# Patient Record
Sex: FEMALE | Race: WHITE | NOT HISPANIC OR LATINO | Employment: FULL TIME | URBAN - METROPOLITAN AREA
[De-identification: names, ages, dates, MRNs, and addresses within clinical notes are randomized per-mention and may not be internally consistent; named-entity substitution may affect disease eponyms.]

---

## 2017-07-20 DIAGNOSIS — Z12.31 OTHER SCREENING MAMMOGRAM: Primary | ICD-10-CM

## 2017-07-31 ENCOUNTER — HOSPITAL ENCOUNTER (OUTPATIENT)
Dept: RADIOLOGY | Facility: HOSPITAL | Age: 57
Discharge: HOME | End: 2017-07-31
Attending: NURSE PRACTITIONER
Payer: COMMERCIAL

## 2017-07-31 DIAGNOSIS — Z12.31 OTHER SCREENING MAMMOGRAM: ICD-10-CM

## 2017-07-31 PROCEDURE — 77067 SCR MAMMO BI INCL CAD: CPT | Mod: 26,,, | Performed by: RADIOLOGY

## 2017-07-31 PROCEDURE — 77067 SCR MAMMO BI INCL CAD: CPT | Mod: TC

## 2017-08-22 DIAGNOSIS — Z12.11 COLON CANCER SCREENING: ICD-10-CM

## 2018-03-02 ENCOUNTER — HOSPITAL ENCOUNTER (EMERGENCY)
Facility: HOSPITAL | Age: 58
Discharge: HOME | End: 2018-03-02
Attending: EMERGENCY MEDICINE
Payer: COMMERCIAL

## 2018-03-02 VITALS
HEIGHT: 59 IN | OXYGEN SATURATION: 98 % | RESPIRATION RATE: 16 BRPM | HEART RATE: 95 BPM | BODY MASS INDEX: 36.29 KG/M2 | TEMPERATURE: 98 F | DIASTOLIC BLOOD PRESSURE: 73 MMHG | WEIGHT: 180 LBS | SYSTOLIC BLOOD PRESSURE: 114 MMHG

## 2018-03-02 DIAGNOSIS — R52 PAIN: ICD-10-CM

## 2018-03-02 DIAGNOSIS — M25.569 KNEE PAIN, UNSPECIFIED CHRONICITY, UNSPECIFIED LATERALITY: Primary | ICD-10-CM

## 2018-03-02 LAB
BASOPHILS # BLD AUTO: 0.03 K/UL
BASOPHILS NFR BLD: 0.2 %
DIFFERENTIAL METHOD: ABNORMAL
EOSINOPHIL # BLD AUTO: 0.5 K/UL
EOSINOPHIL NFR BLD: 3.1 %
ERYTHROCYTE [DISTWIDTH] IN BLOOD BY AUTOMATED COUNT: 14 %
HCT VFR BLD AUTO: 42.3 %
HGB BLD-MCNC: 13.8 G/DL
LYMPHOCYTES # BLD AUTO: 3.1 K/UL
LYMPHOCYTES NFR BLD: 18.4 %
MCH RBC QN AUTO: 30.8 PG
MCHC RBC AUTO-ENTMCNC: 32.6 G/DL
MCV RBC AUTO: 94 FL
MONOCYTES # BLD AUTO: 1.3 K/UL
MONOCYTES NFR BLD: 7.6 %
NEUTROPHILS # BLD AUTO: 11.7 K/UL
NEUTROPHILS NFR BLD: 70.5 %
PLATELET # BLD AUTO: 256 K/UL
PMV BLD AUTO: 9.8 FL
RBC # BLD AUTO: 4.48 M/UL
WBC # BLD AUTO: 16.55 K/UL

## 2018-03-02 PROCEDURE — 63600175 PHARM REV CODE 636 W HCPCS: Performed by: NURSE PRACTITIONER

## 2018-03-02 PROCEDURE — 85025 COMPLETE CBC W/AUTO DIFF WBC: CPT

## 2018-03-02 PROCEDURE — 99284 EMERGENCY DEPT VISIT MOD MDM: CPT | Mod: 25

## 2018-03-02 PROCEDURE — 96372 THER/PROPH/DIAG INJ SC/IM: CPT

## 2018-03-02 RX ORDER — HYDROCODONE BITARTRATE AND ACETAMINOPHEN 5; 325 MG/1; MG/1
1 TABLET ORAL EVERY 6 HOURS PRN
Qty: 7 TABLET | Refills: 0 | Status: SHIPPED | OUTPATIENT
Start: 2018-03-02 | End: 2018-05-14

## 2018-03-02 RX ORDER — KETOROLAC TROMETHAMINE 30 MG/ML
30 INJECTION, SOLUTION INTRAMUSCULAR; INTRAVENOUS
Status: COMPLETED | OUTPATIENT
Start: 2018-03-02 | End: 2018-03-02

## 2018-03-02 RX ADMIN — KETOROLAC TROMETHAMINE 30 MG: 30 INJECTION, SOLUTION INTRAMUSCULAR at 07:03

## 2018-03-03 NOTE — DISCHARGE INSTRUCTIONS
Follow-up with PCP within 2-3 days. Return to ED if symptoms worsen or change, such as fever, chills, increased swelling or redness to area.

## 2018-03-03 NOTE — ED PROVIDER NOTES
"Encounter Date: 3/2/2018       History     Chief Complaint   Patient presents with    Joint Swelling     right knee swelling and tenderness radiating to calf area for 3 days.  Denies shortness of breath     Pt is a 57-year-old female with pmhx of total knee surgery on R knee and hidradenitis suppurativa who presents today with R knee pain and swelling x 2 days. Pt denies any trauma or injury to R knee. Reports she has been told that she has arthritis, but is currently not taking any medication for it. States that she had a total knee replacement on her R knee 2 years ago. Denies any recent surgeries or hospitalizations. Pt reports that she takes weekly Humira injections for her skin condition.  Pt describes the pain as a throbbing pain. Pt has tried ice and aleve at home with no relief. Reports that she can walk but it hurts and she has noticed that she is starting to "limp."  She also reports that she thinks that she had some chills last night with nausea. Denies chills and nausea at this time. Denies fever and vomiting. Pt also denies dizziness, headache, SOB, chest pain, and abdominal pain. Pt denies any other complaints at this time.       The history is provided by the patient.   Leg Pain    The incident occurred at home. There was no injury mechanism. The incident occurred two days ago. Pain location: right knee. The quality of the pain is described as throbbing. The pain is at a severity of 9/10. The pain has been constant since onset. Pertinent negatives include no numbness, no inability to bear weight, no loss of motion, no muscle weakness, no loss of sensation and no tingling. She reports no foreign bodies present. The symptoms are aggravated by palpation, bearing weight and activity. She has tried ice and NSAIDs for the symptoms. The treatment provided no relief.     Review of patient's allergies indicates:   Allergen Reactions    Morphine Other (See Comments)     headaches    Pcn [penicillins] Nausea " And Vomiting    Latex, natural rubber Rash     Past Medical History:   Diagnosis Date    HS (hereditary spherocytosis)      Past Surgical History:   Procedure Laterality Date    achilles tendon repair      CHOLECYSTECTOMY      TOTAL KNEE ARTHROPLASTY       Family History   Problem Relation Age of Onset    Cancer Mother     Cancer Father      lung CA    Cancer Son      wade    Breast cancer Sister     Liver cancer Maternal Uncle      Social History   Substance Use Topics    Smoking status: Current Some Day Smoker     Types: Vaping with nicotine    Smokeless tobacco: Not on file    Alcohol use Yes      Comment: occ     Review of Systems   Constitutional: Negative for chills and fever.   Respiratory: Negative for chest tightness, shortness of breath and wheezing.    Cardiovascular: Negative for chest pain, palpitations and leg swelling.   Gastrointestinal: Negative for abdominal pain, nausea and vomiting.   Genitourinary: Negative for dysuria.   Musculoskeletal: Positive for joint swelling. Negative for back pain, gait problem, neck pain and neck stiffness.   Skin: Negative for rash.   Allergic/Immunologic: Positive for immunocompromised state.   Neurological: Negative for dizziness, tingling, weakness, light-headedness, numbness and headaches.   Hematological: Does not bruise/bleed easily.       Physical Exam     Initial Vitals [03/02/18 1807]   BP Pulse Resp Temp SpO2   138/85 94 16 97.7 °F (36.5 °C) 99 %      MAP       102.67         Physical Exam    Nursing note and vitals reviewed.  Constitutional: Vital signs are normal. She appears well-developed and well-nourished. She is Obese . She is cooperative.  Non-toxic appearance. She does not have a sickly appearance.   Obese   HENT:   Head: Normocephalic and atraumatic.   Right Ear: Hearing normal.   Left Ear: Hearing normal.   Nose: Nose normal.   Mouth/Throat: Oropharynx is clear and moist.   Eyes: Conjunctivae, EOM and lids are normal. Pupils are  equal, round, and reactive to light.   Neck: Normal range of motion and phonation normal. Neck supple. No tracheal deviation present.   Cardiovascular: Normal rate, regular rhythm and normal heart sounds.   Pulses:       Radial pulses are 2+ on the right side, and 2+ on the left side.        Dorsalis pedis pulses are 2+ on the right side, and 2+ on the left side.   Pulmonary/Chest: Effort normal and breath sounds normal. No respiratory distress. She has no decreased breath sounds.   Abdominal: Soft. Bowel sounds are normal.   Musculoskeletal:        Right knee: She exhibits swelling. She exhibits normal range of motion, no effusion, no ecchymosis, no deformity, no laceration, no erythema and no bony tenderness. Tenderness found.        Legs:  Neurological: She is alert and oriented to person, place, and time. She has normal strength. No sensory deficit. Gait normal.   Skin: Skin is warm, dry and intact. Capillary refill takes less than 2 seconds.   Psychiatric: Her speech is normal.         ED Course   Procedures  Labs Reviewed   CBC W/ AUTO DIFFERENTIAL - Abnormal; Notable for the following:        Result Value    WBC 16.55 (*)     Gran # (ANC) 11.7 (*)     Mono # 1.3 (*)     All other components within normal limits         Imaging Results          X-Ray Knee 3 View Right (Final result)  Result time 03/02/18 19:16:43    Final result by Kaleb Rodriguez MD (03/02/18 19:16:43)                 Impression:      No acute fracture.    Status post prior right total knee arthroplasty.  Orthopedic appliances appear intact and in good position and alignment.    Soft tissue swelling.        Electronically signed by: KALEB RODRIGUEZ MD  Date:     03/02/18  Time:    19:16              Narrative:    History: .    Right knee 3 views:    Status post prior right total knee arthroplasty.  Orthopedic appliances appear intact and in good position and alignment.    No acute fractures or dislocations.  Mild soft tissue swelling about  the knee.                                 Medical Decision Making:   Initial Assessment:   Pt is a 57-year-old with pmhx of R total knee replacement and Hs who presents today with R knee pain and swelling x 2 days. Pt has tenderness, swelling, and decreased ROM to R knee due to pain. No redness noted. No deformity or bony tenderness noted. DP 2+ bilaterally by palpation. Pt ambulatory.   Differential Diagnosis:   Arthritis, bursitis, septic arthritis, osteomyelitis   Independently Interpreted Test(s):   I have ordered and independently interpreted X-rays - see prior notes.  Clinical Tests:   Lab Tests: Reviewed       <> Summary of Lab: Benign  ED Management:  Xray, CBC, toradol  Xray normal. WBC elevated but no left shift. Pt's pain and swelling to R knee most likely due to arthritis. Pt instructed to f/u with PCP within 2-3 days. Pt instructed to return to emergency department if symptoms worsen or change, such as increased pain, swelling, warmth or redness to area. Pt verbalized d/c instructions and is in compliance and agreement with tx plan.     Rx: Norco                   Attending Attestation:     Physician Attestation Statement for NP/PA:   I have conducted a face to face encounter with this patient in addition to the NP/PA, due to NP/PA Request    Other NP/PA Attestation Additions:    History of Present Illness: Agree; 49-year-old female present see emergency department complaining of right knee pain and swelling.  Onset about 2 days ago.  She reports a constant swelling and pain that is worse with angulation without alleviating factors.  Described as aching and throbbing.  History of total knee replacement in that leg.  Minimal relief with ice and over-the-counter Aleve at home.  No other symptoms reported.  Denies any fever.   Physical Exam: agree   Medical Decision Making: Agree; x-ray and labs benign; patient instructed on symptomatically management, follow-up with orthopedist in her primary care  physician, reasons to return to the emergency room                    Clinical Impression:   The primary encounter diagnosis was Knee pain, unspecified chronicity, unspecified laterality. A diagnosis of Pain was also pertinent to this visit.                           Campos Nova NP  03/02/18 2013       Cj Apodaca MD  03/05/18 6843

## 2018-03-03 NOTE — ED NOTES
Report received, pt care assumed. Pt sitting up on stretcher, ELLIE, VSS. Dr. Apodaca at bedside evaluating pt. Pt updated on plan of care. Will continue to monitor.

## 2018-03-05 ENCOUNTER — OFFICE VISIT (OUTPATIENT)
Dept: INTERNAL MEDICINE | Facility: CLINIC | Age: 58
End: 2018-03-05
Payer: COMMERCIAL

## 2018-03-05 VITALS
BODY MASS INDEX: 39.85 KG/M2 | DIASTOLIC BLOOD PRESSURE: 64 MMHG | HEART RATE: 106 BPM | WEIGHT: 197.31 LBS | SYSTOLIC BLOOD PRESSURE: 100 MMHG | OXYGEN SATURATION: 95 %

## 2018-03-05 DIAGNOSIS — L30.3 PUSTULAR ECZEMA: ICD-10-CM

## 2018-03-05 DIAGNOSIS — M25.561 ACUTE PAIN OF RIGHT KNEE: Primary | ICD-10-CM

## 2018-03-05 PROCEDURE — 99999 PR PBB SHADOW E&M-EST. PATIENT-LVL IV: CPT | Mod: PBBFAC,,, | Performed by: INTERNAL MEDICINE

## 2018-03-05 PROCEDURE — 99213 OFFICE O/P EST LOW 20 MIN: CPT | Mod: S$GLB,,, | Performed by: INTERNAL MEDICINE

## 2018-03-05 RX ORDER — NAPROXEN 500 MG/1
500 TABLET ORAL 2 TIMES DAILY PRN
Qty: 10 TABLET | Refills: 0 | Status: SHIPPED | OUTPATIENT
Start: 2018-03-05 | End: 2018-05-14

## 2018-03-05 NOTE — PROGRESS NOTES
Subjective:       Patient ID: Radha Ervin is a 57 y.o. female.    Chief Complaint: Knee Pain (right)    HPI Mrs. Ervin is a 57-year-old female who presents with a chief complaint of right knee pain.  She is a patient of Dr. Valdivia and this is her first visit with me.Patient states that she had right knee replacement 2 years ago.  She was doing well until Friday when she began to experience pain in the right knee.  She describes this pain as a soreness.  It is 7 out of 10 in severity.  She went to the emergency department on Friday evening for this pain.  X-rays were obtained which were normal.  She has associated swelling but no redness or warmth of the knee.  She was given hydrocodone/acetaminophen which she has been taking; she is also been taking ibuprofen; however she has got no relief from her pain.  Of note she was following with Dr. Velasquez who performed her knee replacement surgery.  However due to a change in her insurance she can no longer see him and she needs to establish with a different orthopedist here at [facility].    She complains of bumps on her hands which have been present for some time. She follows with dermatology. They told her it was some form of eczema. She was prescribed a z-pack. The lesions got better but are now back. They are itchy.    Review of Systems   Musculoskeletal: Positive for joint swelling (see hpi).   Skin: Positive for rash.       Objective:      Physical Exam   Constitutional: She is oriented to person, place, and time. She appears well-developed and well-nourished. No distress.   Musculoskeletal: She exhibits edema. She exhibits no deformity.   Right knee: +edema, vertical incisional scar-well healed. No erythema, warmth. +crepitus felt on ROM activities but able to perform active and passive full ROM   Neurological: She is alert and oriented to person, place, and time.   Skin: Skin is warm and dry. She is not diaphoretic.   Numerous white pustules on an erythematous  base on bilateral hands   Psychiatric: She has a normal mood and affect. Her behavior is normal. Judgment and thought content normal.   Vitals reviewed.      Assessment:       1. Acute pain of right knee    2. Pustular eczema        Plan:     1. Acute pain of right knee  -I have reviewed both the patient's x-rays and the x-ray report, which did not reveal any acute changes  -Prescribing naproxen 500 mg PO BID PRN pain X 5 days, lidocaine 1 application BID PRN pain  -Apply ice or heat to the site  -Referral placed to orthopedics  -Discussed with patient that if any signs of infection such as fever, warmth or redness of knee, she should go to emergency department for evaluation. She understood    2. Pustular eczema  -Patient has appt with dermatology at 4:40 today    RTC for worsening symptoms  RTC PRN

## 2018-03-07 ENCOUNTER — TELEPHONE (OUTPATIENT)
Dept: INTERNAL MEDICINE | Facility: CLINIC | Age: 58
End: 2018-03-07

## 2018-03-07 NOTE — TELEPHONE ENCOUNTER
----- Message from Diana Cardenas sent at 3/7/2018 11:50 AM CST -----  Contact: [phone]/self  Patient called in returning your call. Please advise.

## 2018-03-07 NOTE — TELEPHONE ENCOUNTER
Called pt , no answer, left voicemail adv that no one from Dr. Pedersen office called pt to my knowing and that if pt has any other questions call office back at [phone]!

## 2018-03-22 ENCOUNTER — OFFICE VISIT (OUTPATIENT)
Dept: ORTHOPEDICS | Facility: CLINIC | Age: 58
End: 2018-03-22
Payer: COMMERCIAL

## 2018-03-22 DIAGNOSIS — M65.9 SYNOVITIS OF RIGHT KNEE: Primary | ICD-10-CM

## 2018-03-22 PROCEDURE — 99999 PR PBB SHADOW E&M-EST. PATIENT-LVL I: CPT | Mod: PBBFAC,,, | Performed by: ORTHOPAEDIC SURGERY

## 2018-03-22 PROCEDURE — 99214 OFFICE O/P EST MOD 30 MIN: CPT | Mod: S$GLB,,, | Performed by: ORTHOPAEDIC SURGERY

## 2018-03-22 NOTE — PROGRESS NOTES
Subjective:      Patient ID: Radha Ervin is a 57 y.o. female.    Chief Complaint: No chief complaint on file.  Right knee pain  HPI     Tthe patient reports pain and swelling in her right total knee which was  Replaced about 2 years ago..  It started acutely while taking Humira.  She has a fairly generalized rash and some swelling in all her extremities.  Denies fever and chills.  Stopped Humira 2 weeks ago and feels much better already.    Review of Systems   Constitution: Negative for fever and weight loss.   HENT: Negative for congestion.    Eyes: Negative for visual disturbance.   Cardiovascular: Negative for chest pain.   Respiratory: Negative for shortness of breath.    Hematologic/Lymphatic: Negative for bleeding problem. Does not bruise/bleed easily.   Skin: Positive for rash. Negative for poor wound healing.   Musculoskeletal: Positive for joint pain and joint swelling.   Gastrointestinal: Negative for abdominal pain.   Genitourinary: Negative for dysuria.   Neurological: Negative for seizures.   Psychiatric/Behavioral: Negative for altered mental status.   Allergic/Immunologic: Negative for persistent infections.         Objective:            Ortho/SPM Exam    Right Knee    There were no vitals filed for this visit.    The patient is not in acute distress.   Body habitus is obese.   The patient walks without a limp.  Resisted SLR negative.   The skin over the knee is has a healed scar.  Knee effusion 1+  Tendernes is located absent  Range of motion- Flexion 115 deg, Extension 0 deg,   Ligament exam:   MCL intact     Pulses DP present, PT present.  Motor normal 5/5 strength in all tested muscle groups.   Sensory normal.    There were no vitals filed for this visit.    I reviewed the relevant radiographic images for the patient's condition:a well-positioned right total knee is present without visible complicating  process            Assessment:       Encounter Diagnosis   Name Primary?    Synovitis of  right knee Yes            Based on spontaneous improvement, this appears to be a drug reaction. I'm still concerned about the possibility of latent infection  Plan:       Diagnoses and all orders for this visit:    Synovitis of right knee      I explained my diagnostic impression and the reasoning behind it in detail, using layman's terms.      I discussed aspiration with the patient and she hopes to avoid this.    Considering the improvement, infection is less likely    I recommend avoidance of vigorous activity.  Patient advised to call my office or go to the emergency room  for acute fever or worsening

## 2018-03-22 NOTE — LETTER
03/22/2018    To whom it may concern:    Radha Ervin is under my medical care. She was seen today and may return to work/school on Tomorrow with the following restrictions: sit down work only for 10 days.    Yours truly,        Flex De La Cruz MD

## 2018-03-22 NOTE — LETTER
March 22, 2018      Melba Pedersen MD  [address] [facility] [address]           [facility]  [address] [facility] [address]  Phone: [phone]          Patient: Radha Ervin   MR Number: [id number]   YOB: 1960   Date of Visit: 3/22/2018       Dear Dr. Melba Pedersen:    Thank you for referring Radha Ervin to me for evaluation. Attached you will find relevant portions of my assessment and plan of care.    If you have questions, please do not hesitate to call me. I look forward to following Radha Ervin along with you.    Sincerely,    Flex De La Cruz MD    Enclosure  CC:  No Recipients    If you would like to receive this communication electronically, please contact [email or web address] or [phone] to request more information on [facility] access.    For providers and/or their staff who would like to refer a patient to [facility], please contact us through our one-stop-shop provider referral line, [facility] , at [phone].    If you feel you have received this communication in error or would no longer like to receive these types of communications, please e-mail [email or web address]

## 2018-04-03 ENCOUNTER — OFFICE VISIT (OUTPATIENT)
Dept: ORTHOPEDICS | Facility: CLINIC | Age: 58
End: 2018-04-03
Payer: COMMERCIAL

## 2018-04-03 VITALS
WEIGHT: 197 LBS | WEIGHT: 197 LBS | HEIGHT: 59 IN | BODY MASS INDEX: 39.72 KG/M2 | HEIGHT: 59 IN | BODY MASS INDEX: 39.72 KG/M2

## 2018-04-03 DIAGNOSIS — M65.9 SYNOVITIS OF RIGHT KNEE: Primary | ICD-10-CM

## 2018-04-03 PROCEDURE — 99999 PR PBB SHADOW E&M-EST. PATIENT-LVL III: CPT | Mod: PBBFAC,,, | Performed by: ORTHOPAEDIC SURGERY

## 2018-04-03 PROCEDURE — 99499 UNLISTED E&M SERVICE: CPT | Mod: S$GLB,,, | Performed by: ORTHOPAEDIC SURGERY

## 2018-04-03 PROCEDURE — 99213 OFFICE O/P EST LOW 20 MIN: CPT | Mod: S$GLB,,, | Performed by: ORTHOPAEDIC SURGERY

## 2018-04-03 NOTE — LETTER
04/03/2018    To whom it may concern:    Radha Ervin is under my medical care. She was seen today and may return to work/school on tomorrow with the following restrictions: No standing for over one hour.    Yours truly,        Flex De La Cruz MD

## 2018-04-03 NOTE — PROGRESS NOTES
"Subjective:      Patient ID: Radha Ervin is a 57 y.o. female.    Chief Complaint: Pain of the Right Knee      HPI:  The patient is seen for postop follow-up of right  TKA.  Pain control has been satisfactory  They feel that they are ambulating easily  Preoperative complaints include: Compared to the previous visit the patient reports that her discomfort and swelling are much improved      Current Outpatient Prescriptions:     ADALIMUMAB (HUMIRA SUBQ), Inject into the skin., Disp: , Rfl:     hydrocodone-acetaminophen 5-325mg (NORCO) 5-325 mg per tablet, Take 1 tablet by mouth every 6 (six) hours as needed for Pain., Disp: 7 tablet, Rfl: 0    lidocaine 5 % Gel, Apply 1 application topically 2 (two) times daily as needed (pain)., Disp: 30 g, Rfl: 0    naproxen (NAPROSYN) 500 MG tablet, Take 1 tablet (500 mg total) by mouth 2 (two) times daily as needed (pain)., Disp: 10 tablet, Rfl: 0  Review of patient's allergies indicates:   Allergen Reactions    Morphine Other (See Comments)     headaches    Pcn [penicillins] Nausea And Vomiting    Latex, natural rubber Rash       Ht 4' 11" (1.499 m)   Wt 89.4 kg (197 lb)   LMP  (LMP Unknown)   BMI 39.79 kg/m²     ROS        Objective:    Ortho Exam          Alert, oriented, no acute distress  non-ataxic  wound margins intact and healing well.  No signs of infection.  Range of motion: extension- 0 degrees; flexion- 135 degrees  Valgus/varus stability- stable  mild  Effusion is trace at most  Assessment:     Imaging: NA        1. Synovitis of right knee        The synovitis and swelling was apparently a drug reaction which is resolving        Plan:          No Follow-up on file.    The patient and I discussed the situation.  Considering the very unusual presentation, I would like to follow-up until he returns to baseline.  I warned the patient to go to the ER call for any acute worsening        "

## 2018-05-14 ENCOUNTER — OFFICE VISIT (OUTPATIENT)
Dept: ORTHOPEDICS | Facility: CLINIC | Age: 58
End: 2018-05-14
Payer: COMMERCIAL

## 2018-05-14 DIAGNOSIS — M65.9 SYNOVITIS OF RIGHT KNEE: Primary | ICD-10-CM

## 2018-05-14 PROCEDURE — 99999 PR PBB SHADOW E&M-EST. PATIENT-LVL II: CPT | Mod: PBBFAC,,, | Performed by: ORTHOPAEDIC SURGERY

## 2018-05-14 PROCEDURE — 99212 OFFICE O/P EST SF 10 MIN: CPT | Mod: S$GLB,,, | Performed by: ORTHOPAEDIC SURGERY

## 2018-05-14 NOTE — PROGRESS NOTES
Subjective:      Patient ID: Radha Ervin is a 57 y.o. female.    Chief Complaint: Follow-up of the Right Knee and Follow-up (right knee total replacement )      HPI:  Follow-up for swelling of right total knee.  This was apparently a drug reaction.  Patient reports that the knee has returned to baseline.    No current outpatient prescriptions on file.  Review of patient's allergies indicates:   Allergen Reactions    Morphine Other (See Comments)     headaches    Pcn [penicillins] Nausea And Vomiting    Latex, natural rubber Rash       LMP  (LMP Unknown)     ROS        Objective:    Ortho Exam          Alert, oriented, no acute distress  non-ataxic  wound margins intact and healing well.  No signs of infection.   No effusion  Range of motion: extension- 0 degrees; flexion- 120 degrees  Valgus/varus stability- stable  none    Assessment:     Imaging: na        1. Synovitis of right knee        Unusual drug reaction-apparently resolving        Plan:          No Follow-up on file. no further intervention for now.  Patient advised to return for any acute recurrence  Otherwise follow-up in one year with x-ray

## 2018-06-08 DIAGNOSIS — Z11.59 NEED FOR HEPATITIS C SCREENING TEST: ICD-10-CM

## 2018-06-30 ENCOUNTER — OFFICE VISIT (OUTPATIENT)
Dept: URGENT CARE | Facility: CLINIC | Age: 58
End: 2018-06-30
Payer: COMMERCIAL

## 2018-06-30 VITALS
WEIGHT: 170 LBS | DIASTOLIC BLOOD PRESSURE: 82 MMHG | SYSTOLIC BLOOD PRESSURE: 122 MMHG | RESPIRATION RATE: 18 BRPM | HEIGHT: 59 IN | OXYGEN SATURATION: 95 % | TEMPERATURE: 98 F | HEART RATE: 101 BPM | BODY MASS INDEX: 34.27 KG/M2

## 2018-06-30 DIAGNOSIS — R30.0 DYSURIA: Primary | ICD-10-CM

## 2018-06-30 DIAGNOSIS — N30.00 ACUTE CYSTITIS WITHOUT HEMATURIA: ICD-10-CM

## 2018-06-30 DIAGNOSIS — L20.89 OTHER ATOPIC DERMATITIS: ICD-10-CM

## 2018-06-30 LAB
BILIRUB UR QL STRIP: POSITIVE
GLUCOSE UR QL STRIP: NEGATIVE
KETONES UR QL STRIP: POSITIVE
LEUKOCYTE ESTERASE UR QL STRIP: POSITIVE
PH, POC UA: 5.5 (ref 5–8)
POC BLOOD, URINE: NEGATIVE
POC NITRATES, URINE: NEGATIVE
PROT UR QL STRIP: POSITIVE
SP GR UR STRIP: 1.03 (ref 1–1.03)
UROBILINOGEN UR STRIP-ACNC: 1 (ref 0.1–1.1)

## 2018-06-30 PROCEDURE — 3008F BODY MASS INDEX DOCD: CPT | Mod: CPTII,S$GLB,, | Performed by: EMERGENCY MEDICINE

## 2018-06-30 PROCEDURE — 99214 OFFICE O/P EST MOD 30 MIN: CPT | Mod: 25,S$GLB,, | Performed by: EMERGENCY MEDICINE

## 2018-06-30 PROCEDURE — 81003 URINALYSIS AUTO W/O SCOPE: CPT | Mod: QW,S$GLB,, | Performed by: EMERGENCY MEDICINE

## 2018-06-30 RX ORDER — SULFAMETHOXAZOLE AND TRIMETHOPRIM 800; 160 MG/1; MG/1
1 TABLET ORAL 2 TIMES DAILY
Qty: 6 TABLET | Refills: 0 | Status: SHIPPED | OUTPATIENT
Start: 2018-06-30 | End: 2018-07-02

## 2018-06-30 NOTE — PROGRESS NOTES
"Subjective:       Patient ID: Radha Ervin is a 57 y.o. female.    Vitals:  height is 4' 11" (1.499 m) and weight is 77.1 kg (170 lb). Her oral temperature is 98.1 °F (36.7 °C). Her blood pressure is 122/82 and her pulse is 101. Her respiration is 18 and oxygen saturation is 95%.     Chief Complaint: Dysuria    Dysuria, nausea, and frequency that started today    She also has a rash to her hands and feet since February, she's seen dermatology, they've done a bx and diagnosed her with eczema.      Dysuria    This is a new problem. The current episode started today. The problem has been unchanged. The quality of the pain is described as aching. The pain is at a severity of 3/10. There has been no fever. Associated symptoms include frequency, nausea and urgency. Pertinent negatives include no chills, hematuria or vomiting. She has tried nothing for the symptoms.     Review of Systems   Constitution: Negative for chills and fever.   Skin: Negative for itching.   Musculoskeletal: Negative for back pain.   Gastrointestinal: Positive for nausea. Negative for abdominal pain and vomiting.   Genitourinary: Positive for dysuria, frequency and urgency. Negative for genital sores, hematuria, missed menses and non-menstrual bleeding.       Objective:      Physical Exam   Constitutional: She is oriented to person, place, and time. She appears well-developed and well-nourished.   HENT:   Head: Normocephalic and atraumatic.   Cardiovascular: Normal rate, regular rhythm and normal heart sounds.    No murmur heard.  Pulmonary/Chest: Effort normal and breath sounds normal. No respiratory distress. She has no wheezes.   Abdominal: There is no CVA tenderness.   Musculoskeletal: Normal range of motion.   Neurological: She is alert and oriented to person, place, and time.   Skin: Skin is warm and dry.   Dried patches, mild erythema noted to B palms, also +dermataphyte to thumb nails.   Psychiatric: She has a normal mood and affect. Her " behavior is normal.   Nursing note and vitals reviewed.      Assessment:       1. Dysuria    2. Acute cystitis without hematuria        Plan:         Dysuria  -     POCT Urinalysis, Dipstick, Automated, W/O Scope    Acute cystitis without hematuria  -     sulfamethoxazole-trimethoprim 800-160mg (BACTRIM DS) 800-160 mg Tab; Take 1 tablet by mouth 2 (two) times daily. for 3 days  Dispense: 6 tablet; Refill: 0  udip +Leuk, will treat with Bactrim, f/u if not improved    Other atopic dermatitis  Follow up with dermatology as directed.

## 2018-06-30 NOTE — PATIENT INSTRUCTIONS
Follow up with primary care provider if not improved  Go to ER for new, worse or concerning symptoms    Anatomy of the Female Urinary Tract  Your urinary tract helps get rid of urine (your bodys liquid waste). The kidneys collect chemicals and water your body doesnt need. This is turned into urine. Urine travels out of the kidneys through the ureters to the bladder. The bladder holds urine until youre ready to release it. The urethra carries urine from the bladder out of the body. The main sphincter muscle circles the mid-urethra.      Front view of female urinary tract.     Date Last Reviewed: 1/1/2017  © 5570-8591 [employer]. [address] [place] [address], [place] [address]. All rights reserved. This information is not intended as a substitute for professional medical care. Always follow your healthcare professional's instructions.

## 2018-07-02 ENCOUNTER — OFFICE VISIT (OUTPATIENT)
Dept: URGENT CARE | Facility: CLINIC | Age: 58
End: 2018-07-02
Payer: COMMERCIAL

## 2018-07-02 VITALS
RESPIRATION RATE: 18 BRPM | OXYGEN SATURATION: 97 % | HEIGHT: 59 IN | DIASTOLIC BLOOD PRESSURE: 71 MMHG | WEIGHT: 170 LBS | TEMPERATURE: 98 F | SYSTOLIC BLOOD PRESSURE: 106 MMHG | BODY MASS INDEX: 34.27 KG/M2 | HEART RATE: 105 BPM

## 2018-07-02 DIAGNOSIS — R39.15 URINARY URGENCY: ICD-10-CM

## 2018-07-02 DIAGNOSIS — N30.00 ACUTE CYSTITIS WITHOUT HEMATURIA: Primary | ICD-10-CM

## 2018-07-02 LAB
BILIRUB UR QL STRIP: POSITIVE
GLUCOSE UR QL STRIP: NEGATIVE
KETONES UR QL STRIP: NEGATIVE
LEUKOCYTE ESTERASE UR QL STRIP: POSITIVE
PH, POC UA: 5.5 (ref 5–8)
POC BLOOD, URINE: NEGATIVE
POC NITRATES, URINE: NEGATIVE
PROT UR QL STRIP: POSITIVE
SP GR UR STRIP: 1.03 (ref 1–1.03)
UROBILINOGEN UR STRIP-ACNC: NORMAL (ref 0.1–1.1)

## 2018-07-02 PROCEDURE — 99000 SPECIMEN HANDLING OFFICE-LAB: CPT | Mod: S$GLB,,, | Performed by: PHYSICIAN ASSISTANT

## 2018-07-02 PROCEDURE — 99214 OFFICE O/P EST MOD 30 MIN: CPT | Mod: 25,S$GLB,, | Performed by: PHYSICIAN ASSISTANT

## 2018-07-02 PROCEDURE — 81003 URINALYSIS AUTO W/O SCOPE: CPT | Mod: QW,S$GLB,, | Performed by: PHYSICIAN ASSISTANT

## 2018-07-02 RX ORDER — NITROFURANTOIN 25; 75 MG/1; MG/1
100 CAPSULE ORAL 2 TIMES DAILY
Qty: 14 CAPSULE | Refills: 0 | Status: SHIPPED | OUTPATIENT
Start: 2018-07-02 | End: 2018-07-09

## 2018-07-02 NOTE — PROGRESS NOTES
"Subjective:       Patient ID: Radha Ervin is a 57 y.o. female.    Vitals:  height is 4' 11" (1.499 m) and weight is 77.1 kg (170 lb). Her temperature is 98.3 °F (36.8 °C). Her blood pressure is 106/71 and her pulse is 105. Her respiration is 18 and oxygen saturation is 97%.     Chief Complaint: Urinary Tract Infection    Urinary Tract Infection    This is a new problem. The current episode started in the past 7 days (2 DAYS AGO). The problem has been gradually worsening. The quality of the pain is described as burning. The pain is at a severity of 2/10. The pain is mild. She is not sexually active. History of pyelonephritis: HX OF KIDNEY STONES. Associated symptoms include frequency, sweats and urgency. Pertinent negatives include no chills, hematuria, nausea or vomiting. She has tried antibiotics (BACTRIM - 2 DAYS AGO) for the symptoms. The treatment provided no relief. Her past medical history is significant for kidney stones.     Review of Systems   Constitution: Negative for chills and fever.   Skin: Negative for itching.   Musculoskeletal: Negative for back pain.   Gastrointestinal: Negative for abdominal pain, nausea and vomiting.   Genitourinary: Positive for dysuria, frequency, incomplete emptying and urgency. Negative for genital sores, hematuria, missed menses and non-menstrual bleeding.       Objective:      Physical Exam   Constitutional: She is oriented to person, place, and time. She appears well-developed and well-nourished.   HENT:   Head: Normocephalic and atraumatic.   Right Ear: External ear normal.   Left Ear: External ear normal.   Nose: Nose normal.   Mouth/Throat: Mucous membranes are normal.   Eyes: Conjunctivae and lids are normal.   Neck: Trachea normal and full passive range of motion without pain. Neck supple.   Cardiovascular: Normal rate, regular rhythm and normal heart sounds.    Pulmonary/Chest: Effort normal and breath sounds normal. No respiratory distress.   Abdominal: Soft. " "Normal appearance and bowel sounds are normal. She exhibits no distension, no abdominal bruit, no pulsatile midline mass and no mass. There is no hepatosplenomegaly. There is no tenderness. There is no rigidity, no rebound, no guarding, no CVA tenderness, no tenderness at McBurney's point and negative Brown's sign. No hernia.   Musculoskeletal: Normal range of motion. She exhibits no edema.   Neurological: She is alert and oriented to person, place, and time. She has normal strength.   Skin: Skin is warm, dry and intact. She is not diaphoretic. No pallor.   Psychiatric: She has a normal mood and affect. Her speech is normal and behavior is normal. Judgment and thought content normal. Cognition and memory are normal.   Nursing note and vitals reviewed.      Assessment:       1. Acute cystitis without hematuria    2. Urinary urgency        Plan:         Acute cystitis without hematuria  -     Urine culture  -     nitrofurantoin, macrocrystal-monohydrate, (MACROBID) 100 MG capsule; Take 1 capsule (100 mg total) by mouth 2 (two) times daily. for 7 days  Dispense: 14 capsule; Refill: 0    Urinary urgency  -     POCT Urinalysis, Dipstick, Automated, W/O Scope        Bladder Infection, Female (Adult)    Urine is normally doesn't have any bacteria in it. But bacteria can get into the urinary tract from the skin around the rectum. Or they can travel in the blood from elsewhere in the body. Once they are in your urinary tract, they can cause infection in the urethra (urethritis), the bladder (cystitis), or the kidneys (pyelonephritis).  The most common place for an infection is in the bladder. This is called a bladder infection. This is one of the most common infections in women. Most bladder infections are easily treated. They are not serious unless the infection spreads to the kidney.  The phrases "bladder infection," "UTI," and "cystitis" are often used to describe the same thing. But they are not always the same. " Cystitis is an inflammation of the bladder. The most common cause of cystitis is an infection.  Symptoms  The infection causes inflammation in the urethra and bladder. This causes many of the symptoms. The most common symptoms of a bladder infection are:  · Pain or burning when urinating  · Having to urinate more often than usual  · Urgent need to urinate  · Only a small amount of urine comes out  · Blood in urine  · Abdominal discomfort. This is usually in the lower abdomen above the pubic bone.  · Cloudy urine  · Strong- or bad-smelling urine  · Unable to urinate (urinary retention)  · Unable to hold urine in (urinary incontinence)  · Fever  · Loss of appetite  · Confusion (in older adults)  Causes  Bladder infections are not contagious. You can't get one from someone else, from a toilet seat, or from sharing a bath.  The most common cause of bladder infections is bacteria from the bowels. The bacteria get onto the skin around the opening of the urethra. From there, they can get into the urine and travel up to the bladder, causing inflammation and infection. This usually happens because of:  · Wiping improperly after urinating. Always wipe from front to back.  · Bowel incontinence  · Pregnancy  · Procedures such as having a catheter inserted  · Older age  · Not emptying your bladder. This can allow bacteria a chance to grow in your urine.  · Dehydration  · Constipation  · Sex  · Use of a diaphragm for birth control   Treatment  Bladder infections are diagnosed by a urine test. They are treated with antibiotics and usually clear up quickly without complications. Treatment helps prevent a more serious kidney infection.  Medicines  Medicines can help in the treatment of a bladder infection:  · Take antibiotics until they are used up, even if you feel better. It is important to finish them to make sure the infection has cleared.  · You can use acetaminophen or ibuprofen for pain, fever, or discomfort, unless another  medicine was prescribed. If you have chronic liver or kidney disease, talk with your healthcare provider before using these medicines. Also talk with your provider if you've ever had a stomach ulcer or gastrointestinal bleeding, or are taking blood-thinner medicines.  · If you are given phenazopydridine to reduce burning with urination, it will cause your urine to become a bright orange color. This can stain clothing.  Care and prevention  These self-care steps can help prevent future infections:  · Drink plenty of fluids to prevent dehydration and flush out your bladder. Do this unless you must restrict fluids for other health reasons, or your doctor told you not to.  · Proper cleaning after going to the bathroom is important. Wipe from front to back after using the toilet to prevent the spread of bacteria.  · Urinate more often. Don't try to hold urine in for a long time.  · Wear loose-fitting clothes and cotton underwear. Avoid tight-fitting pants.  · Improve your diet and prevent constipation. Eat more fresh fruit and vegetables, and fiber, and less junk and fatty foods.  · Avoid sex until your symptoms are gone.  · Avoid caffeine, alcohol, and spicy foods. These can irritate your bladder.  · Urinate right after intercourse to flush out your bladder.  · If you use birth control pills and have frequent bladder infections, discuss it with your doctor.  Follow-up care  Call your healthcare provider if all symptoms are not gone after 3 days of treatment. This is especially important if you have repeat infections.  If a culture was done, you will be told if your treatment needs to be changed. If directed, you can call to find out the results.  If X-rays were done, you will be told if the results will affect your treatment.  Call 911  Call 911 if any of the following occur:  · Trouble breathing  · Hard to wake up or confusion  · Fainting or loss of consciousness  · Rapid heart rate  When to seek medical advice  Call  your healthcare provider right away if any of these occur:  · Fever of 100.4ºF (38.0ºC) or higher, or as directed by your healthcare provider  · Symptoms are not better by the third day of treatment  · Back or belly (abdominal) pain that gets worse  · Repeated vomiting, or unable to keep medicine down  · Weakness or dizziness  · Vaginal discharge  · Pain, redness, or swelling in the outer vaginal area (labia)  Date Last Reviewed: 10/1/2016  © 5616-2976 [employer] [address] [place] [id number]. All rights reserved. This information is not intended as a substitute for professional medical care. Always follow your healthcare professional's instructions.      Please follow up with your Primary care provider within 2-5 days if your signs and symptoms have not resolved or worsen.     If your condition worsens or fails to improve we recommend that you receive another evaluation at the emergency room immediately or contact your primary medical clinic to discuss your concerns.   You must understand that you have received an Urgent Care treatment only and that you may be released before all of your medical problems are known or treated. You, the patient, will arrange for follow up care as instructed.

## 2018-07-02 NOTE — PATIENT INSTRUCTIONS

## 2018-07-06 ENCOUNTER — TELEPHONE (OUTPATIENT)
Dept: URGENT CARE | Facility: CLINIC | Age: 58
End: 2018-07-06

## 2018-07-06 LAB
BACTERIA UR CULT: NORMAL
BACTERIA UR CULT: NORMAL

## 2018-08-06 ENCOUNTER — TELEPHONE (OUTPATIENT)
Dept: ORTHOPEDICS | Facility: CLINIC | Age: 58
End: 2018-08-06

## 2018-08-06 NOTE — TELEPHONE ENCOUNTER
Spoke with patient and informed her           At last visit pt symptoms were resolved.   If she needs a note for work she should have a follow up with Dr. De La Cruz for re-evaluation.      ----- Message from Tana Roldan sent at 8/6/2018 10:04 AM CDT -----  No. [phone]    Patient needs a note for work stating that she has to continue sitting down at work for at least 6 months.   Patient would like to pick the note up today.    Please call.

## 2018-08-13 ENCOUNTER — TELEPHONE (OUTPATIENT)
Dept: DERMATOLOGY | Facility: CLINIC | Age: 58
End: 2018-08-13

## 2018-08-13 NOTE — TELEPHONE ENCOUNTER
Spoke with pt for sooner appt. Pt is scheduled for September 10th at 3:30pm. ----- Message from Harry Chua sent at 8/13/2018 12:59 PM CDT -----  Contact: Patient   Patient Requesting Sooner Appointment.     Reason for sooner appt.: Blisters on hands, feet   When is the first available appointment? 10/19  Communication Preference: [phone]  Additional Information: pt prefers Mondays

## 2018-08-20 ENCOUNTER — OFFICE VISIT (OUTPATIENT)
Dept: INTERNAL MEDICINE | Facility: CLINIC | Age: 58
End: 2018-08-20
Payer: COMMERCIAL

## 2018-08-20 ENCOUNTER — PATIENT MESSAGE (OUTPATIENT)
Dept: INTERNAL MEDICINE | Facility: CLINIC | Age: 58
End: 2018-08-20

## 2018-08-20 VITALS
OXYGEN SATURATION: 96 % | WEIGHT: 195.13 LBS | HEIGHT: 59 IN | HEART RATE: 90 BPM | DIASTOLIC BLOOD PRESSURE: 60 MMHG | SYSTOLIC BLOOD PRESSURE: 100 MMHG | BODY MASS INDEX: 39.34 KG/M2

## 2018-08-20 DIAGNOSIS — L40.9 PSORIASIS: Primary | ICD-10-CM

## 2018-08-20 PROCEDURE — 99213 OFFICE O/P EST LOW 20 MIN: CPT | Mod: S$GLB,,, | Performed by: INTERNAL MEDICINE

## 2018-08-20 PROCEDURE — 3008F BODY MASS INDEX DOCD: CPT | Mod: CPTII,S$GLB,, | Performed by: INTERNAL MEDICINE

## 2018-08-20 PROCEDURE — 99999 PR PBB SHADOW E&M-EST. PATIENT-LVL III: CPT | Mod: PBBFAC,,, | Performed by: INTERNAL MEDICINE

## 2018-08-20 RX ORDER — BETAMETHASONE VALERATE 1.2 MG/G
CREAM TOPICAL 2 TIMES DAILY
Qty: 1 TUBE | Refills: 1 | Status: SHIPPED | OUTPATIENT
Start: 2018-08-20 | End: 2018-09-10

## 2018-08-20 RX ORDER — CALCIPOTRIENE 50 UG/G
CREAM TOPICAL 2 TIMES DAILY
Qty: 1 G | Refills: 1 | Status: SHIPPED | OUTPATIENT
Start: 2018-08-20 | End: 2018-09-10

## 2018-08-20 NOTE — PROGRESS NOTES
Subjective:       Patient ID: Radha Ervin is a 58 y.o. female.    Chief Complaint: Blister (on her hand started in february)    HPI Mrs. Ervin is a 58-year-old female who presents with a chief complaint of blisters on her hands and feet.  Patient reports being placed on Humira for hidradenitis.  Then she began getting blisters in February.  She went to a dermatologist about this but her blisters did not go away.  She stopped the Humira.  However her blisters have returned.  She has blisters on her hands and feet.  However she reports that she has itching everywhere.  She has been using an over-the-counter cream for eczema. Upon my inspection this appears to be a barrier cream only.  She has an appointment with a new dermatologist-Dr. Spencer-in September.    Review of Systems   Musculoskeletal: Positive for arthralgias (chronic right knee pain).   Skin: Positive for rash.       Objective:      Physical Exam   Constitutional: She is oriented to person, place, and time. She appears well-developed and well-nourished. No distress.   HENT:   Head: Normocephalic and atraumatic.   Eyes: Conjunctivae and EOM are normal.   Neurological: She is alert and oriented to person, place, and time.   Skin: Skin is warm and dry. She is not diaphoretic.   Erythematous patches of skin on the hands and feet, some with overlying light scale.  Patient has marked discoloration of nails on hands and feet; some nail loss on fingernails; nail pitting also present   Psychiatric: She has a normal mood and affect. Her behavior is normal. Judgment and thought content normal.   Vitals reviewed.      Assessment:       1. Psoriasis        Plan:     1. Psoriasis  Calcipotriene and betamethasone creams prescribed  Pt will let me know if still symptomatic with creams. And then will prescribe oral steroids if needed  Has appt scheduled with Mary in Sept    RTC PRN

## 2018-08-27 ENCOUNTER — OFFICE VISIT (OUTPATIENT)
Dept: ALLERGY | Facility: CLINIC | Age: 58
End: 2018-08-27
Payer: COMMERCIAL

## 2018-08-27 VITALS
BODY MASS INDEX: 39.72 KG/M2 | OXYGEN SATURATION: 94 % | HEART RATE: 89 BPM | WEIGHT: 197.06 LBS | SYSTOLIC BLOOD PRESSURE: 110 MMHG | HEIGHT: 59 IN | DIASTOLIC BLOOD PRESSURE: 80 MMHG

## 2018-08-27 DIAGNOSIS — L60.9 NAIL ABNORMALITIES: ICD-10-CM

## 2018-08-27 DIAGNOSIS — L73.2 HYDRADENITIS: ICD-10-CM

## 2018-08-27 DIAGNOSIS — R21 RASH: ICD-10-CM

## 2018-08-27 DIAGNOSIS — L29.9 ITCHING: Primary | ICD-10-CM

## 2018-08-27 PROCEDURE — 99999 PR PBB SHADOW E&M-EST. PATIENT-LVL IV: CPT | Mod: PBBFAC,,, | Performed by: STUDENT IN AN ORGANIZED HEALTH CARE EDUCATION/TRAINING PROGRAM

## 2018-08-27 PROCEDURE — 99203 OFFICE O/P NEW LOW 30 MIN: CPT | Mod: S$GLB,,, | Performed by: STUDENT IN AN ORGANIZED HEALTH CARE EDUCATION/TRAINING PROGRAM

## 2018-08-27 PROCEDURE — 3008F BODY MASS INDEX DOCD: CPT | Mod: CPTII,S$GLB,, | Performed by: STUDENT IN AN ORGANIZED HEALTH CARE EDUCATION/TRAINING PROGRAM

## 2018-08-27 RX ORDER — HYDROXYZINE HYDROCHLORIDE 25 MG/1
TABLET, FILM COATED ORAL
Refills: 0 | COMMUNITY
Start: 2018-07-16 | End: 2018-10-07

## 2018-08-27 RX ORDER — CETIRIZINE HYDROCHLORIDE 10 MG/1
TABLET ORAL
Qty: 60 TABLET | Refills: 3 | Status: SHIPPED | OUTPATIENT
Start: 2018-08-27 | End: 2023-07-28

## 2018-08-27 NOTE — PROGRESS NOTES
Allergy Clinic Note  [facility]    Subjective:      Patient ID: Radha Ervin is a 58 y.o. female.    Chief Complaint: Rash and Itching      Referring Provider: Self, Aaareferral    History of Present Illness:  58-year-old female presents complaining of itching off and on since February 2018.    Patient has had diffuse itching.  She has had a localized rash on her hands and feet and severe fingernail and toenail disease.  This is superimposed on a long history of hidradenitis for which she was treated with Humira, but stopped secondary to itching.  The itching persists off Humira.    She reports that workup by her 1st dermatologist included a count of 794.  She does not know what type of count this was, and no records are available.  She has an appointment with a 2nd dermatologist September 10th.    She denies any history of rhinitis, eczema, asthma, or hives.  She admits sinusitis episodes less than once a year but denies any other infections besides her axillary and groin hidradenitis.  Her itching is uncontrolled on topical betamethasone.  She is intolerant of oral hydroxyzine (excess sedation).    She says the rash on her palm started with purulent blisters.  In the last couple of months many of her fingernails and toenails have become rough and some have fallen off.    Additional History:   Past medical history is significant for  hidradenitis.  No Hx of ENT surgery. Family history is negative for atopy also 1 sister does have sinus infections. Client   reports that she has been smoking vaping with nicotine.  she has never used smokeless tobacco.  Exposures are notable for active smoking.      Patient Active Problem List   Diagnosis    Primary localized osteoarthrosis of right lower leg    BMI 40.0-44.9, adult    Other atopic dermatitis     Current Outpatient Medications on File Prior to Visit   Medication Sig Dispense Refill    betamethasone valerate 0.1% (VALISONE) 0.1 % Crea Apply  "topically 2 (two) times daily. for 10 days 1 Tube 1    calcipotriene (DOVONOX) 0.005 % cream Apply topically 2 (two) times daily. 1 g 1    hydrOXYzine HCl (ATARAX) 25 MG tablet TK 1 T PO QHS PRF ITCHING  0     No current facility-administered medications on file prior to visit.          Review of Systems   Constitutional: Negative for chills and fever.   HENT: Negative for ear discharge and nosebleeds.    Eyes: Negative for discharge and redness.   Respiratory: Negative for hemoptysis, sputum production, shortness of breath and stridor.    Cardiovascular: Negative for chest pain and palpitations.   Gastrointestinal: Negative for blood in stool, melena and vomiting.   Genitourinary: Negative for dysuria and hematuria.   Skin: Positive for itching and rash.        Fingernail and toenail disruption   Neurological: Negative for seizures and loss of consciousness.       Objective:   /80 (BP Location: Left arm, Patient Position: Sitting)   Pulse 89   Ht 4' 11" (1.499 m)   Wt 89.4 kg (197 lb 1.5 oz)   LMP  (LMP Unknown)   SpO2 (!) 94%   BMI 39.81 kg/m²       Physical Exam   Constitutional: She is oriented to person, place, and time and well-developed, well-nourished, and in no distress.   HENT:   Head: Normocephalic and atraumatic.   Nose: Nose normal.   Eyes: Conjunctivae are normal. No scleral icterus.   Neck: Neck supple.   Cardiovascular: Normal rate, regular rhythm and intact distal pulses.   Pulmonary/Chest: Effort normal. No stridor. No respiratory distress.   Abdominal: She exhibits no distension.   Musculoskeletal: She exhibits no edema or deformity.   Neurological: She is alert and oriented to person, place, and time.   Skin: No rash noted. No erythema.   Fingernails:  Left 5th fingernail absent.  Multiple fingernails and toenails heaped up and extremely rough, some with brownish discoloration.    Left palm:  Thenar eminence-- patch of rough red dry fissured and cracked skin with some flaking.  No " "ade scales.   Psychiatric: Affect and judgment normal.       Data:       Assessment:     1. Itching    2. Rash    3. Nail abnormalities    4. Hydradenitis        Plan:     Medical decision making:  Client is presenting with what appears to be a dermatologic problem involving skin and nails.  The recurrent skin abscesses diagnosis hidradenitis could possibly represent a rare immune deficiency such as hyper IgE syndrome or LAD deficiency, but I think it is much too soon to be considering such rarities.      I think I can best serve this patient by controlling her itching.  Since she is intolerant of Atarax, I will start with Zyrtec 10 mg BID with low threshold to increase to 20 mg BID. Defer topical Rx to deramtology.      I do not recommend allergy testing at this time, as client has no allergic syndromes.  I am curious, however, if the "count" represents total IgE.  This would explain the suggestion she see an alleist but would not be anywhere near high enough for hyper IgE syndrome.      Radha was seen today for rash and itching.    Diagnoses and all orders for this visit:    Itching  -     cetirizine (ZYRTEC) 10 MG tablet; Take 1 tablet up to twice a day.  Can be used regularly or just when needed.    Rash   Nail abnormalities  Hydradenitis       -    defer to Dermatology        Patient Instructions   Testing    None now.        Treatment    Zyrtec = cetirizine (10mg)  1 pill up to twice a day         OK to skip if not itchy.      Follow-up if symptoms worsen or fail to improve.    Becky Fernandez MD      "

## 2018-08-27 NOTE — PATIENT INSTRUCTIONS
Testing    None now.        Treatment    Zyrtec = cetirizine (10mg)  1 pill up to twice a day         OK to skip if not itchy.

## 2018-08-31 DIAGNOSIS — Z12.11 COLON CANCER SCREENING: ICD-10-CM

## 2018-09-10 ENCOUNTER — OFFICE VISIT (OUTPATIENT)
Dept: DERMATOLOGY | Facility: CLINIC | Age: 58
End: 2018-09-10
Payer: COMMERCIAL

## 2018-09-10 DIAGNOSIS — L40.9 PSORIASIS: Primary | ICD-10-CM

## 2018-09-10 PROCEDURE — 99999 PR PBB SHADOW E&M-EST. PATIENT-LVL II: CPT | Mod: PBBFAC,,, | Performed by: DERMATOLOGY

## 2018-09-10 PROCEDURE — 99202 OFFICE O/P NEW SF 15 MIN: CPT | Mod: S$GLB,,, | Performed by: DERMATOLOGY

## 2018-09-10 RX ORDER — BETAMETHASONE DIPROPIONATE 0.5 MG/G
OINTMENT TOPICAL 2 TIMES DAILY
Qty: 45 G | Refills: 2 | Status: SHIPPED | OUTPATIENT
Start: 2018-09-10 | End: 2019-01-14 | Stop reason: SDUPTHER

## 2018-09-10 NOTE — PROGRESS NOTES
Subjective:       Patient ID:  Radha Ervin is a 58 y.o. female who presents for   Chief Complaint   Patient presents with    Rash     hands and feet, x months, blistering, itching, burning, slight painful, tx betamethasone cream and hydroxyzine     Nail Problem     fingers and R big toe, x several months, chipping off, no tx     Rash  - Initial  Affected locations: left foot, right foot, left hand and right hand  Signs / symptoms: itching, burning and pain  Relieving factors/Treatments tried: Rx topical steroids and antihistamines    Nail Problem  - Initial  Affected locations: left fingers, right fingers and right toes  Duration: several months.  Signs and Symptoms: chipping off.  Relieving factors/Treatments tried: nothing      59 yo female with history of hidradenitis, of axilla and groin, for which outside dermatologist started Humira injections weekly in 12/2017, and she stopped end of February when developed this rash.  Was initially on scalp, palms, and soles and consistent of painful itchy red plaques with pustules.  Was treated with one tar light treatment at outside derm ( no Excimer or consistent phototherapy).  Was recently given betamethasone cream that is helping a little bit.  Also given dovonex that was not covered.  Scalp is not clear, but palms and soles still active. Is also concerned by her nails that are brittle and falling off.  Also endorses right knee pain and occasional finger pain as well and swelling of distal nails.    Review of Systems   Gastrointestinal: Negative for diarrhea.   Skin: Positive for itching, rash and dry skin.   Psychiatric/Behavioral: Negative for depressed mood.   Hematologic/Lymphatic: Does not bruise/bleed easily.        Objective:    Physical Exam   Constitutional: She appears well-developed and well-nourished. No distress.   Neurological: She is alert and oriented to person, place, and time. She is not disoriented.   Psychiatric: She has a normal mood and  affect.   Skin:   Areas Examined (abnormalities noted in diagram):   Scalp / Hair Palpated and Inspected  Head / Face Inspection Performed  Neck Inspection Performed  Chest / Axilla Inspection Performed  Abdomen Inspection Performed  Genitals / Buttocks / Groin Inspection Performed  Back Inspection Performed  RUE Inspected  LUE Inspection Performed  RLE Inspected  LLE Inspection Performed  Nails and Digits Inspection Performed              Diagram Legend     Erythematous scaling macule/papule c/w actinic keratosis       Vascular papule c/w angioma      Pigmented verrucoid papule/plaque c/w seborrheic keratosis      Yellow umbilicated papule c/w sebaceous hyperplasia      Irregularly shaped tan macule c/w lentigo     1-2 mm smooth white papules consistent with Milia      Movable subcutaneous cyst with punctum c/w epidermal inclusion cyst      Subcutaneous movable cyst c/w pilar cyst      Firm pink to brown papule c/w dermatofibroma      Pedunculated fleshy papule(s) c/w skin tag(s)      Evenly pigmented macule c/w junctional nevus     Mildly variegated pigmented, slightly irregular-bordered macule c/w mildly atypical nevus      Flesh colored to evenly pigmented papule c/w intradermal nevus       Pink pearly papule/plaque c/w basal cell carcinoma      Erythematous hyperkeratotic cursted plaque c/w SCC      Surgical scar with no sign of skin cancer recurrence      Open and closed comedones      Inflammatory papules and pustules      Verrucoid papule consistent consistent with wart     Erythematous eczematous patches and plaques     Dystrophic onycholytic nail with subungual debris c/w onychomycosis     Umbilicated papule    Erythematous-base heme-crusted tan verrucoid plaque consistent with inflamed seborrheic keratosis     Erythematous Silvery Scaling Plaque c/w Psoriasis     See annotation    Lab Results   Component Value Date    WBC 16.55 (H) 03/02/2018    HGB 13.8 03/02/2018    HCT 42.3 03/02/2018    MCV 94  03/02/2018     03/02/2018     CMP  Sodium   Date Value Ref Range Status   01/13/2016 142 136 - 145 mmol/L Final     Potassium   Date Value Ref Range Status   01/13/2016 4.0 3.5 - 5.1 mmol/L Final     Chloride   Date Value Ref Range Status   01/13/2016 109 95 - 110 mmol/L Final     CO2   Date Value Ref Range Status   01/13/2016 26 23 - 29 mmol/L Final     Glucose   Date Value Ref Range Status   01/13/2016 112 (H) 70 - 110 mg/dL Final     BUN, Bld   Date Value Ref Range Status   01/13/2016 12 6 - 20 mg/dL Final     Creatinine   Date Value Ref Range Status   01/13/2016 0.8 0.5 - 1.4 mg/dL Final     Calcium   Date Value Ref Range Status   01/13/2016 9.4 8.7 - 10.5 mg/dL Final     Total Protein   Date Value Ref Range Status   01/13/2016 6.8 6.0 - 8.4 g/dL Final     Albumin   Date Value Ref Range Status   01/13/2016 3.7 3.5 - 5.2 g/dL Final     Total Bilirubin   Date Value Ref Range Status   01/13/2016 0.4 0.1 - 1.0 mg/dL Final     Comment:     For infants and newborns, interpretation of results should be based  on gestational age, weight and in agreement with clinical  observations.  Premature Infant recommended reference ranges:  Up to 24 hours.............<8.0 mg/dL  Up to 48 hours............<12.0 mg/dL  3-5 days..................<15.0 mg/dL  6-29 days.................<15.0 mg/dL       Alkaline Phosphatase   Date Value Ref Range Status   01/13/2016 83 55 - 135 U/L Final     AST   Date Value Ref Range Status   01/13/2016 13 10 - 40 U/L Final     ALT   Date Value Ref Range Status   01/13/2016 18 10 - 44 U/L Final     Anion Gap   Date Value Ref Range Status   01/13/2016 7 (L) 8 - 16 mmol/L Final     eGFR if    Date Value Ref Range Status   01/13/2016 >60 >60 mL/min/1.73 m^2 Final     eGFR if non    Date Value Ref Range Status   01/13/2016 >60 >60 mL/min/1.73 m^2 Final     Comment:     Calculation used to obtain the estimated glomerular filtration  rate (eGFR) is the CKD-EPI  equation. Since race is unknown   in our information system, the eGFR values for   -American and Non--American patients are given   for each creatinine result.         Assessment / Plan:        Psoriasis - palmo plantar pustulosis subtype -  was in scalp, now clear, palms/soles remain- thought to be humira induced from treatment for HS  -     Ambulatory Referral to Rheumatology to rule out inflammatory arthritis which could affect systemic management  Discussed otelza vs methotrexate   Phototherapy not an option due to cost (copay) and work schedule  Would not treat with another anti TNF  Could consider a nonTNF biologic as well  Smoking cessation encouraged pt to consider    -     betamethasone dipropionate (DIPROLENE) 0.05 % ointment; Apply topically 2 (two) times daily. To rash under gloves x 14 days, then weekends only for 14 days  Dispense: 45 g; Refill: 2  neutrogena norwegian hand cream under occlusion    HS - one small inflammatory cyst right axilla otherwise clear           Follow-up in about 4 weeks (around 10/8/2018).

## 2018-10-07 ENCOUNTER — OFFICE VISIT (OUTPATIENT)
Dept: URGENT CARE | Facility: CLINIC | Age: 58
End: 2018-10-07
Payer: COMMERCIAL

## 2018-10-07 VITALS
WEIGHT: 180 LBS | DIASTOLIC BLOOD PRESSURE: 88 MMHG | BODY MASS INDEX: 36.29 KG/M2 | SYSTOLIC BLOOD PRESSURE: 127 MMHG | HEIGHT: 59 IN | OXYGEN SATURATION: 96 % | RESPIRATION RATE: 16 BRPM | HEART RATE: 84 BPM | TEMPERATURE: 98 F

## 2018-10-07 DIAGNOSIS — H60.93 OTITIS EXTERNA OF BOTH EARS, UNSPECIFIED CHRONICITY, UNSPECIFIED TYPE: Primary | ICD-10-CM

## 2018-10-07 PROCEDURE — 99214 OFFICE O/P EST MOD 30 MIN: CPT | Mod: S$GLB,,, | Performed by: NURSE PRACTITIONER

## 2018-10-07 RX ORDER — NEOMYCIN SULFATE, POLYMYXIN B SULFATE, HYDROCORTISONE 3.5; 10000; 1 MG/ML; [USP'U]/ML; MG/ML
4 SOLUTION/ DROPS AURICULAR (OTIC) 3 TIMES DAILY
Qty: 10 ML | Refills: 0 | Status: SHIPPED | OUTPATIENT
Start: 2018-10-07 | End: 2018-10-14

## 2018-10-07 NOTE — PATIENT INSTRUCTIONS

## 2018-10-07 NOTE — PROGRESS NOTES
"Subjective:       Patient ID: Radha Ervin is a 58 y.o. female.    Vitals:  height is 4' 11" (1.499 m) and weight is 81.6 kg (180 lb). Her oral temperature is 98 °F (36.7 °C). Her blood pressure is 127/88 and her pulse is 84. Her respiration is 16 and oxygen saturation is 96%.     Chief Complaint: Otalgia (Rt ear)    Otalgia    There is pain in the right ear. This is a new problem. The current episode started yesterday. The problem occurs constantly. The problem has been unchanged. There has been no fever. The pain is at a severity of 8/10. The pain is severe. Associated symptoms include coughing. Pertinent negatives include no abdominal pain, diarrhea, headaches, rash, sore throat or vomiting. She has tried nothing for the symptoms.     Review of Systems   Constitution: Negative for chills and fever.   HENT: Positive for ear pain. Negative for sore throat.    Eyes: Negative for blurred vision.   Cardiovascular: Negative for chest pain.   Respiratory: Positive for cough. Negative for shortness of breath.    Skin: Negative for rash.   Musculoskeletal: Negative for back pain and joint pain.   Gastrointestinal: Negative for abdominal pain, diarrhea, nausea and vomiting.   Neurological: Negative for headaches.   Psychiatric/Behavioral: The patient is not nervous/anxious.        Objective:      Physical Exam   Constitutional: She is oriented to person, place, and time. She appears well-developed and well-nourished. She is cooperative.  Non-toxic appearance. She does not appear ill. No distress.   HENT:   Head: Normocephalic and atraumatic.   Right Ear: Hearing, tympanic membrane, external ear and ear canal normal. There is tenderness.   Left Ear: Hearing, tympanic membrane, external ear and ear canal normal. No tenderness.   Nose: Nose normal. No mucosal edema, rhinorrhea or nasal deformity. No epistaxis. Right sinus exhibits no maxillary sinus tenderness and no frontal sinus tenderness. Left sinus exhibits no " maxillary sinus tenderness and no frontal sinus tenderness.   Mouth/Throat: Uvula is midline, oropharynx is clear and moist and mucous membranes are normal. No trismus in the jaw. Normal dentition. No uvula swelling. No posterior oropharyngeal erythema.   Bilateral canal redness   Narrowing to right side    Eyes: Conjunctivae and lids are normal. Right eye exhibits no discharge. Left eye exhibits no discharge. No scleral icterus.   Sclera clear bilat   Neck: Trachea normal, normal range of motion, full passive range of motion without pain and phonation normal. Neck supple.   Cardiovascular: Normal rate, regular rhythm, normal heart sounds, intact distal pulses and normal pulses.   Pulmonary/Chest: Effort normal and breath sounds normal. No respiratory distress.   Abdominal: Soft. Normal appearance and bowel sounds are normal. She exhibits no distension, no pulsatile midline mass and no mass. There is no tenderness.   Musculoskeletal: Normal range of motion. She exhibits no edema or deformity.   Neurological: She is alert and oriented to person, place, and time. She exhibits normal muscle tone. Coordination normal.   Skin: Skin is warm, dry and intact. She is not diaphoretic. No pallor.   Psychiatric: She has a normal mood and affect. Her speech is normal and behavior is normal. Judgment and thought content normal. Cognition and memory are normal.   Nursing note and vitals reviewed.      Assessment:       1. Otitis externa of both ears, unspecified chronicity, unspecified type        Plan:         Otitis externa of both ears, unspecified chronicity, unspecified type    Other orders  -     neomycin-polymyxin-hydrocortisone (CORTISPORIN) otic solution; Place 4 drops into both ears 3 (three) times daily. for 7 days  Dispense: 10 mL; Refill: 0      Patient Instructions     External Ear Infection (Adult)    External otitis (also called swimmers ear) is an infection in the ear canal. It is often caused by bacteria or  fungus. It can occur a few days after water gets trapped in the ear canal (from swimming or bathing). It can also occur after cleaning too deeply in the ear canal with a cotton swab or other object. Sometimes, hair care products get into the ear canal and cause this problem.  Symptoms can include pain, fever, itching, redness, drainage, or swelling of the ear canal. Temporary hearing loss may also occur.  Home care  · Do not try to clean the ear canal. This can push pus and bacteria deeper into the canal.  · Use prescribed ear drops as directed. These help reduce swelling and fight the infection. If an ear wick was placed in the ear canal, apply drops right onto the end of the wick. The wick will draw the medication into the ear canal even if it is swollen closed.  · A cotton ball may be loosely placed in the outer ear to absorb any drainage.  · You may use acetaminophen or ibuprofen to control pain, unless another medication was prescribed. Note: If you have chronic liver or kidney disease or ever had a stomach ulcer or GI bleeding, talk to your health care provider before taking any of these medications.  · Do not allow water to get into your ear when bathing. Also, avoid swimming until the infection has cleared.  Prevention  · Keep your ears dry. This helps lower the risk of infection. Dry your ears with a towel or hair dryer after getting wet. Also, use ear plugs when swimming.  · Do not stick any objects in the ear to remove wax.  · If you feel water trapped in your ear, use ear drops right away. You can get these drops over the counter at most drugstores. They work by removing water from the ear canal.  Follow-up care  Follow up with your health care provider in one week, or as advised.  When to seek medical advice  Call your health care provider right away if any of these occur:  · Ear pain becomes worse or doesnt improve after 3 days of treatment  · Redness or swelling of the outer ear occurs or gets  worse  · Headache  · Painful or stiff neck  · Drowsiness or confusion  · Fever of 100.4ºF (38ºC) or higher, or as directed by your health care provider  · Seizure  Date Last Reviewed: 3/22/2015  © 1875-9839 [employer]. [address] [place] [address]. All rights reserved. This information is not intended as a substitute for professional medical care. Always follow your healthcare professional's instructions.

## 2018-10-07 NOTE — LETTER
October 7, 2018      [facility]  [place] [address]  Phone: [phone]  Fax: [phone]       Patient: Radha Ervin   YOB: 1960  Date of Visit: 10/07/2018    To Whom It May Concern:    Eri Ervin  was at [facility] on 10/07/2018. She may return to work/school on 10/8/18 with no restrictions. If you have any questions or concerns, or if I can be of further assistance, please do not hesitate to contact me.    Sincerely,    Veronica Noriega NP

## 2018-10-15 ENCOUNTER — OFFICE VISIT (OUTPATIENT)
Dept: DERMATOLOGY | Facility: CLINIC | Age: 58
End: 2018-10-15
Payer: COMMERCIAL

## 2018-10-15 ENCOUNTER — TELEPHONE (OUTPATIENT)
Dept: PHARMACY | Facility: CLINIC | Age: 58
End: 2018-10-15

## 2018-10-15 DIAGNOSIS — L73.2 HIDRADENITIS SUPPURATIVA: Primary | ICD-10-CM

## 2018-10-15 DIAGNOSIS — L40.3 PALMOPLANTAR PUSTULAR PSORIASIS: ICD-10-CM

## 2018-10-15 PROCEDURE — 99213 OFFICE O/P EST LOW 20 MIN: CPT | Mod: 25,S$GLB,, | Performed by: DERMATOLOGY

## 2018-10-15 PROCEDURE — 11900 INJECT SKIN LESIONS </W 7: CPT | Mod: S$GLB,,, | Performed by: DERMATOLOGY

## 2018-10-15 PROCEDURE — 99999 PR PBB SHADOW E&M-EST. PATIENT-LVL II: CPT | Mod: PBBFAC,,, | Performed by: DERMATOLOGY

## 2018-10-15 RX ORDER — CLINDAMYCIN PHOSPHATE 10 MG/G
GEL TOPICAL 2 TIMES DAILY
Qty: 60 G | Refills: 2 | Status: SHIPPED | OUTPATIENT
Start: 2018-10-15 | End: 2018-10-17 | Stop reason: SDUPTHER

## 2018-10-15 RX ORDER — APREMILAST 30 MG/1
TABLET, FILM COATED ORAL
Qty: 60 TABLET | Refills: 2 | Status: SHIPPED | OUTPATIENT
Start: 2018-10-15 | End: 2019-01-24

## 2018-10-15 NOTE — PROGRESS NOTES
Subjective:       Patient ID:  Radha Ervin is a 58 y.o. female who presents for   Chief Complaint   Patient presents with    Rash     f/u     59 yo female with history of hidradenitis, of axilla and groin, presents for follow-up of pustular psoriasis that is likely secondary to Humira, which she was started on by  an outside dermatologist 12/2017. She stopped Humira end of February when developed this rash.  At her last visit she was given betamethasone to use for 14 days BID which she did, with instructions to decrease to two days weekly. This has improved her palms slightly but not her feet or nails. She reports her scalp has improved and she has not had to use her T-gel shampoo as often. Was initially on scalp, palms, and soles and consistent of painful itchy red plaques with pustules.  Endorses right knee pain (s/p knee replacement) after working and standing all day. She has an appt with Rheum tomorrow. She is interested in starting otezla but not MTX at this time.       Rash  - Follow-up  Symptom course: unchanged  Affected locations: right hand, left hand, left foot and right foot  Signs / symptoms: itching, burning and pain        Review of Systems   Skin: Positive for rash.   Hematologic/Lymphatic: Bruises/bleeds easily.        Bruises        Objective:    Physical Exam   Constitutional: She appears well-developed and well-nourished. No distress.   Neurological: She is alert and oriented to person, place, and time. She is not disoriented.   Psychiatric: She has a normal mood and affect.   Skin:   Areas Examined (abnormalities noted in diagram):   Scalp / Hair Palpated and Inspected  Head / Face Inspection Performed  Neck Inspection Performed  Chest / Axilla Inspection Performed  RUE Inspected  LUE Inspection Performed  RLE Inspected  LLE Inspection Performed  Nails and Digits Inspection Performed                      Diagram Legend     Erythematous scaling macule/papule c/w actinic keratosis        Vascular papule c/w angioma      Pigmented verrucoid papule/plaque c/w seborrheic keratosis      Yellow umbilicated papule c/w sebaceous hyperplasia      Irregularly shaped tan macule c/w lentigo     1-2 mm smooth white papules consistent with Milia      Movable subcutaneous cyst with punctum c/w epidermal inclusion cyst      Subcutaneous movable cyst c/w pilar cyst      Firm pink to brown papule c/w dermatofibroma      Pedunculated fleshy papule(s) c/w skin tag(s)      Evenly pigmented macule c/w junctional nevus     Mildly variegated pigmented, slightly irregular-bordered macule c/w mildly atypical nevus      Flesh colored to evenly pigmented papule c/w intradermal nevus       Pink pearly papule/plaque c/w basal cell carcinoma      Erythematous hyperkeratotic cursted plaque c/w SCC      Surgical scar with no sign of skin cancer recurrence      Open and closed comedones      Inflammatory papules and pustules      Verrucoid papule consistent consistent with wart     Erythematous eczematous patches and plaques     Dystrophic onycholytic nail with subungual debris c/w onychomycosis     Umbilicated papule    Erythematous-base heme-crusted tan verrucoid plaque consistent with inflamed seborrheic keratosis     Erythematous Silvery Scaling Plaque c/w Psoriasis     See annotation      Assessment / Plan:        Hidradenitis suppurativa  -     clindamycin phosphate 1% (CLINDAGEL) 1 % gel; Apply topically 2 (two) times daily. To axilla PRN flares  Dispense: 60 g; Refill: 2  - DIAL soap to AA  Intralesional Kenalog 5mg/cc (0.2 cc total) injected into 1 lesions on the right axilla today after obtaining verbal consent including risk of surrounding hypopigmentation. Patient tolerated procedure well.    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 PsA - to see rheum tmw  After reviewing medications pt hesitant to start MTX  although discussed this is likely more effective over time  Trial Otelza   Given starter packs today x 2   Restart betamethasone topical burst x 2 wks then weekends only  -     OTEZLA 30 mg Tab; Take 1 tablet by mouth twice daily  Dispense: 60 tablet; Refill: 2  Discussed the benefits and risks of Otezla including but not limited to nausea, vomiting, diarrhea, weight loss, and depression.    Start Otezla at 30mg bid after standard tapering up to this dose.    Otezla is a pregnancy category C and dose needs to be reduced in patients with severe renal impairment (Cr cl < 30ml/min)    Otezla is not recommended with strong ECR126 enzyme inducers ie rifamin and anti-epileptics)             Follow-up in about 3 months (around 1/15/2019).

## 2018-10-16 ENCOUNTER — PATIENT MESSAGE (OUTPATIENT)
Dept: DERMATOLOGY | Facility: CLINIC | Age: 58
End: 2018-10-16

## 2018-10-16 ENCOUNTER — INITIAL CONSULT (OUTPATIENT)
Dept: RHEUMATOLOGY | Facility: CLINIC | Age: 58
End: 2018-10-16
Payer: COMMERCIAL

## 2018-10-16 ENCOUNTER — PATIENT MESSAGE (OUTPATIENT)
Dept: RHEUMATOLOGY | Facility: CLINIC | Age: 58
End: 2018-10-16

## 2018-10-16 ENCOUNTER — LAB VISIT (OUTPATIENT)
Dept: LAB | Facility: HOSPITAL | Age: 58
End: 2018-10-16
Payer: COMMERCIAL

## 2018-10-16 VITALS
DIASTOLIC BLOOD PRESSURE: 88 MMHG | HEIGHT: 59 IN | WEIGHT: 199.5 LBS | HEART RATE: 77 BPM | BODY MASS INDEX: 40.22 KG/M2 | SYSTOLIC BLOOD PRESSURE: 125 MMHG

## 2018-10-16 DIAGNOSIS — M25.561 CHRONIC PAIN OF RIGHT KNEE: ICD-10-CM

## 2018-10-16 DIAGNOSIS — L40.9 PSORIASIS: Primary | ICD-10-CM

## 2018-10-16 DIAGNOSIS — L73.2 HIDRADENITIS SUPPURATIVA: ICD-10-CM

## 2018-10-16 DIAGNOSIS — L40.9 PSORIASIS: ICD-10-CM

## 2018-10-16 DIAGNOSIS — G89.29 CHRONIC PAIN OF RIGHT KNEE: ICD-10-CM

## 2018-10-16 LAB
ALBUMIN SERPL BCP-MCNC: 3.9 G/DL
ALP SERPL-CCNC: 116 U/L
ALT SERPL W/O P-5'-P-CCNC: 20 U/L
ANION GAP SERPL CALC-SCNC: 7 MMOL/L
AST SERPL-CCNC: 17 U/L
BASOPHILS # BLD AUTO: 0.08 K/UL
BASOPHILS NFR BLD: 0.7 %
BILIRUB SERPL-MCNC: 0.6 MG/DL
BUN SERPL-MCNC: 12 MG/DL
CALCIUM SERPL-MCNC: 9.6 MG/DL
CCP AB SER IA-ACNC: 10.5 U/ML
CHLORIDE SERPL-SCNC: 108 MMOL/L
CO2 SERPL-SCNC: 26 MMOL/L
CREAT SERPL-MCNC: 0.8 MG/DL
CRP SERPL-MCNC: 11.9 MG/L
DIFFERENTIAL METHOD: ABNORMAL
EOSINOPHIL # BLD AUTO: 0.5 K/UL
EOSINOPHIL NFR BLD: 4.9 %
ERYTHROCYTE [DISTWIDTH] IN BLOOD BY AUTOMATED COUNT: 13.9 %
ERYTHROCYTE [SEDIMENTATION RATE] IN BLOOD BY WESTERGREN METHOD: 36 MM/HR
EST. GFR  (AFRICAN AMERICAN): >60 ML/MIN/1.73 M^2
EST. GFR  (NON AFRICAN AMERICAN): >60 ML/MIN/1.73 M^2
GLUCOSE SERPL-MCNC: 86 MG/DL
HCT VFR BLD AUTO: 45.5 %
HGB BLD-MCNC: 14.4 G/DL
IMM GRANULOCYTES # BLD AUTO: 0.04 K/UL
IMM GRANULOCYTES NFR BLD AUTO: 0.4 %
LYMPHOCYTES # BLD AUTO: 3 K/UL
LYMPHOCYTES NFR BLD: 27.1 %
MCH RBC QN AUTO: 29.9 PG
MCHC RBC AUTO-ENTMCNC: 31.6 G/DL
MCV RBC AUTO: 95 FL
MONOCYTES # BLD AUTO: 0.7 K/UL
MONOCYTES NFR BLD: 6.6 %
NEUTROPHILS # BLD AUTO: 6.6 K/UL
NEUTROPHILS NFR BLD: 60.3 %
NRBC BLD-RTO: 0 /100 WBC
PLATELET # BLD AUTO: 293 K/UL
PMV BLD AUTO: 10.2 FL
POTASSIUM SERPL-SCNC: 4 MMOL/L
PROT SERPL-MCNC: 7.9 G/DL
RBC # BLD AUTO: 4.81 M/UL
RHEUMATOID FACT SERPL-ACNC: 267 IU/ML
SODIUM SERPL-SCNC: 141 MMOL/L
URATE SERPL-MCNC: 7.2 MG/DL
WBC # BLD AUTO: 10.99 K/UL

## 2018-10-16 PROCEDURE — 86431 RHEUMATOID FACTOR QUANT: CPT

## 2018-10-16 PROCEDURE — 99999 PR PBB SHADOW E&M-EST. PATIENT-LVL III: CPT | Mod: PBBFAC,,, | Performed by: STUDENT IN AN ORGANIZED HEALTH CARE EDUCATION/TRAINING PROGRAM

## 2018-10-16 PROCEDURE — 86200 CCP ANTIBODY: CPT

## 2018-10-16 PROCEDURE — 85652 RBC SED RATE AUTOMATED: CPT

## 2018-10-16 PROCEDURE — 81374 HLA I TYPING 1 ANTIGEN LR: CPT | Mod: PO

## 2018-10-16 PROCEDURE — 84550 ASSAY OF BLOOD/URIC ACID: CPT

## 2018-10-16 PROCEDURE — 80053 COMPREHEN METABOLIC PANEL: CPT

## 2018-10-16 PROCEDURE — 3008F BODY MASS INDEX DOCD: CPT | Mod: CPTII,S$GLB,, | Performed by: STUDENT IN AN ORGANIZED HEALTH CARE EDUCATION/TRAINING PROGRAM

## 2018-10-16 PROCEDURE — 86235 NUCLEAR ANTIGEN ANTIBODY: CPT

## 2018-10-16 PROCEDURE — 85025 COMPLETE CBC W/AUTO DIFF WBC: CPT

## 2018-10-16 PROCEDURE — 86038 ANTINUCLEAR ANTIBODIES: CPT

## 2018-10-16 PROCEDURE — 86140 C-REACTIVE PROTEIN: CPT

## 2018-10-16 PROCEDURE — 36415 COLL VENOUS BLD VENIPUNCTURE: CPT

## 2018-10-16 PROCEDURE — 99204 OFFICE O/P NEW MOD 45 MIN: CPT | Mod: S$GLB,,, | Performed by: STUDENT IN AN ORGANIZED HEALTH CARE EDUCATION/TRAINING PROGRAM

## 2018-10-16 RX ORDER — SULFASALAZINE 500 MG/1
500 TABLET ORAL 2 TIMES DAILY
Qty: 180 TABLET | Refills: 0 | Status: SHIPPED | OUTPATIENT
Start: 2018-10-16 | End: 2018-12-17 | Stop reason: SDUPTHER

## 2018-10-16 ASSESSMENT — ROUTINE ASSESSMENT OF PATIENT INDEX DATA (RAPID3)
PAIN SCORE: 1.5
FATIGUE SCORE: 0
PSYCHOLOGICAL DISTRESS SCORE: 0
MDHAQ FUNCTION SCORE: .2
PATIENT GLOBAL ASSESSMENT SCORE: 0
TOTAL RAPID3 SCORE: .72
WHEN YOU AWAKENED IN THE MORNING OVER THE LAST WEEK, PLEASE INDICATE THE AMOUNT OF TIME IT TAKES UNTIL YOU ARE AS LIMBER AS YOU WILL BE FOR THE DAY: 5 MIN
AM STIFFNESS SCORE: 1, YES

## 2018-10-16 NOTE — PROGRESS NOTES
Subjective:       Patient ID: Radha Ervin is a 58 y.o. female.    Chief Complaint: Disease Management    HPI   Ms. Ervin is a 58 year old female with past medical history of hydradenitis suppurativa, pustular psoriasis who presents for evaluation of psoriatic arthritis. Patient has had hydradenitis for about 15 years. She was placed on Humira for this in December 2017. In Feb 2018 she started developing erythematous blisters, which were painful, pruritic and scaly on her palms and soles. She states that she has had a similar rash about 10 years ago. It was also pustular on palms and soles. She went to her PCP that did not know what it was but ended up giving her a 2 week course of prednisone. She had not had any issues since. She started developing nail changes in July 2018.   She has right knee pain. She states that OA which required a total knee replacement in 2016. She has had one episode where her knee was swollen a that time she was evaluated by orthopedics and it was thought to be related to humira. She still has knee pain but it is usually at the end of the day and worse with movement. She has morning stiffness lasting 2-3 min.   Denies alopecia,  eye pain/redness, dry eyes, dry mouth, oral ulcers, rash, photosensitivity, raynauds, distal hand ulcers, genital ulcers, chest pain, shortness of breath, abd pain, constipation, diarrhea, bloody diarrhea, joint  swelling, tenderness, muscle weakness,      Review of Systems   Constitutional: Negative for activity change, appetite change and fever.   HENT: Negative for mouth sores.         Negative hair loss    Eyes: Negative for pain and redness.   Respiratory: Negative for chest tightness and shortness of breath.    Cardiovascular: Negative for chest pain and leg swelling.   Gastrointestinal: Negative for abdominal pain, blood in stool, constipation and diarrhea.   Endocrine: Negative for cold intolerance.   Genitourinary: Negative for dysuria.  "  Musculoskeletal: Positive for arthralgias. Negative for back pain and neck stiffness.   Skin: Positive for rash.        +nail changes  +pustular lesions with    Neurological: Negative for numbness and headaches.         Objective:   /88   Pulse 77   Ht 4' 11" (1.499 m)   Wt 90.5 kg (199 lb 8.3 oz)   LMP  (LMP Unknown)   BMI 40.30 kg/m²      Physical Exam   Constitutional: She is oriented to person, place, and time and well-developed, well-nourished, and in no distress. No distress.   HENT:   Head: Normocephalic.   Mouth/Throat: No oropharyngeal exudate.   Eyes: Conjunctivae and EOM are normal. Pupils are equal, round, and reactive to light. No scleral icterus.   Neck: Neck supple.   Cardiovascular: Normal rate and normal heart sounds.    No murmur heard.  Pulmonary/Chest: Effort normal and breath sounds normal. No respiratory distress.   Abdominal: Soft. Bowel sounds are normal. She exhibits no distension.       Right Side Rheumatological Exam     Examination finds the shoulder, elbow, wrist, knee, 1st PIP, 1st MCP, 2nd PIP, 2nd MCP, 3rd PIP, 3rd MCP, 4th PIP, 4th MCP, 5th PIP and 5th MCP normal.    Left Side Rheumatological Exam     Examination finds the shoulder, elbow, wrist, knee, 1st PIP, 1st MCP, 2nd PIP, 2nd MCP, 3rd PIP, 3rd MCP, 4th PIP, 4th MCP, 5th PIP and 5th MCP normal.      Neurological: She is alert and oriented to person, place, and time.   Skin: Skin is warm and dry. Rash noted. She is not diaphoretic.     +pustular erythematous plaques on palms and soles   +nail changes   Musculoskeletal: She exhibits no edema.            Assessment:       1. Psoriasis    2. Chronic pain of right knee            Plan:       Ms. Ervin is a 58 year old female with past medical history of hydradenitis suppurativa, pustular psoriasis who presents for evaluation of psoriatic arthritis.  Patient with pustular psoriasis thought to be 2/2 Humira. At this time we do not see any signs/symptoms that indicated " psoriatic arthritis based on hsitory, exam and reviewing patients old XRAYs. We agree with Umm. We would also start sulfasalazine 500mg bid with plans to increase to 1g bid in 1 mos as this will help her psoriasis and hydradenitis. We will also complete workup with HLA b27, RF, CCP, ESR, CRP, [place], SSA, and uric acid.   We will see in 3 mos to further evaluate.

## 2018-10-16 NOTE — PROGRESS NOTES
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

## 2018-10-17 LAB
ANA SER QL IF: NORMAL
ANTI-SSA ANTIBODY: 1.4 EU
ANTI-SSA INTERPRETATION: NEGATIVE

## 2018-10-17 RX ORDER — CLINDAMYCIN PHOSPHATE 10 MG/G
GEL TOPICAL 2 TIMES DAILY
Qty: 60 G | Refills: 2 | Status: SHIPPED | OUTPATIENT
Start: 2018-10-17 | End: 2019-05-13 | Stop reason: SDUPTHER

## 2018-10-17 NOTE — PROGRESS NOTES
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  Unclear yet,but would wait and see how she does on the otezla  If no response,then would do advanced imaging  rtc in 3 months

## 2018-10-18 ENCOUNTER — IMMUNIZATION (OUTPATIENT)
Dept: PHARMACY | Facility: CLINIC | Age: 58
End: 2018-10-18
Payer: COMMERCIAL

## 2018-10-18 NOTE — TELEPHONE ENCOUNTER
DOCUMENTATION ONLY:  Prior authorization for Otezla approved from 10/17/18 to 10/17/20    Case Id: [id number]    Co-pay: $100.00, e-voucher brings down to $0    Patient Assistance is not required

## 2018-10-19 ENCOUNTER — DOCUMENTATION ONLY (OUTPATIENT)
Dept: DERMATOLOGY | Facility: CLINIC | Age: 58
End: 2018-10-19

## 2018-10-19 ENCOUNTER — TELEPHONE (OUTPATIENT)
Dept: DERMATOLOGY | Facility: CLINIC | Age: 58
End: 2018-10-19

## 2018-10-19 NOTE — TELEPHONE ENCOUNTER
Spoke with pt, informed her that she can start the Otezla now since she has been approved for 2 yrs. Pt states she has a $0 copayment also. Provider informed.    ----- Message from Andria Spencer MD sent at 10/19/2018  2:42 PM CDT -----  Regarding: RE: Otezla approved  Yay!  PCOOL - can you please notify the patient she was approved and to go ahead and start the Otelza starter packs?  Thanks :)    ----- Message -----  From: Kathe Burnett MA  Sent: 10/19/2018   2:40 PM  To: Andria Spencer MD  Subject: Otezla approved                                  Cool pt - the Otzela 30mg was approved by [facility] from 10-17-18 to 10-.

## 2018-10-19 NOTE — PROGRESS NOTES
The PA for Otezla 30mg tablet was approved by [facility] from 10-17-18 to 10-.  Auth #  [id number]

## 2018-10-22 NOTE — TELEPHONE ENCOUNTER
Otezla initial. $0 (004). Address confirmed. Work address: [address] [place] [id number]. Shipping out 10/23/18.     Patient was given Otezla counseling as follows:  - Take Otezla Starter pack as directed on package label, then take Otezla 30mg (maintenance dose) twice daily with or without food.   - Patient was counseled on the following possible side effects, which include, but are not limited to: headache, diarrhea, upset stomach, weight loss. Contact provider if allergic reaction, major weight loss, or depression should occur.  - DDI: none    Reviewed difference in MOA of past tx (Humira) with Otelza. She was nervous starting a new tx after past experienced complications on TNF.     Compliance stressed. Patient has no further questions. Patient plans to start Otezla on 10/23/18 (starter kit given in office). I will f/u with patient in 1 week and [facility] will f/u in 3 weeks for further refills.

## 2018-10-24 LAB
HLA-B27 RELATED AG QL: NEGATIVE
HLA-B27 RELATED AG QL: NORMAL

## 2018-10-30 NOTE — TELEPHONE ENCOUNTER
Patient reached for initial F/U. Did not want to complete full questionnaire. She did start treatment and is now on the 30 mg BID. No stomach upset, nausea, vomiting, or diarrhea. Has been tolerating the medication very well. Has noticed minor improvement in skin since starting in various locations.   No new questions or concerns at this time. Advised we will be reaching out again in the near future for her refill, she acknowledged.

## 2018-11-15 ENCOUNTER — TELEPHONE (OUTPATIENT)
Dept: PHARMACY | Facility: CLINIC | Age: 58
End: 2018-11-15

## 2018-12-13 ENCOUNTER — TELEPHONE (OUTPATIENT)
Dept: INTERNAL MEDICINE | Facility: CLINIC | Age: 58
End: 2018-12-13

## 2018-12-13 ENCOUNTER — HOSPITAL ENCOUNTER (OUTPATIENT)
Dept: RADIOLOGY | Facility: HOSPITAL | Age: 58
Discharge: HOME | End: 2018-12-13
Attending: FAMILY MEDICINE
Payer: COMMERCIAL

## 2018-12-13 ENCOUNTER — OFFICE VISIT (OUTPATIENT)
Dept: FAMILY MEDICINE | Facility: CLINIC | Age: 58
End: 2018-12-13
Payer: COMMERCIAL

## 2018-12-13 VITALS
TEMPERATURE: 99 F | HEART RATE: 102 BPM | SYSTOLIC BLOOD PRESSURE: 100 MMHG | WEIGHT: 217.13 LBS | HEIGHT: 59 IN | DIASTOLIC BLOOD PRESSURE: 80 MMHG | OXYGEN SATURATION: 95 % | BODY MASS INDEX: 43.77 KG/M2

## 2018-12-13 DIAGNOSIS — J01.80 ACUTE NON-RECURRENT SINUSITIS OF OTHER SINUS: Primary | ICD-10-CM

## 2018-12-13 DIAGNOSIS — R05.9 COUGH: ICD-10-CM

## 2018-12-13 DIAGNOSIS — J02.9 SORE THROAT: ICD-10-CM

## 2018-12-13 DIAGNOSIS — Z79.899 LONG-TERM CURRENT USE OF INTRAVENOUS IMMUNOGLOBULIN (IVIG): ICD-10-CM

## 2018-12-13 DIAGNOSIS — F17.200 TOBACCO DEPENDENCE: ICD-10-CM

## 2018-12-13 PROBLEM — L73.2 HIDRADENITIS: Status: ACTIVE | Noted: 2018-12-13

## 2018-12-13 PROCEDURE — 99999 PR PBB SHADOW E&M-EST. PATIENT-LVL IV: CPT | Mod: PBBFAC,,, | Performed by: FAMILY MEDICINE

## 2018-12-13 PROCEDURE — 99214 OFFICE O/P EST MOD 30 MIN: CPT | Mod: S$GLB,,, | Performed by: FAMILY MEDICINE

## 2018-12-13 PROCEDURE — 3008F BODY MASS INDEX DOCD: CPT | Mod: CPTII,S$GLB,, | Performed by: FAMILY MEDICINE

## 2018-12-13 PROCEDURE — 71046 X-RAY EXAM CHEST 2 VIEWS: CPT | Mod: TC,FY,PO

## 2018-12-13 PROCEDURE — 71046 X-RAY EXAM CHEST 2 VIEWS: CPT | Mod: 26,,, | Performed by: RADIOLOGY

## 2018-12-13 RX ORDER — FLUTICASONE PROPIONATE 50 MCG
1 SPRAY, SUSPENSION (ML) NASAL 2 TIMES DAILY
Qty: 16 G | Refills: 0 | Status: SHIPPED | OUTPATIENT
Start: 2018-12-13 | End: 2019-01-12

## 2018-12-13 RX ORDER — AZITHROMYCIN 250 MG/1
TABLET, FILM COATED ORAL
Qty: 6 TABLET | Refills: 0 | Status: SHIPPED | OUTPATIENT
Start: 2018-12-13 | End: 2018-12-18

## 2018-12-13 RX ORDER — BENZONATATE 100 MG/1
100 CAPSULE ORAL 3 TIMES DAILY PRN
Qty: 30 CAPSULE | Refills: 0 | Status: SHIPPED | OUTPATIENT
Start: 2018-12-13 | End: 2018-12-23

## 2018-12-13 RX ORDER — PROMETHAZINE HYDROCHLORIDE AND DEXTROMETHORPHAN HYDROBROMIDE 6.25; 15 MG/5ML; MG/5ML
5 SYRUP ORAL EVERY 6 HOURS PRN
Qty: 180 ML | Refills: 0 | Status: SHIPPED | OUTPATIENT
Start: 2018-12-13 | End: 2018-12-23

## 2018-12-13 RX ORDER — OXYMETAZOLINE HCL 0.05 %
1 SPRAY, NON-AEROSOL (ML) NASAL 2 TIMES DAILY
Start: 2018-12-13 | End: 2018-12-16

## 2018-12-13 NOTE — PROGRESS NOTES
Subjective:       Patient ID: Radha Ervin is a 58 y.o. female.    Chief Complaint: Cough (x 3 days) and Sore Throat (x 3 days)    Marley is a 58 y.o. female who presents today with cough and sore throat since Saturday. This is day 6 today. She started with a sore throat. Halls helped initially but then she continue to have symptoms including facial pain and a cough. She tried mucinex and that helped a little. The cough started on Monday and is minimally productive. It is worse when she lies down. She is a smoker.      Sinusitis   This is a new problem. The current episode started in the past 7 days. The problem has been gradually worsening since onset. There has been no fever. Associated symptoms include congestion, coughing, ear pain, headaches, a hoarse voice, sinus pressure and a sore throat. Pertinent negatives include no chills, diaphoresis, neck pain, shortness of breath, sneezing or swollen glands. Treatments tried: OTC. The treatment provided mild relief.   Cough   This is a new problem. The current episode started in the past 7 days. The problem has been gradually worsening. The problem occurs every few minutes. The cough is productive of sputum. Associated symptoms include ear pain, headaches, nasal congestion, postnasal drip, rhinorrhea and a sore throat. Pertinent negatives include no chest pain, chills, ear congestion, fever, hemoptysis, shortness of breath or wheezing. The symptoms are aggravated by lying down. Risk factors for lung disease include smoking/tobacco exposure. Treatments tried: mucinex. The treatment provided mild relief. Her past medical history is significant for bronchitis. There is no history of COPD, emphysema or pneumonia.     Review of Systems   Constitutional: Negative for chills, diaphoresis and fever.   HENT: Positive for congestion, ear pain, hoarse voice, postnasal drip, rhinorrhea, sinus pressure and sore throat. Negative for sneezing.    Respiratory: Positive for cough.  Negative for hemoptysis, shortness of breath and wheezing.    Cardiovascular: Negative for chest pain.   Musculoskeletal: Negative for neck pain.   Neurological: Positive for headaches.         Objective:     Vitals:    12/13/18 1358   BP: 100/80   Pulse: 102   Temp: 98.5 °F (36.9 °C)        Physical Exam   Constitutional: She is oriented to person, place, and time. She appears well-developed and well-nourished. No distress.   HENT:   Head: Normocephalic and atraumatic.   TM: appear normal BL  Nasal congestion noted   pharyngeal erythema without exudate noted  No adenopathy  Full ROM of neck     Eyes: Conjunctivae and EOM are normal. Pupils are equal, round, and reactive to light.   Neck: Normal range of motion. Neck supple.   Cardiovascular: Normal rate and regular rhythm.   Pulmonary/Chest: Effort normal. No respiratory distress. She has no wheezes. She has no rales.   Diminished expiratory phase in the left lung. No wheezing. No rales. No respiratory distress or nasal flaring or grunting.    Musculoskeletal: She exhibits no edema.   Neurological: She is alert and oriented to person, place, and time.   Skin: Skin is warm. No rash noted. She is not diaphoretic.   Psychiatric: She has a normal mood and affect. Her behavior is normal. Judgment and thought content normal.   Nursing note and vitals reviewed.      Assessment:       1. Acute non-recurrent sinusitis of other sinus    2. Cough    3. Long-term current use of intravenous immunoglobulin (IVIG)    4. Sore throat    5. Tobacco dependence        Plan:         No wheezing noted, but left lung has a more diminished expiratory phase compared to the right lung.   CXR given history of smoking and lung exam    Given length of symptoms and immunnosupression, will empirically treat for sinusitis. Consider bronchitis in the differential.   Zpak. PCN allergy noted.   Suggested brief course of Afrin for 3 days total (OTC)  Flonase BID  Nasal saline spray for congestion at  night  Tylenol 500 mg TID for pain as needed  Aggressive fluids  Buckwheat honey for possible cough  Tessalon perles for the cough  Follow up if symptoms aren't resolving.  Follow up with PCP as needed      Acute non-recurrent sinusitis of other sinus  -     fluticasone (FLONASE) 50 mcg/actuation nasal spray; 1 spray (50 mcg total) by Each Nare route 2 (two) times daily.  Dispense: 16 g; Refill: 0  -     oxymetazoline (AFRIN, OXYMETAZOLINE,) 0.05 % nasal spray; 1 spray by Nasal route 2 (two) times daily. for 3 days  -     benzonatate (TESSALON) 100 MG capsule; Take 1 capsule (100 mg total) by mouth 3 (three) times daily as needed for Cough.  Dispense: 30 capsule; Refill: 0  -     azithromycin (Z-DERECK) 250 MG tablet; Take 2 tablets by mouth on day 1; Take 1 tablet by mouth on days 2-5  Dispense: 6 tablet; Refill: 0  -     promethazine-dextromethorphan (PROMETHAZINE-DM) 6.25-15 mg/5 mL Syrp; Take 5 mLs by mouth every 6 (six) hours as needed.  Dispense: 180 mL; Refill: 0    Cough  -     X-Ray Chest PA And Lateral; Future; Expected date: 12/13/2018    Long-term current use of intravenous immunoglobulin (IVIG)    Sore throat  -     POCT Rapid Strep A    Tobacco dependence  -     X-Ray Chest PA And Lateral; Future; Expected date: 12/13/2018      Warning signs discussed, patient to call with any further issues or worsening of symptoms.

## 2018-12-13 NOTE — PATIENT INSTRUCTIONS
Discussed diagnosis and treatment of sinusitis  zpak  Suggested brief course of Afrin for 3 days total (OTC)  Flonase BID  Nasal saline spray for congestion at night  Tylenol 500 mg TID for pain as needed  Aggressive fluids  Buckwheat honey for possible cough  Tessalon perles for the cough  Follow up if symptoms aren't resolving.  Follow up with PCP as needed      Acute Bacterial Rhinosinusitis (ABRS)    Acute bacterial rhinosinusitis (ABRS) is an infection of your nasal cavity and sinuses. Its caused by bacteria. Acute means that youve had symptoms for less than 12 weeks.  Understanding your sinuses  The nasal cavity is the large air-filled space behind your nose. The sinuses are a group of spaces formed by the bones of your face. They connect with your nasal cavity. ABRS causes the tissue lining these spaces to become inflamed. Mucus may not drain normally. This leads to facial pain and other symptoms.  What causes ABRS?  ABRS most often follows an upper respiratory infection caused by a virus. Bacteria then infect the lining of your nasal cavity and sinuses. But you can also get ABRS if you have:  · Nasal allergies  · Long-term nasal swelling and congestion not caused by allergies  · Blockage in the nose  Symptoms of ABRS  The symptoms of ABRS may be different for each person, and can include:  · Nasal congestion  · Runny nose  · Fluid draining from the nose down the throat (postnasal drip)  · Headache  · Cough  · Pain in the sinuses  · Thick, colored fluid from the nose (mucus)  · Fever  Diagnosing ABRS  ABRS may be diagnosed if youve had an upper respiratory infection like a cold and cough for longer than 10 to 14 days. Your health care provider will ask about your symptoms and your medical history. The provider will check your vital signs, including your temperature. Youll have a physical exam. The health care provider will check your ears, nose, and throat. You likely wont need any tests. If ABRS comes  back, you may have a culture or other tests.  Treatment for ABRS  Treatment may include:  · Antibiotic medicine. This is for symptoms that last for at least 10 to 14 days.  · Nasal corticosteroid medicine. Drops or spray used in the nose can lessen swelling and congestion.  · Over-the-counter pain medicine. This is to lessen sinus pain and pressure.  · Nasal decongestant medicine. Spray or drops may help to lessen congestion. Do not use them for more than a few days.  · Salt wash (saline irrigation). This can help to loosen mucus.  Possible complications of ABRS  ABRS may come back or become long-term (chronic).  In rare cases, ABRS may cause complications such as:   · Inflamed tissue around the brain and spinal cord (meningitis)  · Inflamed tissue around the eyes (orbital cellulitis)  · Inflamed bones around the sinuses (osteitis)  These problems may need to be treated in a hospital with intravenous (IV) antibiotic medicine or surgery.  When to call the health care provider  Call your health care provider if you have any of the following:  · Symptoms that dont get better, or get worse  · Symptoms that dont get better after 3 to 5 days on antibiotics  · Trouble seeing  · Swelling around your eyes  · Confusion or trouble staying awake   Date Last Reviewed: 3/3/2015  © 1295-3102 [employer]. [address] [place] [address], [place] [address]. All rights reserved. This information is not intended as a substitute for professional medical care. Always follow your healthcare professional's instructions.

## 2018-12-14 ENCOUNTER — PATIENT MESSAGE (OUTPATIENT)
Dept: RHEUMATOLOGY | Facility: CLINIC | Age: 58
End: 2018-12-14

## 2018-12-17 RX ORDER — SULFASALAZINE 500 MG/1
500 TABLET ORAL 2 TIMES DAILY
Qty: 180 TABLET | Refills: 0 | Status: SHIPPED | OUTPATIENT
Start: 2018-12-17 | End: 2019-03-17

## 2018-12-24 ENCOUNTER — TELEPHONE (OUTPATIENT)
Dept: PHARMACY | Facility: CLINIC | Age: 58
End: 2018-12-24

## 2019-01-14 ENCOUNTER — OFFICE VISIT (OUTPATIENT)
Dept: DERMATOLOGY | Facility: CLINIC | Age: 59
End: 2019-01-14
Payer: COMMERCIAL

## 2019-01-14 DIAGNOSIS — L40.3 PALMOPLANTAR PUSTULOSIS: Primary | ICD-10-CM

## 2019-01-14 DIAGNOSIS — L73.2 HIDRADENITIS SUPPURATIVA: ICD-10-CM

## 2019-01-14 PROCEDURE — 99999 PR PBB SHADOW E&M-EST. PATIENT-LVL II: CPT | Mod: PBBFAC,,, | Performed by: DERMATOLOGY

## 2019-01-14 PROCEDURE — 99213 PR OFFICE/OUTPT VISIT, EST, LEVL III, 20-29 MIN: ICD-10-PCS | Mod: S$GLB,,, | Performed by: DERMATOLOGY

## 2019-01-14 PROCEDURE — 99213 OFFICE O/P EST LOW 20 MIN: CPT | Mod: S$GLB,,, | Performed by: DERMATOLOGY

## 2019-01-14 PROCEDURE — 99999 PR PBB SHADOW E&M-EST. PATIENT-LVL II: ICD-10-PCS | Mod: PBBFAC,,, | Performed by: DERMATOLOGY

## 2019-01-14 RX ORDER — BETAMETHASONE DIPROPIONATE 0.5 MG/G
OINTMENT TOPICAL 2 TIMES DAILY
Qty: 45 G | Refills: 2 | Status: SHIPPED | OUTPATIENT
Start: 2019-01-14 | End: 2019-05-13 | Stop reason: SDUPTHER

## 2019-01-14 NOTE — PROGRESS NOTES
Subjective:       Patient ID:  Radha Ervin is a 58 y.o. female who presents for   Chief Complaint   Patient presents with    Psoriasis     f/u     Psoriasis  - Follow-up  Symptom course: stable  Currently using: Otezla.  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 She stopped Humira end of February 2018 when developed this rash.  Was initially on scalp, palms, and soles and consistent of painful itchy red plaques with pustules.  Started Otelza at her last visit 3 mo ago; now taking 30 mg po bid and it is helping.  Initially had diarrhea, since resolved. No depression.  Endorsed right knee pain (s/p knee replacement) after working and standing all day.  Was seen by rheumatology who started her on sulfasalazine, which has helped.    Using topical betamethasone to hands and feet presently for persistent areas.  Still has nail dystrophy.    Review of Systems   Skin: Positive for rash.   Hematologic/Lymphatic: Bruises/bleeds easily.        Objective:    Physical Exam   Constitutional: She appears well-developed and well-nourished. No distress.   Neurological: She is alert and oriented to person, place, and time. She is not disoriented.   Psychiatric: She has a normal mood and affect.   Skin:   Areas Examined (abnormalities noted in diagram):   Head / Face Inspection Performed  Neck Inspection Performed  Chest / Axilla Inspection Performed  Back Inspection Performed  RUE Inspected  LUE Inspection Performed  RLE Inspected  LLE Inspection Performed  Nails and Digits Inspection Performed                      Diagram Legend     Erythematous scaling macule/papule c/w actinic keratosis       Vascular papule c/w angioma       Pigmented verrucoid papule/plaque c/w seborrheic keratosis      Yellow umbilicated papule c/w sebaceous hyperplasia      Irregularly shaped tan macule c/w lentigo     1-2 mm smooth white papules consistent with Milia      Movable subcutaneous cyst with punctum c/w epidermal inclusion cyst      Subcutaneous movable cyst c/w pilar cyst      Firm pink to brown papule c/w dermatofibroma      Pedunculated fleshy papule(s) c/w skin tag(s)      Evenly pigmented macule c/w junctional nevus     Mildly variegated pigmented, slightly irregular-bordered macule c/w mildly atypical nevus      Flesh colored to evenly pigmented papule c/w intradermal nevus       Pink pearly papule/plaque c/w basal cell carcinoma      Erythematous hyperkeratotic cursted plaque c/w SCC      Surgical scar with no sign of skin cancer recurrence      Open and closed comedones      Inflammatory papules and pustules      Verrucoid papule consistent consistent with wart     Erythematous eczematous patches and plaques     Dystrophic onycholytic nail with subungual debris c/w onychomycosis     Umbilicated papule    Erythematous-base heme-crusted tan verrucoid plaque consistent with inflamed seborrheic keratosis     Erythematous Silvery Scaling Plaque c/w Psoriasis     See annotation      Lab Results   Component Value Date    WBC 10.99 10/16/2018    HGB 14.4 10/16/2018    HCT 45.5 10/16/2018    MCV 95 10/16/2018     10/16/2018     CMP  Sodium   Date Value Ref Range Status   10/16/2018 141 136 - 145 mmol/L Final     Potassium   Date Value Ref Range Status   10/16/2018 4.0 3.5 - 5.1 mmol/L Final     Chloride   Date Value Ref Range Status   10/16/2018 108 95 - 110 mmol/L Final     CO2   Date Value Ref Range Status   10/16/2018 26 23 - 29 mmol/L Final     Glucose   Date Value Ref Range Status   10/16/2018 86 70 - 110 mg/dL Final     BUN, Bld   Date Value Ref Range Status   10/16/2018 12 6 - 20 mg/dL Final     Creatinine   Date Value Ref Range Status    10/16/2018 0.8 0.5 - 1.4 mg/dL Final     Calcium   Date Value Ref Range Status   10/16/2018 9.6 8.7 - 10.5 mg/dL Final     Total Protein   Date Value Ref Range Status   10/16/2018 7.9 6.0 - 8.4 g/dL Final     Albumin   Date Value Ref Range Status   10/16/2018 3.9 3.5 - 5.2 g/dL Final     Total Bilirubin   Date Value Ref Range Status   10/16/2018 0.6 0.1 - 1.0 mg/dL Final     Comment:     For infants and newborns, interpretation of results should be based  on gestational age, weight and in agreement with clinical  observations.  Premature Infant recommended reference ranges:  Up to 24 hours.............<8.0 mg/dL  Up to 48 hours............<12.0 mg/dL  3-5 days..................<15.0 mg/dL  6-29 days.................<15.0 mg/dL       Alkaline Phosphatase   Date Value Ref Range Status   10/16/2018 116 55 - 135 U/L Final     AST   Date Value Ref Range Status   10/16/2018 17 10 - 40 U/L Final     ALT   Date Value Ref Range Status   10/16/2018 20 10 - 44 U/L Final     Anion Gap   Date Value Ref Range Status   10/16/2018 7 (L) 8 - 16 mmol/L Final     eGFR if    Date Value Ref Range Status   10/16/2018 >60.0 >60 mL/min/1.73 m^2 Final     eGFR if non    Date Value Ref Range Status   10/16/2018 >60.0 >60 mL/min/1.73 m^2 Final     Comment:     Calculation used to obtain the estimated glomerular filtration  rate (eGFR) is the CKD-EPI equation.          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 To rash under gloves x 14 days, then weekends only for 14 days  Dispense: 45 g; Refill: 2  Appreciate rheum input re: sulfasalazine    Hidradenitis suppurativa  Continue topical clindamycin prn           Follow-up in about 6 months (around 7/14/2019).

## 2019-01-24 DIAGNOSIS — L40.3 PALMOPLANTAR PUSTULAR PSORIASIS: ICD-10-CM

## 2019-01-25 RX ORDER — APREMILAST 30 MG/1
TABLET, FILM COATED ORAL
Qty: 60 TABLET | Refills: 2 | Status: SHIPPED | OUTPATIENT
Start: 2019-01-25 | End: 2019-05-15

## 2019-01-29 ENCOUNTER — TELEPHONE (OUTPATIENT)
Dept: PHARMACY | Facility: CLINIC | Age: 59
End: 2019-01-29

## 2019-02-08 ENCOUNTER — PATIENT MESSAGE (OUTPATIENT)
Dept: ADMINISTRATIVE | Facility: OTHER | Age: 59
End: 2019-02-08

## 2019-02-11 ENCOUNTER — LAB VISIT (OUTPATIENT)
Dept: LAB | Facility: HOSPITAL | Age: 59
End: 2019-02-11
Attending: STUDENT IN AN ORGANIZED HEALTH CARE EDUCATION/TRAINING PROGRAM
Payer: COMMERCIAL

## 2019-02-11 ENCOUNTER — OFFICE VISIT (OUTPATIENT)
Dept: RHEUMATOLOGY | Facility: CLINIC | Age: 59
End: 2019-02-11
Payer: COMMERCIAL

## 2019-02-11 VITALS
SYSTOLIC BLOOD PRESSURE: 120 MMHG | DIASTOLIC BLOOD PRESSURE: 83 MMHG | BODY MASS INDEX: 39.42 KG/M2 | HEART RATE: 101 BPM | WEIGHT: 195.56 LBS | HEIGHT: 59 IN

## 2019-02-11 DIAGNOSIS — L40.1 PUSTULAR PSORIASIS: ICD-10-CM

## 2019-02-11 DIAGNOSIS — L60.3 NAIL DYSTROPHY: ICD-10-CM

## 2019-02-11 DIAGNOSIS — L40.1 PUSTULAR PSORIASIS: Primary | ICD-10-CM

## 2019-02-11 DIAGNOSIS — M25.561 CHRONIC PAIN OF RIGHT KNEE: ICD-10-CM

## 2019-02-11 DIAGNOSIS — L73.2 HYDRADENITIS: ICD-10-CM

## 2019-02-11 DIAGNOSIS — Z79.899 HIGH RISK MEDICATION USE: ICD-10-CM

## 2019-02-11 DIAGNOSIS — G89.29 CHRONIC PAIN OF RIGHT KNEE: ICD-10-CM

## 2019-02-11 LAB
ALBUMIN SERPL BCP-MCNC: 3.7 G/DL
ALP SERPL-CCNC: 95 U/L
ALT SERPL W/O P-5'-P-CCNC: 16 U/L
ANION GAP SERPL CALC-SCNC: 10 MMOL/L
AST SERPL-CCNC: 15 U/L
BASOPHILS # BLD AUTO: 0.06 K/UL
BASOPHILS NFR BLD: 0.4 %
BILIRUB SERPL-MCNC: 0.4 MG/DL
BUN SERPL-MCNC: 13 MG/DL
CALCIUM SERPL-MCNC: 9.8 MG/DL
CHLORIDE SERPL-SCNC: 108 MMOL/L
CO2 SERPL-SCNC: 24 MMOL/L
CREAT SERPL-MCNC: 0.9 MG/DL
DIFFERENTIAL METHOD: ABNORMAL
EOSINOPHIL # BLD AUTO: 0.7 K/UL
EOSINOPHIL NFR BLD: 4.9 %
ERYTHROCYTE [DISTWIDTH] IN BLOOD BY AUTOMATED COUNT: 14.5 %
EST. GFR  (AFRICAN AMERICAN): >60 ML/MIN/1.73 M^2
EST. GFR  (NON AFRICAN AMERICAN): >60 ML/MIN/1.73 M^2
GLUCOSE SERPL-MCNC: 108 MG/DL
HCT VFR BLD AUTO: 38.1 %
HGB BLD-MCNC: 12 G/DL
LYMPHOCYTES # BLD AUTO: 2.6 K/UL
LYMPHOCYTES NFR BLD: 19.1 %
MCH RBC QN AUTO: 29.9 PG
MCHC RBC AUTO-ENTMCNC: 31.5 G/DL
MCV RBC AUTO: 95 FL
MONOCYTES # BLD AUTO: 1 K/UL
MONOCYTES NFR BLD: 7.3 %
NEUTROPHILS # BLD AUTO: 9.3 K/UL
NEUTROPHILS NFR BLD: 68 %
PLATELET # BLD AUTO: 350 K/UL
PMV BLD AUTO: 9.9 FL
POTASSIUM SERPL-SCNC: 3.6 MMOL/L
PROT SERPL-MCNC: 7.1 G/DL
RBC # BLD AUTO: 4.02 M/UL
SODIUM SERPL-SCNC: 142 MMOL/L
WBC # BLD AUTO: 13.63 K/UL

## 2019-02-11 PROCEDURE — 99214 OFFICE O/P EST MOD 30 MIN: CPT | Mod: S$GLB,,, | Performed by: STUDENT IN AN ORGANIZED HEALTH CARE EDUCATION/TRAINING PROGRAM

## 2019-02-11 PROCEDURE — 80053 COMPREHEN METABOLIC PANEL: CPT

## 2019-02-11 PROCEDURE — 85025 COMPLETE CBC W/AUTO DIFF WBC: CPT

## 2019-02-11 PROCEDURE — 99999 PR PBB SHADOW E&M-EST. PATIENT-LVL III: CPT | Mod: PBBFAC,,, | Performed by: STUDENT IN AN ORGANIZED HEALTH CARE EDUCATION/TRAINING PROGRAM

## 2019-02-11 PROCEDURE — 3008F PR BODY MASS INDEX (BMI) DOCUMENTED: ICD-10-PCS | Mod: CPTII,S$GLB,, | Performed by: STUDENT IN AN ORGANIZED HEALTH CARE EDUCATION/TRAINING PROGRAM

## 2019-02-11 PROCEDURE — 99214 PR OFFICE/OUTPT VISIT, EST, LEVL IV, 30-39 MIN: ICD-10-PCS | Mod: S$GLB,,, | Performed by: STUDENT IN AN ORGANIZED HEALTH CARE EDUCATION/TRAINING PROGRAM

## 2019-02-11 PROCEDURE — 99999 PR PBB SHADOW E&M-EST. PATIENT-LVL III: ICD-10-PCS | Mod: PBBFAC,,, | Performed by: STUDENT IN AN ORGANIZED HEALTH CARE EDUCATION/TRAINING PROGRAM

## 2019-02-11 PROCEDURE — 36415 COLL VENOUS BLD VENIPUNCTURE: CPT

## 2019-02-11 PROCEDURE — 3008F BODY MASS INDEX DOCD: CPT | Mod: CPTII,S$GLB,, | Performed by: STUDENT IN AN ORGANIZED HEALTH CARE EDUCATION/TRAINING PROGRAM

## 2019-02-11 RX ORDER — BETAMETHASONE DIPROPIONATE 0.5 MG/G
OINTMENT, AUGMENTED TOPICAL
Refills: 2 | COMMUNITY
Start: 2019-01-22 | End: 2023-07-28

## 2019-02-11 ASSESSMENT — ROUTINE ASSESSMENT OF PATIENT INDEX DATA (RAPID3)
MDHAQ FUNCTION SCORE: 0
AM STIFFNESS SCORE: 0, NO
PAIN SCORE: 1
FATIGUE SCORE: 0
TOTAL RAPID3 SCORE: .5
PSYCHOLOGICAL DISTRESS SCORE: 0
PATIENT GLOBAL ASSESSMENT SCORE: .5

## 2019-02-11 NOTE — PROGRESS NOTES
Subjective:       Patient ID: Radha Ervin is a 58 y.o. female.    Chief Complaint: No chief complaint on file.  follow up for pustular psoriasis     HPI   Ms. Ervin is a 58 year old female with past medical history of hydradenitis suppurativa, pustular psoriasis who presents for follow up.     Initial history:  Patient has had hydradenitis for about 15 years. She was placed on Humira for this in December 2017. In Feb 2018 she started developing erythematous blisters, which were painful, pruritic and scaly on her palms and soles. She states that she has had a similar rash about 10 years ago. It was also pustular on palms and soles. She went to her PCP that did not know what it was but ended up giving her a 2 week course of prednisone. She had not had any issues since. She started developing nail changes in July 2018.   She has right knee pain. She states that OA which required a total knee replacement in 2016. She has had one episode where her knee was swollen a that time she was evaluated by orthopedics and it was thought to be related to humira. She still has knee pain but it is usually at the end of the day and worse with movement. She has morning stiffness lasting 2-3 min.   Denies alopecia,  eye pain/redness, dry eyes, dry mouth, oral ulcers, rash, photosensitivity, raynauds, distal hand ulcers, genital ulcers, chest pain, shortness of breath, abd pain, constipation, diarrhea, bloody diarrhea, joint  swelling, tenderness, muscle weakness.     Interval history:  She states that her psoriasis is better about 70%. Nails still are dystophic, She has almost lost her right big toe nail.   Currently Sulfasalzine 500mg bid (never increased to 1g BID) and Otezla.   Hydradenititis is under control. Last flare few weeks ago, gets one every 2-3 mos.   No hand pain or swelling. Some right knee pain     She has had unintentional weight lost (doesn't know how much), slight decrease in appetite, no night sweats, no fever  or chills.  Last mamogram few years ago, last papsmear years ago, needs to give stool sample.   Labs: WASHINGTON negative, SSA negative,    and CCP 10.5, uric acid 7.2,       Review of Systems   Constitutional: Negative for activity change, appetite change and fever.   HENT: Negative for mouth sores.         Negative hair loss    Eyes: Negative for pain and redness.   Respiratory: Negative for chest tightness and shortness of breath.    Cardiovascular: Negative for chest pain and leg swelling.   Gastrointestinal: Negative for abdominal pain, blood in stool, constipation and diarrhea.   Endocrine: Negative for cold intolerance.   Genitourinary: Negative for dysuria.   Musculoskeletal: Positive for arthralgias. Negative for back pain and neck stiffness.   Skin: Positive for rash.        +nail changes  +pustular lesions with    Neurological: Negative for numbness and headaches.       Past Medical History:   Diagnosis Date    Allergy     Eczema     HS (hereditary spherocytosis)     Hx of total knee replacement 01/19/2016    right knee    Joint pain        Past Surgical History:   Procedure Laterality Date    achilles tendon repair      CHOLECYSTECTOMY      TOTAL KNEE ARTHROPLASTY         Family History   Problem Relation Age of Onset    Cancer Mother     Cancer Father         lung CA    Cancer Son         hogdkanaya    Breast cancer Sister     Liver cancer Maternal Uncle        Social History     Socioeconomic History    Marital status: Single     Spouse name: Not on file    Number of children: Not on file    Years of education: Not on file    Highest education level: Not on file   Social Needs    Financial resource strain: Not on file    Food insecurity - worry: Not on file    Food insecurity - inability: Not on file    Transportation needs - medical: Not on file    Transportation needs - non-medical: Not on file   Occupational History    Not on file   Tobacco Use    Smoking status: Current Some  "Day Smoker     Types: Vaping with nicotine    Smokeless tobacco: Never Used   Substance and Sexual Activity    Alcohol use: Yes     Comment: occassionally    Drug use: No    Sexual activity: Not on file   Other Topics Concern    Are you pregnant or think you may be? Not Asked    Breast-feeding Not Asked   Social History Narrative    Not on file       Current Outpatient Medications   Medication Sig Dispense Refill    augmented betamethasone dipropionate (DIPROLENE-AF) 0.05 % ointment MARIA ALEJANDRA EXT AA BID FOR 14 DAYS  2    cetirizine (ZYRTEC) 10 MG tablet Take 1 tablet up to twice a day.  Can be used regularly or just when needed. 60 tablet 3    clindamycin phosphate 1% (CLINDAGEL) 1 % gel Apply topically 2 (two) times daily. To axilla PRN flares 60 g 2    OTEZLA 30 mg Tab Take 1 tablet by mouth twice daily 60 tablet 2    sulfaSALAzine (AZULFIDINE) 500 mg Tab Take 1 tablet (500 mg total) by mouth 2 (two) times daily. 180 tablet 0    betamethasone dipropionate (DIPROLENE) 0.05 % ointment Apply topically 2 (two) times daily. To rash under gloves x 14 days, then weekends only for 14 days 45 g 2     No current facility-administered medications for this visit.        Review of patient's allergies indicates:   Allergen Reactions    Morphine Other (See Comments)     headaches    Latex, natural rubber Rash and Other (See Comments)     Redness and peeling only with bandaids    Penicillins Nausea And Vomiting and Rash       Objective:   /83   Pulse 101   Ht 4' 11" (1.499 m)   Wt 88.7 kg (195 lb 8.8 oz)   LMP  (LMP Unknown)   BMI 39.50 kg/m²      Physical Exam   Constitutional: She is oriented to person, place, and time and well-developed, well-nourished, and in no distress. No distress.   HENT:   Head: Normocephalic.   Mouth/Throat: No oropharyngeal exudate.   Eyes: Conjunctivae and EOM are normal. Pupils are equal, round, and reactive to light. No scleral icterus.   Neck: Neck supple.   Cardiovascular: " Normal rate and normal heart sounds.    No murmur heard.  Pulmonary/Chest: Effort normal and breath sounds normal. No respiratory distress.   Abdominal: Soft. Bowel sounds are normal. She exhibits no distension.       Right Side Rheumatological Exam     Examination finds the shoulder, elbow, wrist, knee, 1st PIP, 1st MCP, 2nd PIP, 2nd MCP, 3rd PIP, 3rd MCP, 4th PIP, 4th MCP, 5th PIP and 5th MCP normal.    Left Side Rheumatological Exam     Examination finds the shoulder, elbow, wrist, knee, 1st PIP, 1st MCP, 2nd PIP, 2nd MCP, 3rd PIP, 3rd MCP, 4th PIP, 4th MCP, 5th PIP and 5th MCP normal.      Neurological: She is alert and oriented to person, place, and time.   Skin: Skin is warm and dry. No rash noted. She is not diaphoretic.       +nail changes   Musculoskeletal: She exhibits no edema.         CMP  Sodium   Date Value Ref Range Status   10/16/2018 141 136 - 145 mmol/L Final     Potassium   Date Value Ref Range Status   10/16/2018 4.0 3.5 - 5.1 mmol/L Final     Chloride   Date Value Ref Range Status   10/16/2018 108 95 - 110 mmol/L Final     CO2   Date Value Ref Range Status   10/16/2018 26 23 - 29 mmol/L Final     Glucose   Date Value Ref Range Status   10/16/2018 86 70 - 110 mg/dL Final     BUN, Bld   Date Value Ref Range Status   10/16/2018 12 6 - 20 mg/dL Final     Creatinine   Date Value Ref Range Status   10/16/2018 0.8 0.5 - 1.4 mg/dL Final     Calcium   Date Value Ref Range Status   10/16/2018 9.6 8.7 - 10.5 mg/dL Final     Total Protein   Date Value Ref Range Status   10/16/2018 7.9 6.0 - 8.4 g/dL Final     Albumin   Date Value Ref Range Status   10/16/2018 3.9 3.5 - 5.2 g/dL Final     Total Bilirubin   Date Value Ref Range Status   10/16/2018 0.6 0.1 - 1.0 mg/dL Final     Comment:     For infants and newborns, interpretation of results should be based  on gestational age, weight and in agreement with clinical  observations.  Premature Infant recommended reference ranges:  Up to 24 hours.............<8.0  mg/dL  Up to 48 hours............<12.0 mg/dL  3-5 days..................<15.0 mg/dL  6-29 days.................<15.0 mg/dL       Alkaline Phosphatase   Date Value Ref Range Status   10/16/2018 116 55 - 135 U/L Final     AST   Date Value Ref Range Status   10/16/2018 17 10 - 40 U/L Final     ALT   Date Value Ref Range Status   10/16/2018 20 10 - 44 U/L Final     Anion Gap   Date Value Ref Range Status   10/16/2018 7 (L) 8 - 16 mmol/L Final     eGFR if    Date Value Ref Range Status   10/16/2018 >60.0 >60 mL/min/1.73 m^2 Final     eGFR if non    Date Value Ref Range Status   10/16/2018 >60.0 >60 mL/min/1.73 m^2 Final     Comment:     Calculation used to obtain the estimated glomerular filtration  rate (eGFR) is the CKD-EPI equation.        Lab Results   Component Value Date    WBC 10.99 10/16/2018    HGB 14.4 10/16/2018    HCT 45.5 10/16/2018    MCV 95 10/16/2018     10/16/2018     Lab Results   Component Value Date    .0 (H) 10/16/2018       Assessment:       1. Pustular psoriasis    2. Chronic pain of right knee    3. Hydradenitis            Plan:       Ms. Ervin is a 58 year old female with past medical history of hydradenitis suppurativa, pustular psoriasis who presents for follow up.     #Pustilar psoriasis  -Patient with pustular psoriasis thought to be 2/2 Humira  -At this time we do not see any signs/symptoms that indicated psoriatic arthritis based on hsitory, exam and reviewing patients old XRAYs  -continue Otezla   -increase sulfasalazine to 1g BID and in one mos increase to 1.5mg BID   -will get labs today   -following with derm     #Nail dystrophy   -if not improved with Otezla and sulfasalazine will have to switch to another agent   -referral to podiatry   -following with derm     #Right knee pain  -s/p knee replacement   -she is going to see orthopedics soon     #hydradenitis  -controlled for now   -following with derm     #fifi loss   -patient counseled  on health maintenance  and screening.     Patient seen and discussed with Dr. Childs    RTC in 3 mos with rizwan Siddiqui MD  Rheumatology PGY 4

## 2019-02-11 NOTE — PROGRESS NOTES
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

## 2019-02-13 ENCOUNTER — TELEPHONE (OUTPATIENT)
Dept: PHARMACY | Facility: CLINIC | Age: 59
End: 2019-02-13

## 2019-03-10 ENCOUNTER — OFFICE VISIT (OUTPATIENT)
Dept: URGENT CARE | Facility: CLINIC | Age: 59
End: 2019-03-10
Payer: COMMERCIAL

## 2019-03-10 VITALS
WEIGHT: 195 LBS | HEIGHT: 59 IN | DIASTOLIC BLOOD PRESSURE: 84 MMHG | HEART RATE: 114 BPM | TEMPERATURE: 100 F | RESPIRATION RATE: 16 BRPM | BODY MASS INDEX: 39.31 KG/M2 | OXYGEN SATURATION: 97 % | SYSTOLIC BLOOD PRESSURE: 117 MMHG

## 2019-03-10 DIAGNOSIS — R50.9 FEVER, UNSPECIFIED FEVER CAUSE: Primary | ICD-10-CM

## 2019-03-10 DIAGNOSIS — R68.83 CHILLS: ICD-10-CM

## 2019-03-10 DIAGNOSIS — J06.9 UPPER RESPIRATORY TRACT INFECTION, UNSPECIFIED TYPE: ICD-10-CM

## 2019-03-10 LAB
CTP QC/QA: YES
FLUAV AG NPH QL: NEGATIVE
FLUBV AG NPH QL: NEGATIVE

## 2019-03-10 PROCEDURE — 87804 INFLUENZA ASSAY W/OPTIC: CPT | Mod: QW,S$GLB,, | Performed by: PHYSICIAN ASSISTANT

## 2019-03-10 PROCEDURE — 99213 OFFICE O/P EST LOW 20 MIN: CPT | Mod: S$GLB,,, | Performed by: PHYSICIAN ASSISTANT

## 2019-03-10 PROCEDURE — 3008F PR BODY MASS INDEX (BMI) DOCUMENTED: ICD-10-PCS | Mod: CPTII,S$GLB,, | Performed by: PHYSICIAN ASSISTANT

## 2019-03-10 PROCEDURE — 3008F BODY MASS INDEX DOCD: CPT | Mod: CPTII,S$GLB,, | Performed by: PHYSICIAN ASSISTANT

## 2019-03-10 PROCEDURE — 99213 PR OFFICE/OUTPT VISIT, EST, LEVL III, 20-29 MIN: ICD-10-PCS | Mod: S$GLB,,, | Performed by: PHYSICIAN ASSISTANT

## 2019-03-10 PROCEDURE — 87804 POCT INFLUENZA A/B: ICD-10-PCS | Mod: 59,QW,S$GLB, | Performed by: PHYSICIAN ASSISTANT

## 2019-03-10 NOTE — PROGRESS NOTES
"Subjective:       Patient ID: Radha Ervin is a 58 y.o. female.    Vitals:  height is 4' 11" (1.499 m) and weight is 88.5 kg (195 lb). Her oral temperature is 100.1 °F (37.8 °C). Her blood pressure is 117/84 and her pulse is 114 (abnormal). Her respiration is 16 and oxygen saturation is 97%.     Chief Complaint: Chills    Patient states her symptoms started on 3/8/19. Patient is complaining of chills and a little cough.      Fever    This is a new problem. The current episode started in the past 7 days. The problem occurs constantly. The problem has been unchanged. Her temperature was unmeasured prior to arrival. Associated symptoms include coughing and muscle aches. Pertinent negatives include no congestion, ear pain, nausea, rash, sore throat, vomiting or wheezing. Associated symptoms comments: Chills  . She has tried NSAIDs for the symptoms. The treatment provided mild relief.       Constitution: Positive for chills and fever. Negative for sweating and fatigue.   HENT: Negative for ear pain, congestion, sinus pain, sinus pressure, sore throat and voice change.    Neck: Negative for painful lymph nodes.   Eyes: Negative for eye redness.   Respiratory: Positive for cough. Negative for chest tightness, sputum production, bloody sputum, COPD, shortness of breath, stridor, wheezing and asthma.    Gastrointestinal: Negative for nausea and vomiting.   Musculoskeletal: Positive for muscle ache.   Skin: Negative for rash.   Allergic/Immunologic: Negative for seasonal allergies and asthma.   Hematologic/Lymphatic: Negative for swollen lymph nodes.       Patient presented with a couple days of flu-like sx. Patient has had fevers, body aches and chills but not N/V/D. Patient has been taking all of the usual and appropriate supportive medications for her sx.   Objective:       Results for orders placed or performed in visit on 03/10/19   POCT Influenza A/B   Result Value Ref Range    Rapid Influenza A Ag Negative Negative "    Rapid Influenza B Ag Negative Negative     Acceptable Yes        Physical Exam   Constitutional: She is oriented to person, place, and time. She appears well-developed and well-nourished. She is cooperative.  Non-toxic appearance. She does not appear ill. No distress.   HENT:   Head: Normocephalic and atraumatic.   Right Ear: Hearing, tympanic membrane, external ear and ear canal normal.   Left Ear: Hearing, tympanic membrane, external ear and ear canal normal.   Nose: Mucosal edema and rhinorrhea present. No nasal deformity. No epistaxis. Right sinus exhibits no maxillary sinus tenderness and no frontal sinus tenderness. Left sinus exhibits no maxillary sinus tenderness and no frontal sinus tenderness.   Mouth/Throat: Uvula is midline, oropharynx is clear and moist and mucous membranes are normal. No trismus in the jaw. Normal dentition. No uvula swelling. No posterior oropharyngeal erythema.   Eyes: Conjunctivae and lids are normal. No scleral icterus.   Sclera clear bilat   Neck: Trachea normal, full passive range of motion without pain and phonation normal. Neck supple.   Cardiovascular: Normal rate, regular rhythm, normal heart sounds, intact distal pulses and normal pulses.   Pulmonary/Chest: Effort normal and breath sounds normal. No stridor. No respiratory distress. She has no decreased breath sounds. She has no wheezes. She has no rhonchi. She has no rales.   Abdominal: Soft. Normal appearance and bowel sounds are normal. She exhibits no distension. There is no tenderness.   Musculoskeletal: Normal range of motion. She exhibits no edema or deformity.   Neurological: She is alert and oriented to person, place, and time. She exhibits normal muscle tone. Coordination normal.   Skin: Skin is warm, dry and intact. She is not diaphoretic. No pallor.   Psychiatric: She has a normal mood and affect. Her speech is normal and behavior is normal. Judgment and thought content normal. Cognition and  "memory are normal.   Nursing note and vitals reviewed.      Assessment:       1. Fever, unspecified fever cause    2. Chills    3. Upper respiratory tract infection, unspecified type        Plan:       Supportive meds: Tylenol for fevers and body aches, cough syrup, and a decongestant for the sinus pressure from congestion (Zyrtec-D)    Fever, unspecified fever cause    Chills  -     POCT Influenza A/B    Upper respiratory tract infection, unspecified type    There are no Patient Instructions on file for this visit.  FLU / COLD HOME CARES:  Discussed with patient need for blowing the nose to expel mucus outwards. Also advised sleeping propped up in bed to around 30 degrees to not allow mucus to pool in sinuses or throat. Patient was advised to do salt water gargles upon awakening and at bedtime, with a few in between during the day if wished. Patient was given "Daytime vs. Nighttime" cough suppressive medications.     COUGH / SORE THROAT HOME CARES:  Patient was recommended to continue with supportive cares such as lozenges, throat sprays, warm salt water gargles, OTC NSAIDs such as Ibuprofen or acetaminphen for discomfort of fevers above 101.0 degrees. Patient may benefit from an OTC antihistamine such as Zyrtec or Claritin for post nasal drip that may cause a sore throat.  The goal is to dry the sinuses and disrupt the over production of mucus that is filling the sinus cavities and secondarily causing a sore throat.    Consistency with treatment is key and patient was informed that these medications may be needed for the next few weeks.        "

## 2019-03-13 NOTE — PATIENT INSTRUCTIONS
Viral Upper Respiratory Illness (Adult)  You have a viral upper respiratory illness (URI), which is another term for the common cold. This illness is contagious during the first few days. It is spread through the air by coughing and sneezing. It may also be spread by direct contact (touching the sick person and then touching your own eyes, nose, or mouth). Frequent handwashing will decrease risk of spread. Most viral illnesses go away within 7 to 10 days with rest and simple home remedies. Sometimes the illness may last for several weeks. Antibiotics will not kill a virus, and they are generally not prescribed for this condition.    Home care  · If symptoms are severe, rest at home for the first 2 to 3 days. When you resume activity, don't let yourself get too tired.  · Avoid being exposed to cigarette smoke (yours or others).  · You may use acetaminophen or ibuprofen to control pain and fever, unless another medicine was prescribed. (Note: If you have chronic liver or kidney disease, have ever had a stomach ulcer or gastrointestinal bleeding, or are taking blood-thinning medicines, talk with your healthcare provider before using these medicines.) Aspirin should never be given to anyone under 18 years of age who is ill with a viral infection or fever. It may cause severe liver or brain damage.  · Your appetite may be poor, so a light diet is fine. Avoid dehydration by drinking 6 to 8 glasses of fluids per day (water, soft drinks, juices, tea, or soup). Extra fluids will help loosen secretions in the nose and lungs.  · Over-the-counter cold medicines will not shorten the length of time youre sick, but they may be helpful for the following symptoms: cough, sore throat, and nasal and sinus congestion. (Note: Do not use decongestants if you have high blood pressure.)  Follow-up care  Follow up with your healthcare provider, or as advised.  When to seek medical advice  Call your healthcare provider right away if any  of these occur:  · Cough with lots of colored sputum (mucus)  · Severe headache; face, neck, or ear pain  · Difficulty swallowing due to throat pain  · Fever of 100.4°F (38°C)  Call 911, or get immediate medical care  Call emergency services right away if any of these occur:  · Chest pain, shortness of breath, wheezing, or difficulty breathing  · Coughing up blood  · Inability to swallow due to throat pain  Date Last Reviewed: 9/13/2015  © 8882-8003 [employer]. [address] [place] [id number]. All rights reserved. This information is not intended as a substitute for professional medical care. Always follow your healthcare professional's instructions.

## 2019-03-15 ENCOUNTER — TELEPHONE (OUTPATIENT)
Dept: PHARMACY | Facility: CLINIC | Age: 59
End: 2019-03-15

## 2019-04-15 ENCOUNTER — TELEPHONE (OUTPATIENT)
Dept: PHARMACY | Facility: CLINIC | Age: 59
End: 2019-04-15

## 2019-04-18 ENCOUNTER — OFFICE VISIT (OUTPATIENT)
Dept: PODIATRY | Facility: CLINIC | Age: 59
End: 2019-04-18
Payer: COMMERCIAL

## 2019-04-18 VITALS — HEIGHT: 59 IN | WEIGHT: 195 LBS | BODY MASS INDEX: 39.31 KG/M2

## 2019-04-18 DIAGNOSIS — B35.1 ONYCHOMYCOSIS: Primary | ICD-10-CM

## 2019-04-18 DIAGNOSIS — L40.9 PSORIASIS: ICD-10-CM

## 2019-04-18 DIAGNOSIS — M72.2 PLANTAR FASCIITIS, RIGHT: ICD-10-CM

## 2019-04-18 PROCEDURE — 99999 PR PBB SHADOW E&M-EST. PATIENT-LVL III: CPT | Mod: PBBFAC,,, | Performed by: PODIATRIST

## 2019-04-18 PROCEDURE — 99203 OFFICE O/P NEW LOW 30 MIN: CPT | Mod: S$GLB,,, | Performed by: PODIATRIST

## 2019-04-18 PROCEDURE — 3008F PR BODY MASS INDEX (BMI) DOCUMENTED: ICD-10-PCS | Mod: CPTII,S$GLB,, | Performed by: PODIATRIST

## 2019-04-18 PROCEDURE — 99203 PR OFFICE/OUTPT VISIT, NEW, LEVL III, 30-44 MIN: ICD-10-PCS | Mod: S$GLB,,, | Performed by: PODIATRIST

## 2019-04-18 PROCEDURE — 99999 PR PBB SHADOW E&M-EST. PATIENT-LVL III: ICD-10-PCS | Mod: PBBFAC,,, | Performed by: PODIATRIST

## 2019-04-18 PROCEDURE — 3008F BODY MASS INDEX DOCD: CPT | Mod: CPTII,S$GLB,, | Performed by: PODIATRIST

## 2019-04-18 NOTE — PROGRESS NOTES
"Subjective:      Patient ID: Radha Ervin is a 58 y.o. female.    Chief Complaint: Heel Pain (heel or arch of right foot )    Radha is a 58 y.o. female  has a past medical history of Allergy, Eczema, HS (hereditary spherocytosis), total knee replacement, and Joint pain. who presents to the podiatry clinic  with complaint of  right foot pain. Onset of the symptoms was several weeks ago. Precipitating event: Painful R toe nail which caused her to transition from hokas in to an old pair of sketchers. Now she has arch pain as well.. Current symptoms include: ability to bear weight, but with some pain. Aggravating factors: any weight bearing. Symptoms have stabilized. Patient has had prior foot problems. Evaluation to date: none. Treatment to date: avoidance of offending activity, ice and rest. Patients rates pain 7/10 on pain scale.    Vitals:    04/18/19 1333   Weight: 88.5 kg (195 lb)   Height: 4' 11" (1.499 m)   PainSc:   5      Past Medical History:   Diagnosis Date    Allergy     Eczema     HS (hereditary spherocytosis)     Hx of total knee replacement 01/19/2016    right knee    Joint pain        Past Surgical History:   Procedure Laterality Date    achilles tendon repair      CHOLECYSTECTOMY      TOTAL KNEE ARTHROPLASTY         Family History   Problem Relation Age of Onset    Cancer Mother     Cancer Father         lung CA    Cancer Son         wade    Breast cancer Sister     Liver cancer Maternal Uncle        Social History     Socioeconomic History    Marital status: Single     Spouse name: Not on file    Number of children: Not on file    Years of education: Not on file    Highest education level: Not on file   Occupational History    Not on file   Social Needs    Financial resource strain: Not on file    Food insecurity:     Worry: Not on file     Inability: Not on file    Transportation needs:     Medical: Not on file     Non-medical: Not on file   Tobacco Use    Smoking " status: Current Some Day Smoker     Types: Vaping with nicotine    Smokeless tobacco: Never Used   Substance and Sexual Activity    Alcohol use: Yes     Comment: occassionally    Drug use: No    Sexual activity: Not Currently     Partners: Male   Lifestyle    Physical activity:     Days per week: Not on file     Minutes per session: Not on file    Stress: Not on file   Relationships    Social connections:     Talks on phone: Not on file     Gets together: Not on file     Attends [employer] service: Not on file     Active member of club or organization: Not on file     Attends meetings of clubs or organizations: Not on file     Relationship status: Not on file   Other Topics Concern    Are you pregnant or think you may be? Not Asked    Breast-feeding Not Asked   Social History Narrative    Not on file       Current Outpatient Medications   Medication Sig Dispense Refill    augmented betamethasone dipropionate (DIPROLENE-AF) 0.05 % ointment MARIA ALEJANDRA EXT AA BID FOR 14 DAYS  2    cetirizine (ZYRTEC) 10 MG tablet Take 1 tablet up to twice a day.  Can be used regularly or just when needed. 60 tablet 3    clindamycin phosphate 1% (CLINDAGEL) 1 % gel Apply topically 2 (two) times daily. To axilla PRN flares 60 g 2    OTEZLA 30 mg Tab Take 1 tablet by mouth twice daily 60 tablet 2    betamethasone dipropionate (DIPROLENE) 0.05 % ointment Apply topically 2 (two) times daily. To rash under gloves x 14 days, then weekends only for 14 days 45 g 2     No current facility-administered medications for this visit.        Review of patient's allergies indicates:   Allergen Reactions    Morphine Other (See Comments)     headaches    Latex, natural rubber Rash and Other (See Comments)     Redness and peeling only with bandaids    Penicillins Nausea And Vomiting and Rash         Review of Systems   Constitution: Negative for chills, decreased appetite, diaphoresis, fever, night sweats and weight loss.   HENT: Negative for  congestion.    Cardiovascular: Negative for chest pain and claudication.   Respiratory: Negative for cough and shortness of breath.    Skin: Positive for dry skin, nail changes and unusual hair distribution.   Gastrointestinal: Negative for nausea and vomiting.   Psychiatric/Behavioral: Negative for altered mental status.           Objective:      Physical Exam   Constitutional: She is oriented to person, place, and time. She appears well-developed and well-nourished. No distress.   Cardiovascular: Intact distal pulses.   Musculoskeletal: She exhibits tenderness.   Tenderness to right hallux nail and to mid arch of right foot.    Pes cavus noted champ with equinus deformity, reduced with knee flexion, champ.    Neurological: She is alert and oriented to person, place, and time. No sensory deficit.   Skin: Skin is warm, dry and intact. Capillary refill takes less than 2 seconds. Rash noted. She is not diaphoretic. There is erythema. No cyanosis. No pallor. Nails show no clubbing.   R hallux nail mycotic with mild erythematous papular rash of proximal nail fold and champ 5th toes mycotic with thickened/dystrophic and discolored appearance. Remaining toe nails are discolored, thickened and normotrophic.     Skin is warm/dry/and supple, champ. No open lesions or SOI noted, champ, stable.     Lateral onychocryptosis of right hallux nail, no open wounds or SOI noted,s table.                  Assessment:       Encounter Diagnoses   Name Primary?    Onychomycosis Yes    Psoriasis     Plantar fasciitis, right          Plan:       Radha was seen today for heel pain.    Diagnoses and all orders for this visit:    Onychomycosis    Psoriasis    Plantar fasciitis, right      I counseled the patient on her conditions, their implications and medical management.      - With patient's permission and as a courtesy, right great toe nail trimmed and lateral slant back procedure performed. Patient relates relief following the procedure. She  will continue to monitor the areas daily, inspect her feet, wear protective shoe gear when ambulatory, moisturizer to maintain skin integrity and follow in this office p.r.n.    -Recommend stretching posterior compartment for equinus as demonstrated in clinic. Recommend RICE therapy and transitioning to rigid soled shoes with arch support and wide toe box with small heel to accommodate foot type.     -Discussed topical vs oral tx for onychomycosis. Pt elects to try topical tx for now. [facility].     -RTC as specified    Radha Enciso DPM, PGY3    I have personally taken the history and examined this patient and agree with the resident's note as stated as above.   Elmer MARKSM, FACFAS

## 2019-04-18 NOTE — LETTER
April 18, 2019      Jennyfer Siddiqui MD  [address] [facility] [address]           [place] - Podiatry  [address]  [facility] [address]  Phone: [phone]  Fax: [phone]          Patient: Radha Ervin   MR Number: [id number]   YOB: 1960   Date of Visit: 4/18/2019       Dear Dr. Jennyfer Siddiqui:    Thank you for referring Radha Ervin to me for evaluation. Attached you will find relevant portions of my assessment and plan of care.    If you have questions, please do not hesitate to call me. I look forward to following Radha Ervin along with you.    Sincerely,    Elmer Dotson, DPGONZALES    Enclosure  CC:  No Recipients    If you would like to receive this communication electronically, please contact [email or web address] or [phone] to request more information on travayl Link access.    For providers and/or their staff who would like to refer a patient to [facility], please contact us through our one-stop-shop provider referral line, [facility], at [phone].    If you feel you have received this communication in error or would no longer like to receive these types of communications, please e-mail [email or web address]

## 2019-04-23 ENCOUNTER — TELEPHONE (OUTPATIENT)
Dept: PHARMACY | Facility: CLINIC | Age: 59
End: 2019-04-23

## 2019-05-06 ENCOUNTER — LAB VISIT (OUTPATIENT)
Dept: LAB | Facility: HOSPITAL | Age: 59
End: 2019-05-06
Attending: STUDENT IN AN ORGANIZED HEALTH CARE EDUCATION/TRAINING PROGRAM
Payer: COMMERCIAL

## 2019-05-06 DIAGNOSIS — L40.1 PUSTULAR PSORIASIS: ICD-10-CM

## 2019-05-06 LAB
ALBUMIN SERPL BCP-MCNC: 3.8 G/DL (ref 3.5–5.2)
ALP SERPL-CCNC: 103 U/L (ref 55–135)
ALT SERPL W/O P-5'-P-CCNC: 18 U/L (ref 10–44)
ANION GAP SERPL CALC-SCNC: 10 MMOL/L (ref 8–16)
AST SERPL-CCNC: 16 U/L (ref 10–40)
BASOPHILS # BLD AUTO: 0.05 K/UL (ref 0–0.2)
BASOPHILS NFR BLD: 0.5 % (ref 0–1.9)
BILIRUB SERPL-MCNC: 0.4 MG/DL (ref 0.1–1)
BUN SERPL-MCNC: 15 MG/DL (ref 6–20)
CALCIUM SERPL-MCNC: 10.6 MG/DL (ref 8.7–10.5)
CHLORIDE SERPL-SCNC: 106 MMOL/L (ref 95–110)
CO2 SERPL-SCNC: 24 MMOL/L (ref 23–29)
CREAT SERPL-MCNC: 0.9 MG/DL (ref 0.5–1.4)
DIFFERENTIAL METHOD: ABNORMAL
EOSINOPHIL # BLD AUTO: 0.6 K/UL (ref 0–0.5)
EOSINOPHIL NFR BLD: 5.7 % (ref 0–8)
ERYTHROCYTE [DISTWIDTH] IN BLOOD BY AUTOMATED COUNT: 13.9 % (ref 11.5–14.5)
EST. GFR  (AFRICAN AMERICAN): >60 ML/MIN/1.73 M^2
EST. GFR  (NON AFRICAN AMERICAN): >60 ML/MIN/1.73 M^2
GLUCOSE SERPL-MCNC: 107 MG/DL (ref 70–110)
HCT VFR BLD AUTO: 43.9 % (ref 37–48.5)
HGB BLD-MCNC: 14.1 G/DL (ref 12–16)
LYMPHOCYTES # BLD AUTO: 2.4 K/UL (ref 1–4.8)
LYMPHOCYTES NFR BLD: 24.1 % (ref 18–48)
MCH RBC QN AUTO: 29.7 PG (ref 27–31)
MCHC RBC AUTO-ENTMCNC: 32.1 G/DL (ref 32–36)
MCV RBC AUTO: 92 FL (ref 82–98)
MONOCYTES # BLD AUTO: 0.9 K/UL (ref 0.3–1)
MONOCYTES NFR BLD: 8.7 % (ref 4–15)
NEUTROPHILS # BLD AUTO: 6.1 K/UL (ref 1.8–7.7)
NEUTROPHILS NFR BLD: 60.7 % (ref 38–73)
PLATELET # BLD AUTO: 333 K/UL (ref 150–350)
PMV BLD AUTO: 9.9 FL (ref 9.2–12.9)
POTASSIUM SERPL-SCNC: 4.3 MMOL/L (ref 3.5–5.1)
PROT SERPL-MCNC: 7.4 G/DL (ref 6–8.4)
RBC # BLD AUTO: 4.75 M/UL (ref 4–5.4)
SODIUM SERPL-SCNC: 140 MMOL/L (ref 136–145)
WBC # BLD AUTO: 10.11 K/UL (ref 3.9–12.7)

## 2019-05-06 PROCEDURE — 36415 COLL VENOUS BLD VENIPUNCTURE: CPT

## 2019-05-06 PROCEDURE — 80053 COMPREHEN METABOLIC PANEL: CPT

## 2019-05-06 PROCEDURE — 85025 COMPLETE CBC W/AUTO DIFF WBC: CPT

## 2019-05-09 DIAGNOSIS — Z12.31 ENCOUNTER FOR SCREENING MAMMOGRAM FOR MALIGNANT NEOPLASM OF BREAST: Primary | ICD-10-CM

## 2019-05-10 ENCOUNTER — TELEPHONE (OUTPATIENT)
Dept: ORTHOPEDICS | Facility: CLINIC | Age: 59
End: 2019-05-10

## 2019-05-10 DIAGNOSIS — G89.29 CHRONIC PAIN OF RIGHT KNEE: Primary | ICD-10-CM

## 2019-05-10 DIAGNOSIS — M25.561 CHRONIC PAIN OF RIGHT KNEE: Primary | ICD-10-CM

## 2019-05-10 NOTE — PROGRESS NOTES
Subjective:       Patient ID: Radha Ervin is a 58 y.o. female.    Chief Complaint: Disease Management  follow up for pustular psoriasis     HPI   Ms. Ervin is a 58 year old female with past medical history of hydradenitis suppurativa, pustular psoriasis who presents for follow up for her pustular psoriasis     Initial history:  Patient has had hydradenitis for about 15 years. She was placed on Humira for this in December 2017. In Feb 2018 she started developing erythematous blisters, which were painful, pruritic and scaly on her palms and soles. She states that she has had a similar rash about 10 years ago. It was also pustular on palms and soles. She went to her PCP that did not know what it was but ended up giving her a 2 week course of prednisone. She had not had any issues since. She started developing nail changes in July 2018.   She has right knee pain. She states that OA which required a total knee replacement in 2016. She has had one episode where her knee was swollen a that time she was evaluated by orthopedics and it was thought to be related to humira. She still has knee pain but it is usually at the end of the day and worse with movement. She has morning stiffness lasting 2-3 min.   Denies alopecia,  eye pain/redness, dry eyes, dry mouth, oral ulcers, rash, photosensitivity, raynauds, distal hand ulcers, genital ulcers, chest pain, shortness of breath, abd pain, constipation, diarrhea, bloody diarrhea, joint  swelling, tenderness, muscle weakness.     Last seen in clinic 2/11/19  She states that her psoriasis is better about 70%. Nails still are dystophic, She has almost lost her right big toe nail.   Currently Sulfasalzine 500mg bid (never increased to 1g BID) and Otezla.   Hydradenititis is under control. Last flare few weeks ago, gets one every 2-3 mos.   No hand pain or swelling. Some right knee pain   She has had unintentional weight lost (doesn't know how much), slight decrease in appetite,  no night sweats, no fever or chills.  Last mamogram few years ago, last papsmear years ago, needs to give stool sample.   Labs: WASHINGTON negative, SSA negative,    and CCP 10.5, uric acid 7.2,   We increased sulfasalazine to 1g BID and in one mos increase to 1.5mg BID     Interval history:  Is due to see Derm in July. Is currently having a hidradenitis flare in right groin. Having flares every 3-4 weeks, in just this spot. She states that her steroid and antibiotic cream cost her $40 and $70 respectively. Her pustular psoriasis has improved a lot. She gets flares of her psoriasis every 2-3 months which resolve. Her nails are still dystrophic. Saw podiatry. Her nail was shaved down, which is less painful now. Also with plantar fascitis on right foot.   Tolerating Otezla well. Last visit she increased to 1500mg BID SSZ but had diarrhea so decreased to 1000mg BID   No hand, wrists, ankle or feet swelling or pain.   Labs 5/2019: showing normal liver and kidney function, normal blood counts     Review of Systems   Constitutional: Negative for activity change, appetite change and fever.   HENT: Negative for mouth sores.         Negative hair loss    Eyes: Negative for pain and redness.   Respiratory: Positive for cough and shortness of breath. Negative for chest tightness.    Cardiovascular: Negative for chest pain and leg swelling.   Gastrointestinal: Negative for abdominal pain, blood in stool, constipation and diarrhea.   Endocrine: Negative for cold intolerance.   Genitourinary: Negative for dysuria.   Musculoskeletal: Negative for back pain and neck stiffness.   Skin: Positive for rash.        +nail changes     Neurological: Negative for numbness and headaches.       Past Medical History:   Diagnosis Date    Allergy     Eczema     HS (hereditary spherocytosis)     Hx of total knee replacement 01/19/2016    right knee    Joint pain        Past Surgical History:   Procedure Laterality Date    achilles tendon  repair      CHOLECYSTECTOMY      TOTAL KNEE ARTHROPLASTY         Family History   Problem Relation Age of Onset    Cancer Mother     Cancer Father         lung CA    Cancer Son         wade    Breast cancer Sister     Liver cancer Maternal Uncle        Social History     Socioeconomic History    Marital status: Single     Spouse name: Not on file    Number of children: Not on file    Years of education: Not on file    Highest education level: Not on file   Occupational History    Not on file   Social Needs    Financial resource strain: Not on file    Food insecurity:     Worry: Not on file     Inability: Not on file    Transportation needs:     Medical: Not on file     Non-medical: Not on file   Tobacco Use    Smoking status: Current Some Day Smoker     Types: Vaping with nicotine    Smokeless tobacco: Never Used   Substance and Sexual Activity    Alcohol use: Yes     Comment: occassionally    Drug use: No    Sexual activity: Not Currently     Partners: Male   Lifestyle    Physical activity:     Days per week: Not on file     Minutes per session: Not on file    Stress: Not on file   Relationships    Social connections:     Talks on phone: Not on file     Gets together: Not on file     Attends [employer] service: Not on file     Active member of club or organization: Not on file     Attends meetings of clubs or organizations: Not on file     Relationship status: Not on file   Other Topics Concern    Are you pregnant or think you may be? Not Asked    Breast-feeding Not Asked   Social History Narrative    Not on file       Current Outpatient Medications   Medication Sig Dispense Refill    augmented betamethasone dipropionate (DIPROLENE-AF) 0.05 % ointment MARIA ALEJANDRA EXT AA BID FOR 14 DAYS  2    cetirizine (ZYRTEC) 10 MG tablet Take 1 tablet up to twice a day.  Can be used regularly or just when needed. 60 tablet 3    clindamycin phosphate 1% (CLINDAGEL) 1 % gel Apply topically 2 (two) times  "daily. To axilla as needed for flares 60 g 2    OTEZLA 30 mg Tab Take 1 tablet by mouth twice daily 60 tablet 2    betamethasone dipropionate (DIPROLENE) 0.05 % ointment Apply topically 2 (two) times daily. To rash under gloves x 14 days, then weekends only for 14 days 45 g 2    sulfaSALAzine (AZULFIDINE) 500 mg Tab TK 1 T PO BID  0     No current facility-administered medications for this visit.        Review of patient's allergies indicates:   Allergen Reactions    Morphine Other (See Comments)     headaches    Latex, natural rubber Rash and Other (See Comments)     Redness and peeling only with bandaids    Penicillins Nausea And Vomiting and Rash       Objective:   /85   Pulse 89   Ht 4' 11" (1.499 m)   Wt 90.1 kg (198 lb 10.2 oz)   LMP  (LMP Unknown)   BMI 40.12 kg/m²      Physical Exam   Constitutional: She is oriented to person, place, and time and well-developed, well-nourished, and in no distress. No distress.   HENT:   Head: Normocephalic.   Mouth/Throat: No oropharyngeal exudate.   Eyes: Conjunctivae and EOM are normal. Pupils are equal, round, and reactive to light. No scleral icterus.   Neck: Neck supple.   Cardiovascular: Normal rate and normal heart sounds.    No murmur heard.  Pulmonary/Chest: Effort normal and breath sounds normal. No respiratory distress.   Abdominal: Soft. Bowel sounds are normal. She exhibits no distension.       Right Side Rheumatological Exam     Examination finds the shoulder, elbow, wrist, knee, 1st PIP, 1st MCP, 2nd PIP, 2nd MCP, 3rd PIP, 3rd MCP, 4th PIP, 4th MCP, 5th PIP and 5th MCP normal.    Left Side Rheumatological Exam     Examination finds the shoulder, elbow, wrist, knee, 1st PIP, 1st MCP, 2nd PIP, 2nd MCP, 3rd PIP, 3rd MCP, 4th PIP, 4th MCP, 5th PIP and 5th MCP normal.      Genitourinary:   Genitourinary Comments: Right groin with one small lesion, not draining    Neurological: She is alert and oriented to person, place, and time.   Skin: Skin is " warm and dry. No rash noted. She is not diaphoretic.     Resolving lesions on left palm and b/l soles of feet   +nail changes   Musculoskeletal: She exhibits no edema.         CMP  Sodium   Date Value Ref Range Status   05/06/2019 140 136 - 145 mmol/L Final     Potassium   Date Value Ref Range Status   05/06/2019 4.3 3.5 - 5.1 mmol/L Final     Chloride   Date Value Ref Range Status   05/06/2019 106 95 - 110 mmol/L Final     CO2   Date Value Ref Range Status   05/06/2019 24 23 - 29 mmol/L Final     Glucose   Date Value Ref Range Status   05/06/2019 107 70 - 110 mg/dL Final     BUN, Bld   Date Value Ref Range Status   05/06/2019 15 6 - 20 mg/dL Final     Creatinine   Date Value Ref Range Status   05/06/2019 0.9 0.5 - 1.4 mg/dL Final     Calcium   Date Value Ref Range Status   05/06/2019 10.6 (H) 8.7 - 10.5 mg/dL Final     Total Protein   Date Value Ref Range Status   05/06/2019 7.4 6.0 - 8.4 g/dL Final     Albumin   Date Value Ref Range Status   05/06/2019 3.8 3.5 - 5.2 g/dL Final     Total Bilirubin   Date Value Ref Range Status   05/06/2019 0.4 0.1 - 1.0 mg/dL Final     Comment:     For infants and newborns, interpretation of results should be based  on gestational age, weight and in agreement with clinical  observations.  Premature Infant recommended reference ranges:  Up to 24 hours.............<8.0 mg/dL  Up to 48 hours............<12.0 mg/dL  3-5 days..................<15.0 mg/dL  6-29 days.................<15.0 mg/dL       Alkaline Phosphatase   Date Value Ref Range Status   05/06/2019 103 55 - 135 U/L Final     AST   Date Value Ref Range Status   05/06/2019 16 10 - 40 U/L Final     ALT   Date Value Ref Range Status   05/06/2019 18 10 - 44 U/L Final     Anion Gap   Date Value Ref Range Status   05/06/2019 10 8 - 16 mmol/L Final     eGFR if    Date Value Ref Range Status   05/06/2019 >60 >60 mL/min/1.73 m^2 Final     eGFR if non    Date Value Ref Range Status   05/06/2019 >60 >60  mL/min/1.73 m^2 Final     Comment:     Calculation used to obtain the estimated glomerular filtration  rate (eGFR) is the CKD-EPI equation.        Lab Results   Component Value Date    WBC 10.11 05/06/2019    HGB 14.1 05/06/2019    HCT 43.9 05/06/2019    MCV 92 05/06/2019     05/06/2019     Lab Results   Component Value Date    .0 (H) 10/16/2018       Assessment:       1. Palmoplantar pustulosis    2. Hidradenitis suppurativa            Plan:       Ms. Ervin is a 58 year old female with past medical history of hydradenitis suppurativa, pustular psoriasis who presents for follow up.     #Pustilar psoriasis  -Patient with pustular psoriasis thought to be 2/2 Humira  -At this time we do not see any signs/symptoms that indicated psoriatic arthritis based on hsitory, exam and reviewing patients old XRAYs  -she has no active lesions and is doing well   -continue Otezla   -patient was not able to tolerate sulfasalazine 1500mg BID due to diarrhea, she is to continue 1000mg BID   -following with derm     #Nail dystrophy   -following with derm     #Right knee pain  -s/p knee replacement   -she is going to see orthopedics soon     #hydradenitis  -has recurrent flares 3-4 times per week   -on examination, seems to be resolving.   -following with derm   -prescriptions for her steroid and clindamycin cream sent to our pharmacy to see if they can help with cost.   -will discuss with Dr. Spencer if patients therapy needs to be changed vs just monitoring. I have messaged her through epic     #health maintenance   -patient counseled on health maintenance  and screening.     Patient seen and discussed with Dr. Childs    RTC in 3 mos with rizwan Siddiqui MD  Rheumatology PGY 4

## 2019-05-13 ENCOUNTER — OFFICE VISIT (OUTPATIENT)
Dept: RHEUMATOLOGY | Facility: CLINIC | Age: 59
End: 2019-05-13
Payer: COMMERCIAL

## 2019-05-13 VITALS
BODY MASS INDEX: 40.04 KG/M2 | HEART RATE: 89 BPM | HEIGHT: 59 IN | WEIGHT: 198.63 LBS | DIASTOLIC BLOOD PRESSURE: 85 MMHG | SYSTOLIC BLOOD PRESSURE: 122 MMHG

## 2019-05-13 DIAGNOSIS — L73.2 HIDRADENITIS SUPPURATIVA: ICD-10-CM

## 2019-05-13 DIAGNOSIS — L40.3 PALMOPLANTAR PUSTULOSIS: ICD-10-CM

## 2019-05-13 PROCEDURE — 99999 PR PBB SHADOW E&M-EST. PATIENT-LVL III: ICD-10-PCS | Mod: PBBFAC,,, | Performed by: STUDENT IN AN ORGANIZED HEALTH CARE EDUCATION/TRAINING PROGRAM

## 2019-05-13 PROCEDURE — 99999 PR PBB SHADOW E&M-EST. PATIENT-LVL III: CPT | Mod: PBBFAC,,, | Performed by: STUDENT IN AN ORGANIZED HEALTH CARE EDUCATION/TRAINING PROGRAM

## 2019-05-13 PROCEDURE — 99213 PR OFFICE/OUTPT VISIT, EST, LEVL III, 20-29 MIN: ICD-10-PCS | Mod: S$GLB,,, | Performed by: STUDENT IN AN ORGANIZED HEALTH CARE EDUCATION/TRAINING PROGRAM

## 2019-05-13 PROCEDURE — 3008F PR BODY MASS INDEX (BMI) DOCUMENTED: ICD-10-PCS | Mod: CPTII,S$GLB,, | Performed by: STUDENT IN AN ORGANIZED HEALTH CARE EDUCATION/TRAINING PROGRAM

## 2019-05-13 PROCEDURE — 99213 OFFICE O/P EST LOW 20 MIN: CPT | Mod: S$GLB,,, | Performed by: STUDENT IN AN ORGANIZED HEALTH CARE EDUCATION/TRAINING PROGRAM

## 2019-05-13 PROCEDURE — 3008F BODY MASS INDEX DOCD: CPT | Mod: CPTII,S$GLB,, | Performed by: STUDENT IN AN ORGANIZED HEALTH CARE EDUCATION/TRAINING PROGRAM

## 2019-05-13 RX ORDER — CLINDAMYCIN PHOSPHATE 10 MG/G
GEL TOPICAL 2 TIMES DAILY
Qty: 60 G | Refills: 2 | Status: SHIPPED | OUTPATIENT
Start: 2019-05-13 | End: 2021-01-20

## 2019-05-13 RX ORDER — SULFASALAZINE 500 MG/1
TABLET ORAL
Refills: 0 | COMMUNITY
Start: 2019-04-24 | End: 2019-09-09

## 2019-05-13 RX ORDER — BETAMETHASONE DIPROPIONATE 0.5 MG/G
OINTMENT TOPICAL 2 TIMES DAILY
Qty: 45 G | Refills: 2 | Status: SHIPPED | OUTPATIENT
Start: 2019-05-13 | End: 2019-07-15 | Stop reason: SDUPTHER

## 2019-05-13 ASSESSMENT — ROUTINE ASSESSMENT OF PATIENT INDEX DATA (RAPID3)
PAIN SCORE: .5
MDHAQ FUNCTION SCORE: 0
PATIENT GLOBAL ASSESSMENT SCORE: 0
FATIGUE SCORE: 0
AM STIFFNESS SCORE: 0, NO
TOTAL RAPID3 SCORE: .17
PSYCHOLOGICAL DISTRESS SCORE: 0

## 2019-05-13 NOTE — PROGRESS NOTES
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  Plantar fasciitis currently doing PT      WASHINGTON and SSA neg  Uric acid 7.2    CCP 10.5     Psoriasis better  Joints ok  Hidradenitis is active despite steroids and clindamycin,but it comes as flares only     Today noted a small lesion inner left thigh      PLAN      Ask derm how they want to manage the hidradenitis  She doesn't seem to be compliant to the topical steroids and clindamycin since they are very expensive,we will contact our pharmacy and ask for cheaper alternatives  If no response,  Consider other immunosuppressants : different TNF vs tacro vs cyclosporine    For now no change in management unless derm has any suggestions

## 2019-05-13 NOTE — PROGRESS NOTES
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

## 2019-05-14 ENCOUNTER — OFFICE VISIT (OUTPATIENT)
Dept: ORTHOPEDICS | Facility: CLINIC | Age: 59
End: 2019-05-14
Payer: COMMERCIAL

## 2019-05-14 VITALS — BODY MASS INDEX: 39.92 KG/M2 | HEIGHT: 59 IN | WEIGHT: 198 LBS

## 2019-05-14 DIAGNOSIS — M65.9 SYNOVITIS OF RIGHT KNEE: Primary | ICD-10-CM

## 2019-05-14 PROCEDURE — 99999 PR PBB SHADOW E&M-EST. PATIENT-LVL III: CPT | Mod: PBBFAC,,, | Performed by: ORTHOPAEDIC SURGERY

## 2019-05-14 PROCEDURE — 3008F BODY MASS INDEX DOCD: CPT | Mod: CPTII,S$GLB,, | Performed by: ORTHOPAEDIC SURGERY

## 2019-05-14 PROCEDURE — 99212 PR OFFICE/OUTPT VISIT, EST, LEVL II, 10-19 MIN: ICD-10-PCS | Mod: S$GLB,,, | Performed by: ORTHOPAEDIC SURGERY

## 2019-05-14 PROCEDURE — 99212 OFFICE O/P EST SF 10 MIN: CPT | Mod: S$GLB,,, | Performed by: ORTHOPAEDIC SURGERY

## 2019-05-14 PROCEDURE — 3008F PR BODY MASS INDEX (BMI) DOCUMENTED: ICD-10-PCS | Mod: CPTII,S$GLB,, | Performed by: ORTHOPAEDIC SURGERY

## 2019-05-14 PROCEDURE — 99999 PR PBB SHADOW E&M-EST. PATIENT-LVL III: ICD-10-PCS | Mod: PBBFAC,,, | Performed by: ORTHOPAEDIC SURGERY

## 2019-05-14 NOTE — PROGRESS NOTES
"Subjective:      Patient ID: Radha Ervin is a 58 y.o. female.    Chief Complaint: Pain of the Right Knee      HPI:  Three years postop  The patient is seen for postop follow-up of right  TKA.  Pain control has been satisfactory  They feel that they are ambulating easily  Preoperative complaints include:  None at this time. The procedure was done at another facility.  The patient initially presented with synovitis that appears to have been related to her systemic psoriasis.  This is now being treated by Rheumatology and is very well controlled.      Current Outpatient Medications:     augmented betamethasone dipropionate (DIPROLENE-AF) 0.05 % ointment, MARIA ALEJANDRA EXT AA BID FOR 14 DAYS, Disp: , Rfl: 2    betamethasone dipropionate (DIPROLENE) 0.05 % ointment, Apply topically 2 (two) times daily. To rash under gloves x 14 days, then weekends only for 14 days, Disp: 45 g, Rfl: 2    cetirizine (ZYRTEC) 10 MG tablet, Take 1 tablet up to twice a day.  Can be used regularly or just when needed., Disp: 60 tablet, Rfl: 3    clindamycin phosphate 1% (CLINDAGEL) 1 % gel, Apply topically 2 (two) times daily. To axilla as needed for flares, Disp: 60 g, Rfl: 2    OTEZLA 30 mg Tab, Take 1 tablet by mouth twice daily, Disp: 60 tablet, Rfl: 2    sulfaSALAzine (AZULFIDINE) 500 mg Tab, TK 1 T PO BID, Disp: , Rfl: 0  Review of patient's allergies indicates:   Allergen Reactions    Morphine Other (See Comments)     headaches    Latex, natural rubber Rash and Other (See Comments)     Redness and peeling only with bandaids    Penicillins Nausea And Vomiting and Rash       Ht 4' 11" (1.499 m)   Wt 89.8 kg (198 lb)   LMP  (LMP Unknown)   BMI 39.99 kg/m²     ROS        Objective:    Ortho Exam          Alert, oriented, no acute distress  Sclera-Normal  Respiratory distress-none  Gait no limp  Incision:  Normally  No effusion  Range of motion: extension- 0 degrees; flexion- upon 35 degrees  Valgus/varus stability- " stable  Swelling-none  Neurologic exam intact  Pulses intact  Assessment:     Imaging:  Radiographs show well-positioned right total knee without complicating process        1. Synovitis of right knee        The implant is functioning normally at this time. There is no evidence of complicating process; the presenting condition is resolved at this point.        Plan:          No follow-ups on file.    I explained my diagnostic impression and the reasoning behind it in detail, using layman's terms.  Models and/or pictures were used to help in the explanation.    Appropriate levels of activity were discussed as well as modifications with regard to squatting

## 2019-05-15 ENCOUNTER — TELEPHONE (OUTPATIENT)
Dept: PHARMACY | Facility: CLINIC | Age: 59
End: 2019-05-15

## 2019-05-15 DIAGNOSIS — L40.3 PALMOPLANTAR PUSTULAR PSORIASIS: ICD-10-CM

## 2019-05-15 RX ORDER — APREMILAST 30 MG/1
TABLET, FILM COATED ORAL
Qty: 60 TABLET | Refills: 2 | Status: SHIPPED | OUTPATIENT
Start: 2019-05-15 | End: 2019-07-18

## 2019-05-27 ENCOUNTER — OFFICE VISIT (OUTPATIENT)
Dept: URGENT CARE | Facility: CLINIC | Age: 59
End: 2019-05-27
Payer: COMMERCIAL

## 2019-05-27 VITALS
DIASTOLIC BLOOD PRESSURE: 78 MMHG | OXYGEN SATURATION: 96 % | WEIGHT: 180 LBS | HEIGHT: 59 IN | SYSTOLIC BLOOD PRESSURE: 112 MMHG | BODY MASS INDEX: 36.29 KG/M2 | RESPIRATION RATE: 18 BRPM | TEMPERATURE: 101 F | HEART RATE: 115 BPM

## 2019-05-27 DIAGNOSIS — R50.9 FEVER, UNSPECIFIED FEVER CAUSE: ICD-10-CM

## 2019-05-27 DIAGNOSIS — J06.9 UPPER RESPIRATORY TRACT INFECTION, UNSPECIFIED TYPE: Primary | ICD-10-CM

## 2019-05-27 LAB
CTP QC/QA: YES
FLUAV AG NPH QL: NEGATIVE
FLUBV AG NPH QL: NEGATIVE

## 2019-05-27 PROCEDURE — 87804 POCT INFLUENZA A/B: ICD-10-PCS | Mod: QW,S$GLB,, | Performed by: PHYSICIAN ASSISTANT

## 2019-05-27 PROCEDURE — 71046 XR CHEST PA AND LATERAL: ICD-10-PCS | Mod: FY,S$GLB,, | Performed by: RADIOLOGY

## 2019-05-27 PROCEDURE — 87804 INFLUENZA ASSAY W/OPTIC: CPT | Mod: QW,S$GLB,, | Performed by: PHYSICIAN ASSISTANT

## 2019-05-27 PROCEDURE — 99214 PR OFFICE/OUTPT VISIT, EST, LEVL IV, 30-39 MIN: ICD-10-PCS | Mod: S$GLB,,, | Performed by: PHYSICIAN ASSISTANT

## 2019-05-27 PROCEDURE — 3008F BODY MASS INDEX DOCD: CPT | Mod: CPTII,S$GLB,, | Performed by: PHYSICIAN ASSISTANT

## 2019-05-27 PROCEDURE — 3008F PR BODY MASS INDEX (BMI) DOCUMENTED: ICD-10-PCS | Mod: CPTII,S$GLB,, | Performed by: PHYSICIAN ASSISTANT

## 2019-05-27 PROCEDURE — 71046 X-RAY EXAM CHEST 2 VIEWS: CPT | Mod: FY,S$GLB,, | Performed by: RADIOLOGY

## 2019-05-27 PROCEDURE — 99214 OFFICE O/P EST MOD 30 MIN: CPT | Mod: S$GLB,,, | Performed by: PHYSICIAN ASSISTANT

## 2019-05-27 RX ORDER — CODEINE PHOSPHATE AND GUAIFENESIN 10; 100 MG/5ML; MG/5ML
5 SOLUTION ORAL 3 TIMES DAILY PRN
Qty: 120 ML | Refills: 0 | Status: SHIPPED | OUTPATIENT
Start: 2019-05-27 | End: 2019-06-06

## 2019-05-27 RX ORDER — ACETAMINOPHEN 500 MG
1000 TABLET ORAL
Status: COMPLETED | OUTPATIENT
Start: 2019-05-27 | End: 2019-05-27

## 2019-05-27 RX ORDER — BENZONATATE 100 MG/1
200 CAPSULE ORAL 3 TIMES DAILY PRN
Qty: 40 CAPSULE | Refills: 0 | Status: SHIPPED | OUTPATIENT
Start: 2019-05-27 | End: 2022-04-06

## 2019-05-27 RX ORDER — PREDNISONE 20 MG/1
20 TABLET ORAL DAILY
Qty: 3 TABLET | Refills: 0 | Status: SHIPPED | OUTPATIENT
Start: 2019-05-27 | End: 2019-05-30

## 2019-05-27 RX ADMIN — Medication 1000 MG: at 11:05

## 2019-05-27 NOTE — PATIENT INSTRUCTIONS
Viral Upper Respiratory Illness (Adult)  You have a viral upper respiratory illness (URI), which is another term for the common cold. This illness is contagious during the first few days. It is spread through the air by coughing and sneezing. It may also be spread by direct contact (touching the sick person and then touching your own eyes, nose, or mouth). Frequent handwashing will decrease risk of spread. Most viral illnesses go away within 7 to 10 days with rest and simple home remedies. Sometimes the illness may last for several weeks. Antibiotics will not kill a virus, and they are generally not prescribed for this condition.    Home care  · If symptoms are severe, rest at home for the first 2 to 3 days. When you resume activity, don't let yourself get too tired.  · Avoid being exposed to cigarette smoke (yours or others).  · You may use acetaminophen or ibuprofen to control pain and fever, unless another medicine was prescribed. (Note: If you have chronic liver or kidney disease, have ever had a stomach ulcer or gastrointestinal bleeding, or are taking blood-thinning medicines, talk with your healthcare provider before using these medicines.) Aspirin should never be given to anyone under 18 years of age who is ill with a viral infection or fever. It may cause severe liver or brain damage.  · Your appetite may be poor, so a light diet is fine. Avoid dehydration by drinking 6 to 8 glasses of fluids per day (water, soft drinks, juices, tea, or soup). Extra fluids will help loosen secretions in the nose and lungs.  · Over-the-counter cold medicines will not shorten the length of time youre sick, but they may be helpful for the following symptoms: cough, sore throat, and nasal and sinus congestion. (Note: Do not use decongestants if you have high blood pressure.)  Follow-up care  Follow up with your healthcare provider, or as advised.  When to seek medical advice  Call your healthcare provider right away if any  of these occur:  · Cough with lots of colored sputum (mucus)  · Severe headache; face, neck, or ear pain  · Difficulty swallowing due to throat pain  · Fever of 100.4°F (38°C)  Call 911, or get immediate medical care  Call emergency services right away if any of these occur:  · Chest pain, shortness of breath, wheezing, or difficulty breathing  · Coughing up blood  · Inability to swallow due to throat pain  Date Last Reviewed: 9/13/2015  © 9516-1558 [employer]. [address] [place] [address]. All rights reserved. This information is not intended as a substitute for professional medical care. Always follow your healthcare professional's instructions.      Patient Instructions   -Below are suggestions for symptomatic relief:              -Tylenol every 4 hours OR ibuprofen every 6 hours as needed for pain/fever.              -Salt water gargles to soothe throat pain.              -Chloroseptic spray also helps to numb throat pain.              -Nasal saline spray reduces inflammation and dryness.              -Warm face compresses to help with facial sinus pain/pressure.              -Vicks vapor rub at night.              -Flonase OTC or Nasacort OTC for nasal congestion.              -Simple foods like chicken noodle soup.              -Delsym helps with coughing at night              -Zyrtec/Claritin during the day & Benadryl at night may help with allergies.                If you DO NOT have Hypertension or any history of palpitations, it is ok to take over the counter Sudafed or Mucinex D or Allegra-D or Claritin-D or Zyrtec-D.  If you do take one of the above, it is ok to combine that with plain over the counter Mucinex or Allegra or Claritin or Zyrtec. If, for example, you are taking Zyrtec -D, you can combine that with Mucinex, but not Mucinex-D.  If you are taking Mucinex-D, you can combine that with plain Allegra or Claritin or Zyrtec.   If you DO have Hypertension or palpitations,  it is safe to take Coricidin HBP for relief of sinus symptoms.    Please follow up with your Primary care provider within 2-5 days if your signs and symptoms have not resolved or worsen.     If your condition worsens or fails to improve we recommend that you receive another evaluation at the emergency room immediately or contact your primary medical clinic to discuss your concerns.   You must understand that you have received an Urgent Care treatment only and that you may be released before all of your medical problems are known or treated. You, the patient, will arrange for follow up care as instructed.     RED FLAGS/WARNING SYMPTOMS DISCUSSED WITH PATIENT THAT WOULD WARRANT EMERGENT MEDICAL ATTENTION. PATIENT VERBALIZED UNDERSTANDING.

## 2019-05-27 NOTE — PROGRESS NOTES
"Subjective:       Patient ID: Radha Ervin is a 58 y.o. female.    Vitals:  height is 4' 11" (1.499 m) and weight is 81.6 kg (180 lb). Her temperature is 101.1 °F (38.4 °C) (abnormal). Her blood pressure is 112/78 and her pulse is 115 (abnormal). Her respiration is 18 and oxygen saturation is 96%.     Chief Complaint: URI    URI    This is a new problem. The current episode started in the past 7 days (2 Days ago ). The problem has been gradually worsening. The maximum temperature recorded prior to her arrival was 102 - 102.9 F. The fever has been present for 1 to 2 days. Associated symptoms include coughing, headaches and vomiting. Pertinent negatives include no congestion, ear pain, nausea, rash, sinus pain, sore throat or wheezing. Treatments tried: Aleve  The treatment provided moderate relief.       Constitution: Positive for chills, sweating, fatigue and fever.   HENT: Negative for ear pain, congestion, sinus pain, sinus pressure, sore throat and voice change.    Neck: Negative for painful lymph nodes.   Eyes: Negative for eye redness.   Respiratory: Positive for cough and shortness of breath. Negative for chest tightness, sputum production, bloody sputum, COPD, stridor, wheezing and asthma.    Gastrointestinal: Positive for vomiting. Negative for nausea.   Musculoskeletal: Positive for muscle ache.   Skin: Negative for rash.   Allergic/Immunologic: Negative for seasonal allergies and asthma.   Neurological: Positive for headaches.   Hematologic/Lymphatic: Negative for swollen lymph nodes.       Objective:      Physical Exam   Constitutional: She is oriented to person, place, and time. She appears well-developed and well-nourished. She is cooperative.  Non-toxic appearance. She does not appear ill. No distress.   HENT:   Head: Normocephalic and atraumatic.   Right Ear: Hearing, tympanic membrane, external ear and ear canal normal.   Left Ear: Hearing, tympanic membrane, external ear and ear canal normal. "   Nose: Nose normal. No mucosal edema, rhinorrhea or nasal deformity. No epistaxis. Right sinus exhibits no maxillary sinus tenderness and no frontal sinus tenderness. Left sinus exhibits no maxillary sinus tenderness and no frontal sinus tenderness.   Mouth/Throat: Uvula is midline, oropharynx is clear and moist and mucous membranes are normal. No trismus in the jaw. Normal dentition. No uvula swelling. No posterior oropharyngeal erythema.   Eyes: Conjunctivae and lids are normal. Right eye exhibits no discharge. Left eye exhibits no discharge. No scleral icterus.   Sclera clear bilat   Neck: Trachea normal, normal range of motion, full passive range of motion without pain and phonation normal. Neck supple.   Cardiovascular: Normal rate, regular rhythm, normal heart sounds, intact distal pulses and normal pulses.   Pulmonary/Chest: Effort normal. No accessory muscle usage or stridor. No respiratory distress. She has no decreased breath sounds. She has no wheezes. She has no rhonchi. She has no rales.   Moderate upper airway congestion with intermittent deep nonproductive cough noted   Abdominal: Soft. Normal appearance and bowel sounds are normal. She exhibits no distension, no pulsatile midline mass and no mass. There is no tenderness.   Musculoskeletal: Normal range of motion. She exhibits no edema or deformity.   Neurological: She is alert and oriented to person, place, and time. She exhibits normal muscle tone. Coordination normal.   Skin: Skin is warm, dry and intact. She is not diaphoretic. No pallor.   Psychiatric: She has a normal mood and affect. Her speech is normal and behavior is normal. Judgment and thought content normal. Cognition and memory are normal.   Nursing note and vitals reviewed.        XRAY: no acute cardiothoracic processes    Assessment:       1. Upper respiratory tract infection, unspecified type    2. Fever, unspecified fever cause        Plan:         Upper respiratory tract infection,  unspecified type  -     POCT Influenza A/B  -     X-Ray Chest PA And Lateral; Future; Expected date: 05/27/2019    Fever, unspecified fever cause  -     acetaminophen tablet 1,000 mg          Viral Upper Respiratory Illness (Adult)  You have a viral upper respiratory illness (URI), which is another term for the common cold. This illness is contagious during the first few days. It is spread through the air by coughing and sneezing. It may also be spread by direct contact (touching the sick person and then touching your own eyes, nose, or mouth). Frequent handwashing will decrease risk of spread. Most viral illnesses go away within 7 to 10 days with rest and simple home remedies. Sometimes the illness may last for several weeks. Antibiotics will not kill a virus, and they are generally not prescribed for this condition.    Home care  · If symptoms are severe, rest at home for the first 2 to 3 days. When you resume activity, don't let yourself get too tired.  · Avoid being exposed to cigarette smoke (yours or others).  · You may use acetaminophen or ibuprofen to control pain and fever, unless another medicine was prescribed. (Note: If you have chronic liver or kidney disease, have ever had a stomach ulcer or gastrointestinal bleeding, or are taking blood-thinning medicines, talk with your healthcare provider before using these medicines.) Aspirin should never be given to anyone under 18 years of age who is ill with a viral infection or fever. It may cause severe liver or brain damage.  · Your appetite may be poor, so a light diet is fine. Avoid dehydration by drinking 6 to 8 glasses of fluids per day (water, soft drinks, juices, tea, or soup). Extra fluids will help loosen secretions in the nose and lungs.  · Over-the-counter cold medicines will not shorten the length of time youre sick, but they may be helpful for the following symptoms: cough, sore throat, and nasal and sinus congestion. (Note: Do not use  decongestants if you have high blood pressure.)  Follow-up care  Follow up with your healthcare provider, or as advised.  When to seek medical advice  Call your healthcare provider right away if any of these occur:  · Cough with lots of colored sputum (mucus)  · Severe headache; face, neck, or ear pain  · Difficulty swallowing due to throat pain  · Fever of 100.4°F (38°C)  Call 911, or get immediate medical care  Call emergency services right away if any of these occur:  · Chest pain, shortness of breath, wheezing, or difficulty breathing  · Coughing up blood  · Inability to swallow due to throat pain  Date Last Reviewed: 9/13/2015  © 0224-9967 [employer]. [address] [place] [address], [place] [address]. All rights reserved. This information is not intended as a substitute for professional medical care. Always follow your healthcare professional's instructions.      Patient Instructions   -Below are suggestions for symptomatic relief:              -Tylenol every 4 hours OR ibuprofen every 6 hours as needed for pain/fever.              -Salt water gargles to soothe throat pain.              -Chloroseptic spray also helps to numb throat pain.              -Nasal saline spray reduces inflammation and dryness.              -Warm face compresses to help with facial sinus pain/pressure.              -Vicks vapor rub at night.              -Flonase OTC or Nasacort OTC for nasal congestion.              -Simple foods like chicken noodle soup.              -Delsym helps with coughing at night              -Zyrtec/Claritin during the day & Benadryl at night may help with allergies.                If you DO NOT have Hypertension or any history of palpitations, it is ok to take over the counter Sudafed or Mucinex D or Allegra-D or Claritin-D or Zyrtec-D.  If you do take one of the above, it is ok to combine that with plain over the counter Mucinex or Allegra or Claritin or Zyrtec. If, for example, you are taking Zyrtec  -D, you can combine that with Mucinex, but not Mucinex-D.  If you are taking Mucinex-D, you can combine that with plain Allegra or Claritin or Zyrtec.   If you DO have Hypertension or palpitations, it is safe to take Coricidin HBP for relief of sinus symptoms.    Please follow up with your Primary care provider within 2-5 days if your signs and symptoms have not resolved or worsen.     If your condition worsens or fails to improve we recommend that you receive another evaluation at the emergency room immediately or contact your primary medical clinic to discuss your concerns.   You must understand that you have received an Urgent Care treatment only and that you may be released before all of your medical problems are known or treated. You, the patient, will arrange for follow up care as instructed.     RED FLAGS/WARNING SYMPTOMS DISCUSSED WITH PATIENT THAT WOULD WARRANT EMERGENT MEDICAL ATTENTION. PATIENT VERBALIZED UNDERSTANDING.

## 2019-06-12 ENCOUNTER — TELEPHONE (OUTPATIENT)
Dept: PHARMACY | Facility: CLINIC | Age: 59
End: 2019-06-12

## 2019-06-13 ENCOUNTER — PATIENT MESSAGE (OUTPATIENT)
Dept: DERMATOLOGY | Facility: CLINIC | Age: 59
End: 2019-06-13

## 2019-06-13 ENCOUNTER — PATIENT MESSAGE (OUTPATIENT)
Dept: ADMINISTRATIVE | Facility: OTHER | Age: 59
End: 2019-06-13

## 2019-06-17 ENCOUNTER — TELEPHONE (OUTPATIENT)
Dept: PHARMACY | Facility: CLINIC | Age: 59
End: 2019-06-17

## 2019-07-03 ENCOUNTER — TELEPHONE (OUTPATIENT)
Dept: DERMATOLOGY | Facility: CLINIC | Age: 59
End: 2019-07-03

## 2019-07-03 NOTE — TELEPHONE ENCOUNTER
Called pt in regards to her appt scheduled with Dr. Spencer July 15 for 3:45pm. Will need to reschedule to earlier time on that day due to Dr. Spencer seeing hospital patients for this time. No answer. Left voice message.

## 2019-07-05 ENCOUNTER — TELEPHONE (OUTPATIENT)
Dept: DERMATOLOGY | Facility: CLINIC | Age: 59
End: 2019-07-05

## 2019-07-15 ENCOUNTER — OFFICE VISIT (OUTPATIENT)
Dept: DERMATOLOGY | Facility: CLINIC | Age: 59
End: 2019-07-15
Payer: COMMERCIAL

## 2019-07-15 DIAGNOSIS — L73.2 HIDRADENITIS SUPPURATIVA: Primary | ICD-10-CM

## 2019-07-15 DIAGNOSIS — L40.3 PALMOPLANTAR PUSTULOSIS: ICD-10-CM

## 2019-07-15 PROCEDURE — 99213 OFFICE O/P EST LOW 20 MIN: CPT | Mod: S$GLB,,, | Performed by: DERMATOLOGY

## 2019-07-15 PROCEDURE — 99213 PR OFFICE/OUTPT VISIT, EST, LEVL III, 20-29 MIN: ICD-10-PCS | Mod: S$GLB,,, | Performed by: DERMATOLOGY

## 2019-07-15 PROCEDURE — 99999 PR PBB SHADOW E&M-EST. PATIENT-LVL II: CPT | Mod: PBBFAC,,, | Performed by: DERMATOLOGY

## 2019-07-15 PROCEDURE — 99999 PR PBB SHADOW E&M-EST. PATIENT-LVL II: ICD-10-PCS | Mod: PBBFAC,,, | Performed by: DERMATOLOGY

## 2019-07-15 RX ORDER — DOXYCYCLINE 100 MG/1
100 CAPSULE ORAL EVERY 12 HOURS
Qty: 28 CAPSULE | Refills: 3 | Status: SHIPPED | OUTPATIENT
Start: 2019-07-15 | End: 2019-07-29

## 2019-07-15 RX ORDER — BETAMETHASONE DIPROPIONATE 0.5 MG/G
OINTMENT TOPICAL 2 TIMES DAILY
Qty: 45 G | Refills: 2 | Status: SHIPPED | OUTPATIENT
Start: 2019-07-15 | End: 2019-07-29

## 2019-07-15 NOTE — PROGRESS NOTES
Subjective:       Patient ID:  Radha Ervin is a 58 y.o. 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 She stopped Humira end of February 2018 when developed this rash.  Was initially on scalp, palms, and soles and consistent of painful itchy red plaques with pustules.  Started Otelza about 9 months; now taking 30 mg po bid and it is helping. Initially had diarrhea, since resolved. No depression.y.  Was seen by rheumatology who started her on sulfasalazine, which has helped.     Today, she notes that her skin is much better on Otezla. Denies diarrhea and depression. Her nail dystrophy and oil spots on her nails are most bothersome to her. Although, the nail changes in her finger nails are quite mild. She also notes a cyst in her right groin that tends to drain. Never been injected before. In general her HS is better controlled now than previously when she started the Humira.     Using topical betamethasone to hands and feet presently for persistent areas.      Nail Problem  - Initial  Affected locations: right toes, left toes, left fingers and right fingers  Signs and Symptoms: nailbed discolored.  Treatments tried: prescribed solution for the toes.  Improvement on treatment: no relief        Review of Systems   Constitutional: Positive for fever and chills.   Skin: Negative for rash.   Hematologic/Lymphatic: Bruises/bleeds easily.        Objective:    Physical Exam   Constitutional: She appears well-developed and well-nourished. No distress.   Neurological: She is alert and oriented to person, place, and time. She is not disoriented.   Psychiatric: She has a normal mood and affect.   Skin:   Areas Examined (abnormalities noted in diagram):   Head  / Face Inspection Performed  Neck Inspection Performed  Chest / Axilla Inspection Performed  RUE Inspected  LUE Inspection Performed  RLE Inspected  LLE Inspection Performed  Nails and Digits Inspection Performed                      Diagram Legend     Erythematous scaling macule/papule c/w actinic keratosis       Vascular papule c/w angioma      Pigmented verrucoid papule/plaque c/w seborrheic keratosis      Yellow umbilicated papule c/w sebaceous hyperplasia      Irregularly shaped tan macule c/w lentigo     1-2 mm smooth white papules consistent with Milia      Movable subcutaneous cyst with punctum c/w epidermal inclusion cyst      Subcutaneous movable cyst c/w pilar cyst      Firm pink to brown papule c/w dermatofibroma      Pedunculated fleshy papule(s) c/w skin tag(s)      Evenly pigmented macule c/w junctional nevus     Mildly variegated pigmented, slightly irregular-bordered macule c/w mildly atypical nevus      Flesh colored to evenly pigmented papule c/w intradermal nevus       Pink pearly papule/plaque c/w basal cell carcinoma      Erythematous hyperkeratotic cursted plaque c/w SCC      Surgical scar with no sign of skin cancer recurrence      Open and closed comedones      Inflammatory papules and pustules      Verrucoid papule consistent consistent with wart     Erythematous eczematous patches and plaques     Dystrophic onycholytic nail with subungual debris c/w onychomycosis     Umbilicated papule    Erythematous-base heme-crusted tan verrucoid plaque consistent with inflamed seborrheic keratosis     Erythematous Silvery Scaling Plaque c/w Psoriasis     See annotation      Assessment / Plan:        Hidradenitis suppurativa - right groin cyst -   -     doxycycline (VIBRAMYCIN) 100 MG Cap; Take 1 capsule (100 mg total) by mouth every 12 (twelve) hours. PRN hidradenitis flares for 14 days  Dispense: 28 capsule; Refill: 3    Turmeric black pepper  mg po daily    Palmoplantar pustulosis    Doing  well with Otelza - continue at 30 mg po bid  Continue topicals to medial soles  -     betamethasone dipropionate (DIPROLENE) 0.05 % ointment; Apply topically 2 (two) times daily. To rash under gloves x 14 days, then weekends only for 14 days  Dispense: 45 g; Refill: 2    Nail dystrophy -   Feet - suspicious for onychomycosis - continue Penlac and c/s nail avulsion right great toenail    Hands -   Advise biotin 5000 mcg daily  otc nail strengthener daily           Follow up in about 6 months (around 1/15/2020).

## 2019-07-17 ENCOUNTER — TELEPHONE (OUTPATIENT)
Dept: PHARMACY | Facility: CLINIC | Age: 59
End: 2019-07-17

## 2019-07-17 NOTE — TELEPHONE ENCOUNTER
FOR DOCUMENTATION ONLY:  Financial Assistance for Otezla approved  Source: Copay Card  BIN: [id number]  PCN: [employer]  ID: [id number]  Group: [id number]    Must use [facility]  Ph: [phone]  Fx: [phone]

## 2019-07-17 NOTE — TELEPHONE ENCOUNTER
Patient notified of Otezla approval on new plan and need to now fill with [facility]. She gives permission for copay card to be obtained on her behalf. Will send MyChart with processing info and pharmacy info and patient advised to call to schedule delivery. She verbalized understanding.

## 2019-07-17 NOTE — TELEPHONE ENCOUNTER
DOCUMENTATION ONLY:  Otezla prior authorization approved x 1 year on new [employer] plan.   Dates: 7/17/19 through 7/17/20  Case ID: [id number]   Co pay: $35.00   Forwarded to [facility] Financial assistance team for co pay card. CEB

## 2019-07-29 ENCOUNTER — TELEPHONE (OUTPATIENT)
Dept: DERMATOLOGY | Facility: CLINIC | Age: 59
End: 2019-07-29

## 2019-07-30 ENCOUNTER — TELEPHONE (OUTPATIENT)
Dept: DERMATOLOGY | Facility: CLINIC | Age: 59
End: 2019-07-30

## 2019-07-30 NOTE — TELEPHONE ENCOUNTER
Spoke with Shannan at [facility], states pt is going to receive the medication on Friday and it will be for the 30 mg bid.    ----- Message from Kory Gresham LPN sent at 7/24/2019  2:45 PM CDT -----      ----- Message -----  From: Gris Samuel  Sent: 7/24/2019   1:22 PM  To: Tj Ayers Staff    Type:  RX Refill Request    Who Called: Cherri   Refill apremilast (OTEZLA) 30 mg Tab    How is the patient currently taking it? 2 twice a day  Is this a 90 day RX:  Preferred Pharmacy with phone number:[facility] phone [phone]  Local   Ordering Provider:Tj  Would the pharmacy rather a call back  Best Call Back Number:[phone]  Additional Information: Thank you!

## 2019-08-12 ENCOUNTER — LAB VISIT (OUTPATIENT)
Dept: LAB | Facility: HOSPITAL | Age: 59
End: 2019-08-12
Attending: STUDENT IN AN ORGANIZED HEALTH CARE EDUCATION/TRAINING PROGRAM
Payer: COMMERCIAL

## 2019-08-12 DIAGNOSIS — L40.1 PUSTULAR PSORIASIS: ICD-10-CM

## 2019-08-12 LAB
ALBUMIN SERPL BCP-MCNC: 4 G/DL (ref 3.5–5.2)
ALP SERPL-CCNC: 98 U/L (ref 55–135)
ALT SERPL W/O P-5'-P-CCNC: 15 U/L (ref 10–44)
ANION GAP SERPL CALC-SCNC: 9 MMOL/L (ref 8–16)
AST SERPL-CCNC: 15 U/L (ref 10–40)
BASOPHILS # BLD AUTO: 0.05 K/UL (ref 0–0.2)
BASOPHILS NFR BLD: 0.5 % (ref 0–1.9)
BILIRUB SERPL-MCNC: 0.4 MG/DL (ref 0.1–1)
BUN SERPL-MCNC: 13 MG/DL (ref 6–20)
CALCIUM SERPL-MCNC: 9.8 MG/DL (ref 8.7–10.5)
CHLORIDE SERPL-SCNC: 108 MMOL/L (ref 95–110)
CO2 SERPL-SCNC: 24 MMOL/L (ref 23–29)
CREAT SERPL-MCNC: 0.9 MG/DL (ref 0.5–1.4)
DIFFERENTIAL METHOD: ABNORMAL
EOSINOPHIL # BLD AUTO: 0.5 K/UL (ref 0–0.5)
EOSINOPHIL NFR BLD: 4.9 % (ref 0–8)
ERYTHROCYTE [DISTWIDTH] IN BLOOD BY AUTOMATED COUNT: 14.6 % (ref 11.5–14.5)
EST. GFR  (AFRICAN AMERICAN): >60 ML/MIN/1.73 M^2
EST. GFR  (NON AFRICAN AMERICAN): >60 ML/MIN/1.73 M^2
GLUCOSE SERPL-MCNC: 103 MG/DL (ref 70–110)
HCT VFR BLD AUTO: 42.7 % (ref 37–48.5)
HGB BLD-MCNC: 13.4 G/DL (ref 12–16)
LYMPHOCYTES # BLD AUTO: 2.9 K/UL (ref 1–4.8)
LYMPHOCYTES NFR BLD: 28.4 % (ref 18–48)
MCH RBC QN AUTO: 29.4 PG (ref 27–31)
MCHC RBC AUTO-ENTMCNC: 31.4 G/DL (ref 32–36)
MCV RBC AUTO: 94 FL (ref 82–98)
MONOCYTES # BLD AUTO: 0.7 K/UL (ref 0.3–1)
MONOCYTES NFR BLD: 6.7 % (ref 4–15)
NEUTROPHILS # BLD AUTO: 6.1 K/UL (ref 1.8–7.7)
NEUTROPHILS NFR BLD: 59.5 % (ref 38–73)
PLATELET # BLD AUTO: 325 K/UL (ref 150–350)
PMV BLD AUTO: 10.3 FL (ref 9.2–12.9)
POTASSIUM SERPL-SCNC: 4.1 MMOL/L (ref 3.5–5.1)
PROT SERPL-MCNC: 7.4 G/DL (ref 6–8.4)
RBC # BLD AUTO: 4.56 M/UL (ref 4–5.4)
SODIUM SERPL-SCNC: 141 MMOL/L (ref 136–145)
WBC # BLD AUTO: 10.26 K/UL (ref 3.9–12.7)

## 2019-08-12 PROCEDURE — 36415 COLL VENOUS BLD VENIPUNCTURE: CPT

## 2019-08-12 PROCEDURE — 85025 COMPLETE CBC W/AUTO DIFF WBC: CPT

## 2019-08-12 PROCEDURE — 80053 COMPREHEN METABOLIC PANEL: CPT

## 2019-09-09 ENCOUNTER — OFFICE VISIT (OUTPATIENT)
Dept: RHEUMATOLOGY | Facility: CLINIC | Age: 59
End: 2019-09-09
Payer: COMMERCIAL

## 2019-09-09 VITALS
WEIGHT: 196.63 LBS | HEIGHT: 59 IN | DIASTOLIC BLOOD PRESSURE: 99 MMHG | HEART RATE: 77 BPM | SYSTOLIC BLOOD PRESSURE: 148 MMHG | BODY MASS INDEX: 39.64 KG/M2

## 2019-09-09 DIAGNOSIS — Z79.899 HIGH RISK MEDICATION USE: ICD-10-CM

## 2019-09-09 DIAGNOSIS — L60.3 NAIL DYSTROPHY: ICD-10-CM

## 2019-09-09 DIAGNOSIS — L40.3 PALMOPLANTAR PUSTULOSIS: Primary | ICD-10-CM

## 2019-09-09 DIAGNOSIS — L73.2 HIDRADENITIS SUPPURATIVA: ICD-10-CM

## 2019-09-09 PROCEDURE — 99999 PR PBB SHADOW E&M-EST. PATIENT-LVL III: ICD-10-PCS | Mod: PBBFAC,,, | Performed by: STUDENT IN AN ORGANIZED HEALTH CARE EDUCATION/TRAINING PROGRAM

## 2019-09-09 PROCEDURE — 99999 PR PBB SHADOW E&M-EST. PATIENT-LVL III: CPT | Mod: PBBFAC,,, | Performed by: STUDENT IN AN ORGANIZED HEALTH CARE EDUCATION/TRAINING PROGRAM

## 2019-09-09 PROCEDURE — 3008F BODY MASS INDEX DOCD: CPT | Mod: CPTII,S$GLB,, | Performed by: STUDENT IN AN ORGANIZED HEALTH CARE EDUCATION/TRAINING PROGRAM

## 2019-09-09 PROCEDURE — 99213 PR OFFICE/OUTPT VISIT, EST, LEVL III, 20-29 MIN: ICD-10-PCS | Mod: S$GLB,,, | Performed by: STUDENT IN AN ORGANIZED HEALTH CARE EDUCATION/TRAINING PROGRAM

## 2019-09-09 PROCEDURE — 3008F PR BODY MASS INDEX (BMI) DOCUMENTED: ICD-10-PCS | Mod: CPTII,S$GLB,, | Performed by: STUDENT IN AN ORGANIZED HEALTH CARE EDUCATION/TRAINING PROGRAM

## 2019-09-09 PROCEDURE — 99213 OFFICE O/P EST LOW 20 MIN: CPT | Mod: S$GLB,,, | Performed by: STUDENT IN AN ORGANIZED HEALTH CARE EDUCATION/TRAINING PROGRAM

## 2019-09-09 RX ORDER — SULFASALAZINE 500 MG/1
1000 TABLET ORAL 2 TIMES DAILY
Qty: 360 TABLET | Refills: 1 | Status: SHIPPED | OUTPATIENT
Start: 2019-09-09 | End: 2019-12-08

## 2019-09-09 ASSESSMENT — ROUTINE ASSESSMENT OF PATIENT INDEX DATA (RAPID3)
TOTAL RAPID3 SCORE: 0
FATIGUE SCORE: 0
PATIENT GLOBAL ASSESSMENT SCORE: 0
PSYCHOLOGICAL DISTRESS SCORE: 0
MDHAQ FUNCTION SCORE: 0
PAIN SCORE: 0
AM STIFFNESS SCORE: 0, NO

## 2019-09-09 NOTE — PROGRESS NOTES
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  Plantar fasciitis currently doing PT      [place] and SSA neg  Uric acid 7.2    CCP 10.5     Psoriasis has resolved with otezla     Joints ok    Hidradenitis seems to be an active issue    Derm is seeing her closely    Uses doxycycline prn and turmeric prn for hidradenitis flares    For now no change in management unless derm has any suggestions    rtc in 6 months   Answers for HPI/ROS submitted by the patient on 9/7/2019   fever: No  eye redness: No  headaches: No  shortness of breath: No  chest pain: No  trouble swallowing: No  diarrhea: No  constipation: No  unexpected weight change: No  genital sore: No  dysuria: No  During the last 3 days, have you had a skin rash?: No  Bruises or bleeds easily: No  cough: No

## 2019-09-09 NOTE — PROGRESS NOTES
Rapid3 Question Responses and Scores 9/7/2019   MDHAQ Score 0   Psychologic Score 0   Pain Score 0   When you awakened in the morning OVER THE LAST WEEK, did you feel stiff? No   Fatigue Score 0   Global Health Score 0   RAPID3 Score 0

## 2019-09-09 NOTE — PROGRESS NOTES
Subjective:       Patient ID: Radha Ervin is a 59 y.o. female.    Chief Complaint: Follow-up  follow up for pustular psoriasis        Ms. Ervin is a 58 year old female with past medical history of hydradenitis suppurativa, pustular psoriasis who presents for follow up for her pustular psoriasis     Initial history:  Patient has had hydradenitis for about 15 years. She was placed on Humira for this in December 2017. In Feb 2018 she started developing erythematous blisters, which were painful, pruritic and scaly on her palms and soles. She states that she has had a similar rash about 10 years ago. It was also pustular on palms and soles. She went to her PCP that did not know what it was but ended up giving her a 2 week course of prednisone. She had not had any issues since. She started developing nail changes in July 2018.   She has right knee pain. She states that OA which required a total knee replacement in 2016. She has had one episode where her knee was swollen a that time she was evaluated by orthopedics and it was thought to be related to humira. She still has knee pain but it is usually at the end of the day and worse with movement. She has morning stiffness lasting 2-3 min.   Denies alopecia,  eye pain/redness, dry eyes, dry mouth, oral ulcers, rash, photosensitivity, raynauds, distal hand ulcers, genital ulcers, chest pain, shortness of breath, abd pain, constipation, diarrhea, bloody diarrhea, joint  swelling, tenderness, muscle weakness.     Last seen in clinic 2/11/19  She states that her psoriasis is better about 70%. Nails still are dystophic, She has almost lost her right big toe nail.   Currently Sulfasalzine 500mg bid (never increased to 1g BID) and Otezla.   Hydradenititis is under control. Last flare few weeks ago, gets one every 2-3 mos.   No hand pain or swelling. Some right knee pain   She has had unintentional weight lost (doesn't know how much), slight decrease in appetite, no night  sweats, no fever or chills.  Last mamogram few years ago, last papsmear years ago, needs to give stool sample.   Labs: [facility] negative, SSA negative,    and CCP 10.5, uric acid 7.2,   We increased sulfasalazine to 1g BID and in one mos increase to 1.5mg BID     5/2019:  Is due to see Derm in July. Is currently having a hidradenitis flare in right groin. Having flares every 3-4 weeks, in just this spot. She states that her steroid and antibiotic cream cost her $40 and $70 respectively. Her pustular psoriasis has improved a lot. She gets flares of her psoriasis every 2-3 months which resolve. Her nails are still dystrophic. Saw podiatry. Her nail was shaved down, which is less painful now. Also with plantar fascitis on right foot.   Tolerating Otezla well. Last visit she increased to 1500mg BID SSZ but had diarrhea so decreased to 1000mg BID   No hand, wrists, ankle or feet swelling or pain.   Labs 5/2019: showing normal liver and kidney function, normal blood counts     Interval history:  She has one spot in right groin area. She always has this, does not go away. She has taken one course of Doxycycline with initial improvement but then it did not last. She gets nausea with Doxycycline and doesn't like to take. It did shrink with medicine but then came back. She is still using topical creams, clindamycin which helps with it.   Dr. Spencer had also told her to start turmuric with black pepper which she hasnt   No joint pain or swelling.   Currently on Otezla, SSZ 1g bid.       Review of Systems   Constitutional: Negative for activity change, appetite change, fever and unexpected weight change.   HENT: Negative for mouth sores and trouble swallowing.         Negative hair loss    Eyes: Negative for pain and redness.   Respiratory: Negative for cough and chest tightness.    Cardiovascular: Negative for chest pain and leg swelling.   Gastrointestinal: Negative for abdominal pain, blood in stool, constipation and  diarrhea.   Endocrine: Negative for cold intolerance.   Genitourinary: Negative for dysuria and genital sores.   Musculoskeletal: Negative for back pain and neck stiffness.   Skin: Positive for rash.        +nail changes     Neurological: Negative for numbness and headaches.   Hematological: Does not bruise/bleed easily.       Past Medical History:   Diagnosis Date    Allergy     Eczema     HS (hereditary spherocytosis)     Hx of total knee replacement 01/19/2016    right knee    Joint pain        Past Surgical History:   Procedure Laterality Date    achilles tendon repair      CHOLECYSTECTOMY      TOTAL KNEE ARTHROPLASTY         Family History   Problem Relation Age of Onset    Cancer Mother     Cancer Father         lung CA    Cancer Son         wade    Breast cancer Sister     Liver cancer Maternal Uncle        Social History     Socioeconomic History    Marital status: Single     Spouse name: Not on file    Number of children: Not on file    Years of education: Not on file    Highest education level: Not on file   Occupational History    Not on file   Social Needs    Financial resource strain: Not on file    Food insecurity:     Worry: Not on file     Inability: Not on file    Transportation needs:     Medical: Not on file     Non-medical: Not on file   Tobacco Use    Smoking status: Current Some Day Smoker     Types: Vaping with nicotine    Smokeless tobacco: Never Used   Substance and Sexual Activity    Alcohol use: Yes     Comment: occassionally    Drug use: No    Sexual activity: Not Currently     Partners: Male   Lifestyle    Physical activity:     Days per week: Not on file     Minutes per session: Not on file    Stress: Not on file   Relationships    Social connections:     Talks on phone: Not on file     Gets together: Not on file     Attends Congregation service: Not on file     Active member of club or organization: Not on file     Attends meetings of clubs or  "organizations: Not on file     Relationship status: Not on file   Other Topics Concern    Are you pregnant or think you may be? Not Asked    Breast-feeding Not Asked   Social History Narrative    Not on file       Current Outpatient Medications   Medication Sig Dispense Refill    apremilast (OTEZLA) 30 mg Tab Take 1 tablet (30 mg total) by mouth 2 (two) times daily. 60 tablet 5    augmented betamethasone dipropionate (DIPROLENE-AF) 0.05 % ointment MARIA ALEJANDRA EXT AA BID FOR 14 DAYS  2    benzonatate (TESSALON PERLES) 100 MG capsule Take 2 capsules (200 mg total) by mouth 3 (three) times daily as needed for Cough. 40 capsule 0    cetirizine (ZYRTEC) 10 MG tablet Take 1 tablet up to twice a day.  Can be used regularly or just when needed. 60 tablet 3    clindamycin phosphate 1% (CLINDAGEL) 1 % gel Apply topically 2 (two) times daily. To axilla as needed for flares 60 g 2    betamethasone dipropionate (DIPROLENE) 0.05 % ointment Apply topically 2 (two) times daily. To rash under gloves x 14 days, then weekends only for 14 days 45 g 2    sulfaSALAzine (AZULFIDINE) 500 mg Tab Take 2 tablets (1,000 mg total) by mouth 2 (two) times daily. 360 tablet 1     No current facility-administered medications for this visit.        Review of patient's allergies indicates:   Allergen Reactions    Morphine Other (See Comments)     headaches    Latex, natural rubber Rash and Other (See Comments)     Redness and peeling only with bandaids    Penicillins Nausea And Vomiting and Rash       Objective:   BP (!) 148/99   Pulse 77   Ht 4' 11" (1.499 m)   Wt 89.2 kg (196 lb 10.4 oz)   LMP  (LMP Unknown)   BMI 39.72 kg/m²      Physical Exam   Constitutional: She is oriented to person, place, and time and well-developed, well-nourished, and in no distress. No distress.   HENT:   Head: Normocephalic.   Mouth/Throat: No oropharyngeal exudate.   Eyes: Conjunctivae and EOM are normal. Pupils are equal, round, and reactive to light. No " scleral icterus.   Neck: Neck supple.   Cardiovascular: Normal rate and normal heart sounds.    No murmur heard.  Pulmonary/Chest: Effort normal and breath sounds normal. No respiratory distress.   Abdominal: Soft. Bowel sounds are normal. She exhibits no distension.       Right Side Rheumatological Exam     Examination finds the shoulder, elbow, wrist, knee, 1st PIP, 1st MCP, 2nd PIP, 2nd MCP, 3rd PIP, 3rd MCP, 4th PIP, 4th MCP, 5th PIP and 5th MCP normal.    Left Side Rheumatological Exam     Examination finds the shoulder, elbow, wrist, knee, 1st PIP, 1st MCP, 2nd PIP, 2nd MCP, 3rd PIP, 3rd MCP, 4th PIP, 4th MCP, 5th PIP and 5th MCP normal.      Genitourinary:   Genitourinary Comments: Right groin with one small lesion, not draining    Neurological: She is alert and oriented to person, place, and time.   Skin: Skin is warm and dry. No rash noted. She is not diaphoretic.     No lesions on palm or soles   +nail changes  Dry skin on soles, and right elbow    Musculoskeletal: She exhibits no edema.         CMP  Sodium   Date Value Ref Range Status   08/12/2019 141 136 - 145 mmol/L Final     Potassium   Date Value Ref Range Status   08/12/2019 4.1 3.5 - 5.1 mmol/L Final     Chloride   Date Value Ref Range Status   08/12/2019 108 95 - 110 mmol/L Final     CO2   Date Value Ref Range Status   08/12/2019 24 23 - 29 mmol/L Final     Glucose   Date Value Ref Range Status   08/12/2019 103 70 - 110 mg/dL Final     BUN, Bld   Date Value Ref Range Status   08/12/2019 13 6 - 20 mg/dL Final     Creatinine   Date Value Ref Range Status   08/12/2019 0.9 0.5 - 1.4 mg/dL Final     Calcium   Date Value Ref Range Status   08/12/2019 9.8 8.7 - 10.5 mg/dL Final     Total Protein   Date Value Ref Range Status   08/12/2019 7.4 6.0 - 8.4 g/dL Final     Albumin   Date Value Ref Range Status   08/12/2019 4.0 3.5 - 5.2 g/dL Final     Total Bilirubin   Date Value Ref Range Status   08/12/2019 0.4 0.1 - 1.0 mg/dL Final     Comment:     For  infants and newborns, interpretation of results should be based  on gestational age, weight and in agreement with clinical  observations.  Premature Infant recommended reference ranges:  Up to 24 hours.............<8.0 mg/dL  Up to 48 hours............<12.0 mg/dL  3-5 days..................<15.0 mg/dL  6-29 days.................<15.0 mg/dL       Alkaline Phosphatase   Date Value Ref Range Status   08/12/2019 98 55 - 135 U/L Final     AST   Date Value Ref Range Status   08/12/2019 15 10 - 40 U/L Final     ALT   Date Value Ref Range Status   08/12/2019 15 10 - 44 U/L Final     Anion Gap   Date Value Ref Range Status   08/12/2019 9 8 - 16 mmol/L Final     eGFR if    Date Value Ref Range Status   08/12/2019 >60 >60 mL/min/1.73 m^2 Final     eGFR if non    Date Value Ref Range Status   08/12/2019 >60 >60 mL/min/1.73 m^2 Final     Comment:     Calculation used to obtain the estimated glomerular filtration  rate (eGFR) is the CKD-EPI equation.        Lab Results   Component Value Date    WBC 10.26 08/12/2019    HGB 13.4 08/12/2019    HCT 42.7 08/12/2019    MCV 94 08/12/2019     08/12/2019     Lab Results   Component Value Date    .0 (H) 10/16/2018       Assessment:       1. Palmoplantar pustulosis    2. Hidradenitis suppurativa    3. Nail dystrophy    4. High risk medication use            Plan:       Ms. Ervin is a 58 year old female with past medical history of hydradenitis suppurativa, pustular psoriasis who presents for follow up.     #Pustilar psoriasis  -Patient with pustular psoriasis thought to be 2/2 Humira  -At this time we do not see any signs/symptoms that indicated psoriatic arthritis based on hsitory, exam and reviewing patients old XRAYs  -she has no active lesions and is doing well   -continue Otezla   -patient was not able to tolerate sulfasalazine 1500mg BID due to diarrhea, she is to continue 1000mg BID   -following with derm   -labs q3 mos for monitoring      #Nail dystrophy   -following with derm, improving     #hydradenitis  -has one recurring lesion    -following with derm     #health maintenance   -patient counseled on health maintenance  and screening.     Patient seen and discussed with Dr. Childs    RTC in 6 mos with rizwan Siddiqui MD  Rheumatology PGY 5

## 2019-09-11 ENCOUNTER — TELEPHONE (OUTPATIENT)
Dept: DERMATOLOGY | Facility: CLINIC | Age: 59
End: 2019-09-11

## 2019-09-11 NOTE — TELEPHONE ENCOUNTER
Received notification from [facility] that pt was unreachable and wanted to know if therapy has changed on pt. Spoke with pt and stated that medication was being delivered today.

## 2019-11-11 ENCOUNTER — LAB VISIT (OUTPATIENT)
Dept: LAB | Facility: HOSPITAL | Age: 59
End: 2019-11-11
Attending: STUDENT IN AN ORGANIZED HEALTH CARE EDUCATION/TRAINING PROGRAM
Payer: COMMERCIAL

## 2019-11-11 DIAGNOSIS — L40.1 PUSTULAR PSORIASIS: ICD-10-CM

## 2019-11-11 LAB
ALBUMIN SERPL BCP-MCNC: 3.7 G/DL (ref 3.5–5.2)
ALP SERPL-CCNC: 106 U/L (ref 55–135)
ALT SERPL W/O P-5'-P-CCNC: 16 U/L (ref 10–44)
ANION GAP SERPL CALC-SCNC: 11 MMOL/L (ref 8–16)
AST SERPL-CCNC: 16 U/L (ref 10–40)
BASOPHILS # BLD AUTO: 0.07 K/UL (ref 0–0.2)
BASOPHILS NFR BLD: 0.7 % (ref 0–1.9)
BILIRUB SERPL-MCNC: 0.5 MG/DL (ref 0.1–1)
BUN SERPL-MCNC: 13 MG/DL (ref 6–20)
CALCIUM SERPL-MCNC: 9.5 MG/DL (ref 8.7–10.5)
CHLORIDE SERPL-SCNC: 108 MMOL/L (ref 95–110)
CO2 SERPL-SCNC: 23 MMOL/L (ref 23–29)
CREAT SERPL-MCNC: 0.9 MG/DL (ref 0.5–1.4)
DIFFERENTIAL METHOD: ABNORMAL
EOSINOPHIL # BLD AUTO: 0.5 K/UL (ref 0–0.5)
EOSINOPHIL NFR BLD: 5.1 % (ref 0–8)
ERYTHROCYTE [DISTWIDTH] IN BLOOD BY AUTOMATED COUNT: 14.1 % (ref 11.5–14.5)
EST. GFR  (AFRICAN AMERICAN): >60 ML/MIN/1.73 M^2
EST. GFR  (NON AFRICAN AMERICAN): >60 ML/MIN/1.73 M^2
GLUCOSE SERPL-MCNC: 105 MG/DL (ref 70–110)
HCT VFR BLD AUTO: 43.3 % (ref 37–48.5)
HGB BLD-MCNC: 13.6 G/DL (ref 12–16)
LYMPHOCYTES # BLD AUTO: 2.3 K/UL (ref 1–4.8)
LYMPHOCYTES NFR BLD: 22.4 % (ref 18–48)
MCH RBC QN AUTO: 29.8 PG (ref 27–31)
MCHC RBC AUTO-ENTMCNC: 31.4 G/DL (ref 32–36)
MCV RBC AUTO: 95 FL (ref 82–98)
MONOCYTES # BLD AUTO: 0.8 K/UL (ref 0.3–1)
MONOCYTES NFR BLD: 7.9 % (ref 4–15)
NEUTROPHILS # BLD AUTO: 6.5 K/UL (ref 1.8–7.7)
NEUTROPHILS NFR BLD: 63.9 % (ref 38–73)
PLATELET # BLD AUTO: 325 K/UL (ref 150–350)
PMV BLD AUTO: 10.4 FL (ref 9.2–12.9)
POTASSIUM SERPL-SCNC: 4.2 MMOL/L (ref 3.5–5.1)
PROT SERPL-MCNC: 7 G/DL (ref 6–8.4)
RBC # BLD AUTO: 4.56 M/UL (ref 4–5.4)
SODIUM SERPL-SCNC: 142 MMOL/L (ref 136–145)
WBC # BLD AUTO: 10.18 K/UL (ref 3.9–12.7)

## 2019-11-11 PROCEDURE — 80053 COMPREHEN METABOLIC PANEL: CPT

## 2019-11-11 PROCEDURE — 36415 COLL VENOUS BLD VENIPUNCTURE: CPT

## 2019-11-11 PROCEDURE — 85025 COMPLETE CBC W/AUTO DIFF WBC: CPT

## 2020-01-06 ENCOUNTER — TELEPHONE (OUTPATIENT)
Dept: OPHTHALMOLOGY | Facility: CLINIC | Age: 60
End: 2020-01-06

## 2020-01-13 ENCOUNTER — OFFICE VISIT (OUTPATIENT)
Dept: DERMATOLOGY | Facility: CLINIC | Age: 60
End: 2020-01-13
Payer: COMMERCIAL

## 2020-01-13 DIAGNOSIS — L60.1 ONYCHOLYSIS: ICD-10-CM

## 2020-01-13 DIAGNOSIS — L73.2 HIDRADENITIS SUPPURATIVA: ICD-10-CM

## 2020-01-13 DIAGNOSIS — L40.3 PALMOPLANTAR PUSTULOSIS: ICD-10-CM

## 2020-01-13 DIAGNOSIS — L40.9 PSORIASIS: Primary | ICD-10-CM

## 2020-01-13 PROCEDURE — 99999 PR PBB SHADOW E&M-EST. PATIENT-LVL II: CPT | Mod: PBBFAC,,, | Performed by: DERMATOLOGY

## 2020-01-13 PROCEDURE — 99213 PR OFFICE/OUTPT VISIT, EST, LEVL III, 20-29 MIN: ICD-10-PCS | Mod: S$GLB,,, | Performed by: DERMATOLOGY

## 2020-01-13 PROCEDURE — 99213 OFFICE O/P EST LOW 20 MIN: CPT | Mod: S$GLB,,, | Performed by: DERMATOLOGY

## 2020-01-13 PROCEDURE — 99999 PR PBB SHADOW E&M-EST. PATIENT-LVL II: ICD-10-PCS | Mod: PBBFAC,,, | Performed by: DERMATOLOGY

## 2020-01-13 NOTE — PROGRESS NOTES
Subjective:       Patient ID:  Radha Ervin is a 59 y.o. female who presents for   Chief Complaint   Patient presents with    Hidradenitis Suppurativa     f/u     Hidradenitis Suppurativa  - Follow-up  Symptom course: stable  Currently using: betamethasone dip oint.  Affected locations: diffuse  Signs / symptoms: asymptomatic    58 yo female here for followup. Has a history of hand/foot pustular psoriasis, thought to be induced by Humira use for her hidradenitis.  She is not on Humira any longer and is now doing well on Otelza. Her skin is currently clear. No GI side effects nor depression. Not using any topicals.    HS is stable. Uses Dial gold soap and clindagel as needed.  At last visit needed oral abx that caused nausea but she did complete the course.    Also follows with rheum q6 mo for which she is now on sulfasalazine for arthritis.    Review of Systems   Constitutional: Negative for fever, chills, weight loss, fatigue and night sweats.   HENT: Negative for headaches.    Eyes: Negative for itching and eye watering.   Respiratory: Negative for cough and shortness of breath.    Gastrointestinal: Positive for diarrhea. Negative for nausea and vomiting.   Skin: Negative for itching and rash.   Neurological: Negative for headaches.   Psychiatric/Behavioral: Negative for depressed mood.   Hematologic/Lymphatic: Bruises/bleeds easily.          59 year old female established patient with palmoplantar pustular psoriasis and hidradenitis. Was seen in July 2019 and at that time was improved on otezla bid. She was continued on this and started on doxycycline for cyst to groin. She reports cyst is still there, but not draining. Denies any new lesions since last visit.   Reports hands and feet are largely clear. Denies need for topical betamethasone. Still has some at home in case of flares.     Last mamogram 3 years ago and was normal.  Last pap smear years ago.  No colonoscopy.       Objective:    Physical Exam    Constitutional: She appears well-developed and well-nourished. No distress.   Neurological: She is alert and oriented to person, place, and time. She is not disoriented.   Psychiatric: She has a normal mood and affect.   Skin:   Areas Examined (abnormalities noted in diagram):   Head / Face Inspection Performed  Neck Inspection Performed  Chest / Axilla Inspection Performed  Back Inspection Performed  RUE Inspected  LUE Inspection Performed  Nails and Digits Inspection Performed              Diagram Legend     Erythematous scaling macule/papule c/w actinic keratosis       Vascular papule c/w angioma      Pigmented verrucoid papule/plaque c/w seborrheic keratosis      Yellow umbilicated papule c/w sebaceous hyperplasia      Irregularly shaped tan macule c/w lentigo     1-2 mm smooth white papules consistent with Milia      Movable subcutaneous cyst with punctum c/w epidermal inclusion cyst      Subcutaneous movable cyst c/w pilar cyst      Firm pink to brown papule c/w dermatofibroma      Pedunculated fleshy papule(s) c/w skin tag(s)      Evenly pigmented macule c/w junctional nevus     Mildly variegated pigmented, slightly irregular-bordered macule c/w mildly atypical nevus      Flesh colored to evenly pigmented papule c/w intradermal nevus       Pink pearly papule/plaque c/w basal cell carcinoma      Erythematous hyperkeratotic cursted plaque c/w SCC      Surgical scar with no sign of skin cancer recurrence      Open and closed comedones      Inflammatory papules and pustules      Verrucoid papule consistent consistent with wart     Erythematous eczematous patches and plaques     Dystrophic onycholytic nail with subungual debris c/w onychomycosis     Umbilicated papule    Erythematous-base heme-crusted tan verrucoid plaque consistent with inflamed seborrheic keratosis     Erythematous Silvery Scaling Plaque c/w Psoriasis     See annotation    Lab Results   Component Value Date    WBC 10.18 11/11/2019    HGB 13.6  11/11/2019    HCT 43.3 11/11/2019    MCV 95 11/11/2019     11/11/2019       CMP  Sodium   Date Value Ref Range Status   11/11/2019 142 136 - 145 mmol/L Final     Potassium   Date Value Ref Range Status   11/11/2019 4.2 3.5 - 5.1 mmol/L Final     Chloride   Date Value Ref Range Status   11/11/2019 108 95 - 110 mmol/L Final     CO2   Date Value Ref Range Status   11/11/2019 23 23 - 29 mmol/L Final     Glucose   Date Value Ref Range Status   11/11/2019 105 70 - 110 mg/dL Final     BUN, Bld   Date Value Ref Range Status   11/11/2019 13 6 - 20 mg/dL Final     Creatinine   Date Value Ref Range Status   11/11/2019 0.9 0.5 - 1.4 mg/dL Final     Calcium   Date Value Ref Range Status   11/11/2019 9.5 8.7 - 10.5 mg/dL Final     Total Protein   Date Value Ref Range Status   11/11/2019 7.0 6.0 - 8.4 g/dL Final     Albumin   Date Value Ref Range Status   11/11/2019 3.7 3.5 - 5.2 g/dL Final     Total Bilirubin   Date Value Ref Range Status   11/11/2019 0.5 0.1 - 1.0 mg/dL Final     Comment:     For infants and newborns, interpretation of results should be based  on gestational age, weight and in agreement with clinical  observations.  Premature Infant recommended reference ranges:  Up to 24 hours.............<8.0 mg/dL  Up to 48 hours............<12.0 mg/dL  3-5 days..................<15.0 mg/dL  6-29 days.................<15.0 mg/dL       Alkaline Phosphatase   Date Value Ref Range Status   11/11/2019 106 55 - 135 U/L Final     AST   Date Value Ref Range Status   11/11/2019 16 10 - 40 U/L Final     ALT   Date Value Ref Range Status   11/11/2019 16 10 - 44 U/L Final     Anion Gap   Date Value Ref Range Status   11/11/2019 11 8 - 16 mmol/L Final     eGFR if    Date Value Ref Range Status   11/11/2019 >60 >60 mL/min/1.73 m^2 Final     eGFR if non    Date Value Ref Range Status   11/11/2019 >60 >60 mL/min/1.73 m^2 Final     Comment:     Calculation used to obtain the estimated glomerular  filtration  rate (eGFR) is the CKD-EPI equation.          Assessment / Plan:        Psoriasis with Palmoplantar pustulosis  Continue Otelza 30 mg po bid  Topicals prn    Hidradenitis suppurativa  Stable  panoxyl wash 10% daily to AA  clindagel prn    Onycholysis             Follow up in about 1 year (around 1/13/2021).

## 2020-02-10 ENCOUNTER — OFFICE VISIT (OUTPATIENT)
Dept: OPHTHALMOLOGY | Facility: CLINIC | Age: 60
End: 2020-02-10
Payer: COMMERCIAL

## 2020-02-10 DIAGNOSIS — H53.40 VISUAL FIELD DEFECT: ICD-10-CM

## 2020-02-10 DIAGNOSIS — H25.13 NUCLEAR SCLEROSIS OF BOTH EYES: Primary | ICD-10-CM

## 2020-02-10 PROCEDURE — 92004 COMPRE OPH EXAM NEW PT 1/>: CPT | Mod: S$GLB,,, | Performed by: OPHTHALMOLOGY

## 2020-02-10 PROCEDURE — 92004 PR EYE EXAM, NEW PATIENT,COMPREHESV: ICD-10-PCS | Mod: S$GLB,,, | Performed by: OPHTHALMOLOGY

## 2020-02-10 PROCEDURE — 99999 PR PBB SHADOW E&M-EST. PATIENT-LVL III: CPT | Mod: PBBFAC,,, | Performed by: OPHTHALMOLOGY

## 2020-02-10 PROCEDURE — 99999 PR PBB SHADOW E&M-EST. PATIENT-LVL III: ICD-10-PCS | Mod: PBBFAC,,, | Performed by: OPHTHALMOLOGY

## 2020-02-10 NOTE — PROGRESS NOTES
"Subjective:       Patient ID: Radha Ervin is a 59 y.o. female.    Chief Complaint: Concerns About Ocular Health    HPI     DLS: 11/2020 or 12/2020 [facility]    59 y.o. Female is here for Visual field defect after taking [facility] Visual   Field Test. Denies eye pain and flashes. Occasional floaters right eye. No   tearing, burning or tearing. No noticeable VA with correction. Slight   trouble with glare.     Eye meds: No gtts     Last edited by JUAN MIGUEL Johnson on 2/10/2020  8:52 AM. (History)             Assessment:       1. Nuclear sclerosis of both eyes    2. Visual field defect        Plan:       Cataracts- Not visually significant.  H/o HVF defect-Pt had an "abnormal"  HVF at an outside office & was referred here for F/U. ON's & retinae appear WNL's OU.      RTC 4 wks for HVF's.           "

## 2020-03-19 ENCOUNTER — TELEPHONE (OUTPATIENT)
Dept: OPHTHALMOLOGY | Facility: CLINIC | Age: 60
End: 2020-03-19

## 2020-03-19 NOTE — TELEPHONE ENCOUNTER
----- Message from Chela Martinez sent at 3/19/2020  8:28 AM CDT -----  Contact: PT   PT called to reschedule her appointments on 3/23.     Callback: [phone]

## 2020-03-20 ENCOUNTER — PATIENT MESSAGE (OUTPATIENT)
Dept: RHEUMATOLOGY | Facility: CLINIC | Age: 60
End: 2020-03-20

## 2020-03-20 NOTE — PROGRESS NOTES
Subjective:       Patient ID: Radha Ervin is a 59 y.o. female.    Chief Complaint: No chief complaint on file.  follow up for pustular psoriasis        Ms. Ervin is a 58 year old female with past medical history of hydradenitis suppurativa, pustular psoriasis who presents for follow up for her pustular psoriasis     Initial history:  Patient has had hydradenitis for about 15 years. She was placed on Humira for this in December 2017. In Feb 2018 she started developing erythematous blisters, which were painful, pruritic and scaly on her palms and soles. She states that she has had a similar rash about 10 years ago. It was also pustular on palms and soles. She went to her PCP that did not know what it was but ended up giving her a 2 week course of prednisone. She had not had any issues since. She started developing nail changes in July 2018.   She has right knee pain. She states that OA which required a total knee replacement in 2016. She has had one episode where her knee was swollen a that time she was evaluated by orthopedics and it was thought to be related to humira. She still has knee pain but it is usually at the end of the day and worse with movement. She has morning stiffness lasting 2-3 min.   Denies alopecia,  eye pain/redness, dry eyes, dry mouth, oral ulcers, rash, photosensitivity, raynauds, distal hand ulcers, genital ulcers, chest pain, shortness of breath, abd pain, constipation, diarrhea, bloody diarrhea, joint  swelling, tenderness, muscle weakness.     Last seen in clinic 2/11/19  She states that her psoriasis is better about 70%. Nails still are dystophic, She has almost lost her right big toe nail.   Currently Sulfasalzine 500mg bid (never increased to 1g BID) and Otezla.   Hydradenititis is under control. Last flare few weeks ago, gets one every 2-3 mos.   No hand pain or swelling. Some right knee pain   She has had unintentional weight lost (doesn't know how much), slight decrease in  appetite, no night sweats, no fever or chills.  Last mamogram few years ago, last papsmear years ago, needs to give stool sample.   Labs: WASHINGTON negative, SSA negative,    and CCP 10.5, uric acid 7.2,   We increased sulfasalazine to 1g BID and in one mos increase to 1.5mg BID     5/2019:  Is due to see Derm in July. Is currently having a hidradenitis flare in right groin. Having flares every 3-4 weeks, in just this spot. She states that her steroid and antibiotic cream cost her $40 and $70 respectively. Her pustular psoriasis has improved a lot. She gets flares of her psoriasis every 2-3 months which resolve. Her nails are still dystrophic. Saw podiatry. Her nail was shaved down, which is less painful now. Also with plantar fascitis on right foot.   Tolerating Otezla well. Last visit she increased to 1500mg BID SSZ but had diarrhea so decreased to 1000mg BID   No hand, wrists, ankle or feet swelling or pain.   Labs 5/2019: showing normal liver and kidney function, normal blood counts     9/2019:  She has one spot in right groin area. She always has this, does not go away. She has taken one course of Doxycycline with initial improvement but then it did not last. She gets nausea with Doxycycline and doesn't like to take. It did shrink with medicine but then came back. She is still using topical creams, clindamycin which helps with it.   Dr. Spnecer had also told her to start turmuric with black pepper which she hasnt   No joint pain or swelling.   Currently on Otezla, SSZ 1g bid.     Interval history:  Saw ophthalmology 2/20: has nuclear sclerosis of b/l eyes  Saw Dr. Spencer in dermatology 1/20:no change in management. Continue otezla and topical prn. Her hidradenitis is stable.   She has no active pustular psoriasis. She has one area of hidradenitis that sometimes flares. Currently under control. Her joint are doing well. No pain or swelling. Her nails are growing back normal        Review of Systems   Constitutional:  Negative for activity change, appetite change, fever and unexpected weight change.   HENT: Negative for mouth sores and trouble swallowing.         Negative hair loss    Eyes: Negative for pain and redness.   Respiratory: Negative for cough, chest tightness and shortness of breath.    Cardiovascular: Negative for chest pain and leg swelling.   Gastrointestinal: Negative for abdominal pain, blood in stool, constipation and diarrhea.   Endocrine: Negative for cold intolerance.   Genitourinary: Negative for dysuria and genital sores.   Musculoskeletal: Negative for back pain and neck stiffness.   Skin: Negative for rash.        +nail changes improving     Neurological: Negative for numbness and headaches.   Hematological: Does not bruise/bleed easily.       Past Medical History:   Diagnosis Date    Allergy     Amblyopia     Cataract     Eczema     HS (hereditary spherocytosis)     Hx of total knee replacement 01/19/2016    right knee    Joint pain        Past Surgical History:   Procedure Laterality Date    achilles tendon repair      CHOLECYSTECTOMY      TOTAL KNEE ARTHROPLASTY         Family History   Problem Relation Age of Onset    Cancer Mother     Cancer Father         lung CA    Cancer Son         hogdkins    Breast cancer Sister     Liver cancer Maternal Uncle     Blindness Cousin     Diabetes Cousin     Amblyopia Neg Hx     Cataracts Neg Hx     Glaucoma Neg Hx     Macular degeneration Neg Hx     Retinal detachment Neg Hx     Strabismus Neg Hx        Social History     Socioeconomic History    Marital status: Single     Spouse name: Not on file    Number of children: Not on file    Years of education: Not on file    Highest education level: Not on file   Occupational History    Not on file   Social Needs    Financial resource strain: Not on file    Food insecurity:     Worry: Not on file     Inability: Not on file    Transportation needs:     Medical: Not on file     Non-medical:  Not on file   Tobacco Use    Smoking status: Current Some Day Smoker     Types: Vaping with nicotine    Smokeless tobacco: Never Used   Substance and Sexual Activity    Alcohol use: Yes     Comment: occassionally    Drug use: No    Sexual activity: Not Currently     Partners: Male   Lifestyle    Physical activity:     Days per week: Not on file     Minutes per session: Not on file    Stress: Not on file   Relationships    Social connections:     Talks on phone: Not on file     Gets together: Not on file     Attends Gnosticism service: Not on file     Active member of club or organization: Not on file     Attends meetings of clubs or organizations: Not on file     Relationship status: Not on file   Other Topics Concern    Are you pregnant or think you may be? Not Asked    Breast-feeding Not Asked   Social History Narrative    Not on file       Current Outpatient Medications   Medication Sig Dispense Refill    apremilast (OTEZLA) 30 mg Tab Take 1 tablet (30 mg total) by mouth 2 (two) times daily. 60 tablet 5    augmented betamethasone dipropionate (DIPROLENE-AF) 0.05 % ointment MARIA ALEJANDRA EXT AA BID FOR 14 DAYS  2    benzonatate (TESSALON PERLES) 100 MG capsule Take 2 capsules (200 mg total) by mouth 3 (three) times daily as needed for Cough. 40 capsule 0    betamethasone dipropionate (DIPROLENE) 0.05 % ointment Apply topically 2 (two) times daily. To rash under gloves x 14 days, then weekends only for 14 days 45 g 2    cetirizine (ZYRTEC) 10 MG tablet Take 1 tablet up to twice a day.  Can be used regularly or just when needed. 60 tablet 3    clindamycin phosphate 1% (CLINDAGEL) 1 % gel Apply topically 2 (two) times daily. To axilla as needed for flares 60 g 2     No current facility-administered medications for this visit.        Review of patient's allergies indicates:   Allergen Reactions    Morphine Other (See Comments)     headaches    Latex, natural rubber Rash and Other (See Comments)     Redness and  peeling only with bandaids    Penicillins Nausea And Vomiting and Rash       Objective:   LMP  (LMP Unknown)      Physical Exam      Telephone visit: unable to exam       CMP  Sodium   Date Value Ref Range Status   11/11/2019 142 136 - 145 mmol/L Final     Potassium   Date Value Ref Range Status   11/11/2019 4.2 3.5 - 5.1 mmol/L Final     Chloride   Date Value Ref Range Status   11/11/2019 108 95 - 110 mmol/L Final     CO2   Date Value Ref Range Status   11/11/2019 23 23 - 29 mmol/L Final     Glucose   Date Value Ref Range Status   11/11/2019 105 70 - 110 mg/dL Final     BUN, Bld   Date Value Ref Range Status   11/11/2019 13 6 - 20 mg/dL Final     Creatinine   Date Value Ref Range Status   11/11/2019 0.9 0.5 - 1.4 mg/dL Final     Calcium   Date Value Ref Range Status   11/11/2019 9.5 8.7 - 10.5 mg/dL Final     Total Protein   Date Value Ref Range Status   11/11/2019 7.0 6.0 - 8.4 g/dL Final     Albumin   Date Value Ref Range Status   11/11/2019 3.7 3.5 - 5.2 g/dL Final     Total Bilirubin   Date Value Ref Range Status   11/11/2019 0.5 0.1 - 1.0 mg/dL Final     Comment:     For infants and newborns, interpretation of results should be based  on gestational age, weight and in agreement with clinical  observations.  Premature Infant recommended reference ranges:  Up to 24 hours.............<8.0 mg/dL  Up to 48 hours............<12.0 mg/dL  3-5 days..................<15.0 mg/dL  6-29 days.................<15.0 mg/dL       Alkaline Phosphatase   Date Value Ref Range Status   11/11/2019 106 55 - 135 U/L Final     AST   Date Value Ref Range Status   11/11/2019 16 10 - 40 U/L Final     ALT   Date Value Ref Range Status   11/11/2019 16 10 - 44 U/L Final     Anion Gap   Date Value Ref Range Status   11/11/2019 11 8 - 16 mmol/L Final     eGFR if    Date Value Ref Range Status   11/11/2019 >60 >60 mL/min/1.73 m^2 Final     eGFR if non    Date Value Ref Range Status   11/11/2019 >60 >60  mL/min/1.73 m^2 Final     Comment:     Calculation used to obtain the estimated glomerular filtration  rate (eGFR) is the CKD-EPI equation.        Lab Results   Component Value Date    WBC 10.18 11/11/2019    HGB 13.6 11/11/2019    HCT 43.3 11/11/2019    MCV 95 11/11/2019     11/11/2019     Lab Results   Component Value Date    .0 (H) 10/16/2018       Assessment:       No diagnosis found.        Plan:       Ms. Ervin is a 58 year old female with past medical history of hydradenitis suppurativa, pustular psoriasis who presents for follow up.     #Pustilar psoriasis  -Patient with pustular psoriasis thought to be 2/2 Humira  -At this time we do not see any signs/symptoms that indicated psoriatic arthritis based on hsitory, exam and reviewing patients old XRAYs  -she has no active lesions and is doing well   -continue Otezla   -patient was not able to tolerate sulfasalazine 1500mg BID due to diarrhea, she is to continue 1000mg BID   -following with derm   -labs ASAP for monitoring, last in 11/2019 (patient states she will get in 2 weeks)  -labs q3 mos for monitoring     #Nail dystrophy   -following with derm, improving     #hydradenitis  -has one recurring lesion , currently not active   -following with derm     #health maintenance   -patient counseled on health maintenance  and screening.   -COVID concerns addressed     Patient seen and discussed with Dr. Childs    RTC in 6 mos with labs to see Dr. Marybeth Siddiqui MD  Rheumatology PGY 5     The patient location is: home    The chief complaint leading to consultation is: pustular psoriasis  Visit type: Virtual visit with synchronous audio    Total time spent with patient: 20 min   Each patient to whom he or she provides medical services by telemedicine is:  (1) informed of the relationship between the physician and patient and the respective role of any other health care provider with respect to management of the patient; and (2)  notified that he or she may decline to receive medical services by telemedicine and may withdraw from such care at any time.    Notes: see above

## 2020-03-23 ENCOUNTER — OFFICE VISIT (OUTPATIENT)
Dept: RHEUMATOLOGY | Facility: CLINIC | Age: 60
End: 2020-03-23
Payer: COMMERCIAL

## 2020-03-23 DIAGNOSIS — L40.9 PSORIASIS: ICD-10-CM

## 2020-03-23 DIAGNOSIS — L73.2 HIDRADENITIS SUPPURATIVA: Primary | ICD-10-CM

## 2020-03-23 PROCEDURE — 99212 PR OFFICE/OUTPT VISIT, EST, LEVL II, 10-19 MIN: ICD-10-PCS | Mod: S$GLB,,, | Performed by: STUDENT IN AN ORGANIZED HEALTH CARE EDUCATION/TRAINING PROGRAM

## 2020-03-23 PROCEDURE — 99999 PR PBB SHADOW E&M-EST. PATIENT-LVL III: ICD-10-PCS | Mod: PBBFAC,,, | Performed by: STUDENT IN AN ORGANIZED HEALTH CARE EDUCATION/TRAINING PROGRAM

## 2020-03-23 PROCEDURE — 99212 OFFICE O/P EST SF 10 MIN: CPT | Mod: S$GLB,,, | Performed by: STUDENT IN AN ORGANIZED HEALTH CARE EDUCATION/TRAINING PROGRAM

## 2020-03-23 PROCEDURE — 99999 PR PBB SHADOW E&M-EST. PATIENT-LVL III: CPT | Mod: PBBFAC,,, | Performed by: STUDENT IN AN ORGANIZED HEALTH CARE EDUCATION/TRAINING PROGRAM

## 2020-03-23 RX ORDER — SULFASALAZINE 500 MG/1
TABLET ORAL
COMMUNITY
Start: 2020-03-19 | End: 2021-01-20

## 2020-03-23 NOTE — PATIENT INSTRUCTIONS
Rapid3 Question Responses and Scores 3/20/2020   MDHAQ Score 0   Psychologic Score 0   Pain Score 1   When you awakened in the morning OVER THE LAST WEEK, did you feel stiff? No   Fatigue Score 0   Global Health Score 0   RAPID3 Score 0.33

## 2020-03-23 NOTE — PROGRESS NOTES
I have reviewed the notes,documentation by  and I agree with her recommendations and I have made an addendum to her note

## 2020-03-23 NOTE — PROGRESS NOTES
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  Plantar fasciitis currently doing PT      WASHINGTON and SSA neg  Uric acid 7.2    CCP 10.5     Psoriasis has resolved with otezla     Joints ok    Hidradenitis has resolved mostly  Derm is seeing her closely    Uses doxycycline prn and turmeric prn for hidradenitis flares    For now no change in management unless derm has any suggestions    rtc in 6 months     asap labs for SSZ monitoring    Answers for HPI/ROS submitted by the patient on 3/20/2020   fever: No  eye redness: No  headaches: No  shortness of breath: No  chest pain: No  trouble swallowing: No  diarrhea: Yes  constipation: No  unexpected weight change: No  genital sore: No  dysuria: No  During the last 3 days, have you had a skin rash?: No  Bruises or bleeds easily: No  cough: No

## 2020-03-27 ENCOUNTER — OFFICE VISIT (OUTPATIENT)
Dept: INTERNAL MEDICINE | Facility: CLINIC | Age: 60
End: 2020-03-27
Payer: COMMERCIAL

## 2020-03-27 ENCOUNTER — TELEPHONE (OUTPATIENT)
Dept: INTERNAL MEDICINE | Facility: CLINIC | Age: 60
End: 2020-03-27

## 2020-03-27 VITALS — TEMPERATURE: 99 F

## 2020-03-27 DIAGNOSIS — J06.9 VIRAL URI WITH COUGH: Primary | ICD-10-CM

## 2020-03-27 PROCEDURE — 99213 PR OFFICE/OUTPT VISIT, EST, LEVL III, 20-29 MIN: ICD-10-PCS | Mod: 95,,, | Performed by: INTERNAL MEDICINE

## 2020-03-27 PROCEDURE — 99213 OFFICE O/P EST LOW 20 MIN: CPT | Mod: 95,,, | Performed by: INTERNAL MEDICINE

## 2020-03-27 NOTE — PATIENT INSTRUCTIONS
We were happy to see you today    For your Testing  Please have your labs and imaging test done at your earliest convience      For your Medication     Nasal Allergy Medicines  The table below lists the most common over-the-counter (OTC) medicines for nasal allergies. Some are pills. Some are liquids. And, some are nasal sprays. It's important to check with your healthcare provider or pharmacist before taking these medicines, even though they are available without a prescription. And, be sure to follow the instructions on the package labels.  Type of medicine Description of medicine   Antihistamines  Take ONE tab once a day  Claritin  Allegra  Xyzal  Zyrtec     · Stops the release of histamine, a substance in the body that causes many allergy symptoms.  · Helps prevent sneezing, runny nose, and itchy and watery eyes.   Corticosteroids  Use ONE spray in EACH nostril ONCE a day. For persistent symptoms increase to TWICE a day for 3-4 weeks  Flonase  Nasocort    · Reduces inflammation and swelling.  · Relieves itching and sneezing.   Decongestants  Afrin Nasal Spray - only use this for 1-2 days in a row       · Reduce swelling of nasal passages and relieve sinus pressure  · Overuse can worsen symptoms.    ·     ·    Saline sprays, rinses, and gels · Provide lubrication or moisture to nasal passages. These can be used as often as needed.  · Help soothe irritated nasal passages. Loosens thick mucus.   NOTE: Talk to your healthcare provider or pharmacist about the possible side effects and drug or food interactions of any medicine you take.   How to use nasal spray  Nasal sprays must be used the right way to be effective. Be sure to do the following:  · Blow your nose to clear your nostrils.  · Gently shake the bottle. Then remove the cap.  · With your right hand, carefully insert the tip of the bottle into your left nostril. Make sure to point the tip toward your ear and not the center of the nose.  · While gently  breathing in through your nose, press down once on the pump to release the spray.  · Breathe out through your mouth.  · With your left hand, repeat the steps for your right nostril.  Date Last Reviewed: 9/1/2016 © 2000-2017 "[employer]. [address] [place] [id number]. All rights reserved. This information is not intended as a substitute for professional medical care. Always follow your healthcare professional's instructions.    For more information about side effects please visit medlineplus.gov          Please return to clinic in        As needed

## 2020-03-27 NOTE — LETTER
[address] [facility] ? [place] [address] ? Phone [phone] ? Fax [phone] ? [email or web address]          Return to Work/School    Patient: Radha Ervin  YOB: 1960   Date: 03/27/2020      To Whom It May Concern:     Radha Ervin was in contact with/seen in my office on 03/27/2020. COVID-19 is present in our communities across the [facility]. There is limited testing for COVID at this time, so not all patients can be tested. In this situation, your employee meets the following criteria:     Radha Ervin does have some symptoms (upper respiratory, fever, etc) but does not meet all the criteria for testing as defined by the [facility].  The employee should quarantine for a minimum of 7 days from the start of symptoms can return to work once they are asymptomatic for 72 hours without the use of fever reducing medications (Tylenol, Motrin, etc) -ie no sooner than 4/3/2020. Infection control policies of the employer should be followed, as well as good hand hygiene.     If you have any questions or concerns, or if I can be of further assistance, please do not hesitate to contact me.     Sincerely,    Julito Herrera III, MD

## 2020-03-27 NOTE — PROGRESS NOTES
Subjective:       Patient ID: Radha Ervin is a 59 y.o. female.    Chief Complaint: URI  this patient is new to me    The patient location is: [place]  The chief complaint leading to consultation is: cold  Visit type: Virtual visit with synchronous audio and video  Total time spent with patient: 10 mins  Each patient to whom he or she provides medical services by telemedicine is:  (1) informed of the relationship between the physician and patient and the respective role of any other health care provider with respect to management of the patient; and (2) notified that he or she may decline to receive medical services by telemedicine and may withdraw from such care at any time.    Notes:       URI     Onset; started this morning    Associated;  headaches, sore throat  Denies  - Mylagias, arthralgia, retroorbital headache, faituge,   - cough, runny nose, sore throat, congestion  - SOB, RILEY, chest pain, hemoptysis  Sick contact; none   Recent travel; none   Alleviated/meds; none   Worsening; none   Previous episodes; iburpofen            HPI  Review of Systems    Objective:       Temp 99 °F (37.2 °C)   LMP  (LMP Unknown)     Physical Exam   Constitutional: She appears well-developed and well-nourished. No distress.   HEENT  Normocephalic atraumatic  Oropharynx clear and moist  No pharyngeal exudates  No cervical adenopathy palpated on patient self exam  No tenderness to palpation of the frontal , maxillary sinuses on patient self exam  Resp  Normal Respiratory effort  No retraction  No increase work of breathing, able to complete full sentences  No audible wheezes  CV  No perioral cyanosis  Skin  No observed rashes/ bruises  MSK  Moves all visualized extremites with normal range of motion    Skin: She is not diaphoretic.       Assessment:       1. Viral URI with cough        Plan:       Radha was seen today for uri.    Diagnoses and all orders for this visit:    Viral URI with cough  Signs, symptoms consistent  with viral  illness  history or pyhsical exam does not suggest bacterial infection such as PNA or severe viral infections such as COVID19 or influenza  I provided instruction on supportive care measures   reviewed signs and symptoms that should prompt return to provider or evaluation in the ED

## 2020-03-27 NOTE — TELEPHONE ENCOUNTER
----- Message from Nicholas Gamble sent at 3/27/2020 11:55 AM CDT -----  Contact: Pt   Pt would like to be called back regarding needing to speak with nurse about self quarantine for 14 days.  Pt's work place would like a statement from [employer]'s office for proof.    Pt can be reached at  [phone].    Thank You

## 2020-04-02 ENCOUNTER — PATIENT MESSAGE (OUTPATIENT)
Dept: RHEUMATOLOGY | Facility: CLINIC | Age: 60
End: 2020-04-02

## 2020-04-02 ENCOUNTER — PATIENT MESSAGE (OUTPATIENT)
Dept: DERMATOLOGY | Facility: CLINIC | Age: 60
End: 2020-04-02

## 2020-04-06 ENCOUNTER — LAB VISIT (OUTPATIENT)
Dept: LAB | Facility: HOSPITAL | Age: 60
End: 2020-04-06
Attending: STUDENT IN AN ORGANIZED HEALTH CARE EDUCATION/TRAINING PROGRAM
Payer: COMMERCIAL

## 2020-04-06 DIAGNOSIS — L40.9 PSORIASIS: ICD-10-CM

## 2020-04-06 DIAGNOSIS — L73.2 HIDRADENITIS SUPPURATIVA: ICD-10-CM

## 2020-04-06 LAB
ALBUMIN SERPL BCP-MCNC: 4 G/DL (ref 3.5–5.2)
ALP SERPL-CCNC: 112 U/L (ref 55–135)
ALT SERPL W/O P-5'-P-CCNC: 21 U/L (ref 10–44)
ANION GAP SERPL CALC-SCNC: 11 MMOL/L (ref 8–16)
AST SERPL-CCNC: 17 U/L (ref 10–40)
BASOPHILS # BLD AUTO: 0.11 K/UL (ref 0–0.2)
BASOPHILS NFR BLD: 0.9 % (ref 0–1.9)
BILIRUB SERPL-MCNC: 0.4 MG/DL (ref 0.1–1)
BUN SERPL-MCNC: 14 MG/DL (ref 6–20)
CALCIUM SERPL-MCNC: 10.2 MG/DL (ref 8.7–10.5)
CHLORIDE SERPL-SCNC: 107 MMOL/L (ref 95–110)
CO2 SERPL-SCNC: 21 MMOL/L (ref 23–29)
CREAT SERPL-MCNC: 0.9 MG/DL (ref 0.5–1.4)
CRP SERPL-MCNC: 4.2 MG/L (ref 0–8.2)
DIFFERENTIAL METHOD: ABNORMAL
EOSINOPHIL # BLD AUTO: 0.7 K/UL (ref 0–0.5)
EOSINOPHIL NFR BLD: 5.7 % (ref 0–8)
ERYTHROCYTE [DISTWIDTH] IN BLOOD BY AUTOMATED COUNT: 14.1 % (ref 11.5–14.5)
ERYTHROCYTE [SEDIMENTATION RATE] IN BLOOD BY WESTERGREN METHOD: 55 MM/HR (ref 0–36)
EST. GFR  (AFRICAN AMERICAN): >60 ML/MIN/1.73 M^2
EST. GFR  (NON AFRICAN AMERICAN): >60 ML/MIN/1.73 M^2
GLUCOSE SERPL-MCNC: 116 MG/DL (ref 70–110)
HCT VFR BLD AUTO: 47 % (ref 37–48.5)
HGB BLD-MCNC: 14.5 G/DL (ref 12–16)
IMM GRANULOCYTES # BLD AUTO: 0.02 K/UL (ref 0–0.04)
IMM GRANULOCYTES NFR BLD AUTO: 0.2 % (ref 0–0.5)
LYMPHOCYTES # BLD AUTO: 3 K/UL (ref 1–4.8)
LYMPHOCYTES NFR BLD: 25.5 % (ref 18–48)
MCH RBC QN AUTO: 30.5 PG (ref 27–31)
MCHC RBC AUTO-ENTMCNC: 30.9 G/DL (ref 32–36)
MCV RBC AUTO: 99 FL (ref 82–98)
MONOCYTES # BLD AUTO: 0.8 K/UL (ref 0.3–1)
MONOCYTES NFR BLD: 7 % (ref 4–15)
NEUTROPHILS # BLD AUTO: 7.1 K/UL (ref 1.8–7.7)
NEUTROPHILS NFR BLD: 60.7 % (ref 38–73)
NRBC BLD-RTO: 0 /100 WBC
PLATELET # BLD AUTO: 320 K/UL (ref 150–350)
PMV BLD AUTO: 10.9 FL (ref 9.2–12.9)
POTASSIUM SERPL-SCNC: 4.5 MMOL/L (ref 3.5–5.1)
PROT SERPL-MCNC: 7.8 G/DL (ref 6–8.4)
RBC # BLD AUTO: 4.75 M/UL (ref 4–5.4)
SODIUM SERPL-SCNC: 139 MMOL/L (ref 136–145)
WBC # BLD AUTO: 11.67 K/UL (ref 3.9–12.7)

## 2020-04-06 PROCEDURE — 85652 RBC SED RATE AUTOMATED: CPT

## 2020-04-06 PROCEDURE — 86140 C-REACTIVE PROTEIN: CPT

## 2020-04-06 PROCEDURE — 80053 COMPREHEN METABOLIC PANEL: CPT

## 2020-04-06 PROCEDURE — 85025 COMPLETE CBC W/AUTO DIFF WBC: CPT

## 2020-04-06 PROCEDURE — 36415 COLL VENOUS BLD VENIPUNCTURE: CPT | Mod: PO

## 2020-05-06 ENCOUNTER — TELEPHONE (OUTPATIENT)
Dept: OPHTHALMOLOGY | Facility: CLINIC | Age: 60
End: 2020-05-06

## 2020-05-11 ENCOUNTER — OFFICE VISIT (OUTPATIENT)
Dept: OPHTHALMOLOGY | Facility: CLINIC | Age: 60
End: 2020-05-11
Payer: COMMERCIAL

## 2020-05-11 ENCOUNTER — CLINICAL SUPPORT (OUTPATIENT)
Dept: OPHTHALMOLOGY | Facility: CLINIC | Age: 60
End: 2020-05-11
Payer: COMMERCIAL

## 2020-05-11 DIAGNOSIS — H25.13 NUCLEAR SCLEROSIS OF BOTH EYES: ICD-10-CM

## 2020-05-11 DIAGNOSIS — H53.40 VISUAL FIELD DEFECT: Primary | ICD-10-CM

## 2020-05-11 DIAGNOSIS — H53.40 VISUAL FIELD DEFECT: ICD-10-CM

## 2020-05-11 PROCEDURE — 92083 EXTENDED VISUAL FIELD XM: CPT | Mod: S$GLB,,, | Performed by: OPHTHALMOLOGY

## 2020-05-11 PROCEDURE — 99999 PR PBB SHADOW E&M-EST. PATIENT-LVL III: ICD-10-PCS | Mod: PBBFAC,,, | Performed by: OPHTHALMOLOGY

## 2020-05-11 PROCEDURE — 92083 HUMPHREY VISUAL FIELD - OU - BOTH EYES: ICD-10-PCS | Mod: S$GLB,,, | Performed by: OPHTHALMOLOGY

## 2020-05-11 PROCEDURE — 92012 PR EYE EXAM, EST PATIENT,INTERMED: ICD-10-PCS | Mod: S$GLB,,, | Performed by: OPHTHALMOLOGY

## 2020-05-11 PROCEDURE — 99999 PR PBB SHADOW E&M-EST. PATIENT-LVL I: ICD-10-PCS | Mod: PBBFAC,,,

## 2020-05-11 PROCEDURE — 99999 PR PBB SHADOW E&M-EST. PATIENT-LVL I: CPT | Mod: PBBFAC,,,

## 2020-05-11 PROCEDURE — 92012 INTRM OPH EXAM EST PATIENT: CPT | Mod: S$GLB,,, | Performed by: OPHTHALMOLOGY

## 2020-05-11 PROCEDURE — 99999 PR PBB SHADOW E&M-EST. PATIENT-LVL III: CPT | Mod: PBBFAC,,, | Performed by: OPHTHALMOLOGY

## 2020-05-11 NOTE — PROGRESS NOTES
Subjective:       Patient ID: Radha Ervin is a 59 y.o. female.    Chief Complaint: Follow-up    HPI     4 week F/U for HVF's and Review. Denies eye pain. Experience f/f, right   eye. No noticeable VA changes since last visit. Eyes itch and burn. Have   trouble reading with glasses on. Takes a while for eyes to adjust after   reading for a while and putting the glasses back on.    Eye Med's: No gtt     Last edited by JUAN MIGUEL Johnson on 5/11/2020  1:52 PM. (History)             Assessment:       1. Visual field defect    2. Nuclear sclerosis of both eyes        Plan:       H/o HVF defect-Pt with nonspecific VF defects OU. ON's & retinae were WNL's on 2/10/2020 exam.  Cataracts- Not visually significant.      AT's prn.  RTC me prn.

## 2020-05-22 ENCOUNTER — PATIENT MESSAGE (OUTPATIENT)
Dept: RHEUMATOLOGY | Facility: CLINIC | Age: 60
End: 2020-05-22

## 2020-07-17 RX ORDER — APREMILAST 30 MG/1
30 TABLET, FILM COATED ORAL 2 TIMES DAILY
Qty: 60 TABLET | Refills: 5 | Status: SHIPPED | OUTPATIENT
Start: 2020-07-17 | End: 2020-08-13

## 2020-08-13 RX ORDER — APREMILAST 30 MG/1
30 TABLET, FILM COATED ORAL 2 TIMES DAILY
Qty: 60 TABLET | Refills: 11 | Status: SHIPPED | OUTPATIENT
Start: 2020-08-13 | End: 2020-08-18

## 2020-08-14 ENCOUNTER — TELEPHONE (OUTPATIENT)
Dept: PHARMACY | Facility: CLINIC | Age: 60
End: 2020-08-14

## 2020-08-17 NOTE — TELEPHONE ENCOUNTER
DOCUMENTATION ONLY:  Prior authorization for Otezla approved from 08/17/20 to 08/10/22    Case Id: [id number]    Co-pay: $35    Assistance is needed.    Patient must fill with [facility].

## 2020-08-18 RX ORDER — APREMILAST 30 MG/1
30 TABLET, FILM COATED ORAL 2 TIMES DAILY
Qty: 60 TABLET | Refills: 11 | Status: SHIPPED | OUTPATIENT
Start: 2020-08-18 | End: 2020-09-17

## 2021-01-20 ENCOUNTER — OFFICE VISIT (OUTPATIENT)
Dept: DERMATOLOGY | Facility: CLINIC | Age: 61
End: 2021-01-20
Payer: COMMERCIAL

## 2021-01-20 DIAGNOSIS — L73.2 HIDRADENITIS SUPPURATIVA: Primary | ICD-10-CM

## 2021-01-20 DIAGNOSIS — L40.9 PSORIASIS: ICD-10-CM

## 2021-01-20 DIAGNOSIS — L02.224 BOIL OF GROIN: ICD-10-CM

## 2021-01-20 PROCEDURE — 1126F AMNT PAIN NOTED NONE PRSNT: CPT | Mod: S$GLB,,, | Performed by: DERMATOLOGY

## 2021-01-20 PROCEDURE — 99999 PR PBB SHADOW E&M-EST. PATIENT-LVL III: CPT | Mod: PBBFAC,,, | Performed by: DERMATOLOGY

## 2021-01-20 PROCEDURE — 1126F PR PAIN SEVERITY QUANTIFIED, NO PAIN PRESENT: ICD-10-PCS | Mod: S$GLB,,, | Performed by: DERMATOLOGY

## 2021-01-20 PROCEDURE — 99214 PR OFFICE/OUTPT VISIT, EST, LEVL IV, 30-39 MIN: ICD-10-PCS | Mod: S$GLB,,, | Performed by: DERMATOLOGY

## 2021-01-20 PROCEDURE — 99999 PR PBB SHADOW E&M-EST. PATIENT-LVL III: ICD-10-PCS | Mod: PBBFAC,,, | Performed by: DERMATOLOGY

## 2021-01-20 PROCEDURE — 99214 OFFICE O/P EST MOD 30 MIN: CPT | Mod: S$GLB,,, | Performed by: DERMATOLOGY

## 2021-01-20 RX ORDER — CLINDAMYCIN PHOSPHATE 11.9 MG/ML
SOLUTION TOPICAL 2 TIMES DAILY
Qty: 60 ML | Refills: 5 | Status: SHIPPED | OUTPATIENT
Start: 2021-01-20 | End: 2023-07-28

## 2021-01-20 RX ORDER — APREMILAST 30 MG/1
TABLET, FILM COATED ORAL
Qty: 60 TABLET | Refills: 3 | Status: CANCELLED | OUTPATIENT
Start: 2021-01-20

## 2021-01-20 RX ORDER — APREMILAST 30 MG/1
TABLET, FILM COATED ORAL
Qty: 60 TABLET | Refills: 3 | Status: SHIPPED | OUTPATIENT
Start: 2021-01-20 | End: 2021-03-15 | Stop reason: SDUPTHER

## 2021-02-02 ENCOUNTER — SPECIALTY PHARMACY (OUTPATIENT)
Dept: PHARMACY | Facility: CLINIC | Age: 61
End: 2021-02-02

## 2021-02-09 ENCOUNTER — TELEPHONE (OUTPATIENT)
Dept: DERMATOLOGY | Facility: CLINIC | Age: 61
End: 2021-02-09

## 2021-02-11 ENCOUNTER — TELEPHONE (OUTPATIENT)
Dept: DERMATOLOGY | Facility: CLINIC | Age: 61
End: 2021-02-11

## 2021-02-12 ENCOUNTER — TELEPHONE (OUTPATIENT)
Dept: DERMATOLOGY | Facility: CLINIC | Age: 61
End: 2021-02-12

## 2021-02-12 ENCOUNTER — SPECIALTY PHARMACY (OUTPATIENT)
Dept: PHARMACY | Facility: CLINIC | Age: 61
End: 2021-02-12

## 2021-02-23 ENCOUNTER — PATIENT MESSAGE (OUTPATIENT)
Dept: DERMATOLOGY | Facility: CLINIC | Age: 61
End: 2021-02-23

## 2021-02-23 ENCOUNTER — PATIENT MESSAGE (OUTPATIENT)
Dept: RHEUMATOLOGY | Facility: CLINIC | Age: 61
End: 2021-02-23

## 2021-03-06 ENCOUNTER — SPECIALTY PHARMACY (OUTPATIENT)
Dept: PHARMACY | Facility: CLINIC | Age: 61
End: 2021-03-06

## 2021-03-15 DIAGNOSIS — L40.9 PSORIASIS: ICD-10-CM

## 2021-03-15 RX ORDER — APREMILAST 30 MG/1
TABLET, FILM COATED ORAL
Qty: 60 TABLET | Refills: 3 | OUTPATIENT
Start: 2021-03-15 | End: 2021-03-29 | Stop reason: SDUPTHER

## 2021-03-20 ENCOUNTER — IMMUNIZATION (OUTPATIENT)
Dept: PRIMARY CARE CLINIC | Facility: CLINIC | Age: 61
End: 2021-03-20
Payer: COMMERCIAL

## 2021-03-20 DIAGNOSIS — Z23 NEED FOR VACCINATION: Primary | ICD-10-CM

## 2021-03-20 PROCEDURE — 91300 PR SARS-COV- 2 COVID-19 VACCINE, NO PRSV, 30MCG/0.3ML, IM: CPT | Mod: S$GLB,,, | Performed by: INTERNAL MEDICINE

## 2021-03-20 PROCEDURE — 0001A PR IMMUNIZ ADMIN, SARS-COV-2 COVID-19 VACC, 30MCG/0.3ML, 1ST DOSE: ICD-10-PCS | Mod: CV19,S$GLB,, | Performed by: INTERNAL MEDICINE

## 2021-03-20 PROCEDURE — 0001A PR IMMUNIZ ADMIN, SARS-COV-2 COVID-19 VACC, 30MCG/0.3ML, 1ST DOSE: CPT | Mod: CV19,S$GLB,, | Performed by: INTERNAL MEDICINE

## 2021-03-20 PROCEDURE — 91300 PR SARS-COV- 2 COVID-19 VACCINE, NO PRSV, 30MCG/0.3ML, IM: ICD-10-PCS | Mod: S$GLB,,, | Performed by: INTERNAL MEDICINE

## 2021-03-20 RX ADMIN — Medication 0.3 ML: at 12:03

## 2021-03-29 DIAGNOSIS — L40.9 PSORIASIS: ICD-10-CM

## 2021-03-29 RX ORDER — APREMILAST 30 MG/1
TABLET, FILM COATED ORAL
Qty: 60 TABLET | Refills: 3 | OUTPATIENT
Start: 2021-03-29 | End: 2022-09-13 | Stop reason: SDUPTHER

## 2021-04-07 ENCOUNTER — PATIENT MESSAGE (OUTPATIENT)
Dept: DERMATOLOGY | Facility: CLINIC | Age: 61
End: 2021-04-07

## 2021-04-11 ENCOUNTER — IMMUNIZATION (OUTPATIENT)
Dept: PRIMARY CARE CLINIC | Facility: CLINIC | Age: 61
End: 2021-04-11
Payer: COMMERCIAL

## 2021-04-11 DIAGNOSIS — Z23 NEED FOR VACCINATION: Primary | ICD-10-CM

## 2021-04-11 PROCEDURE — 91300 PR SARS-COV- 2 COVID-19 VACCINE, NO PRSV, 30MCG/0.3ML, IM: ICD-10-PCS | Mod: S$GLB,,, | Performed by: INTERNAL MEDICINE

## 2021-04-11 PROCEDURE — 0002A PR IMMUNIZ ADMIN, SARS-COV-2 COVID-19 VACC, 30MCG/0.3ML, 2ND DOSE: CPT | Mod: CV19,S$GLB,, | Performed by: INTERNAL MEDICINE

## 2021-04-11 PROCEDURE — 91300 PR SARS-COV- 2 COVID-19 VACCINE, NO PRSV, 30MCG/0.3ML, IM: CPT | Mod: S$GLB,,, | Performed by: INTERNAL MEDICINE

## 2021-04-11 PROCEDURE — 0002A PR IMMUNIZ ADMIN, SARS-COV-2 COVID-19 VACC, 30MCG/0.3ML, 2ND DOSE: ICD-10-PCS | Mod: CV19,S$GLB,, | Performed by: INTERNAL MEDICINE

## 2021-04-11 RX ADMIN — Medication 0.3 ML: at 12:04

## 2021-04-14 ENCOUNTER — PATIENT MESSAGE (OUTPATIENT)
Dept: DERMATOLOGY | Facility: CLINIC | Age: 61
End: 2021-04-14

## 2021-05-18 ENCOUNTER — PATIENT MESSAGE (OUTPATIENT)
Dept: DERMATOLOGY | Facility: CLINIC | Age: 61
End: 2021-05-18

## 2021-05-19 ENCOUNTER — TELEPHONE (OUTPATIENT)
Dept: DERMATOLOGY | Facility: CLINIC | Age: 61
End: 2021-05-19

## 2021-09-22 ENCOUNTER — OFFICE VISIT (OUTPATIENT)
Dept: INTERNAL MEDICINE | Facility: CLINIC | Age: 61
End: 2021-09-22
Payer: COMMERCIAL

## 2021-09-22 VITALS
DIASTOLIC BLOOD PRESSURE: 88 MMHG | WEIGHT: 203.25 LBS | BODY MASS INDEX: 40.97 KG/M2 | OXYGEN SATURATION: 97 % | SYSTOLIC BLOOD PRESSURE: 128 MMHG | HEART RATE: 98 BPM | HEIGHT: 59 IN

## 2021-09-22 DIAGNOSIS — Z01.810 PREOPERATIVE CARDIOVASCULAR EXAMINATION: ICD-10-CM

## 2021-09-22 DIAGNOSIS — Z23 NEED FOR VACCINATION: ICD-10-CM

## 2021-09-22 DIAGNOSIS — F17.200 TOBACCO DEPENDENCE: ICD-10-CM

## 2021-09-22 DIAGNOSIS — Z00.00 PREVENTATIVE HEALTH CARE: Primary | ICD-10-CM

## 2021-09-22 PROCEDURE — 90686 FLU VACCINE (QUAD) GREATER THAN OR EQUAL TO 3YO PRESERVATIVE FREE IM: ICD-10-PCS | Mod: S$GLB,,, | Performed by: INTERNAL MEDICINE

## 2021-09-22 PROCEDURE — 90472 IMMUNIZATION ADMIN EACH ADD: CPT | Mod: S$GLB,,, | Performed by: INTERNAL MEDICINE

## 2021-09-22 PROCEDURE — 1159F MED LIST DOCD IN RCRD: CPT | Mod: CPTII,S$GLB,, | Performed by: INTERNAL MEDICINE

## 2021-09-22 PROCEDURE — 99999 PR PBB SHADOW E&M-EST. PATIENT-LVL IV: CPT | Mod: PBBFAC,,, | Performed by: INTERNAL MEDICINE

## 2021-09-22 PROCEDURE — 1160F PR REVIEW ALL MEDS BY PRESCRIBER/CLIN PHARMACIST DOCUMENTED: ICD-10-PCS | Mod: CPTII,S$GLB,, | Performed by: INTERNAL MEDICINE

## 2021-09-22 PROCEDURE — 3079F DIAST BP 80-89 MM HG: CPT | Mod: CPTII,S$GLB,, | Performed by: INTERNAL MEDICINE

## 2021-09-22 PROCEDURE — 90472 TDAP VACCINE GREATER THAN OR EQUAL TO 7YO IM: ICD-10-PCS | Mod: S$GLB,,, | Performed by: INTERNAL MEDICINE

## 2021-09-22 PROCEDURE — 99406 PR TOBACCO USE CESSATION INTERMEDIATE 3-10 MINUTES: ICD-10-PCS | Mod: 25,S$GLB,, | Performed by: INTERNAL MEDICINE

## 2021-09-22 PROCEDURE — 3079F PR MOST RECENT DIASTOLIC BLOOD PRESSURE 80-89 MM HG: ICD-10-PCS | Mod: CPTII,S$GLB,, | Performed by: INTERNAL MEDICINE

## 2021-09-22 PROCEDURE — 99999 PR PBB SHADOW E&M-EST. PATIENT-LVL IV: ICD-10-PCS | Mod: PBBFAC,,, | Performed by: INTERNAL MEDICINE

## 2021-09-22 PROCEDURE — 1159F PR MEDICATION LIST DOCUMENTED IN MEDICAL RECORD: ICD-10-PCS | Mod: CPTII,S$GLB,, | Performed by: INTERNAL MEDICINE

## 2021-09-22 PROCEDURE — 99396 PR PREVENTIVE VISIT,EST,40-64: ICD-10-PCS | Mod: 25,S$GLB,, | Performed by: INTERNAL MEDICINE

## 2021-09-22 PROCEDURE — 3008F PR BODY MASS INDEX (BMI) DOCUMENTED: ICD-10-PCS | Mod: CPTII,S$GLB,, | Performed by: INTERNAL MEDICINE

## 2021-09-22 PROCEDURE — 90471 FLU VACCINE (QUAD) GREATER THAN OR EQUAL TO 3YO PRESERVATIVE FREE IM: ICD-10-PCS | Mod: S$GLB,,, | Performed by: INTERNAL MEDICINE

## 2021-09-22 PROCEDURE — 90715 TDAP VACCINE GREATER THAN OR EQUAL TO 7YO IM: ICD-10-PCS | Mod: S$GLB,,, | Performed by: INTERNAL MEDICINE

## 2021-09-22 PROCEDURE — 90715 TDAP VACCINE 7 YRS/> IM: CPT | Mod: S$GLB,,, | Performed by: INTERNAL MEDICINE

## 2021-09-22 PROCEDURE — 3074F PR MOST RECENT SYSTOLIC BLOOD PRESSURE < 130 MM HG: ICD-10-PCS | Mod: CPTII,S$GLB,, | Performed by: INTERNAL MEDICINE

## 2021-09-22 PROCEDURE — 3074F SYST BP LT 130 MM HG: CPT | Mod: CPTII,S$GLB,, | Performed by: INTERNAL MEDICINE

## 2021-09-22 PROCEDURE — 99406 BEHAV CHNG SMOKING 3-10 MIN: CPT | Mod: 25,S$GLB,, | Performed by: INTERNAL MEDICINE

## 2021-09-22 PROCEDURE — 90686 IIV4 VACC NO PRSV 0.5 ML IM: CPT | Mod: S$GLB,,, | Performed by: INTERNAL MEDICINE

## 2021-09-22 PROCEDURE — 1160F RVW MEDS BY RX/DR IN RCRD: CPT | Mod: CPTII,S$GLB,, | Performed by: INTERNAL MEDICINE

## 2021-09-22 PROCEDURE — 3008F BODY MASS INDEX DOCD: CPT | Mod: CPTII,S$GLB,, | Performed by: INTERNAL MEDICINE

## 2021-09-22 PROCEDURE — 90471 IMMUNIZATION ADMIN: CPT | Mod: S$GLB,,, | Performed by: INTERNAL MEDICINE

## 2021-09-22 PROCEDURE — 99396 PREV VISIT EST AGE 40-64: CPT | Mod: 25,S$GLB,, | Performed by: INTERNAL MEDICINE

## 2021-09-29 ENCOUNTER — HOSPITAL ENCOUNTER (OUTPATIENT)
Dept: RADIOLOGY | Facility: HOSPITAL | Age: 61
Discharge: HOME | End: 2021-09-29
Attending: INTERNAL MEDICINE
Payer: COMMERCIAL

## 2021-09-29 ENCOUNTER — TELEPHONE (OUTPATIENT)
Dept: INTERNAL MEDICINE | Facility: CLINIC | Age: 61
End: 2021-09-29

## 2021-09-29 DIAGNOSIS — Z00.00 PREVENTATIVE HEALTH CARE: ICD-10-CM

## 2021-09-29 PROCEDURE — 77067 SCR MAMMO BI INCL CAD: CPT | Mod: TC

## 2021-09-29 PROCEDURE — 77063 BREAST TOMOSYNTHESIS BI: CPT | Mod: 26,,, | Performed by: RADIOLOGY

## 2021-09-29 PROCEDURE — 77063 MAMMO DIGITAL SCREENING BILAT WITH TOMO: ICD-10-PCS | Mod: 26,,, | Performed by: RADIOLOGY

## 2021-09-29 PROCEDURE — 77067 MAMMO DIGITAL SCREENING BILAT WITH TOMO: ICD-10-PCS | Mod: 26,,, | Performed by: RADIOLOGY

## 2021-09-29 PROCEDURE — 77067 SCR MAMMO BI INCL CAD: CPT | Mod: 26,,, | Performed by: RADIOLOGY

## 2021-10-04 ENCOUNTER — PATIENT MESSAGE (OUTPATIENT)
Dept: ADMINISTRATIVE | Facility: HOSPITAL | Age: 61
End: 2021-10-04

## 2021-10-06 ENCOUNTER — TELEPHONE (OUTPATIENT)
Dept: INTERNAL MEDICINE | Facility: CLINIC | Age: 61
End: 2021-10-06

## 2021-12-12 ENCOUNTER — NURSE TRIAGE (OUTPATIENT)
Dept: ADMINISTRATIVE | Facility: CLINIC | Age: 61
End: 2021-12-12
Payer: COMMERCIAL

## 2021-12-21 ENCOUNTER — PATIENT MESSAGE (OUTPATIENT)
Dept: INTERNAL MEDICINE | Facility: CLINIC | Age: 61
End: 2021-12-21
Payer: COMMERCIAL

## 2022-03-23 ENCOUNTER — PATIENT OUTREACH (OUTPATIENT)
Dept: ADMINISTRATIVE | Facility: HOSPITAL | Age: 62
End: 2022-03-23
Payer: COMMERCIAL

## 2022-03-23 ENCOUNTER — PATIENT MESSAGE (OUTPATIENT)
Dept: ADMINISTRATIVE | Facility: HOSPITAL | Age: 62
End: 2022-03-23
Payer: COMMERCIAL

## 2022-03-23 NOTE — PROGRESS NOTES
Chart audit performed LINKS triggered and updated Care everywhere triggered & updated Patient outreach regarding Health Maintenance- Appt sched for PAP Smear, requesting FITKIT.

## 2022-03-30 ENCOUNTER — DOCUMENTATION ONLY (OUTPATIENT)
Dept: DERMATOLOGY | Facility: CLINIC | Age: 62
End: 2022-03-30

## 2022-03-30 ENCOUNTER — OFFICE VISIT (OUTPATIENT)
Dept: DERMATOLOGY | Facility: CLINIC | Age: 62
End: 2022-03-30
Payer: COMMERCIAL

## 2022-03-30 DIAGNOSIS — L73.2 HIDRADENITIS SUPPURATIVA: Primary | ICD-10-CM

## 2022-03-30 DIAGNOSIS — L02.224 BOIL OF GROIN: ICD-10-CM

## 2022-03-30 DIAGNOSIS — L40.9 PSORIASIS: ICD-10-CM

## 2022-03-30 PROCEDURE — 99214 PR OFFICE/OUTPT VISIT, EST, LEVL IV, 30-39 MIN: ICD-10-PCS | Mod: S$GLB,,, | Performed by: DERMATOLOGY

## 2022-03-30 PROCEDURE — 99999 PR PBB SHADOW E&M-EST. PATIENT-LVL II: CPT | Mod: PBBFAC,,, | Performed by: DERMATOLOGY

## 2022-03-30 PROCEDURE — 99214 OFFICE O/P EST MOD 30 MIN: CPT | Mod: S$GLB,,, | Performed by: DERMATOLOGY

## 2022-03-30 PROCEDURE — 99999 PR PBB SHADOW E&M-EST. PATIENT-LVL II: ICD-10-PCS | Mod: PBBFAC,,, | Performed by: DERMATOLOGY

## 2022-03-30 RX ORDER — BETAMETHASONE DIPROPIONATE 0.5 MG/G
OINTMENT TOPICAL 2 TIMES DAILY
Qty: 45 G | Refills: 3 | Status: SHIPPED | OUTPATIENT
Start: 2022-03-30

## 2022-03-30 RX ORDER — APREMILAST 30 MG/1
TABLET, FILM COATED ORAL
Qty: 60 TABLET | Refills: 2 | Status: SHIPPED | OUTPATIENT
Start: 2022-03-30 | End: 2022-07-08

## 2022-03-30 RX ORDER — APREMILAST 30 MG/1
TABLET, FILM COATED ORAL
Qty: 60 TABLET | Refills: 2 | OUTPATIENT
Start: 2022-03-30 | End: 2022-03-30 | Stop reason: SDUPTHER

## 2022-03-30 RX ORDER — CLINDAMYCIN PHOSPHATE 11.9 MG/ML
SOLUTION TOPICAL 2 TIMES DAILY
Qty: 60 ML | Refills: 5 | Status: SHIPPED | OUTPATIENT
Start: 2022-03-30 | End: 2023-07-28

## 2022-03-30 NOTE — PROGRESS NOTES
Subjective:       Patient ID:  Radha Ervin is a 61 y.o. female who presents for   Chief Complaint   Patient presents with    Hidradenitis Suppurativa     Patient is a 62 yo female present for HS. Current flare on groin, lesion comes and goes. Interested in kenalog injection     Hidradenitis Suppurativa    hx of HS was previously tx with Humira, however developed hand/foot pustular psoriasis and now on Otelza for this (which helps).  Previously was tx with sulfasalazine for the HS, no longer taking.  Using clindamycin soln to groin area PRN.  Has a new boil right upper thigh would like injected.    Review of Systems   Skin: Positive for abscesses.        Objective:    Physical Exam   Constitutional: She appears well-developed and well-nourished.   Neurological: She is alert and oriented to person, place, and time.   Psychiatric: She has a normal mood and affect.   Skin:   Areas Examined (abnormalities noted in diagram):   Scalp / Hair Palpated and Inspected  Head / Face Inspection Performed  Neck Inspection Performed  Chest / Axilla Inspection Performed  Abdomen Inspection Performed  Genitals / Buttocks / Groin Inspection Performed  Back Inspection Performed  RUE Inspected  LUE Inspection Performed  RLE Inspected  LLE Inspection Performed  Nails and Digits Inspection Performed              Diagram Legend     Erythematous scaling macule/papule c/w actinic keratosis       Vascular papule c/w angioma      Pigmented verrucoid papule/plaque c/w seborrheic keratosis      Yellow umbilicated papule c/w sebaceous hyperplasia      Irregularly shaped tan macule c/w lentigo     1-2 mm smooth white papules consistent with Milia      Movable subcutaneous cyst with punctum c/w epidermal inclusion cyst      Subcutaneous movable cyst c/w pilar cyst      Firm pink to brown papule c/w dermatofibroma      Pedunculated fleshy papule(s) c/w skin tag(s)      Evenly pigmented macule c/w junctional nevus     Mildly variegated  pigmented, slightly irregular-bordered macule c/w mildly atypical nevus      Flesh colored to evenly pigmented papule c/w intradermal nevus       Pink pearly papule/plaque c/w basal cell carcinoma      Erythematous hyperkeratotic cursted plaque c/w SCC      Surgical scar with no sign of skin cancer recurrence      Open and closed comedones      Inflammatory papules and pustules      Verrucoid papule consistent consistent with wart     Erythematous eczematous patches and plaques     Dystrophic onycholytic nail with subungual debris c/w onychomycosis     Umbilicated papule    Erythematous-base heme-crusted tan verrucoid plaque consistent with inflamed seborrheic keratosis     Erythematous Silvery Scaling Plaque c/w Psoriasis     See annotation      Assessment / Plan:        Hidradenitis suppurativa  -     clindamycin (CLEOCIN T) 1 % external solution; Apply topically 2 (two) times daily. To groin area for pimples  Dispense: 60 mL; Refill: 5    Psoriasis - much improved  -     OTEZLA 30 mg Tab; Take 1 po bid  Dispense: 60 tablet; Refill: 2    Refills sent and pt requested samples today given lapse in RX coverage -   3 boxes lot #[id number] exp 5/3/23 given       betamethasone dipropionate (DIPROLENE) 0.05 % ointment; Apply topically 2 (two) times daily. Prn psoriasis flares  Dispense: 45 g; Refill: 3    Boil of groin                 Follow up in about 6 months (around 9/30/2022).

## 2022-04-05 NOTE — PROGRESS NOTES
Health Maintenance Due   Topic Date Due    Hepatitis C Screening  Never done    Cervical Cancer Screening  Never done    Lipid Panel  Never done    Pneumococcal Vaccines (Age 0-64) (1 of 2 - PPSV23) Never done    HIV Screening  Never done    Colorectal Cancer Screening  Never done    Shingles Vaccine (1 of 2) Never done     I spent at least 30 mins with preparing to see the patient, obtaining and/or revieweing separately obtained history, preforming a examination and evaluation, counseling, communicating with other health care professional, documenting clinical information in the electronic health record,  and/or coordinating care.                 Subjective:       Patient ID: Radha Ervin is a 61 y.o. female.    Chief Complaint: No chief complaint on file.      Preoperative clearance    Surgical info  Surgeon: Dr Parker  Date: 4/12/22  Surgery: cataracts  Fax: [phone]        Perninet PMx  Patient Active Problem List   Diagnosis    Primary localized osteoarthrosis of right lower leg    BMI 40.0-44.9, adult    Other atopic dermatitis    Long-term current use of intravenous immunoglobulin (IVIG)    Hidradenitis    Tobacco dependence    Nuclear sclerosis of both eyes    Visual field defect         Mets:  > 4        Prior Surgery      Past Surgical History:   Procedure Laterality Date    achilles tendon repair      CHOLECYSTECTOMY      TOTAL KNEE ARTHROPLASTY           Patient denies complication or reactions to anasthesia      Smoking/Etoh    Tobacco Use: High Risk    Smoking Tobacco Use: Current Some Day Smoker    Smokeless Tobacco Use: Never Used         Pertinet meds    Current Outpatient Medications   Medication Sig Dispense Refill    augmented betamethasone dipropionate (DIPROLENE-AF) 0.05 % ointment MARIA ALEJANDRA EXT AA BID FOR 14 DAYS  2    benzonatate (TESSALON PERLES) 100 MG capsule Take 2 capsules (200 mg total) by mouth 3 (three) times daily as needed for Cough. 40 capsule 0     "betamethasone dipropionate (DIPROLENE) 0.05 % ointment Apply topically 2 (two) times daily. Prn psoriasis flares 45 g 3    cetirizine (ZYRTEC) 10 MG tablet Take 1 tablet up to twice a day.  Can be used regularly or just when needed. 60 tablet 3    clindamycin (CLEOCIN T) 1 % external solution Apply topically 2 (two) times daily. To groin and axilla prn breakouts 60 mL 5    clindamycin (CLEOCIN T) 1 % external solution Apply topically 2 (two) times daily. To groin area for pimples 60 mL 5    OTEZLA 30 mg Tab Take 1 tablet by mouth twice a day. 60 tablet 3    OTEZLA 30 mg Tab Take 1 po bid 60 tablet 2     No current facility-administered medications for this visit.         OTher        Sleep Apnea screening  STOP-BANG  Snore: Yes  Tired/fatigue:No  Observed apnea; No  Pressure - HTN; No  BMI >35: Yes  Age >50; Yes  Neck circumference: No  Gender male; No  Score2         HPI  Review of Systems   All other systems reviewed and are negative.        Objective:     /88 (BP Location: Right arm, Patient Position: Sitting, BP Method: Large (Manual))   Pulse 101   Ht 4' 11" (1.499 m)   Wt 94.8 kg (208 lb 15.9 oz)   LMP  (LMP Unknown)   SpO2 97%   BMI 42.21 kg/m²       Wt Readings from Last 1 Encounters:   09/22/21 1321 92.2 kg (203 lb 4.2 oz)       BMI Readings from Last 1 Encounters:   09/22/21 41.05 kg/m²            Physical Exam  Vitals and nursing note reviewed.   Constitutional:       General: She is not in acute distress.     Appearance: She is well-developed. She is not diaphoretic.   HENT:      Head: Normocephalic.      Nose: Nose normal.   Eyes:      General:         Right eye: No discharge.         Left eye: No discharge.      Conjunctiva/sclera: Conjunctivae normal.      Pupils: Pupils are equal, round, and reactive to light.   Cardiovascular:      Rate and Rhythm: Normal rate and regular rhythm.      Heart sounds: Normal heart sounds. No murmur heard.    No friction rub. No gallop.   Pulmonary:     "  Effort: Pulmonary effort is normal. No respiratory distress.   Abdominal:      General: Bowel sounds are normal. There is no distension.      Palpations: Abdomen is soft.   Musculoskeletal:         General: No deformity. Normal range of motion.      Cervical back: Normal range of motion.   Skin:     General: Skin is warm.   Neurological:      Mental Status: She is alert and oriented to person, place, and time.      Cranial Nerves: No cranial nerve deficit.             Assessment:       1. Tobacco dependence        Plan:         Radha was seen today for pre-op exam.    Diagnoses and all orders for this visit:    Tobacco dependence                  Pre-op examination   Patient does not present with active unstable cardiac conditions such as ACS, unstable arrhythmia or ADHF  No personal  history of CAD/MI, CVA/TIA, DM requiring insulin , or Cr>2    Patient is able to walk at least one flight of stairs  without symptoms of chest pain   RCRI score is 0 class I    Patient is low risk patient going for low risk surgery and would not  recommend further evaluation prior to surgery            Health Maintenance Due   Topic Date Due    Hepatitis C Screening  Never done    Cervical Cancer Screening  Never done    Lipid Panel  Never done    Pneumococcal Vaccines (Age 0-64) (1 of 2 - PPSV23) Never done    HIV Screening  Never done    Colorectal Cancer Screening  Never done    Shingles Vaccine (1 of 2) Never done           Future Appointments   Date Time Provider Department Center   7/14/2022  1:45 PM Nguyen Moreno MD [facility] [place]             Disclaimer:  This note has been generated using voice-recognition software. There may be grammatical or spelling errors that have been missed during proof-reading

## 2022-04-05 NOTE — PATIENT INSTRUCTIONS
We were happy to see you today    For your Testing  Please have your labs and/or imaging test done at your earliest convience      For your Medication   For more information about side effects please visit medlineplus.gov      For your Referrals      For your Vaccinations    We gave your the following vaccines  Please obtain the following vaccines at the pharmacy          Please return to clinic in        Extra resources

## 2022-04-06 ENCOUNTER — OFFICE VISIT (OUTPATIENT)
Dept: INTERNAL MEDICINE | Facility: CLINIC | Age: 62
End: 2022-04-06
Payer: COMMERCIAL

## 2022-04-06 ENCOUNTER — PATIENT MESSAGE (OUTPATIENT)
Dept: ADMINISTRATIVE | Facility: HOSPITAL | Age: 62
End: 2022-04-06
Payer: COMMERCIAL

## 2022-04-06 VITALS
HEART RATE: 101 BPM | DIASTOLIC BLOOD PRESSURE: 88 MMHG | BODY MASS INDEX: 42.13 KG/M2 | WEIGHT: 209 LBS | OXYGEN SATURATION: 97 % | HEIGHT: 59 IN | SYSTOLIC BLOOD PRESSURE: 122 MMHG

## 2022-04-06 DIAGNOSIS — Z12.11 SCREENING FOR COLON CANCER: ICD-10-CM

## 2022-04-06 DIAGNOSIS — F17.200 TOBACCO DEPENDENCE: Primary | ICD-10-CM

## 2022-04-06 PROCEDURE — 3008F PR BODY MASS INDEX (BMI) DOCUMENTED: ICD-10-PCS | Mod: CPTII,S$GLB,, | Performed by: INTERNAL MEDICINE

## 2022-04-06 PROCEDURE — 1159F PR MEDICATION LIST DOCUMENTED IN MEDICAL RECORD: ICD-10-PCS | Mod: CPTII,S$GLB,, | Performed by: INTERNAL MEDICINE

## 2022-04-06 PROCEDURE — 3074F PR MOST RECENT SYSTOLIC BLOOD PRESSURE < 130 MM HG: ICD-10-PCS | Mod: CPTII,S$GLB,, | Performed by: INTERNAL MEDICINE

## 2022-04-06 PROCEDURE — 3074F SYST BP LT 130 MM HG: CPT | Mod: CPTII,S$GLB,, | Performed by: INTERNAL MEDICINE

## 2022-04-06 PROCEDURE — 99999 PR PBB SHADOW E&M-EST. PATIENT-LVL V: CPT | Mod: PBBFAC,,, | Performed by: INTERNAL MEDICINE

## 2022-04-06 PROCEDURE — 99999 PR PBB SHADOW E&M-EST. PATIENT-LVL V: ICD-10-PCS | Mod: PBBFAC,,, | Performed by: INTERNAL MEDICINE

## 2022-04-06 PROCEDURE — 3008F BODY MASS INDEX DOCD: CPT | Mod: CPTII,S$GLB,, | Performed by: INTERNAL MEDICINE

## 2022-04-06 PROCEDURE — 3079F DIAST BP 80-89 MM HG: CPT | Mod: CPTII,S$GLB,, | Performed by: INTERNAL MEDICINE

## 2022-04-06 PROCEDURE — 1159F MED LIST DOCD IN RCRD: CPT | Mod: CPTII,S$GLB,, | Performed by: INTERNAL MEDICINE

## 2022-04-06 PROCEDURE — 99214 OFFICE O/P EST MOD 30 MIN: CPT | Mod: S$GLB,,, | Performed by: INTERNAL MEDICINE

## 2022-04-06 PROCEDURE — 99214 PR OFFICE/OUTPT VISIT, EST, LEVL IV, 30-39 MIN: ICD-10-PCS | Mod: S$GLB,,, | Performed by: INTERNAL MEDICINE

## 2022-04-06 PROCEDURE — 3079F PR MOST RECENT DIASTOLIC BLOOD PRESSURE 80-89 MM HG: ICD-10-PCS | Mod: CPTII,S$GLB,, | Performed by: INTERNAL MEDICINE

## 2022-04-06 RX ORDER — PREDNISOLONE ACETATE 10 MG/ML
SUSPENSION/ DROPS OPHTHALMIC
COMMUNITY
Start: 2022-02-24 | End: 2022-09-14

## 2022-04-06 RX ORDER — IBUPROFEN 800 MG/1
800 TABLET ORAL 2 TIMES DAILY PRN
COMMUNITY
Start: 2021-12-08 | End: 2023-07-28

## 2022-04-06 RX ORDER — KETOROLAC TROMETHAMINE 5 MG/ML
SOLUTION OPHTHALMIC
COMMUNITY
Start: 2022-02-24 | End: 2022-09-14

## 2022-04-06 NOTE — PROGRESS NOTES
Patient received sample medication per verbal order by . Medication otezla .  Lot [id number], expiration 5/3/23.

## 2022-04-18 ENCOUNTER — SPECIALTY PHARMACY (OUTPATIENT)
Dept: PHARMACY | Facility: CLINIC | Age: 62
End: 2022-04-18
Payer: COMMERCIAL

## 2022-04-18 NOTE — TELEPHONE ENCOUNTER
Drug/Indication: Otezla for HS   Dose: 30 mg BID continuation dose.     -Rx received. Medication appropriate. Tried and failed Humira, however developed hand/foot pustular psoriasis and now on Otelza for this (which helps).  Previously was tx with sulfasalazine for the HS, no longer taking.  Using clindamycin soln to groin area PRN.  Has a new boil right upper thigh would like injected.  -PA is required. [phone]    -PA submitted CMM key: [id number]

## 2022-04-19 ENCOUNTER — PATIENT MESSAGE (OUTPATIENT)
Dept: DERMATOLOGY | Facility: CLINIC | Age: 62
End: 2022-04-19
Payer: COMMERCIAL

## 2022-04-20 ENCOUNTER — LAB VISIT (OUTPATIENT)
Dept: LAB | Facility: HOSPITAL | Age: 62
End: 2022-04-20
Attending: INTERNAL MEDICINE
Payer: COMMERCIAL

## 2022-04-20 DIAGNOSIS — Z12.11 SCREENING FOR COLON CANCER: ICD-10-CM

## 2022-04-20 PROCEDURE — 82274 ASSAY TEST FOR BLOOD FECAL: CPT | Performed by: INTERNAL MEDICINE

## 2022-04-25 ENCOUNTER — PATIENT MESSAGE (OUTPATIENT)
Dept: PHARMACY | Facility: CLINIC | Age: 62
End: 2022-04-25
Payer: COMMERCIAL

## 2022-04-25 NOTE — TELEPHONE ENCOUNTER
PA Appeal faxed 4/22.   Test claim unsuccessful just for pharmacy not in network.   Patient locked into [facility].    [phone]     Transferring out and will inform patient via patient messages.

## 2022-04-26 LAB — HEMOCCULT STL QL IA: NEGATIVE

## 2022-05-20 ENCOUNTER — TELEPHONE (OUTPATIENT)
Dept: INTERNAL MEDICINE | Facility: CLINIC | Age: 62
End: 2022-05-20
Payer: COMMERCIAL

## 2022-05-20 NOTE — TELEPHONE ENCOUNTER
----- Message from Cristo Walters sent at 5/20/2022  8:54 AM CDT -----  Contact: pt  Type: Requesting to speak with nurse        Who Called: PT  Regarding: would like advice on what to do with having tested positive for covid-19  ld the patient rather a call back or a response via [facility]? Call back  Best Call Back Number: [phone]   Additional Information: at home test

## 2022-05-20 NOTE — TELEPHONE ENCOUNTER
Patient states that she has an sore throat and took an at home test that came back positive. Informed patient that she can use OTC medication to help treat any symptoms she may be having., rest and plenty of fluids. Patient verbalized understanding.

## 2022-05-24 ENCOUNTER — OFFICE VISIT (OUTPATIENT)
Dept: INTERNAL MEDICINE | Facility: CLINIC | Age: 62
End: 2022-05-24
Payer: COMMERCIAL

## 2022-05-24 DIAGNOSIS — U07.1 COVID-19: Primary | ICD-10-CM

## 2022-05-24 PROCEDURE — 1159F PR MEDICATION LIST DOCUMENTED IN MEDICAL RECORD: ICD-10-PCS | Mod: CPTII,95,, | Performed by: INTERNAL MEDICINE

## 2022-05-24 PROCEDURE — 99212 PR OFFICE/OUTPT VISIT, EST, LEVL II, 10-19 MIN: ICD-10-PCS | Mod: 95,,, | Performed by: INTERNAL MEDICINE

## 2022-05-24 PROCEDURE — 1160F RVW MEDS BY RX/DR IN RCRD: CPT | Mod: CPTII,95,, | Performed by: INTERNAL MEDICINE

## 2022-05-24 PROCEDURE — 1160F PR REVIEW ALL MEDS BY PRESCRIBER/CLIN PHARMACIST DOCUMENTED: ICD-10-PCS | Mod: CPTII,95,, | Performed by: INTERNAL MEDICINE

## 2022-05-24 PROCEDURE — 99212 OFFICE O/P EST SF 10 MIN: CPT | Mod: 95,,, | Performed by: INTERNAL MEDICINE

## 2022-05-24 PROCEDURE — 1159F MED LIST DOCD IN RCRD: CPT | Mod: CPTII,95,, | Performed by: INTERNAL MEDICINE

## 2022-05-24 RX ORDER — BENZONATATE 200 MG/1
200 CAPSULE ORAL 3 TIMES DAILY PRN
Qty: 21 CAPSULE | Refills: 0 | Status: SHIPPED | OUTPATIENT
Start: 2022-05-24 | End: 2022-06-03

## 2022-05-24 NOTE — PROGRESS NOTES
The patient location is: [place], [place]  The chief complaint leading to consultation is: covid     Visit type: audiovisual    Face to Face time with patient: 10   minutes of total time spent on the encounter, which includes face to face time and non-face to face time preparing to see the patient (eg, review of tests), Obtaining and/or reviewing separately obtained history, Documenting clinical information in the electronic or other health record, Independently interpreting results (not separately reported) and communicating results to the patient/family/caregiver, or Care coordination (not separately reported).         Each patient to whom he or she provides medical services by telemedicine is:  (1) informed of the relationship between the physician and patient and the respective role of any other health care provider with respect to management of the patient; and (2) notified that he or she may decline to receive medical services by telemedicine and may withdraw from such care at any time.    Notes:     Assessment:       1. COVID-19        Plan:         Diagnoses and all orders for this visit:    COVID-19  -     benzonatate (TESSALON) 200 MG capsule; Take 1 capsule (200 mg total) by mouth 3 (three) times daily as needed.  -     COVID-19 Home Symptom Monitoring  - Duration (days): 14          Subjective:       Patient ID: Radha Ervin is a 61 y.o. female.    Chief Complaint: No chief complaint on file.  covid 19   Patient reports that she tested positive on COVID last Friday.  Symptoms started on Thursday evening to Friday morning with sore throat since then development of headache.  Coughing started a few days ago.  Symptoms seem to be improving.  Needs work note for in returning to work on Thursday.  She reports sick contact at work with COVID.  No recent travel.  She has not been able to take gain anything for cough she does not have anything at home.  She did not smoke since her diagnosis.    HPI  Review of  Systems   Constitutional: Negative for activity change and unexpected weight change.   HENT: Negative for hearing loss, rhinorrhea and trouble swallowing.    Eyes: Negative for discharge and visual disturbance.   Respiratory: Negative for chest tightness and wheezing.    Cardiovascular: Negative for chest pain and palpitations.   Gastrointestinal: Negative for blood in stool, constipation, diarrhea and vomiting.   Endocrine: Negative for polydipsia and polyuria.   Genitourinary: Negative for difficulty urinating, dysuria, hematuria and menstrual problem.   Musculoskeletal: Negative for arthralgias, joint swelling and neck pain.   Neurological: Positive for headaches. Negative for weakness.   Psychiatric/Behavioral: Negative for confusion and dysphoric mood.         Objective:     LMP  (LMP Unknown)       Wt Readings from Last 1 Encounters:   04/06/22 0928 94.8 kg (208 lb 15.9 oz)       BMI Readings from Last 1 Encounters:   04/06/22 42.21 kg/m²            Physical Exam  Nursing note reviewed.   Constitutional:       General: She is not in acute distress.     Appearance: Normal appearance. She is not ill-appearing, toxic-appearing or diaphoretic.   HENT:      Head: Normocephalic.   Eyes:      Conjunctiva/sclera: Conjunctivae normal.   Pulmonary:      Effort: Pulmonary effort is normal. No respiratory distress.   Neurological:      General: No focal deficit present.      Mental Status: She is alert and oriented to person, place, and time.   Psychiatric:         Mood and Affect: Mood normal.         Behavior: Behavior normal.         Thought Content: Thought content normal.         Judgment: Judgment normal.             Future Appointments   Date Time Provider Department Center   7/14/2022  1:45 PM Nguyen Moreno MD [facility] [place]         Medication List with Changes/Refills   New Medications    BENZONATATE (TESSALON) 200 MG CAPSULE    Take 1 capsule (200 mg total) by mouth 3 (three) times daily as needed.    Current Medications    AUGMENTED BETAMETHASONE DIPROPIONATE (DIPROLENE-AF) 0.05 % OINTMENT    MARIA ALEJANDRA EXT AA BID FOR 14 DAYS    BETAMETHASONE DIPROPIONATE (DIPROLENE) 0.05 % OINTMENT    Apply topically 2 (two) times daily. Prn psoriasis flares    CETIRIZINE (ZYRTEC) 10 MG TABLET    Take 1 tablet up to twice a day.  Can be used regularly or just when needed.    CLINDAMYCIN (CLEOCIN T) 1 % EXTERNAL SOLUTION    Apply topically 2 (two) times daily. To groin and axilla prn breakouts    CLINDAMYCIN (CLEOCIN T) 1 % EXTERNAL SOLUTION    Apply topically 2 (two) times daily. To groin area for pimples    IBUPROFEN (ADVIL,MOTRIN) 800 MG TABLET    Take 800 mg by mouth 2 (two) times daily as needed.    KETOROLAC 0.5% (ACULAR) 0.5 % DROP    INSTILL 1 DROP IN SURGERY EYE TWICE DAILY. STARTING 3 DAYS BEFORE SURGERY AND THE MORNING OF SURGERY    OTEZLA 30 MG TAB    Take 1 tablet by mouth twice a day.    OTEZLA 30 MG TAB    Take 1 po bid    PREDNISOLONE ACETATE (PRED FORTE) 1 % DRPS    SHAKE LIQUID AND INSTILL 1 DROP TO SURGERY EYE FOUR TIMES DAILY STARTING AFTER SURGERY         Disclaimer:  This note has been generated using voice-recognition software. There may be grammatical or spelling errors that have been missed during proof-reading

## 2022-05-24 NOTE — LETTER
[address] [facility] ? [place] [address] ? Phone [phone] ? Fax [phone]           Return to Work/School Status    Patient: Radha Ervin  YOB: 1960   Date: 05/24/2022      [facility] has adopted CDCs time-based return to work/school strategy for persons with confirmed or suspected COVID19. [facility] does not recommend using a test-based strategy for returning to work/school after COVID-19 infection. CDC has reported prolonged positive test results without evidence of infectiousness. At this time, positive specimens capable of producing disease have not been isolated more than 9 days after onset of illness.   Symptomatic persons with confirmed COVID-19 or suspected COVID-19 can return to work/school after:   If you were not hospitalized and are not moderately to severely immunocompromised:   More than 5 days since symptoms first appeared AND   More than 24 hours fever free without medications AND   Symptoms are improving   Continue to wear a mask around others for 5 additional days.   Continue to wear a mask around others for 5 additional days.    She may return to work on 5/26/2022     More information about the science behind the symptom-based return to work/school can be found at: https://www.cdc.gov/coronavirus/2019-ncov/community/astgujcp-kchbfxmhatu-lyjaqqzwc.html    Sincerely,          Julito Herrera III, MD

## 2022-05-31 ENCOUNTER — PATIENT MESSAGE (OUTPATIENT)
Dept: ADMINISTRATIVE | Facility: HOSPITAL | Age: 62
End: 2022-05-31
Payer: COMMERCIAL

## 2022-05-31 ENCOUNTER — TELEPHONE (OUTPATIENT)
Dept: FAMILY MEDICINE | Facility: CLINIC | Age: 62
End: 2022-05-31
Payer: COMMERCIAL

## 2022-06-04 ENCOUNTER — OFFICE VISIT (OUTPATIENT)
Dept: INTERNAL MEDICINE | Facility: CLINIC | Age: 62
End: 2022-06-04
Payer: COMMERCIAL

## 2022-06-04 VITALS
HEART RATE: 100 BPM | HEIGHT: 59 IN | SYSTOLIC BLOOD PRESSURE: 126 MMHG | BODY MASS INDEX: 42.21 KG/M2 | DIASTOLIC BLOOD PRESSURE: 88 MMHG | OXYGEN SATURATION: 95 %

## 2022-06-04 DIAGNOSIS — M25.561 ACUTE PAIN OF RIGHT KNEE: ICD-10-CM
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
## 2022-06-04 PROCEDURE — 3079F PR MOST RECENT DIASTOLIC BLOOD PRESSURE 80-89 MM HG: ICD-10-PCS | Mod: CPTII,S$GLB,, | Performed by: INTERNAL MEDICINE

## 2022-06-04 PROCEDURE — 1160F RVW MEDS BY RX/DR IN RCRD: CPT | Mod: CPTII,S$GLB,, | Performed by: INTERNAL MEDICINE

## 2022-06-04 PROCEDURE — 99999 PR PBB SHADOW E&M-EST. PATIENT-LVL III: ICD-10-PCS | Mod: PBBFAC,,, | Performed by: INTERNAL MEDICINE

## 2022-06-04 PROCEDURE — 1160F PR REVIEW ALL MEDS BY PRESCRIBER/CLIN PHARMACIST DOCUMENTED: ICD-10-PCS | Mod: CPTII,S$GLB,, | Performed by: INTERNAL MEDICINE

## 2022-06-04 PROCEDURE — 3008F PR BODY MASS INDEX (BMI) DOCUMENTED: ICD-10-PCS | Mod: CPTII,S$GLB,, | Performed by: INTERNAL MEDICINE

## 2022-06-04 PROCEDURE — 99999 PR PBB SHADOW E&M-EST. PATIENT-LVL III: CPT | Mod: PBBFAC,,, | Performed by: INTERNAL MEDICINE

## 2022-06-04 PROCEDURE — 1159F PR MEDICATION LIST DOCUMENTED IN MEDICAL RECORD: ICD-10-PCS | Mod: CPTII,S$GLB,, | Performed by: INTERNAL MEDICINE

## 2022-06-04 PROCEDURE — 99214 OFFICE O/P EST MOD 30 MIN: CPT | Mod: S$GLB,,, | Performed by: INTERNAL MEDICINE

## 2022-06-04 PROCEDURE — 3074F PR MOST RECENT SYSTOLIC BLOOD PRESSURE < 130 MM HG: ICD-10-PCS | Mod: CPTII,S$GLB,, | Performed by: INTERNAL MEDICINE

## 2022-06-04 PROCEDURE — 1159F MED LIST DOCD IN RCRD: CPT | Mod: CPTII,S$GLB,, | Performed by: INTERNAL MEDICINE

## 2022-06-04 PROCEDURE — 3079F DIAST BP 80-89 MM HG: CPT | Mod: CPTII,S$GLB,, | Performed by: INTERNAL MEDICINE

## 2022-06-04 PROCEDURE — 3074F SYST BP LT 130 MM HG: CPT | Mod: CPTII,S$GLB,, | Performed by: INTERNAL MEDICINE

## 2022-06-04 PROCEDURE — 99214 PR OFFICE/OUTPT VISIT, EST, LEVL IV, 30-39 MIN: ICD-10-PCS | Mod: S$GLB,,, | Performed by: INTERNAL MEDICINE

## 2022-06-04 PROCEDURE — 3008F BODY MASS INDEX DOCD: CPT | Mod: CPTII,S$GLB,, | Performed by: INTERNAL MEDICINE

## 2022-06-04 NOTE — PROGRESS NOTES
"Assessment:       1. Fall, initial encounter    2. Acute pain of right knee        Plan:         Radha was seen today for follow-up and knee pain.    Diagnoses and all orders for this visit:    Fall, initial encounter  -     X-Ray Knee 3 View Right; Future    Acute pain of right knee  -     X-Ray Knee 3 View Right; Future      New   Uncontrolled  Signs, symptoms consistent with hematoma  history or pyhsical exam do not suggest septic joint  DDx includes but not limited to bursitis, tenditis fx  I provided instruction on supportive care measures   Prior tesing reviewed and new testing was was given  no change   reviewed signs and symptoms that should prompt return to provider or evaluation in the ED    Subjective:       Patient ID: Radha Ervin is a 61 y.o. female.    Chief Complaint: Follow-up and Knee Pain        Patient reports that symptoms  started   One weeks   prior to presentation .     Symptoms are are improving    Patient reports pain is right .     Pain is worse with flexion       They have tried nothing  for the pain .      Scale is 5/10    They describe the pain as dull      Associated symptoms include had swelling and bruising initial but improved     Pain does not radiate .       Past Surgical History:   Procedure Laterality Date    achilles tendon repair      CHOLECYSTECTOMY      TOTAL KNEE ARTHROPLASTY         Denies     Previous episodes include right knee replacement in 2016 with Dr owusu       HPI  Review of Systems   All other systems reviewed and are negative.        Objective:     /88 (BP Location: Right arm, Patient Position: Sitting, BP Method: Large (Manual))   Pulse 100   Ht 4' 11" (1.499 m)   LMP  (LMP Unknown)   SpO2 95%   BMI 42.21 kg/m²       Wt Readings from Last 1 Encounters:   04/06/22 0928 94.8 kg (208 lb 15.9 oz)       BMI Readings from Last 1 Encounters:   06/04/22 42.21 kg/m²            Physical Exam  Vitals and nursing note reviewed.   Constitutional:       " General: She is not in acute distress.     Appearance: Normal appearance. She is not ill-appearing, toxic-appearing or diaphoretic.   HENT:      Head: Normocephalic.   Eyes:      Conjunctiva/sclera: Conjunctivae normal.   Cardiovascular:      Rate and Rhythm: Normal rate and regular rhythm.   Pulmonary:      Effort: Pulmonary effort is normal. No respiratory distress.      Breath sounds: Normal breath sounds. No stridor.   Musculoskeletal:      Cervical back: Normal range of motion and neck supple.      Comments: Inspection; no obvious deformity, swelling, erythema  Palpation; no crepitus, warmth  . ttp to the medial joint line   ROM:   - Passive: full  - Active: full  Maneuvers; No laxity to Anterior drawer, Lachman,Posterior drawer, Valgus, varus  Negative Thessaly - Medial-lateral grind, Griffith     Neurological:      General: No focal deficit present.      Mental Status: She is alert and oriented to person, place, and time.   Psychiatric:         Mood and Affect: Mood normal.         Behavior: Behavior normal.         Thought Content: Thought content normal.         Judgment: Judgment normal.             Future Appointments   Date Time Provider Department Center   7/14/2022  1:45 PM Nguyen Moreno MD [facility] [place]         Medication List with Changes/Refills   Current Medications    AUGMENTED BETAMETHASONE DIPROPIONATE (DIPROLENE-AF) 0.05 % OINTMENT    MARIA ALEJANDRA EXT AA BID FOR 14 DAYS    BETAMETHASONE DIPROPIONATE (DIPROLENE) 0.05 % OINTMENT    Apply topically 2 (two) times daily. Prn psoriasis flares    CETIRIZINE (ZYRTEC) 10 MG TABLET    Take 1 tablet up to twice a day.  Can be used regularly or just when needed.    CLINDAMYCIN (CLEOCIN T) 1 % EXTERNAL SOLUTION    Apply topically 2 (two) times daily. To groin and axilla prn breakouts    CLINDAMYCIN (CLEOCIN T) 1 % EXTERNAL SOLUTION    Apply topically 2 (two) times daily. To groin area for pimples    IBUPROFEN (ADVIL,MOTRIN) 800 MG TABLET    Take 800 mg  by mouth 2 (two) times daily as needed.    KETOROLAC 0.5% (ACULAR) 0.5 % DROP    INSTILL 1 DROP IN SURGERY EYE TWICE DAILY. STARTING 3 DAYS BEFORE SURGERY AND THE MORNING OF SURGERY    OTEZLA 30 MG TAB    Take 1 tablet by mouth twice a day.    OTEZLA 30 MG TAB    Take 1 po bid    PREDNISOLONE ACETATE (PRED FORTE) 1 % DRPS    SHAKE LIQUID AND INSTILL 1 DROP TO SURGERY EYE FOUR TIMES DAILY STARTING AFTER SURGERY         Disclaimer:  This note has been generated using voice-recognition software. There may be grammatical or spelling errors that have been missed during proof-reading

## 2022-06-06 ENCOUNTER — HOSPITAL ENCOUNTER (OUTPATIENT)
Dept: RADIOLOGY | Facility: HOSPITAL | Age: 62
Discharge: HOME | End: 2022-06-06
Attending: INTERNAL MEDICINE
Payer: COMMERCIAL

## 2022-06-06 DIAGNOSIS — M25.561 ACUTE PAIN OF RIGHT KNEE: ICD-10-CM
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
## 2022-06-06 PROCEDURE — 73562 XR KNEE 3 VIEW RIGHT: ICD-10-PCS | Mod: 26,RT,, | Performed by: INTERNAL MEDICINE

## 2022-06-06 PROCEDURE — 73562 X-RAY EXAM OF KNEE 3: CPT | Mod: TC,FY,RT

## 2022-06-06 PROCEDURE — 73562 X-RAY EXAM OF KNEE 3: CPT | Mod: 26,RT,, | Performed by: INTERNAL MEDICINE

## 2022-07-26 ENCOUNTER — PATIENT OUTREACH (OUTPATIENT)
Dept: ADMINISTRATIVE | Facility: HOSPITAL | Age: 62
End: 2022-07-26
Payer: COMMERCIAL

## 2022-07-26 ENCOUNTER — PATIENT MESSAGE (OUTPATIENT)
Dept: ADMINISTRATIVE | Facility: HOSPITAL | Age: 62
End: 2022-07-26
Payer: COMMERCIAL

## 2022-08-24 ENCOUNTER — PATIENT MESSAGE (OUTPATIENT)
Dept: ADMINISTRATIVE | Facility: HOSPITAL | Age: 62
End: 2022-08-24
Payer: COMMERCIAL

## 2022-09-07 ENCOUNTER — PATIENT MESSAGE (OUTPATIENT)
Dept: DERMATOLOGY | Facility: CLINIC | Age: 62
End: 2022-09-07
Payer: COMMERCIAL

## 2022-09-09 RX ORDER — APREMILAST 30 MG/1
TABLET, FILM COATED ORAL
Qty: 60 TABLET | Refills: 2 | Status: SHIPPED | OUTPATIENT
Start: 2022-09-09 | End: 2022-12-09 | Stop reason: SDUPTHER

## 2022-09-13 ENCOUNTER — SPECIALTY PHARMACY (OUTPATIENT)
Dept: PHARMACY | Facility: CLINIC | Age: 62
End: 2022-09-13
Payer: COMMERCIAL

## 2022-09-13 NOTE — TELEPHONE ENCOUNTER
[address], this is Danielle Mendez with [facility].  We are working on your prescription that your doctor has sent us. We will be working with your insurance to get this approved for you. We will be calling you along the way with updates on your medication.  If you have any questions, you can reach us at [phone].    Welcome call outcome: Patient/caregiver reached      -PA approved from 09/13/2022 to 09/13/2023  Case ID: [id number]       Benefits Investigation     Insurance name: [employer]   Rep name: [facility] Website    Copay amount: $2844.39   Deductible: $4000  OOPmax: $6350  [facility] in network? Yes     Copay card in [place]  Copay: $0 with insurance and copay card

## 2022-09-14 ENCOUNTER — SPECIALTY PHARMACY (OUTPATIENT)
Dept: PHARMACY | Facility: CLINIC | Age: 62
End: 2022-09-14
Payer: COMMERCIAL

## 2022-09-14 DIAGNOSIS — L40.9 PSORIASIS: Primary | ICD-10-CM

## 2022-09-14 NOTE — TELEPHONE ENCOUNTER
Specialty Pharmacy - Initial Clinical Assessment    Specialty Medication Orders Linked to Encounter      Flowsheet Row Most Recent Value   Medication #1 OTEZLA 30 mg Tab (Order#[id number], Rx#[id number])          Patient Diagnosis   Psoriasis     Subjective    Radha Ervin is a 62 y.o. female, who is followed by the specialty pharmacy service for management and education.    Recent Encounters       Date Type Provider Description    09/14/2022 Specialty Pharmacy Danielle Mendez, Aysha Initial Clinical Assessment    09/13/2022 Specialty Pharmacy Danielle Mendez, Aysha Referral Authorization    04/18/2022 Specialty Pharmacy Dina De Los Santos, Aysha Referral Authorization    03/06/2021 Specialty Pharmacy Veronica Jolley, PharmD Referral Authorization    02/12/2021 Specialty Pharmacy Aysha Wilkes Initial Clinical Assessment          Clinical call attempts since last clinical assessment   No call attempts found.     Current Outpatient Medications   Medication Sig    augmented betamethasone dipropionate (DIPROLENE-AF) 0.05 % ointment MARIA ALEJANDRA EXT AA BID FOR 14 DAYS    betamethasone dipropionate (DIPROLENE) 0.05 % ointment Apply topically 2 (two) times daily. Prn psoriasis flares    cetirizine (ZYRTEC) 10 MG tablet Take 1 tablet up to twice a day.  Can be used regularly or just when needed. (Patient taking differently: Take 1 tablet up to twice a day.  Can be used regularly or just when needed.)    clindamycin (CLEOCIN T) 1 % external solution Apply topically 2 (two) times daily. To groin and axilla prn breakouts    clindamycin (CLEOCIN T) 1 % external solution Apply topically 2 (two) times daily. To groin area for pimples    ibuprofen (ADVIL,MOTRIN) 800 MG tablet Take 800 mg by mouth 2 (two) times daily as needed.    OTEZLA 30 mg Tab TAKE 1 TABLET BY MOUTH  TWICE DAILY   Last reviewed on 9/14/2022  8:44 AM by Danielle Mendez PharmD    Review of patient's allergies indicates:   Allergen Reactions    Morphine Other (See Comments)      headaches    Latex, natural rubber Rash and Other (See Comments)     Redness and peeling only with bandaids    Penicillins Nausea And Vomiting and Rash   Last reviewed on  9/14/2022 8:42 AM by Danielle Mendez    Drug Interactions    Drug interactions evaluated: yes  Clinically relevant drug interactions identified: no  Provided the patient with educational material regarding drug interactions: not applicable           Assessment Questions - Documented Responses      Flowsheet Row Most Recent Value   Assessment    Medication Reconciliation completed for patient Yes   During the past 4 weeks, has patient missed any activities due to condition or medication? No   During the past 4 weeks, did patient have any of the following urgent care visits? None   Goals of Therapy Status Discussed (new start)   Status of the patients ability to self-administer: Is Able   All education points have been covered with patient? Yes, supplemental printed education provided   Welcome packet contents reviewed and discussed with patient? Yes   Assesment completed? Yes   Plan Therapy continued   Do you need to open a clinical intervention (i-vent)? No   Do you want to schedule first shipment? Yes          Refill Questions - Documented Responses      Flowsheet Row Most Recent Value   Patient Availability and HIPAA Verification    Does patient want to proceed with activity? Yes   HIPAA/medical authority confirmed? Yes   Relationship to patient of person spoken to? Self   Refill Screening Questions    When does the patient need to receive the medication? 09/14/22   Refill Delivery Questions    How will the patient receive the medication? Delivery Evi   When does the patient need to receive the medication? 09/14/22   Shipping Address Prescription   Address in [facility] confirmed and updated if neccessary? Yes   Expected Copay ($) 0   Is the patient able to afford the medication copay? Yes   Payment Method zero copay   Days supply of Refill  "30   Supplies needed? No supplies needed   Refill activity completed? Yes   Refill activity plan Refill scheduled   Shipment/Pickup Date: 09/14/22            Objective    She has a past medical history of Allergy, Amblyopia, Cataract, Eczema, HS (hereditary spherocytosis), total knee replacement (01/19/2016), and Joint pain.    Tried/failed medications: SSZ, clindamycin, humira, steroid creams    BP Readings from Last 4 Encounters:   06/04/22 126/88   04/06/22 122/88   09/22/21 128/88   09/09/19 (!) 148/99     Ht Readings from Last 4 Encounters:   06/04/22 4' 11" (1.499 m)   04/06/22 4' 11" (1.499 m)   09/22/21 4' 11" (1.499 m)   09/09/19 4' 11" (1.499 m)     Wt Readings from Last 4 Encounters:   04/06/22 94.8 kg (208 lb 15.9 oz)   09/22/21 92.2 kg (203 lb 4.2 oz)   09/09/19 89.2 kg (196 lb 10.4 oz)   05/27/19 81.6 kg (180 lb)     No results for input(s): CREATININE, ALT, AST, HEPBSAG, HEPBSAB, HEPBCAB in the last 2160 hours.  The goals of Psoriasis treatment include:  Reducing the size, thickness and extent of lesions and erythema  Relieving the symptoms of psoriasis  Improving and maintaining physical function  Preventing infection and other complications of treatment  Reducing long term complications of psoriasis  Minimizing psychological impact on overall well-being  Improving or maintaining quality of life  Maintaining optimal therapy adherence  Minimizing and managing side effects    Goals of Therapy Status: Discussed (new start)    Assessment/Plan  Patient plans to continue therapy without changes      Indication, dosage, appropriateness, effectiveness, safety and convenience of her specialty medication(s) were reviewed today.     Patient Education   Patient received education on the following:   Expectations and possible outcomes of therapy  Proper use, timely administration, and missed dose management  Duration of therapy  Side effects, including prevention, minimization, and management  Contraindications " and safety precautions  New or changed medications, including prescribe and over the counter medications and supplements  Reviews recommended vaccinations, as appropriate  Storage, safe handling, and disposal    Patient has been on Otezla for several months, she got new insurance so now she can fill with [facility], patient is confident    Tasks added this encounter   No tasks added.   Tasks due within next 3 months   9/13/2022 - Clinical - Initial Assessment     Danielle Mendez, PharmD  [facility]  [address] [facility]  [facility] [address]  Phone: [phone]  Fax: [phone]

## 2022-09-29 ENCOUNTER — IMMUNIZATION (OUTPATIENT)
Dept: PHARMACY | Facility: CLINIC | Age: 62
End: 2022-09-29
Payer: COMMERCIAL

## 2022-10-10 ENCOUNTER — PATIENT MESSAGE (OUTPATIENT)
Dept: ADMINISTRATIVE | Facility: HOSPITAL | Age: 62
End: 2022-10-10
Payer: COMMERCIAL

## 2022-10-10 ENCOUNTER — SPECIALTY PHARMACY (OUTPATIENT)
Dept: PHARMACY | Facility: CLINIC | Age: 62
End: 2022-10-10
Payer: COMMERCIAL

## 2022-10-10 NOTE — TELEPHONE ENCOUNTER
Specialty Pharmacy - Refill Coordination    Specialty Medication Orders Linked to Encounter      Flowsheet Row Most Recent Value   Medication #1 apremilast (OTEZLA) 30 mg Tab (Order#[id number], Rx#)            Refill Questions - Documented Responses      Flowsheet Row Most Recent Value   Patient Availability and HIPAA Verification    Does patient want to proceed with activity? Yes   HIPAA/medical authority confirmed? Yes   Relationship to patient of person spoken to? Self   Refill Screening Questions    Changes to allergies? No   Changes to medications? No   New conditions since last clinic visit? No   Unplanned office visit, urgent care, ED, or hospital admission in the last 4 weeks? No   How does patient/caregiver feel medication is working? Good   Financial problems or insurance changes? No   How many doses of your specialty medications were missed in the last 4 weeks? 0   Would patient like to speak to a pharmacist? No   When does the patient need to receive the medication? 10/17/22   Refill Delivery Questions    How will the patient receive the medication? MEDRx   When does the patient need to receive the medication? 10/17/22   Shipping Address Prescription   Address in [facility] confirmed and updated if neccessary? Yes   Expected Copay ($) 0   Is the patient able to afford the medication copay? Yes   Payment Method zero copay   Days supply of Refill 30   Supplies needed? No supplies needed   Refill activity completed? Yes   Refill activity plan Refill scheduled   Shipment/Pickup Date: 10/12/22            Current Outpatient Medications   Medication Sig    augmented betamethasone dipropionate (DIPROLENE-AF) 0.05 % ointment MARIA ALEJANDRA EXT AA BID FOR 14 DAYS    betamethasone dipropionate (DIPROLENE) 0.05 % ointment Apply topically 2 (two) times daily. Prn psoriasis flares    cetirizine (ZYRTEC) 10 MG tablet Take 1 tablet up to twice a day.  Can be used regularly or just when needed. (Patient taking differently:  Take 1 tablet up to twice a day.  Can be used regularly or just when needed.)    clindamycin (CLEOCIN T) 1 % external solution Apply topically 2 (two) times daily. To groin and axilla prn breakouts    clindamycin (CLEOCIN T) 1 % external solution Apply topically 2 (two) times daily. To groin area for pimples    ibuprofen (ADVIL,MOTRIN) 800 MG tablet Take 800 mg by mouth 2 (two) times daily as needed.    OTEZLA 30 mg Tab TAKE 1 TABLET BY MOUTH  TWICE DAILY   Last reviewed on 9/14/2022  8:44 AM by Danielle Mendez, PharmD    Review of patient's allergies indicates:   Allergen Reactions    Morphine Other (See Comments)     headaches    Latex, natural rubber Rash and Other (See Comments)     Redness and peeling only with bandaids    Penicillins Nausea And Vomiting and Rash    Last reviewed on  9/14/2022 8:42 AM by Danielle Mendez      Tasks added this encounter   11/9/2022 - Refill Call (Auto Added)   Tasks due within next 3 months   No tasks due.     [facility] Pharmacy  [address] [facility] [address] [address]  Phone: [phone]  Fax: [phone]

## 2022-10-12 DIAGNOSIS — Z12.31 OTHER SCREENING MAMMOGRAM: ICD-10-CM

## 2022-11-09 ENCOUNTER — SPECIALTY PHARMACY (OUTPATIENT)
Dept: PHARMACY | Facility: CLINIC | Age: 62
End: 2022-11-09
Payer: COMMERCIAL

## 2022-11-09 NOTE — TELEPHONE ENCOUNTER
Specialty Pharmacy - Refill Coordination    Specialty Medication Orders Linked to Encounter      Flowsheet Row Most Recent Value   Medication #1 apremilast (OTEZLA) 30 mg Tab (Order#[id number], Rx#)            Refill Questions - Documented Responses      Flowsheet Row Most Recent Value   Patient Availability and HIPAA Verification    Does patient want to proceed with activity? Yes   HIPAA/medical authority confirmed? Yes   Relationship to patient of person spoken to? Self   Refill Screening Questions    Changes to allergies? No   Changes to medications? No   New conditions since last clinic visit? No   Unplanned office visit, urgent care, ED, or hospital admission in the last 4 weeks? No   How does patient/caregiver feel medication is working? Good   Financial problems or insurance changes? No   How many doses of your specialty medications were missed in the last 4 weeks? 0   Would patient like to speak to a pharmacist? No   When does the patient need to receive the medication? 11/16/22   Refill Delivery Questions    How will the patient receive the medication? MEDRx   When does the patient need to receive the medication? 11/16/22   Shipping Address Prescription   Address in [facility] confirmed and updated if neccessary? Yes   Expected Copay ($) 0   Is the patient able to afford the medication copay? Yes   Payment Method zero copay   Days supply of Refill 30   Supplies needed? No supplies needed   Refill activity completed? Yes   Refill activity plan Refill scheduled   Shipment/Pickup Date: 11/14/22            Current Outpatient Medications   Medication Sig    augmented betamethasone dipropionate (DIPROLENE-AF) 0.05 % ointment MARIA ALEJANDRA EXT AA BID FOR 14 DAYS    betamethasone dipropionate (DIPROLENE) 0.05 % ointment Apply topically 2 (two) times daily. Prn psoriasis flares    cetirizine (ZYRTEC) 10 MG tablet Take 1 tablet up to twice a day.  Can be used regularly or just when needed. (Patient taking differently:  Take 1 tablet up to twice a day.  Can be used regularly or just when needed.)    clindamycin (CLEOCIN T) 1 % external solution Apply topically 2 (two) times daily. To groin and axilla prn breakouts    clindamycin (CLEOCIN T) 1 % external solution Apply topically 2 (two) times daily. To groin area for pimples    ibuprofen (ADVIL,MOTRIN) 800 MG tablet Take 800 mg by mouth 2 (two) times daily as needed.    OTEZLA 30 mg Tab TAKE 1 TABLET BY MOUTH  TWICE DAILY   Last reviewed on 9/14/2022  8:44 AM by Danielle Mendez, PharmD    Review of patient's allergies indicates:   Allergen Reactions    Morphine Other (See Comments)     headaches    Latex, natural rubber Rash and Other (See Comments)     Redness and peeling only with bandaids    Penicillins Nausea And Vomiting and Rash    Last reviewed on  9/14/2022 8:42 AM by Danielle Mendez      Tasks added this encounter   12/9/2022 - Refill Call (Auto Added)   Tasks due within next 3 months   No tasks due.     Amy Haque [facility] - Specialty Pharmacy  [address] [facility] [address]  Phone: [phone]  Fax: [phone]

## 2022-12-09 ENCOUNTER — SPECIALTY PHARMACY (OUTPATIENT)
Dept: PHARMACY | Facility: CLINIC | Age: 62
End: 2022-12-09
Payer: COMMERCIAL

## 2022-12-09 RX ORDER — APREMILAST 30 MG/1
TABLET, FILM COATED ORAL
Qty: 60 TABLET | Refills: 2 | Status: SHIPPED | OUTPATIENT
Start: 2022-12-09 | End: 2023-03-29 | Stop reason: SDUPTHER

## 2022-12-09 NOTE — TELEPHONE ENCOUNTER
Outgoing call regarding otezla refill; per pt, she has plenty on hand; informed her that a refill request was sent to md, and once approved [facility] will follow up to schedule delivery

## 2022-12-15 NOTE — TELEPHONE ENCOUNTER
Outgoing call regarding Otezla refill, informed pt provider has approved refill. Pt stated she has about two weeks on hand, informed pt will follow up on 12/22 to set up refill

## 2022-12-22 NOTE — TELEPHONE ENCOUNTER
Specialty Pharmacy - Refill Coordination    Specialty Medication Orders Linked to Encounter      Flowsheet Row Most Recent Value   Medication #1 OTEZLA 30 mg Tab (Order#[id number], Rx#[id number])        Spoke to patient about missed doses and ways to prevent missing because of forgetting, like setting a timer or taking with morning and evening meal so that becomes a habit.     Refill Questions - Documented Responses      Flowsheet Row Most Recent Value   Patient Availability and HIPAA Verification    Does patient want to proceed with activity? Yes   HIPAA/medical authority confirmed? Yes   Relationship to patient of person spoken to? Self   Refill Screening Questions    Changes to allergies? No   Changes to medications? No   New conditions since last clinic visit? No   Unplanned office visit, urgent care, ED, or hospital admission in the last 4 weeks? No   How does patient/caregiver feel medication is working? Good   Financial problems or insurance changes? No   How many doses of your specialty medications were missed in the last 4 weeks? 2  [simply forgot]   Why were doses missed? Simply forgot   Would patient like to speak to a pharmacist? No   When does the patient need to receive the medication? 12/29/22   Refill Delivery Questions    How will the patient receive the medication? Mail   When does the patient need to receive the medication? 12/29/22   Shipping Address Prescription   Address in [facility] confirmed and updated if neccessary? Yes   Expected Copay ($) 0   Is the patient able to afford the medication copay? Yes   Payment Method zero copay   Days supply of Refill 30   Supplies needed? No supplies needed   Refill activity completed? Yes   Refill activity plan Refill scheduled   Shipment/Pickup Date: 12/28/22            Current Outpatient Medications   Medication Sig    augmented betamethasone dipropionate (DIPROLENE-AF) 0.05 % ointment MARIA ALEJANDRA EXT AA BID FOR 14 DAYS    betamethasone dipropionate  (DIPROLENE) 0.05 % ointment Apply topically 2 (two) times daily. Prn psoriasis flares    cetirizine (ZYRTEC) 10 MG tablet Take 1 tablet up to twice a day.  Can be used regularly or just when needed. (Patient taking differently: Take 1 tablet up to twice a day.  Can be used regularly or just when needed.)    clindamycin (CLEOCIN T) 1 % external solution Apply topically 2 (two) times daily. To groin and axilla prn breakouts    clindamycin (CLEOCIN T) 1 % external solution Apply topically 2 (two) times daily. To groin area for pimples    ibuprofen (ADVIL,MOTRIN) 800 MG tablet Take 800 mg by mouth 2 (two) times daily as needed.    OTEZLA 30 mg Tab TAKE 1 TABLET BY MOUTH  TWICE DAILY   Last reviewed on 9/14/2022  8:44 AM by Danielle Mendez PharmD    Review of patient's allergies indicates:   Allergen Reactions    Morphine Other (See Comments)     headaches    Latex, natural rubber Rash and Other (See Comments)     Redness and peeling only with bandaids    Penicillins Nausea And Vomiting and Rash    Last reviewed on  9/14/2022 8:42 AM by Danielle Mendez      Tasks added this encounter   No tasks added.   Tasks due within next 3 months   12/9/2022 - Refill Call (Auto Added)     Danielle Mendez, PharmD  [facility] - Specialty Pharmacy  [address] [place] [address]  Phone: [phone]  Fax: [phone]

## 2023-01-17 ENCOUNTER — PATIENT MESSAGE (OUTPATIENT)
Dept: ADMINISTRATIVE | Facility: HOSPITAL | Age: 63
End: 2023-01-17

## 2023-01-23 ENCOUNTER — SPECIALTY PHARMACY (OUTPATIENT)
Dept: PHARMACY | Facility: CLINIC | Age: 63
End: 2023-01-23

## 2023-01-23 NOTE — TELEPHONE ENCOUNTER
Specialty Pharmacy - Refill Coordination    Specialty Medication Orders Linked to Encounter      Flowsheet Row Most Recent Value   Medication #1 apremilast (OTEZLA) 30 mg Tab (Order#[id number], Rx#)            Refill Questions - Documented Responses      Flowsheet Row Most Recent Value   Patient Availability and HIPAA Verification    Does patient want to proceed with activity? Yes   HIPAA/medical authority confirmed? Yes   Relationship to patient of person spoken to? Self   Refill Screening Questions    Changes to allergies? No   Changes to medications? No   New conditions since last clinic visit? No   Unplanned office visit, urgent care, ED, or hospital admission in the last 4 weeks? No   How does patient/caregiver feel medication is working? Very good   Financial problems or insurance changes? No   How many doses of your specialty medications were missed in the last 4 weeks? 1   Would patient like to speak to a pharmacist? No   When does the patient need to receive the medication? 02/02/23   Refill Delivery Questions    How will the patient receive the medication? MEDRx   When does the patient need to receive the medication? 02/02/23   Shipping Address Prescription   Address in [facility] confirmed and updated if neccessary? Yes   Expected Copay ($) 0   Is the patient able to afford the medication copay? Yes   Payment Method zero copay   Days supply of Refill 30   Supplies needed? No supplies needed   Refill activity completed? Yes   Refill activity plan Refill scheduled   Shipment/Pickup Date: 01/26/23            Current Outpatient Medications   Medication Sig    augmented betamethasone dipropionate (DIPROLENE-AF) 0.05 % ointment MARIA ALEJANDRA EXT AA BID FOR 14 DAYS    betamethasone dipropionate (DIPROLENE) 0.05 % ointment Apply topically 2 (two) times daily. Prn psoriasis flares    cetirizine (ZYRTEC) 10 MG tablet Take 1 tablet up to twice a day.  Can be used regularly or just when needed. (Patient taking  differently: Take 1 tablet up to twice a day.  Can be used regularly or just when needed.)    clindamycin (CLEOCIN T) 1 % external solution Apply topically 2 (two) times daily. To groin and axilla prn breakouts    clindamycin (CLEOCIN T) 1 % external solution Apply topically 2 (two) times daily. To groin area for pimples    ibuprofen (ADVIL,MOTRIN) 800 MG tablet Take 800 mg by mouth 2 (two) times daily as needed.    OTEZLA 30 mg Tab TAKE 1 TABLET BY MOUTH  TWICE DAILY   Last reviewed on 9/14/2022  8:44 AM by Danielle Mendez, PharmD    Review of patient's allergies indicates:   Allergen Reactions    Morphine Other (See Comments)     headaches    Latex, natural rubber Rash and Other (See Comments)     Redness and peeling only with bandaids    Penicillins Nausea And Vomiting and Rash    Last reviewed on  9/14/2022 8:42 AM by Danielle Mendez      Tasks added this encounter   2/25/2023 - Refill Call (Auto Added)   Tasks due within next 3 months   No tasks due.     Bushra Jade  [facility] - Specialty Pharmacy  [address] [facility] [address]  Phone: [phone]  Fax: [phone]

## 2023-02-10 ENCOUNTER — PATIENT MESSAGE (OUTPATIENT)
Dept: INTERNAL MEDICINE | Facility: CLINIC | Age: 63
End: 2023-02-10

## 2023-02-27 ENCOUNTER — SPECIALTY PHARMACY (OUTPATIENT)
Dept: PHARMACY | Facility: CLINIC | Age: 63
End: 2023-02-27
Payer: COMMERCIAL

## 2023-02-27 NOTE — TELEPHONE ENCOUNTER
Specialty Pharmacy - Refill Coordination    Specialty Medication Orders Linked to Encounter      Flowsheet Row Most Recent Value   Medication #1 OTEZLA 30 mg Tab (Order#[id number], Rx#[id number])            Refill Questions - Documented Responses      Flowsheet Row Most Recent Value   Patient Availability and HIPAA Verification    Does patient want to proceed with activity? Yes   HIPAA/medical authority confirmed? Yes   Relationship to patient of person spoken to? Self   Refill Screening Questions    Changes to allergies? No   Changes to medications? No   New conditions since last clinic visit? No   Unplanned office visit, urgent care, ED, or hospital admission in the last 4 weeks? No   How does patient/caregiver feel medication is working? Very good   Financial problems or insurance changes? No   How many doses of your specialty medications were missed in the last 4 weeks? 0   Would patient like to speak to a pharmacist? No   When does the patient need to receive the medication? 03/06/23   Refill Delivery Questions    How will the patient receive the medication? MEDRx   When does the patient need to receive the medication? 03/06/23   Shipping Address Prescription   Address in [facility] confirmed and updated if neccessary? Yes   Expected Copay ($) 0   Is the patient able to afford the medication copay? Yes   Payment Method zero copay   Days supply of Refill 30   Supplies needed? No supplies needed   Refill activity completed? Yes   Refill activity plan Refill scheduled   Shipment/Pickup Date: 02/28/23            Current Outpatient Medications   Medication Sig    augmented betamethasone dipropionate (DIPROLENE-AF) 0.05 % ointment MARIA ALEJANDRA EXT AA BID FOR 14 DAYS    betamethasone dipropionate (DIPROLENE) 0.05 % ointment Apply topically 2 (two) times daily. Prn psoriasis flares    cetirizine (ZYRTEC) 10 MG tablet Take 1 tablet up to twice a day.  Can be used regularly or just when needed. (Patient taking differently:  Take 1 tablet up to twice a day.  Can be used regularly or just when needed.)    clindamycin (CLEOCIN T) 1 % external solution Apply topically 2 (two) times daily. To groin and axilla prn breakouts    clindamycin (CLEOCIN T) 1 % external solution Apply topically 2 (two) times daily. To groin area for pimples    ibuprofen (ADVIL,MOTRIN) 800 MG tablet Take 800 mg by mouth 2 (two) times daily as needed.    OTEZLA 30 mg Tab TAKE 1 TABLET BY MOUTH  TWICE DAILY   Last reviewed on 9/14/2022  8:44 AM by Danielle Mendez, PharmD    Review of patient's allergies indicates:   Allergen Reactions    Morphine Other (See Comments)     headaches    Latex, natural rubber Rash and Other (See Comments)     Redness and peeling only with bandaids    Penicillins Nausea And Vomiting and Rash    Last reviewed on  9/14/2022 8:42 AM by Danielle Mendez      Tasks added this encounter   3/29/2023 - Refill Call (Auto Added)   Tasks due within next 3 months   No tasks due.     Deshawn Haque [facility] - Specialty Pharmacy  [address] [facility] [address]  Phone: [phone]  Fax: [phone]

## 2023-03-21 ENCOUNTER — TELEPHONE (OUTPATIENT)
Dept: DERMATOLOGY | Facility: CLINIC | Age: 63
End: 2023-03-21
Payer: COMMERCIAL

## 2023-03-21 NOTE — TELEPHONE ENCOUNTER
Spoke with pt - stated nothing available today, but we had a cancellation for tomorrow. Pt stated she could not make it today, but she will be there tomorrow.     ----- Message from Treasure Cabrera sent at 3/21/2023 12:31 PM CDT -----  Regarding: appt access  Contact: self [phone]@  Pt calling in regards to a painful boil inside her thigh, pt requesting a same day visit. Pls call

## 2023-03-22 ENCOUNTER — OFFICE VISIT (OUTPATIENT)
Dept: DERMATOLOGY | Facility: CLINIC | Age: 63
End: 2023-03-22
Payer: COMMERCIAL

## 2023-03-22 ENCOUNTER — PATIENT MESSAGE (OUTPATIENT)
Dept: DERMATOLOGY | Facility: CLINIC | Age: 63
End: 2023-03-22

## 2023-03-22 DIAGNOSIS — L03.90 CELLULITIS, UNSPECIFIED CELLULITIS SITE: Primary | ICD-10-CM

## 2023-03-22 PROCEDURE — 99999 PR PBB SHADOW E&M-EST. PATIENT-LVL II: ICD-10-PCS | Mod: PBBFAC,,,

## 2023-03-22 PROCEDURE — 87206 SMEAR FLUORESCENT/ACID STAI: CPT | Performed by: DERMATOLOGY

## 2023-03-22 PROCEDURE — 1159F PR MEDICATION LIST DOCUMENTED IN MEDICAL RECORD: ICD-10-PCS | Mod: CPTII,S$GLB,, | Performed by: DERMATOLOGY

## 2023-03-22 PROCEDURE — 99214 PR OFFICE/OUTPT VISIT, EST, LEVL IV, 30-39 MIN: ICD-10-PCS | Mod: S$GLB,,, | Performed by: DERMATOLOGY

## 2023-03-22 PROCEDURE — 87116 MYCOBACTERIA CULTURE: CPT | Performed by: DERMATOLOGY

## 2023-03-22 PROCEDURE — 3044F HG A1C LEVEL LT 7.0%: CPT | Mod: CPTII,S$GLB,, | Performed by: DERMATOLOGY

## 2023-03-22 PROCEDURE — 99999 PR PBB SHADOW E&M-EST. PATIENT-LVL II: CPT | Mod: PBBFAC,,,

## 2023-03-22 PROCEDURE — 1160F PR REVIEW ALL MEDS BY PRESCRIBER/CLIN PHARMACIST DOCUMENTED: ICD-10-PCS | Mod: CPTII,S$GLB,, | Performed by: DERMATOLOGY

## 2023-03-22 PROCEDURE — 87102 FUNGUS ISOLATION CULTURE: CPT | Performed by: DERMATOLOGY

## 2023-03-22 PROCEDURE — 3044F PR MOST RECENT HEMOGLOBIN A1C LEVEL <7.0%: ICD-10-PCS | Mod: CPTII,S$GLB,, | Performed by: DERMATOLOGY

## 2023-03-22 PROCEDURE — 99214 OFFICE O/P EST MOD 30 MIN: CPT | Mod: S$GLB,,, | Performed by: DERMATOLOGY

## 2023-03-22 PROCEDURE — 87070 CULTURE OTHR SPECIMN AEROBIC: CPT | Performed by: DERMATOLOGY

## 2023-03-22 PROCEDURE — 1159F MED LIST DOCD IN RCRD: CPT | Mod: CPTII,S$GLB,, | Performed by: DERMATOLOGY

## 2023-03-22 PROCEDURE — 1160F RVW MEDS BY RX/DR IN RCRD: CPT | Mod: CPTII,S$GLB,, | Performed by: DERMATOLOGY

## 2023-03-22 PROCEDURE — 87075 CULTR BACTERIA EXCEPT BLOOD: CPT | Performed by: DERMATOLOGY

## 2023-03-22 PROCEDURE — 87205 SMEAR GRAM STAIN: CPT | Performed by: DERMATOLOGY

## 2023-03-22 RX ORDER — DOXYCYCLINE 100 MG/1
100 CAPSULE ORAL 2 TIMES DAILY
Qty: 20 CAPSULE | Refills: 0 | Status: SHIPPED | OUTPATIENT
Start: 2023-03-22 | End: 2023-04-01

## 2023-03-22 NOTE — PROGRESS NOTES
Subjective:       Patient ID:  Radha Ervin is a 62 y.o. female who presents for   Chief Complaint   Patient presents with    Lesion     Lesion   63 yo female with PMH HS, psoriasis presenting for increased swelling and drainage from groin. 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 Discontinued Humira ~4-5 years ago and has been doing well on Otezla ever since. Today patient reports several days of increased edema and drainage from right groin lesion. Denies fevers or chills. Has not been using any systemic antibiotics or topical antibiotic preparations or washes for HS.       Review of Systems   Constitutional:  Negative for fever and chills.      Objective:    Physical Exam   Constitutional: She appears well-developed and well-nourished.   Neurological: She is alert and oriented to person, place, and time.   Skin:   Areas Examined (abnormalities noted in diagram):   Genitals / Buttocks / Groin Inspection Performed  RUE Inspected  LUE Inspection Performed  RLE Inspected  LLE Inspection Performed            Diagram Legend     Erythematous scaling macule/papule c/w actinic keratosis       Vascular papule c/w angioma      Pigmented verrucoid papule/plaque c/w seborrheic keratosis      Yellow umbilicated papule c/w sebaceous hyperplasia      Irregularly shaped tan macule c/w lentigo     1-2 mm smooth white papules consistent with Milia      Movable subcutaneous cyst with punctum c/w epidermal inclusion cyst      Subcutaneous movable cyst c/w pilar cyst      Firm pink to brown papule c/w dermatofibroma      Pedunculated fleshy papule(s) c/w skin tag(s)      Evenly pigmented macule c/w junctional nevus     Mildly variegated pigmented, slightly irregular-bordered macule c/w mildly atypical nevus      Flesh colored to evenly pigmented papule c/w intradermal nevus       Pink pearly  papule/plaque c/w basal cell carcinoma      Erythematous hyperkeratotic cursted plaque c/w SCC      Surgical scar with no sign of skin cancer recurrence      Open and closed comedones      Inflammatory papules and pustules      Verrucoid papule consistent consistent with wart     Erythematous eczematous patches and plaques     Dystrophic onycholytic nail with subungual debris c/w onychomycosis     Umbilicated papule    Erythematous-base heme-crusted tan verrucoid plaque consistent with inflamed seborrheic keratosis     Erythematous Silvery Scaling Plaque c/w Psoriasis     See annotation        Assessment / Plan:        Hidradenitis suppurativa flare with cellulitis  -     Aerobic Culture  -     AFB Culture & Smear  -     Culture, Anaerobe  -     Fungus Culture  -     Gram Stain  - Cellulitis outlined with marking pen and photos taken today and uploaded to chart   - Start doxycycline 100 mg BID for 10 days; advised to take with food and full glass of water; counseled on photosensitizing effects; GoodRx provided  - Start antimicrobial washes with Hibiclens  - Continue wound care with regular dressing changes  - Counseled to call clinic or present to ED for worsening rash or systemic s/sx such as fevers, chills           Follow up with [facility]

## 2023-03-23 LAB — GRAM STN SPEC: NORMAL

## 2023-03-25 LAB — BACTERIA SPEC AEROBE CULT: NORMAL

## 2023-03-27 LAB — BACTERIA SPEC ANAEROBE CULT: NORMAL

## 2023-03-29 RX ORDER — APREMILAST 30 MG/1
TABLET, FILM COATED ORAL
Qty: 60 TABLET | Refills: 2 | Status: SHIPPED | OUTPATIENT
Start: 2023-03-29 | End: 2023-06-30 | Stop reason: SDUPTHER

## 2023-04-03 ENCOUNTER — PATIENT MESSAGE (OUTPATIENT)
Dept: PHARMACY | Facility: CLINIC | Age: 63
End: 2023-04-03
Payer: COMMERCIAL

## 2023-04-03 ENCOUNTER — SPECIALTY PHARMACY (OUTPATIENT)
Dept: PHARMACY | Facility: CLINIC | Age: 63
End: 2023-04-03
Payer: COMMERCIAL

## 2023-04-03 NOTE — TELEPHONE ENCOUNTER
Specialty Pharmacy - Refill Coordination    Specialty Medication Orders Linked to Encounter      Flowsheet Row Most Recent Value   Medication #1 OTEZLA 30 mg Tab (Order#[id number], Rx#[id number])            Refill Questions - Documented Responses      Flowsheet Row Most Recent Value   Patient Availability and HIPAA Verification    Does patient want to proceed with activity? Yes   HIPAA/medical authority confirmed? Yes   Relationship to patient of person spoken to? Self   Refill Screening Questions    Changes to allergies? No   Changes to medications? No   New conditions since last clinic visit? No   Unplanned office visit, urgent care, ED, or hospital admission in the last 4 weeks? No   How does patient/caregiver feel medication is working? Good   Financial problems or insurance changes? No   How many doses of your specialty medications were missed in the last 4 weeks? 1   Why were doses missed? Fell asleep/slept through dose   Would patient like to speak to a pharmacist? No   When does the patient need to receive the medication? 04/08/23   Refill Delivery Questions    How will the patient receive the medication? MEDRx   When does the patient need to receive the medication? 04/08/23   Shipping Address Prescription   Address in [facility] confirmed and updated if neccessary? Yes   Expected Copay ($) 0   Is the patient able to afford the medication copay? Yes   Payment Method zero copay   Days supply of Refill 30   Supplies needed? No supplies needed   Refill activity completed? Yes   Refill activity plan Refill scheduled   Shipment/Pickup Date: 04/04/23            Current Outpatient Medications   Medication Sig    augmented betamethasone dipropionate (DIPROLENE-AF) 0.05 % ointment MARIA ALEJANDRA EXT AA BID FOR 14 DAYS    betamethasone dipropionate (DIPROLENE) 0.05 % ointment Apply topically 2 (two) times daily. Prn psoriasis flares    cetirizine (ZYRTEC) 10 MG tablet Take 1 tablet up to twice a day.  Can be used  regularly or just when needed. (Patient taking differently: Take 1 tablet up to twice a day.  Can be used regularly or just when needed.)    clindamycin (CLEOCIN T) 1 % external solution Apply topically 2 (two) times daily. To groin and axilla prn breakouts    clindamycin (CLEOCIN T) 1 % external solution Apply topically 2 (two) times daily. To groin area for pimples    ibuprofen (ADVIL,MOTRIN) 800 MG tablet Take 800 mg by mouth 2 (two) times daily as needed.    OTEZLA 30 mg Tab TAKE 1 TABLET BY MOUTH  TWICE DAILY   Last reviewed on 9/14/2022  8:44 AM by Danielle Mendez, Aysha    Review of patient's allergies indicates:   Allergen Reactions    Morphine Other (See Comments)     headaches    Latex, natural rubber Rash and Other (See Comments)     Redness and peeling only with bandaids    Penicillins Nausea And Vomiting and Rash    Last reviewed on  3/22/2023 2:07 PM by Ross Troncoso      Tasks added this encounter   5/1/2023 - Refill Call (Auto Added)   Tasks due within next 3 months   6/14/2023 - Clinical - Follow Up Assesement (Annual)     Sunshine Gimenez, PharmD  [facility]  [address] [facility] [address]  Phone: [phone]  Fax: [phone]

## 2023-04-11 ENCOUNTER — PATIENT MESSAGE (OUTPATIENT)
Dept: ADMINISTRATIVE | Facility: HOSPITAL | Age: 63
End: 2023-04-11
Payer: COMMERCIAL

## 2023-04-19 LAB — FUNGUS SPEC CULT: NORMAL

## 2023-04-28 ENCOUNTER — OFFICE VISIT (OUTPATIENT)
Dept: DERMATOLOGY | Facility: CLINIC | Age: 63
End: 2023-04-28
Payer: COMMERCIAL

## 2023-04-28 DIAGNOSIS — L73.2 HIDRADENITIS SUPPURATIVA: Primary | ICD-10-CM

## 2023-04-28 DIAGNOSIS — L40.9 PSORIASIS: ICD-10-CM

## 2023-04-28 LAB
ALBUMIN: 4.2 G/DL (ref 3.5–5)
ALP SERPL-CCNC: 121 U/L (ref 38–126)
ALT SERPL W P-5'-P-CCNC: 18 U/L (ref 7–56)
ANION GAP SERPL CALC-SCNC: 13.3 MMOL/L (ref 9–18)
AST SERPL-CCNC: 15 U/L (ref 7–40)
BASOPHILS ABSOLUTE COUNT: 0.1 THOUSAND/UL (ref 0–0.2)
BASOPHILS NFR BLD: 0.7 % (ref 0–2)
BILIRUB SERPL-MCNC: 0.4 MG/DL (ref 0–1.2)
BUN BLD-MCNC: 12 MG/DL (ref 7–21)
BUN/CREAT SERPL: 14 (ref 6–22)
CALC OSMOLALITY: 282 MOSM/KG (ref 275–295)
CALCIUM SERPL-MCNC: 9.8 MG/DL (ref 8.5–10.3)
CHLORIDE SERPL-SCNC: 106 MMOL/L (ref 98–107)
CO2 SERPL-SCNC: 26 MMOL/L (ref 21–31)
CREAT SERPL-MCNC: 0.86 MG/DL (ref 0.5–1)
EGFR: 76 ML/MIN
EOSINOPHIL NFR BLD: 4 % (ref 0–4)
EOSINOPHILS ABSOLUTE COUNT: 0.5 THOUSAND/UL (ref 0–0.7)
ERYTHROCYTE [DISTWIDTH] IN BLOOD BY AUTOMATED COUNT: 14.7 % (ref 12–15.3)
GLUCOSE SERPL-MCNC: 111 MG/DL (ref 70–100)
HBA1C MFR BLD: 6.3 % (ref 4.5–5.7)
HCT VFR BLD AUTO: 43.6 % (ref 37–47)
HGB BLD-MCNC: 14.4 GM/DL (ref 12–16)
LYMPHOCYTES %: 20.3 % (ref 15–45)
LYMPHOCYTES ABSOLUTE COUNT: 2.5 THOUSAND/UL (ref 1–4.2)
MCH RBC QN AUTO: 30.4 PG (ref 27–33)
MCHC RBC AUTO-ENTMCNC: 32.9 G/DL (ref 32–36)
MCV RBC AUTO: 92.5 FL (ref 81–99)
MONOCYTES %: 6.5 % (ref 3–13)
MONOCYTES ABSOLUTE COUNT: 0.8 THOUSAND/UL (ref 0.1–0.8)
NEUTROPHILS ABSOLUTE COUNT: 8.4 THOUSAND/UL (ref 2.1–7.6)
NEUTROPHILS RELATIVE PERCENT: 68.5 % (ref 32–80)
PLATELET # BLD AUTO: 318 THOUSAND/UL (ref 150–350)
PMV BLD AUTO: 8.9 FL (ref 7–10.2)
POTASSIUM SERPL-SCNC: 4.3 MMOL/L (ref 3.5–5)
RBC # BLD AUTO: 4.72 MILLION/UL (ref 4.2–5.4)
SODIUM BLD-SCNC: 141 MMOL/L (ref 135–145)
TOTAL PROTEIN: 7.1 G/DL (ref 6.3–8.2)
WBC # BLD AUTO: 12.3 THOUSAND/UL (ref 4.5–11)

## 2023-04-28 PROCEDURE — 11900 PR INJECTION INTO SKIN LESIONS, UP TO 7: ICD-10-PCS | Mod: S$GLB,,, | Performed by: DERMATOLOGY

## 2023-04-28 PROCEDURE — 1160F RVW MEDS BY RX/DR IN RCRD: CPT | Mod: CPTII,S$GLB,, | Performed by: DERMATOLOGY

## 2023-04-28 PROCEDURE — 1160F PR REVIEW ALL MEDS BY PRESCRIBER/CLIN PHARMACIST DOCUMENTED: ICD-10-PCS | Mod: CPTII,S$GLB,, | Performed by: DERMATOLOGY

## 2023-04-28 PROCEDURE — 99214 OFFICE O/P EST MOD 30 MIN: CPT | Mod: 25,S$GLB,, | Performed by: DERMATOLOGY

## 2023-04-28 PROCEDURE — 99999 PR PBB SHADOW E&M-EST. PATIENT-LVL III: CPT | Mod: PBBFAC,,, | Performed by: DERMATOLOGY

## 2023-04-28 PROCEDURE — 1159F MED LIST DOCD IN RCRD: CPT | Mod: CPTII,S$GLB,, | Performed by: DERMATOLOGY

## 2023-04-28 PROCEDURE — 11900 INJECT SKIN LESIONS </W 7: CPT | Mod: S$GLB,,, | Performed by: DERMATOLOGY

## 2023-04-28 PROCEDURE — 1159F PR MEDICATION LIST DOCUMENTED IN MEDICAL RECORD: ICD-10-PCS | Mod: CPTII,S$GLB,, | Performed by: DERMATOLOGY

## 2023-04-28 PROCEDURE — 99999 PR PBB SHADOW E&M-EST. PATIENT-LVL III: ICD-10-PCS | Mod: PBBFAC,,, | Performed by: DERMATOLOGY

## 2023-04-28 PROCEDURE — 99214 PR OFFICE/OUTPT VISIT, EST, LEVL IV, 30-39 MIN: ICD-10-PCS | Mod: 25,S$GLB,, | Performed by: DERMATOLOGY

## 2023-04-28 RX ORDER — TRIAMCINOLONE ACETONIDE 40 MG/ML
40 INJECTION, SUSPENSION INTRA-ARTICULAR; INTRAMUSCULAR ONCE
Status: DISCONTINUED | OUTPATIENT
Start: 2023-04-28 | End: 2023-08-28

## 2023-04-28 NOTE — ASSESSMENT & PLAN NOTE
"Today's Plan:      Intralesional Kenalog 10mg/cc (0.6 cc total) injected into 2 lesions on the left medial thigh and right post medial thigh today after obtaining verbal consent including risk of surrounding hypopigmentation. Patient tolerated procedure well.    Units: 1  NDC for Kenalog 10mg/cc:  3649-8492-52    Intralesional Kenalog 20mg/cc (1.5 cc total) injected into 2 lesions on the right inguinal fold and right medial thigh today after obtaining verbal consent including risk of surrounding hypopigmentation. Patient tolerated procedure well.    Units:4  NDC for Kenalog 40mg/cc:  6376-8854-07              LIFESTYLE:    Weight loss and dietary modifications:  Avoid sugars and processed foods  Limit "white" foods ie. Bread, rice, pasta, potatoes  - recommend limiting to 1/2 cup per serving  Limit dairy     Stop smoking     Keep skin cool as overheating can flare HS    Avoid shaving affected areas and wearing tight fitting clothing as friction exacerbates disease    TREATMENT:    Dilute bleach to affected areas: compresses daily to affected/flared area(s) - can use CLn wash on warm wet rag as compress  If have groin involvement, may want to take dilute bleach baths (daily when flared; 2x/week for maintenance). Recipe for dilute bleach bath:    add 1/2 cup of regular strength (6%) bleach to a full tub of lukewarm water and soak for 10 - 15 minutes. (use 1/4 cup for a half-full tub of water)   OR Add Clorox (2 teaspoons/gal of water, max. 2/3 cup) to bath water     Wash affected areas with Hibiclens +/- Benzoyl peroxide every day      Take Turmeric (with black pepper) supplement at 500mg to 1 gram per day (available over the counter and on [employer])   Can cause upset stomach    Apply Clindamycin solution 1x/day to "quiet affected areas" and 2x/day to flared affected areas    Take Spironolactone (aldactone) at 50 mg every day    Continue Otezla at 30mg po 2x/day          CONSIDER:  Adding 3 small cans of V8 juice per " week  Metformin ER at 500mg every night with dinner -- can decrease # and severity of lesions   SE: diarrhea        RESOURCE:  [email or web address]      FOR FLARES (new painful bump):  Message [facility] through F[facility] for injection which will often hasten resolution of tender, red bump    Can apply warm/hot compresses on a painful, deep lump    If notice a foul-smelling drainage, can use Hydrogen Peroxide to cleanse area followed by application of Medi-honey to open area (apply Medi-honey 1x/day)

## 2023-04-28 NOTE — PROGRESS NOTES
Subjective:      Patient ID:  Radha Ervin is a 62 y.o. female who presents for   Chief Complaint   Patient presents with    Hidradenitis Suppurativa     1st visit     This is patient's 1st visit to [facility] for Hidradenitis Suppurativa.     Year started: 2017  Location started: Groin  Family history of HS: no  Smokes: Yes- 1/2 ppd  Current weight: 200# (4/23)    Last seen 3/22/23 by resident and  Dr Spencer  for same.     Past treatments (including topicals, orals, injectables, laser, surgery):  Clindamycin 1% sol  Humira x 2 months - induced pso on palms    Current treatments:   Otezla 30mg bid x 5 years (for pso)    In the past, pt has been diagnosed with:    PCOS: none  Diabetes: no  HTN: no  Arthritis: no  Cancer: no              Review of Systems   Constitutional:  Negative for weight loss and weight gain.   HENT:  Negative for nosebleeds and headaches.    Gastrointestinal:  Positive for diarrhea (biw. since starting otezla. never had colonoscopy). Negative for Sensitivity to oral antibiotics.   Genitourinary:  Negative for irregular periods (post menopausal).   Musculoskeletal:  Negative for arthralgias.   Skin:  Positive for abscesses. Negative for recent sunburn.   Neurological:  Negative for headaches.   Psychiatric/Behavioral:  Negative for depressed mood.    Hematologic/Lymphatic: Bruises/bleeds easily.     Objective:   Physical Exam   Constitutional: She appears well-developed and well-nourished. She is obese.  No distress.   Neurological: She is alert and oriented to person, place, and time. She is not disoriented.   Psychiatric: She has a normal mood and affect.   Skin:   Areas Examined (abnormalities noted in diagram):   Chest / Axilla Inspection Performed  Abdomen Inspection Performed  Genitals / Buttocks / Groin Inspection Performed  RLE Inspected  LLE Inspection Performed              Diagram Legend     Erythematous scaling macule/papule c/w actinic keratosis       Vascular papule c/w angioma       Pigmented verrucoid papule/plaque c/w seborrheic keratosis      Yellow umbilicated papule c/w sebaceous hyperplasia      Irregularly shaped tan macule c/w lentigo     1-2 mm smooth white papules consistent with Milia      Movable subcutaneous cyst with punctum c/w epidermal inclusion cyst      Subcutaneous movable cyst c/w pilar cyst      Firm pink to brown papule c/w dermatofibroma      Pedunculated fleshy papule(s) c/w skin tag(s)      Evenly pigmented macule c/w junctional nevus     Mildly variegated pigmented, slightly irregular-bordered macule c/w mildly atypical nevus      Flesh colored to evenly pigmented papule c/w intradermal nevus       Pink pearly papule/plaque c/w basal cell carcinoma      Erythematous hyperkeratotic cursted plaque c/w SCC      Surgical scar with no sign of skin cancer recurrence      Open and closed comedones      Inflammatory papules and pustules      Verrucoid papule consistent consistent with wart     Erythematous eczematous patches and plaques     Dystrophic onycholytic nail with subungual debris c/w onychomycosis     Umbilicated papule    Erythematous-base heme-crusted tan verrucoid plaque consistent with inflamed seborrheic keratosis     Erythematous Silvery Scaling Plaque c/w Psoriasis     See annotation      Assessment / Plan:        Hidradenitis suppurativa  -     CBC Auto Differential; Future; Expected date: 04/28/2023  -     Comprehensive Metabolic Panel; Future; Expected date: 04/28/2023  -     HEMOGLOBIN A1C; Future; Expected date: 04/28/2023  -     triamcinolone acetonide injection 10 mg  -     AL [id number] ACETONIDE INJ PER 10MG  -     AL INJECTION INTO SKIN LESIONS, UP TO 7  -     triamcinolone acetonide injection 40 mg  -     AL [id number] ACETONIDE INJ PER 10MG  -     AL INJECTION INTO SKIN LESIONS, UP TO 7  -     X-Ray Chest PA And Lateral; Future; Expected date: 04/28/2023    Psoriasis      Hidradenitis suppurativa  Today's Plan:      Intralesional Kenalog  "10mg/cc (0.6 cc total) injected into 2 lesions on the left medial thigh and right post medial thigh today after obtaining verbal consent including risk of surrounding hypopigmentation. Patient tolerated procedure well.    Units: 1  NDC for Kenalog 10mg/cc:  5183-2472-25    Intralesional Kenalog 20mg/cc (1.5 cc total) injected into 2 lesions on the right inguinal fold and right medial thigh today after obtaining verbal consent including risk of surrounding hypopigmentation. Patient tolerated procedure well.    Units:4  NDC for Kenalog 40mg/cc:  6579-5918-91              LIFESTYLE:    Weight loss and dietary modifications:  Avoid sugars and processed foods  Limit "white" foods ie. Bread, rice, pasta, potatoes  - recommend limiting to 1/2 cup per serving  Limit dairy     Stop smoking     Keep skin cool as overheating can flare HS    Avoid shaving affected areas and wearing tight fitting clothing as friction exacerbates disease    TREATMENT:    Dilute bleach to affected areas: compresses daily to affected/flared area(s) - can use CLn wash on warm wet rag as compress  If have groin involvement, may want to take dilute bleach baths (daily when flared; 2x/week for maintenance). Recipe for dilute bleach bath:    add 1/2 cup of regular strength (6%) bleach to a full tub of lukewarm water and soak for 10 - 15 minutes. (use 1/4 cup for a half-full tub of water)   OR Add Clorox (2 teaspoons/gal of water, max. 2/3 cup) to bath water     Wash affected areas with Hibiclens +/- Benzoyl peroxide every day      Take Turmeric (with black pepper) supplement at 500mg to 1 gram per day (available over the counter and on [employer])   Can cause upset stomach    Apply Clindamycin solution 1x/day to "quiet affected areas" and 2x/day to flared affected areas    Take Spironolactone (aldactone) at 50 mg every day    Continue Otezla at 30mg po 2x/day          CONSIDER:  Adding 3 small cans of V8 juice per week  Metformin ER at 500mg every night " with dinner -- can decrease # and severity of lesions   SE: diarrhea        RESOURCE:  [email or web address]      FOR FLARES (new painful bump):  Message office through [employer] for injection which will often hasten resolution of tender, red bump    Can apply warm/hot compresses on a painful, deep lump    If notice a foul-smelling drainage, can use Hydrogen Peroxide to cleanse area followed by application of Medi-honey to open area (apply Medi-honey 1x/day)                No follow-ups on file.

## 2023-04-28 NOTE — PATIENT INSTRUCTIONS
"LIFESTYLE:    Weight loss and dietary modifications:  Avoid sugars and processed foods  Limit "white" foods ie. Bread, rice, pasta, potatoes  - recommend limiting to 1/2 cup per serving  Limit dairy     Stop smoking     Keep skin cool as overheating can flare HS    Avoid shaving affected areas and wearing tight fitting clothing as friction exacerbates disease    TREATMENT:    Dilute bleach to affected areas: compresses daily to affected/flared area(s) - can use CLn wash on warm wet rag as compress  If have groin involvement, may want to take dilute bleach baths (daily when flared; 2x/week for maintenance). Recipe for dilute bleach bath:    add 1/2 cup of regular strength (6%) bleach to a full tub of lukewarm water and soak for 10 - 15 minutes. (use 1/4 cup for a half-full tub of water)   OR Add Clorox (2 teaspoons/gal of water, max. 2/3 cup) to bath water     Wash affected areas with Hibiclens +/- Benzoyl peroxide every day      Take Turmeric (with black pepper) supplement at 500mg to 1 gram per day (available over the counter and on [employer])   Can cause upset stomach    Apply Clindamycin solution 1x/day to "quiet affected areas" and 2x/day to flared affected areas    Take Spironolactone (aldactone) at 50 mg every day    Continue Otezla at 30mg po 2x/day          CONSIDER:  Adding 3 small cans of V8 juice per week  Metformin ER at 500mg every night with dinner -- can decrease # and severity of lesions   SE: diarrhea        RESOURCE:  [employer]      FOR FLARES (new painful bump):  Message office through [employer] for injection which will often hasten resolution of tender, red bump    Can apply warm/hot compresses on a painful, deep lump    If notice a foul-smelling drainage, can use Hydrogen Peroxide to cleanse area followed by application of Medi-honey to open area (apply Medi-honey 1x/day)              "

## 2023-05-01 ENCOUNTER — PATIENT MESSAGE (OUTPATIENT)
Dept: DERMATOLOGY | Facility: CLINIC | Age: 63
End: 2023-05-01
Payer: COMMERCIAL

## 2023-05-01 ENCOUNTER — SPECIALTY PHARMACY (OUTPATIENT)
Dept: PHARMACY | Facility: CLINIC | Age: 63
End: 2023-05-01
Payer: COMMERCIAL

## 2023-05-01 NOTE — TELEPHONE ENCOUNTER
Specialty Pharmacy - Refill Coordination    Specialty Medication Orders Linked to Encounter      Flowsheet Row Most Recent Value   Medication #1 OTEZLA 30 mg Tab (Order#[id number], Rx#[id number])            Refill Questions - Documented Responses      Flowsheet Row Most Recent Value   Refill Screening Questions    Changes to allergies? No   Changes to medications? No   New conditions since last clinic visit? No   Unplanned office visit, urgent care, ED, or hospital admission in the last 4 weeks? No   How does patient/caregiver feel medication is working? Very good   Financial problems or insurance changes? No   How many doses of your specialty medications were missed in the last 4 weeks? 0   Would patient like to speak to a pharmacist? No   When does the patient need to receive the medication? 05/06/23   Refill Delivery Questions    How will the patient receive the medication? MEDRx   When does the patient need to receive the medication? 05/06/23   Shipping Address Prescription   Address in [facility] confirmed and updated if neccessary? Yes   Expected Copay ($) 0   Is the patient able to afford the medication copay? Yes   Payment Method zero copay   Days supply of Refill 30   Supplies needed? No supplies needed   Refill activity completed? Yes   Refill activity plan Refill scheduled   Shipment/Pickup Date: 05/03/23            Current Outpatient Medications   Medication Sig    augmented betamethasone dipropionate (DIPROLENE-AF) 0.05 % ointment MARIA ALEJANDRA EXT AA BID FOR 14 DAYS    betamethasone dipropionate (DIPROLENE) 0.05 % ointment Apply topically 2 (two) times daily. Prn psoriasis flares (Patient not taking: Reported on 4/28/2023)    cetirizine (ZYRTEC) 10 MG tablet Take 1 tablet up to twice a day.  Can be used regularly or just when needed. (Patient taking differently: Take 1 tablet up to twice a day.  Can be used regularly or just when needed.)    clindamycin (CLEOCIN T) 1 % external solution Apply topically 2  (two) times daily. To groin and axilla prn breakouts (Patient not taking: Reported on 4/28/2023)    clindamycin (CLEOCIN T) 1 % external solution Apply topically 2 (two) times daily. To groin area for pimples (Patient not taking: Reported on 4/28/2023)    ibuprofen (ADVIL,MOTRIN) 800 MG tablet Take 800 mg by mouth 2 (two) times daily as needed.    OTEZLA 30 mg Tab TAKE 1 TABLET BY MOUTH  TWICE DAILY   Last reviewed on 4/28/2023 10:31 AM by Kathe Quan MD    Review of patient's allergies indicates:   Allergen Reactions    Morphine Other (See Comments)     headaches    Latex, natural rubber Rash and Other (See Comments)     Redness and peeling only with bandaids    Penicillins Nausea And Vomiting and Rash    Last reviewed on  4/28/2023 10:31 AM by Ktahe Quan      Tasks added this encounter   No tasks added.   Tasks due within next 3 months   6/14/2023 - Clinical Assessment (1 year recurrence)     Augusta Batista, PharmD  [facility]  [address] [place], [place] [address]  [facility] [address]  Phone: [phone]  Fax: [phone]

## 2023-05-02 DIAGNOSIS — L73.2 HIDRADENITIS SUPPURATIVA: Primary | ICD-10-CM

## 2023-05-02 RX ORDER — SPIRONOLACTONE 50 MG/1
TABLET, FILM COATED ORAL
Qty: 90 TABLET | Refills: 1 | Status: SHIPPED | OUTPATIENT
Start: 2023-05-02 | End: 2023-06-22 | Stop reason: SDUPTHER

## 2023-05-03 ENCOUNTER — HOSPITAL ENCOUNTER (OUTPATIENT)
Dept: RADIOLOGY | Facility: HOSPITAL | Age: 63
Discharge: HOME | End: 2023-05-03
Attending: DERMATOLOGY
Payer: COMMERCIAL

## 2023-05-03 DIAGNOSIS — L73.2 HIDRADENITIS SUPPURATIVA: ICD-10-CM

## 2023-05-03 PROCEDURE — 71046 XR CHEST PA AND LATERAL: ICD-10-PCS | Mod: 26,,, | Performed by: RADIOLOGY

## 2023-05-03 PROCEDURE — 71046 X-RAY EXAM CHEST 2 VIEWS: CPT | Mod: TC,FY

## 2023-05-03 PROCEDURE — 71046 X-RAY EXAM CHEST 2 VIEWS: CPT | Mod: 26,,, | Performed by: RADIOLOGY

## 2023-05-11 LAB
ACID FAST MOD KINY STN SPEC: NORMAL
MYCOBACTERIUM SPEC QL CULT: NORMAL

## 2023-05-12 ENCOUNTER — PATIENT OUTREACH (OUTPATIENT)
Dept: ADMINISTRATIVE | Facility: HOSPITAL | Age: 63
End: 2023-05-12
Payer: COMMERCIAL

## 2023-05-12 ENCOUNTER — PATIENT MESSAGE (OUTPATIENT)
Dept: ADMINISTRATIVE | Facility: HOSPITAL | Age: 63
End: 2023-05-12
Payer: COMMERCIAL

## 2023-05-12 NOTE — PROGRESS NOTES
2023 Care Everywhere updates requested and reviewed.  Immunizations reconciled. Media reports reviewed.  Duplicate HM overrides and  orders removed.  Overdue HM topic chart audit and/or requested.  Overdue lab testing linked to upcoming lab appointments if applies.  Lab juany, and FindThatCourse reviewed   DIS reviewed      Portal outreached regarding Health maintenance     Health Maintenance Due   Topic Date Due    Hepatitis C Screening  Never done    Cervical Cancer Screening  Never done    Lipid Panel  Never done    Pneumococcal Vaccines (Age 0-64) (1 - PCV) Never done    HIV Screening  Never done    Shingles Vaccine (1 of 2) Never done    Mammogram  2022    COVID-19 Vaccine (5 - Booster for Pfizer series) 2023    Colorectal Cancer Screening  2023

## 2023-05-13 ENCOUNTER — TELEPHONE (OUTPATIENT)
Dept: INTERNAL MEDICINE | Facility: CLINIC | Age: 63
End: 2023-05-13
Payer: COMMERCIAL

## 2023-05-13 NOTE — TELEPHONE ENCOUNTER
----- Message from uSraj Herrera III, MD sent at 5/9/2023  6:26 PM CDT -----  Regarding: overdue for appt  Sure! No problem  )_______  Hi Ilana  can we get her in for an appointment ok to overbook   ----- Message -----  From: Kathe Quan MD  Sent: 5/3/2023  12:49 PM CDT  To: MD Horacio Marin III,  I saw this lady in my clinic for hidradenitis suppurativa. She has a long h/o smoking. As part of age-appropriate cancer screening (in anticipation for potential biologic use), I ordered a CXR.   Results:  Impression:     Questionable 2 cm nodular opacity projecting over the retrosternal space on the lateral view, possibly related to shadow superimposition but not present on the previous radiograph.  Recommend further characterization with a dedicated CT thorax.     Would you please take over from here in ordering her a CT chest?     Thanks MELONY PRITCHETT she has also never had a colonoscopy and is due for her mammogram

## 2023-05-13 NOTE — TELEPHONE ENCOUNTER
Patient confirmed appointment dated for 05/26/2023 that she previously had already scheduled. Pt also states she will have mammogram and colonoscopy done when she returns from her vacation.

## 2023-05-25 NOTE — PROGRESS NOTES
Assessment:       1. Solitary pulmonary nodule    2. Screening for cervical cancer    3. Encounter for screening for HIV    4. Encounter for screening mammogram for breast cancer    5. Screening for colon cancer    6. Tobacco dependence    7. Preventative health care    8. Morbid obesity    9. Drug-induced immunodeficiency    10. Leukopenia, unspecified type    11. Leukocytosis, unspecified type    12. Situational anxiety        Plan:         Radha was seen today for cough.    Diagnoses and all orders for this visit:    Solitary pulmonary nodule    New   Uncontrolled  Signs, symptoms consistent with unclear etiology but concering for malignacny  I provided instruction on supportive care measures   Prior tesing reviewed and new testing was was given   reviewed signs and symptoms that should prompt return to provider or evaluation in the ED  -     CT Chest Without Contrast; Future    Screening for cervical cancer  -     Cancel: Ambulatory referral/consult to Obstetrics / Gynecology; Future    Encounter for screening for HIV    Encounter for screening mammogram for breast cancer  -     Mammo Digital Screening Bilat; Future    Screening for colon cancer  -     Cancel: Case Request Endoscopy: COLONOSCOPY  -     Fecal Immunochemical Test (iFOBT); Future    Tobacco dependence  -     Cancel: Ambulatory referral/consult to Smoking Cessation Program; Future      Morbid obesity    Drug-induced immunodeficiency    Leukopenia, unspecified type    Leukocytosis, unspecified type  -     Cancel: Pathologist Interpretation Differential; Future  -     Pathologist Interpretation Differential; Future  -     Pathologist Interpretation Differential    Situational anxiety  -     LORazepam (ATIVAN) 0.5 MG tablet; Take 1 tablet (0.5 mg total) by mouth every 6 (six) hours as needed for Anxiety.        Let get the CT scan of the chest- we will discuss once results come back   Lets to your annual blood work  We should get your colon cancer  "screening  Please get your mammogram and we need to do another PAP screening   Please stop smoking if your smoking     Subjective:       Patient ID: Radha Ervin is a 62 y.o. female.    Chief Complaint: Cough      Interim Hx  Last physical in 9/2021    Concerns today    Abnormla cxr     Chronic problems     HPI    Patient Active Problem List   Diagnosis    Primary localized osteoarthrosis of right lower leg    BMI 40.0-44.9, adult    Other atopic dermatitis    Long-term current use of intravenous immunoglobulin (IVIG)    Hidradenitis suppurativa    Tobacco dependence    Nuclear sclerosis of both eyes    Visual field defect         Review of Systems   All other systems reviewed and are negative.          Health Maintenance Due   Topic Date Due    Hepatitis C Screening  Never done    Cervical Cancer Screening  Never done    Lipid Panel  Never done    Pneumococcal Vaccines (Age 0-64) (1 - PCV) Never done    HIV Screening  Never done    Shingles Vaccine (1 of 2) Never done    Mammogram  09/29/2022    COVID-19 Vaccine (5 - Booster for Pfizer series) 01/11/2023    Colorectal Cancer Screening  04/26/2023         Objective:     /82 (BP Location: Right arm, Patient Position: Sitting, BP Method: Large (Manual))   Pulse 99   Ht 4' 11" (1.499 m)   LMP  (LMP Unknown)   SpO2 95%   BMI 42.21 kg/m²     Vitals 5/26/2023 6/4/2022 4/6/2022 9/22/2021 9/9/2019   Height 59 59 59 59 59.000   Weight (lbs) - - 209 203.26 196.65   BMI (kg/m2) - - 42.2 41 39.8              Physical Exam  Vitals and nursing note reviewed.   Constitutional:       General: She is not in acute distress.     Appearance: She is well-developed. She is not diaphoretic.   HENT:      Head: Normocephalic.      Nose: Nose normal.   Eyes:      General:         Right eye: No discharge.         Left eye: No discharge.      Conjunctiva/sclera: Conjunctivae normal.      Pupils: Pupils are equal, round, and reactive to light.   Cardiovascular:      Rate and " Rhythm: Normal rate and regular rhythm.      Heart sounds: Normal heart sounds. No murmur heard.    No friction rub. No gallop.   Pulmonary:      Effort: Pulmonary effort is normal. No respiratory distress.   Abdominal:      General: Bowel sounds are normal. There is no distension.      Palpations: Abdomen is soft.   Musculoskeletal:         General: No deformity. Normal range of motion.      Cervical back: Normal range of motion.   Skin:     General: Skin is warm.   Neurological:      Mental Status: She is alert and oriented to person, place, and time.      Cranial Nerves: No cranial nerve deficit.         No results found for this or any previous visit (from the past 336 hour(s)).      ASCVD  The ASCVD Risk score (Jim DK, et al., 2019) failed to calculate for the following reasons:    Cannot find a previous HDL lab    Cannot find a previous total cholesterol lab    Basic Labs    BMP  Lab Results   Component Value Date     04/28/2023    K 4.3 04/28/2023     04/28/2023    CO2 26 04/28/2023    BUN 12.0 04/28/2023    CREATININE 0.86 04/28/2023    CALCIUM 9.8 04/28/2023    ANIONGAP 13.3 04/28/2023    ESTGFRAFRICA >60.0 04/06/2020    EGFRNONAA >60.0 04/06/2020     Lab Results   Component Value Date    EGFRNORACEVR 76 (L) 04/28/2023       Lab Results   Component Value Date    ALT 18 04/28/2023    AST 15 04/28/2023    ALKPHOS 121 04/28/2023    BILITOT 0.4 04/28/2023         No results found for: TSH  Lab Results   Component Value Date    WBC 12.3 (H) 04/28/2023    HGB 14.4 04/28/2023    HCT 43.6 04/28/2023    MCV 92.5 04/28/2023     04/28/2023           STD  No results found for: HIV1X2, DUS67EFRD  No results found for: RPR  No results found for: HAV, HEPAIGM, HEPBIGM, HEPBCAB, HBEAG, HEPCAB  No results found for: LABNGO, LABCHLA        Lipids  No results found for: CHOL  No results found for: HDL  No results found for: LDLCALC  No results found for: TRIG  No results found for: CHOLHDL    DM  Lab  Results   Component Value Date    HGBA1C 6.3 (H) 04/28/2023       X-Ray Chest PA And Lateral  Narrative: EXAMINATION:  XR CHEST PA AND LATERAL    CLINICAL HISTORY:  Hidradenitis suppurativa    TECHNIQUE:  PA and lateral views of the chest were performed.    COMPARISON:  Radiograph 05/27/2019.    FINDINGS:  Mediastinal structures are midline.  Hilar contours are unremarkable.  Cardiac silhouette is normal in size.  Lung volumes are normal and symmetric.  No consolidation.  Questionable 2 cm nodular opacity projecting over the retrosternal space on the lateral view, not definitely present on the previous radiograph.  No pneumothorax or pleural effusion.  No free air beneath the diaphragm.  No acute osseous abnormalities.  Thoracic dextroscoliosis.  Visualized soft tissues are within normal limits.  Right upper lobe cholecystectomy clips.  Visualized soft tissues are within normal limits.  Impression: Questionable 2 cm nodular opacity projecting over the retrosternal space on the lateral view, possibly related to shadow superimposition but not present on the previous radiograph.  Recommend further characterization with a dedicated CT thorax.    This report was flagged in Epic as abnormal.    Electronically signed by: Johnathan Barron MD  Date:    05/03/2023  Time:    10:19        Future Appointments   Date Time Provider Department Center   5/26/2023 12:00 PM [facility] CT2 LIMIT 450 LBS [facility]   6/23/2023 10:00 AM Kathe Quan MD [facility] DERM [facility]         Medication List with Changes/Refills   Current Medications    AUGMENTED BETAMETHASONE DIPROPIONATE (DIPROLENE-AF) 0.05 % OINTMENT    MARIA ALEJANDRA EXT AA BID FOR 14 DAYS    BETAMETHASONE DIPROPIONATE (DIPROLENE) 0.05 % OINTMENT    Apply topically 2 (two) times daily. Prn psoriasis flares    CETIRIZINE (ZYRTEC) 10 MG TABLET    Take 1 tablet up to twice a day.  Can be used regularly or just when needed.    CLINDAMYCIN (CLEOCIN T) 1 % EXTERNAL SOLUTION    Apply  topically 2 (two) times daily. To groin and axilla prn breakouts    CLINDAMYCIN (CLEOCIN T) 1 % EXTERNAL SOLUTION    Apply topically 2 (two) times daily. To groin area for pimples    IBUPROFEN (ADVIL,MOTRIN) 800 MG TABLET    Take 800 mg by mouth 2 (two) times daily as needed.    OTEZLA 30 MG TAB    TAKE 1 TABLET BY MOUTH  TWICE DAILY    SPIRONOLACTONE (ALDACTONE) 50 MG TABLET    Take 1 po qday         Disclaimer:  This note has been generated using voice-recognition software. There may be grammatical or spelling errors that have been missed during proof-reading

## 2023-05-26 ENCOUNTER — TELEPHONE (OUTPATIENT)
Dept: PULMONOLOGY | Facility: CLINIC | Age: 63
End: 2023-05-26
Payer: COMMERCIAL

## 2023-05-26 ENCOUNTER — OFFICE VISIT (OUTPATIENT)
Dept: INTERNAL MEDICINE | Facility: CLINIC | Age: 63
End: 2023-05-26
Payer: COMMERCIAL

## 2023-05-26 ENCOUNTER — HOSPITAL ENCOUNTER (OUTPATIENT)
Dept: RADIOLOGY | Facility: HOSPITAL | Age: 63
Discharge: HOME | End: 2023-05-26
Attending: INTERNAL MEDICINE
Payer: COMMERCIAL

## 2023-05-26 VITALS
HEART RATE: 99 BPM | OXYGEN SATURATION: 95 % | BODY MASS INDEX: 42.21 KG/M2 | SYSTOLIC BLOOD PRESSURE: 110 MMHG | DIASTOLIC BLOOD PRESSURE: 82 MMHG | HEIGHT: 59 IN

## 2023-05-26 DIAGNOSIS — D84.821 DRUG-INDUCED IMMUNODEFICIENCY: ICD-10-CM

## 2023-05-26 DIAGNOSIS — Z12.31 ENCOUNTER FOR SCREENING MAMMOGRAM FOR BREAST CANCER: ICD-10-CM

## 2023-05-26 DIAGNOSIS — Z12.4 SCREENING FOR CERVICAL CANCER: ICD-10-CM

## 2023-05-26 DIAGNOSIS — Z00.00 PREVENTATIVE HEALTH CARE: ICD-10-CM

## 2023-05-26 DIAGNOSIS — Z11.4 ENCOUNTER FOR SCREENING FOR HIV: ICD-10-CM

## 2023-05-26 DIAGNOSIS — F17.200 TOBACCO DEPENDENCE: ICD-10-CM

## 2023-05-26 DIAGNOSIS — Z12.11 SCREENING FOR COLON CANCER: ICD-10-CM

## 2023-05-26 DIAGNOSIS — D72.819 LEUKOPENIA, UNSPECIFIED TYPE: ICD-10-CM

## 2023-05-26 DIAGNOSIS — Z79.899 DRUG-INDUCED IMMUNODEFICIENCY: ICD-10-CM

## 2023-05-26 DIAGNOSIS — R91.1 SOLITARY PULMONARY NODULE: ICD-10-CM

## 2023-05-26 DIAGNOSIS — R91.1 SOLITARY PULMONARY NODULE: Primary | ICD-10-CM

## 2023-05-26 DIAGNOSIS — F41.8 SITUATIONAL ANXIETY: ICD-10-CM

## 2023-05-26 DIAGNOSIS — E66.01 MORBID OBESITY: ICD-10-CM

## 2023-05-26 DIAGNOSIS — D72.829 LEUKOCYTOSIS, UNSPECIFIED TYPE: ICD-10-CM

## 2023-05-26 PROCEDURE — 3074F PR MOST RECENT SYSTOLIC BLOOD PRESSURE < 130 MM HG: ICD-10-PCS | Mod: CPTII,S$GLB,, | Performed by: INTERNAL MEDICINE

## 2023-05-26 PROCEDURE — 3079F DIAST BP 80-89 MM HG: CPT | Mod: CPTII,S$GLB,, | Performed by: INTERNAL MEDICINE

## 2023-05-26 PROCEDURE — 99214 PR OFFICE/OUTPT VISIT, EST, LEVL IV, 30-39 MIN: ICD-10-PCS | Mod: S$GLB,,, | Performed by: INTERNAL MEDICINE

## 2023-05-26 PROCEDURE — 1160F PR REVIEW ALL MEDS BY PRESCRIBER/CLIN PHARMACIST DOCUMENTED: ICD-10-PCS | Mod: CPTII,S$GLB,, | Performed by: INTERNAL MEDICINE

## 2023-05-26 PROCEDURE — 1160F RVW MEDS BY RX/DR IN RCRD: CPT | Mod: CPTII,S$GLB,, | Performed by: INTERNAL MEDICINE

## 2023-05-26 PROCEDURE — 1159F PR MEDICATION LIST DOCUMENTED IN MEDICAL RECORD: ICD-10-PCS | Mod: CPTII,S$GLB,, | Performed by: INTERNAL MEDICINE

## 2023-05-26 PROCEDURE — 99999 PR PBB SHADOW E&M-EST. PATIENT-LVL V: CPT | Mod: PBBFAC,,, | Performed by: INTERNAL MEDICINE

## 2023-05-26 PROCEDURE — 71250 CT THORAX DX C-: CPT | Mod: 26,,, | Performed by: RADIOLOGY

## 2023-05-26 PROCEDURE — 3008F BODY MASS INDEX DOCD: CPT | Mod: CPTII,S$GLB,, | Performed by: INTERNAL MEDICINE

## 2023-05-26 PROCEDURE — 1159F MED LIST DOCD IN RCRD: CPT | Mod: CPTII,S$GLB,, | Performed by: INTERNAL MEDICINE

## 2023-05-26 PROCEDURE — 99999 PR PBB SHADOW E&M-EST. PATIENT-LVL V: ICD-10-PCS | Mod: PBBFAC,,, | Performed by: INTERNAL MEDICINE

## 2023-05-26 PROCEDURE — 99214 OFFICE O/P EST MOD 30 MIN: CPT | Mod: S$GLB,,, | Performed by: INTERNAL MEDICINE

## 2023-05-26 PROCEDURE — 71250 CT THORAX DX C-: CPT | Mod: TC

## 2023-05-26 PROCEDURE — 3044F HG A1C LEVEL LT 7.0%: CPT | Mod: CPTII,S$GLB,, | Performed by: INTERNAL MEDICINE

## 2023-05-26 PROCEDURE — 3074F SYST BP LT 130 MM HG: CPT | Mod: CPTII,S$GLB,, | Performed by: INTERNAL MEDICINE

## 2023-05-26 PROCEDURE — 71250 CT CHEST WITHOUT CONTRAST: ICD-10-PCS | Mod: 26,,, | Performed by: RADIOLOGY

## 2023-05-26 PROCEDURE — 3008F PR BODY MASS INDEX (BMI) DOCUMENTED: ICD-10-PCS | Mod: CPTII,S$GLB,, | Performed by: INTERNAL MEDICINE

## 2023-05-26 PROCEDURE — 3044F PR MOST RECENT HEMOGLOBIN A1C LEVEL <7.0%: ICD-10-PCS | Mod: CPTII,S$GLB,, | Performed by: INTERNAL MEDICINE

## 2023-05-26 PROCEDURE — 3079F PR MOST RECENT DIASTOLIC BLOOD PRESSURE 80-89 MM HG: ICD-10-PCS | Mod: CPTII,S$GLB,, | Performed by: INTERNAL MEDICINE

## 2023-05-26 RX ORDER — LORAZEPAM 0.5 MG/1
0.5 TABLET ORAL EVERY 6 HOURS PRN
Qty: 3 TABLET | Refills: 0 | Status: SHIPPED | OUTPATIENT
Start: 2023-05-26 | End: 2023-08-28

## 2023-05-26 NOTE — PATIENT INSTRUCTIONS
Let get the CT scan of the chest  Lets to your annual blood work at [facility]   We should get your colon cancer screening  Please get your mammogram and we need to do another PAP screening   Please stop smoking if your smoking. Keep using the patches   We should check in once the results are back

## 2023-05-26 NOTE — TELEPHONE ENCOUNTER
----- Message from Suraj Herrera III, MD sent at 5/26/2023  1:48 PM CDT -----  Hi, never thought I'd see so much lung cancer  . Patient hasn't seen me in a while, cxr with mass and just saw me today and got CT/  She is supposed be going on vacation in 5/30 to [place] and doesn't come June 7   Are you able to see her after?

## 2023-05-29 ENCOUNTER — TELEPHONE (OUTPATIENT)
Dept: PULMONOLOGY | Facility: CLINIC | Age: 63
End: 2023-05-29
Payer: COMMERCIAL

## 2023-05-29 NOTE — TELEPHONE ENCOUNTER
Called and spoke with pt to confirm scheduling of referral appointment with Dr. Mccollum on Wednesday June 14th.  Instructions given on where to go with verbalized understanding.  Appointment slip mailed.

## 2023-05-30 ENCOUNTER — SPECIALTY PHARMACY (OUTPATIENT)
Dept: PHARMACY | Facility: CLINIC | Age: 63
End: 2023-05-30

## 2023-05-30 LAB
BASOPHILS ABSOLUTE COUNT: 0.1 THOUSAND/UL (ref 0–0.2)
BASOPHILS NFR BLD: 0.7 % (ref 0–2)
CHOL/HDLC RATIO: 3.98
CHOLEST SERPL-MSCNC: 167 MG/DL (ref 100–200)
EOSINOPHIL NFR BLD: 3.6 % (ref 0–4)
EOSINOPHILS ABSOLUTE COUNT: 0.4 THOUSAND/UL (ref 0–0.7)
ERYTHROCYTE [DISTWIDTH] IN BLOOD BY AUTOMATED COUNT: 14.5 % (ref 12–15.3)
HCT VFR BLD AUTO: 43.4 % (ref 37–47)
HCV AB S/CO SERPL IA: 0.07
HCV AB SERPL QL IA: NORMAL
HDLC SERPL-MCNC: 42 MG/DL (ref 40–75)
HGB BLD-MCNC: 14.5 GM/DL (ref 12–16)
LDLC SERPL CALC-MCNC: 104 MG/DL (ref 0–125)
LYMPHOCYTES %: 22.3 % (ref 15–45)
LYMPHOCYTES ABSOLUTE COUNT: 2.7 THOUSAND/UL (ref 1–4.2)
MCH RBC QN AUTO: 30.8 PG (ref 27–33)
MCHC RBC AUTO-ENTMCNC: 33.4 G/DL (ref 32–36)
MCV RBC AUTO: 92.1 FL (ref 81–99)
MONOCYTES %: 5.7 % (ref 3–13)
MONOCYTES ABSOLUTE COUNT: 0.7 THOUSAND/UL (ref 0.1–0.8)
NEUTROPHILS ABSOLUTE COUNT: 8.3 THOUSAND/UL (ref 2.1–7.6)
NEUTROPHILS RELATIVE PERCENT: 67.7 % (ref 32–80)
PLATELET # BLD AUTO: 297 THOUSAND/UL (ref 150–350)
PMV BLD AUTO: 9.1 FL (ref 7–10.2)
RBC # BLD AUTO: 4.72 MILLION/UL (ref 4.2–5.4)
TRIGL SERPL-MCNC: 134 MG/DL (ref 30–150)
WBC # BLD AUTO: 12.2 THOUSAND/UL (ref 4.5–11)

## 2023-05-30 NOTE — TELEPHONE ENCOUNTER
Specialty Pharmacy - Refill Coordination    Specialty Medication Orders Linked to Encounter      Flowsheet Row Most Recent Value   Medication #1 OTEZLA 30 mg Tab (Order#[id number], Rx#[id number])            Refill Questions - Documented Responses      Flowsheet Row Most Recent Value   Patient Availability and HIPAA Verification    Does patient want to proceed with activity? Yes   HIPAA/medical authority confirmed? Yes   Relationship to patient of person spoken to? Self   Refill Screening Questions    Changes to allergies? No   Changes to medications? Yes  [Aldactone was added. no DDI w/ Otezla therapy.]   New conditions since last clinic visit? Yes  [lung cancer dx just given on 5/26/23]   Unplanned office visit, urgent care, ED, or hospital admission in the last 4 weeks? No   How does patient/caregiver feel medication is working? Good   Financial problems or insurance changes? No   How many doses of your specialty medications were missed in the last 4 weeks? 1  [Missed doses of Otezla d/t doctor's appts on Friday (and potentially news of lung cancer dx)]   Would patient like to speak to a pharmacist? No   When does the patient need to receive the medication? 06/08/23   Refill Delivery Questions    How will the patient receive the medication? [facility]   When does the patient need to receive the medication? 06/08/23   Shipping Address Prescription   Address in [facility] confirmed and updated if neccessary? Yes   Expected Copay ($) 0   Is the patient able to afford the medication copay? Yes   Payment Method zero copay   Days supply of Refill 30   Supplies needed? No supplies needed   Refill activity completed? Yes   Refill activity plan Refill scheduled   Shipment/Pickup Date: 06/07/23            Current Outpatient Medications   Medication Sig    augmented betamethasone dipropionate (DIPROLENE-AF) 0.05 % ointment MARIA ALEJANDRA EXT AA BID FOR 14 DAYS    betamethasone dipropionate (DIPROLENE) 0.05 % ointment Apply topically  2 (two) times daily. Prn psoriasis flares (Patient not taking: Reported on 4/28/2023)    cetirizine (ZYRTEC) 10 MG tablet Take 1 tablet up to twice a day.  Can be used regularly or just when needed. (Patient taking differently: Take 1 tablet up to twice a day.  Can be used regularly or just when needed.)    clindamycin (CLEOCIN T) 1 % external solution Apply topically 2 (two) times daily. To groin and axilla prn breakouts (Patient not taking: Reported on 4/28/2023)    clindamycin (CLEOCIN T) 1 % external solution Apply topically 2 (two) times daily. To groin area for pimples (Patient not taking: Reported on 4/28/2023)    ibuprofen (ADVIL,MOTRIN) 800 MG tablet Take 800 mg by mouth 2 (two) times daily as needed.    LORazepam (ATIVAN) 0.5 MG tablet Take 1 tablet (0.5 mg total) by mouth every 6 (six) hours as needed for Anxiety.    OTEZLA 30 mg Tab TAKE 1 TABLET BY MOUTH  TWICE DAILY    spironolactone (ALDACTONE) 50 MG tablet Take 1 po qday   Last reviewed on 5/26/2023 11:36 AM by Suraj Herrera III, MD    Review of patient's allergies indicates:   Allergen Reactions    Morphine Other (See Comments)     headaches    Latex, natural rubber Rash and Other (See Comments)     Redness and peeling only with bandaids    Penicillins Nausea And Vomiting and Rash    Last reviewed on  5/26/2023 11:36 AM by Suraj Herrera      Tasks added this encounter   No tasks added.   Tasks due within next 3 months   6/14/2023 - Clinical Assessment (1 year recurrence)  5/29/2023 - Refill Coordination Outreach (1 time occurrence)     Tonia Wall, PharmD  [facility]  [address] [place] [id number]  Phone: [phone]  Fax: [phone]

## 2023-05-31 ENCOUNTER — LAB VISIT (OUTPATIENT)
Dept: LAB | Facility: HOSPITAL | Age: 63
End: 2023-05-31
Attending: INTERNAL MEDICINE
Payer: COMMERCIAL

## 2023-05-31 DIAGNOSIS — Z12.11 SCREENING FOR COLON CANCER: ICD-10-CM

## 2023-05-31 PROCEDURE — 82274 ASSAY TEST FOR BLOOD FECAL: CPT | Performed by: INTERNAL MEDICINE

## 2023-06-06 LAB — HEMOCCULT STL QL IA: NEGATIVE

## 2023-06-07 ENCOUNTER — HOSPITAL ENCOUNTER (OUTPATIENT)
Dept: RADIOLOGY | Facility: HOSPITAL | Age: 63
Discharge: HOME | End: 2023-06-07
Attending: INTERNAL MEDICINE
Payer: COMMERCIAL

## 2023-06-07 DIAGNOSIS — Z12.31 OTHER SCREENING MAMMOGRAM: ICD-10-CM

## 2023-06-07 PROCEDURE — 77063 MAMMO DIGITAL SCREENING BILAT WITH TOMO: ICD-10-PCS | Mod: 26,,, | Performed by: RADIOLOGY

## 2023-06-07 PROCEDURE — 77067 SCR MAMMO BI INCL CAD: CPT | Mod: TC

## 2023-06-07 PROCEDURE — 77067 SCR MAMMO BI INCL CAD: CPT | Mod: 26,,, | Performed by: RADIOLOGY

## 2023-06-07 PROCEDURE — 77063 BREAST TOMOSYNTHESIS BI: CPT | Mod: 26,,, | Performed by: RADIOLOGY

## 2023-06-07 PROCEDURE — 77067 MAMMO DIGITAL SCREENING BILAT WITH TOMO: ICD-10-PCS | Mod: 26,,, | Performed by: RADIOLOGY

## 2023-06-12 ENCOUNTER — TELEPHONE (OUTPATIENT)
Dept: PULMONOLOGY | Facility: CLINIC | Age: 63
End: 2023-06-12
Payer: COMMERCIAL

## 2023-06-12 ENCOUNTER — TELEPHONE (OUTPATIENT)
Dept: DERMATOLOGY | Facility: CLINIC | Age: 63
End: 2023-06-12
Payer: COMMERCIAL

## 2023-06-12 DIAGNOSIS — R91.1 NODULE OF LEFT LUNG: Primary | ICD-10-CM

## 2023-06-12 NOTE — TELEPHONE ENCOUNTER
----- Message from Eri Khan sent at 6/12/2023  3:29 PM CDT -----  Regarding: [email or web address]  Contact: @  [phone]  Pt is calling to reschedule appointment on 6/23 she has another appointment ...no available dates Pleaes call and adv @  [phone]

## 2023-06-12 NOTE — TELEPHONE ENCOUNTER
Spoken with patient, informed her that I have received her message. I advised patient that she will visit with provider before any testing so that the provider can advise what test patient will need based off patient DX. Patient verbalized thats he understand.

## 2023-06-12 NOTE — TELEPHONE ENCOUNTER
Called and spoke with pt to confirm re-scheduling of Pulmonary consult from 6/14 to 6/21 with Dr. Mccollum. Confirmed date and informed pt that she was scheduled to have EBUS on 6/23.  Pt verbalized understanding, new appointment slip mailed.

## 2023-06-12 NOTE — TELEPHONE ENCOUNTER
----- Message from Dominguez ROLON Route sent at 6/12/2023  8:49 AM CDT -----  Regarding: Appointment Upcoming  Contact: pt.[phone]  Pt is scheduled to see provider on 6/14. She is asking if she will be given a PET Scan Or any other Tests. Pt states this is her last month of insurance and if there are any test needed to please let them happen before then. Patient Requesting Call Back @ pt.[phone] or portal

## 2023-06-14 ENCOUNTER — PATIENT MESSAGE (OUTPATIENT)
Dept: INTERNAL MEDICINE | Facility: CLINIC | Age: 63
End: 2023-06-14
Payer: COMMERCIAL

## 2023-06-14 ENCOUNTER — PATIENT MESSAGE (OUTPATIENT)
Dept: PULMONOLOGY | Facility: CLINIC | Age: 63
End: 2023-06-14
Payer: COMMERCIAL

## 2023-06-16 ENCOUNTER — HOSPITAL ENCOUNTER (OUTPATIENT)
Dept: RADIOLOGY | Facility: HOSPITAL | Age: 63
Discharge: HOME | End: 2023-06-16
Attending: INTERNAL MEDICINE
Payer: COMMERCIAL

## 2023-06-16 ENCOUNTER — OFFICE VISIT (OUTPATIENT)
Dept: INTERNAL MEDICINE | Facility: CLINIC | Age: 63
End: 2023-06-16
Payer: COMMERCIAL

## 2023-06-16 VITALS
HEART RATE: 86 BPM | BODY MASS INDEX: 40.31 KG/M2 | OXYGEN SATURATION: 97 % | HEIGHT: 59 IN | DIASTOLIC BLOOD PRESSURE: 82 MMHG | WEIGHT: 199.94 LBS | SYSTOLIC BLOOD PRESSURE: 108 MMHG

## 2023-06-16 DIAGNOSIS — M54.6 ACUTE MIDLINE THORACIC BACK PAIN: ICD-10-CM

## 2023-06-16 DIAGNOSIS — D21.9 FIBROMA: ICD-10-CM

## 2023-06-16 DIAGNOSIS — T14.8XXA MUSCLE STRAIN: ICD-10-CM

## 2023-06-16 DIAGNOSIS — T14.8XXA MUSCLE STRAIN: Primary | ICD-10-CM

## 2023-06-16 PROCEDURE — 1160F RVW MEDS BY RX/DR IN RCRD: CPT | Mod: CPTII,S$GLB,, | Performed by: INTERNAL MEDICINE

## 2023-06-16 PROCEDURE — 99214 PR OFFICE/OUTPT VISIT, EST, LEVL IV, 30-39 MIN: ICD-10-PCS | Mod: S$GLB,,, | Performed by: INTERNAL MEDICINE

## 2023-06-16 PROCEDURE — 72070 XR THORACIC SPINE AP LATERAL: ICD-10-PCS | Mod: 26,,, | Performed by: RADIOLOGY

## 2023-06-16 PROCEDURE — 3044F PR MOST RECENT HEMOGLOBIN A1C LEVEL <7.0%: ICD-10-PCS | Mod: CPTII,S$GLB,, | Performed by: INTERNAL MEDICINE

## 2023-06-16 PROCEDURE — 3044F HG A1C LEVEL LT 7.0%: CPT | Mod: CPTII,S$GLB,, | Performed by: INTERNAL MEDICINE

## 2023-06-16 PROCEDURE — 1160F PR REVIEW ALL MEDS BY PRESCRIBER/CLIN PHARMACIST DOCUMENTED: ICD-10-PCS | Mod: CPTII,S$GLB,, | Performed by: INTERNAL MEDICINE

## 2023-06-16 PROCEDURE — 99999 PR PBB SHADOW E&M-EST. PATIENT-LVL V: ICD-10-PCS | Mod: PBBFAC,,, | Performed by: INTERNAL MEDICINE

## 2023-06-16 PROCEDURE — 1159F MED LIST DOCD IN RCRD: CPT | Mod: CPTII,S$GLB,, | Performed by: INTERNAL MEDICINE

## 2023-06-16 PROCEDURE — 3079F PR MOST RECENT DIASTOLIC BLOOD PRESSURE 80-89 MM HG: ICD-10-PCS | Mod: CPTII,S$GLB,, | Performed by: INTERNAL MEDICINE

## 2023-06-16 PROCEDURE — 3074F PR MOST RECENT SYSTOLIC BLOOD PRESSURE < 130 MM HG: ICD-10-PCS | Mod: CPTII,S$GLB,, | Performed by: INTERNAL MEDICINE

## 2023-06-16 PROCEDURE — 3008F PR BODY MASS INDEX (BMI) DOCUMENTED: ICD-10-PCS | Mod: CPTII,S$GLB,, | Performed by: INTERNAL MEDICINE

## 2023-06-16 PROCEDURE — 3074F SYST BP LT 130 MM HG: CPT | Mod: CPTII,S$GLB,, | Performed by: INTERNAL MEDICINE

## 2023-06-16 PROCEDURE — 72070 X-RAY EXAM THORAC SPINE 2VWS: CPT | Mod: 26,,, | Performed by: RADIOLOGY

## 2023-06-16 PROCEDURE — 72070 X-RAY EXAM THORAC SPINE 2VWS: CPT | Mod: TC,FY

## 2023-06-16 PROCEDURE — 3079F DIAST BP 80-89 MM HG: CPT | Mod: CPTII,S$GLB,, | Performed by: INTERNAL MEDICINE

## 2023-06-16 PROCEDURE — 99999 PR PBB SHADOW E&M-EST. PATIENT-LVL V: CPT | Mod: PBBFAC,,, | Performed by: INTERNAL MEDICINE

## 2023-06-16 PROCEDURE — 1159F PR MEDICATION LIST DOCUMENTED IN MEDICAL RECORD: ICD-10-PCS | Mod: CPTII,S$GLB,, | Performed by: INTERNAL MEDICINE

## 2023-06-16 PROCEDURE — 3008F BODY MASS INDEX DOCD: CPT | Mod: CPTII,S$GLB,, | Performed by: INTERNAL MEDICINE

## 2023-06-16 PROCEDURE — 99214 OFFICE O/P EST MOD 30 MIN: CPT | Mod: S$GLB,,, | Performed by: INTERNAL MEDICINE

## 2023-06-16 RX ORDER — MELOXICAM 15 MG/1
15 TABLET ORAL DAILY
Qty: 30 TABLET | Refills: 0 | Status: SHIPPED | OUTPATIENT
Start: 2023-06-16 | End: 2023-08-28

## 2023-06-16 RX ORDER — CYCLOBENZAPRINE HCL 10 MG
10 TABLET ORAL 3 TIMES DAILY PRN
Qty: 15 TABLET | Refills: 0 | Status: SHIPPED | OUTPATIENT
Start: 2023-06-16 | End: 2023-06-26

## 2023-06-16 NOTE — PATIENT INSTRUCTIONS
Try the the topical and the oral medication  We can set you up with general sugery    You can use the topical below    3. Aprecreme  4. Diclofenac  5. Salonpas    For more severe symptoms you can take the prescription medication

## 2023-06-16 NOTE — PROGRESS NOTES
Assessment:       1. Muscle strain    2. Fibroma    3. Acute midline thoracic back pain          Plan:         Radha was seen today for shoulder pain.    Diagnoses and all orders for this visit:    Muscle strain  -     cyclobenzaprine (FLEXERIL) 10 MG tablet; Take 1 tablet (10 mg total) by mouth 3 (three) times daily as needed for Muscle spasms.  -     meloxicam (MOBIC) 15 MG tablet; Take 1 tablet (15 mg total) by mouth once daily.  -     Cancel: X-Ray Thoracic Spine 4 or more views; Future    Fibroma  -     Ambulatory referral/consult to General Surgery; Future    Acute midline thoracic back pain  -     cyclobenzaprine (FLEXERIL) 10 MG tablet; Take 1 tablet (10 mg total) by mouth 3 (three) times daily as needed for Muscle spasms.  -     meloxicam (MOBIC) 15 MG tablet; Take 1 tablet (15 mg total) by mouth once daily.          Subjective:       Patient ID: Radha Ervin is a 62 y.o. female.    Chief Complaint: Shoulder Pain          Patient reports that symptoms  started   1 weeks   prior to presentation .     Symptoms are are worsening    Patient reports pain is midline .     Pain is worse with Movement      They have tried Tylenol  for the pain .      Scale is 4/10    They describe the pain as dull      Associated symptoms include stinging     Pain does not radiate .       Past Surgical History:   Procedure Laterality Date    achilles tendon repair      CHOLECYSTECTOMY      TOTAL KNEE ARTHROPLASTY         Denies lbp-red flags : Sudden severe Numbness or weakness    Previous episodes include ; of note suspected new dx lung cancer  HPI    Review of Systems   All other systems reviewed and are negative.          Health Maintenance Due   Topic Date Due    Cervical Cancer Screening  Never done    Pneumococcal Vaccines (Age 0-64) (1 - PCV) Never done    HIV Screening  Never done    Shingles Vaccine (1 of 2) Never done    Mammogram  09/29/2022    COVID-19 Vaccine (5 - Pfizer series) 01/11/2023         Objective:     BP  "108/82 (BP Location: Right arm, Patient Position: Sitting, BP Method: Large (Manual))   Pulse 86   Ht 4' 11" (1.499 m)   Wt 90.7 kg (199 lb 15.3 oz)   LMP  (LMP Unknown)   SpO2 97%   BMI 40.39 kg/m²     Vitals 6/16/2023 5/26/2023 6/4/2022 4/6/2022 9/22/2021   Height 59 59 59 59 59   Weight (lbs) 199.96 - - 209 203.26   BMI (kg/m2) 40.4 - - 42.2 41            Physical Exam  Nursing note reviewed.   Constitutional:       General: She is not in acute distress.     Appearance: Normal appearance. She is not ill-appearing, toxic-appearing or diaphoretic.   HENT:      Head: Normocephalic.   Eyes:      Conjunctiva/sclera: Conjunctivae normal.   Cardiovascular:      Rate and Rhythm: Normal rate.   Pulmonary:      Effort: Pulmonary effort is normal. No respiratory distress.   Musculoskeletal:         General: Tenderness present. No deformity.      Comments: Ttp of mid thoracic back    Neurological:      General: No focal deficit present.      Mental Status: She is alert and oriented to person, place, and time.   Psychiatric:         Mood and Affect: Mood normal.         Behavior: Behavior normal.         Thought Content: Thought content normal.         Judgment: Judgment normal.           Future Appointments   Date Time Provider Department Center   6/21/2023  8:30 AM Radha Mccollum MD [facility]   6/28/2023 10:20 AM Lucho Vazquez MD [facility] [place]   7/28/2023 10:20 AM Kathe Quan MD [facility] [address]   12/18/2023  9:20 AM Suraj Herrera III, MD [facility]         Medication List with Changes/Refills   New Medications    CYCLOBENZAPRINE (FLEXERIL) 10 MG TABLET    Take 1 tablet (10 mg total) by mouth 3 (three) times daily as needed for Muscle spasms.    MELOXICAM (MOBIC) 15 MG TABLET    Take 1 tablet (15 mg total) by mouth once daily.   Current Medications    AUGMENTED BETAMETHASONE DIPROPIONATE (DIPROLENE-AF) 0.05 % OINTMENT    MARIA ALEJANDRA EXT AA BID FOR 14 DAYS    BETAMETHASONE " DIPROPIONATE (DIPROLENE) 0.05 % OINTMENT    Apply topically 2 (two) times daily. Prn psoriasis flares    CETIRIZINE (ZYRTEC) 10 MG TABLET    Take 1 tablet up to twice a day.  Can be used regularly or just when needed.    CLINDAMYCIN (CLEOCIN T) 1 % EXTERNAL SOLUTION    Apply topically 2 (two) times daily. To groin and axilla prn breakouts    CLINDAMYCIN (CLEOCIN T) 1 % EXTERNAL SOLUTION    Apply topically 2 (two) times daily. To groin area for pimples    IBUPROFEN (ADVIL,MOTRIN) 800 MG TABLET    Take 800 mg by mouth 2 (two) times daily as needed.    LORAZEPAM (ATIVAN) 0.5 MG TABLET    Take 1 tablet (0.5 mg total) by mouth every 6 (six) hours as needed for Anxiety.    OTEZLA 30 MG TAB    TAKE 1 TABLET BY MOUTH  TWICE DAILY    SPIRONOLACTONE (ALDACTONE) 50 MG TABLET    Take 1 po qday         Disclaimer:  This note has been generated using voice-recognition software. There may be grammatical or spelling errors that have been missed during proof-reading

## 2023-06-19 ENCOUNTER — TELEPHONE (OUTPATIENT)
Dept: PULMONOLOGY | Facility: CLINIC | Age: 63
End: 2023-06-19
Payer: COMMERCIAL

## 2023-06-19 NOTE — TELEPHONE ENCOUNTER
Called and spoke with pt to confirm not taking blood thinning medication.  Currently not taking Mobic.

## 2023-06-21 ENCOUNTER — OFFICE VISIT (OUTPATIENT)
Dept: PULMONOLOGY | Facility: CLINIC | Age: 63
End: 2023-06-21
Payer: COMMERCIAL

## 2023-06-21 ENCOUNTER — PATIENT MESSAGE (OUTPATIENT)
Dept: DERMATOLOGY | Facility: CLINIC | Age: 63
End: 2023-06-21
Payer: COMMERCIAL

## 2023-06-21 VITALS
OXYGEN SATURATION: 95 % | DIASTOLIC BLOOD PRESSURE: 85 MMHG | TEMPERATURE: 98 F | HEART RATE: 102 BPM | SYSTOLIC BLOOD PRESSURE: 122 MMHG | BODY MASS INDEX: 40.43 KG/M2 | WEIGHT: 200.19 LBS

## 2023-06-21 DIAGNOSIS — Z87.891 FORMER SMOKER: ICD-10-CM

## 2023-06-21 DIAGNOSIS — R91.1 PULMONARY NODULE: Primary | ICD-10-CM

## 2023-06-21 DIAGNOSIS — J43.2 CENTRILOBULAR EMPHYSEMA: ICD-10-CM

## 2023-06-21 PROBLEM — J43.9 EMPHYSEMA LUNG: Status: ACTIVE | Noted: 2023-06-21

## 2023-06-21 PROCEDURE — 1160F PR REVIEW ALL MEDS BY PRESCRIBER/CLIN PHARMACIST DOCUMENTED: ICD-10-PCS | Mod: CPTII,S$GLB,, | Performed by: INTERNAL MEDICINE

## 2023-06-21 PROCEDURE — 1160F RVW MEDS BY RX/DR IN RCRD: CPT | Mod: CPTII,S$GLB,, | Performed by: INTERNAL MEDICINE

## 2023-06-21 PROCEDURE — 99204 OFFICE O/P NEW MOD 45 MIN: CPT | Mod: S$GLB,,, | Performed by: INTERNAL MEDICINE

## 2023-06-21 PROCEDURE — 3044F PR MOST RECENT HEMOGLOBIN A1C LEVEL <7.0%: ICD-10-PCS | Mod: CPTII,S$GLB,, | Performed by: INTERNAL MEDICINE

## 2023-06-21 PROCEDURE — 3008F PR BODY MASS INDEX (BMI) DOCUMENTED: ICD-10-PCS | Mod: CPTII,S$GLB,, | Performed by: INTERNAL MEDICINE

## 2023-06-21 PROCEDURE — 3074F PR MOST RECENT SYSTOLIC BLOOD PRESSURE < 130 MM HG: ICD-10-PCS | Mod: CPTII,S$GLB,, | Performed by: INTERNAL MEDICINE

## 2023-06-21 PROCEDURE — 99999 PR PBB SHADOW E&M-EST. PATIENT-LVL IV: CPT | Mod: PBBFAC,,, | Performed by: INTERNAL MEDICINE

## 2023-06-21 PROCEDURE — 99999 PR PBB SHADOW E&M-EST. PATIENT-LVL IV: ICD-10-PCS | Mod: PBBFAC,,, | Performed by: INTERNAL MEDICINE

## 2023-06-21 PROCEDURE — 3074F SYST BP LT 130 MM HG: CPT | Mod: CPTII,S$GLB,, | Performed by: INTERNAL MEDICINE

## 2023-06-21 PROCEDURE — 3008F BODY MASS INDEX DOCD: CPT | Mod: CPTII,S$GLB,, | Performed by: INTERNAL MEDICINE

## 2023-06-21 PROCEDURE — 1159F PR MEDICATION LIST DOCUMENTED IN MEDICAL RECORD: ICD-10-PCS | Mod: CPTII,S$GLB,, | Performed by: INTERNAL MEDICINE

## 2023-06-21 PROCEDURE — 3079F PR MOST RECENT DIASTOLIC BLOOD PRESSURE 80-89 MM HG: ICD-10-PCS | Mod: CPTII,S$GLB,, | Performed by: INTERNAL MEDICINE

## 2023-06-21 PROCEDURE — 1159F MED LIST DOCD IN RCRD: CPT | Mod: CPTII,S$GLB,, | Performed by: INTERNAL MEDICINE

## 2023-06-21 PROCEDURE — 3044F HG A1C LEVEL LT 7.0%: CPT | Mod: CPTII,S$GLB,, | Performed by: INTERNAL MEDICINE

## 2023-06-21 PROCEDURE — 99204 PR OFFICE/OUTPT VISIT, NEW, LEVL IV, 45-59 MIN: ICD-10-PCS | Mod: S$GLB,,, | Performed by: INTERNAL MEDICINE

## 2023-06-21 PROCEDURE — 3079F DIAST BP 80-89 MM HG: CPT | Mod: CPTII,S$GLB,, | Performed by: INTERNAL MEDICINE

## 2023-06-21 NOTE — PROGRESS NOTES
Subjective:       Patient ID: Radha Ervin is a 62 y.o. female.    Consult from Dr. Veronica Herrera for pulmonary nodule    Chief Complaint: Pulmonary Nodules    62 year old former smoker (quit approximately one month ago with approximately 30 pk year h/o tobacco use.  Followed by Dr. Quan for psoriasis who ordered a CXR with subsequent follow up CT with  [facility] mass.  Patient has dyspnea after a flight of stairs.  No chronic cough since quitting smoker.      Review of Systems   Constitutional:  Negative for fever and night sweats.   HENT:  Negative for sinus pressure.    Respiratory:  Positive for wheezing (intermittent). Negative for cough and sputum production.         Does snore   Cardiovascular:  Negative for chest pain, palpitations and leg swelling.   Genitourinary:  Negative for difficulty urinating.   Endocrine:  Negative for cold intolerance and heat intolerance.    Musculoskeletal:  Negative for arthralgias.   Gastrointestinal:  Negative for acid reflux.   Neurological:  Negative for headaches.   Hematological:  Negative for adenopathy.   Psychiatric/Behavioral:  Negative for confusion.        No personal or family of anesthesia complications other than nausea and vomiting  Objective:      Vitals:    06/21/23 0832   BP: 122/85   BP Location: Right arm   Patient Position: Sitting   BP Method: Medium (Automatic)   Pulse: 102   Temp: 98.1 °F (36.7 °C)   TempSrc: Oral   SpO2: 95%   Weight: 90.8 kg (200 lb 2.8 oz)      Physical Exam   Constitutional: She is oriented to person, place, and time. She appears well-developed and well-nourished.   HENT:   Head: Normocephalic.   Cardiovascular: Normal rate and regular rhythm.   Pulmonary/Chest: Normal expansion, symmetric chest wall expansion, effort normal and breath sounds normal. She has no wheezes. She has no rales.   Musculoskeletal:         General: No edema. Normal range of motion.      Cervical back: Normal range of motion and neck supple.    Lymphadenopathy: No supraclavicular adenopathy is present.     She has no cervical adenopathy.   Neurological: She is alert and oriented to person, place, and time.   Skin: Skin is warm and dry.   Psychiatric: She has a normal mood and affect.     Personal Diagnostic Review  CT of chest performed on 5/26/23 without contrast revealed left upper lobe pulmonary nodule with enlarged right paratracheal lymph node.    Emphysematous changes are present.  No flowsheet data found.      Assessment:       1. Pulmonary nodule    2. Former smoker    3. Centrilobular emphysema        Outpatient Encounter Medications as of 6/21/2023   Medication Sig Dispense Refill    cyclobenzaprine (FLEXERIL) 10 MG tablet Take 1 tablet (10 mg total) by mouth 3 (three) times daily as needed for Muscle spasms. 15 tablet 0    ibuprofen (ADVIL,MOTRIN) 800 MG tablet Take 800 mg by mouth 2 (two) times daily as needed.      LORazepam (ATIVAN) 0.5 MG tablet Take 1 tablet (0.5 mg total) by mouth every 6 (six) hours as needed for Anxiety. 3 tablet 0    OTEZLA 30 mg Tab TAKE 1 TABLET BY MOUTH  TWICE DAILY 60 tablet 2    spironolactone (ALDACTONE) 50 MG tablet Take 1 po qday 90 tablet 1    augmented betamethasone dipropionate (DIPROLENE-AF) 0.05 % ointment MARIA ALEJANDRA EXT AA BID FOR 14 DAYS  2    betamethasone dipropionate (DIPROLENE) 0.05 % ointment Apply topically 2 (two) times daily. Prn psoriasis flares (Patient not taking: Reported on 4/28/2023) 45 g 3    cetirizine (ZYRTEC) 10 MG tablet Take 1 tablet up to twice a day.  Can be used regularly or just when needed. (Patient not taking: Reported on 6/16/2023) 60 tablet 3    clindamycin (CLEOCIN T) 1 % external solution Apply topically 2 (two) times daily. To groin and axilla prn breakouts (Patient not taking: Reported on 4/28/2023) 60 mL 5    clindamycin (CLEOCIN T) 1 % external solution Apply topically 2 (two) times daily. To groin area for pimples (Patient not taking: Reported on 4/28/2023) 60 mL 5     meloxicam (MOBIC) 15 MG tablet Take 1 tablet (15 mg total) by mouth once daily. (Patient not taking: Reported on 6/21/2023) 30 tablet 0     Facility-Administered Encounter Medications as of 6/21/2023   Medication Dose Route Frequency Provider Last Rate Last Admin    triamcinolone acetonide injection 10 mg  10 mg Intradermal Once Kathe Quan MD        triamcinolone acetonide injection 40 mg  40 mg Intradermal Once Kathe Quan MD         Orders Placed This Encounter   Procedures    Spirometry with/without bronchodilator     Standing Status:   Future     Standing Expiration Date:   6/20/2024     Order Specific Question:   Release to patient     Answer:   Immediate    DLCO-Carbon Monoxide Diffusing Capacity     Standing Status:   Future     Standing Expiration Date:   6/20/2024     Order Specific Question:   Release to patient     Answer:   Immediate     Plan:     Problem List Items Addressed This Visit       Emphysema lung    Overview     Present on CT.  PFTs have been ordered.          Other Visit Diagnoses       Pulmonary nodule    -  Primary    Relevant Orders    Spirometry with/without bronchodilator    DLCO-Carbon Monoxide Diffusing Capacity    Former smoker        Relevant Orders    Spirometry with/without bronchodilator    DLCO-Carbon Monoxide Diffusing Capacity        Patient is scheduled for robotic bronchoscopy with EBUS 6/23.      I have explained the risks, benefits and alternatives of the procedure in detail.  The patient voices understanding and all questions have been answered.  The patient agrees to proceed as planned.

## 2023-06-21 NOTE — H&P (VIEW-ONLY)
Subjective:       Patient ID: Radha Ervin is a 62 y.o. female.    Consult from Dr. Veronica Herrera for pulmonary nodule    Chief Complaint: Pulmonary Nodules    62 year old former smoker (quit approximately one month ago with approximately 30 pk year h/o tobacco use.  Followed by Dr. Quan for psoriasis who ordered a CXR with subsequent follow up CT with  [facility] mass.  Patient has dyspnea after a flight of stairs.  No chronic cough since quitting smoker.      Review of Systems   Constitutional:  Negative for fever and night sweats.   HENT:  Negative for sinus pressure.    Respiratory:  Positive for wheezing (intermittent). Negative for cough and sputum production.         Does snore   Cardiovascular:  Negative for chest pain, palpitations and leg swelling.   Genitourinary:  Negative for difficulty urinating.   Endocrine:  Negative for cold intolerance and heat intolerance.    Musculoskeletal:  Negative for arthralgias.   Gastrointestinal:  Negative for acid reflux.   Neurological:  Negative for headaches.   Hematological:  Negative for adenopathy.   Psychiatric/Behavioral:  Negative for confusion.        No personal or family of anesthesia complications other than nausea and vomiting  Objective:      Vitals:    06/21/23 0832   BP: 122/85   BP Location: Right arm   Patient Position: Sitting   BP Method: Medium (Automatic)   Pulse: 102   Temp: 98.1 °F (36.7 °C)   TempSrc: Oral   SpO2: 95%   Weight: 90.8 kg (200 lb 2.8 oz)      Physical Exam   Constitutional: She is oriented to person, place, and time. She appears well-developed and well-nourished.   HENT:   Head: Normocephalic.   Cardiovascular: Normal rate and regular rhythm.   Pulmonary/Chest: Normal expansion, symmetric chest wall expansion, effort normal and breath sounds normal. She has no wheezes. She has no rales.   Musculoskeletal:         General: No edema. Normal range of motion.      Cervical back: Normal range of motion and neck supple.    Lymphadenopathy: No supraclavicular adenopathy is present.     She has no cervical adenopathy.   Neurological: She is alert and oriented to person, place, and time.   Skin: Skin is warm and dry.   Psychiatric: She has a normal mood and affect.     Personal Diagnostic Review  CT of chest performed on 5/26/23 without contrast revealed left upper lobe pulmonary nodule with enlarged right paratracheal lymph node.    Emphysematous changes are present.  No flowsheet data found.      Assessment:       1. Pulmonary nodule    2. Former smoker    3. Centrilobular emphysema        Outpatient Encounter Medications as of 6/21/2023   Medication Sig Dispense Refill    cyclobenzaprine (FLEXERIL) 10 MG tablet Take 1 tablet (10 mg total) by mouth 3 (three) times daily as needed for Muscle spasms. 15 tablet 0    ibuprofen (ADVIL,MOTRIN) 800 MG tablet Take 800 mg by mouth 2 (two) times daily as needed.      LORazepam (ATIVAN) 0.5 MG tablet Take 1 tablet (0.5 mg total) by mouth every 6 (six) hours as needed for Anxiety. 3 tablet 0    OTEZLA 30 mg Tab TAKE 1 TABLET BY MOUTH  TWICE DAILY 60 tablet 2    spironolactone (ALDACTONE) 50 MG tablet Take 1 po qday 90 tablet 1    augmented betamethasone dipropionate (DIPROLENE-AF) 0.05 % ointment MARIA ALEJANDRA EXT AA BID FOR 14 DAYS  2    betamethasone dipropionate (DIPROLENE) 0.05 % ointment Apply topically 2 (two) times daily. Prn psoriasis flares (Patient not taking: Reported on 4/28/2023) 45 g 3    cetirizine (ZYRTEC) 10 MG tablet Take 1 tablet up to twice a day.  Can be used regularly or just when needed. (Patient not taking: Reported on 6/16/2023) 60 tablet 3    clindamycin (CLEOCIN T) 1 % external solution Apply topically 2 (two) times daily. To groin and axilla prn breakouts (Patient not taking: Reported on 4/28/2023) 60 mL 5    clindamycin (CLEOCIN T) 1 % external solution Apply topically 2 (two) times daily. To groin area for pimples (Patient not taking: Reported on 4/28/2023) 60 mL 5     meloxicam (MOBIC) 15 MG tablet Take 1 tablet (15 mg total) by mouth once daily. (Patient not taking: Reported on 6/21/2023) 30 tablet 0     Facility-Administered Encounter Medications as of 6/21/2023   Medication Dose Route Frequency Provider Last Rate Last Admin    triamcinolone acetonide injection 10 mg  10 mg Intradermal Once Kathe Quan MD        triamcinolone acetonide injection 40 mg  40 mg Intradermal Once Kathe Quan MD         Orders Placed This Encounter   Procedures    Spirometry with/without bronchodilator     Standing Status:   Future     Standing Expiration Date:   6/20/2024     Order Specific Question:   Release to patient     Answer:   Immediate    DLCO-Carbon Monoxide Diffusing Capacity     Standing Status:   Future     Standing Expiration Date:   6/20/2024     Order Specific Question:   Release to patient     Answer:   Immediate     Plan:     Problem List Items Addressed This Visit       Emphysema lung    Overview     Present on CT.  PFTs have been ordered.          Other Visit Diagnoses       Pulmonary nodule    -  Primary    Relevant Orders    Spirometry with/without bronchodilator    DLCO-Carbon Monoxide Diffusing Capacity    Former smoker        Relevant Orders    Spirometry with/without bronchodilator    DLCO-Carbon Monoxide Diffusing Capacity        Patient is scheduled for robotic bronchoscopy with EBUS 6/23.      I have explained the risks, benefits and alternatives of the procedure in detail.  The patient voices understanding and all questions have been answered.  The patient agrees to proceed as planned.

## 2023-06-22 ENCOUNTER — ANESTHESIA EVENT (OUTPATIENT)
Dept: SURGERY | Facility: HOSPITAL | Age: 63
End: 2023-06-22
Payer: COMMERCIAL

## 2023-06-22 ENCOUNTER — TELEPHONE (OUTPATIENT)
Dept: INTERNAL MEDICINE | Facility: CLINIC | Age: 63
End: 2023-06-22
Payer: COMMERCIAL

## 2023-06-22 DIAGNOSIS — L73.2 HIDRADENITIS SUPPURATIVA: ICD-10-CM

## 2023-06-22 RX ORDER — SPIRONOLACTONE 50 MG/1
TABLET, FILM COATED ORAL
Qty: 90 TABLET | Refills: 1 | Status: SHIPPED | OUTPATIENT
Start: 2023-06-22 | End: 2023-07-28 | Stop reason: SDUPTHER

## 2023-06-22 NOTE — ANESTHESIA PREPROCEDURE EVALUATION
06/22/2023  Radha Ervin is a 62 y.o., female.  Patient Active Problem List   Diagnosis    Primary localized osteoarthrosis of right lower leg    BMI 40.0-44.9, adult    Other atopic dermatitis    Long-term current use of intravenous immunoglobulin (IVIG)    Hidradenitis suppurativa    Tobacco dependence    Nuclear sclerosis of both eyes    Visual field defect    Morbid obesity    Drug-induced immunodeficiency    Emphysema lung           Pre-op Assessment          Review of Systems      Physical Exam    Airway:  No airway management difficulties anticipated  Dental:No active dental issues noted  Chest/Lungs:  Clear to auscultation    Heart:  Rate: Normal  Rhythm: Regular Rhythm  Sounds: Normal        Anesthesia Plan  Type of Anesthesia, risks & benefits discussed:    Anesthesia Type: Gen ETT  Informed Consent: Informed consent signed with the Patient and all parties understand the risks and agree with anesthesia plan.  All questions answered.   ASA Score: 3  Anesthesia Plan Notes: Chart reviewed. Patient seen and examined. Anesthesia plan discussed and questions answered. E-consent signed. Josse Toro MD    Ready For Surgery From Anesthesia Perspective.     .

## 2023-06-23 ENCOUNTER — ANESTHESIA (OUTPATIENT)
Dept: SURGERY | Facility: HOSPITAL | Age: 63
End: 2023-06-23
Payer: COMMERCIAL

## 2023-06-23 ENCOUNTER — HOSPITAL ENCOUNTER (OUTPATIENT)
Facility: HOSPITAL | Age: 63
Discharge: HOME | End: 2023-06-23
Attending: INTERNAL MEDICINE | Admitting: INTERNAL MEDICINE
Payer: COMMERCIAL

## 2023-06-23 ENCOUNTER — PATIENT MESSAGE (OUTPATIENT)
Dept: SURGERY | Facility: HOSPITAL | Age: 63
End: 2023-06-23

## 2023-06-23 VITALS
TEMPERATURE: 99 F | BODY MASS INDEX: 39.31 KG/M2 | HEART RATE: 87 BPM | RESPIRATION RATE: 19 BRPM | OXYGEN SATURATION: 94 % | HEIGHT: 59 IN | SYSTOLIC BLOOD PRESSURE: 125 MMHG | DIASTOLIC BLOOD PRESSURE: 81 MMHG | WEIGHT: 195 LBS

## 2023-06-23 DIAGNOSIS — J43.2 CENTRILOBULAR EMPHYSEMA: Primary | ICD-10-CM

## 2023-06-23 DIAGNOSIS — R91.1 PULMONARY NODULE: ICD-10-CM

## 2023-06-23 PROCEDURE — 25000003 PHARM REV CODE 250: Performed by: REGISTERED NURSE

## 2023-06-23 PROCEDURE — C1726 CATH, BAL DIL, NON-VASCULAR: HCPCS | Performed by: INTERNAL MEDICINE

## 2023-06-23 PROCEDURE — 63600175 PHARM REV CODE 636 W HCPCS: Performed by: REGISTERED NURSE

## 2023-06-23 PROCEDURE — 31627 PR BRONCHOSCOPY,COMPUTER ASSIST/IMAGE-GUIDED NAVIGATION: ICD-10-PCS | Mod: ,,, | Performed by: INTERNAL MEDICINE

## 2023-06-23 PROCEDURE — 31629 BRONCHOSCOPY/NEEDLE BX EACH: CPT | Mod: 59,LT,, | Performed by: INTERNAL MEDICINE

## 2023-06-23 PROCEDURE — 31628 BRONCHOSCOPY/LUNG BX EACH: CPT | Mod: 51,LT,, | Performed by: INTERNAL MEDICINE

## 2023-06-23 PROCEDURE — 36000707: Performed by: INTERNAL MEDICINE

## 2023-06-23 PROCEDURE — D9220A PRA ANESTHESIA: ICD-10-PCS | Mod: ANES,,, | Performed by: ANESTHESIOLOGY

## 2023-06-23 PROCEDURE — D9220A PRA ANESTHESIA: Mod: ANES,,, | Performed by: ANESTHESIOLOGY

## 2023-06-23 PROCEDURE — 71000044 HC DOSC ROUTINE RECOVERY FIRST HOUR: Performed by: INTERNAL MEDICINE

## 2023-06-23 PROCEDURE — 31624 DX BRONCHOSCOPE/LAVAGE: CPT | Mod: 51,LT,, | Performed by: INTERNAL MEDICINE

## 2023-06-23 PROCEDURE — 36000706: Performed by: INTERNAL MEDICINE

## 2023-06-23 PROCEDURE — 37000009 HC ANESTHESIA EA ADD 15 MINS: Performed by: INTERNAL MEDICINE

## 2023-06-23 PROCEDURE — 31624 PR BRONCHOSCOPY,DIAG2STIC W LAVAGE: ICD-10-PCS | Mod: 51,LT,, | Performed by: INTERNAL MEDICINE

## 2023-06-23 PROCEDURE — D9220A PRA ANESTHESIA: ICD-10-PCS | Mod: CRNA,,, | Performed by: REGISTERED NURSE

## 2023-06-23 PROCEDURE — 31629 PR BRONCHOSCOPY,TRANSBRON ASPIR BX: ICD-10-PCS | Mod: 59,LT,, | Performed by: INTERNAL MEDICINE

## 2023-06-23 PROCEDURE — 71000015 HC POSTOP RECOV 1ST HR: Performed by: INTERNAL MEDICINE

## 2023-06-23 PROCEDURE — 27201423 OPTIME MED/SURG SUP & DEVICES STERILE SUPPLY: Performed by: INTERNAL MEDICINE

## 2023-06-23 PROCEDURE — 31654 PR BRONCH W/ EBUS, DIAG OR THERA INTERVENTION PERIPHERAL LESION(S), INCL GUID, ADD ON CODE: ICD-10-PCS | Mod: ,,, | Performed by: INTERNAL MEDICINE

## 2023-06-23 PROCEDURE — 31652 PR BRONCH W/ EBUS, SAMPLING 1 OR 2 NODES, INCL GUIDE: ICD-10-PCS | Mod: ,,, | Performed by: INTERNAL MEDICINE

## 2023-06-23 PROCEDURE — D9220A PRA ANESTHESIA: Mod: CRNA,,, | Performed by: REGISTERED NURSE

## 2023-06-23 PROCEDURE — 37000008 HC ANESTHESIA 1ST 15 MINUTES: Performed by: INTERNAL MEDICINE

## 2023-06-23 PROCEDURE — 31654 BRONCH EBUS IVNTJ PERPH LES: CPT | Mod: ,,, | Performed by: INTERNAL MEDICINE

## 2023-06-23 PROCEDURE — 31628 PR BRONCHOSCOPY,TRANSBRONCH BIOPSY: ICD-10-PCS | Mod: 51,LT,, | Performed by: INTERNAL MEDICINE

## 2023-06-23 PROCEDURE — 25000003 PHARM REV CODE 250: Performed by: INTERNAL MEDICINE

## 2023-06-23 PROCEDURE — 31627 NAVIGATIONAL BRONCHOSCOPY: CPT | Mod: ,,, | Performed by: INTERNAL MEDICINE

## 2023-06-23 PROCEDURE — 31652 BRONCH EBUS SAMPLNG 1/2 NODE: CPT | Mod: ,,, | Performed by: INTERNAL MEDICINE

## 2023-06-23 RX ORDER — ROCURONIUM BROMIDE 10 MG/ML
INJECTION, SOLUTION INTRAVENOUS
Status: DISCONTINUED | OUTPATIENT
Start: 2023-06-23 | End: 2023-06-23

## 2023-06-23 RX ORDER — PROPOFOL 10 MG/ML
VIAL (ML) INTRAVENOUS
Status: DISCONTINUED | OUTPATIENT
Start: 2023-06-23 | End: 2023-06-23

## 2023-06-23 RX ORDER — MIDAZOLAM HYDROCHLORIDE 1 MG/ML
INJECTION, SOLUTION INTRAMUSCULAR; INTRAVENOUS
Status: DISCONTINUED | OUTPATIENT
Start: 2023-06-23 | End: 2023-06-23

## 2023-06-23 RX ORDER — FENTANYL CITRATE 50 UG/ML
25 INJECTION, SOLUTION INTRAMUSCULAR; INTRAVENOUS EVERY 5 MIN PRN
Status: DISCONTINUED | OUTPATIENT
Start: 2023-06-23 | End: 2023-06-23 | Stop reason: HOSPADM

## 2023-06-23 RX ORDER — ONDANSETRON 2 MG/ML
4 INJECTION INTRAMUSCULAR; INTRAVENOUS ONCE AS NEEDED
Status: DISCONTINUED | OUTPATIENT
Start: 2023-06-23 | End: 2023-06-23 | Stop reason: HOSPADM

## 2023-06-23 RX ORDER — DIPHENHYDRAMINE HYDROCHLORIDE 50 MG/ML
25 INJECTION INTRAMUSCULAR; INTRAVENOUS EVERY 6 HOURS PRN
Status: DISCONTINUED | OUTPATIENT
Start: 2023-06-23 | End: 2023-06-23 | Stop reason: HOSPADM

## 2023-06-23 RX ORDER — ONDANSETRON 2 MG/ML
INJECTION INTRAMUSCULAR; INTRAVENOUS
Status: DISCONTINUED | OUTPATIENT
Start: 2023-06-23 | End: 2023-06-23

## 2023-06-23 RX ORDER — LIDOCAINE HYDROCHLORIDE 20 MG/ML
INJECTION INTRAVENOUS
Status: DISCONTINUED | OUTPATIENT
Start: 2023-06-23 | End: 2023-06-23

## 2023-06-23 RX ORDER — FENTANYL CITRATE 50 UG/ML
INJECTION, SOLUTION INTRAMUSCULAR; INTRAVENOUS
Status: DISCONTINUED | OUTPATIENT
Start: 2023-06-23 | End: 2023-06-23

## 2023-06-23 RX ORDER — HYDROMORPHONE HYDROCHLORIDE 1 MG/ML
0.2 INJECTION, SOLUTION INTRAMUSCULAR; INTRAVENOUS; SUBCUTANEOUS EVERY 5 MIN PRN
Status: DISCONTINUED | OUTPATIENT
Start: 2023-06-23 | End: 2023-06-23 | Stop reason: HOSPADM

## 2023-06-23 RX ORDER — SODIUM CHLORIDE 9 MG/ML
INJECTION, SOLUTION INTRAVENOUS CONTINUOUS
Status: DISCONTINUED | OUTPATIENT
Start: 2023-06-23 | End: 2023-06-23 | Stop reason: HOSPADM

## 2023-06-23 RX ORDER — DEXAMETHASONE SODIUM PHOSPHATE 4 MG/ML
INJECTION, SOLUTION INTRA-ARTICULAR; INTRALESIONAL; INTRAMUSCULAR; INTRAVENOUS; SOFT TISSUE
Status: DISCONTINUED | OUTPATIENT
Start: 2023-06-23 | End: 2023-06-23

## 2023-06-23 RX ORDER — PROPOFOL 10 MG/ML
VIAL (ML) INTRAVENOUS CONTINUOUS PRN
Status: DISCONTINUED | OUTPATIENT
Start: 2023-06-23 | End: 2023-06-23

## 2023-06-23 RX ADMIN — DEXAMETHASONE SODIUM PHOSPHATE 4 MG: 4 INJECTION, SOLUTION INTRAMUSCULAR; INTRAVENOUS at 11:06

## 2023-06-23 RX ADMIN — FENTANYL CITRATE 100 MCG: 50 INJECTION, SOLUTION INTRAMUSCULAR; INTRAVENOUS at 11:06

## 2023-06-23 RX ADMIN — ONDANSETRON 4 MG: 2 INJECTION INTRAMUSCULAR; INTRAVENOUS at 11:06

## 2023-06-23 RX ADMIN — PROPOFOL 150 MG: 10 INJECTION, EMULSION INTRAVENOUS at 11:06

## 2023-06-23 RX ADMIN — MIDAZOLAM HYDROCHLORIDE 2 MG: 1 INJECTION, SOLUTION INTRAMUSCULAR; INTRAVENOUS at 11:06

## 2023-06-23 RX ADMIN — Medication 125 MCG/KG/MIN: at 11:06

## 2023-06-23 RX ADMIN — LIDOCAINE HYDROCHLORIDE 100 MG: 20 INJECTION INTRAVENOUS at 11:06

## 2023-06-23 RX ADMIN — SODIUM CHLORIDE: 9 INJECTION, SOLUTION INTRAVENOUS at 10:06

## 2023-06-23 RX ADMIN — ROCURONIUM BROMIDE 10 MG: 10 INJECTION INTRAVENOUS at 12:06

## 2023-06-23 RX ADMIN — ROCURONIUM BROMIDE 50 MG: 10 INJECTION INTRAVENOUS at 11:06

## 2023-06-23 NOTE — ANESTHESIA POSTPROCEDURE EVALUATION
Anesthesia Post Evaluation    Patient: Radha Ervin    Procedure(s) Performed: Procedure(s) (LRB):  BRONCHOSCOPY, NAVIGATIONAL (N/A)    Final Anesthesia Type: general      Level of consciousness: awake and alert  Post-procedure vital signs: reviewed and stable  Pain control: Pain has been treated.  Airway patency: patent    PONV status: Absent or treated.  Anesthetic complications: no      Cardiovascular status: hemodynamically stable  Respiratory status: unassisted  Hydration status: euvolemic            Vitals Value Taken Time   /81 06/23/23 1430   Temp 37.1 °C (98.7 °F) 06/23/23 1430   Pulse 87 06/23/23 1430   Resp 19 06/23/23 1430   SpO2 94 % 06/23/23 1430         No case tracking events are documented in the log.      Pain/Derick Score: Derick Score: 10 (6/23/2023  2:30 PM)

## 2023-06-23 NOTE — ANESTHESIA PROCEDURE NOTES
Intubation    Date/Time: 6/23/2023 11:45 AM  Performed by: Juno Dacosta  Authorized by: Josse Toro MD     Intubation:     Induction:  Intravenous    Intubated:  Postinduction    Mask Ventilation:  Easy mask    Attempts:  2    Attempted By:  Student    Method of Intubation:  Direct    Blade:  Gould 3    Laryngeal View Grade: Grade IIA - cords partially seen      Attempted By (2nd Attempt):  Student    Method of Intubation (2nd Attempt):  Video laryngoscopy    Blade (2nd Attempt):  Gould 3    Laryngeal View Grade (2nd Attempt): Grade I - full view of cords      Difficult Airway Encountered?: No      Complications:  None    Airway Device:  Oral endotracheal tube    Airway Device Size:  8.5    Style/Cuff Inflation:  Cuffed (inflated to minimal occlusive pressure)    Tube secured:  19    Secured at:  The lips    Placement Verified By:  Capnometry    Complicating Factors:  Small mouth and oropharyngeal edema or fat    Findings Post-Intubation:  BS equal bilateral and atraumatic/condition of teeth unchanged

## 2023-06-23 NOTE — INTERVAL H&P NOTE
The patient has been examined and the H&P has been reviewed:    I concur with the findings and no changes have occurred since H&P was written.    Anesthesia/Surgery risks, benefits and alternative options discussed and understood by patient/family.          Left upper lobe nodule with mediastinal adenopathy    Diagnostic and staging robotic bronchoscopy with EBUS today    I have explained the risks, benefits and alternatives of the procedure in detail.  The patient voices understanding and all questions have been answered.  The patient agrees to proceed as planned.

## 2023-06-26 NOTE — DISCHARGE SUMMARY
[facility] - Surgery (2nd Fl)  Discharge Note  Short Stay    Procedure(s) (LRB):  BRONCHOSCOPY, NAVIGATIONAL (N/A)      OUTCOME: Patient tolerated treatment/procedure well without complication and is now ready for discharge.    DISPOSITION: Home or Self Care    FINAL DIAGNOSIS:  Left upper lobe nodule    FOLLOWUP:  call on one week    DISCHARGE INSTRUCTIONS:    Discharge Procedure Orders   Diet general         Clinical Reference Documents Added to Patient Instructions         Document    BRONCHOSCOPY, DIAGNOSTIC (ENGLISH)            TIME SPENT ON DISCHARGE: 5 minutes

## 2023-06-28 ENCOUNTER — PATIENT MESSAGE (OUTPATIENT)
Dept: FAMILY MEDICINE | Facility: CLINIC | Age: 63
End: 2023-06-28
Payer: COMMERCIAL

## 2023-06-28 ENCOUNTER — TELEPHONE (OUTPATIENT)
Dept: FAMILY MEDICINE | Facility: CLINIC | Age: 63
End: 2023-06-28
Payer: COMMERCIAL

## 2023-06-28 ENCOUNTER — LAB VISIT (OUTPATIENT)
Dept: LAB | Facility: HOSPITAL | Age: 63
End: 2023-06-28
Attending: INTERNAL MEDICINE
Payer: COMMERCIAL

## 2023-06-28 ENCOUNTER — E-VISIT (OUTPATIENT)
Dept: INTERNAL MEDICINE | Facility: CLINIC | Age: 63
End: 2023-06-28
Payer: COMMERCIAL

## 2023-06-28 DIAGNOSIS — R30.0 DYSURIA: Primary | ICD-10-CM

## 2023-06-28 DIAGNOSIS — N30.00 ACUTE CYSTITIS WITHOUT HEMATURIA: Primary | ICD-10-CM

## 2023-06-28 DIAGNOSIS — R30.0 DYSURIA: ICD-10-CM

## 2023-06-28 LAB
BILIRUB UR QL STRIP: NEGATIVE
CLARITY UR: ABNORMAL
COLOR UR: YELLOW
GLUCOSE UR QL STRIP: NEGATIVE
HGB UR QL STRIP: ABNORMAL
KETONES UR QL STRIP: NEGATIVE
LEUKOCYTE ESTERASE UR QL STRIP: ABNORMAL
NITRITE UR QL STRIP: NEGATIVE
PH UR STRIP: 6 [PH] (ref 5–8)
PROT UR QL STRIP: ABNORMAL
SP GR UR STRIP: 1.01 (ref 1–1.03)
URN SPEC COLLECT METH UR: ABNORMAL
UROBILINOGEN UR STRIP-ACNC: NEGATIVE EU/DL

## 2023-06-28 PROCEDURE — 81000 URINALYSIS NONAUTO W/SCOPE: CPT | Mod: PO | Performed by: INTERNAL MEDICINE

## 2023-06-28 PROCEDURE — 99421 PR E&M, ONLINE DIGIT, EST, < 7 DAYS, 5-10 MINS: ICD-10-PCS | Mod: ,,, | Performed by: INTERNAL MEDICINE

## 2023-06-28 PROCEDURE — 99421 OL DIG E/M SVC 5-10 MIN: CPT | Mod: ,,, | Performed by: INTERNAL MEDICINE

## 2023-06-28 RX ORDER — NITROFURANTOIN 25; 75 MG/1; MG/1
100 CAPSULE ORAL 2 TIMES DAILY
Qty: 10 EACH | Refills: 0 | Status: SHIPPED | OUTPATIENT
Start: 2023-06-28 | End: 2023-06-28

## 2023-06-28 RX ORDER — SULFAMETHOXAZOLE AND TRIMETHOPRIM 800; 160 MG/1; MG/1
1 TABLET ORAL 2 TIMES DAILY
Qty: 10 TABLET | Refills: 0 | Status: SHIPPED | OUTPATIENT
Start: 2023-06-28 | End: 2023-07-03

## 2023-06-28 NOTE — PROGRESS NOTES
Patient ID: Radha Ervin is a 62 y.o. female.    Chief Complaint: Urinary Tract Infection (Entered automatically based on patient selection in Patient Portal.)    The patient initiated a request through [facility] on 6/28/2023 for evaluation and management with a chief complaint of Urinary Tract Infection (Entered automatically based on patient selection in Patient Portal.)     I evaluated the questionnaire submission on 06/28/2023  .    Oh[facility] Peq Evisit Uti Questionnaire    6/28/2023 10:00 AM CDT - Filed by Patient   Do you agree to participate in an E-Visit? Yes   If you have any of the following problems, please present to your local ER or call 911:  I acknowledge   What is the main issue that you would like for your doctor to address today? Burn when urinate   Are you able to take your vital signs? No   What symptoms do you currently have? Change in urine appearance or smell   When did your symptoms first appear? 6/27/2023   List what you have done or taken to help your symptoms. Cranberry juice water   Please indicate whether you have had the following symptoms during the past 24 hours     Urgent urination (a sudden and uncontrollable urge to urinate) None   Frequent urination of small amounts of urine (going to the toilet very often) Mild   Burning pain when urinating Mild   Incomplete bladder emptying (still feel like you need to urinate again after urination) None   Pain not associated with urination in the lower abdomen below the belly button) None   What does your urine look like? Clear   Blood seen in the urine None   Flank pain (pain in one or both sides of the lower back) None   Abnormal Vaginal Discharge (abnormal amount, color and/or odor) Moderate   Discharge from the urethra (urinary opening) without urination None   High body temperature/fever? None-<99.5   Please rate how much discomfort you have experience because of the symptoms in the past 24 hours: Moderate   Please indicate how the symptoms  have interfered with your every day activities/work in the past 24 hours: Mild   Please indicate how these symptoms have interfered with your social activities (visiting people, meeting with friends, etc.) in the past 24 hours? None   Are you a diabetic? No   Please indicate whether you have the following at the time of completion of this questionnaire: None of the above   Provide any information you feel is important to your history not asked above    Please attach any relevant images or files (if you have performed a home test for UTI, please submit a photo of results)          Recent Labs Obtained:  No visits with results within 7 Day(s) from this visit.   Latest known visit with results is:   Lab Visit on 05/31/2023   Component Date Value Ref Range Status    Fecal Immunochemical Test (iFOBT) 06/06/2023 Negative  Negative Final     BMP  Lab Results   Component Value Date     04/28/2023    K 4.3 04/28/2023     04/28/2023    CO2 26 04/28/2023    BUN 12.0 04/28/2023    CREATININE 0.86 04/28/2023    CALCIUM 9.8 04/28/2023    ANIONGAP 13.3 04/28/2023    EGFRNORACEVR 76 (L) 04/28/2023         Encounter Diagnosis   Name Primary?    Dysuria Yes        Orders Placed This Encounter   Procedures    Urine culture     Standing Status:   Future     Standing Expiration Date:   8/26/2024    Urinalysis     Standing Status:   Future     Standing Expiration Date:   8/26/2024     Order Specific Question:   Collection Type     Answer:   Urine, Clean Catch      Medications Ordered This Encounter   Medications    nitrofurantoin, macrocrystal-monohydrate, (MACROBID) 100 MG capsule     Sig: Take 1 capsule (100 mg total) by mouth 2 (two) times daily. for 5 days     Dispense:  10 each     Refill:  0        No follow-ups on file.      E-Visit Time Tracking:    Day 1 Time (in minutes): 5     Total Time (in minutes): 5           Future Appointments   Date Time Provider Department Center   7/28/2023 10:20 AM Kathe Quan MD  [facility] [address]   12/18/2023  9:20 AM Suraj Herrera III, MD [facility]         Medication List with Changes/Refills   New Medications    NITROFURANTOIN, MACROCRYSTAL-MONOHYDRATE, (MACROBID) 100 MG CAPSULE    Take 1 capsule (100 mg total) by mouth 2 (two) times daily. for 5 days   Current Medications    AUGMENTED BETAMETHASONE DIPROPIONATE (DIPROLENE-AF) 0.05 % OINTMENT    MARIA ALEJANDRA EXT AA BID FOR 14 DAYS    BETAMETHASONE DIPROPIONATE (DIPROLENE) 0.05 % OINTMENT    Apply topically 2 (two) times daily. Prn psoriasis flares    CETIRIZINE (ZYRTEC) 10 MG TABLET    Take 1 tablet up to twice a day.  Can be used regularly or just when needed.    CLINDAMYCIN (CLEOCIN T) 1 % EXTERNAL SOLUTION    Apply topically 2 (two) times daily. To groin and axilla prn breakouts    CLINDAMYCIN (CLEOCIN T) 1 % EXTERNAL SOLUTION    Apply topically 2 (two) times daily. To groin area for pimples    IBUPROFEN (ADVIL,MOTRIN) 800 MG TABLET    Take 800 mg by mouth 2 (two) times daily as needed.    LORAZEPAM (ATIVAN) 0.5 MG TABLET    Take 1 tablet (0.5 mg total) by mouth every 6 (six) hours as needed for Anxiety.    MELOXICAM (MOBIC) 15 MG TABLET    Take 1 tablet (15 mg total) by mouth once daily.    OTEZLA 30 MG TAB    TAKE 1 TABLET BY MOUTH  TWICE DAILY    SPIRONOLACTONE (ALDACTONE) 50 MG TABLET    Take 1 po qday         Disclaimer:  This note has been generated using voice-recognition software. There may be grammatical or spelling errors that have been missed during proof-reading

## 2023-06-28 NOTE — TELEPHONE ENCOUNTER
----- Message from Puneet Reyes sent at 6/28/2023  3:24 PM CDT -----  Contact: pt  .Type:  Needs Medical Advice    Who Called: pt    Pharmacy name and phone #:  [id number] - [address] AT [facility] [address]   Phone: [phone]  Fax:  [phone]  Would the patient rather a call back or a response via [facility]?  Call back  Best Call Back Number: [phone]  Additional Information: pt. Is calling regarding the medication nitrofurantoin, macrocrystal-monohydrate, (MACROBID) 100 MG capsule.  Pt. States the last time she took medication it made her sick.  She is requesting something else be called in for her.  Pt. Is at the pharmacy

## 2023-06-28 NOTE — TELEPHONE ENCOUNTER
Diagnoses and all orders for this visit:    Acute cystitis without hematuria  -     sulfamethoxazole-trimethoprim 800-160mg (BACTRIM DS) 800-160 mg Tab; Take 1 tablet by mouth 2 (two) times daily. for 5 days        Medication List with Changes/Refills   New Medications    SULFAMETHOXAZOLE-TRIMETHOPRIM 800-160MG (BACTRIM DS) 800-160 MG TAB    Take 1 tablet by mouth 2 (two) times daily. for 5 days   Current Medications    AUGMENTED BETAMETHASONE DIPROPIONATE (DIPROLENE-AF) 0.05 % OINTMENT    MARIA ALEJANDRA EXT AA BID FOR 14 DAYS    BETAMETHASONE DIPROPIONATE (DIPROLENE) 0.05 % OINTMENT    Apply topically 2 (two) times daily. Prn psoriasis flares    CETIRIZINE (ZYRTEC) 10 MG TABLET    Take 1 tablet up to twice a day.  Can be used regularly or just when needed.    CLINDAMYCIN (CLEOCIN T) 1 % EXTERNAL SOLUTION    Apply topically 2 (two) times daily. To groin and axilla prn breakouts    CLINDAMYCIN (CLEOCIN T) 1 % EXTERNAL SOLUTION    Apply topically 2 (two) times daily. To groin area for pimples    IBUPROFEN (ADVIL,MOTRIN) 800 MG TABLET    Take 800 mg by mouth 2 (two) times daily as needed.    LORAZEPAM (ATIVAN) 0.5 MG TABLET    Take 1 tablet (0.5 mg total) by mouth every 6 (six) hours as needed for Anxiety.    MELOXICAM (MOBIC) 15 MG TABLET    Take 1 tablet (15 mg total) by mouth once daily.    OTEZLA 30 MG TAB    TAKE 1 TABLET BY MOUTH  TWICE DAILY    SPIRONOLACTONE (ALDACTONE) 50 MG TABLET    Take 1 po qday   Discontinued Medications    NITROFURANTOIN, MACROCRYSTAL-MONOHYDRATE, (MACROBID) 100 MG CAPSULE    Take 1 capsule (100 mg total) by mouth 2 (two) times daily. for 5 days

## 2023-06-29 ENCOUNTER — PATIENT MESSAGE (OUTPATIENT)
Dept: DERMATOLOGY | Facility: CLINIC | Age: 63
End: 2023-06-29
Payer: COMMERCIAL

## 2023-06-29 LAB
ADEQUACY: ABNORMAL
BACTERIA #/AREA URNS AUTO: NORMAL /HPF
COMMENT: ABNORMAL
FINAL PATHOLOGIC DIAGNOSIS: ABNORMAL
HYALINE CASTS UR QL AUTO: 0 /LPF
Lab: ABNORMAL
MICROSCOPIC COMMENT: NORMAL
MICROSCOPIC EXAM: ABNORMAL
RBC #/AREA URNS AUTO: 2 /HPF (ref 0–4)
WBC #/AREA URNS AUTO: 3 /HPF (ref 0–5)

## 2023-06-30 ENCOUNTER — TELEPHONE (OUTPATIENT)
Dept: PULMONOLOGY | Facility: CLINIC | Age: 63
End: 2023-06-30
Payer: COMMERCIAL

## 2023-06-30 ENCOUNTER — SPECIALTY PHARMACY (OUTPATIENT)
Dept: PHARMACY | Facility: CLINIC | Age: 63
End: 2023-06-30
Payer: COMMERCIAL

## 2023-06-30 DIAGNOSIS — C34.92 ADENOCARCINOMA OF LEFT LUNG: Primary | ICD-10-CM

## 2023-06-30 DIAGNOSIS — C34.92 ADENOCARCINOMA, LUNG, LEFT: Primary | ICD-10-CM

## 2023-06-30 DIAGNOSIS — L40.9 PSORIASIS: Primary | ICD-10-CM

## 2023-06-30 DIAGNOSIS — C34.90 MALIGNANT NEOPLASM OF UNSPECIFIED PART OF UNSPECIFIED BRONCHUS OR LUNG: ICD-10-CM

## 2023-06-30 NOTE — TELEPHONE ENCOUNTER
Patient reports enough of Otezla tablets until Monday 7/3, could not specify how many days remain. Informed patient [facility] will follow back up once refill approval is received.

## 2023-06-30 NOTE — TELEPHONE ENCOUNTER
Spoke with patient, informed her that I have received message. I also advised patient that I will forward her message to Dr Mccollum to review/advise. Patient verbalized that she understand and states that she just wants to know what the next plan of care is for her. I verbalized to patient that I understand.

## 2023-06-30 NOTE — TELEPHONE ENCOUNTER
----- Message from Eri Khan sent at 6/30/2023  9:02 AM CDT -----  Regarding: results  Contact: @ [phone]  Pt is calling to see if someone can call  in regards to recent lab/scans results test results  ..Please call and adv @ [phone]

## 2023-07-03 RX ORDER — APREMILAST 30 MG/1
TABLET, FILM COATED ORAL
Qty: 60 TABLET | Refills: 2 | Status: ACTIVE | OUTPATIENT
Start: 2023-07-03 | End: 2023-10-02 | Stop reason: SDUPTHER

## 2023-07-05 ENCOUNTER — SPECIALTY PHARMACY (OUTPATIENT)
Dept: PHARMACY | Facility: CLINIC | Age: 63
End: 2023-07-05
Payer: COMMERCIAL

## 2023-07-05 DIAGNOSIS — L40.9 PSORIASIS: Primary | ICD-10-CM

## 2023-07-05 NOTE — TELEPHONE ENCOUNTER
Specialty Pharmacy - Clinical Reassessment    Specialty Medication Orders Linked to Encounter      Flowsheet Row Most Recent Value   Medication #1 OTEZLA 30 mg Tab (Order#[id number], Rx#[id number])          Patient Diagnosis   L40.9 - Psoriasis    Radha Ervin is a 62 y.o. female, who is followed by the specialty pharmacy service for management and education of her Otezla.  She has been on therapy with Otezla for 16  months.  I have reviewed her electronic medical record and current medication list and determined that specialty medication adjustment Is needed at this time.    Patient has not experienced adverse events.  She Is adherent reporting 0 missed doses since last review.  Adherence has been encouraged with the following mechanism(s): proactive refill calls.  She is meeting goals of therapy and will continue treatment.        7/5/2023 5/30/2023 5/1/2023 4/3/2023 2/27/2023 1/23/2023 12/22/2022   Follow Up Review   # of missed doses 0 1    0 0 1 0 1 2   Reason    Fell asleep/slept through dose   Simply forgot   New Medications? No Yes    Yes No No No No No   New Conditions? Yes Yes    Yes No No No No No   New Allergies? No No    No No No No No No   Med Effective? Good Good    Good Very good Good Very good Very good Good   Urgent Care? No No    No No No No No No   Requested Pharmacist? No No    No No No No No No         Therapy is appropriate to continue.    Therapy is effective: Yes  On scale of 1 to 10, how does patient rank quality of life? (10 - Best): 10  Recommendations: Patient reports recent cancer diagnosis, which she would like to inform [facility] of, staff message sent to Derm provider. Patient also has follow up scheduled in July with [facility] office. Sent staff message to [facility] to provide update.  Review Method: Patient Contact    Tasks added this encounter   No tasks added.   Tasks due within next 3 months   6/14/2023 - Clinical Assessment (1 year recurrence)     Danuta Eugene, PharmD  [facility]  [address] [facility] [address]  Phone: [phone]  Fax: [phone]

## 2023-07-05 NOTE — TELEPHONE ENCOUNTER
Navigation bronchoscopy positive for adenocarcinoma.  WIll need staging pet to complete, PFTs and consult with thoracic surgery.  Patient has been notified of plan and is aware that someone will be calling to scheduled.

## 2023-07-05 NOTE — TELEPHONE ENCOUNTER
Specialty Pharmacy - Clinical Intervention  Specialty Pharmacy - Refill Coordination    Specialty Medication Orders Linked to Encounter      Flowsheet Row Most Recent Value   Medication #1 OTEZLA 30 mg Tab (Order#[id number], Rx#[id number])            Refill Questions - Documented Responses      Flowsheet Row Most Recent Value   Refill Screening Questions    Changes to allergies? No   Changes to medications? No   New conditions since last clinic visit? Yes  [lung cancer]   Unplanned office visit, urgent care, ED, or hospital admission in the last 4 weeks? No   How does patient/caregiver feel medication is working? Good   Financial problems or insurance changes? No   How many doses of your specialty medications were missed in the last 4 weeks? 0   Would patient like to speak to a pharmacist? No   When does the patient need to receive the medication? 07/07/23   Refill Delivery Questions    How will the patient receive the medication? [facility]   When does the patient need to receive the medication? 07/07/23   Shipping Address Home   Address in [facility] confirmed and updated if neccessary? Yes   Expected Copay ($) 0   Is the patient able to afford the medication copay? Yes   Payment Method zero copay   Days supply of Refill 30   Supplies needed? No supplies needed   Refill activity completed? Yes   Refill activity plan Refill scheduled   Shipment/Pickup Date: 07/06/23            Current Outpatient Medications   Medication Sig    augmented betamethasone dipropionate (DIPROLENE-AF) 0.05 % ointment MARIA ALEJANDRA EXT AA BID FOR 14 DAYS    betamethasone dipropionate (DIPROLENE) 0.05 % ointment Apply topically 2 (two) times daily. Prn psoriasis flares (Patient not taking: Reported on 4/28/2023)    cetirizine (ZYRTEC) 10 MG tablet Take 1 tablet up to twice a day.  Can be used regularly or just when needed. (Patient not taking: Reported on 6/16/2023)    clindamycin (CLEOCIN T) 1 % external solution Apply topically 2 (two) times  daily. To groin and axilla prn breakouts (Patient not taking: Reported on 4/28/2023)    clindamycin (CLEOCIN T) 1 % external solution Apply topically 2 (two) times daily. To groin area for pimples (Patient not taking: Reported on 4/28/2023)    ibuprofen (ADVIL,MOTRIN) 800 MG tablet Take 800 mg by mouth 2 (two) times daily as needed.    LORazepam (ATIVAN) 0.5 MG tablet Take 1 tablet (0.5 mg total) by mouth every 6 (six) hours as needed for Anxiety.    meloxicam (MOBIC) 15 MG tablet Take 1 tablet (15 mg total) by mouth once daily. (Patient not taking: Reported on 6/21/2023)    OTEZLA 30 mg Tab TAKE 1 TABLET BY MOUTH  TWICE DAILY    spironolactone (ALDACTONE) 50 MG tablet Take 1 po qday   Last reviewed on 6/28/2023  4:15 PM by Suraj Herrera III, MD    Review of patient's allergies indicates:   Allergen Reactions    Morphine Other (See Comments)     headaches    Latex, natural rubber Rash and Other (See Comments)     Redness and peeling only with bandaids    Penicillins Nausea And Vomiting and Rash    Last reviewed on  6/28/2023 4:15 PM by Suraj Herrera      Tasks added this encounter   No tasks added.   Tasks due within next 3 months   6/14/2023 - Clinical Assessment (1 year recurrence)  7/5/2023 - Refill Coordination Outreach (1 time occurrence)     Danuta Eugene, PharmD  [facility]  [address] [place] [id number]  Phone: [phone]  Fax: [phone]

## 2023-07-06 ENCOUNTER — TELEPHONE (OUTPATIENT)
Dept: HEMATOLOGY/ONCOLOGY | Facility: CLINIC | Age: 63
End: 2023-07-06
Payer: COMMERCIAL

## 2023-07-06 NOTE — NURSING
Spoke with Ms. Ervin this Morning.  Offered Patient appointments for Monday July 10, 2023.  Patient declined due to work schedule.  [place] [facility] [place]  [place] CT on 07/13/23 at the [place] location.  NPO instructions given.  Patient scheduled to see Dr. Gould on 07/13/23 at the [facility].  Patient agreeable to these appointment.  Discussed the location of all appointments.  Patient is active on the patient portal.  Oncology Navigation   Intake  Date of Diagnosis: 06/29/23  Cancer Type: Thoracic  Internal / External Referral: Internal  Date of Referral: 07/05/23  Initial Nurse Navigator Contact: 07/06/23  Referral to Initial Contact Timeline (days): 1  Date Worked: 07/06/23  First Appointment Available: 07/10/23  Appointment Date: 07/13/23  First Available Date vs. Scheduled Date (days): 3  Multiple appointments: Yes  Reason if booked > 7 days after scheduling: Specific provider / access; Patient request     Treatment  Current Status: Staging work-up    Surgery: Planned  Surgical Oncologist: Bryanna  Consult Date: 07/13/23          Procedures: PET scan  PET Scan Schedule Date: 07/13/23             Barriers of Care: Other  Other Barriers: coordinating schedule with work demands     Acuity  Stage: 1  Treatment Tolerability: Has not started treatment yet/treatment fully completed and side effects resolved   Needed: 0  Verbalizes Financial Concerns: 0  Transportation: 0  History of noncompliance/frequent no shows and cancellations: 0  Verbalizes the need for more education: 1  Navigation Acuity: 2     Follow Up  No follow-ups on file.

## 2023-07-07 NOTE — PROGRESS NOTES
History & Physical    SUBJECTIVE:     History of Present Illness:  Patient is a 62 y.o. female former smoker with psoriasis with MARY NSCLC. CXR prior to starting biologic prompting chest CT identifying 2.7 cm left upper lobe mass. Robotic bronchoscopy with biopsy and EBUS with AMRY positive for adenocarcinoma. Comes to clinic today with PFTs and PET scan. Unable to complete DLCO maneuvers. No blood thinners.     Quit smoking 3 months ago. ~ 35 pack years. Occasional etoh  PSH:  L achilles repair, cholecystectomy, R total knee replacement     Chief Complaint   Patient presents with    Consult       Review of patient's allergies indicates:   Allergen Reactions    Morphine Other (See Comments)     headaches    Latex, natural rubber Rash and Other (See Comments)     Redness and peeling only with bandaids    Penicillins Nausea And Vomiting and Rash       Current Outpatient Medications   Medication Sig Dispense Refill    augmented betamethasone dipropionate (DIPROLENE-AF) 0.05 % ointment MARIA ALEJANDRA EXT AA BID FOR 14 DAYS  2    betamethasone dipropionate (DIPROLENE) 0.05 % ointment Apply topically 2 (two) times daily. Prn psoriasis flares 45 g 3    cetirizine (ZYRTEC) 10 MG tablet Take 1 tablet up to twice a day.  Can be used regularly or just when needed. 60 tablet 3    clindamycin (CLEOCIN T) 1 % external solution Apply topically 2 (two) times daily. To groin and axilla prn breakouts 60 mL 5    clindamycin (CLEOCIN T) 1 % external solution Apply topically 2 (two) times daily. To groin area for pimples 60 mL 5    ibuprofen (ADVIL,MOTRIN) 800 MG tablet Take 800 mg by mouth 2 (two) times daily as needed.      meloxicam (MOBIC) 15 MG tablet Take 1 tablet (15 mg total) by mouth once daily. 30 tablet 0    OTEZLA 30 mg Tab TAKE 1 TABLET BY MOUTH  TWICE DAILY 60 tablet 2    spironolactone (ALDACTONE) 50 MG tablet Take 1 po qday 90 tablet 1    LORazepam (ATIVAN) 0.5 MG tablet Take 1 tablet (0.5 mg total) by mouth every 6 (six) hours as  needed for Anxiety. 3 tablet 0     Current Facility-Administered Medications   Medication Dose Route Frequency Provider Last Rate Last Admin    triamcinolone acetonide injection 10 mg  10 mg Intradermal Once Kathe Quan MD        triamcinolone acetonide injection 40 mg  40 mg Intradermal Once Kathe Quan MD           Past Medical History:   Diagnosis Date    Allergy     Amblyopia     Cataract     Eczema     HS (hereditary spherocytosis)     Hx of total knee replacement 2016    right knee    Joint pain      Past Surgical History:   Procedure Laterality Date    achilles tendon repair      CHOLECYSTECTOMY      NAVIGATIONAL BRONCHOSCOPY N/A 2023    Procedure: BRONCHOSCOPY, NAVIGATIONAL;  Surgeon: Radha Mccollum MD;  Location: [facility] OR [address] [place] [address];  Service: Pulmonary;  Laterality: N/A;    TOTAL KNEE ARTHROPLASTY       Family History   Problem Relation Age of Onset    Cancer Mother     Cancer Father         lung CA    Cancer Son         hogdkanaya    Breast cancer Sister         1/2 sister    Liver cancer Maternal Uncle     Blindness Cousin     Diabetes Cousin     Amblyopia Neg Hx     Cataracts Neg Hx     Glaucoma Neg Hx     Macular degeneration Neg Hx     Retinal detachment Neg Hx     Strabismus Neg Hx      Social History     Tobacco Use    Smoking status: Former     Packs/day: 1.00     Years: 40.00     Pack years: 40.00     Types: Vaping with nicotine, Cigarettes     Start date:      Quit date: 2023     Years since quittin.2    Smokeless tobacco: Never    Tobacco comments:     Patient is currently on the patch    Substance Use Topics    Alcohol use: Yes     Comment: occassionally    Drug use: No        Review of Systems:  Review of Systems   Constitutional:  Negative for activity change, fatigue and fever.   Respiratory:  Negative for cough and shortness of breath.    Cardiovascular:  Negative for leg swelling.   Gastrointestinal:  Negative for abdominal pain.   Genitourinary:  Negative for  "difficulty urinating.   Neurological:  Negative for dizziness and syncope.   Psychiatric/Behavioral:  Negative for agitation.      OBJECTIVE:     Vital Signs (Most Recent)  Vitals:    07/13/23 0947   BP: 117/89   Pulse: 95   SpO2: 96%   Weight: 88.5 kg (195 lb)   Height: 4' 11" (1.499 m)   PainSc: 0-No pain     Physical Exam:  Physical Exam  Constitutional:       Appearance: Normal appearance.   HENT:      Head: Atraumatic.   Eyes:      Extraocular Movements: Extraocular movements intact.   Cardiovascular:      Rate and Rhythm: Normal rate and regular rhythm.      Pulses: Normal pulses.      Heart sounds: Normal heart sounds.   Pulmonary:      Effort: Pulmonary effort is normal.      Breath sounds: Normal breath sounds.   Abdominal:      Palpations: Abdomen is soft.   Musculoskeletal:         General: Normal range of motion.      Cervical back: Normal range of motion and neck supple.   Skin:     General: Skin is warm and dry.   Neurological:      General: No focal deficit present.      Mental Status: She is alert and oriented to person, place, and time.   Psychiatric:         Mood and Affect: Mood normal.         Behavior: Behavior normal.       Chest 5/26/23:   1. Nodule with spiculation in the left upper lobe.  Primary concern is for neoplasm.  Recommend PET-CT or biopsy for further characterization.  2. A mildly enlarged mediastinal lymph node is nonspecific but warrants continued attention on imaging follow-up.  3. Pulmonary emphysema.    PET 7/13/23:  Left upper lobe hypermetabolic pulmonary lesion consistent with known adenocarcinoma.  No definite FDG avid metastasis.  Prominent prevascular lymph node measuring 0.9 cm in short axis with uptake similar to  blood pool, attention on follow-up.  4 cm simple cystic structure in the right adnexa.  No associated increased tracer uptake.  Consider further evaluation with pelvic ultrasound.    PFTs  FEV1: 2.40L 120%  DLCO: unable to be completed maneuvers "     ASSESSMENT/PLAN:     Patient is a 62 y.o. female former smoker with psoriasis with MARY NSCLC.    PLAN:Plan   Plan for robotic Left upper lobectomy   Walk test, FEV1 and overall functional status was adequate, do not feel further testing is necessary (despite no DLCO)  Will need cardiac stress test   May need to hold Apremilast for psoriasis, out of concern for wound healing/air leak/bronchopleural fistula

## 2023-07-07 NOTE — H&P (VIEW-ONLY)
History & Physical    SUBJECTIVE:     History of Present Illness:  Patient is a 62 y.o. female former smoker with psoriasis with MARY NSCLC. CXR prior to starting biologic prompting chest CT identifying 2.7 cm left upper lobe mass. Robotic bronchoscopy with biopsy and EBUS with MARY positive for adenocarcinoma. Comes to clinic today with PFTs and PET scan. Unable to complete DLCO maneuvers. No blood thinners.     Quit smoking 3 months ago. ~ 35 pack years. Occasional etoh  PSH:  L achilles repair, cholecystectomy, R total knee replacement     Chief Complaint   Patient presents with    Consult       Review of patient's allergies indicates:   Allergen Reactions    Morphine Other (See Comments)     headaches    Latex, natural rubber Rash and Other (See Comments)     Redness and peeling only with bandaids    Penicillins Nausea And Vomiting and Rash       Current Outpatient Medications   Medication Sig Dispense Refill    augmented betamethasone dipropionate (DIPROLENE-AF) 0.05 % ointment MARIA ALEJANDRA EXT AA BID FOR 14 DAYS  2    betamethasone dipropionate (DIPROLENE) 0.05 % ointment Apply topically 2 (two) times daily. Prn psoriasis flares 45 g 3    cetirizine (ZYRTEC) 10 MG tablet Take 1 tablet up to twice a day.  Can be used regularly or just when needed. 60 tablet 3    clindamycin (CLEOCIN T) 1 % external solution Apply topically 2 (two) times daily. To groin and axilla prn breakouts 60 mL 5    clindamycin (CLEOCIN T) 1 % external solution Apply topically 2 (two) times daily. To groin area for pimples 60 mL 5    ibuprofen (ADVIL,MOTRIN) 800 MG tablet Take 800 mg by mouth 2 (two) times daily as needed.      meloxicam (MOBIC) 15 MG tablet Take 1 tablet (15 mg total) by mouth once daily. 30 tablet 0    OTEZLA 30 mg Tab TAKE 1 TABLET BY MOUTH  TWICE DAILY 60 tablet 2    spironolactone (ALDACTONE) 50 MG tablet Take 1 po qday 90 tablet 1    LORazepam (ATIVAN) 0.5 MG tablet Take 1 tablet (0.5 mg total) by mouth every 6 (six) hours as  needed for Anxiety. 3 tablet 0     Current Facility-Administered Medications   Medication Dose Route Frequency Provider Last Rate Last Admin    triamcinolone acetonide injection 10 mg  10 mg Intradermal Once Kathe Quan MD        triamcinolone acetonide injection 40 mg  40 mg Intradermal Once Kathe Quan MD           Past Medical History:   Diagnosis Date    Allergy     Amblyopia     Cataract     Eczema     HS (hereditary spherocytosis)     Hx of total knee replacement 2016    right knee    Joint pain      Past Surgical History:   Procedure Laterality Date    achilles tendon repair      CHOLECYSTECTOMY      NAVIGATIONAL BRONCHOSCOPY N/A 2023    Procedure: BRONCHOSCOPY, NAVIGATIONAL;  Surgeon: Radha Mccollum MD;  Location: [facility] OR [address] [place] [address];  Service: Pulmonary;  Laterality: N/A;    TOTAL KNEE ARTHROPLASTY       Family History   Problem Relation Age of Onset    Cancer Mother     Cancer Father         lung CA    Cancer Son         hogdkanaya    Breast cancer Sister         1/2 sister    Liver cancer Maternal Uncle     Blindness Cousin     Diabetes Cousin     Amblyopia Neg Hx     Cataracts Neg Hx     Glaucoma Neg Hx     Macular degeneration Neg Hx     Retinal detachment Neg Hx     Strabismus Neg Hx      Social History     Tobacco Use    Smoking status: Former     Packs/day: 1.00     Years: 40.00     Pack years: 40.00     Types: Vaping with nicotine, Cigarettes     Start date:      Quit date: 2023     Years since quittin.2    Smokeless tobacco: Never    Tobacco comments:     Patient is currently on the patch    Substance Use Topics    Alcohol use: Yes     Comment: occassionally    Drug use: No        Review of Systems:  Review of Systems   Constitutional:  Negative for activity change, fatigue and fever.   Respiratory:  Negative for cough and shortness of breath.    Cardiovascular:  Negative for leg swelling.   Gastrointestinal:  Negative for abdominal pain.   Genitourinary:  Negative for  "difficulty urinating.   Neurological:  Negative for dizziness and syncope.   Psychiatric/Behavioral:  Negative for agitation.      OBJECTIVE:     Vital Signs (Most Recent)  Vitals:    07/13/23 0947   BP: 117/89   Pulse: 95   SpO2: 96%   Weight: 88.5 kg (195 lb)   Height: 4' 11" (1.499 m)   PainSc: 0-No pain     Physical Exam:  Physical Exam  Constitutional:       Appearance: Normal appearance.   HENT:      Head: Atraumatic.   Eyes:      Extraocular Movements: Extraocular movements intact.   Cardiovascular:      Rate and Rhythm: Normal rate and regular rhythm.      Pulses: Normal pulses.      Heart sounds: Normal heart sounds.   Pulmonary:      Effort: Pulmonary effort is normal.      Breath sounds: Normal breath sounds.   Abdominal:      Palpations: Abdomen is soft.   Musculoskeletal:         General: Normal range of motion.      Cervical back: Normal range of motion and neck supple.   Skin:     General: Skin is warm and dry.   Neurological:      General: No focal deficit present.      Mental Status: She is alert and oriented to person, place, and time.   Psychiatric:         Mood and Affect: Mood normal.         Behavior: Behavior normal.       Chest 5/26/23:   1. Nodule with spiculation in the left upper lobe.  Primary concern is for neoplasm.  Recommend PET-CT or biopsy for further characterization.  2. A mildly enlarged mediastinal lymph node is nonspecific but warrants continued attention on imaging follow-up.  3. Pulmonary emphysema.    PET 7/13/23:  Left upper lobe hypermetabolic pulmonary lesion consistent with known adenocarcinoma.  No definite FDG avid metastasis.  Prominent prevascular lymph node measuring 0.9 cm in short axis with uptake similar to  blood pool, attention on follow-up.  4 cm simple cystic structure in the right adnexa.  No associated increased tracer uptake.  Consider further evaluation with pelvic ultrasound.    PFTs  FEV1: 2.40L 120%  DLCO: unable to be completed maneuvers "     ASSESSMENT/PLAN:     Patient is a 62 y.o. female former smoker with psoriasis with MARY NSCLC.    PLAN:Plan   Plan for robotic Left upper lobectomy   Walk test, FEV1 and overall functional status was adequate, do not feel further testing is necessary (despite no DLCO)  Will need cardiac stress test   May need to hold Apremilast for psoriasis, out of concern for wound healing/air leak/bronchopleural fistula

## 2023-07-10 LAB
DNA RANGE(S) EXAMINED NAR: NORMAL
GENE DIS ANL INTERP-IMP: POSITIVE
GENE DIS ASSESSED: NORMAL
GENE MUT TESTED BLD/T: 6.3 M/MB
MSI CA SPEC-IMP: NORMAL
PD-L1 BY 22C3 TISS IMSTN DOC: POSITIVE
REASON FOR STUDY: NORMAL
TEMPUS FUSIONADDENDUM: NORMAL
TEMPUS LCA: NORMAL
TEMPUS PD-L1 (22C3) COMBINED POSITIVE SCORE: 40
TEMPUS PD-L1 (22C3) TUMOR PROPORTION SCORE: 40 %
TEMPUS PERTINENTNEGATIVES: NORMAL
TEMPUS PORTAL: NORMAL
TEMPUS THERAPY1: NORMAL
TEMPUS THERAPY2: NORMAL
TEMPUS THERAPY3: NORMAL
TEMPUS THERAPYCOUNT: 3
TEMPUS TRIAL1: NORMAL
TEMPUS TRIAL2: NORMAL
TEMPUS TRIAL3: NORMAL
TEMPUS TRIALCOUNT: 3

## 2023-07-12 ENCOUNTER — HOSPITAL ENCOUNTER (OUTPATIENT)
Dept: PULMONOLOGY | Facility: CLINIC | Age: 63
Discharge: HOME | End: 2023-07-12
Payer: COMMERCIAL

## 2023-07-12 DIAGNOSIS — R91.1 PULMONARY NODULE: ICD-10-CM

## 2023-07-12 DIAGNOSIS — Z87.891 FORMER SMOKER: ICD-10-CM

## 2023-07-12 PROCEDURE — 94729 DIFFUSING CAPACITY: CPT | Mod: S$GLB,,, | Performed by: INTERNAL MEDICINE

## 2023-07-12 PROCEDURE — 94060 PR EVAL OF BRONCHOSPASM: ICD-10-PCS | Mod: S$GLB,,, | Performed by: INTERNAL MEDICINE

## 2023-07-12 PROCEDURE — 94729 PR C02/MEMBANE DIFFUSE CAPACITY: ICD-10-PCS | Mod: S$GLB,,, | Performed by: INTERNAL MEDICINE

## 2023-07-12 PROCEDURE — 94060 EVALUATION OF WHEEZING: CPT | Mod: S$GLB,,, | Performed by: INTERNAL MEDICINE

## 2023-07-13 ENCOUNTER — HOSPITAL ENCOUNTER (OUTPATIENT)
Dept: RADIOLOGY | Facility: HOSPITAL | Age: 63
Discharge: HOME | End: 2023-07-13
Attending: INTERNAL MEDICINE
Payer: COMMERCIAL

## 2023-07-13 ENCOUNTER — LAB VISIT (OUTPATIENT)
Dept: LAB | Facility: HOSPITAL | Age: 63
End: 2023-07-13
Payer: COMMERCIAL

## 2023-07-13 ENCOUNTER — HOSPITAL ENCOUNTER (OUTPATIENT)
Dept: PULMONOLOGY | Facility: CLINIC | Age: 63
Discharge: HOME | End: 2023-07-13
Payer: COMMERCIAL

## 2023-07-13 ENCOUNTER — OFFICE VISIT (OUTPATIENT)
Dept: CARDIOTHORACIC SURGERY | Facility: CLINIC | Age: 63
End: 2023-07-13
Payer: COMMERCIAL

## 2023-07-13 VITALS
WEIGHT: 195 LBS | BODY MASS INDEX: 39.31 KG/M2 | HEART RATE: 95 BPM | HEIGHT: 59 IN | SYSTOLIC BLOOD PRESSURE: 117 MMHG | DIASTOLIC BLOOD PRESSURE: 89 MMHG | OXYGEN SATURATION: 96 %

## 2023-07-13 VITALS — HEIGHT: 59 IN | WEIGHT: 195.13 LBS | BODY MASS INDEX: 39.34 KG/M2

## 2023-07-13 DIAGNOSIS — C34.92 ADENOCARCINOMA, LUNG, LEFT: ICD-10-CM

## 2023-07-13 DIAGNOSIS — R91.1 PULMONARY NODULE: Primary | ICD-10-CM

## 2023-07-13 DIAGNOSIS — Z01.810 PRE-OPERATIVE CARDIOVASCULAR EXAMINATION: ICD-10-CM

## 2023-07-13 DIAGNOSIS — F17.200 TOBACCO DEPENDENCE: ICD-10-CM

## 2023-07-13 DIAGNOSIS — C34.90 MALIGNANT NEOPLASM OF UNSPECIFIED PART OF UNSPECIFIED BRONCHUS OR LUNG: ICD-10-CM

## 2023-07-13 DIAGNOSIS — C34.92 ADENOCARCINOMA, LUNG, LEFT: Primary | ICD-10-CM

## 2023-07-13 DIAGNOSIS — E66.01 MORBID OBESITY: ICD-10-CM

## 2023-07-13 DIAGNOSIS — C34.92 ADENOCARCINOMA OF LEFT LUNG: ICD-10-CM

## 2023-07-13 DIAGNOSIS — Z01.818 PRE-OP EVALUATION: ICD-10-CM

## 2023-07-13 LAB
ALBUMIN SERPL BCP-MCNC: 4 G/DL (ref 3.5–5.2)
ALP SERPL-CCNC: 100 U/L (ref 55–135)
ALT SERPL W/O P-5'-P-CCNC: 22 U/L (ref 10–44)
ANION GAP SERPL CALC-SCNC: 9 MMOL/L (ref 8–16)
APTT PPP: 25.6 SEC (ref 21–32)
AST SERPL-CCNC: 16 U/L (ref 10–40)
BASOPHILS # BLD AUTO: 0.09 K/UL (ref 0–0.2)
BASOPHILS NFR BLD: 0.8 % (ref 0–1.9)
BILIRUB SERPL-MCNC: 0.8 MG/DL (ref 0.1–1)
BUN SERPL-MCNC: 26 MG/DL (ref 8–23)
CALCIUM SERPL-MCNC: 10.6 MG/DL (ref 8.7–10.5)
CHLORIDE SERPL-SCNC: 104 MMOL/L (ref 95–110)
CO2 SERPL-SCNC: 22 MMOL/L (ref 23–29)
CREAT SERPL-MCNC: 1.4 MG/DL (ref 0.5–1.4)
DIFFERENTIAL METHOD: NORMAL
EOSINOPHIL # BLD AUTO: 0.4 K/UL (ref 0–0.5)
EOSINOPHIL NFR BLD: 3.6 % (ref 0–8)
ERYTHROCYTE [DISTWIDTH] IN BLOOD BY AUTOMATED COUNT: 13.7 % (ref 11.5–14.5)
EST. GFR  (NO RACE VARIABLE): 42.3 ML/MIN/1.73 M^2
FEF 25 75 LLN: 0.92
FEF 25 75 PRE REF: 127.8 %
FEF 25 75 REF: 1.88
FET100 CHG: -1.3 %
FEV05 LLN: 0.7
FEV05 REF: 1.56
FEV1 CHG: 4.7 %
FEV1 FVC LLN: 67
FEV1 FVC PRE REF: 100.1 %
FEV1 FVC REF: 80
FEV1 LLN: 1.5
FEV1 PRE REF: 119.6 %
FEV1 REF: 2.01
FEV1 VOL CHG: 0.11
FVC CHG: 1.5 %
FVC LLN: 1.89
FVC PRE REF: 119 %
FVC REF: 2.53
FVC VOL CHG: 0.04
GLUCOSE SERPL-MCNC: 142 MG/DL (ref 70–110)
HCT VFR BLD AUTO: 45.9 % (ref 37–48.5)
HGB BLD-MCNC: 15 G/DL (ref 12–16)
IMM GRANULOCYTES # BLD AUTO: 0.04 K/UL (ref 0–0.04)
IMM GRANULOCYTES NFR BLD AUTO: 0.4 % (ref 0–0.5)
INR PPP: 1 (ref 0.8–1.2)
LYMPHOCYTES # BLD AUTO: 2.6 K/UL (ref 1–4.8)
LYMPHOCYTES NFR BLD: 22.6 % (ref 18–48)
MCH RBC QN AUTO: 30.6 PG (ref 27–31)
MCHC RBC AUTO-ENTMCNC: 32.7 G/DL (ref 32–36)
MCV RBC AUTO: 94 FL (ref 82–98)
MONOCYTES # BLD AUTO: 0.6 K/UL (ref 0.3–1)
MONOCYTES NFR BLD: 5.4 % (ref 4–15)
NEUTROPHILS # BLD AUTO: 7.6 K/UL (ref 1.8–7.7)
NEUTROPHILS NFR BLD: 67.2 % (ref 38–73)
NRBC BLD-RTO: 0 /100 WBC
PEF LLN: 3.93
PEF PRE REF: 87.7 %
PEF REF: 5.38
PHYSICIAN COMMENT: ABNORMAL
PLATELET # BLD AUTO: 338 K/UL (ref 150–450)
PMV BLD AUTO: 10.1 FL (ref 9.2–12.9)
POST FEF 25 75: 2.64 L/S (ref 0.92–3.18)
POST FET 100: 7 SEC
POST FEV1 FVC: 82.19 % (ref 66.97–90.35)
POST FEV1: 2.51 L (ref 1.5–2.5)
POST FEV5: 1.95 L (ref 0.7–2.41)
POST FVC: 3.06 L (ref 1.89–3.2)
POST PEF: 5.25 L/S (ref 3.93–6.84)
POTASSIUM SERPL-SCNC: 4.2 MMOL/L (ref 3.5–5.1)
PRE FEF 25 75: 2.4 L/S (ref 0.92–3.18)
PRE FET 100: 7.1 SEC
PRE FEV05 REF: 120.9 %
PRE FEV1 FVC: 79.67 % (ref 66.97–90.35)
PRE FEV1: 2.4 L (ref 1.5–2.5)
PRE FEV5: 1.89 L (ref 0.7–2.41)
PRE FVC: 3.01 L (ref 1.89–3.2)
PRE PEF: 4.72 L/S (ref 3.93–6.84)
PROT SERPL-MCNC: 7.9 G/DL (ref 6–8.4)
PROTHROMBIN TIME: 10.5 SEC (ref 9–12.5)
RBC # BLD AUTO: 4.9 M/UL (ref 4–5.4)
SODIUM SERPL-SCNC: 135 MMOL/L (ref 136–145)
WBC # BLD AUTO: 11.32 K/UL (ref 3.9–12.7)

## 2023-07-13 PROCEDURE — 80053 COMPREHEN METABOLIC PANEL: CPT | Performed by: PHYSICIAN ASSISTANT

## 2023-07-13 PROCEDURE — A9552 F18 FDG: HCPCS

## 2023-07-13 PROCEDURE — 78815 PET IMAGE W/CT SKULL-THIGH: CPT | Mod: 26,PI,, | Performed by: STUDENT IN AN ORGANIZED HEALTH CARE EDUCATION/TRAINING PROGRAM

## 2023-07-13 PROCEDURE — 99999 PR PBB SHADOW E&M-EST. PATIENT-LVL V: CPT | Mod: PBBFAC,,, | Performed by: STUDENT IN AN ORGANIZED HEALTH CARE EDUCATION/TRAINING PROGRAM

## 2023-07-13 PROCEDURE — 3074F PR MOST RECENT SYSTOLIC BLOOD PRESSURE < 130 MM HG: ICD-10-PCS | Mod: CPTII,S$GLB,, | Performed by: STUDENT IN AN ORGANIZED HEALTH CARE EDUCATION/TRAINING PROGRAM

## 2023-07-13 PROCEDURE — 99999 PR PBB SHADOW E&M-EST. PATIENT-LVL V: ICD-10-PCS | Mod: PBBFAC,,, | Performed by: STUDENT IN AN ORGANIZED HEALTH CARE EDUCATION/TRAINING PROGRAM

## 2023-07-13 PROCEDURE — 94618 PULMONARY STRESS TESTING: CPT | Mod: S$GLB,,, | Performed by: INTERNAL MEDICINE

## 2023-07-13 PROCEDURE — 99205 PR OFFICE/OUTPT VISIT, NEW, LEVL V, 60-74 MIN: ICD-10-PCS | Mod: 57,S$GLB,, | Performed by: STUDENT IN AN ORGANIZED HEALTH CARE EDUCATION/TRAINING PROGRAM

## 2023-07-13 PROCEDURE — 85025 COMPLETE CBC W/AUTO DIFF WBC: CPT | Performed by: PHYSICIAN ASSISTANT

## 2023-07-13 PROCEDURE — 99205 OFFICE O/P NEW HI 60 MIN: CPT | Mod: 57,S$GLB,, | Performed by: STUDENT IN AN ORGANIZED HEALTH CARE EDUCATION/TRAINING PROGRAM

## 2023-07-13 PROCEDURE — 1159F PR MEDICATION LIST DOCUMENTED IN MEDICAL RECORD: ICD-10-PCS | Mod: CPTII,S$GLB,, | Performed by: STUDENT IN AN ORGANIZED HEALTH CARE EDUCATION/TRAINING PROGRAM

## 2023-07-13 PROCEDURE — 78815 NM PET CT ROUTINE: ICD-10-PCS | Mod: 26,PI,, | Performed by: STUDENT IN AN ORGANIZED HEALTH CARE EDUCATION/TRAINING PROGRAM

## 2023-07-13 PROCEDURE — 3008F PR BODY MASS INDEX (BMI) DOCUMENTED: ICD-10-PCS | Mod: CPTII,S$GLB,, | Performed by: STUDENT IN AN ORGANIZED HEALTH CARE EDUCATION/TRAINING PROGRAM

## 2023-07-13 PROCEDURE — 94618 PULMONARY STRESS TESTING: ICD-10-PCS | Mod: S$GLB,,, | Performed by: INTERNAL MEDICINE

## 2023-07-13 PROCEDURE — 3079F DIAST BP 80-89 MM HG: CPT | Mod: CPTII,S$GLB,, | Performed by: STUDENT IN AN ORGANIZED HEALTH CARE EDUCATION/TRAINING PROGRAM

## 2023-07-13 PROCEDURE — 3074F SYST BP LT 130 MM HG: CPT | Mod: CPTII,S$GLB,, | Performed by: STUDENT IN AN ORGANIZED HEALTH CARE EDUCATION/TRAINING PROGRAM

## 2023-07-13 PROCEDURE — 3008F BODY MASS INDEX DOCD: CPT | Mod: CPTII,S$GLB,, | Performed by: STUDENT IN AN ORGANIZED HEALTH CARE EDUCATION/TRAINING PROGRAM

## 2023-07-13 PROCEDURE — 85610 PROTHROMBIN TIME: CPT | Performed by: PHYSICIAN ASSISTANT

## 2023-07-13 PROCEDURE — 3044F PR MOST RECENT HEMOGLOBIN A1C LEVEL <7.0%: ICD-10-PCS | Mod: CPTII,S$GLB,, | Performed by: STUDENT IN AN ORGANIZED HEALTH CARE EDUCATION/TRAINING PROGRAM

## 2023-07-13 PROCEDURE — 36415 COLL VENOUS BLD VENIPUNCTURE: CPT | Performed by: PHYSICIAN ASSISTANT

## 2023-07-13 PROCEDURE — 3044F HG A1C LEVEL LT 7.0%: CPT | Mod: CPTII,S$GLB,, | Performed by: STUDENT IN AN ORGANIZED HEALTH CARE EDUCATION/TRAINING PROGRAM

## 2023-07-13 PROCEDURE — 3079F PR MOST RECENT DIASTOLIC BLOOD PRESSURE 80-89 MM HG: ICD-10-PCS | Mod: CPTII,S$GLB,, | Performed by: STUDENT IN AN ORGANIZED HEALTH CARE EDUCATION/TRAINING PROGRAM

## 2023-07-13 PROCEDURE — 85730 THROMBOPLASTIN TIME PARTIAL: CPT | Performed by: PHYSICIAN ASSISTANT

## 2023-07-13 PROCEDURE — 1159F MED LIST DOCD IN RCRD: CPT | Mod: CPTII,S$GLB,, | Performed by: STUDENT IN AN ORGANIZED HEALTH CARE EDUCATION/TRAINING PROGRAM

## 2023-07-13 NOTE — PROCEDURES
Radha Ervin is a 62 y.o.  female patient, who presents for a 6 minute walk test ordered by MD Bryanna.  The diagnosis is Pre-Operative Evaluation; Lung Cancer.  The patient's BMI is 39.4 kg/m2.  Predicted distance (lower limit of normal) is 264.85 meters.      Test Results:    The test was completed without stopping. The total time walked was 360 seconds. During walking, the patient reported:  No complaints. The patient used no assistive devices during testing.     07/13/2023---------Distance: 381 meters (1250 feet)     O2 Sat % Supplemental Oxygen Heart Rate Blood Pressure Yanet Scale   Pre-exercise  (Resting) 96 % Room Air 102 bpm 127/75 mmHg 0   During Exercise 96 % Room Air 124 bpm 132/84 mmHg 0.5   Post-exercise  (Recovery) 96 % Room Air  109 bpm       Recovery Time: 61 seconds    Performing nurse/tech: Puneet GILLESPIE      PREVIOUS STUDY:   The patient has not had a previous study.      CLINICAL INTERPRETATION:  Six minute walk distance is 381 meters (1250 feet) with very, very light dyspnea.  During exercise, there was no desaturation while breathing room air.  Blood pressure remained stable and Heart rate increased significantly with walking.  Tachycardia was present prior to exercise.  The patient did not report non-pulmonary symptoms during exercise.  No previous study performed.  Based upon age and body mass index, exercise capacity is normal.

## 2023-07-20 ENCOUNTER — PATIENT MESSAGE (OUTPATIENT)
Dept: PULMONOLOGY | Facility: CLINIC | Age: 63
End: 2023-07-20
Payer: COMMERCIAL

## 2023-07-22 ENCOUNTER — PATIENT MESSAGE (OUTPATIENT)
Dept: PULMONOLOGY | Facility: CLINIC | Age: 63
End: 2023-07-22
Payer: COMMERCIAL

## 2023-07-25 ENCOUNTER — HOSPITAL ENCOUNTER (OUTPATIENT)
Dept: CARDIOLOGY | Facility: HOSPITAL | Age: 63
Discharge: HOME | End: 2023-07-25
Attending: PHYSICIAN ASSISTANT
Payer: COMMERCIAL

## 2023-07-25 VITALS — BODY MASS INDEX: 39.39 KG/M2 | WEIGHT: 195 LBS

## 2023-07-25 DIAGNOSIS — Z01.810 PRE-OPERATIVE CARDIOVASCULAR EXAMINATION: ICD-10-CM

## 2023-07-25 DIAGNOSIS — C34.92 ADENOCARCINOMA, LUNG, LEFT: ICD-10-CM

## 2023-07-25 LAB
AORTIC ROOT ANNULUS: 2.8 CM
CV ECHO LV RWT: 0 CM
CV STRESS BASE HR: 82 BPM
DIASTOLIC BLOOD PRESSURE: 70 MMHG
ECHO LV POSTERIOR WALL: 0 CM (ref 0.6–1.1)
EJECTION FRACTION: 60 %
FRACTIONAL SHORTENING: 61 % (ref 28–44)
INTERVENTRICULAR SEPTUM: 0.7 CM (ref 0.6–1.1)
LA MAJOR: 4.1 CM
LA MINOR: 3.6 CM
LA WIDTH: 2.3 CM
LEFT ATRIUM SIZE: 2.6 CM
LEFT ATRIUM VOLUME: 19.49 CM3
LEFT INTERNAL DIMENSION IN SYSTOLE: 1.3 CM (ref 2.1–4)
LEFT VENTRICULAR INTERNAL DIMENSION IN DIASTOLE: 3.3 CM (ref 3.5–6)
LEFT VENTRICULAR MASS: 23.95 G
OHS CV CPX 1 MINUTE RECOVERY HEART RATE: 129 BPM
OHS CV CPX 85 PERCENT MAX PREDICTED HEART RATE MALE: 127
OHS CV CPX MAX PREDICTED HEART RATE: 150
OHS CV CPX PATIENT IS FEMALE: 1
OHS CV CPX PATIENT IS MALE: 0
OHS CV CPX PEAK DIASTOLIC BLOOD PRESSURE: 84 MMHG
OHS CV CPX PEAK HEAR RATE: 137 BPM
OHS CV CPX PEAK RATE PRESSURE PRODUCT: NORMAL
OHS CV CPX PEAK SYSTOLIC BLOOD PRESSURE: 113 MMHG
OHS CV CPX PERCENT MAX PREDICTED HEART RATE ACHIEVED: 92
OHS CV CPX RATE PRESSURE PRODUCT PRESENTING: 8528
RA MAJOR: 3.3 CM
RA WIDTH: 2.5 CM
RIGHT VENTRICULAR END-DIASTOLIC DIMENSION: 2.2 CM
RIGHT VENTRICULAR LENGTH IN DIASTOLE (APICAL 4-CHAMBER VIEW): 4.1 CM
STRESS ECHO POST EXERCISE DUR MIN: 8 MINUTES
STRESS ECHO POST EXERCISE DUR SEC: 10 SECONDS
SYSTOLIC BLOOD PRESSURE: 104 MMHG
TRICUSPID ANNULAR PLANE SYSTOLIC EXCURSION: 1.4 CM

## 2023-07-25 PROCEDURE — 63600175 PHARM REV CODE 636 W HCPCS: Performed by: PHYSICIAN ASSISTANT

## 2023-07-25 PROCEDURE — 93351 STRESS ECHO (CUPID ONLY): ICD-10-PCS | Mod: 26,,, | Performed by: INTERNAL MEDICINE

## 2023-07-25 PROCEDURE — 93351 STRESS TTE COMPLETE: CPT | Mod: 26,,, | Performed by: INTERNAL MEDICINE

## 2023-07-25 PROCEDURE — 93351 STRESS TTE COMPLETE: CPT

## 2023-07-25 PROCEDURE — 25000003 PHARM REV CODE 250: Performed by: PHYSICIAN ASSISTANT

## 2023-07-25 RX ORDER — DOBUTAMINE HYDROCHLORIDE 400 MG/100ML
10 INJECTION, SOLUTION INTRAVENOUS ONCE
Status: COMPLETED | OUTPATIENT
Start: 2023-07-25 | End: 2023-07-25

## 2023-07-25 RX ADMIN — DOBUTAMINE 10 MCG/KG/MIN: 12.5 INJECTION, SOLUTION, CONCENTRATE INTRAVENOUS at 01:07

## 2023-07-25 NOTE — NURSING
1325: Pt on table, ready for test. Pt states NKDA and denies hx of glaucoma.    1325: Connected to monitor EKG and NIBP; /70; HR 71.    1326: LESLIE Bonner at bedside to explain procedure and obtain consent.    1328: Test started per protocol, see EKG sheet for VS.    1328: Dobutamine started at 10mcg to increase HR, target . HR 71 /70.    1331: Dobutamine increased to 20mcg, pt tolerating well. Pt given hand ball to squeeze and instructed to do leg exercises.  BP 91/76.    1334: Dobutamine increased to 30mcg.  BP 94/70.    1335: Target HR obtained. Pt tolerated test well. Denied any c/o chest pain and/or SOB. , BP 94/70    1335: Testing phase completed, dobutamine off, NS up at rapid rate for recovery phase.     1338: BP 91/55    1342: /62    1347: Recovery phase completed. Pt states feels normal, no distress, NS d/c'd, approx  200cc infused. HR 83. B{ 106/62 Iv d/c'd, pt released to cardio.

## 2023-07-28 ENCOUNTER — OFFICE VISIT (OUTPATIENT)
Dept: DERMATOLOGY | Facility: CLINIC | Age: 63
End: 2023-07-28
Payer: COMMERCIAL

## 2023-07-28 DIAGNOSIS — L40.9 PSORIASIS: ICD-10-CM

## 2023-07-28 DIAGNOSIS — L73.2 HIDRADENITIS SUPPURATIVA: Primary | ICD-10-CM

## 2023-07-28 PROBLEM — L20.89 OTHER ATOPIC DERMATITIS: Status: RESOLVED | Noted: 2018-06-30 | Resolved: 2023-07-28

## 2023-07-28 PROCEDURE — 99214 OFFICE O/P EST MOD 30 MIN: CPT | Mod: S$GLB,,, | Performed by: DERMATOLOGY

## 2023-07-28 PROCEDURE — 99214 PR OFFICE/OUTPT VISIT, EST, LEVL IV, 30-39 MIN: ICD-10-PCS | Mod: S$GLB,,, | Performed by: DERMATOLOGY

## 2023-07-28 PROCEDURE — 3044F PR MOST RECENT HEMOGLOBIN A1C LEVEL <7.0%: ICD-10-PCS | Mod: CPTII,S$GLB,, | Performed by: DERMATOLOGY

## 2023-07-28 PROCEDURE — 99999 PR PBB SHADOW E&M-EST. PATIENT-LVL III: ICD-10-PCS | Mod: PBBFAC,,, | Performed by: DERMATOLOGY

## 2023-07-28 PROCEDURE — 1159F MED LIST DOCD IN RCRD: CPT | Mod: CPTII,S$GLB,, | Performed by: DERMATOLOGY

## 2023-07-28 PROCEDURE — 1159F PR MEDICATION LIST DOCUMENTED IN MEDICAL RECORD: ICD-10-PCS | Mod: CPTII,S$GLB,, | Performed by: DERMATOLOGY

## 2023-07-28 PROCEDURE — 99999 PR PBB SHADOW E&M-EST. PATIENT-LVL III: CPT | Mod: PBBFAC,,, | Performed by: DERMATOLOGY

## 2023-07-28 PROCEDURE — 1160F RVW MEDS BY RX/DR IN RCRD: CPT | Mod: CPTII,S$GLB,, | Performed by: DERMATOLOGY

## 2023-07-28 PROCEDURE — 1160F PR REVIEW ALL MEDS BY PRESCRIBER/CLIN PHARMACIST DOCUMENTED: ICD-10-PCS | Mod: CPTII,S$GLB,, | Performed by: DERMATOLOGY

## 2023-07-28 PROCEDURE — 3044F HG A1C LEVEL LT 7.0%: CPT | Mod: CPTII,S$GLB,, | Performed by: DERMATOLOGY

## 2023-07-28 RX ORDER — SPIRONOLACTONE 50 MG/1
TABLET, FILM COATED ORAL
Qty: 90 TABLET | Refills: 1 | Status: ON HOLD | OUTPATIENT
Start: 2023-07-28 | End: 2023-08-06 | Stop reason: SDUPTHER

## 2023-07-28 RX ORDER — CLINDAMYCIN PHOSPHATE 10 MG/G
GEL TOPICAL
Qty: 60 G | Refills: 5 | Status: SHIPPED | OUTPATIENT
Start: 2023-07-28

## 2023-07-28 NOTE — PATIENT INSTRUCTIONS
Today's Plan:      Currently clear. Has lung cancer resection surgery next week    Start   hibiclens wash alternating with benzoyl peroxide wash   clinda gel every day to quiet areas  turmeric (with black pepper) supplement at 500mg to 1 gram per day (available over the counter and on [employer])              Can cause upset stomach    Continue:  Take Spironolactone (aldactone) at 50 mg every day  Take otezla at 30mg 2x/day      Ok to use dilute bleach if flare.   Dilute bleach to affected areas: compresses daily to affected/flared area(s) - can use CLn wash on warm wet rag as compress  If have groin involvement, may want to take dilute bleach baths (daily when flared; 2x/week for maintenance). Recipe for dilute bleach bath:     add 1/2 cup of regular strength (6%) bleach to a full tub of lukewarm water and soak for 10 - 15 minutes. (use 1/4 cup for a half-full tub of water)   OR Add Clorox (2 teaspoons/gal of water, max. 2/3 cup) to bath water

## 2023-07-28 NOTE — ASSESSMENT & PLAN NOTE
Today's Plan:      Currently clear. Has lung cancer resection surgery next week    Start   hibiclens wash alternating with benzoyl peroxide wash   clinda gel every day to quiet areas  turmeric (with black pepper) supplement at 500mg to 1 gram per day (available over the counter and on [employer])              Can cause upset stomach    Continue:  Take Spironolactone (aldactone) at 50 mg every day  Take otezla at 30mg 2x/day      Ok to use dilute bleach if flare.

## 2023-07-28 NOTE — PROGRESS NOTES
"  Subjective:      Patient ID:  Radha Ervin is a 63 y.o. female who presents for   Chief Complaint   Patient presents with    Hidradenitis Suppurativa     F/u     Patient with Hidradenitis suppurativa. Last seen on 4/24/23 for IL K 10 to abscesses on left medial thigh and right post medial thigh and IL K 20 to right inguinal fold and right medial thigh - improved    Condition overall - Improved    No new lesion(s)     Current treatment regimen:  Otezla 30mg BID (dx with pso 2/2 humira)  Spironolactone 50mg every day  Antibac wash    Has not yet started:  Turmeric      Lifestyle modifications - diet, smoking, shaving etc  Limiting "white" foods, sugars and processed foods      Dx with lung cancer 5/23 - surgery for excision next week    Review of Systems   Constitutional:  Negative for weight loss and weight gain.   HENT:  Negative for nosebleeds and headaches.    Gastrointestinal:  Positive for diarrhea (biw. since starting otezla. never had colonoscopy). Negative for Sensitivity to oral antibiotics.   Genitourinary:  Negative for irregular periods (post menopausal).   Musculoskeletal:  Negative for arthralgias.   Skin:  Positive for abscesses. Negative for recent sunburn.   Neurological:  Negative for headaches.   Psychiatric/Behavioral:  Negative for depressed mood.    Hematologic/Lymphatic: Bruises/bleeds easily.     Objective:   Physical Exam   Constitutional: She appears well-developed and well-nourished. She is obese.  No distress.   Neurological: She is alert and oriented to person, place, and time. She is not disoriented.   Psychiatric: She has a normal mood and affect.   Skin:   Areas Examined (abnormalities noted in diagram):   Chest / Axilla Inspection Performed  Abdomen Inspection Performed  Genitals / Buttocks / Groin Inspection Performed  RLE Inspected  LLE Inspection Performed              Diagram Legend     Erythematous scaling macule/papule c/w actinic keratosis       Vascular papule c/w " angioma      Pigmented verrucoid papule/plaque c/w seborrheic keratosis      Yellow umbilicated papule c/w sebaceous hyperplasia      Irregularly shaped tan macule c/w lentigo     1-2 mm smooth white papules consistent with Milia      Movable subcutaneous cyst with punctum c/w epidermal inclusion cyst      Subcutaneous movable cyst c/w pilar cyst      Firm pink to brown papule c/w dermatofibroma      Pedunculated fleshy papule(s) c/w skin tag(s)      Evenly pigmented macule c/w junctional nevus     Mildly variegated pigmented, slightly irregular-bordered macule c/w mildly atypical nevus      Flesh colored to evenly pigmented papule c/w intradermal nevus       Pink pearly papule/plaque c/w basal cell carcinoma      Erythematous hyperkeratotic cursted plaque c/w SCC      Surgical scar with no sign of skin cancer recurrence      Open and closed comedones      Inflammatory papules and pustules      Verrucoid papule consistent consistent with wart     Erythematous eczematous patches and plaques     Dystrophic onycholytic nail with subungual debris c/w onychomycosis     Umbilicated papule    Erythematous-base heme-crusted tan verrucoid plaque consistent with inflamed seborrheic keratosis     Erythematous Silvery Scaling Plaque c/w Psoriasis     See annotation      Assessment / Plan:        Hidradenitis suppurativa  -     spironolactone (ALDACTONE) 50 MG tablet; Take 1 po qday  Dispense: 90 tablet; Refill: 1  -     clindamycin phosphate 1% (CLINDAGEL) 1 % gel; AAA every day  Dispense: 60 g; Refill: 5    Psoriasis      Hidradenitis suppurativa  Today's Plan:      Currently clear. Has lung cancer resection surgery next week    Start   hibiclens wash alternating with benzoyl peroxide wash   clinda gel every day to quiet areas  turmeric (with black pepper) supplement at 500mg to 1 gram per day (available over the counter and on [employer])              Can cause upset stomach    Continue:  Take Spironolactone (aldactone) at 50 mg  every day  Take otezla at 30mg 2x/day      Ok to use dilute bleach if flare.       Follow up in about 4 months (around 11/28/2023) for HS clinic.

## 2023-07-31 ENCOUNTER — SPECIALTY PHARMACY (OUTPATIENT)
Dept: PHARMACY | Facility: CLINIC | Age: 63
End: 2023-07-31
Payer: COMMERCIAL

## 2023-07-31 NOTE — TELEPHONE ENCOUNTER
Outgoing call to pt regarding otezla refill. Pt was unsure of on hand count. Pt stated she did miss about three days of medication due to getting a stress test and biopsy. Pt has lung cancer and will be having surgery 8/3 to get part of lung removed. Routing to [facility]. Will follow up tomorrow for on-hand count. Routing to [facility].

## 2023-08-01 RX ORDER — IBUPROFEN 200 MG
1 TABLET ORAL
COMMUNITY
End: 2023-08-28

## 2023-08-01 NOTE — PRE-PROCEDURE INSTRUCTIONS
PreOp Instructions given:   - Verbal medication information (what to hold and what to take)   - NPO guidelines 2300-  - Arrival place directions given; time to be given the day before procedure by the   Surgeon's Office DOSC   - Bathing with antibacterial soap   - Don't wear any jewelry or bring any valuables AM of surgery   - No makeup or moisturizer to face   - No perfume/cologne, powder, lotions or aftershave   Pt. verbalized understanding.   Pt denies any h/o Anesthesia/Sedation complications or side effects.  Patient does not know arrival time.  Explained that this information comes from the surgeon's office and if they haven't heard from them by 2 or 3 pm to call the office.  Patient stated an understanding.

## 2023-08-02 ENCOUNTER — ANESTHESIA EVENT (OUTPATIENT)
Dept: SURGERY | Facility: HOSPITAL | Age: 63
DRG: 164 | End: 2023-08-02
Payer: COMMERCIAL

## 2023-08-02 ENCOUNTER — TELEPHONE (OUTPATIENT)
Dept: CARDIOTHORACIC SURGERY | Facility: CLINIC | Age: 63
End: 2023-08-02
Payer: COMMERCIAL

## 2023-08-02 NOTE — TELEPHONE ENCOUNTER
Outgoing call to patient, patient has about 1 week on hand of Otezla. Per surgeon, advised patient to hold Otezla, will follow up with patient in 1 week following restart of Otezla.

## 2023-08-02 NOTE — ANESTHESIA PREPROCEDURE EVALUATION
[facility]  Anesthesia Pre-Operative Evaluation         Patient Name: Radha Ervin  YOB: 1960  MRN: [id number]    SUBJECTIVE:     Pre-operative evaluation for Procedure(s) (LRB):  XI ROBOTIC RATS upper lobectomy (Left)  Bronchoscopy (N/A)  LYMPHADENECTOMY (Left)     08/02/2023    Radha Ervin is a 63 y.o. female with a significant medical history of former smoker with psoriasis with MARY NSCLC, emphysema, and obesity.     CXR prior to starting biologic prompting chest CT identifying 2.7 cm left upper lobe mass. Robotic bronchoscopy with biopsy and EBUS with MARY positive for adenocarcinoma who presents for the above procedure. Recent 6 minute walk test with O2 96% throughout. Recent stress echo with 60% EF without evidence of MI and normal LV, RV size.    Lab Results   Component Value Date    WBC 11.32 07/13/2023    HGB 15.0 07/13/2023    HCT 45.9 07/13/2023    MCV 94 07/13/2023     07/13/2023       Prev airway:     Method of Intubation:  Direct    Blade:  Gould 3    Laryngeal View Grade: Grade IIA - cords partially seen      Attempted By (2nd Attempt):  Student    Method of Intubation (2nd Attempt):  Video laryngoscopy    Blade (2nd Attempt):  Gould 3    Laryngeal View Grade (2nd Attempt): Grade I - full view of cords      Difficult Airway Encountered?: No      Complications:  None    Airway Device:  Oral endotracheal tube    Airway Device Size:  8.5      Patient Active Problem List   Diagnosis    Primary localized osteoarthrosis of right lower leg    BMI 40.0-44.9, adult    Long-term current use of intravenous immunoglobulin (IVIG)    Hidradenitis suppurativa    Tobacco dependence    Nuclear sclerosis of both eyes    Visual field defect    Morbid obesity    Drug-induced immunodeficiency    Emphysema lung    Pulmonary nodule    Adenocarcinoma, lung, left       Review of patient's allergies indicates:   Allergen Reactions    Morphine Other (See Comments)      headaches    Latex, natural rubber Rash and Other (See Comments)     Redness and peeling only with bandaids    Penicillins Nausea And Vomiting and Rash       Current Inpatient Medications:   triamcinolone acetonide  10 mg Intradermal Once    triamcinolone acetonide  40 mg Intradermal Once       Current Facility-Administered Medications on File Prior to Encounter   Medication Dose Route Frequency Provider Last Rate Last Admin    triamcinolone acetonide injection 10 mg  10 mg Intradermal Once Kathe Quan MD        triamcinolone acetonide injection 40 mg  40 mg Intradermal Once Kathe Quan MD         Current Outpatient Medications on File Prior to Encounter   Medication Sig Dispense Refill    LORazepam (ATIVAN) 0.5 MG tablet Take 1 tablet (0.5 mg total) by mouth every 6 (six) hours as needed for Anxiety. 3 tablet 0    nicotine (NICODERM CQ) 21 mg/24 hr Place 1 patch onto the skin every 24 hours.      OTEZLA 30 mg Tab TAKE 1 TABLET BY MOUTH  TWICE DAILY 60 tablet 2    betamethasone dipropionate (DIPROLENE) 0.05 % ointment Apply topically 2 (two) times daily. Prn psoriasis flares 45 g 3    meloxicam (MOBIC) 15 MG tablet Take 1 tablet (15 mg total) by mouth once daily. (Patient not taking: Reported on 2023) 30 tablet 0       Past Surgical History:   Procedure Laterality Date    achilles tendon repair      CHOLECYSTECTOMY      NAVIGATIONAL BRONCHOSCOPY N/A 2023    Procedure: BRONCHOSCOPY, NAVIGATIONAL;  Surgeon: Radha Mccollum MD;  Location: [facility] OR [address] [place] [address];  Service: Pulmonary;  Laterality: N/A;    TOTAL KNEE ARTHROPLASTY         Social History     Socioeconomic History    Marital status: Single   Tobacco Use    Smoking status: Former     Current packs/day: 0.00     Average packs/day: 1 pack/day for 46.3 years (46.3 ttl pk-yrs)     Types: Vaping with nicotine, Cigarettes     Start date:      Quit date: 2023     Years since quittin.2    Smokeless tobacco: Never     Tobacco comments:     Patient is currently on the patch    Substance and Sexual Activity    Alcohol use: Yes     Comment: occassionally    Drug use: No    Sexual activity: Not Currently     Partners: Male       OBJECTIVE:     Vital Signs Range:  Vitals - 1 value per visit 7/13/2023 7/25/2023 7/28/2023   SYSTOLIC - - -   DIASTOLIC - - -   Pulse - - -   Temp - - -   Resp - - -   SPO2 - - -   Weight (lb) 195.11 195 -   Weight (kg) 88.5 88.451 -   Height 59 - -   BMI (Calculated) 39.4 - -   VISIT REPORT [id number]  [id number]; - 17NONCRENCREPNotFromCR  [id number];   Pain Score  - - 0   Some recent data might be hidden         CBC:   Lab Results   Component Value Date    WBC 11.32 07/13/2023    HGB 15.0 07/13/2023    HCT 45.9 07/13/2023    MCV 94 07/13/2023     07/13/2023         CMP:   Sodium   Date Value Ref Range Status   07/13/2023 135 (L) 136 - 145 mmol/L Final     Potassium   Date Value Ref Range Status   07/13/2023 4.2 3.5 - 5.1 mmol/L Final     Chloride   Date Value Ref Range Status   07/13/2023 104 95 - 110 mmol/L Final     CO2   Date Value Ref Range Status   07/13/2023 22 (L) 23 - 29 mmol/L Final     Glucose   Date Value Ref Range Status   07/13/2023 142 (H) 70 - 110 mg/dL Final     BUN   Date Value Ref Range Status   07/13/2023 26 (H) 8 - 23 mg/dL Final   04/28/2023 12.0 7.0 - 21.0 mg/dL Final     Creatinine   Date Value Ref Range Status   07/13/2023 1.4 0.5 - 1.4 mg/dL Final     Calcium   Date Value Ref Range Status   07/13/2023 10.6 (H) 8.7 - 10.5 mg/dL Final     Total Protein   Date Value Ref Range Status   07/13/2023 7.9 6.0 - 8.4 g/dL Final     Albumin   Date Value Ref Range Status   07/13/2023 4.0 3.5 - 5.2 g/dL Final     Total Bilirubin   Date Value Ref Range Status   07/13/2023 0.8 0.1 - 1.0 mg/dL Final     Comment:     For infants and newborns, interpretation of results should be based  on gestational age, weight and in agreement with clinical  observations.    Premature  Infant recommended reference ranges:  Up to 24 hours.............<8.0 mg/dL  Up to 48 hours............<12.0 mg/dL  3-5 days..................<15.0 mg/dL  6-29 days.................<15.0 mg/dL       Alkaline Phosphatase   Date Value Ref Range Status   07/13/2023 100 55 - 135 U/L Final     AST   Date Value Ref Range Status   07/13/2023 16 10 - 40 U/L Final     ALT   Date Value Ref Range Status   07/13/2023 22 10 - 44 U/L Final     Anion Gap   Date Value Ref Range Status   07/13/2023 9 8 - 16 mmol/L Final   04/28/2023 13.3 9 - 18 mmol/L Final     eGFR if    Date Value Ref Range Status   04/06/2020 >60.0 >60 mL/min/1.73 m^2 Final     eGFR if non    Date Value Ref Range Status   04/06/2020 >60.0 >60 mL/min/1.73 m^2 Final     Comment:     Calculation used to obtain the estimated glomerular filtration  rate (eGFR) is the CKD-EPI equation.          INR:  Lab Results   Component Value Date    INR 1.0 07/13/2023    INR 1.0 01/13/2016       EKG:   Results for orders placed or performed in visit on 01/13/16   EKG 12-lead    Collection Time: 01/13/16 12:37 PM    Narrative    Test Reason : Z01.818  Blood Pressure : Vent. Rate : 083 BPM     Atrial Rate : 083 BPM     P-R Int : 156 ms          QRS Dur : 084 ms      QT Int : 366 ms       P-R-T Axes : 047 015 044 degrees     QTc Int : 430 ms    Normal sinus rhythm  Normal ECG  No previous ECGs available  Confirmed by Anup Juan MD ([id number]) on 1/19/2016 3:13:31 PM    Referred By: donell parks           Confirmed By:Anup Juan MD        2D ECHO:   No results found for this or any previous visit.         ASSESSMENT/PLAN:       Pre-op Assessment    I have reviewed the Patient Summary Reports.       I have reviewed the Medications.     Review of Systems  Anesthesia Hx:  No problems with previous Anesthesia  Denies Family Hx of Anesthesia complications.   Denies Personal Hx of Anesthesia complications.   Social:  Former Smoker    Hematology/Oncology:         Current/Recent Cancer.   Cardiovascular:   Exercise tolerance: good Denies Hypertension.  Denies MI.   Denies CABG/stent.     Pulmonary:   COPD    Musculoskeletal:   Arthritis     Endocrine:  Obesity / BMI > 30      Physical Exam  General: Well nourished, Cooperative, Alert and Oriented    Airway:  Mallampati: III   Mouth Opening: Normal  TM Distance: Normal  Tongue: Normal  Neck ROM: Normal ROM    Dental:  Periodontal disease        Anesthesia Plan  Type of Anesthesia, risks & benefits discussed:    Anesthesia Type: Gen ETT  Intra-op Monitoring Plan: Standard ASA Monitors and Art Line  Post Op Pain Control Plan: multimodal analgesia and IV/PO Opioids PRN  Induction:  IV  Airway Plan: Direct and Video, Post-Induction  Informed Consent: Informed consent signed with the Patient and all parties understand the risks and agree with anesthesia plan.  All questions answered.   ASA Score: 3  Day of Surgery Review of History & Physical: H&P Update referred to the surgeon/provider.    Ready For Surgery From Anesthesia Perspective.     .

## 2023-08-03 ENCOUNTER — ANESTHESIA (OUTPATIENT)
Dept: SURGERY | Facility: HOSPITAL | Age: 63
DRG: 164 | End: 2023-08-03
Payer: COMMERCIAL

## 2023-08-03 ENCOUNTER — HOSPITAL ENCOUNTER (INPATIENT)
Facility: HOSPITAL | Age: 63
LOS: 3 days | Discharge: HOME | DRG: 164 | End: 2023-08-06
Attending: STUDENT IN AN ORGANIZED HEALTH CARE EDUCATION/TRAINING PROGRAM | Admitting: STUDENT IN AN ORGANIZED HEALTH CARE EDUCATION/TRAINING PROGRAM
Payer: COMMERCIAL

## 2023-08-03 DIAGNOSIS — L73.2 HIDRADENITIS SUPPURATIVA: ICD-10-CM

## 2023-08-03 DIAGNOSIS — C34.92 ADENOCARCINOMA, LUNG, LEFT: Primary | ICD-10-CM

## 2023-08-03 DIAGNOSIS — C34.92 NSCLC OF LEFT LUNG: ICD-10-CM

## 2023-08-03 LAB
ABO + RH BLD: NORMAL
BASOPHILS # BLD AUTO: 0.04 K/UL (ref 0–0.2)
BASOPHILS NFR BLD: 0.2 % (ref 0–1.9)
BLD GP AB SCN CELLS X3 SERPL QL: NORMAL
CREAT SERPL-MCNC: 1.1 MG/DL (ref 0.5–1.4)
DIFFERENTIAL METHOD: ABNORMAL
EOSINOPHIL # BLD AUTO: 0 K/UL (ref 0–0.5)
EOSINOPHIL NFR BLD: 0 % (ref 0–8)
ERYTHROCYTE [DISTWIDTH] IN BLOOD BY AUTOMATED COUNT: 14.2 % (ref 11.5–14.5)
EST. GFR  (NO RACE VARIABLE): 56.1 ML/MIN/1.73 M^2
GLUCOSE SERPL-MCNC: 184 MG/DL (ref 70–110)
GLUCOSE SERPL-MCNC: 186 MG/DL (ref 70–110)
HCO3 UR-SCNC: 22.6 MMOL/L (ref 24–28)
HCO3 UR-SCNC: 23.9 MMOL/L (ref 24–28)
HCT VFR BLD AUTO: 36.8 % (ref 37–48.5)
HCT VFR BLD CALC: 37 %PCV (ref 36–54)
HCT VFR BLD CALC: 37 %PCV (ref 36–54)
HGB BLD-MCNC: 11.9 G/DL (ref 12–16)
IMM GRANULOCYTES # BLD AUTO: 0.13 K/UL (ref 0–0.04)
IMM GRANULOCYTES NFR BLD AUTO: 0.6 % (ref 0–0.5)
LYMPHOCYTES # BLD AUTO: 0.6 K/UL (ref 1–4.8)
LYMPHOCYTES NFR BLD: 3.1 % (ref 18–48)
MCH RBC QN AUTO: 31.2 PG (ref 27–31)
MCHC RBC AUTO-ENTMCNC: 32.3 G/DL (ref 32–36)
MCV RBC AUTO: 97 FL (ref 82–98)
MONOCYTES # BLD AUTO: 0.7 K/UL (ref 0.3–1)
MONOCYTES NFR BLD: 3.3 % (ref 4–15)
NEUTROPHILS # BLD AUTO: 19.1 K/UL (ref 1.8–7.7)
NEUTROPHILS NFR BLD: 92.8 % (ref 38–73)
NRBC BLD-RTO: 0 /100 WBC
PCO2 BLDA: 60.1 MMHG (ref 35–45)
PCO2 BLDA: 65 MMHG (ref 35–45)
PH SMN: 7.17 [PH] (ref 7.35–7.45)
PH SMN: 7.18 [PH] (ref 7.35–7.45)
PLATELET # BLD AUTO: 232 K/UL (ref 150–450)
PMV BLD AUTO: 10.1 FL (ref 9.2–12.9)
PO2 BLDA: 130 MMHG (ref 80–100)
PO2 BLDA: 98 MMHG (ref 80–100)
POC BE: -5 MMOL/L
POC BE: -6 MMOL/L
POC IONIZED CALCIUM: 0.96 MMOL/L (ref 1.06–1.42)
POC IONIZED CALCIUM: 1.15 MMOL/L (ref 1.06–1.42)
POC SATURATED O2: 95 % (ref 95–100)
POC SATURATED O2: 98 % (ref 95–100)
POC TCO2: 24 MMOL/L (ref 23–27)
POC TCO2: 26 MMOL/L (ref 23–27)
POTASSIUM BLD-SCNC: 4.6 MMOL/L (ref 3.5–5.1)
POTASSIUM BLD-SCNC: 5.3 MMOL/L (ref 3.5–5.1)
RBC # BLD AUTO: 3.81 M/UL (ref 4–5.4)
SAMPLE: ABNORMAL
SAMPLE: ABNORMAL
SODIUM BLD-SCNC: 139 MMOL/L (ref 136–145)
SODIUM BLD-SCNC: 139 MMOL/L (ref 136–145)
SPECIMEN OUTDATE: NORMAL
WBC # BLD AUTO: 20.63 K/UL (ref 3.9–12.7)

## 2023-08-03 PROCEDURE — 63600175 PHARM REV CODE 636 W HCPCS: Performed by: STUDENT IN AN ORGANIZED HEALTH CARE EDUCATION/TRAINING PROGRAM

## 2023-08-03 PROCEDURE — 25000003 PHARM REV CODE 250: Performed by: STUDENT IN AN ORGANIZED HEALTH CARE EDUCATION/TRAINING PROGRAM

## 2023-08-03 PROCEDURE — 71000033 HC RECOVERY, INTIAL HOUR: Performed by: STUDENT IN AN ORGANIZED HEALTH CARE EDUCATION/TRAINING PROGRAM

## 2023-08-03 PROCEDURE — 71000039 HC RECOVERY, EACH ADD'L HOUR: Performed by: STUDENT IN AN ORGANIZED HEALTH CARE EDUCATION/TRAINING PROGRAM

## 2023-08-03 PROCEDURE — 32663 THORACOSCOPY W/LOBECTOMY: CPT | Mod: 62,LT,, | Performed by: STUDENT IN AN ORGANIZED HEALTH CARE EDUCATION/TRAINING PROGRAM

## 2023-08-03 PROCEDURE — C1729 CATH, DRAINAGE: HCPCS | Performed by: STUDENT IN AN ORGANIZED HEALTH CARE EDUCATION/TRAINING PROGRAM

## 2023-08-03 PROCEDURE — 37000008 HC ANESTHESIA 1ST 15 MINUTES: Performed by: STUDENT IN AN ORGANIZED HEALTH CARE EDUCATION/TRAINING PROGRAM

## 2023-08-03 PROCEDURE — 36620 INSERTION CATHETER ARTERY: CPT | Mod: ,,, | Performed by: ANESTHESIOLOGY

## 2023-08-03 PROCEDURE — 32663 PR THORACOSCOPY SURG LOBECTOMY: ICD-10-PCS | Mod: 62,LT,, | Performed by: THORACIC SURGERY (CARDIOTHORACIC VASCULAR SURGERY)

## 2023-08-03 PROCEDURE — 31622 DX BRONCHOSCOPE/WASH: CPT | Mod: 59,51,, | Performed by: STUDENT IN AN ORGANIZED HEALTH CARE EDUCATION/TRAINING PROGRAM

## 2023-08-03 PROCEDURE — 88305 TISSUE EXAM BY PATHOLOGIST: CPT | Mod: 59 | Performed by: STUDENT IN AN ORGANIZED HEALTH CARE EDUCATION/TRAINING PROGRAM

## 2023-08-03 PROCEDURE — 71000016 HC POSTOP RECOV ADDL HR: Performed by: STUDENT IN AN ORGANIZED HEALTH CARE EDUCATION/TRAINING PROGRAM

## 2023-08-03 PROCEDURE — 32674 THORACOSCOPY LYMPH NODE EXC: CPT | Mod: ,,, | Performed by: STUDENT IN AN ORGANIZED HEALTH CARE EDUCATION/TRAINING PROGRAM

## 2023-08-03 PROCEDURE — 82565 ASSAY OF CREATININE: CPT | Performed by: STUDENT IN AN ORGANIZED HEALTH CARE EDUCATION/TRAINING PROGRAM

## 2023-08-03 PROCEDURE — 88309 TISSUE EXAM BY PATHOLOGIST: CPT | Performed by: STUDENT IN AN ORGANIZED HEALTH CARE EDUCATION/TRAINING PROGRAM

## 2023-08-03 PROCEDURE — 88341 IMHCHEM/IMCYTCHM EA ADD ANTB: CPT | Performed by: STUDENT IN AN ORGANIZED HEALTH CARE EDUCATION/TRAINING PROGRAM

## 2023-08-03 PROCEDURE — 36415 COLL VENOUS BLD VENIPUNCTURE: CPT | Performed by: STUDENT IN AN ORGANIZED HEALTH CARE EDUCATION/TRAINING PROGRAM

## 2023-08-03 PROCEDURE — 31622 PR BRONCHOSCOPY,DIAGNOSTIC: ICD-10-PCS | Mod: 59,51,, | Performed by: STUDENT IN AN ORGANIZED HEALTH CARE EDUCATION/TRAINING PROGRAM

## 2023-08-03 PROCEDURE — 71000015 HC POSTOP RECOV 1ST HR: Performed by: STUDENT IN AN ORGANIZED HEALTH CARE EDUCATION/TRAINING PROGRAM

## 2023-08-03 PROCEDURE — 88307 TISSUE EXAM BY PATHOLOGIST: CPT | Mod: 26,,, | Performed by: STUDENT IN AN ORGANIZED HEALTH CARE EDUCATION/TRAINING PROGRAM

## 2023-08-03 PROCEDURE — 32674 PR THORACOSCOPY LYMPH NODE EXC: ICD-10-PCS | Mod: ,,, | Performed by: STUDENT IN AN ORGANIZED HEALTH CARE EDUCATION/TRAINING PROGRAM

## 2023-08-03 PROCEDURE — 88342 IMHCHEM/IMCYTCHM 1ST ANTB: CPT | Mod: 26,,, | Performed by: STUDENT IN AN ORGANIZED HEALTH CARE EDUCATION/TRAINING PROGRAM

## 2023-08-03 PROCEDURE — 88341 IMHCHEM/IMCYTCHM EA ADD ANTB: CPT | Mod: 26,,, | Performed by: STUDENT IN AN ORGANIZED HEALTH CARE EDUCATION/TRAINING PROGRAM

## 2023-08-03 PROCEDURE — 32663 PR THORACOSCOPY SURG LOBECTOMY: ICD-10-PCS | Mod: 62,LT,, | Performed by: STUDENT IN AN ORGANIZED HEALTH CARE EDUCATION/TRAINING PROGRAM

## 2023-08-03 PROCEDURE — 36620 ARTERIAL: ICD-10-PCS | Mod: ,,, | Performed by: ANESTHESIOLOGY

## 2023-08-03 PROCEDURE — 32663 THORACOSCOPY W/LOBECTOMY: CPT | Mod: 62,LT,, | Performed by: THORACIC SURGERY (CARDIOTHORACIC VASCULAR SURGERY)

## 2023-08-03 PROCEDURE — 25000003 PHARM REV CODE 250

## 2023-08-03 PROCEDURE — 88309 TISSUE EXAM BY PATHOLOGIST: CPT | Mod: 26,,, | Performed by: STUDENT IN AN ORGANIZED HEALTH CARE EDUCATION/TRAINING PROGRAM

## 2023-08-03 PROCEDURE — 88307 PR  SURG PATH,LEVEL V: ICD-10-PCS | Mod: 26,,, | Performed by: STUDENT IN AN ORGANIZED HEALTH CARE EDUCATION/TRAINING PROGRAM

## 2023-08-03 PROCEDURE — 86920 COMPATIBILITY TEST SPIN: CPT | Performed by: ANESTHESIOLOGY

## 2023-08-03 PROCEDURE — 85025 COMPLETE CBC W/AUTO DIFF WBC: CPT | Performed by: ANESTHESIOLOGY

## 2023-08-03 PROCEDURE — 86900 BLOOD TYPING SEROLOGIC ABO: CPT

## 2023-08-03 PROCEDURE — 88341 PR IHC OR ICC EACH ADD'L SINGLE ANTIBODY  STAINPR: ICD-10-PCS | Mod: 26,,, | Performed by: STUDENT IN AN ORGANIZED HEALTH CARE EDUCATION/TRAINING PROGRAM

## 2023-08-03 PROCEDURE — D9220A PRA ANESTHESIA: ICD-10-PCS | Mod: ,,, | Performed by: ANESTHESIOLOGY

## 2023-08-03 PROCEDURE — 36000713 HC OR TIME LEV V EA ADD 15 MIN: Performed by: STUDENT IN AN ORGANIZED HEALTH CARE EDUCATION/TRAINING PROGRAM

## 2023-08-03 PROCEDURE — 94761 N-INVAS EAR/PLS OXIMETRY MLT: CPT

## 2023-08-03 PROCEDURE — 88309 PR  SURG PATH,LEVEL VI: ICD-10-PCS | Mod: 26,,, | Performed by: STUDENT IN AN ORGANIZED HEALTH CARE EDUCATION/TRAINING PROGRAM

## 2023-08-03 PROCEDURE — C9290 INJ, BUPIVACAINE LIPOSOME: HCPCS | Performed by: STUDENT IN AN ORGANIZED HEALTH CARE EDUCATION/TRAINING PROGRAM

## 2023-08-03 PROCEDURE — 88342 IMHCHEM/IMCYTCHM 1ST ANTB: CPT | Mod: 59 | Performed by: STUDENT IN AN ORGANIZED HEALTH CARE EDUCATION/TRAINING PROGRAM

## 2023-08-03 PROCEDURE — 27201423 OPTIME MED/SURG SUP & DEVICES STERILE SUPPLY: Performed by: STUDENT IN AN ORGANIZED HEALTH CARE EDUCATION/TRAINING PROGRAM

## 2023-08-03 PROCEDURE — 27000221 HC OXYGEN, UP TO 24 HOURS

## 2023-08-03 PROCEDURE — 88342 CHG IMMUNOCYTOCHEMISTRY: ICD-10-PCS | Mod: 26,,, | Performed by: STUDENT IN AN ORGANIZED HEALTH CARE EDUCATION/TRAINING PROGRAM

## 2023-08-03 PROCEDURE — 20600001 HC STEP DOWN PRIVATE ROOM

## 2023-08-03 PROCEDURE — D9220A PRA ANESTHESIA: Mod: ,,, | Performed by: ANESTHESIOLOGY

## 2023-08-03 PROCEDURE — 36000712 HC OR TIME LEV V 1ST 15 MIN: Performed by: STUDENT IN AN ORGANIZED HEALTH CARE EDUCATION/TRAINING PROGRAM

## 2023-08-03 PROCEDURE — 37000009 HC ANESTHESIA EA ADD 15 MINS: Performed by: STUDENT IN AN ORGANIZED HEALTH CARE EDUCATION/TRAINING PROGRAM

## 2023-08-03 RX ORDER — DEXAMETHASONE SODIUM PHOSPHATE 4 MG/ML
INJECTION, SOLUTION INTRA-ARTICULAR; INTRALESIONAL; INTRAMUSCULAR; INTRAVENOUS; SOFT TISSUE
Status: DISCONTINUED | OUTPATIENT
Start: 2023-08-03 | End: 2023-08-03

## 2023-08-03 RX ORDER — SODIUM CHLORIDE 0.9 % (FLUSH) 0.9 %
10 SYRINGE (ML) INJECTION
Status: DISCONTINUED | OUTPATIENT
Start: 2023-08-03 | End: 2023-08-03 | Stop reason: HOSPADM

## 2023-08-03 RX ORDER — LIDOCAINE HYDROCHLORIDE 10 MG/ML
1 INJECTION, SOLUTION EPIDURAL; INFILTRATION; INTRACAUDAL; PERINEURAL ONCE
Status: DISCONTINUED | OUTPATIENT
Start: 2023-08-03 | End: 2023-08-03

## 2023-08-03 RX ORDER — IBUPROFEN 200 MG
1 TABLET ORAL
Status: DISCONTINUED | OUTPATIENT
Start: 2023-08-03 | End: 2023-08-06 | Stop reason: HOSPADM

## 2023-08-03 RX ORDER — POLYETHYLENE GLYCOL 3350 17 G/17G
17 POWDER, FOR SOLUTION ORAL DAILY
Status: DISCONTINUED | OUTPATIENT
Start: 2023-08-03 | End: 2023-08-06 | Stop reason: HOSPADM

## 2023-08-03 RX ORDER — OXYCODONE HYDROCHLORIDE 10 MG/1
10 TABLET ORAL EVERY 6 HOURS PRN
Status: DISCONTINUED | OUTPATIENT
Start: 2023-08-03 | End: 2023-08-05

## 2023-08-03 RX ORDER — ENOXAPARIN SODIUM 100 MG/ML
40 INJECTION SUBCUTANEOUS EVERY 24 HOURS
Status: DISCONTINUED | OUTPATIENT
Start: 2023-08-04 | End: 2023-08-06 | Stop reason: HOSPADM

## 2023-08-03 RX ORDER — PHENYLEPHRINE HCL IN 0.9% NACL 1 MG/10 ML
SYRINGE (ML) INTRAVENOUS
Status: DISCONTINUED | OUTPATIENT
Start: 2023-08-03 | End: 2023-08-03

## 2023-08-03 RX ORDER — LIDOCAINE HYDROCHLORIDE 20 MG/ML
INJECTION, SOLUTION EPIDURAL; INFILTRATION; INTRACAUDAL; PERINEURAL
Status: DISCONTINUED | OUTPATIENT
Start: 2023-08-03 | End: 2023-08-03

## 2023-08-03 RX ORDER — ONDANSETRON 2 MG/ML
INJECTION INTRAMUSCULAR; INTRAVENOUS
Status: DISCONTINUED | OUTPATIENT
Start: 2023-08-03 | End: 2023-08-03

## 2023-08-03 RX ORDER — HYDROCODONE BITARTRATE AND ACETAMINOPHEN 500; 5 MG/1; MG/1
TABLET ORAL
Status: DISCONTINUED | OUTPATIENT
Start: 2023-08-03 | End: 2023-08-06 | Stop reason: HOSPADM

## 2023-08-03 RX ORDER — METOCLOPRAMIDE HYDROCHLORIDE 5 MG/ML
5 INJECTION INTRAMUSCULAR; INTRAVENOUS EVERY 6 HOURS PRN
Status: DISCONTINUED | OUTPATIENT
Start: 2023-08-03 | End: 2023-08-06 | Stop reason: HOSPADM

## 2023-08-03 RX ORDER — BUPIVACAINE HYDROCHLORIDE 2.5 MG/ML
INJECTION, SOLUTION EPIDURAL; INFILTRATION; INTRACAUDAL
Status: DISCONTINUED | OUTPATIENT
Start: 2023-08-03 | End: 2023-08-03 | Stop reason: HOSPADM

## 2023-08-03 RX ORDER — CLINDAMYCIN PHOSPHATE 900 MG/50ML
900 INJECTION, SOLUTION INTRAVENOUS
Status: DISCONTINUED | OUTPATIENT
Start: 2023-08-03 | End: 2023-08-03

## 2023-08-03 RX ORDER — AMOXICILLIN 250 MG
1 CAPSULE ORAL 2 TIMES DAILY
Status: DISCONTINUED | OUTPATIENT
Start: 2023-08-03 | End: 2023-08-06 | Stop reason: HOSPADM

## 2023-08-03 RX ORDER — LIDOCAINE 50 MG/G
1 PATCH TOPICAL
Status: DISCONTINUED | OUTPATIENT
Start: 2023-08-03 | End: 2023-08-06 | Stop reason: HOSPADM

## 2023-08-03 RX ORDER — ONDANSETRON 8 MG/1
8 TABLET, ORALLY DISINTEGRATING ORAL EVERY 8 HOURS PRN
Status: DISCONTINUED | OUTPATIENT
Start: 2023-08-03 | End: 2023-08-06 | Stop reason: HOSPADM

## 2023-08-03 RX ORDER — HYDROMORPHONE HYDROCHLORIDE 1 MG/ML
0.2 INJECTION, SOLUTION INTRAMUSCULAR; INTRAVENOUS; SUBCUTANEOUS EVERY 5 MIN PRN
Status: DISCONTINUED | OUTPATIENT
Start: 2023-08-03 | End: 2023-08-03 | Stop reason: HOSPADM

## 2023-08-03 RX ORDER — LORAZEPAM 0.5 MG/1
0.5 TABLET ORAL EVERY 6 HOURS PRN
Status: DISCONTINUED | OUTPATIENT
Start: 2023-08-03 | End: 2023-08-03

## 2023-08-03 RX ORDER — PROPOFOL 10 MG/ML
VIAL (ML) INTRAVENOUS
Status: DISCONTINUED | OUTPATIENT
Start: 2023-08-03 | End: 2023-08-03

## 2023-08-03 RX ORDER — CEFAZOLIN SODIUM 1 G/3ML
INJECTION, POWDER, FOR SOLUTION INTRAMUSCULAR; INTRAVENOUS
Status: DISCONTINUED | OUTPATIENT
Start: 2023-08-03 | End: 2023-08-03

## 2023-08-03 RX ORDER — LEVALBUTEROL INHALATION SOLUTION 0.63 MG/3ML
0.63 SOLUTION RESPIRATORY (INHALATION) EVERY 6 HOURS
Status: DISCONTINUED | OUTPATIENT
Start: 2023-08-03 | End: 2023-08-04

## 2023-08-03 RX ORDER — ACETAMINOPHEN 500 MG
1000 TABLET ORAL EVERY 8 HOURS
Status: DISCONTINUED | OUTPATIENT
Start: 2023-08-03 | End: 2023-08-06 | Stop reason: HOSPADM

## 2023-08-03 RX ORDER — MUPIROCIN 20 MG/G
1 OINTMENT TOPICAL 2 TIMES DAILY
Status: DISCONTINUED | OUTPATIENT
Start: 2023-08-03 | End: 2023-08-06 | Stop reason: HOSPADM

## 2023-08-03 RX ORDER — KETAMINE HCL IN 0.9 % NACL 50 MG/5 ML
SYRINGE (ML) INTRAVENOUS
Status: DISCONTINUED | OUTPATIENT
Start: 2023-08-03 | End: 2023-08-03

## 2023-08-03 RX ORDER — METHOCARBAMOL 500 MG/1
500 TABLET, FILM COATED ORAL 4 TIMES DAILY
Status: DISCONTINUED | OUTPATIENT
Start: 2023-08-03 | End: 2023-08-04

## 2023-08-03 RX ORDER — MIDAZOLAM HYDROCHLORIDE 1 MG/ML
INJECTION, SOLUTION INTRAMUSCULAR; INTRAVENOUS
Status: DISCONTINUED | OUTPATIENT
Start: 2023-08-03 | End: 2023-08-03

## 2023-08-03 RX ORDER — HALOPERIDOL 5 MG/ML
0.5 INJECTION INTRAMUSCULAR EVERY 10 MIN PRN
Status: DISCONTINUED | OUTPATIENT
Start: 2023-08-03 | End: 2023-08-03 | Stop reason: HOSPADM

## 2023-08-03 RX ORDER — ALBUMIN HUMAN 50 G/1000ML
SOLUTION INTRAVENOUS CONTINUOUS PRN
Status: DISCONTINUED | OUTPATIENT
Start: 2023-08-03 | End: 2023-08-03

## 2023-08-03 RX ORDER — GABAPENTIN 300 MG/1
300 CAPSULE ORAL 3 TIMES DAILY
Status: DISCONTINUED | OUTPATIENT
Start: 2023-08-03 | End: 2023-08-04

## 2023-08-03 RX ORDER — FENTANYL CITRATE 50 UG/ML
INJECTION, SOLUTION INTRAMUSCULAR; INTRAVENOUS
Status: DISCONTINUED | OUTPATIENT
Start: 2023-08-03 | End: 2023-08-03

## 2023-08-03 RX ORDER — ROCURONIUM BROMIDE 10 MG/ML
INJECTION, SOLUTION INTRAVENOUS
Status: DISCONTINUED | OUTPATIENT
Start: 2023-08-03 | End: 2023-08-03

## 2023-08-03 RX ORDER — OXYCODONE HYDROCHLORIDE 5 MG/1
5 TABLET ORAL EVERY 6 HOURS PRN
Status: DISCONTINUED | OUTPATIENT
Start: 2023-08-03 | End: 2023-08-05

## 2023-08-03 RX ORDER — ACETAMINOPHEN 500 MG
1000 TABLET ORAL
Status: COMPLETED | OUTPATIENT
Start: 2023-08-03 | End: 2023-08-03

## 2023-08-03 RX ADMIN — SODIUM CHLORIDE 0.25 MCG/KG/MIN: 9 INJECTION, SOLUTION INTRAVENOUS at 08:08

## 2023-08-03 RX ADMIN — MIDAZOLAM HYDROCHLORIDE 2 MG: 2 INJECTION, SOLUTION INTRAMUSCULAR; INTRAVENOUS at 07:08

## 2023-08-03 RX ADMIN — CEFAZOLIN 2 G: 1 INJECTION, POWDER, FOR SOLUTION INTRAMUSCULAR; INTRAVENOUS at 08:08

## 2023-08-03 RX ADMIN — GABAPENTIN 300 MG: 300 CAPSULE ORAL at 09:08

## 2023-08-03 RX ADMIN — Medication 100 MCG: at 01:08

## 2023-08-03 RX ADMIN — ROCURONIUM BROMIDE 50 MG: 10 INJECTION INTRAVENOUS at 07:08

## 2023-08-03 RX ADMIN — HYDROMORPHONE HYDROCHLORIDE 0.2 MG: 1 INJECTION, SOLUTION INTRAMUSCULAR; INTRAVENOUS; SUBCUTANEOUS at 04:08

## 2023-08-03 RX ADMIN — Medication 100 MCG: at 07:08

## 2023-08-03 RX ADMIN — ACETAMINOPHEN 1000 MG: 500 TABLET ORAL at 04:08

## 2023-08-03 RX ADMIN — LIDOCAINE 1 PATCH: 50 PATCH CUTANEOUS at 05:08

## 2023-08-03 RX ADMIN — Medication 10 MG: at 11:08

## 2023-08-03 RX ADMIN — Medication 10 MG: at 12:08

## 2023-08-03 RX ADMIN — ROCURONIUM BROMIDE 20 MG: 10 INJECTION INTRAVENOUS at 09:08

## 2023-08-03 RX ADMIN — ALBUMIN HUMAN: 50 SOLUTION INTRAVENOUS at 11:08

## 2023-08-03 RX ADMIN — GABAPENTIN 400 MG: 300 CAPSULE ORAL at 06:08

## 2023-08-03 RX ADMIN — FENTANYL CITRATE 50 MCG: 50 INJECTION INTRAMUSCULAR; INTRAVENOUS at 02:08

## 2023-08-03 RX ADMIN — Medication 100 MCG: at 10:08

## 2023-08-03 RX ADMIN — Medication 100 MCG: at 02:08

## 2023-08-03 RX ADMIN — ACETAMINOPHEN 1000 MG: 500 TABLET ORAL at 06:08

## 2023-08-03 RX ADMIN — ONDANSETRON 0.5 G: 2 INJECTION INTRAMUSCULAR; INTRAVENOUS at 01:08

## 2023-08-03 RX ADMIN — FENTANYL CITRATE 50 MCG: 50 INJECTION INTRAMUSCULAR; INTRAVENOUS at 12:08

## 2023-08-03 RX ADMIN — Medication 100 MCG: at 09:08

## 2023-08-03 RX ADMIN — Medication 10 MG: at 10:08

## 2023-08-03 RX ADMIN — LIDOCAINE HYDROCHLORIDE 50 MG: 20 INJECTION, SOLUTION EPIDURAL; INFILTRATION; INTRACAUDAL at 07:08

## 2023-08-03 RX ADMIN — POLYETHYLENE GLYCOL 3350 17 G: 17 POWDER, FOR SOLUTION ORAL at 04:08

## 2023-08-03 RX ADMIN — GABAPENTIN 300 MG: 300 CAPSULE ORAL at 04:08

## 2023-08-03 RX ADMIN — SUGAMMADEX 400 MG: 100 INJECTION, SOLUTION INTRAVENOUS at 02:08

## 2023-08-03 RX ADMIN — MUPIROCIN 1 G: 20 OINTMENT TOPICAL at 09:08

## 2023-08-03 RX ADMIN — SODIUM CHLORIDE, SODIUM GLUCONATE, SODIUM ACETATE, POTASSIUM CHLORIDE, MAGNESIUM CHLORIDE, SODIUM PHOSPHATE, DIBASIC, AND POTASSIUM PHOSPHATE: .53; .5; .37; .037; .03; .012; .00082 INJECTION, SOLUTION INTRAVENOUS at 07:08

## 2023-08-03 RX ADMIN — OXYCODONE HYDROCHLORIDE 10 MG: 10 TABLET ORAL at 04:08

## 2023-08-03 RX ADMIN — ROCURONIUM BROMIDE 20 MG: 10 INJECTION INTRAVENOUS at 12:08

## 2023-08-03 RX ADMIN — CEFAZOLIN 2 G: 1 INJECTION, POWDER, FOR SOLUTION INTRAMUSCULAR; INTRAVENOUS at 12:08

## 2023-08-03 RX ADMIN — FENTANYL CITRATE 100 MCG: 50 INJECTION INTRAMUSCULAR; INTRAVENOUS at 07:08

## 2023-08-03 RX ADMIN — METHOCARBAMOL 500 MG: 500 TABLET ORAL at 04:08

## 2023-08-03 RX ADMIN — Medication 100 MCG: at 08:08

## 2023-08-03 RX ADMIN — METHOCARBAMOL 500 MG: 500 TABLET ORAL at 09:08

## 2023-08-03 RX ADMIN — MIDAZOLAM HYDROCHLORIDE 2 MG: 2 INJECTION, SOLUTION INTRAMUSCULAR; INTRAVENOUS at 12:08

## 2023-08-03 RX ADMIN — DEXAMETHASONE SODIUM PHOSPHATE 4 MG: 4 INJECTION INTRA-ARTICULAR; INTRALESIONAL; INTRAMUSCULAR; INTRAVENOUS; SOFT TISSUE at 07:08

## 2023-08-03 RX ADMIN — PROPOFOL 50 MG: 10 INJECTION, EMULSION INTRAVENOUS at 02:08

## 2023-08-03 RX ADMIN — ROCURONIUM BROMIDE 20 MG: 10 INJECTION INTRAVENOUS at 11:08

## 2023-08-03 RX ADMIN — PROPOFOL 180 MG: 10 INJECTION, EMULSION INTRAVENOUS at 07:08

## 2023-08-03 RX ADMIN — ROCURONIUM BROMIDE 50 MG: 10 INJECTION INTRAVENOUS at 08:08

## 2023-08-03 RX ADMIN — ACETAMINOPHEN 1000 MG: 500 TABLET ORAL at 09:08

## 2023-08-03 RX ADMIN — Medication 100 MCG: at 12:08

## 2023-08-03 RX ADMIN — Medication 20 MG: at 08:08

## 2023-08-03 RX ADMIN — ROCURONIUM BROMIDE 20 MG: 10 INJECTION INTRAVENOUS at 10:08

## 2023-08-03 RX ADMIN — SENNOSIDES AND DOCUSATE SODIUM 1 TABLET: 50; 8.6 TABLET ORAL at 09:08

## 2023-08-03 RX ADMIN — ROCURONIUM BROMIDE 10 MG: 10 INJECTION INTRAVENOUS at 12:08

## 2023-08-03 RX ADMIN — ONDANSETRON 4 MG: 2 INJECTION INTRAMUSCULAR; INTRAVENOUS at 02:08

## 2023-08-03 NOTE — BRIEF OP NOTE
Certification of Assistant at Surgery       Surgery Date: 8/3/2023     Participating Surgeons:  Surgeon(s) and Role:     * Saeed Gould MD - Primary     * Robb Ann MD - Resident - Assisting     * Sonu Grande MD - Co-Surgeon     * Rivas Armstrong PA-C - First Assist    Procedures:  Procedure(s) (LRB):  XI ROBOTIC RATS upper lobectomy (Left)  Bronchoscopy (N/A)  LYMPHADENECTOMY (Left)  BLOCK, NERVE, INTERCOSTAL, 2 OR MORE (N/A)    Assistant Surgeon's Certification of Necessity:  I understand that section 1842 (b) (6) (d) of the Social Security Act generally prohibits Medicare Part B reasonable charge payment for the services of assistants at surgery in teaching hospitals when qualified residents are available to furnish such services. I certify that the services for which payment is claimed were medically necessary, and that no qualified resident was available to perform the services. I further understand that these services are subject to post-payment review by the Medicare carrier.      Rivas Armstrong PA-C    08/03/2023  2:17 PM

## 2023-08-03 NOTE — OP NOTE
Thoracic Surgery Operative Report      Date:08/03/2023     Preoperative Diagnosis: Non-Small Cell Lung Cancer of the Left Upper Lobe     Postoperative Diagnosis: Non-Small Cell Lung Cancer of the Left Upper Lobe     Procedure Performed:   1.  Flexible bronchoscopy  2.  Left Robotic-Assisted Left Upper Lobectomy   3.  Mediastinal Lymph Node Dissection.   4.  Intercostal nerve block       Intraoperative Findings: Left upper lobe nodule, no evidence of pleural dissemination      Surgeon: Saeed Gould MD    Co-Surgeon: Sonu Grande MD    Fellow/Resident: Robb Ann M.D.       Assistant:   Bedside assistant attestation:  Rivas Armstrong functioned as a bedside 1st assistant.  his duties included robotic docking, instrument exchange, and specimen retrieval throughout the entire surgery.  There was no qualified resident available to function as a bedside first assistant given the case complexity and extent of duties described above.      Anesthesia Type: General Anesthesia.       Estimated Blood Loss: 125 mL.      Intravenous Fluid: See Anesthesia Record.       Specimens:   Specimen (24h ago, onward)       Start     Ordered    08/03/23 1418  Specimen to Pathology, Surgery Pulmonary and Thoracic  Once        Comments: Pre-op Diagnosis: Adenocarcinoma, lung, left [C34.92]Procedure(s):XI ROBOTIC RATS upper lobectomyBronchoscopyLYMPHADENECTOMY Number of specimens: 11Name of specimens: 1. Left level 9- permanent2. left Level 8- permanent3) level 7- permanent4) left level 11 number 1- permamnent5) left level 11 number 2- permanent6) left level 11 number 3 -permanent7) left level 10- permanent8) level 6 - permanent9) level 5- fgpfykmga52) left level 10 number 2- quidxcdly81) left upper lobe w/ peribronchial level 12 lymph nodes- permanent     References:    Click here for ordering Quick Tip   Question Answer Comment   Procedure Type: Pulmonary and Thoracic    Specimen Class: Known or suspected malignancy    Which provider  would you like to cc? IOANA BARRERA    Release to patient Immediate        08/03/23 8212                    Drains: 28 Malay Chest Tube.      Complications: None.       Disposition: The patient tolerated the operation well and was transported the postanesthesia care unit in stable condition.       Indication for the procedure:   Mrs is a 63 year old former smoker, who presented with a left upper lobe lung nodule, consistent with clinical stage 1 lung cancer.  After extensive medical & oncological work-up and discussion at multi-disciplinary cancer conference, he was deemed a suitable surgical candidate. The patient presents today for resection. The patient was offered a robotic left upper lobectomy. I discussed with the patient the risks, benefits and alternatives to surgery. Specifically I informed her of the risk of bleeding, infection, injury to nearby structures within the chest and conversion to an open procedure. I discussed with him that although there does not appear to be evidence of regional or distant disease there may evidence of spread at the time of surgery or when the specimen is examined by pathologist. Following this conversation the patient consented to surgery.     Description of the operation:   Mrs. Ervin  was transferred to the operating room and placed supine on the operating room table. The patient was intubated with a single-lumen endotracheal tube by anesthesia. General anesthesia was induced. Intravenous lines and an arterial line were placed the anesthesia team. A evans catheter was inserted.  Compression boots were placed in the lower extremities with DVT prophylaxis. The patient received IV Cefazolin prior to the incision for antimicrobial prophylaxis.  After a time out, a flexible bronchoscopy was performed. The flexible bronchoscope was advanced through the endotracheal tube advanced to the jean-paul. The jean-paul appeared to be sharp. The right mainstem bronchus was within normal  limits. The right upper lobe bronchial orifice was normal. The bronchus intermedius was normal. The right middle lobe and right lower lobe bronchial orifices were normal. Left mainstem bronchus was entered and was normal. The left upper lobe and left lower lobe bronchial orifices were normal. The patient was then intubated with a double-lumen endotracheal tube and positioned in the right lateral decubitus position with the left chest facing upward. Secretions were noted to be minimal.       The left chest was prepped and draped in the standard sterile surgical fashion. A total of two 8 mm trocars and two 12mm robotic trocars were placed along the 8th intercostal space approximately 8-10 cm apart.  A 12 mm Air Seal Port was placed in the 10th intercostal space. The 8mm 30 degree robotic camera was placed thorough the posterior axillary line trocar and the left hemithorax was inspected and there was no evidence of pleural effusion or pleural dissemination. The left upper lobe lung carcinoma was located in the expected location. In addition, we noted a small adhesion to the mediastinal fat which was divided. The Volusioni Xi robot was then docked to the robotic ports. A Cadiere grasper, a tip up fenestrated lung grasper, and a long bipolar forcep were placed through the ports.     The inferior pulmonary ligament was incised and the the mediastinal pleura was incised posteriorly along the bronchus intermedius with the bilpolar grapser to the level of the aortic isthmus. Level 7, 8, 9 and 11 (sump node) were identified, removed en bloc and sent for permanent histology. Similarly the lung was reflected caudally which facilitated dissection of the AP window, and a level 5 and level 6 lymph node were obtained and sent for permanent pathology. We then reflected the lung posterolaterally to expose the anterior hilum. The pleura was divided and the superior pulmonary venous trunk was identified and dissected but not divided at  this point. There was a separate lingula branch that was also circumferentially dissected.  Next, the major fissure was then dissected with the bipolar grasper to expose the lingular branch of the pulmonary artery and posterior ascending branches. The posterior fissure was connected to the posterior hilum and divided with a single application of the Robotic stapling device with a blue load. There was a large level 10 lymph node that was adherent to the basilar trunk of the left pulmonary artery. This lymph node was carefully dissected off the pulmonary artery and removed and sent for permanent histology.  Its removal facilitated a connection to the anterior hilum, and the anterior fissure which was very thin was divided with bipolar cautery      The lingular branch of the pulmonary artery was dissected and divided with a single application of the Robotic stapling device with a white vascular load.  The patient had aberrant anatomy and there were four  additional arterial branches. The apicoposterior, truncus anterior and posterior ascending branches all came off proximally with little space in between to allow for placement of the stapler. To allow for more mobility of the upper lobe, After confirming the presence of a separate inferior pulmonary vein, the previously dissected superior pulmonary and lingular branch vein was divided with a single application of the Robotic stapling device with a white vascular load. Returning attention back to the arteries, when attempting to divide two of the arteries with a single firing of the white vascular load, despite no resistance and no issues with the stapling mechanism there was a moderate amount of arterial bleeding from the staple line.  Direct pressure was applied and there was only 25cc blood loss.     At this point my partner Dr. Sonu Grande was called into the room to assist. After 15 minutes of direct pressure the gauze was removed and the bleeding had slowed  significantly. The two remaining arterial branches were divided  with two firings of the Robotic stapling device with a white vascular load. This was done by Dr Grande (separate dictation).     With the pulmonary arteries divided the left upper lobe bronchus was then dissected circumferentially and divided with a single application of the Robotic stapling device with a green robotic staple load.  The left upper lobe specimen was then placed in an endoscopic retrieval bag and removed through the 10th intercostal space Air seal port which was enlarged approximately 4 cm.       The left chest was then meticulously inspected and hemostasis was ensured. Vistaseal was applied to the arterial branch stumps and hemostasis was satisfactory. Intercostal nerve blocks were performed with Exaprel injections posteriorly from T4-9. A 28 Latvian chest tube was placed in the 8th intercostal space and secured with a #0 Ethibond suture. The left lower lobe was then re-expanded under direct vision. The robotic incisions were closed in 3 layers. The muscle layers were reapproximated with a running #0 Vicryl suture. The adipose tissue layers were reapproximated a running 2-0 Vicryl suture. The skin was reapproximated a running 4-0 Vicryl suture. Sterile dressings were placed on the incisions.       The sponge and instrument counts were correct x 2. The patient was transported to the post anesthesia care unit in stable condition. The patient was extubated in the operating room and was transported to the PACU by the anesthesia team. There no intraoperative surgical complications.     Thoracic (Pulmonary) Cancer - Synoptic Operative Report Summary    Side of surgery Left   Surgical approach Robotic   Tumor type NSCLC - Adenocarcinoma   Which lymph nodes were submitted as separate specimens? 5 - Subaortic, 6 - Para-aortic (ascending aorta or phrenic), 7 - Subcarinal, 8L - Paraesophageal (left), 9L - Pulmonary ligament (left), 10L - Hilar  (left), 11L - Interlobar (left), and 12L - Lobar (left)   What pre-operative therapies did the patient receive? None

## 2023-08-03 NOTE — ANESTHESIA PROCEDURE NOTES
Arterial    Diagnosis: Lung mass  Doctor requesting consult: Dr. Gould    Patient location during procedure: done in OR  Procedure start time: 8/3/2023 7:16 AM  Timeout: 8/3/2023 7:15 AM  Procedure end time: 8/3/2023 7:18 AM    Staffing  Authorizing Provider: Yen Keith MD  Performing Provider: Jonathan Yu MD    Staffing  Performed by: Jonathan Yu MD  Authorized by: Yen Keith MD    Anesthesiologist was present at the time of the procedure.    Preanesthetic Checklist  Completed: patient identified, IV checked, site marked, risks and benefits discussed, surgical consent, monitors and equipment checked, pre-op evaluation, timeout performed and anesthesia consent givenArterial  Skin Prep: chlorhexidine gluconate and isopropyl alcohol  Local Infiltration: none  Orientation: left  Location: radial    Catheter Size: 20 G  Catheter placement by Ultrasound guidance. Heme positive aspiration all ports.   Vessel Caliber: medium, patent, compressibility normal  Needle advanced into vessel with real time Ultrasound guidance.Insertion Attempts: 1  Assessment  Dressing: secured with tape and tegaderm  Patient: Tolerated well

## 2023-08-03 NOTE — ANESTHESIA PROCEDURE NOTES
Intubation    Date/Time: 8/3/2023 7:34 AM    Performed by: Jonathan Yu MD  Authorized by: Yen Keith MD    Intubation:     Induction:  Intravenous    Intubated:  Postinduction    Mask Ventilation:  Easy mask    Attempts:  1    Attempted By:  Student    Method of Intubation:  Video laryngoscopy    Blade:  Gould 3    Laryngeal View Grade: Grade I - full view of cords      Difficult Airway Encountered?: No      Complications:  None    Airway Device:  Oral endotracheal tube    Airway Device Size:  9.0    Style/Cuff Inflation:  Cuffed (inflated to minimal occlusive pressure)    Tube secured:  22    Secured at:  The lips    Placement Verified By:  Capnometry    Complicating Factors:  None    Findings Post-Intubation:  BS equal bilateral and atraumatic/condition of teeth unchanged

## 2023-08-03 NOTE — BRIEF OP NOTE
[facility] - Surgery (2nd Fl)  Brief Operative Note    SUMMARY     Surgery Date: 8/3/2023     Surgeon(s) and Role:     * Saeed Gould MD - Primary     * Robb Ann MD - Resident - Assisting     * Sonu Grande MD - Co-Surgeon     * Rivas Armstrong PA-C - First Assist      Pre-op Diagnosis:  Adenocarcinoma, lung, left [C34.92]    Post-op Diagnosis:  Post-Op Diagnosis Codes:     * Adenocarcinoma, lung, left [C34.92]    Procedure(s) (LRB):  XI ROBOTIC RATS upper lobectomy (Left)  Bronchoscopy (N/A)  LYMPHADENECTOMY (Left)  BLOCK, NERVE, INTERCOSTAL, 2 OR MORE (N/A)    Anesthesia: General    Operative Findings: robotic ports inserted, DaVinci robot docked, mediastinal lymph node dissection of multiple lymph node stations, resection of left upper lobe of left lung.    Estimated Blood Loss: * No values recorded between 8/3/2023  8:25 AM and 8/3/2023  2:12 PM *    Estimated Blood Loss has not been documented. EBL = 150 ml.

## 2023-08-03 NOTE — TRANSFER OF CARE
Anesthesia Transfer of Care Note    Patient: Radha Ervin    Procedure(s) Performed: Procedure(s) (LRB):  XI ROBOTIC RATS upper lobectomy (Left)  Bronchoscopy (N/A)  LYMPHADENECTOMY (Left)  BLOCK, NERVE, INTERCOSTAL, 2 OR MORE (N/A)    Patient location: PACU    Anesthesia Type: general    Transport from OR: Transported from OR on 6-10 L/min O2 by face mask with adequate spontaneous ventilation    Post pain: adequate analgesia    Post assessment: no apparent anesthetic complications    Post vital signs: stable    Level of consciousness: awake and alert    Nausea/Vomiting: no nausea/vomiting    Complications: none    Transfer of care protocol was followed      Last vitals:   Visit Vitals  /66   Pulse 72   Temp 36.6 °C (97.9 °F) (Oral)   Resp 18   LMP  (LMP Unknown)   SpO2 97%   Breastfeeding No

## 2023-08-03 NOTE — NURSING
Pt transferred to [facility] 1032. VSS, on 3 L NC. Pt lethargic, arouses to voice. Pt w/ complaints of generalized pain. L chest tube dressing w/ SS drainage. CT @ -20. Family @ bedside.

## 2023-08-03 NOTE — PROGRESS NOTES
Preop checklist complete. All consents signed. Patient sister to take belongings. Glasses and dentures removed. No additional questions from patient.

## 2023-08-03 NOTE — NURSING TRANSFER
Nursing Transfer Note      8/3/2023   6:14 PM          Reason patient is being transferred: recovery care complete    Transfer To: 1032    Transfer via bed    Transfer with chest tube    Transported by RN/PCT    Order for Tele Monitor? No    Additional Lines: Chest Tube    4eyes on Skin: yes    Medicines sent: N/A    Any special needs or follow-up needed: routine    Patient belongings transferred with patient: No    Chart send with patient: Yes    Notified: sibling    Patient reassessed at: 08/03/23 2454

## 2023-08-03 NOTE — ANESTHESIA PROCEDURE NOTES
Intubation    Date/Time: 8/3/2023 7:34 AM    Performed by: Jonathan Yu MD  Authorized by: Yen Keith MD    Intubation:     Induction:  Intravenous    Intubated:  Postinduction    Mask Ventilation:  Easy mask    Attempts:  1    Attempted By:  Resident anesthesiologist    Method of Intubation:  Fiberoptic    Laryngeal View Grade: Grade I - full view of cords      Difficult Airway Encountered?: No      Complications:  None    Airway Device:  Double lumen tube left    Airway Device Size:  35F    Style/Cuff Inflation:  Cuffed    Tube secured:  22    Secured at:  The lips    Placement Verified By:  Capnometry    Complicating Factors:  None    Findings Post-Intubation:  BS equal bilateral

## 2023-08-04 ENCOUNTER — TELEPHONE (OUTPATIENT)
Dept: HEMATOLOGY/ONCOLOGY | Facility: CLINIC | Age: 63
End: 2023-08-04
Payer: COMMERCIAL

## 2023-08-04 LAB
ANION GAP SERPL CALC-SCNC: 12 MMOL/L (ref 8–16)
ANION GAP SERPL CALC-SCNC: 8 MMOL/L (ref 8–16)
BASOPHILS # BLD AUTO: 0.03 K/UL (ref 0–0.2)
BASOPHILS NFR BLD: 0.2 % (ref 0–1.9)
BUN SERPL-MCNC: 13 MG/DL (ref 8–23)
BUN SERPL-MCNC: 13 MG/DL (ref 8–23)
CALCIUM SERPL-MCNC: 8.7 MG/DL (ref 8.7–10.5)
CALCIUM SERPL-MCNC: 9.5 MG/DL (ref 8.7–10.5)
CHLORIDE SERPL-SCNC: 104 MMOL/L (ref 95–110)
CHLORIDE SERPL-SCNC: 108 MMOL/L (ref 95–110)
CO2 SERPL-SCNC: 19 MMOL/L (ref 23–29)
CO2 SERPL-SCNC: 23 MMOL/L (ref 23–29)
CREAT SERPL-MCNC: 1.1 MG/DL (ref 0.5–1.4)
CREAT SERPL-MCNC: 1.1 MG/DL (ref 0.5–1.4)
DIFFERENTIAL METHOD: ABNORMAL
EOSINOPHIL # BLD AUTO: 0 K/UL (ref 0–0.5)
EOSINOPHIL NFR BLD: 0 % (ref 0–8)
ERYTHROCYTE [DISTWIDTH] IN BLOOD BY AUTOMATED COUNT: 13.5 % (ref 11.5–14.5)
EST. GFR  (NO RACE VARIABLE): 56.1 ML/MIN/1.73 M^2
EST. GFR  (NO RACE VARIABLE): 56.1 ML/MIN/1.73 M^2
GLUCOSE SERPL-MCNC: 116 MG/DL (ref 70–110)
GLUCOSE SERPL-MCNC: 127 MG/DL (ref 70–110)
GLUCOSE SERPL-MCNC: 152 MG/DL (ref 70–110)
GLUCOSE SERPL-MCNC: 155 MG/DL (ref 70–110)
HCO3 UR-SCNC: 20.2 MMOL/L (ref 24–28)
HCO3 UR-SCNC: 22 MMOL/L (ref 24–28)
HCT VFR BLD AUTO: 38.2 % (ref 37–48.5)
HCT VFR BLD CALC: 31 %PCV (ref 36–54)
HCT VFR BLD CALC: 34 %PCV (ref 36–54)
HGB BLD-MCNC: 12.3 G/DL (ref 12–16)
IMM GRANULOCYTES # BLD AUTO: 0.07 K/UL (ref 0–0.04)
IMM GRANULOCYTES NFR BLD AUTO: 0.4 % (ref 0–0.5)
LYMPHOCYTES # BLD AUTO: 1.4 K/UL (ref 1–4.8)
LYMPHOCYTES NFR BLD: 8.4 % (ref 18–48)
MCH RBC QN AUTO: 30.8 PG (ref 27–31)
MCHC RBC AUTO-ENTMCNC: 32.2 G/DL (ref 32–36)
MCV RBC AUTO: 96 FL (ref 82–98)
MONOCYTES # BLD AUTO: 1.1 K/UL (ref 0.3–1)
MONOCYTES NFR BLD: 6.8 % (ref 4–15)
NEUTROPHILS # BLD AUTO: 13.8 K/UL (ref 1.8–7.7)
NEUTROPHILS NFR BLD: 84.2 % (ref 38–73)
NRBC BLD-RTO: 0 /100 WBC
PCO2 BLDA: 48.8 MMHG (ref 35–45)
PCO2 BLDA: 53.1 MMHG (ref 35–45)
PH SMN: 7.23 [PH] (ref 7.35–7.45)
PH SMN: 7.23 [PH] (ref 7.35–7.45)
PLATELET # BLD AUTO: 244 K/UL (ref 150–450)
PMV BLD AUTO: 10 FL (ref 9.2–12.9)
PO2 BLDA: 222 MMHG (ref 80–100)
PO2 BLDA: 76 MMHG (ref 80–100)
POC BE: -6 MMOL/L
POC BE: -7 MMOL/L
POC IONIZED CALCIUM: 0.99 MMOL/L (ref 1.06–1.42)
POC IONIZED CALCIUM: 1.3 MMOL/L (ref 1.06–1.42)
POC SATURATED O2: 100 % (ref 95–100)
POC SATURATED O2: 92 % (ref 95–100)
POC TCO2: 22 MMOL/L (ref 23–27)
POC TCO2: 24 MMOL/L (ref 23–27)
POTASSIUM BLD-SCNC: 4.4 MMOL/L (ref 3.5–5.1)
POTASSIUM BLD-SCNC: 4.6 MMOL/L (ref 3.5–5.1)
POTASSIUM SERPL-SCNC: 4.1 MMOL/L (ref 3.5–5.1)
POTASSIUM SERPL-SCNC: 4.9 MMOL/L (ref 3.5–5.1)
RBC # BLD AUTO: 3.99 M/UL (ref 4–5.4)
SAMPLE: ABNORMAL
SAMPLE: ABNORMAL
SODIUM BLD-SCNC: 139 MMOL/L (ref 136–145)
SODIUM BLD-SCNC: 141 MMOL/L (ref 136–145)
SODIUM SERPL-SCNC: 135 MMOL/L (ref 136–145)
SODIUM SERPL-SCNC: 139 MMOL/L (ref 136–145)
WBC # BLD AUTO: 16.44 K/UL (ref 3.9–12.7)

## 2023-08-04 PROCEDURE — 94761 N-INVAS EAR/PLS OXIMETRY MLT: CPT

## 2023-08-04 PROCEDURE — 63600175 PHARM REV CODE 636 W HCPCS: Performed by: STUDENT IN AN ORGANIZED HEALTH CARE EDUCATION/TRAINING PROGRAM

## 2023-08-04 PROCEDURE — 94640 AIRWAY INHALATION TREATMENT: CPT

## 2023-08-04 PROCEDURE — 36415 COLL VENOUS BLD VENIPUNCTURE: CPT

## 2023-08-04 PROCEDURE — 80048 BASIC METABOLIC PNL TOTAL CA: CPT | Mod: 91 | Performed by: PHYSICIAN ASSISTANT

## 2023-08-04 PROCEDURE — 36415 COLL VENOUS BLD VENIPUNCTURE: CPT | Performed by: PHYSICIAN ASSISTANT

## 2023-08-04 PROCEDURE — 20600001 HC STEP DOWN PRIVATE ROOM

## 2023-08-04 PROCEDURE — 25000003 PHARM REV CODE 250: Performed by: STUDENT IN AN ORGANIZED HEALTH CARE EDUCATION/TRAINING PROGRAM

## 2023-08-04 PROCEDURE — 99900035 HC TECH TIME PER 15 MIN (STAT)

## 2023-08-04 PROCEDURE — 63600175 PHARM REV CODE 636 W HCPCS: Performed by: PHYSICIAN ASSISTANT

## 2023-08-04 PROCEDURE — 27000221 HC OXYGEN, UP TO 24 HOURS

## 2023-08-04 PROCEDURE — 80048 BASIC METABOLIC PNL TOTAL CA: CPT

## 2023-08-04 PROCEDURE — 25000242 PHARM REV CODE 250 ALT 637 W/ HCPCS: Performed by: STUDENT IN AN ORGANIZED HEALTH CARE EDUCATION/TRAINING PROGRAM

## 2023-08-04 PROCEDURE — 99900031 HC PATIENT EDUCATION (STAT)

## 2023-08-04 PROCEDURE — 85025 COMPLETE CBC W/AUTO DIFF WBC: CPT

## 2023-08-04 RX ORDER — METHOCARBAMOL 750 MG/1
750 TABLET, FILM COATED ORAL 4 TIMES DAILY
Status: DISCONTINUED | OUTPATIENT
Start: 2023-08-04 | End: 2023-08-05

## 2023-08-04 RX ORDER — GABAPENTIN 400 MG/1
400 CAPSULE ORAL 3 TIMES DAILY
Status: DISCONTINUED | OUTPATIENT
Start: 2023-08-04 | End: 2023-08-05

## 2023-08-04 RX ORDER — FUROSEMIDE 10 MG/ML
20 INJECTION INTRAMUSCULAR; INTRAVENOUS ONCE
Status: COMPLETED | OUTPATIENT
Start: 2023-08-04 | End: 2023-08-04

## 2023-08-04 RX ORDER — LEVALBUTEROL INHALATION SOLUTION 0.63 MG/3ML
0.63 SOLUTION RESPIRATORY (INHALATION)
Status: DISCONTINUED | OUTPATIENT
Start: 2023-08-04 | End: 2023-08-06 | Stop reason: HOSPADM

## 2023-08-04 RX ADMIN — GABAPENTIN 300 MG: 300 CAPSULE ORAL at 08:08

## 2023-08-04 RX ADMIN — ENOXAPARIN SODIUM 40 MG: 40 INJECTION SUBCUTANEOUS at 05:08

## 2023-08-04 RX ADMIN — ACETAMINOPHEN 1000 MG: 500 TABLET ORAL at 09:08

## 2023-08-04 RX ADMIN — SENNOSIDES AND DOCUSATE SODIUM 1 TABLET: 50; 8.6 TABLET ORAL at 09:08

## 2023-08-04 RX ADMIN — POLYETHYLENE GLYCOL 3350 17 G: 17 POWDER, FOR SOLUTION ORAL at 08:08

## 2023-08-04 RX ADMIN — GABAPENTIN 400 MG: 400 CAPSULE ORAL at 09:08

## 2023-08-04 RX ADMIN — SENNOSIDES AND DOCUSATE SODIUM 1 TABLET: 50; 8.6 TABLET ORAL at 08:08

## 2023-08-04 RX ADMIN — METHOCARBAMOL 500 MG: 500 TABLET ORAL at 08:08

## 2023-08-04 RX ADMIN — LIDOCAINE 1 PATCH: 50 PATCH CUTANEOUS at 02:08

## 2023-08-04 RX ADMIN — MUPIROCIN 1 G: 20 OINTMENT TOPICAL at 09:08

## 2023-08-04 RX ADMIN — GABAPENTIN 400 MG: 400 CAPSULE ORAL at 02:08

## 2023-08-04 RX ADMIN — ONDANSETRON 8 MG: 8 TABLET, ORALLY DISINTEGRATING ORAL at 08:08

## 2023-08-04 RX ADMIN — FUROSEMIDE 20 MG: 10 INJECTION, SOLUTION INTRAMUSCULAR; INTRAVENOUS at 01:08

## 2023-08-04 RX ADMIN — LEVALBUTEROL HYDROCHLORIDE 0.63 MG: 0.63 SOLUTION RESPIRATORY (INHALATION) at 02:08

## 2023-08-04 RX ADMIN — ACETAMINOPHEN 1000 MG: 500 TABLET ORAL at 02:08

## 2023-08-04 RX ADMIN — METHOCARBAMOL 750 MG: 750 TABLET ORAL at 05:08

## 2023-08-04 RX ADMIN — METHOCARBAMOL 750 MG: 750 TABLET ORAL at 09:08

## 2023-08-04 RX ADMIN — MUPIROCIN 1 G: 20 OINTMENT TOPICAL at 08:08

## 2023-08-04 RX ADMIN — LEVALBUTEROL HYDROCHLORIDE 0.63 MG: 0.63 SOLUTION RESPIRATORY (INHALATION) at 07:08

## 2023-08-04 RX ADMIN — OXYCODONE HYDROCHLORIDE 10 MG: 10 TABLET ORAL at 01:08

## 2023-08-04 RX ADMIN — LEVALBUTEROL HYDROCHLORIDE 0.63 MG: 0.63 SOLUTION RESPIRATORY (INHALATION) at 08:08

## 2023-08-04 RX ADMIN — METHOCARBAMOL 750 MG: 750 TABLET ORAL at 01:08

## 2023-08-04 NOTE — ASSESSMENT & PLAN NOTE
Radha Ervin is a 63 y.o. lady with left upper lobe s/p left upper lobectomy on 8/3/23    - CXR with lung up, no pneumo  - CT placed to water seal today  - DVT ppx  - multimodal pain control  - up and out of bed today, walk in the halls

## 2023-08-04 NOTE — PLAN OF CARE
[facility]  Initial Discharge Assessment       Primary Care Provider: Suraj Herrera III, MD    Admission Diagnosis: Adenocarcinoma, lung, left [C34.92]  NSCLC of left lung [C34.92]    Admission Date: 8/3/2023  Expected Discharge Date: 8/8/2023    Transition of Care Barriers: None    Payor: BLUE CROSS BLUE SHIELD / Plan: BLUE CONNECT / Product Type: HMO /     Extended Emergency Contact Information  Primary Emergency Contact: Jeannie Dai   [place]  Mobile Phone: [phone]  Relation: Sister    Discharge Plan A: Home with family  Discharge Plan B: Home Health      [id number] [place] - [address] [facility] [address]  [address] [place] [address]  Phone: [phone] Fax: [phone]    [place], [place] - [address]  [address] [facility] [address]  Phone: [phone] Fax: [phone]    [facility] - [place] - [address]  [address]  [place] [address]  Phone: [phone] Fax: [phone]    [facility]  [address] [address]  Phone: [phone] Fax: [phone]    [id number] - [place] - [address]  [address] [place] [address]  Phone: [phone] Fax: [phone]    [facility] [place] - [address]  [address] [facility] [address]  Phone: [phone] Fax: [phone]    [facility] [place], [place] - [address]  [address] [facility] [address]  Phone: [phone] Fax: [phone]    [facility]  [address] [facility] [address]  Phone: [phone] Fax: [phone]      Initial Assessment (most recent)       Adult Discharge Assessment - 08/04/23 1421          Discharge Assessment    Assessment Type Discharge Planning Assessment     Confirmed/corrected address, phone number and insurance Yes     Confirmed  Demographics Correct on Facesheet     Source of Information patient     When was your last doctors appointment? 06/15/23     Communicated ROSEMARIE with patient/caregiver Date not available/Unable to determine     People in Home alone     Do you expect to return to your current living situation? No   Patient plans to dc to her sister's home: [address] [place] [address]    Do you have help at home or someone to help you manage your care at home? Yes     Who are your caregiver(s) and their phone number(s)? Sister Jeannie Patricia [phone]     Prior to hospitilization cognitive status: Alert/Oriented     Current cognitive status: Alert/Oriented     Home Layout Able to live on 1st floor     Equipment Currently Used at Home none     Readmission within 30 days? No     Patient currently being followed by outpatient case management? No     Do you currently have service(s) that help you manage your care at home? No     Do you take prescription medications? Yes     Do you have prescription coverage? Yes     Do you have any problems affording any of your prescribed medications? No     Is the patient taking medications as prescribed? yes     Who is going to help you get home at discharge? Sister Jeannie Patricia [phone]     How do you get to doctors appointments? car, drives self     Are you on dialysis? No     Do you take coumadin? No     Discharge Plan A Home with family     Discharge Plan B Home Health     DME Needed Upon Discharge  none     Discharge Plan discussed with: Sibling;Patient     Name(s) and Number(s) Sister [phone]     Transition of Care Barriers None        Physical Activity    On average, how many days per week do you engage in moderate to strenuous exercise (like a brisk walk)? 0 days     On average, how many minutes do you engage in exercise at this level? 0 min        Financial Resource Strain    How hard is it for you to pay for the very basics like food, housing, medical care, and heating? Not very  hard        Housing Stability    In the last 12 months, was there a time when you were not able to pay the mortgage or rent on time? No     In the last 12 months, was there a time when you did not have a steady place to sleep or slept in a shelter (including now)? No        Transportation Needs    In the past 12 months, has lack of transportation kept you from medical appointments or from getting medications? No     In the past 12 months, has lack of transportation kept you from meetings, work, or from getting things needed for daily living? No        Food Insecurity    Within the past 12 months, you worried that your food would run out before you got the money to buy more. Never true     Within the past 12 months, the food you bought just didn't last and you didn't have money to get more. Never true        Social Connections    In a typical week, how many times do you talk on the phone with family, friends, or neighbors? Three times a week     How often do you get together with friends or relatives? Three times a week     How often do you attend Congregation or Judaism services? Never     Do you belong to any clubs or organizations such as Congregation groups, unions, fraternal or athletic groups, or school groups? No     How often do you attend meetings of the clubs or organizations you belong to? Never        Alcohol Use    Q1: How often do you have a drink containing alcohol? Patient refused     Q2: How many drinks containing alcohol do you have on a typical day when you are drinking? Patient refused     Q3: How often do you have six or more drinks on one occasion? Patient refused                      Patient lives alone and plans to stay with her sister upon discharge. She is not on Dialysis or Coumadin.    Gretchen Castellanos RN    [facility]  [phone]

## 2023-08-04 NOTE — SUBJECTIVE & OBJECTIVE
Interval History: NAEON. CT with 200 output. No air leak. Pain well controlled. Hgb stable    Medications:  Continuous Infusions:  Scheduled Meds:   acetaminophen  1,000 mg Oral Q8H    enoxparin  40 mg Subcutaneous Daily    gabapentin  300 mg Oral TID    levalbuterol  0.63 mg Nebulization Q6H WAKE    LIDOcaine  1 patch Transdermal Q24H    methocarbamoL  500 mg Oral QID    mupirocin  1 g Nasal BID    nicotine  1 patch Transdermal Q24H    polyethylene glycol  17 g Oral Daily    senna-docusate 8.6-50 mg  1 tablet Oral BID     PRN Meds:0.9%  NaCl infusion (for blood administration), ceFAZolin (ANCEF) IVPB, metoclopramide HCl, ondansetron, oxyCODONE, oxyCODONE     Review of patient's allergies indicates:   Allergen Reactions    Morphine Other (See Comments)     headaches    Latex, natural rubber Rash and Other (See Comments)     Redness and peeling only with bandaids    Penicillins Nausea And Vomiting and Rash     Objective:     Vital Signs (Most Recent):  Temp: 98.6 °F (37 °C) (08/04/23 0444)  Pulse: 81 (08/04/23 0444)  Resp: 18 (08/04/23 0444)  BP: 112/68 (08/04/23 0444)  SpO2: 97 % (08/04/23 0444) Vital Signs (24h Range):  Temp:  [97.5 °F (36.4 °C)-98.6 °F (37 °C)] 98.6 °F (37 °C)  Pulse:  [71-83] 81  Resp:  [13-20] 18  SpO2:  [95 %-99 %] 97 %  BP: (112-145)/(67-89) 112/68     Intake/Output - Last 3 Shifts         08/02 0700  08/03 0659 08/03 0700  08/04 0659 08/04 0700  08/05 0659    I.V.  500     IV Piggyback  2750     Total Intake  3250     Urine  500     Chest Tube  190     Total Output  690     Net  +2560            Urine Occurrence  1 x             SpO2: 97 %        Physical Exam  Constitutional:       General: She is not in acute distress.     Appearance: Normal appearance.   HENT:      Head: Normocephalic and atraumatic.   Cardiovascular:      Rate and Rhythm: Normal rate and regular rhythm.   Pulmonary:      Effort: Pulmonary effort is normal. No respiratory distress.      Comments: Chest tube in place with  serosanguinous output  No air leak  Abdominal:      General: Abdomen is flat. There is no distension.      Palpations: Abdomen is soft.      Tenderness: There is no abdominal tenderness.   Neurological:      General: No focal deficit present.      Mental Status: She is alert and oriented to person, place, and time.   Psychiatric:         Behavior: Behavior normal.         Thought Content: Thought content normal.            Significant Labs:  CBC:   Recent Labs   Lab 08/04/23  0512   WBC 16.44*   RBC 3.99*   HGB 12.3   HCT 38.2      MCV 96   MCH 30.8   MCHC 32.2     CMP:   Recent Labs   Lab 08/04/23  0512   *   CALCIUM 8.7   *   K 4.9   CO2 19*      BUN 13   CREATININE 1.1       Significant Diagnostics:  I have reviewed all pertinent imaging results/findings within the past 24 hours.    VTE Risk Mitigation (From admission, onward)           Ordered     enoxaparin injection 40 mg  Daily         08/03/23 1410     IP VTE HIGH RISK PATIENT  Once         08/03/23 1410     Place sequential compression device  Until discontinued         08/03/23 1410

## 2023-08-04 NOTE — PROGRESS NOTES
[facility]  Thoracic Surgery  Progress Note    Subjective:     History of Present Illness:  No notes on file    Post-Op Info:  Procedure(s) (LRB):  XI ROBOTIC RATS upper lobectomy (Left)  Bronchoscopy (N/A)  LYMPHADENECTOMY (Left)  BLOCK, NERVE, INTERCOSTAL, 2 OR MORE (N/A)   1 Day Post-Op     Interval History: NAEON. CT with 200 output. No air leak. Pain well controlled. Hgb stable    Medications:  Continuous Infusions:  Scheduled Meds:   acetaminophen  1,000 mg Oral Q8H    enoxparin  40 mg Subcutaneous Daily    gabapentin  300 mg Oral TID    levalbuterol  0.63 mg Nebulization Q6H WAKE    LIDOcaine  1 patch Transdermal Q24H    methocarbamoL  500 mg Oral QID    mupirocin  1 g Nasal BID    nicotine  1 patch Transdermal Q24H    polyethylene glycol  17 g Oral Daily    senna-docusate 8.6-50 mg  1 tablet Oral BID     PRN Meds:0.9%  NaCl infusion (for blood administration), ceFAZolin (ANCEF) IVPB, metoclopramide HCl, ondansetron, oxyCODONE, oxyCODONE     Review of patient's allergies indicates:   Allergen Reactions    Morphine Other (See Comments)     headaches    Latex, natural rubber Rash and Other (See Comments)     Redness and peeling only with bandaids    Penicillins Nausea And Vomiting and Rash     Objective:     Vital Signs (Most Recent):  Temp: 98.6 °F (37 °C) (08/04/23 0444)  Pulse: 81 (08/04/23 0444)  Resp: 18 (08/04/23 0444)  BP: 112/68 (08/04/23 0444)  SpO2: 97 % (08/04/23 0444) Vital Signs (24h Range):  Temp:  [97.5 °F (36.4 °C)-98.6 °F (37 °C)] 98.6 °F (37 °C)  Pulse:  [71-83] 81  Resp:  [13-20] 18  SpO2:  [95 %-99 %] 97 %  BP: (112-145)/(67-89) 112/68     Intake/Output - Last 3 Shifts         08/02 0700  08/03 0659 08/03 0700  08/04 0659 08/04 0700 08/05 0659    I.V.  500     IV Piggyback  2750     Total Intake  3250     Urine  500     Chest Tube  190     Total Output  690     Net  +2560            Urine Occurrence  1 x             SpO2: 97 %        Physical Exam  Constitutional:        General: She is not in acute distress.     Appearance: Normal appearance.   HENT:      Head: Normocephalic and atraumatic.   Cardiovascular:      Rate and Rhythm: Normal rate and regular rhythm.   Pulmonary:      Effort: Pulmonary effort is normal. No respiratory distress.      Comments: Chest tube in place with serosanguinous output  No air leak  Abdominal:      General: Abdomen is flat. There is no distension.      Palpations: Abdomen is soft.      Tenderness: There is no abdominal tenderness.   Neurological:      General: No focal deficit present.      Mental Status: She is alert and oriented to person, place, and time.   Psychiatric:         Behavior: Behavior normal.         Thought Content: Thought content normal.            Significant Labs:  CBC:   Recent Labs   Lab 08/04/23  0512   WBC 16.44*   RBC 3.99*   HGB 12.3   HCT 38.2      MCV 96   MCH 30.8   MCHC 32.2     CMP:   Recent Labs   Lab 08/04/23  0512   *   CALCIUM 8.7   *   K 4.9   CO2 19*      BUN 13   CREATININE 1.1       Significant Diagnostics:  I have reviewed all pertinent imaging results/findings within the past 24 hours.    VTE Risk Mitigation (From admission, onward)           Ordered     enoxaparin injection 40 mg  Daily         08/03/23 1410     IP VTE HIGH RISK PATIENT  Once         08/03/23 1410     Place sequential compression device  Until discontinued         08/03/23 1410                  Assessment/Plan:     * Adenocarcinoma, lung, left  Radha Ervin is a 63 y.o. lady with left upper lobe s/p left upper lobectomy on 8/3/23    - CXR with lung up, no pneumo  - CT placed to water seal today  - DVT ppx  - multimodal pain control  - up and out of bed today, walk in the halls        Robb Ann MD  Thoracic Surgery  [facility]

## 2023-08-04 NOTE — NURSING
..[facility] Plan of Care Note  Dx lung ca    Shift Events none    Goals of Care: pain control    Neuro: x4    Vital Signs: wdl    Respiratory: 3l, exp wheeze    Diet: reg    Is patient tolerating current diet? yes    GTTS: no    Urine Output/Bowel Movement: incont, lbm pta    Drains/Tubes/Tube Feeds (include total output/shift): L CT water seal    Lines: piv x2      Accuchecks:no    Skin: lap sites    Fall Risk Score: 8    Activity level? sb    Any scheduled procedures? no    Any safety concerns? no    Other: n/a

## 2023-08-05 LAB
ANION GAP SERPL CALC-SCNC: 11 MMOL/L (ref 8–16)
BUN SERPL-MCNC: 15 MG/DL (ref 8–23)
CALCIUM SERPL-MCNC: 8.9 MG/DL (ref 8.7–10.5)
CHLORIDE SERPL-SCNC: 102 MMOL/L (ref 95–110)
CO2 SERPL-SCNC: 25 MMOL/L (ref 23–29)
CREAT SERPL-MCNC: 1.5 MG/DL (ref 0.5–1.4)
EST. GFR  (NO RACE VARIABLE): 38.7 ML/MIN/1.73 M^2
GLUCOSE SERPL-MCNC: 153 MG/DL (ref 70–110)
POTASSIUM SERPL-SCNC: 4 MMOL/L (ref 3.5–5.1)
SODIUM SERPL-SCNC: 138 MMOL/L (ref 136–145)

## 2023-08-05 PROCEDURE — 94799 UNLISTED PULMONARY SVC/PX: CPT

## 2023-08-05 PROCEDURE — P9045 ALBUMIN (HUMAN), 5%, 250 ML: HCPCS | Mod: JZ,JG | Performed by: STUDENT IN AN ORGANIZED HEALTH CARE EDUCATION/TRAINING PROGRAM

## 2023-08-05 PROCEDURE — 20600001 HC STEP DOWN PRIVATE ROOM

## 2023-08-05 PROCEDURE — 80048 BASIC METABOLIC PNL TOTAL CA: CPT | Performed by: STUDENT IN AN ORGANIZED HEALTH CARE EDUCATION/TRAINING PROGRAM

## 2023-08-05 PROCEDURE — 94640 AIRWAY INHALATION TREATMENT: CPT

## 2023-08-05 PROCEDURE — 27000221 HC OXYGEN, UP TO 24 HOURS

## 2023-08-05 PROCEDURE — 63600175 PHARM REV CODE 636 W HCPCS: Mod: JZ,JG | Performed by: STUDENT IN AN ORGANIZED HEALTH CARE EDUCATION/TRAINING PROGRAM

## 2023-08-05 PROCEDURE — 99900035 HC TECH TIME PER 15 MIN (STAT)

## 2023-08-05 PROCEDURE — 25000242 PHARM REV CODE 250 ALT 637 W/ HCPCS: Performed by: STUDENT IN AN ORGANIZED HEALTH CARE EDUCATION/TRAINING PROGRAM

## 2023-08-05 PROCEDURE — 25000242 PHARM REV CODE 250 ALT 637 W/ HCPCS

## 2023-08-05 PROCEDURE — 25000003 PHARM REV CODE 250: Performed by: STUDENT IN AN ORGANIZED HEALTH CARE EDUCATION/TRAINING PROGRAM

## 2023-08-05 PROCEDURE — 94761 N-INVAS EAR/PLS OXIMETRY MLT: CPT

## 2023-08-05 PROCEDURE — 36415 COLL VENOUS BLD VENIPUNCTURE: CPT | Performed by: STUDENT IN AN ORGANIZED HEALTH CARE EDUCATION/TRAINING PROGRAM

## 2023-08-05 RX ORDER — ALBUMIN HUMAN 50 G/1000ML
12.5 SOLUTION INTRAVENOUS ONCE
Status: COMPLETED | OUTPATIENT
Start: 2023-08-05 | End: 2023-08-05

## 2023-08-05 RX ORDER — GLUCAGON 1 MG
1 KIT INJECTION
Status: DISCONTINUED | OUTPATIENT
Start: 2023-08-05 | End: 2023-08-06 | Stop reason: HOSPADM

## 2023-08-05 RX ORDER — FUROSEMIDE 10 MG/ML
20 INJECTION INTRAMUSCULAR; INTRAVENOUS ONCE
Status: COMPLETED | OUTPATIENT
Start: 2023-08-05 | End: 2023-08-05

## 2023-08-05 RX ORDER — METHOCARBAMOL 500 MG/1
500 TABLET, FILM COATED ORAL 3 TIMES DAILY
Status: DISCONTINUED | OUTPATIENT
Start: 2023-08-05 | End: 2023-08-05

## 2023-08-05 RX ORDER — IBUPROFEN 200 MG
24 TABLET ORAL
Status: DISCONTINUED | OUTPATIENT
Start: 2023-08-05 | End: 2023-08-06 | Stop reason: HOSPADM

## 2023-08-05 RX ORDER — INSULIN ASPART 100 [IU]/ML
0-5 INJECTION, SOLUTION INTRAVENOUS; SUBCUTANEOUS
Status: DISCONTINUED | OUTPATIENT
Start: 2023-08-05 | End: 2023-08-06 | Stop reason: HOSPADM

## 2023-08-05 RX ORDER — ALBUMIN HUMAN 50 G/1000ML
25 SOLUTION INTRAVENOUS ONCE
Status: COMPLETED | OUTPATIENT
Start: 2023-08-05 | End: 2023-08-05

## 2023-08-05 RX ORDER — LEVALBUTEROL 1.25 MG/.5ML
1.25 SOLUTION, CONCENTRATE RESPIRATORY (INHALATION) ONCE
Status: COMPLETED | OUTPATIENT
Start: 2023-08-05 | End: 2023-08-05

## 2023-08-05 RX ORDER — OXYCODONE HCL 5 MG/5 ML
2.5 SOLUTION, ORAL ORAL EVERY 6 HOURS PRN
Status: DISCONTINUED | OUTPATIENT
Start: 2023-08-05 | End: 2023-08-06 | Stop reason: HOSPADM

## 2023-08-05 RX ORDER — IBUPROFEN 200 MG
16 TABLET ORAL
Status: DISCONTINUED | OUTPATIENT
Start: 2023-08-05 | End: 2023-08-06 | Stop reason: HOSPADM

## 2023-08-05 RX ORDER — METHOCARBAMOL 500 MG/1
500 TABLET, FILM COATED ORAL 2 TIMES DAILY
Status: DISCONTINUED | OUTPATIENT
Start: 2023-08-06 | End: 2023-08-06 | Stop reason: HOSPADM

## 2023-08-05 RX ORDER — GABAPENTIN 300 MG/1
300 CAPSULE ORAL 2 TIMES DAILY
Status: DISCONTINUED | OUTPATIENT
Start: 2023-08-05 | End: 2023-08-05

## 2023-08-05 RX ADMIN — METHOCARBAMOL 500 MG: 500 TABLET ORAL at 03:08

## 2023-08-05 RX ADMIN — ALBUMIN (HUMAN) 12.5 G: 12.5 SOLUTION INTRAVENOUS at 09:08

## 2023-08-05 RX ADMIN — FUROSEMIDE 20 MG: 10 INJECTION, SOLUTION INTRAMUSCULAR; INTRAVENOUS at 11:08

## 2023-08-05 RX ADMIN — LIDOCAINE 1 PATCH: 50 PATCH CUTANEOUS at 03:08

## 2023-08-05 RX ADMIN — ACETAMINOPHEN 1000 MG: 500 TABLET ORAL at 10:08

## 2023-08-05 RX ADMIN — ACETAMINOPHEN 1000 MG: 500 TABLET ORAL at 12:08

## 2023-08-05 RX ADMIN — SENNOSIDES AND DOCUSATE SODIUM 1 TABLET: 50; 8.6 TABLET ORAL at 08:08

## 2023-08-05 RX ADMIN — GABAPENTIN 300 MG: 300 CAPSULE ORAL at 08:08

## 2023-08-05 RX ADMIN — POLYETHYLENE GLYCOL 3350 17 G: 17 POWDER, FOR SOLUTION ORAL at 08:08

## 2023-08-05 RX ADMIN — MUPIROCIN 1 G: 20 OINTMENT TOPICAL at 08:08

## 2023-08-05 RX ADMIN — FUROSEMIDE 20 MG: 10 INJECTION, SOLUTION INTRAMUSCULAR; INTRAVENOUS at 08:08

## 2023-08-05 RX ADMIN — LEVALBUTEROL 1.25 MG: 1.25 SOLUTION, CONCENTRATE RESPIRATORY (INHALATION) at 02:08

## 2023-08-05 RX ADMIN — ENOXAPARIN SODIUM 40 MG: 40 INJECTION SUBCUTANEOUS at 05:08

## 2023-08-05 RX ADMIN — ALBUMIN (HUMAN) 25 G: 12.5 SOLUTION INTRAVENOUS at 08:08

## 2023-08-05 RX ADMIN — METHOCARBAMOL 500 MG: 500 TABLET ORAL at 08:08

## 2023-08-05 RX ADMIN — ACETAMINOPHEN 1000 MG: 500 TABLET ORAL at 05:08

## 2023-08-05 RX ADMIN — OXYCODONE HYDROCHLORIDE 5 MG: 5 TABLET ORAL at 03:08

## 2023-08-05 RX ADMIN — LEVALBUTEROL HYDROCHLORIDE 0.63 MG: 0.63 SOLUTION RESPIRATORY (INHALATION) at 08:08

## 2023-08-05 RX ADMIN — MUPIROCIN 1 G: 20 OINTMENT TOPICAL at 09:08

## 2023-08-05 RX ADMIN — LEVALBUTEROL HYDROCHLORIDE 0.63 MG: 0.63 SOLUTION RESPIRATORY (INHALATION) at 02:08

## 2023-08-05 NOTE — NURSING
Upon rounds patient noted to have increased gurgling and cough. Patient has chest tube, oxygen saturation of 91% on 1L of oxygen via nasal canula. Team paged and Dr. Zuniga reported that he will come see patient at bedside.

## 2023-08-05 NOTE — ASSESSMENT & PLAN NOTE
Radha Ervin is a 63 y.o. lady with left upper lobe s/p left upper lobectomy on 8/3/23    - CXR pending  - CT clamped, can likely pull today  - DVT ppx  - multimodal pain control  - up and out of bed today, walk in the halls  - albumin/lasix   - IS, currently pulling 1L  - sats>88%

## 2023-08-05 NOTE — NURSING
[facility] Plan of Care Note  Dx left lung adenocarcinoma    Shift Events increased wheezing, team notified and ordered respiratory treatment    Goals of Care: wean oxygen    Neuro: AAOx4    Vital Signs: low blood pressures, team aware    Respiratory: oxygen at 2L, chest tube    Diet: regular    Is patient tolerating current diet? yes    GTTS: n/a    Urine Output/Bowel Movement: 400ml of urine output    Drains/Tubes/Tube Feeds (include total output/shift): chest tube 90ml output    Lines: peripheral      Accuchecks:n/a    Skin: incision    Fall Risk Score: 10    Activity level? Up with assistance    Any scheduled procedures? N/a     Any safety concerns? Respiratory status    Other: n/a

## 2023-08-05 NOTE — SUBJECTIVE & OBJECTIVE
Interval History: NAEON. CT with 160 output. No air leak. Pain control improved. Episodes of hypotension. Oxygen requirement at 2L    Medications:  Continuous Infusions:  Scheduled Meds:   acetaminophen  1,000 mg Oral Q8H    enoxparin  40 mg Subcutaneous Daily    gabapentin  300 mg Oral BID    levalbuterol  0.63 mg Nebulization Q6H WAKE    LIDOcaine  1 patch Transdermal Q24H    methocarbamoL  500 mg Oral TID    mupirocin  1 g Nasal BID    nicotine  1 patch Transdermal Q24H    polyethylene glycol  17 g Oral Daily    senna-docusate 8.6-50 mg  1 tablet Oral BID     PRN Meds:0.9%  NaCl infusion (for blood administration), ceFAZolin (ANCEF) IVPB, metoclopramide HCl, ondansetron, oxyCODONE, oxyCODONE     Review of patient's allergies indicates:   Allergen Reactions    Morphine Other (See Comments)     headaches    Latex, natural rubber Rash and Other (See Comments)     Redness and peeling only with bandaids    Penicillins Nausea And Vomiting and Rash     Objective:     Vital Signs (Most Recent):  Temp: 99.2 °F (37.3 °C) (08/05/23 0415)  Pulse: 100 (08/05/23 0415)  Resp: 17 (08/05/23 0415)  BP: 103/71 (08/05/23 0415)  SpO2: (!) 90 % (08/05/23 0415) Vital Signs (24h Range):  Temp:  [98.1 °F (36.7 °C)-99.2 °F (37.3 °C)] 99.2 °F (37.3 °C)  Pulse:  [] 100  Resp:  [15-20] 17  SpO2:  [90 %-97 %] 90 %  BP: ()/(51-71) 103/71     Intake/Output - Last 3 Shifts         08/03 0700  08/04 0659 08/04 0700  08/05 0659 08/05 0700  08/06 0659    P.O.  342     I.V. (mL/kg) 500      IV Piggyback 2750      Total Intake(mL/kg) 3250 342 (3.5)     Urine (mL/kg/hr) 500 1000 (0.4)     Stool  0     Chest Tube 190 165     Total Output 690 1165     Net +2560 -823            Urine Occurrence 1 x 3 x     Stool Occurrence  0 x             SpO2: (!) 90 %        Physical Exam  Constitutional:       General: She is not in acute distress.     Appearance: Normal appearance.   HENT:      Head: Normocephalic and atraumatic.   Cardiovascular:       Rate and Rhythm: Normal rate and regular rhythm.   Pulmonary:      Effort: Pulmonary effort is normal. No respiratory distress.      Comments: Chest tube in place with serosanguinous output  No air leak  Abdominal:      General: Abdomen is flat. There is no distension.      Palpations: Abdomen is soft.      Tenderness: There is no abdominal tenderness.   Neurological:      General: No focal deficit present.      Mental Status: She is alert and oriented to person, place, and time.   Psychiatric:         Behavior: Behavior normal.         Thought Content: Thought content normal.            Significant Labs:  CBC:   Recent Labs   Lab 08/04/23  0512   WBC 16.44*   RBC 3.99*   HGB 12.3   HCT 38.2      MCV 96   MCH 30.8   MCHC 32.2       CMP:   Recent Labs   Lab 08/04/23  1731   *   CALCIUM 9.5      K 4.1   CO2 23      BUN 13   CREATININE 1.1         Significant Diagnostics:  I have reviewed all pertinent imaging results/findings within the past 24 hours.    VTE Risk Mitigation (From admission, onward)           Ordered     enoxaparin injection 40 mg  Daily         08/03/23 1410     IP VTE HIGH RISK PATIENT  Once         08/03/23 1410     Place sequential compression device  Until discontinued         08/03/23 1410

## 2023-08-05 NOTE — PROGRESS NOTES
[facility]  Thoracic Surgery  Progress Note    Subjective:     History of Present Illness:  No notes on file    Post-Op Info:  Procedure(s) (LRB):  XI ROBOTIC RATS upper lobectomy (Left)  Bronchoscopy (N/A)  LYMPHADENECTOMY (Left)  BLOCK, NERVE, INTERCOSTAL, 2 OR MORE (N/A)   2 Days Post-Op     Interval History: NAEON. CT with 160 output. No air leak. Pain control improved. Episodes of hypotension. Oxygen requirement at 2L    Medications:  Continuous Infusions:  Scheduled Meds:   acetaminophen  1,000 mg Oral Q8H    enoxparin  40 mg Subcutaneous Daily    gabapentin  300 mg Oral BID    levalbuterol  0.63 mg Nebulization Q6H WAKE    LIDOcaine  1 patch Transdermal Q24H    methocarbamoL  500 mg Oral TID    mupirocin  1 g Nasal BID    nicotine  1 patch Transdermal Q24H    polyethylene glycol  17 g Oral Daily    senna-docusate 8.6-50 mg  1 tablet Oral BID     PRN Meds:0.9%  NaCl infusion (for blood administration), ceFAZolin (ANCEF) IVPB, metoclopramide HCl, ondansetron, oxyCODONE, oxyCODONE     Review of patient's allergies indicates:   Allergen Reactions    Morphine Other (See Comments)     headaches    Latex, natural rubber Rash and Other (See Comments)     Redness and peeling only with bandaids    Penicillins Nausea And Vomiting and Rash     Objective:     Vital Signs (Most Recent):  Temp: 99.2 °F (37.3 °C) (08/05/23 0415)  Pulse: 100 (08/05/23 0415)  Resp: 17 (08/05/23 0415)  BP: 103/71 (08/05/23 0415)  SpO2: (!) 90 % (08/05/23 0415) Vital Signs (24h Range):  Temp:  [98.1 °F (36.7 °C)-99.2 °F (37.3 °C)] 99.2 °F (37.3 °C)  Pulse:  [] 100  Resp:  [15-20] 17  SpO2:  [90 %-97 %] 90 %  BP: ()/(51-71) 103/71     Intake/Output - Last 3 Shifts         08/03 0700  08/04 0659 08/04 0700  08/05 0659 08/05 0700 08/06 0659    P.O.  342     I.V. (mL/kg) 500      IV Piggyback 2750      Total Intake(mL/kg) 3250 342 (3.5)     Urine (mL/kg/hr) 500 1000 (0.4)     Stool  0     Chest Tube 190 165     Total  Output 690 1165     Net +2560 -823            Urine Occurrence 1 x 3 x     Stool Occurrence  0 x             SpO2: (!) 90 %       Physical Exam  Constitutional:       General: She is not in acute distress.     Appearance: Normal appearance.   HENT:      Head: Normocephalic and atraumatic.   Cardiovascular:      Rate and Rhythm: Normal rate and regular rhythm.   Pulmonary:      Effort: Pulmonary effort is normal. No respiratory distress.      Comments: Chest tube in place with serosanguinous output  No air leak  Abdominal:      General: Abdomen is flat. There is no distension.      Palpations: Abdomen is soft.      Tenderness: There is no abdominal tenderness.   Neurological:      General: No focal deficit present.      Mental Status: She is alert and oriented to person, place, and time.   Psychiatric:         Behavior: Behavior normal.         Thought Content: Thought content normal.            Significant Labs:  CBC:   Recent Labs   Lab 08/04/23  0512   WBC 16.44*   RBC 3.99*   HGB 12.3   HCT 38.2      MCV 96   MCH 30.8   MCHC 32.2       CMP:   Recent Labs   Lab 08/04/23  1731   *   CALCIUM 9.5      K 4.1   CO2 23      BUN 13   CREATININE 1.1         Significant Diagnostics:  I have reviewed all pertinent imaging results/findings within the past 24 hours.    VTE Risk Mitigation (From admission, onward)           Ordered     enoxaparin injection 40 mg  Daily         08/03/23 1410     IP VTE HIGH RISK PATIENT  Once         08/03/23 1410     Place sequential compression device  Until discontinued         08/03/23 1410                  Assessment/Plan:     * Adenocarcinoma, lung, left  Radha Ervin is a 63 y.o. lady with left upper lobe s/p left upper lobectomy on 8/3/23    - CXR pending  - CT clamped, can likely pull today  - DVT ppx  - multimodal pain control  - up and out of bed today, walk in the halls  - albumin/lasix   - IS, currently pulling 1L  - sats>88%        Robb Ann,  MD  Thoracic Surgery  [facility]

## 2023-08-05 NOTE — PLAN OF CARE
[facility] Plan of Care Note  Dx Lung CA    Shift Events Chest tube removed. Anbulated in [place]     Goals of Care: ambulate. Pain mgmt.     Neuro: Aox4    Vital Signs: Hypotensive but outerwise WDL    Respiratory: O2 at 1L NC    Diet: add Reg    Is patient tolerating current diet? yes    GTTS: na    Urine Output/Bowel Movement: 0    Drains/Tubes/Tube Feeds (include total output/shift): na    Lines: 20 R thumb, 20 RFA      Accuchecks:na    Skin: L flank inceision    Fall Risk Score: see flowsheet    Activity level? see flowshee    Any scheduled procedures? na    Any safety concerns? fall    Other: na

## 2023-08-05 NOTE — NURSING
[facility] Plan of Care Note  Dx Adenocarcinoma of lung    Shift Events O2 weaned to 1L, chest tube to water seal, BP low s/p Lasix (MD notified)    Goals of Care: Pain control, ambulation, chest tube    Neuro: AO4    Vital Signs: BP low, see flow sheet    Respiratory: 1L    Diet: Regular    Is patient tolerating current diet? Nauseous this AM, vomited x 1, tolerated fine since lunch    GTTS: N/A    Urine Output/Bowel Movement: Incontinent of urine, LMB 8/2, + BS    Drains/Tubes/Tube Feeds (include total output/shift): L chest tube to water seal, 75 mL out for shift    Lines: 20 R hand, 20 R fore      Accuchecks:n/a    Skin: 2x lap sites posterior chest     Fall Risk Score: 10    Activity level? 1 assist    Any scheduled procedures? N/A    Any safety concerns? Decreased BP, risk for fall.     Other: MD paged for low BP, awaiting response at time of this note. Oncoming shift RN made aware of concern and added to page to MD

## 2023-08-06 VITALS
OXYGEN SATURATION: 92 % | BODY MASS INDEX: 43.02 KG/M2 | SYSTOLIC BLOOD PRESSURE: 117 MMHG | HEIGHT: 59 IN | RESPIRATION RATE: 18 BRPM | TEMPERATURE: 98 F | DIASTOLIC BLOOD PRESSURE: 74 MMHG | WEIGHT: 213.38 LBS | HEART RATE: 102 BPM

## 2023-08-06 LAB
ANION GAP SERPL CALC-SCNC: 12 MMOL/L (ref 8–16)
BLD PROD TYP BPU: NORMAL
BLD PROD TYP BPU: NORMAL
BLOOD UNIT EXPIRATION DATE: NORMAL
BLOOD UNIT EXPIRATION DATE: NORMAL
BLOOD UNIT TYPE CODE: 6200
BLOOD UNIT TYPE CODE: 6200
BLOOD UNIT TYPE: NORMAL
BLOOD UNIT TYPE: NORMAL
BUN SERPL-MCNC: 14 MG/DL (ref 8–23)
CALCIUM SERPL-MCNC: 9.4 MG/DL (ref 8.7–10.5)
CHLORIDE SERPL-SCNC: 102 MMOL/L (ref 95–110)
CO2 SERPL-SCNC: 25 MMOL/L (ref 23–29)
CODING SYSTEM: NORMAL
CODING SYSTEM: NORMAL
CREAT SERPL-MCNC: 1 MG/DL (ref 0.5–1.4)
CROSSMATCH INTERPRETATION: NORMAL
CROSSMATCH INTERPRETATION: NORMAL
DISPENSE STATUS: NORMAL
DISPENSE STATUS: NORMAL
EST. GFR  (NO RACE VARIABLE): >60 ML/MIN/1.73 M^2
GLUCOSE SERPL-MCNC: 91 MG/DL (ref 70–110)
NUM UNITS TRANS PACKED RBC: NORMAL
NUM UNITS TRANS PACKED RBC: NORMAL
POTASSIUM SERPL-SCNC: 3.7 MMOL/L (ref 3.5–5.1)
SODIUM SERPL-SCNC: 139 MMOL/L (ref 136–145)

## 2023-08-06 PROCEDURE — 99024 POSTOP FOLLOW-UP VISIT: CPT | Mod: ,,, | Performed by: STUDENT IN AN ORGANIZED HEALTH CARE EDUCATION/TRAINING PROGRAM

## 2023-08-06 PROCEDURE — 27000221 HC OXYGEN, UP TO 24 HOURS

## 2023-08-06 PROCEDURE — 99900031 HC PATIENT EDUCATION (STAT)

## 2023-08-06 PROCEDURE — 25000242 PHARM REV CODE 250 ALT 637 W/ HCPCS: Performed by: STUDENT IN AN ORGANIZED HEALTH CARE EDUCATION/TRAINING PROGRAM

## 2023-08-06 PROCEDURE — 94640 AIRWAY INHALATION TREATMENT: CPT

## 2023-08-06 PROCEDURE — 25000003 PHARM REV CODE 250: Performed by: STUDENT IN AN ORGANIZED HEALTH CARE EDUCATION/TRAINING PROGRAM

## 2023-08-06 PROCEDURE — 80048 BASIC METABOLIC PNL TOTAL CA: CPT | Performed by: STUDENT IN AN ORGANIZED HEALTH CARE EDUCATION/TRAINING PROGRAM

## 2023-08-06 PROCEDURE — 36415 COLL VENOUS BLD VENIPUNCTURE: CPT | Performed by: STUDENT IN AN ORGANIZED HEALTH CARE EDUCATION/TRAINING PROGRAM

## 2023-08-06 PROCEDURE — 99900035 HC TECH TIME PER 15 MIN (STAT)

## 2023-08-06 PROCEDURE — 99024 PR POST-OP FOLLOW-UP VISIT: ICD-10-PCS | Mod: ,,, | Performed by: STUDENT IN AN ORGANIZED HEALTH CARE EDUCATION/TRAINING PROGRAM

## 2023-08-06 PROCEDURE — 94761 N-INVAS EAR/PLS OXIMETRY MLT: CPT

## 2023-08-06 PROCEDURE — 63600175 PHARM REV CODE 636 W HCPCS: Performed by: STUDENT IN AN ORGANIZED HEALTH CARE EDUCATION/TRAINING PROGRAM

## 2023-08-06 RX ORDER — ACETAMINOPHEN 500 MG
1000 TABLET ORAL EVERY 8 HOURS
Refills: 0 | COMMUNITY
Start: 2023-08-06 | End: 2024-03-06

## 2023-08-06 RX ORDER — LIDOCAINE 50 MG/G
1 PATCH TOPICAL DAILY
Qty: 10 PATCH | Refills: 0 | Status: SHIPPED | OUTPATIENT
Start: 2023-08-06 | End: 2023-08-28

## 2023-08-06 RX ORDER — FUROSEMIDE 10 MG/ML
20 INJECTION INTRAMUSCULAR; INTRAVENOUS ONCE
Status: DISCONTINUED | OUTPATIENT
Start: 2023-08-06 | End: 2023-08-06

## 2023-08-06 RX ORDER — OXYCODONE HCL 5 MG/5 ML
2.5 SOLUTION, ORAL ORAL EVERY 4 HOURS PRN
Qty: 100 ML | Refills: 0 | Status: SHIPPED | OUTPATIENT
Start: 2023-08-06 | End: 2023-08-13

## 2023-08-06 RX ORDER — ONDANSETRON 8 MG/1
8 TABLET, ORALLY DISINTEGRATING ORAL EVERY 8 HOURS PRN
Qty: 30 TABLET | Refills: 0 | Status: SHIPPED | OUTPATIENT
Start: 2023-08-06 | End: 2023-08-28

## 2023-08-06 RX ORDER — SPIRONOLACTONE 50 MG/1
TABLET, FILM COATED ORAL
Qty: 90 TABLET | Refills: 1 | OUTPATIENT
Start: 2023-08-06 | End: 2023-11-20 | Stop reason: SDUPTHER

## 2023-08-06 RX ORDER — FUROSEMIDE 10 MG/ML
10 INJECTION INTRAMUSCULAR; INTRAVENOUS ONCE
Status: COMPLETED | OUTPATIENT
Start: 2023-08-06 | End: 2023-08-06

## 2023-08-06 RX ORDER — METHOCARBAMOL 500 MG/1
500 TABLET, FILM COATED ORAL 2 TIMES DAILY
Qty: 20 TABLET | Refills: 0 | Status: SHIPPED | OUTPATIENT
Start: 2023-08-06 | End: 2023-08-16

## 2023-08-06 RX ADMIN — ACETAMINOPHEN 1000 MG: 500 TABLET ORAL at 05:08

## 2023-08-06 RX ADMIN — LIDOCAINE 1 PATCH: 50 PATCH CUTANEOUS at 02:08

## 2023-08-06 RX ADMIN — SENNOSIDES AND DOCUSATE SODIUM 1 TABLET: 50; 8.6 TABLET ORAL at 08:08

## 2023-08-06 RX ADMIN — FUROSEMIDE 10 MG: 10 INJECTION, SOLUTION INTRAMUSCULAR; INTRAVENOUS at 08:08

## 2023-08-06 RX ADMIN — POLYETHYLENE GLYCOL 3350 17 G: 17 POWDER, FOR SOLUTION ORAL at 08:08

## 2023-08-06 RX ADMIN — ACETAMINOPHEN 1000 MG: 500 TABLET ORAL at 02:08

## 2023-08-06 RX ADMIN — MUPIROCIN 1 G: 20 OINTMENT TOPICAL at 08:08

## 2023-08-06 RX ADMIN — LEVALBUTEROL HYDROCHLORIDE 0.63 MG: 0.63 SOLUTION RESPIRATORY (INHALATION) at 02:08

## 2023-08-06 RX ADMIN — METHOCARBAMOL 500 MG: 500 TABLET ORAL at 08:08

## 2023-08-06 NOTE — PROGRESS NOTES
[facility]  Thoracic Surgery  Progress Note    Subjective:     History of Present Illness:  No notes on file    Post-Op Info:  Procedure(s) (LRB):  XI ROBOTIC RATS upper lobectomy (Left)  Bronchoscopy (N/A)  LYMPHADENECTOMY (Left)  BLOCK, NERVE, INTERCOSTAL, 2 OR MORE (N/A)   3 Days Post-Op     Interval History: Diuresis yesterday, labs improved. Remains on 1L NC. Pending CXR this morning. Mental status much improved. CT removed yesterday. Pulling >1L on IS    Medications:  Continuous Infusions:  Scheduled Meds:   acetaminophen  1,000 mg Oral Q8H    enoxparin  40 mg Subcutaneous Daily    furosemide (LASIX) injection  10 mg Intravenous Once    levalbuterol  0.63 mg Nebulization Q6H WAKE    LIDOcaine  1 patch Transdermal Q24H    methocarbamoL  500 mg Oral BID    mupirocin  1 g Nasal BID    nicotine  1 patch Transdermal Q24H    polyethylene glycol  17 g Oral Daily    senna-docusate 8.6-50 mg  1 tablet Oral BID     PRN Meds:0.9%  NaCl infusion (for blood administration), ceFAZolin (ANCEF) IVPB, dextrose 10%, dextrose 10%, glucagon (human recombinant), glucose, glucose, insulin aspart U-100, metoclopramide HCl, ondansetron, oxyCODONE     Review of patient's allergies indicates:   Allergen Reactions    Morphine Other (See Comments)     headaches    Latex, natural rubber Rash and Other (See Comments)     Redness and peeling only with bandaids    Penicillins Nausea And Vomiting and Rash     Objective:     Vital Signs (Most Recent):  Temp: 98.1 °F (36.7 °C) (08/06/23 0629)  Pulse: 94 (08/06/23 0629)  Resp: 18 (08/06/23 0629)  BP: 111/70 (08/06/23 0629)  SpO2: (!) 92 % (08/06/23 0629) Vital Signs (24h Range):  Temp:  [98.1 °F (36.7 °C)-98.4 °F (36.9 °C)] 98.1 °F (36.7 °C)  Pulse:  [] 94  Resp:  [16-18] 18  SpO2:  [88 %-97 %] 92 %  BP: ()/(59-75) 111/70     Intake/Output - Last 3 Shifts         08/04 0700  08/05 0659 08/05 0700  08/06 0659    P.O. 342     Total Intake(mL/kg) 342 (3.5)     Urine  "(mL/kg/hr) 1000 (0.4) 3375 (1.5)    Stool 0 0    Chest Tube 165     Total Output 1165 3375    Net -823 -3375          Urine Occurrence 3 x     Stool Occurrence 0 x 0 x            SpO2: (!) 92 %       Physical Exam  Constitutional:       General: She is not in acute distress.     Appearance: Normal appearance.   HENT:      Head: Normocephalic and atraumatic.   Cardiovascular:      Rate and Rhythm: Normal rate and regular rhythm.   Pulmonary:      Effort: Pulmonary effort is normal. No respiratory distress.      Comments: Incisions c/d/i  Abdominal:      General: Abdomen is flat. There is no distension.      Palpations: Abdomen is soft.      Tenderness: There is no abdominal tenderness.   Neurological:      General: No focal deficit present.      Mental Status: She is alert and oriented to person, place, and time.   Psychiatric:         Behavior: Behavior normal.         Thought Content: Thought content normal.            Significant Labs:  CBC:   No results for input(s): "WBC", "RBC", "HGB", "HCT", "PLT", "MCV", "MCH", "MCHC" in the last 48 hours.    CMP:   Recent Labs   Lab 08/06/23  0430   GLU 91   CALCIUM 9.4      K 3.7   CO2 25      BUN 14   CREATININE 1.0         Significant Diagnostics:  I have reviewed all pertinent imaging results/findings within the past 24 hours.    VTE Risk Mitigation (From admission, onward)           Ordered     enoxaparin injection 40 mg  Daily         08/03/23 1410     IP VTE HIGH RISK PATIENT  Once         08/03/23 1410     Place sequential compression device  Until discontinued         08/03/23 1410                  Assessment/Plan:     * Adenocarcinoma, lung, left  Radha Ervin is a 63 y.o. lady with left upper lobe s/p left upper lobectomy on 8/3/23    - CXR pending  - CT removed 8/5  - DVT ppx  - multimodal pain control  - up and out of bed in the AM, walk the halls  - lasix 10 this morning  - IS, currently pulling >1L  - sats>88%, wean oxygen as able  - if walks " down halls this morning and able to wean off of oxygen around noon, ok to dc.         Robb Ann MD  Thoracic Surgery  [facility]

## 2023-08-06 NOTE — PLAN OF CARE
In basket message sent to MD (Cardiothoracic Surgeon) office for seven day hospital follow up deu to weekend closure. Family to provide transportation home.   08/06/23 1431   Final Note   Assessment Type Final Discharge Note   Anticipated Discharge Disposition Home   Hospital Resources/Appts/Education Provided Provided patient/caregiver with written discharge plan information;Appointments scheduled and added to AVS   Post-Acute Status   Discharge Delays None known at this time     [facility]  Discharge Final Note    Primary Care Provider: Suraj Herrera III, MD    Expected Discharge Date: 8/6/2023    Final Discharge Note (most recent)       Final Note - 08/06/23 1431          Final Note    Assessment Type Final Discharge Note (P)      Anticipated Discharge Disposition Home or Self Care (P)      Hospital Resources/Appts/Education Provided Provided patient/caregiver with written discharge plan information;Appointments scheduled and added to AVS (P)         Post-Acute Status    Discharge Delays None known at this time (P)                      Important Message from Medicare             Contact Info       Saeed Gould MD   Specialty: Cardiothoracic Surgery    [address]  [place] [address]   Phone: [phone]       Next Steps: Follow up in 2 week(s)

## 2023-08-06 NOTE — DISCHARGE SUMMARY
[facility]  Thoracic Surgery  Discharge Summary    Patient Name: Radha Ervin  MRN: [id number]  Admission Date: 8/3/2023  Hospital Length of Stay: 3 days  Discharge Date and Time:  08/06/2023 2:24 PM  Attending Physician: Saeed Gould MD   Discharging Provider: Vahe Rivero MD  Primary Care Provider: Suraj Herrera III, MD    HPI:   No notes on file    Procedure(s) (LRB):  XI ROBOTIC RATS upper lobectomy (Left)  Bronchoscopy (N/A)  LYMPHADENECTOMY (Left)  BLOCK, NERVE, INTERCOSTAL, 2 OR MORE (N/A)      Hospital Course: Please see the preoperative H&P and other available documentation for full details related to history prior to this admission.  Briefly, Radha Ervin is a 63 y.o. female who was admitted following scheduled elective surgery for Adenocarcinoma, lung, left. They underwent the following procedures: robotic left upper lobectomy    Following a complete preoperative discussion of the risks and benefits of surgery with signed informed consent, the patient was taken to the operating room on 8/3/2023 and underwent the above stated procedures. The patient tolerated surgery well and there were no complications. Please see the operative report for full intraoperative findings and details. Postoperatively, the patient did well and was transferred from the PACU to the floor in stable condition. They were sensitive to pain medications so we decreased them over the hospital course. She additionally required diuresis and developed an KESHA at one point that resolved quickly with fluid management. Labs and vital signs remained stable and appropriate throughout course. Diet was advanced as tolerated and the patient's pain was controlled on oral pain medications without problem. Ambulating without issue. Voiding without issue with adequate urine output. Passing gas and stool. Incision site is clean, dry, and intact.    Currently, the patient is doing well at 3 Days Post-Op and is stable and appropriate  for discharge home at this time. Patient will follow up in clinic with Dr. Gould in 2 weeks.        Goals of Care Treatment Preferences:  Code Status: Full Code          Significant Diagnostic Studies: N/A    Pending Diagnostic Studies:     Procedure Component Value Units Date/Time    Basic metabolic panel [[id number]] Collected: 08/06/23 0327    Order Status: Sent Lab Status: In process Updated: 08/06/23 0327    Specimen: Blood     Specimen to Pathology, Surgery Pulmonary and Thoracic [[id number]] Collected: 08/03/23 1420    Order Status: Sent Lab Status: In process Updated: 08/03/23 1739    Specimen: Tissue     X-Ray Chest AP Portable [[id number]]     Order Status: Sent Lab Status: No result         Final Active Diagnoses:    Diagnosis Date Noted POA    PRINCIPAL PROBLEM:  Adenocarcinoma, lung, left [C34.92] 07/13/2023 Yes      Problems Resolved During this Admission:      Discharged Condition: good    Disposition:     Follow Up:   Follow-up Information     Saeed Gould MD Follow up in 2 week(s).    Specialty: Cardiothoracic Surgery  Contact information:  [address] [place] [id number]  [phone]                       Patient Instructions:      Diet Adult Regular     Lifting restrictions     No driving until:   Order Comments: No driving until off narcotics and able to move arms freely     Notify your health care provider if you experience any of the following:  temperature >100.4     Notify your health care provider if you experience any of the following:  persistent nausea and vomiting or diarrhea     Notify your health care provider if you experience any of the following:  severe uncontrolled pain     Notify your health care provider if you experience any of the following:  redness, tenderness, or signs of infection (pain, swelling, redness, odor or green/yellow discharge around incision site)     Notify your health care provider if you experience any of the following:  difficulty breathing or  increased cough     Notify your health care provider if you experience any of the following:  severe persistent headache     Notify your health care provider if you experience any of the following:  worsening rash     Notify your health care provider if you experience any of the following:  persistent dizziness, light-headedness, or visual disturbances     Notify your health care provider if you experience any of the following:  increased confusion or weakness     Reason for not Ordering Smoking Cessation Referral     Order Specific Question Answer Comments   Reason for not ordering: Not medically appropriate at this time      Medications:  Reconciled Home Medications:      Medication List      START taking these medications    acetaminophen 500 MG tablet  Commonly known as: TYLENOL  Take 2 tablets (1,000 mg total) by mouth every 8 (eight) hours.     LIDOcaine 5 %  Commonly known as: LIDODERM  Place 1 patch onto the skin once daily. Remove & Discard patch within 12 hours or as directed by MD     methocarbamoL 500 MG Tab  Commonly known as: ROBAXIN  Take 1 tablet (500 mg total) by mouth 2 (two) times a day. for 10 days     ondansetron 8 MG Tbdl  Commonly known as: ZOFRAN-ODT  Dissolve 1 tablet (8 mg total) by mouth every 8 (eight) hours as needed.     oxyCODONE 5 mg/5 mL Soln  Commonly known as: ROXICODONE  Take 2.5 mLs (2.5 mg total) by mouth every 4 (four) hours as needed.        CHANGE how you take these medications    spironolactone 50 MG tablet  Commonly known as: ALDACTONE  Take 1 po qday  Hold if blood pressure is low  What changed: additional instructions        CONTINUE taking these medications    betamethasone dipropionate 0.05 % ointment  Commonly known as: DIPROLENE  Apply topically 2 (two) times daily. Prn psoriasis flares     clindamycin phosphate 1% 1 % gel  Commonly known as: CLINDAGEL  AAA every day     LORazepam 0.5 MG tablet  Commonly known as: ATIVAN  Take 1 tablet (0.5 mg total) by mouth every 6  (six) hours as needed for Anxiety.     nicotine 21 mg/24 hr  Commonly known as: NICODERM CQ  Place 1 patch onto the skin every 24 hours.     OTEZLA 30 mg Tab  Generic drug: apremilast  TAKE 1 TABLET BY MOUTH  TWICE DAILY        ASK your doctor about these medications    meloxicam 15 MG tablet  Commonly known as: MOBIC  Take 1 tablet (15 mg total) by mouth once daily.            Vahe Rivero MD  Thoracic Surgery  [facility]

## 2023-08-06 NOTE — SUBJECTIVE & OBJECTIVE
Interval History: Diuresis yesterday, labs improved. Remains on 1L NC. Pending CXR this morning. Mental status much improved. CT removed yesterday. Pulling >1L on IS    Medications:  Continuous Infusions:  Scheduled Meds:   acetaminophen  1,000 mg Oral Q8H    enoxparin  40 mg Subcutaneous Daily    levalbuterol  0.63 mg Nebulization Q6H WAKE    LIDOcaine  1 patch Transdermal Q24H    methocarbamoL  500 mg Oral BID    mupirocin  1 g Nasal BID    nicotine  1 patch Transdermal Q24H    polyethylene glycol  17 g Oral Daily    senna-docusate 8.6-50 mg  1 tablet Oral BID     PRN Meds:0.9%  NaCl infusion (for blood administration), ceFAZolin (ANCEF) IVPB, dextrose 10%, dextrose 10%, glucagon (human recombinant), glucose, glucose, insulin aspart U-100, metoclopramide HCl, ondansetron, oxyCODONE     Review of patient's allergies indicates:   Allergen Reactions    Morphine Other (See Comments)     headaches    Latex, natural rubber Rash and Other (See Comments)     Redness and peeling only with bandaids    Penicillins Nausea And Vomiting and Rash     Objective:     Vital Signs (Most Recent):  Temp: 98.1 °F (36.7 °C) (08/06/23 0629)  Pulse: 94 (08/06/23 0629)  Resp: 18 (08/06/23 0629)  BP: 111/70 (08/06/23 0629)  SpO2: (!) 92 % (08/06/23 0629) Vital Signs (24h Range):  Temp:  [98.1 °F (36.7 °C)-98.4 °F (36.9 °C)] 98.1 °F (36.7 °C)  Pulse:  [] 94  Resp:  [16-18] 18  SpO2:  [88 %-97 %] 92 %  BP: ()/(59-75) 111/70     Intake/Output - Last 3 Shifts         08/04 0700  08/05 0659 08/05 0700  08/06 0659    P.O. 342     Total Intake(mL/kg) 342 (3.5)     Urine (mL/kg/hr) 1000 (0.4) 3375 (1.5)    Stool 0 0    Chest Tube 165     Total Output 1165 3375    Net -823 -3375          Urine Occurrence 3 x     Stool Occurrence 0 x 0 x            SpO2: (!) 92 %        Physical Exam  Constitutional:       General: She is not in acute distress.     Appearance: Normal appearance.   HENT:      Head: Normocephalic and atraumatic.  "  Cardiovascular:      Rate and Rhythm: Normal rate and regular rhythm.   Pulmonary:      Effort: Pulmonary effort is normal. No respiratory distress.      Comments: Incisions c/d/i  Abdominal:      General: Abdomen is flat. There is no distension.      Palpations: Abdomen is soft.      Tenderness: There is no abdominal tenderness.   Neurological:      General: No focal deficit present.      Mental Status: She is alert and oriented to person, place, and time.   Psychiatric:         Behavior: Behavior normal.         Thought Content: Thought content normal.            Significant Labs:  CBC:   No results for input(s): "WBC", "RBC", "HGB", "HCT", "PLT", "MCV", "MCH", "MCHC" in the last 48 hours.    CMP:   Recent Labs   Lab 08/06/23  0430   GLU 91   CALCIUM 9.4      K 3.7   CO2 25      BUN 14   CREATININE 1.0         Significant Diagnostics:  I have reviewed all pertinent imaging results/findings within the past 24 hours.    VTE Risk Mitigation (From admission, onward)           Ordered     enoxaparin injection 40 mg  Daily         08/03/23 1410     IP VTE HIGH RISK PATIENT  Once         08/03/23 1410     Place sequential compression device  Until discontinued         08/03/23 1410                  "

## 2023-08-06 NOTE — NURSING
[facility] Plan of Care Note  Dx left lung adenocarcinoma    Shift Events n/a    Goals of Care: wean off oxygeb    Neuro: AAOx4    Vital Signs: stable    Respiratory: oxygen on 2L    Diet: regular    Is patient tolerating current diet? yes    GTTS: n/a    Urine Output/Bowel Movement:     Drains/Tubes/Tube Feeds (include total output/shift): n/a    Lines: peripheral      Accuchecks:n/a    Skin: incision    Fall Risk Score: 10    Activity level? ambulating    Any scheduled procedures? N/a    Any safety concerns? N/a    Other: n/a

## 2023-08-06 NOTE — HOSPITAL COURSE
Please see the preoperative H&P and other available documentation for full details related to history prior to this admission.  Briefly, Radha Ervin is a 63 y.o. female who was admitted following scheduled elective surgery for Adenocarcinoma, lung, left. They underwent the following procedures: robotic left upper lobectomy    Following a complete preoperative discussion of the risks and benefits of surgery with signed informed consent, the patient was taken to the operating room on 8/3/2023 and underwent the above stated procedures. The patient tolerated surgery well and there were no complications. Please see the operative report for full intraoperative findings and details. Postoperatively, the patient did well and was transferred from the PACU to the floor in stable condition. They were sensitive to pain medications so we decreased them over the hospital course. She additionally required diuresis and developed an KESHA at one point that resolved quickly with fluid management. Labs and vital signs remained stable and appropriate throughout course. Diet was advanced as tolerated and the patient's pain was controlled on oral pain medications without problem. Ambulating without issue. Voiding without issue with adequate urine output. Passing gas and stool. Incision site is clean, dry, and intact.    Currently, the patient is doing well at 3 Days Post-Op and is stable and appropriate for discharge home at this time. Patient will follow up in clinic with Dr. Gould in 2 weeks.

## 2023-08-06 NOTE — ASSESSMENT & PLAN NOTE
Radha Ervin is a 63 y.o. lady with left upper lobe s/p left upper lobectomy on 8/3/23    - CXR pending  - CT removed 8/5  - DVT ppx  - multimodal pain control  - up and out of bed in the AM, walk the halls  - lasix 10 this morning  - IS, currently pulling >1L  - sats>88%, wean oxygen as able  - if walks down halls this morning and able to wean off of oxygen around noon, ok to dc.

## 2023-08-07 DIAGNOSIS — C34.90 MALIGNANT NEOPLASM OF UNSPECIFIED PART OF UNSPECIFIED BRONCHUS OR LUNG: Primary | ICD-10-CM

## 2023-08-08 NOTE — ANESTHESIA POSTPROCEDURE EVALUATION
Anesthesia Post Evaluation    Patient: Radha Ervin    Procedure(s) Performed: Procedure(s) (LRB):  XI ROBOTIC RATS upper lobectomy (Left)  Bronchoscopy (N/A)  LYMPHADENECTOMY (Left)  BLOCK, NERVE, INTERCOSTAL, 2 OR MORE (N/A)    Final Anesthesia Type: general      Patient location during evaluation: PACU  Patient participation: Yes- Able to Participate  Level of consciousness: awake and alert  Post-procedure vital signs: reviewed and stable  Pain management: adequate  Airway patency: patent    PONV status at discharge: No PONV  Anesthetic complications: no      Cardiovascular status: blood pressure returned to baseline  Respiratory status: unassisted  Hydration status: euvolemic  Follow-up not needed.          Vitals Value Taken Time   /74 08/06/23 1345   Temp 36.6 °C (97.9 °F) 08/06/23 1304   Pulse 102 08/06/23 1415   Resp 18 08/06/23 1415   SpO2 92 % 08/06/23 1416         Event Time   Out of Recovery 16:30:00         Pain/Derick Score: No data recorded

## 2023-08-08 NOTE — OP NOTE
Date of Surgery 8/3/2023  Diagnosis:  Left upper lobe non-small cell lung cancer  Procedure:  Robotic left upper lobectomy with mediastinal lymph node dissection    I was called into the room to provide assistance as a co-surgeon to Dr. Gould.  There was bleeding noted at the stump of a left upper lobe pulmonary arterial segmental branch.  At the time that I entered the room, the bleeding was under control with direct pressure.  The patient was hemodynamically stable.  Pressure was held several minutes until the team was set up to convert to thoracotomy if needed, adequate IV access was confirmed, and blood products were immediately available.  I took over the robotic controls and released pressure.  There was no evidence of active bleeding from the arterial stump.  I performed an additional hilar dissection and mobilized and apical arterial branch as well as the the truncus anterior.  The vessels were divided separately using a robotic vascular load.  I then excused myself from the operating room and Dr. Dacosta performed the remainder of the case.

## 2023-08-11 NOTE — TELEPHONE ENCOUNTER
Specialty Pharmacy - Refill Coordination    Specialty Medication Orders Linked to Encounter      Flowsheet Row Most Recent Value   Medication #1 OTEZLA 30 mg Tab (Order#[id number], Rx#[id number])            Refill Questions - Documented Responses      Flowsheet Row Most Recent Value   Patient Availability and HIPAA Verification    Does patient want to proceed with activity? Yes   HIPAA/medical authority confirmed? Yes   Relationship to patient of person spoken to? Self   Refill Screening Questions    Changes to allergies? No   Changes to medications? Yes  [robaxin]   New conditions since last clinic visit? No   Unplanned office visit, urgent care, ED, or hospital admission in the last 4 weeks? No  [just completed lung SX]   How does patient/caregiver feel medication is working? Good   Financial problems or insurance changes? No   How many doses of your specialty medications were missed in the last 4 weeks? 0   Would patient like to speak to a pharmacist? No   When does the patient need to receive the medication? 08/16/23   Refill Delivery Questions    How will the patient receive the medication? [facility]   When does the patient need to receive the medication? 08/16/23   Shipping Address Temporary   Address in [facility] confirmed and updated if neccessary? No   Expected Copay ($) 0   Is the patient able to afford the medication copay? Yes   Payment Method zero copay   Days supply of Refill 30   Supplies needed? No supplies needed   Refill activity completed? Yes   Refill activity plan Refill scheduled   Shipment/Pickup Date: 08/10/23            Current Outpatient Medications   Medication Sig    acetaminophen (TYLENOL) 500 MG tablet Take 2 tablets (1,000 mg total) by mouth every 8 (eight) hours.    betamethasone dipropionate (DIPROLENE) 0.05 % ointment Apply topically 2 (two) times daily. Prn psoriasis flares    clindamycin phosphate 1% (CLINDAGEL) 1 % gel AAA every day    LIDOcaine (LIDODERM) 5 % Place 1 patch  onto the skin once daily. Remove & Discard patch within 12 hours or as directed by MD    LORazepam (ATIVAN) 0.5 MG tablet Take 1 tablet (0.5 mg total) by mouth every 6 (six) hours as needed for Anxiety.    meloxicam (MOBIC) 15 MG tablet Take 1 tablet (15 mg total) by mouth once daily. (Patient not taking: Reported on 8/1/2023)    methocarbamoL (ROBAXIN) 500 MG Tab Take 1 tablet (500 mg total) by mouth 2 (two) times a day. for 10 days    nicotine (NICODERM CQ) 21 mg/24 hr Place 1 patch onto the skin every 24 hours.    ondansetron (ZOFRAN-ODT) 8 MG TbDL Dissolve 1 tablet (8 mg total) by mouth every 8 (eight) hours as needed.    OTEZLA 30 mg Tab TAKE 1 TABLET BY MOUTH  TWICE DAILY    oxyCODONE (ROXICODONE) 5 mg/5 mL Soln Take 2.5 mLs (2.5 mg total) by mouth every 4 (four) hours as needed.    spironolactone (ALDACTONE) 50 MG tablet Take 1 po qday  Hold if blood pressure is low   Last reviewed on 8/3/2023  6:38 AM by Yen Keith MD    Review of patient's allergies indicates:   Allergen Reactions    Morphine Other (See Comments)     headaches    Latex, natural rubber Rash and Other (See Comments)     Redness and peeling only with bandaids    Penicillins Nausea And Vomiting and Rash    Last reviewed on  8/3/2023 3:08 PM by Sade Hernandes      Tasks added this encounter   No tasks added.   Tasks due within next 3 months   8/14/2023 - Refill Coordination Outreach (1 time occurrence)  9/2/2023 - Refill Coordination Outreach (1 time occurrence)     Danuta Eugene, PharmD  [facility]  [address] [place] [id number]  Phone: [phone]  Fax: [phone]

## 2023-08-16 LAB
FINAL PATHOLOGIC DIAGNOSIS: NORMAL
GROSS: NORMAL
Lab: NORMAL
MICROSCOPIC EXAM: NORMAL

## 2023-08-17 NOTE — PHYSICIAN QUERY
PT Name: Radha Ervin  MR #: [id number]    DOCUMENTATION CLARIFICATION     CDS/: HALIMA Brito, RN, CCDS               Contact information: [email or web address]  This form is a permanent document in the medical record.     Query Date: August 17, 2023    By submitting this query, we are merely seeking further clarification of documentation.  Please utilize your independent clinical judgment when addressing the question(s) below.    The medical record contains the following:  Pathology Findings Location in Medical Record   Final Pathologic Diagnosis 1. Lymph nodes, left level 9, dissection:   - Two lymph nodes, negative for metastatic carcinoma (0/2)     2. Lymph node, left level 8, dissection:     - One lymph node, negative for metastatic carcinoma (0/1)     3. Lymph nodes, level 7, dissection:     - Three lymph nodes, negative for metastatic carcinoma (0/3)     4. Lymph node, left level 11 #1, dissection:     - One lymph node, negative for metastatic carcinoma (0/1)     5. Lymph node, left level 11 #2, dissection:     - One lymph node, negative for metastatic carcinoma (0/1)     6. Lymph nodes, level 11 #3, dissection:     - Two lymph nodes, 1 positive for metastatic carcinoma (1/2)     7. Lymph node, left level 10, dissection:     - One lymph node, negative for metastatic carcinoma (0/1)     8. Lymph nodes, level 6, dissection:   - Four lymph nodes, 3 positive for metastatic carcinoma (3/4)     9. Lymph nodes, level 5, dissection:     - Three lymph nodes, negative for metastatic carcinoma (0/3)     10. Lymph node, left level 10 #2, dissection:     - One lymph node, negative for metastatic carcinoma (0/1)     11. Lung, left upper lobe, lobectomy:   - Invasive poorly differentiated adenocarcinoma   - Greatest dimension of tumor:  2.1 cm   - Tumor invades visceral pleura (type PL2)   - Vascular resection positive for invasive tumor (present in adventitial soft tissue)   - Lymphovascular invasion present    - 4 lymph nodes, one positive for metastatic carcinoma (1/4)   - Background lung with emphysematous changes and active pleuritis   - See CAP tumor synoptic     CAP Tumor Synoptic:   Synchronous tumors:  Not applicable   Procedure:  Lobectomy   Specimen laterality:  Left   Tumor focality:  Single focus   Tumor site:  Upper lobe of lung   Total tumor size (size of entire tumor):  2.1 cm in greatest dimension   Size of invasive component:  Not applicable (no significant lepidic component present, size of invasive component equal to total tumor size)   Histologic type:  Invasive poorly differentiated adenocarcinoma   Histologic patterns present:  Acinar, solid, complex glands, and micropapillary patterns (combined poorly differentiated patterns represent >50% of tumor)   Histologic grade:  Grade 3, poorly differentiated   Spread through air spaces:  Present   Visceral pleura invasion:  Present (type PL2)   Direct invasion of adjacent structures:  Not applicable (no adjacent structures present)   Treatment effect:  No known pre-surgical therapy   Lymphovascular invasion:  Present (lymphatic, venous, and arterial invasion present)   Margin status for invasive carcinoma:  Invasive carcinoma present at margin   Margin involved by invasive carcinoma:  Vascular   Lymph nodes from prior procedures:  Not included   Regional lymph node status:  Tumor present in regional lymph nodes   Number of lymph nodes with tumor:  5   Luis sites with tumor:  Levels 6, 11L, 12L   Number of lymph nodes examined: 23   Luis sites examined: Levels 5, 6, 7, 8L, 9L, 10L, 11L, 12L   Distant sites involved:  Not applicable   PATHOLOGIC STAGE CLASSIFICATION (pTNM, AJCC 8th Edition)   TNM Descriptors:  Not applicable   pT Category:  pT2a   pN Category:  pN2   pM Category:  Not applicable - pM cannot be determined from the submitted specimens   Special studies:  PD-L1 (clone 22C3) IHC and next generation sequencing through [facility] performed on prior  FNA specimen ([facility]-; collected 6/23/23). Please see separate [facility] report for results.     Tumor Block: 11E     Path report: 8/3       Please clarify the pathology findings.  [x  ] Pathology findings noted above are ruled in/confirmed as diagnoses   [  ] Pathology findings noted above are not confirmed as diagnoses   [  ] Other diagnosis (please specify): ___________   [  ] Clinically Undetermined     Please document in your progress notes daily for the duration of treatment until resolved and include in your discharge summary.    Form No. [id number]

## 2023-08-21 ENCOUNTER — OFFICE VISIT (OUTPATIENT)
Dept: CARDIOTHORACIC SURGERY | Facility: CLINIC | Age: 63
End: 2023-08-21
Payer: COMMERCIAL

## 2023-08-21 ENCOUNTER — HOSPITAL ENCOUNTER (OUTPATIENT)
Dept: RADIOLOGY | Facility: HOSPITAL | Age: 63
Discharge: HOME | End: 2023-08-21
Payer: COMMERCIAL

## 2023-08-21 VITALS
HEIGHT: 59 IN | SYSTOLIC BLOOD PRESSURE: 120 MMHG | OXYGEN SATURATION: 97 % | BODY MASS INDEX: 38.8 KG/M2 | WEIGHT: 192.44 LBS | HEART RATE: 92 BPM | DIASTOLIC BLOOD PRESSURE: 81 MMHG

## 2023-08-21 DIAGNOSIS — C34.92 ADENOCARCINOMA OF LEFT LUNG, STAGE 3: Primary | ICD-10-CM

## 2023-08-21 DIAGNOSIS — C34.90 MALIGNANT NEOPLASM OF UNSPECIFIED PART OF UNSPECIFIED BRONCHUS OR LUNG: ICD-10-CM

## 2023-08-21 DIAGNOSIS — C34.90 MALIGNANT NEOPLASM OF UNSPECIFIED PART OF UNSPECIFIED BRONCHUS OR LUNG: Primary | ICD-10-CM

## 2023-08-21 PROCEDURE — 71046 X-RAY EXAM CHEST 2 VIEWS: CPT | Mod: 26,,, | Performed by: INTERNAL MEDICINE

## 2023-08-21 PROCEDURE — 99999 PR PBB SHADOW E&M-EST. PATIENT-LVL IV: CPT | Mod: PBBFAC,,, | Performed by: STUDENT IN AN ORGANIZED HEALTH CARE EDUCATION/TRAINING PROGRAM

## 2023-08-21 PROCEDURE — 99999 PR PBB SHADOW E&M-EST. PATIENT-LVL IV: ICD-10-PCS | Mod: PBBFAC,,, | Performed by: STUDENT IN AN ORGANIZED HEALTH CARE EDUCATION/TRAINING PROGRAM

## 2023-08-21 PROCEDURE — 3044F PR MOST RECENT HEMOGLOBIN A1C LEVEL <7.0%: ICD-10-PCS | Mod: CPTII,S$GLB,, | Performed by: STUDENT IN AN ORGANIZED HEALTH CARE EDUCATION/TRAINING PROGRAM

## 2023-08-21 PROCEDURE — 3008F BODY MASS INDEX DOCD: CPT | Mod: CPTII,S$GLB,, | Performed by: STUDENT IN AN ORGANIZED HEALTH CARE EDUCATION/TRAINING PROGRAM

## 2023-08-21 PROCEDURE — 71046 XR CHEST PA AND LATERAL: ICD-10-PCS | Mod: 26,,, | Performed by: INTERNAL MEDICINE

## 2023-08-21 PROCEDURE — 99024 PR POST-OP FOLLOW-UP VISIT: ICD-10-PCS | Mod: S$GLB,,, | Performed by: STUDENT IN AN ORGANIZED HEALTH CARE EDUCATION/TRAINING PROGRAM

## 2023-08-21 PROCEDURE — 71046 X-RAY EXAM CHEST 2 VIEWS: CPT | Mod: TC

## 2023-08-21 PROCEDURE — 3079F PR MOST RECENT DIASTOLIC BLOOD PRESSURE 80-89 MM HG: ICD-10-PCS | Mod: CPTII,S$GLB,, | Performed by: STUDENT IN AN ORGANIZED HEALTH CARE EDUCATION/TRAINING PROGRAM

## 2023-08-21 PROCEDURE — 3044F HG A1C LEVEL LT 7.0%: CPT | Mod: CPTII,S$GLB,, | Performed by: STUDENT IN AN ORGANIZED HEALTH CARE EDUCATION/TRAINING PROGRAM

## 2023-08-21 PROCEDURE — 3074F PR MOST RECENT SYSTOLIC BLOOD PRESSURE < 130 MM HG: ICD-10-PCS | Mod: CPTII,S$GLB,, | Performed by: STUDENT IN AN ORGANIZED HEALTH CARE EDUCATION/TRAINING PROGRAM

## 2023-08-21 PROCEDURE — 1111F DSCHRG MED/CURRENT MED MERGE: CPT | Mod: CPTII,S$GLB,, | Performed by: STUDENT IN AN ORGANIZED HEALTH CARE EDUCATION/TRAINING PROGRAM

## 2023-08-21 PROCEDURE — 1111F PR DISCHARGE MEDS RECONCILED W/ CURRENT OUTPATIENT MED LIST: ICD-10-PCS | Mod: CPTII,S$GLB,, | Performed by: STUDENT IN AN ORGANIZED HEALTH CARE EDUCATION/TRAINING PROGRAM

## 2023-08-21 PROCEDURE — 3008F PR BODY MASS INDEX (BMI) DOCUMENTED: ICD-10-PCS | Mod: CPTII,S$GLB,, | Performed by: STUDENT IN AN ORGANIZED HEALTH CARE EDUCATION/TRAINING PROGRAM

## 2023-08-21 PROCEDURE — 3079F DIAST BP 80-89 MM HG: CPT | Mod: CPTII,S$GLB,, | Performed by: STUDENT IN AN ORGANIZED HEALTH CARE EDUCATION/TRAINING PROGRAM

## 2023-08-21 PROCEDURE — 99024 POSTOP FOLLOW-UP VISIT: CPT | Mod: S$GLB,,, | Performed by: STUDENT IN AN ORGANIZED HEALTH CARE EDUCATION/TRAINING PROGRAM

## 2023-08-21 PROCEDURE — 3074F SYST BP LT 130 MM HG: CPT | Mod: CPTII,S$GLB,, | Performed by: STUDENT IN AN ORGANIZED HEALTH CARE EDUCATION/TRAINING PROGRAM

## 2023-08-21 NOTE — PROGRESS NOTES
Subjective     Patient ID: Radha Ervin is a 63 y.o. female.    Chief Complaint: Post-op Evaluation    Diagnosis:  nsclc    Pre-operative therapy: none     Procedure(s) and date(s): 08/03/23 - robotic-assisted left upper lobectomy with MLND    Pathology: 2.1 cm poorly differentiated invasive adenocarcinoma ,+ MILLI, +VPI, + LVI, positive vascular margin.  pT1N2     Post-operative therapy: Discuss in TB. Refer to Med/Onc and Rad/Onc    HPI  63 y.o. female former smoker with psoriasis and MARY NSCLC who presents for 2 weeks follow up s/p robotic left upper lobectomy with MLND. Surgery c/b intraoperative bleeding of pulmonary arterial branches (x2) requiring pressure and subsequently stapled robotically. Patient tolerated procedure well. Discharged POD3 following removal of chest tube. Today patient reports she is doing well. States her pain is well controlled and only taking tylenol Q8 hrs. Returning gradually to ADLs.     Review of Systems   Constitutional:  Negative for diaphoresis, fatigue, fever and unexpected weight change.   Respiratory: Negative.  Negative for cough, shortness of breath, wheezing and stridor.    Cardiovascular: Negative.  Negative for chest pain, palpitations, leg swelling and claudication.   Musculoskeletal: Negative.    Integumentary:  Negative.   Neurological: Negative.  Negative for dizziness, syncope and weakness.   Hematological: Negative.    Psychiatric/Behavioral: Negative.          Objective     Physical Exam  Constitutional:       Appearance: Normal appearance. She is obese.   Eyes:      Extraocular Movements: Extraocular movements intact.   Cardiovascular:      Rate and Rhythm: Normal rate and regular rhythm.      Pulses: Normal pulses.   Pulmonary:      Effort: Pulmonary effort is normal.      Breath sounds: Normal breath sounds.   Abdominal:      General: Abdomen is flat.      Palpations: Abdomen is soft.   Skin:     General: Skin is warm and dry.      Comments: Incision(s) C/D/I.  Healing well.    Neurological:      General: No focal deficit present.      Mental Status: She is alert and oriented to person, place, and time. Mental status is at baseline.   Psychiatric:         Mood and Affect: Mood normal.         Behavior: Behavior normal.         Thought Content: Thought content normal.       Diagnostics:     CXR 08/21/23: There has not been a significant interval change in the cardiopulmonary status compared to prior examination allowing for change in patient positioning.  There is volume loss within the left chest.  There is left perihilar parenchymal opacity likely postsurgical in nature.  Degenerative changes in the spine.  Surgical clips in the upper abdomen.       Assessment and Plan   63 y.o. female former smoker with psoriasis and MARY NSCLC who presents for 2 weeks follow up s/p robotic left upper lobectomy with MLND.     Plan:     Discuss at Thoracic Tumor Board 08/23/23  Refer to Med/Onc and Rad/Onc for evaluation and treatment pending TB discussion.   RTC as needed for follow up

## 2023-08-21 NOTE — NURSING
Met with Ms. Ervin after her appointment with Dr. Gould. Patient notified of the scheduled appointment with Dr. Quevedo with Rad/Onc scheduled for 23, with Dr. Eduardo for 08/28/3 and for MRI brain scheduled for 23.  Verbal and written reminder given to Ms. Erivn.  Patient is active on the patient portal.  Cancer Services guide booklet emailed to patient.  Discussed the role of the Oncology Nurse Navigator.  Provided patient with my direct contact information.  Oncology Navigation   Intake  Date of Diagnosis: 23  Cancer Type: Thoracic  Internal / External Referral: Internal  Date of Referral: 23  Initial Nurse Navigator Contact: 23  Referral to Initial Contact Timeline (days): 0  Date Worked: 23  First Appointment Available: 23  Appointment Date: 23  First Available Date vs. Scheduled Date (days): 0  Multiple appointments: Yes  Reason if booked > 7 days after scheduling: Additional tests/procedures; Specific provider / access     Treatment  Current Status: Staging work-up  Date Presented to Tumor Board: 23    Surgery: Planned  Surgical Oncologist: Bryanna  Consult Date: 23    Medical Oncologist: Alesha  Consult Date: 23    Radiation Oncologist: Sae    Procedures: MRI  MRI Schedule Date: 23  PET Scan Schedule Date: 23          Radiation Oncologist: Sae    Support Systems: Family members  Barriers of Care: Financial concerns  Financial Concerns: Financial hardship  Other Barriers: coordinating schedule with work demands  Concerns: Patient states that she has applied for Financial Assistance     Acuity  Stage: 1  Surgical Procedure Complexity: 2  Treatment Tolerability: Has not started treatment yet/treatment fully completed and side effects resolved  ECO  Hospitalization Within the Past Month: 1   Needed: 0  Support: 0  Verbalizes Financial Concerns: 1  Transportation: 0  History of noncompliance/frequent no  shows and cancellations: 0  Verbalizes the need for more education: 1  Navigation Acuity: 6     Follow Up  No follow-ups on file.

## 2023-08-22 ENCOUNTER — PATIENT MESSAGE (OUTPATIENT)
Dept: CARDIOTHORACIC SURGERY | Facility: CLINIC | Age: 63
End: 2023-08-22
Payer: COMMERCIAL

## 2023-08-24 NOTE — PROGRESS NOTES
REFERRING PHYSICIAN:   Saeed Gould MD    DIAGNOSIS:  pT1c N2 M0, stage IIIA adenocarcinoma of the left upper lobe of the lung    HISTORY OF PRESENT ILLNESS:   Ms. Ervin is a 63-year-old female with 30 pack-year history of smoking, quit approximately 4 months ago, who was recently diagnosed with left lung cancer after evaluation for an abnormal chest x-ray.  A subsequent CT of the chest without contrast on May 26, 2023 revealed a spiculated nodule along the paramediastinal left upper lobe measuring 2.7 x 1.9 cm.  A mildly enlarged right paratracheal lymph node was seen measuring 1.1 cm.  She underwent EBUS and biopsy on June 23, 2023.  Pathology revealed the left upper lobe lesion positive for adenocarcinoma.  Station 4R was negative for malignancy.      A PET scan on July 13, 2023 revealed the left upper lobe mass measuring 2.6 x 1.6 cm with SUV max 7.4.  There was mildly metabolic prevascular mediastinal lymph node measuring 0.9 cm seen in short axis with SUV max 2.3.  She underwent robotic left upper lobectomy and lymph node sampling on August 3, 2023.  Pathology revealed invasive poorly differentiated adenocarcinoma measuring 2.1 cm.  There was visceral pleural invasion noted.  Vascular resection revealed invasive tumor present in the adventitial soft tissue.  There was lymphovascular invasion noted.  Invasive carcinoma is present at the vascular margin.  3/4 level 6 lymph nodes, 1/2 level 11 lymph nodes and 1/4 level 12 lymph nodes were involved out of a total of 23 lymph nodes.  Surgery was complicated by intraoperative bleeding of pulmonary arterial branches which resolved with pressure.  After discussion in the multidisciplinary thoracic conference, she is recommended to undergo adjuvant chemotherapy and radiation.  An MRI of the brain is scheduled on 8/30/2023. She is here today for discussion regarding that.    At present, she reports shortness of breath with one flight of stairs which is improving. She  also reports cough and pain at the port site on the left posterior chest wall. She denies fever, night sweats, or weight loss.      REVIEW OF SYSTEMS:  As above.  In addition, patient denies headaches, visual problems, dizziness, chest pain, nausea, vomiting, diarrhea, or any new bony pains. Patient also denies easy bruising, skin rashes, or numbness or tingling.    ECO-1    PAST MEDICAL HISTORY:  Past Medical History:   Diagnosis Date    Allergy     Amblyopia     Cataract     Eczema     HS (hereditary spherocytosis)     Hx of total knee replacement 2016    right knee    Joint pain        PAST SURGICAL HISTORY:  Past Surgical History:   Procedure Laterality Date    achilles tendon repair      BRONCHOSCOPY N/A 8/3/2023    Procedure: Bronchoscopy;  Surgeon: Saeed Gould MD;  [facility];  Service: Cardiothoracic;  Laterality: N/A;    CHOLECYSTECTOMY      INJECTION OF ANESTHETIC AGENT AROUND MULTIPLE INTERCOSTAL NERVES N/A 8/3/2023    Procedure: BLOCK, NERVE, INTERCOSTAL, 2 OR MORE;  Surgeon: Saeed Gould MD;  [facility];  Service: Cardiothoracic;  Laterality: N/A;    LYMPHADENECTOMY Left 8/3/2023    Procedure: LYMPHADENECTOMY;  Surgeon: Saeed Gould MD;  [facility];  Service: Cardiothoracic;  Laterality: Left;    NAVIGATIONAL BRONCHOSCOPY N/A 2023    Procedure: BRONCHOSCOPY, NAVIGATIONAL;  Surgeon: Radha Mccollum MD;  Location: [facility] OR [address] [place] [address];  Service: Pulmonary;  Laterality: N/A;    TOTAL KNEE ARTHROPLASTY      XI ROBOTIC RATS Left 8/3/2023    Procedure: XI ROBOTIC RATS upper lobectomy;  Surgeon: Saeed Gould MD;  Location: [facility] OR [address] [place] [address];  Service: Cardiothoracic;  Laterality: Left;       ALLERGIES:   Review of patient's allergies indicates:   Allergen Reactions    Morphine Other (See Comments)     headaches    Latex, natural rubber Rash and Other (See Comments)     Redness and peeling only with bandaids    Penicillins Nausea And  Vomiting and Rash       MEDICATIONS:  Current Outpatient Medications   Medication Sig    acetaminophen (TYLENOL) 500 MG tablet Take 2 tablets (1,000 mg total) by mouth every 8 (eight) hours.    betamethasone dipropionate (DIPROLENE) 0.05 % ointment Apply topically 2 (two) times daily. Prn psoriasis flares    clindamycin phosphate 1% (CLINDAGEL) 1 % gel AAA every day    LIDOcaine (LIDODERM) 5 % Place 1 patch onto the skin once daily. Remove & Discard patch within 12 hours or as directed by MD    LORazepam (ATIVAN) 0.5 MG tablet Take 1 tablet (0.5 mg total) by mouth every 6 (six) hours as needed for Anxiety.    meloxicam (MOBIC) 15 MG tablet Take 1 tablet (15 mg total) by mouth once daily.    nicotine (NICODERM CQ) 21 mg/24 hr Place 1 patch onto the skin every 24 hours.    ondansetron (ZOFRAN-ODT) 8 MG TbDL Dissolve 1 tablet (8 mg total) by mouth every 8 (eight) hours as needed.    OTEZLA 30 mg Tab TAKE 1 TABLET BY MOUTH  TWICE DAILY    spironolactone (ALDACTONE) 50 MG tablet Take 1 po qday  Hold if blood pressure is low     Current Facility-Administered Medications   Medication    triamcinolone acetonide injection 10 mg    triamcinolone acetonide injection 40 mg       SOCIAL HISTORY:  Social History     Socioeconomic History    Marital status: Single   Tobacco Use    Smoking status: Former     Current packs/day: 0.00     Average packs/day: 1 pack/day for 46.3 years (46.3 ttl pk-yrs)     Types: Vaping with nicotine, Cigarettes     Start date:      Quit date: 2023     Years since quittin.3    Smokeless tobacco: Never    Tobacco comments:     Patient is currently on the patch    Substance and Sexual Activity    Alcohol use: Yes     Comment: occassionally    Drug use: No    Sexual activity: Not Currently     Partners: Male     Social Determinants of Health     Financial Resource Strain: Medium Risk (2023)    Overall Financial Resource Strain (CARDIA)     Difficulty of Paying Living Expenses: Somewhat hard    Food Insecurity: Food Insecurity Present (8/6/2023)    Hunger Vital Sign     Worried About Running Out of Food in the Last Year: Sometimes true     Ran Out of Food in the Last Year: Sometimes true   Transportation Needs: No Transportation Needs (8/6/2023)    PRAPARE - Transportation     Lack of Transportation (Medical): No     Lack of Transportation (Non-Medical): No   Physical Activity: Insufficiently Active (8/6/2023)    Exercise Vital Sign     Days of Exercise per Week: 1 day     Minutes of Exercise per Session: 30 min   Stress: Stress Concern Present (8/6/2023)    [place] [employer] - Occupational Stress Questionnaire     Feeling of Stress : To some extent   Social Connections: Socially Isolated (8/6/2023)    Social Connection and Isolation Panel [NHANES]     Frequency of Communication with Friends and Family: More than three times a week     Frequency of Social Gatherings with Friends and Family: Twice a week     Attends Denominational Services: Never     Active Member of Clubs or Organizations: No     Attends Club or Organization Meetings: Never     Marital Status:    Housing Stability: Unknown (8/6/2023)    Housing Stability Vital Sign     Unable to Pay for Housing in the Last Year: No     Unstable Housing in the Last Year: No       FAMILY HISTORY:  Family History   Problem Relation Age of Onset    Cancer Mother     Cancer Father         lung CA    Cancer Son         [email or web address]    Breast cancer Sister         1/2 sister    Liver cancer Maternal Uncle     Blindness Cousin     Diabetes Cousin     Amblyopia Neg Hx     Cataracts Neg Hx     Glaucoma Neg Hx     Macular degeneration Neg Hx     Retinal detachment Neg Hx     Strabismus Neg Hx          PHYSICAL EXAMINATION:  Vitals:    08/25/23 1120   BP: 117/71   BP Location: Left arm   Patient Position: Sitting   BP Method: Medium (Automatic)   Pulse: 87   Temp: 97.7 °F (36.5 °C)   TempSrc: Oral   SpO2: 97%   Weight: 88.4 kg (194 lb 14.4 oz)  "  Height: 4' 11" (1.499 m)   Body mass index is 39.37 kg/m².   GENERAL: Patient is alert and oriented, in no acute distress.  HEENT:Extraocular muscles are intact.  Oropharynx is clear without lesions.  There is no cervical or supraclavicular lymphadenopathy palpated.  No thyromegaly noted.  HEART: Regular rate and rhythm.  LUNGS: Port sites without signs of infections. Lungs are clear to auscultation bilaterally.  ABDOMEN:Soft, nontender, nondistended, without hepatosplenomegaly.  Normoactive bowel sounds.  EXTREMITIES: No clubbing, cyanosis, or edema.  NEUROLOGICAL: Cranial nerve II through XII grossly intact.  Sensation is intact.  Strength is 5 out of 5 in the upper and lower extremities bilaterally.     ASSESSMENT:   This is a 63-year-old female with pT1c N2 M0, poorly differentiated adenocarcinoma of the left upper lobe who underwent robotic left upper lobectomy and mediastinal lymph node sampling on August 3, 2023 with a 2.1 cm lesion with positive vascular margin, and level 6, 11, and 12 lymph nodes positive for malignancy (5/23).    PLAN:   After discussion in the multidisciplinary thoracic conference and review of the available pathology and radiological studies, Ms. Ervin is noted to have multiple mediastinal lymph nodes positive as well as positive margin with tumor in the adventitial soft tissue. The recommendation is for adjuvant radiation and chemotherapy. I plan to deliver approximately 54-60 Gy. I recommend sequential treatment to minimize toxicity. She is scheduled to see medical oncology on 8/28/2023 and get MRI brain on 8/30/2023. I also recommend a CT chest with contrast prior to start of treatment for baseline. This was ordered today to be done in 2 weeks.     The risks, benefits, and side effects of radiation were explained in detail to the patient and her siste.  All questions were answered and informed consent was signed.  I plan to see the patient back for radiation planning CT based on " chemotherapy plans.    Psychosocial Distress screening score of 2 noted and reviewed. No intervention indicated.     I spent approximately 60 minutes reviewing the available records and evaluating the patient, out of which over 50% of the time was spent face to face with the patient in counseling and coordinating this patient's care.

## 2023-08-24 NOTE — PHYSICIAN QUERY
"PT Name: Radha Ervin  MR #: [id number]    DOCUMENTATION CLARIFICATION      CDS/: HALIMA Brito, RN, CCDS               Contact information: [email or web address]    This form is a permanent document in the medical record.      Query Date: August 24, 2023    By submitting this query, we are merely seeking further clarification of documentation. Please utilize your independent clinical judgment when addressing the question(s) below.    The Medical Record contains the following:  Indicators Supporting Clinical Findings Location in Medical Record   X BMI 43.1   Anthropometics: 8/4   X Height/Weight       Weight: 88.5 kg (195 lb)   Height: 4' 11" (1.499 m)    Interval H & P: Dr. Gould 8/3   X Acute/Chronic Illness Left lung adenocarcinoma  DCS: Dr. Gould 8/6    Treatment      Other         Provider, please specify the diagnosis associated with the above clinical findings.   [  x ] Morbid Obesity   [   ] Other diagnosis (please specify): _________   [  ] Clinically Undetermined       Please document in your progress notes daily for the duration of treatment until resolved and include in your discharge summary.  Form [id number]    "

## 2023-08-25 ENCOUNTER — OFFICE VISIT (OUTPATIENT)
Dept: RADIATION ONCOLOGY | Facility: CLINIC | Age: 63
End: 2023-08-25
Payer: COMMERCIAL

## 2023-08-25 VITALS
HEART RATE: 87 BPM | WEIGHT: 194.88 LBS | HEIGHT: 59 IN | DIASTOLIC BLOOD PRESSURE: 71 MMHG | SYSTOLIC BLOOD PRESSURE: 117 MMHG | TEMPERATURE: 98 F | BODY MASS INDEX: 39.29 KG/M2 | OXYGEN SATURATION: 97 %

## 2023-08-25 DIAGNOSIS — C34.92 ADENOCARCINOMA, LUNG, LEFT: Primary | ICD-10-CM

## 2023-08-25 PROCEDURE — 99204 PR OFFICE/OUTPT VISIT, NEW, LEVL IV, 45-59 MIN: ICD-10-PCS | Mod: S$GLB,,, | Performed by: RADIOLOGY

## 2023-08-25 PROCEDURE — 99999 PR PBB SHADOW E&M-EST. PATIENT-LVL V: CPT | Mod: PBBFAC,,, | Performed by: RADIOLOGY

## 2023-08-25 PROCEDURE — 1111F DSCHRG MED/CURRENT MED MERGE: CPT | Mod: CPTII,S$GLB,, | Performed by: RADIOLOGY

## 2023-08-25 PROCEDURE — 1159F PR MEDICATION LIST DOCUMENTED IN MEDICAL RECORD: ICD-10-PCS | Mod: CPTII,S$GLB,, | Performed by: RADIOLOGY

## 2023-08-25 PROCEDURE — 3078F PR MOST RECENT DIASTOLIC BLOOD PRESSURE < 80 MM HG: ICD-10-PCS | Mod: CPTII,S$GLB,, | Performed by: RADIOLOGY

## 2023-08-25 PROCEDURE — 1160F RVW MEDS BY RX/DR IN RCRD: CPT | Mod: CPTII,S$GLB,, | Performed by: RADIOLOGY

## 2023-08-25 PROCEDURE — 1159F MED LIST DOCD IN RCRD: CPT | Mod: CPTII,S$GLB,, | Performed by: RADIOLOGY

## 2023-08-25 PROCEDURE — 3078F DIAST BP <80 MM HG: CPT | Mod: CPTII,S$GLB,, | Performed by: RADIOLOGY

## 2023-08-25 PROCEDURE — 3074F PR MOST RECENT SYSTOLIC BLOOD PRESSURE < 130 MM HG: ICD-10-PCS | Mod: CPTII,S$GLB,, | Performed by: RADIOLOGY

## 2023-08-25 PROCEDURE — 1160F PR REVIEW ALL MEDS BY PRESCRIBER/CLIN PHARMACIST DOCUMENTED: ICD-10-PCS | Mod: CPTII,S$GLB,, | Performed by: RADIOLOGY

## 2023-08-25 PROCEDURE — 99204 OFFICE O/P NEW MOD 45 MIN: CPT | Mod: S$GLB,,, | Performed by: RADIOLOGY

## 2023-08-25 PROCEDURE — 3074F SYST BP LT 130 MM HG: CPT | Mod: CPTII,S$GLB,, | Performed by: RADIOLOGY

## 2023-08-25 PROCEDURE — 99999 PR PBB SHADOW E&M-EST. PATIENT-LVL V: ICD-10-PCS | Mod: PBBFAC,,, | Performed by: RADIOLOGY

## 2023-08-25 PROCEDURE — 1111F PR DISCHARGE MEDS RECONCILED W/ CURRENT OUTPATIENT MED LIST: ICD-10-PCS | Mod: CPTII,S$GLB,, | Performed by: RADIOLOGY

## 2023-08-25 PROCEDURE — 3044F PR MOST RECENT HEMOGLOBIN A1C LEVEL <7.0%: ICD-10-PCS | Mod: CPTII,S$GLB,, | Performed by: RADIOLOGY

## 2023-08-25 PROCEDURE — 3008F PR BODY MASS INDEX (BMI) DOCUMENTED: ICD-10-PCS | Mod: CPTII,S$GLB,, | Performed by: RADIOLOGY

## 2023-08-25 PROCEDURE — 3044F HG A1C LEVEL LT 7.0%: CPT | Mod: CPTII,S$GLB,, | Performed by: RADIOLOGY

## 2023-08-25 PROCEDURE — 3008F BODY MASS INDEX DOCD: CPT | Mod: CPTII,S$GLB,, | Performed by: RADIOLOGY

## 2023-08-25 NOTE — Clinical Note
CT chest with contrast ordered to be done in 2 weeks Sequence of treatment to be determined after med onc appt Ct/sim based on that.

## 2023-08-28 ENCOUNTER — OFFICE VISIT (OUTPATIENT)
Dept: HEMATOLOGY/ONCOLOGY | Facility: CLINIC | Age: 63
End: 2023-08-28
Payer: COMMERCIAL

## 2023-08-28 VITALS
WEIGHT: 191.81 LBS | RESPIRATION RATE: 18 BRPM | HEART RATE: 78 BPM | HEIGHT: 59 IN | DIASTOLIC BLOOD PRESSURE: 72 MMHG | SYSTOLIC BLOOD PRESSURE: 113 MMHG | BODY MASS INDEX: 38.67 KG/M2 | OXYGEN SATURATION: 97 %

## 2023-08-28 DIAGNOSIS — F41.8 SITUATIONAL ANXIETY: ICD-10-CM

## 2023-08-28 DIAGNOSIS — C34.92 ADENOCARCINOMA OF LEFT LUNG: Primary | ICD-10-CM

## 2023-08-28 DIAGNOSIS — J43.2 CENTRILOBULAR EMPHYSEMA: ICD-10-CM

## 2023-08-28 PROCEDURE — 1111F PR DISCHARGE MEDS RECONCILED W/ CURRENT OUTPATIENT MED LIST: ICD-10-PCS | Mod: CPTII,S$GLB,, | Performed by: INTERNAL MEDICINE

## 2023-08-28 PROCEDURE — 3044F HG A1C LEVEL LT 7.0%: CPT | Mod: CPTII,S$GLB,, | Performed by: INTERNAL MEDICINE

## 2023-08-28 PROCEDURE — 3078F PR MOST RECENT DIASTOLIC BLOOD PRESSURE < 80 MM HG: ICD-10-PCS | Mod: CPTII,S$GLB,, | Performed by: INTERNAL MEDICINE

## 2023-08-28 PROCEDURE — 1159F MED LIST DOCD IN RCRD: CPT | Mod: CPTII,S$GLB,, | Performed by: INTERNAL MEDICINE

## 2023-08-28 PROCEDURE — 1111F DSCHRG MED/CURRENT MED MERGE: CPT | Mod: CPTII,S$GLB,, | Performed by: INTERNAL MEDICINE

## 2023-08-28 PROCEDURE — 1159F PR MEDICATION LIST DOCUMENTED IN MEDICAL RECORD: ICD-10-PCS | Mod: CPTII,S$GLB,, | Performed by: INTERNAL MEDICINE

## 2023-08-28 PROCEDURE — 99205 OFFICE O/P NEW HI 60 MIN: CPT | Mod: S$GLB,,, | Performed by: INTERNAL MEDICINE

## 2023-08-28 PROCEDURE — 3008F PR BODY MASS INDEX (BMI) DOCUMENTED: ICD-10-PCS | Mod: CPTII,S$GLB,, | Performed by: INTERNAL MEDICINE

## 2023-08-28 PROCEDURE — 99205 PR OFFICE/OUTPT VISIT, NEW, LEVL V, 60-74 MIN: ICD-10-PCS | Mod: S$GLB,,, | Performed by: INTERNAL MEDICINE

## 2023-08-28 PROCEDURE — 3074F SYST BP LT 130 MM HG: CPT | Mod: CPTII,S$GLB,, | Performed by: INTERNAL MEDICINE

## 2023-08-28 PROCEDURE — 3074F PR MOST RECENT SYSTOLIC BLOOD PRESSURE < 130 MM HG: ICD-10-PCS | Mod: CPTII,S$GLB,, | Performed by: INTERNAL MEDICINE

## 2023-08-28 PROCEDURE — 3078F DIAST BP <80 MM HG: CPT | Mod: CPTII,S$GLB,, | Performed by: INTERNAL MEDICINE

## 2023-08-28 PROCEDURE — 3008F BODY MASS INDEX DOCD: CPT | Mod: CPTII,S$GLB,, | Performed by: INTERNAL MEDICINE

## 2023-08-28 PROCEDURE — 3044F PR MOST RECENT HEMOGLOBIN A1C LEVEL <7.0%: ICD-10-PCS | Mod: CPTII,S$GLB,, | Performed by: INTERNAL MEDICINE

## 2023-08-28 PROCEDURE — 99999 PR PBB SHADOW E&M-EST. PATIENT-LVL III: ICD-10-PCS | Mod: PBBFAC,,, | Performed by: INTERNAL MEDICINE

## 2023-08-28 PROCEDURE — 99999 PR PBB SHADOW E&M-EST. PATIENT-LVL III: CPT | Mod: PBBFAC,,, | Performed by: INTERNAL MEDICINE

## 2023-08-28 RX ORDER — PROMETHAZINE HYDROCHLORIDE 25 MG/1
25 TABLET ORAL EVERY 6 HOURS PRN
Qty: 60 TABLET | Refills: 0 | Status: SHIPPED | OUTPATIENT
Start: 2023-08-28 | End: 2024-02-11

## 2023-08-28 RX ORDER — LORAZEPAM 1 MG/1
1 TABLET ORAL EVERY 6 HOURS PRN
Qty: 10 TABLET | Refills: 3 | Status: SHIPPED | OUTPATIENT
Start: 2023-08-28 | End: 2024-03-06

## 2023-08-28 RX ORDER — ONDANSETRON HYDROCHLORIDE 8 MG/1
8 TABLET, FILM COATED ORAL EVERY 8 HOURS PRN
Qty: 60 TABLET | Refills: 2 | Status: SHIPPED | OUTPATIENT
Start: 2023-08-28 | End: 2024-01-22 | Stop reason: SDUPTHER

## 2023-08-28 NOTE — PROGRESS NOTES
PATIENT: Radha Ervin  MRN: [id number]  DATE: 8/28/2023    Diagnosis:   1. Adenocarcinoma of left lung    2. Situational anxiety    3. Centrilobular emphysema        Chief Complaint: Lung Cancer and Establish Care      Oncologic History:       Ms. Ervin is a 63-year-old female with 30 pack-year history of smoking, quit approximately 4 months ago, who was recently diagnosed with left lung cancer after evaluation for an abnormal chest x-ray.  A subsequent CT of the chest without contrast on May 26, 2023 revealed a spiculated nodule along the paramediastinal left upper lobe measuring 2.7 x 1.9 cm.  A mildly enlarged right paratracheal lymph node was seen measuring 1.1 cm.  She underwent EBUS and biopsy on June 23, 2023.  Pathology revealed the left upper lobe lesion positive for adenocarcinoma.  Station 4R was negative for malignancy.       A PET scan on July 13, 2023 revealed the left upper lobe mass measuring 2.6 x 1.6 cm with SUV max 7.4.  There was mildly metabolic prevascular mediastinal lymph node measuring 0.9 cm seen in short axis with SUV max 2.3.  She underwent robotic left upper lobectomy and lymph node sampling on August 3, 2023.  Pathology revealed invasive poorly differentiated adenocarcinoma measuring 2.1 cm.  There was visceral pleural invasion noted.  Vascular resection revealed invasive tumor present in the adventitial soft tissue.  There was lymphovascular invasion noted.  Invasive carcinoma is present at the vascular margin.  3/4 level 6 lymph nodes, 1/2 level 11 lymph nodes and 1/4 level 12 lymph nodes were involved out of a total of 23 lymph nodes.  Surgery was complicated by intraoperative bleeding of pulmonary arterial branches which resolved with pressure.      Her case was discussed at [facility] on 8/24/23: Recommend concurrent chemoRT followed by immunotherapy.            Subjective:    History of Present Illness: Ms. Ervin is a 63 y.o. female who presents to clinic to establish care with  medical oncology. She has approx 30 pack-year smoking history and recently underwent MARY lobectomy and MLND. Intraoperative bleeding complications were noted but otherwise patient tolerated procedure well and is recovering well. Vascular surgical margins however were positive. Case was presented at  on 8/23/23 and recommendation was for chemoradiation. She has seen Dr. Quevedo who has suggested sequential chemoradiation which I think is reasonable. We discussed that typically the regimen used in sequential CRT (on pathway at this institution) is carbo/taxol x2 cycles. We discussed rationale, risks, and benefits of carbo/taxol and informed discussion process done in clinic.     Past Medical History:   Past Medical History:   Diagnosis Date    Allergy     Amblyopia     Cataract     Eczema     HS (hereditary spherocytosis)     Hx of total knee replacement 01/19/2016    right knee    Joint pain        Past Surgical History:   Past Surgical History:   Procedure Laterality Date    achilles tendon repair      BRONCHOSCOPY N/A 8/3/2023    Procedure: Bronchoscopy;  Surgeon: Saeed Gould MD;  Location: [facility] OR [address] [place] [address];  Service: Cardiothoracic;  Laterality: N/A;    CHOLECYSTECTOMY      INJECTION OF ANESTHETIC AGENT AROUND MULTIPLE INTERCOSTAL NERVES N/A 8/3/2023    Procedure: BLOCK, NERVE, INTERCOSTAL, 2 OR MORE;  Surgeon: Saeed Gould MD;  [facility];  Service: Cardiothoracic;  Laterality: N/A;    LYMPHADENECTOMY Left 8/3/2023    Procedure: LYMPHADENECTOMY;  Surgeon: Saeed Gould MD;  Location: [facility] OR [address] [place] [address];  Service: Cardiothoracic;  Laterality: Left;    NAVIGATIONAL BRONCHOSCOPY N/A 6/23/2023    Procedure: BRONCHOSCOPY, NAVIGATIONAL;  Surgeon: Radha Mccollum MD;  Location: [address];  Service: Pulmonary;  Laterality: N/A;    TOTAL KNEE ARTHROPLASTY      XI ROBOTIC RATS Left 8/3/2023    Procedure: XI ROBOTIC RATS upper lobectomy;  Surgeon: Saeed Gould MD;  Location: [facility]  OR 2ND FLR;  Service: Cardiothoracic;  Laterality: Left;       Family History:   Family History   Problem Relation Age of Onset    Cancer Mother     Cancer Father         lung CA    Cancer Son         wade    Breast cancer Sister         1/2 sister    Liver cancer Maternal Uncle     Blindness Cousin     Diabetes Cousin     Amblyopia Neg Hx     Cataracts Neg Hx     Glaucoma Neg Hx     Macular degeneration Neg Hx     Retinal detachment Neg Hx     Strabismus Neg Hx        Social History:  reports that she quit smoking about 3 months ago. Her smoking use included vaping with nicotine and cigarettes. She started smoking about 46 years ago. She has a 46.3 pack-year smoking history. She has never used smokeless tobacco. She reports current alcohol use. She reports that she does not use drugs.    Allergies:  Review of patient's allergies indicates:   Allergen Reactions    Morphine Other (See Comments)     headaches    Latex, natural rubber Rash and Other (See Comments)     Redness and peeling only with bandaids    Penicillins Nausea And Vomiting and Rash       Medications:  Current Outpatient Medications   Medication Sig Dispense Refill    acetaminophen (TYLENOL) 500 MG tablet Take 2 tablets (1,000 mg total) by mouth every 8 (eight) hours.  0    betamethasone dipropionate (DIPROLENE) 0.05 % ointment Apply topically 2 (two) times daily. Prn psoriasis flares 45 g 3    clindamycin phosphate 1% (CLINDAGEL) 1 % gel AAA every day 60 g 5    OTEZLA 30 mg Tab TAKE 1 TABLET BY MOUTH  TWICE DAILY 60 tablet 2    spironolactone (ALDACTONE) 50 MG tablet Take 1 po qday  Hold if blood pressure is low 90 tablet 1    LORazepam (ATIVAN) 1 MG tablet Take 1 tablet (1 mg total) by mouth every 6 (six) hours as needed for Anxiety (and half an hour before MRI). 10 tablet 3     No current facility-administered medications for this visit.       A comprehensive review of systems was performed; pertinent positives and negatives are noted in the  "HPI.      ECOG Performance Status:   ECOG SCORE    1 - Restricted in strenuous activity-ambulatory and able to carry out work of a light nature         Objective:      Vitals:   Vitals:    08/28/23 1307   BP: 113/72   BP Location: Right arm   Patient Position: Sitting   BP Method: Medium (Automatic)   Pulse: 78   Resp: 18   SpO2: 97%   Weight: 87 kg (191 lb 12.8 oz)   Height: 4' 11" (1.499 m)     BMI: Body mass index is 38.74 kg/m².    Physical Exam  Vitals and nursing note reviewed.   Constitutional:       General: She is not in acute distress.     Appearance: Normal appearance. She is not toxic-appearing.   HENT:      Head: Normocephalic and atraumatic.   Eyes:      General: No scleral icterus.     Conjunctiva/sclera: Conjunctivae normal.   Cardiovascular:      Rate and Rhythm: Normal rate and regular rhythm.      Heart sounds: Normal heart sounds. No murmur heard.  Pulmonary:      Effort: Pulmonary effort is normal. No respiratory distress.      Breath sounds: Examination of the left-upper field reveals decreased breath sounds. Decreased breath sounds present. No wheezing, rhonchi or rales.   Abdominal:      General: There is no distension.      Palpations: Abdomen is soft.      Tenderness: There is no abdominal tenderness.   Musculoskeletal:         General: No swelling, tenderness or deformity.      Cervical back: Neck supple. No tenderness.   Skin:     Coloration: Skin is not jaundiced.      Findings: No erythema.   Neurological:      General: No focal deficit present.      Mental Status: She is alert and oriented to person, place, and time.      Motor: No weakness.   Psychiatric:         Mood and Affect: Mood normal.         Behavior: Behavior normal.         Laboratory Data:  Specimen to Pathology, Surgery Pulmonary and Thoracic  Order: [id number]  Status: Final result     Visible to patient: Yes (seen)     Next appt: 08/30/2023 at 07:45 AM in Radiology ([facility] Department)     0 Result Notes    "   Component 3 wk ago   Final Pathologic Diagnosis 1. Lymph nodes, left level 9, dissection:   - Two lymph nodes, negative for metastatic carcinoma (0/2)     2. Lymph node, left level 8, dissection:     - One lymph node, negative for metastatic carcinoma (0/1)     3. Lymph nodes, level 7, dissection:     - Three lymph nodes, negative for metastatic carcinoma (0/3)     4. Lymph node, left level 11 #1, dissection:     - One lymph node, negative for metastatic carcinoma (0/1)     5. Lymph node, left level 11 #2, dissection:     - One lymph node, negative for metastatic carcinoma (0/1)     6. Lymph nodes, level 11 #3, dissection:     - Two lymph nodes, 1 positive for metastatic carcinoma (1/2)     7. Lymph node, left level 10, dissection:     - One lymph node, negative for metastatic carcinoma (0/1)     8. Lymph nodes, level 6, dissection:   - Four lymph nodes, 3 positive for metastatic carcinoma (3/4)     9. Lymph nodes, level 5, dissection:     - Three lymph nodes, negative for metastatic carcinoma (0/3)     10. Lymph node, left level 10 #2, dissection:     - One lymph node, negative for metastatic carcinoma (0/1)     11. Lung, left upper lobe, lobectomy:   - Invasive poorly differentiated adenocarcinoma   - Greatest dimension of tumor:  2.1 cm   - Tumor invades visceral pleura (type PL2)   - Vascular resection positive for invasive tumor (present in adventitial soft tissue)   - Lymphovascular invasion present   - 4 lymph nodes, one positive for metastatic carcinoma (1/4)   - Background lung with emphysematous changes and active pleuritis   - See CAP tumor synoptic     CAP Tumor Synoptic:   Synchronous tumors:  Not applicable   Procedure:  Lobectomy   Specimen laterality:  Left   Tumor focality:  Single focus   Tumor site:  Upper lobe of lung   Total tumor size (size of entire tumor):  2.1 cm in greatest dimension   Size of invasive component:  Not applicable (no significant lepidic component present, size of invasive  component equal to total tumor size)   Histologic type:  Invasive poorly differentiated adenocarcinoma   Histologic patterns present:  Acinar, solid, complex glands, and micropapillary patterns (combined poorly differentiated patterns represent >50% of tumor)   Histologic grade:  Grade 3, poorly differentiated   Spread through air spaces:  Present   Visceral pleura invasion:  Present (type PL2)   Direct invasion of adjacent structures:  Not applicable (no adjacent structures present)   Treatment effect:  No known pre-surgical therapy   Lymphovascular invasion:  Present (lymphatic, venous, and arterial invasion present)   Margin status for invasive carcinoma:  Invasive carcinoma present at margin   Margin involved by invasive carcinoma:  Vascular   Lymph nodes from prior procedures:  Not included   Regional lymph node status:  Tumor present in regional lymph nodes   Number of lymph nodes with tumor:  5   Luis sites with tumor:  Levels 6, 11L, 12L   Number of lymph nodes examined: 23   Luis sites examined: Levels 5, 6, 7, 8L, 9L, 10L, 11L, 12L   Distant sites involved:  Not applicable   PATHOLOGIC STAGE CLASSIFICATION (pTNM, AJCC 8th Edition)   TNM Descriptors:  Not applicable   pT Category:  pT2a   pN Category:  pN2   pM Category:  Not applicable - pM cannot be determined from the submitted specimens   Special studies:  PD-L1 (clone 22C3) IHC and next generation sequencing through [facility] performed on prior FNA specimen ([facility]-; collected 6/23/23). Please see separate [facility] report for results.     Tumor Block: 11E       Select slides (11A, 11C, 11D, 11E)  reviewed in consultation with Dr. Kruger, who concurs with the respective findings of margin positivity, visceral pleural invasion, and histotype.    Comment: Interp By Declan Holman M.D., Signed on 08/16/2023 at 16:14              Imaging: NM PET CT Routine Skull to Mid Thigh  Order: [id number]  Status: Final result     Visible to patient: Yes (seen)      Next appt: 08/30/2023 at 07:45 AM in Radiology ([facility] Department)     Dx: Adenocarcinoma, lung, left; Malignant...     0 Result Notes  Details    Reading Physician Reading Date Result Priority   Roger Garcia IV, MD  [phone] 7/13/2023 Routine   Yashira Davis MD  [phone] 7/13/2023      Narrative & Impression  EXAMINATION:  NM PET CT ROUTINE     CLINICAL HISTORY:  Non-small cell lung cancer (NSCLC), staging; Malignant neoplasm of unspecified part of unspecified bronchus or lung     TECHNIQUE:  10.71 mCi of F18-FDG was administered intravenously in the left antecubital fossa. After an approximately 60 min distribution time, PET/CT images were acquired from the skull base to mid thigh.  Transmission images were acquired to correct for attenuation using a whole body low-dose CT scan without IV contrast with the arms positioned above the head. Glycemia at the time of injection was 135 mg/dL.     COMPARISON:  CT 05/26/2023     FINDINGS:  Quality of the study: Adequate.     In the head and neck, there are no hypermetabolic lesions worrisome for malignancy. There are no hypermetabolic mucosal lesions, and there are no pathologically enlarged or hypermetabolic lymph nodes.  Increased metabolic activity about the vocal cords, likely due to phonation.     In the chest, left upper lobe lung lesion measuring 2.6 x 1.6 cm (image 65) demonstrating SUV max 7.4.  Mildly metabolic prevascular mediastinal lymph node measuring 0.9 cm in short axis with SUV max of 2.3 (image 66).     In the abdomen and pelvis, there is physiologic tracer distribution within the abdominal organs and excretion into the genitourinary system.     Prominent uptake in the cecum without obvious CT abnormality, presumably represents prominent physiologic uptake.     In the bones, there are no hypermetabolic lesions worrisome for malignancy.     Additional CT findings:     Small focus of cutaneous thickening with mild  tracer uptake in the right anterior abdominal wall, likely represents benign infectious/inflammatory process.  Prior cholecystectomy.  Nodular thickening of the left adrenal gland without significant metabolic activity, could represent underlying adenoma.  Bilateral renal cysts.  0.4 cm left nonobstructive nephrolithiasis.  Additional punctate nonobstructing calculus on the right.  4 cm fluid attenuating, cystic structure in the right adnexa.  No associated increased tracer uptake.     Impression:     Left upper lobe hypermetabolic pulmonary lesion consistent with known adenocarcinoma.  No definite FDG avid metastasis.     Prominent prevascular lymph node measuring 0.9 cm in short axis with uptake similar to     blood pool, attention on follow-up.     4 cm simple cystic structure in the right adnexa.  No associated increased tracer uptake.  Consider further evaluation with pelvic ultrasound.     Additional findings as above.     Electronically signed by resident: Yashira Davis  Date:                                            07/13/2023  Time:                                           09:35     Electronically signed by: Roger Garcia  Date:                                            07/13/2023  Time:                                           12:21           Exam Ended: 07/13/23 09:34 Last Resulted: 07/13/23 12:21            Assessment:       1. Adenocarcinoma of left lung    2. Situational anxiety    3. Centrilobular emphysema      Stg IIIA lung adenocarcinoma with positive surgical margins.  Tumor board has recommended evaluation for chemoradiation.  Dr. Quevedo has seen patient and would like to do sequential chemoradiotherapy.  Discussed risks/benefits and answered all questions to stated satisfaction of patient and sister. Informed consent done in clinic today.     Plan:     Induction carbo/taxol discussed. Treatment plan entered. Antiemetic support discussed and antiemesis scripts generated.        Roger Eduardo MD, FACP  Hematology/Oncology  [facility]  [address], [address]  [place] [address]  Phone: [phone]  Fax: [phone]    Total time of this visit, including time spent face to face with patient and/or via video/audio, and also in preparing for today's visit for MDM and documentation. (Medical Decision Making, including consideration of possible diagnoses, management options, complex medical record review, review of diagnostic tests and information, consideration and discussion of significant complications based on comorbidities, and discussion with providers involved with the care of the patient) 61 minutes. Greater than 50% was spent face to face with the patient counseling and coordinating care.

## 2023-08-29 NOTE — PLAN OF CARE
START ON PATHWAY REGIMEN - Non-Small Cell Lung    [id number]        Paclitaxel       Carboplatin     **Always confirm dose/schedule in your pharmacy ordering system**    Patient Characteristics:  Preoperative or Nonsurgical Candidate (Clinical Staging), Stage III -   Nonsurgical Candidate (Nonsquamous and Squamous), PS = 0, 1  Therapeutic Status: Preoperative or Nonsurgical Candidate (Clinical Staging)  AJCC T Category: cT2a  AJCC N Category: cN2  AJCC M Category: cM0  AJCC 8 Stage Grouping: IIIA  ECOG Performance Status: 1  Intent of Therapy:  Curative Intent, Discussed with Patient

## 2023-08-30 ENCOUNTER — HOSPITAL ENCOUNTER (OUTPATIENT)
Dept: RADIOLOGY | Facility: HOSPITAL | Age: 63
Discharge: HOME | End: 2023-08-30
Attending: INTERNAL MEDICINE
Payer: COMMERCIAL

## 2023-08-30 DIAGNOSIS — C34.90 MALIGNANT NEOPLASM OF UNSPECIFIED PART OF UNSPECIFIED BRONCHUS OR LUNG: ICD-10-CM

## 2023-08-30 PROCEDURE — A9585 GADOBUTROL INJECTION: HCPCS | Performed by: INTERNAL MEDICINE

## 2023-08-30 PROCEDURE — 70553 MRI BRAIN STEM W/O & W/DYE: CPT | Mod: 26,,, | Performed by: RADIOLOGY

## 2023-08-30 PROCEDURE — 25500020 PHARM REV CODE 255: Performed by: INTERNAL MEDICINE

## 2023-08-30 PROCEDURE — 70553 MRI BRAIN STEM W/O & W/DYE: CPT | Mod: TC

## 2023-08-30 PROCEDURE — 70553 MRI BRAIN W WO CONTRAST: ICD-10-PCS | Mod: 26,,, | Performed by: RADIOLOGY

## 2023-08-30 RX ORDER — GADOBUTROL 604.72 MG/ML
10 INJECTION INTRAVENOUS
Status: COMPLETED | OUTPATIENT
Start: 2023-08-30 | End: 2023-08-30

## 2023-08-30 RX ADMIN — GADOBUTROL 10 ML: 604.72 INJECTION INTRAVENOUS at 08:08

## 2023-08-31 ENCOUNTER — TELEPHONE (OUTPATIENT)
Dept: RADIATION ONCOLOGY | Facility: CLINIC | Age: 63
End: 2023-08-31
Payer: COMMERCIAL

## 2023-08-31 NOTE — TELEPHONE ENCOUNTER
Call to pt to schedule CT chest per Dr Quevedo. CT scheduled 9/13/23 at 10:00 AM at [facility]. Pt confirmed and accepts this appt.

## 2023-08-31 NOTE — TELEPHONE ENCOUNTER
----- Message from Jennifer Quevedo MD sent at 8/31/2023 11:01 AM CDT -----  2 weeks was incase no chemo.     We will start radiation after chemo completed.    ----- Message -----  From: Pedro Pablo Herndon RN  Sent: 8/31/2023  10:59 AM CDT  To: Jennifer Quevedo MD     CC chart note you sent me mentioned CT in 2 weeks.Do you still want CT done? If so, now?? You mentioned in note below that you will start XRT at the completion of chemo. Let me know  ----- Message -----  From: Jennifer Quevedo MD  Sent: 8/29/2023   7:56 AM CDT  To: Pedro Pablo Herndon RN; Roger Eduardo MD    She has + margins, but I think her systemic recurrence risk is higher with + mediastinal nodes. So I will start radiation upon completion of chemo.       ----- Message -----  From: Roger Eduardo MD  Sent: 8/28/2023   8:48 AM CDT  To: Jennifer Quevedo MD    In your note you mention sequential treatment, presumably you would like her to get induction chemo prior to radiation? Just wanted to make sure I know what I'm talking to her about today. Thank you!    ----- Message -----  From: Jennifer Quevedo MD  Sent: 8/24/2023  11:17 AM CDT  To: Roger Eduardo MD    [facility],     I am seeing her tomorrow for adjuvant radiation recommendations.  Just wanted to timing of chemotherapy and radiation.  Let me know what she plan to do.    Thank you

## 2023-09-01 ENCOUNTER — PATIENT MESSAGE (OUTPATIENT)
Dept: HEMATOLOGY/ONCOLOGY | Facility: CLINIC | Age: 63
End: 2023-09-01
Payer: COMMERCIAL

## 2023-09-08 ENCOUNTER — PATIENT MESSAGE (OUTPATIENT)
Dept: HEMATOLOGY/ONCOLOGY | Facility: CLINIC | Age: 63
End: 2023-09-08
Payer: COMMERCIAL

## 2023-09-13 ENCOUNTER — HOSPITAL ENCOUNTER (OUTPATIENT)
Dept: RADIOLOGY | Facility: HOSPITAL | Age: 63
Discharge: HOME | End: 2023-09-13
Attending: RADIOLOGY
Payer: COMMERCIAL

## 2023-09-13 DIAGNOSIS — C34.92 ADENOCARCINOMA, LUNG, LEFT: ICD-10-CM

## 2023-09-13 LAB
CREAT SERPL-MCNC: 1.2 MG/DL (ref 0.5–1.4)
SAMPLE: NORMAL

## 2023-09-13 PROCEDURE — 71260 CT CHEST WITH CONTRAST: ICD-10-PCS | Mod: 26,,, | Performed by: RADIOLOGY

## 2023-09-13 PROCEDURE — 25500020 PHARM REV CODE 255: Performed by: RADIOLOGY

## 2023-09-13 PROCEDURE — 71260 CT THORAX DX C+: CPT | Mod: TC

## 2023-09-13 PROCEDURE — 71260 CT THORAX DX C+: CPT | Mod: 26,,, | Performed by: RADIOLOGY

## 2023-09-13 RX ADMIN — IOHEXOL 75 ML: 350 INJECTION, SOLUTION INTRAVENOUS at 10:09

## 2023-09-15 ENCOUNTER — PATIENT MESSAGE (OUTPATIENT)
Dept: PULMONOLOGY | Facility: CLINIC | Age: 63
End: 2023-09-15
Payer: COMMERCIAL

## 2023-09-19 ENCOUNTER — PATIENT MESSAGE (OUTPATIENT)
Dept: RADIATION ONCOLOGY | Facility: CLINIC | Age: 63
End: 2023-09-19
Payer: COMMERCIAL

## 2023-09-19 NOTE — TELEPHONE ENCOUNTER
Ms. Ervin,     Your CT chest looks good. I will see you after chemo is completed for radiation planning.

## 2023-09-21 ENCOUNTER — OFFICE VISIT (OUTPATIENT)
Dept: DERMATOLOGY | Facility: CLINIC | Age: 63
End: 2023-09-21
Payer: COMMERCIAL

## 2023-09-21 ENCOUNTER — PATIENT MESSAGE (OUTPATIENT)
Dept: HEMATOLOGY/ONCOLOGY | Facility: CLINIC | Age: 63
End: 2023-09-21
Payer: COMMERCIAL

## 2023-09-21 ENCOUNTER — LAB VISIT (OUTPATIENT)
Dept: LAB | Facility: HOSPITAL | Age: 63
End: 2023-09-21
Attending: INTERNAL MEDICINE
Payer: COMMERCIAL

## 2023-09-21 ENCOUNTER — OFFICE VISIT (OUTPATIENT)
Dept: HEMATOLOGY/ONCOLOGY | Facility: CLINIC | Age: 63
End: 2023-09-21
Payer: COMMERCIAL

## 2023-09-21 ENCOUNTER — PATIENT MESSAGE (OUTPATIENT)
Dept: DERMATOLOGY | Facility: CLINIC | Age: 63
End: 2023-09-21

## 2023-09-21 VITALS
RESPIRATION RATE: 16 BRPM | DIASTOLIC BLOOD PRESSURE: 84 MMHG | HEART RATE: 89 BPM | SYSTOLIC BLOOD PRESSURE: 120 MMHG | OXYGEN SATURATION: 97 % | HEIGHT: 59 IN | BODY MASS INDEX: 38.4 KG/M2 | WEIGHT: 190.5 LBS

## 2023-09-21 DIAGNOSIS — R11.2 CINV (CHEMOTHERAPY-INDUCED NAUSEA AND VOMITING): ICD-10-CM

## 2023-09-21 DIAGNOSIS — C34.92 ADENOCARCINOMA OF LEFT LUNG: ICD-10-CM

## 2023-09-21 DIAGNOSIS — F41.8 SITUATIONAL ANXIETY: ICD-10-CM

## 2023-09-21 DIAGNOSIS — J43.2 CENTRILOBULAR EMPHYSEMA: ICD-10-CM

## 2023-09-21 DIAGNOSIS — C34.92 ADENOCARCINOMA OF LEFT LUNG: Primary | ICD-10-CM

## 2023-09-21 DIAGNOSIS — L02.91 ABSCESS: Primary | ICD-10-CM

## 2023-09-21 DIAGNOSIS — T45.1X5A CINV (CHEMOTHERAPY-INDUCED NAUSEA AND VOMITING): ICD-10-CM

## 2023-09-21 LAB
ALBUMIN SERPL BCP-MCNC: 3.9 G/DL (ref 3.5–5.2)
ALP SERPL-CCNC: 95 U/L (ref 55–135)
ALT SERPL W/O P-5'-P-CCNC: 19 U/L (ref 10–44)
ANION GAP SERPL CALC-SCNC: 11 MMOL/L (ref 8–16)
AST SERPL-CCNC: 15 U/L (ref 10–40)
BASOPHILS # BLD AUTO: 0.14 K/UL (ref 0–0.2)
BASOPHILS NFR BLD: 1 % (ref 0–1.9)
BILIRUB SERPL-MCNC: 0.5 MG/DL (ref 0.1–1)
BUN SERPL-MCNC: 18 MG/DL (ref 8–23)
CALCIUM SERPL-MCNC: 9.8 MG/DL (ref 8.7–10.5)
CHLORIDE SERPL-SCNC: 106 MMOL/L (ref 95–110)
CO2 SERPL-SCNC: 21 MMOL/L (ref 23–29)
CREAT SERPL-MCNC: 1.3 MG/DL (ref 0.5–1.4)
DIFFERENTIAL METHOD: ABNORMAL
EOSINOPHIL # BLD AUTO: 0.7 K/UL (ref 0–0.5)
EOSINOPHIL NFR BLD: 4.7 % (ref 0–8)
ERYTHROCYTE [DISTWIDTH] IN BLOOD BY AUTOMATED COUNT: 13 % (ref 11.5–14.5)
EST. GFR  (NO RACE VARIABLE): 45.9 ML/MIN/1.73 M^2
GLUCOSE SERPL-MCNC: 106 MG/DL (ref 70–110)
HCT VFR BLD AUTO: 44.6 % (ref 37–48.5)
HGB BLD-MCNC: 14.1 G/DL (ref 12–16)
IMM GRANULOCYTES # BLD AUTO: 0.05 K/UL (ref 0–0.04)
IMM GRANULOCYTES NFR BLD AUTO: 0.4 % (ref 0–0.5)
LYMPHOCYTES # BLD AUTO: 3.3 K/UL (ref 1–4.8)
LYMPHOCYTES NFR BLD: 23.8 % (ref 18–48)
MCH RBC QN AUTO: 30.5 PG (ref 27–31)
MCHC RBC AUTO-ENTMCNC: 31.6 G/DL (ref 32–36)
MCV RBC AUTO: 97 FL (ref 82–98)
MONOCYTES # BLD AUTO: 1 K/UL (ref 0.3–1)
MONOCYTES NFR BLD: 7.5 % (ref 4–15)
NEUTROPHILS # BLD AUTO: 8.7 K/UL (ref 1.8–7.7)
NEUTROPHILS NFR BLD: 62.6 % (ref 38–73)
NRBC BLD-RTO: 0 /100 WBC
PLATELET # BLD AUTO: 399 K/UL (ref 150–450)
PMV BLD AUTO: 9.5 FL (ref 9.2–12.9)
POTASSIUM SERPL-SCNC: 4.6 MMOL/L (ref 3.5–5.1)
PROT SERPL-MCNC: 7.9 G/DL (ref 6–8.4)
RBC # BLD AUTO: 4.62 M/UL (ref 4–5.4)
SODIUM SERPL-SCNC: 138 MMOL/L (ref 136–145)
TSH SERPL DL<=0.005 MIU/L-ACNC: 2.49 UIU/ML (ref 0.4–4)
WBC # BLD AUTO: 13.93 K/UL (ref 3.9–12.7)

## 2023-09-21 PROCEDURE — 3074F PR MOST RECENT SYSTOLIC BLOOD PRESSURE < 130 MM HG: ICD-10-PCS | Mod: CPTII,S$GLB,, | Performed by: INTERNAL MEDICINE

## 2023-09-21 PROCEDURE — 84443 ASSAY THYROID STIM HORMONE: CPT | Performed by: INTERNAL MEDICINE

## 2023-09-21 PROCEDURE — 99999 PR PBB SHADOW E&M-EST. PATIENT-LVL III: ICD-10-PCS | Mod: PBBFAC,,, | Performed by: DERMATOLOGY

## 2023-09-21 PROCEDURE — 87070 CULTURE OTHR SPECIMN AEROBIC: CPT | Performed by: DERMATOLOGY

## 2023-09-21 PROCEDURE — 3008F BODY MASS INDEX DOCD: CPT | Mod: CPTII,S$GLB,, | Performed by: INTERNAL MEDICINE

## 2023-09-21 PROCEDURE — 3044F PR MOST RECENT HEMOGLOBIN A1C LEVEL <7.0%: ICD-10-PCS | Mod: CPTII,S$GLB,, | Performed by: INTERNAL MEDICINE

## 2023-09-21 PROCEDURE — 85025 COMPLETE CBC W/AUTO DIFF WBC: CPT | Performed by: INTERNAL MEDICINE

## 2023-09-21 PROCEDURE — 99999 PR PBB SHADOW E&M-EST. PATIENT-LVL IV: CPT | Mod: PBBFAC,,, | Performed by: INTERNAL MEDICINE

## 2023-09-21 PROCEDURE — 99215 OFFICE O/P EST HI 40 MIN: CPT | Mod: S$GLB,,, | Performed by: INTERNAL MEDICINE

## 2023-09-21 PROCEDURE — 99214 PR OFFICE/OUTPT VISIT, EST, LEVL IV, 30-39 MIN: ICD-10-PCS | Mod: S$GLB,,, | Performed by: DERMATOLOGY

## 2023-09-21 PROCEDURE — 1159F MED LIST DOCD IN RCRD: CPT | Mod: CPTII,S$GLB,, | Performed by: DERMATOLOGY

## 2023-09-21 PROCEDURE — 1160F PR REVIEW ALL MEDS BY PRESCRIBER/CLIN PHARMACIST DOCUMENTED: ICD-10-PCS | Mod: CPTII,S$GLB,, | Performed by: DERMATOLOGY

## 2023-09-21 PROCEDURE — 3008F PR BODY MASS INDEX (BMI) DOCUMENTED: ICD-10-PCS | Mod: CPTII,S$GLB,, | Performed by: INTERNAL MEDICINE

## 2023-09-21 PROCEDURE — 36415 COLL VENOUS BLD VENIPUNCTURE: CPT | Performed by: INTERNAL MEDICINE

## 2023-09-21 PROCEDURE — 99214 OFFICE O/P EST MOD 30 MIN: CPT | Mod: S$GLB,,, | Performed by: DERMATOLOGY

## 2023-09-21 PROCEDURE — 1159F PR MEDICATION LIST DOCUMENTED IN MEDICAL RECORD: ICD-10-PCS | Mod: CPTII,S$GLB,, | Performed by: DERMATOLOGY

## 2023-09-21 PROCEDURE — 3044F HG A1C LEVEL LT 7.0%: CPT | Mod: CPTII,S$GLB,, | Performed by: INTERNAL MEDICINE

## 2023-09-21 PROCEDURE — 1160F RVW MEDS BY RX/DR IN RCRD: CPT | Mod: CPTII,S$GLB,, | Performed by: DERMATOLOGY

## 2023-09-21 PROCEDURE — 99999 PR PBB SHADOW E&M-EST. PATIENT-LVL III: CPT | Mod: PBBFAC,,, | Performed by: DERMATOLOGY

## 2023-09-21 PROCEDURE — 3079F PR MOST RECENT DIASTOLIC BLOOD PRESSURE 80-89 MM HG: ICD-10-PCS | Mod: CPTII,S$GLB,, | Performed by: INTERNAL MEDICINE

## 2023-09-21 PROCEDURE — 99215 PR OFFICE/OUTPT VISIT, EST, LEVL V, 40-54 MIN: ICD-10-PCS | Mod: S$GLB,,, | Performed by: INTERNAL MEDICINE

## 2023-09-21 PROCEDURE — 80053 COMPREHEN METABOLIC PANEL: CPT | Performed by: INTERNAL MEDICINE

## 2023-09-21 PROCEDURE — 3044F HG A1C LEVEL LT 7.0%: CPT | Mod: CPTII,S$GLB,, | Performed by: DERMATOLOGY

## 2023-09-21 PROCEDURE — 99999 PR PBB SHADOW E&M-EST. PATIENT-LVL IV: ICD-10-PCS | Mod: PBBFAC,,, | Performed by: INTERNAL MEDICINE

## 2023-09-21 PROCEDURE — 3079F DIAST BP 80-89 MM HG: CPT | Mod: CPTII,S$GLB,, | Performed by: INTERNAL MEDICINE

## 2023-09-21 PROCEDURE — 3074F SYST BP LT 130 MM HG: CPT | Mod: CPTII,S$GLB,, | Performed by: INTERNAL MEDICINE

## 2023-09-21 PROCEDURE — 3044F PR MOST RECENT HEMOGLOBIN A1C LEVEL <7.0%: ICD-10-PCS | Mod: CPTII,S$GLB,, | Performed by: DERMATOLOGY

## 2023-09-21 RX ORDER — FAMOTIDINE 10 MG/ML
20 INJECTION INTRAVENOUS
Status: CANCELLED | OUTPATIENT
Start: 2023-09-22

## 2023-09-21 RX ORDER — HEPARIN 100 UNIT/ML
500 SYRINGE INTRAVENOUS
Status: CANCELLED | OUTPATIENT
Start: 2023-09-22

## 2023-09-21 RX ORDER — EPINEPHRINE 0.3 MG/.3ML
0.3 INJECTION SUBCUTANEOUS ONCE AS NEEDED
Status: CANCELLED | OUTPATIENT
Start: 2023-09-22

## 2023-09-21 RX ORDER — DEXAMETHASONE 4 MG/1
8 TABLET ORAL DAILY
Qty: 6 TABLET | Refills: 0 | Status: SHIPPED | OUTPATIENT
Start: 2023-09-21 | End: 2023-10-09 | Stop reason: SDUPTHER

## 2023-09-21 RX ORDER — DIPHENHYDRAMINE HYDROCHLORIDE 50 MG/ML
50 INJECTION INTRAMUSCULAR; INTRAVENOUS ONCE AS NEEDED
Status: CANCELLED | OUTPATIENT
Start: 2023-09-22

## 2023-09-21 RX ORDER — DOXYCYCLINE 100 MG/1
CAPSULE ORAL
Qty: 14 CAPSULE | Refills: 0 | Status: SHIPPED | OUTPATIENT
Start: 2023-09-21 | End: 2023-10-09

## 2023-09-21 RX ORDER — OLANZAPINE 5 MG/1
5 TABLET ORAL NIGHTLY
Qty: 30 TABLET | Refills: 2 | Status: SHIPPED | OUTPATIENT
Start: 2023-09-21 | End: 2024-02-11

## 2023-09-21 RX ORDER — SODIUM CHLORIDE 0.9 % (FLUSH) 0.9 %
10 SYRINGE (ML) INJECTION
Status: CANCELLED | OUTPATIENT
Start: 2023-09-22

## 2023-09-21 RX ORDER — PROCHLORPERAZINE EDISYLATE 5 MG/ML
10 INJECTION INTRAMUSCULAR; INTRAVENOUS ONCE AS NEEDED
Status: CANCELLED
Start: 2023-09-22

## 2023-09-21 NOTE — PROGRESS NOTES
"  Subjective:      Patient ID:  Radha Ervin is a 63 y.o. female who presents for   Chief Complaint   Patient presents with    Hidradenitis Suppurativa     Flare     Patient with Hidradenitis suppurativa flare L breast x 4 days + tender  Last seen on 7/28/2023 in Hs clinic    Condition overall - Improved    No new lesion(s)     Current treatment regimen:  Otezla 30mg BID (dx with pso 2/2 humira)  Spironolactone 50mg every day  Antibac wash    Has not yet started:  Turmeric      Lifestyle modifications - diet, smoking, shaving etc  Limiting "white" foods, sugars and processed foods      Dx with lung cancer 5/23 - starts chemo tomorrow      Review of Systems   Constitutional:  Negative for weight loss and weight gain.   HENT:  Negative for nosebleeds and headaches.    Gastrointestinal:  Positive for diarrhea (biw. since starting otezla. never had colonoscopy). Negative for Sensitivity to oral antibiotics.   Genitourinary:  Negative for irregular periods (post menopausal).   Musculoskeletal:  Negative for arthralgias.   Skin:  Positive for abscesses. Negative for recent sunburn.   Neurological:  Negative for headaches.   Psychiatric/Behavioral:  Negative for depressed mood.    Hematologic/Lymphatic: Bruises/bleeds easily.       Objective:   Physical Exam   Constitutional: She appears well-developed and well-nourished. She is obese.  No distress.   Neurological: She is alert and oriented to person, place, and time. She is not disoriented.   Psychiatric: She has a normal mood and affect.   Skin:   Areas Examined (abnormalities noted in diagram):   Chest / Axilla Inspection Performed  Abdomen Inspection Performed           Diagram Legend     Erythematous scaling macule/papule c/w actinic keratosis       Vascular papule c/w angioma      Pigmented verrucoid papule/plaque c/w seborrheic keratosis      Yellow umbilicated papule c/w sebaceous hyperplasia      Irregularly shaped tan macule c/w lentigo     1-2 mm smooth white " papules consistent with Milia      Movable subcutaneous cyst with punctum c/w epidermal inclusion cyst      Subcutaneous movable cyst c/w pilar cyst      Firm pink to brown papule c/w dermatofibroma      Pedunculated fleshy papule(s) c/w skin tag(s)      Evenly pigmented macule c/w junctional nevus     Mildly variegated pigmented, slightly irregular-bordered macule c/w mildly atypical nevus      Flesh colored to evenly pigmented papule c/w intradermal nevus       Pink pearly papule/plaque c/w basal cell carcinoma      Erythematous hyperkeratotic cursted plaque c/w SCC      Surgical scar with no sign of skin cancer recurrence      Open and closed comedones      Inflammatory papules and pustules      Verrucoid papule consistent consistent with wart     Erythematous eczematous patches and plaques     Dystrophic onycholytic nail with subungual debris c/w onychomycosis     Umbilicated papule    Erythematous-base heme-crusted tan verrucoid plaque consistent with inflamed seborrheic keratosis     Erythematous Silvery Scaling Plaque c/w Psoriasis     See annotation      Assessment / Plan:        Abscess - left inframammary  Lesion incised with #11 blade and drained on today's date. Bacterial culture performed.    -     doxycycline (VIBRAMYCIN) 100 MG Cap; Take 1 po bid with food and not within 1 hour prior to lying down  Dispense: 14 capsule; Refill: 0  -     Aerobic culture             No follow-ups on file.

## 2023-09-22 ENCOUNTER — INFUSION (OUTPATIENT)
Dept: INFUSION THERAPY | Facility: HOSPITAL | Age: 63
End: 2023-09-22
Payer: COMMERCIAL

## 2023-09-22 VITALS
HEIGHT: 59 IN | TEMPERATURE: 98 F | SYSTOLIC BLOOD PRESSURE: 98 MMHG | BODY MASS INDEX: 38.4 KG/M2 | HEART RATE: 100 BPM | DIASTOLIC BLOOD PRESSURE: 64 MMHG | WEIGHT: 190.5 LBS | OXYGEN SATURATION: 96 % | RESPIRATION RATE: 18 BRPM

## 2023-09-22 DIAGNOSIS — C34.92 ADENOCARCINOMA, LUNG, LEFT: Primary | ICD-10-CM

## 2023-09-22 PROCEDURE — 96375 TX/PRO/DX INJ NEW DRUG ADDON: CPT

## 2023-09-22 PROCEDURE — 96376 TX/PRO/DX INJ SAME DRUG ADON: CPT

## 2023-09-22 PROCEDURE — 25000003 PHARM REV CODE 250: Performed by: INTERNAL MEDICINE

## 2023-09-22 PROCEDURE — A4216 STERILE WATER/SALINE, 10 ML: HCPCS | Performed by: INTERNAL MEDICINE

## 2023-09-22 PROCEDURE — 63600175 PHARM REV CODE 636 W HCPCS: Mod: JZ,JG | Performed by: INTERNAL MEDICINE

## 2023-09-22 PROCEDURE — 96367 TX/PROPH/DG ADDL SEQ IV INF: CPT

## 2023-09-22 PROCEDURE — 96409 CHEMO IV PUSH SNGL DRUG: CPT

## 2023-09-22 RX ORDER — HEPARIN 100 UNIT/ML
500 SYRINGE INTRAVENOUS
Status: DISCONTINUED | OUTPATIENT
Start: 2023-09-22 | End: 2023-09-22 | Stop reason: HOSPADM

## 2023-09-22 RX ORDER — PROCHLORPERAZINE EDISYLATE 5 MG/ML
10 INJECTION INTRAMUSCULAR; INTRAVENOUS ONCE AS NEEDED
Status: DISCONTINUED | OUTPATIENT
Start: 2023-09-22 | End: 2023-09-22 | Stop reason: HOSPADM

## 2023-09-22 RX ORDER — FAMOTIDINE 10 MG/ML
20 INJECTION INTRAVENOUS
Status: COMPLETED | OUTPATIENT
Start: 2023-09-22 | End: 2023-09-22

## 2023-09-22 RX ORDER — SODIUM CHLORIDE 0.9 % (FLUSH) 0.9 %
10 SYRINGE (ML) INJECTION
Status: DISCONTINUED | OUTPATIENT
Start: 2023-09-22 | End: 2023-09-22 | Stop reason: HOSPADM

## 2023-09-22 RX ORDER — METHYLPREDNISOLONE SOD SUCC 125 MG
125 VIAL (EA) INJECTION
Status: COMPLETED | OUTPATIENT
Start: 2023-09-22 | End: 2023-09-22

## 2023-09-22 RX ORDER — DIPHENHYDRAMINE HYDROCHLORIDE 50 MG/ML
50 INJECTION INTRAMUSCULAR; INTRAVENOUS ONCE AS NEEDED
Status: COMPLETED | OUTPATIENT
Start: 2023-09-22 | End: 2023-09-22

## 2023-09-22 RX ORDER — EPINEPHRINE 0.3 MG/.3ML
0.3 INJECTION SUBCUTANEOUS ONCE AS NEEDED
Status: DISCONTINUED | OUTPATIENT
Start: 2023-09-22 | End: 2023-09-22 | Stop reason: HOSPADM

## 2023-09-22 RX ADMIN — APREPITANT 130 MG: 130 INJECTION, EMULSION INTRAVENOUS at 01:09

## 2023-09-22 RX ADMIN — DEXAMETHASONE SODIUM PHOSPHATE 0.25 MG: 4 INJECTION, SOLUTION INTRA-ARTICULAR; INTRALESIONAL; INTRAMUSCULAR; INTRAVENOUS; SOFT TISSUE at 01:09

## 2023-09-22 RX ADMIN — DIPHENHYDRAMINE HYDROCHLORIDE 50 MG: 50 INJECTION, SOLUTION INTRAMUSCULAR; INTRAVENOUS at 12:09

## 2023-09-22 RX ADMIN — DIPHENHYDRAMINE HYDROCHLORIDE 50 MG: 50 INJECTION INTRAMUSCULAR; INTRAVENOUS at 02:09

## 2023-09-22 RX ADMIN — PACLITAXEL 378 MG: 6 INJECTION, SOLUTION, CONCENTRATE INTRAVENOUS at 02:09

## 2023-09-22 RX ADMIN — METHYLPREDNISOLONE SODIUM SUCCINATE 125 MG: 125 INJECTION, POWDER, FOR SOLUTION INTRAMUSCULAR; INTRAVENOUS at 02:09

## 2023-09-22 RX ADMIN — SODIUM CHLORIDE: 9 INJECTION, SOLUTION INTRAVENOUS at 12:09

## 2023-09-22 RX ADMIN — FAMOTIDINE 20 MG: 10 INJECTION INTRAVENOUS at 12:09

## 2023-09-22 NOTE — PLAN OF CARE
8630 pt will not be rechallenged today, assisted pt to wc, no distress noted upon d/c suze wheatley with sister

## 2023-09-22 NOTE — PLAN OF CARE
1300 pt here for D1C1 Taxol/Carbo infusion, labs, hx, meds, allergies reviewed, pt with no new complaints at this time, juani peñaloza chair, continue to monitor

## 2023-09-22 NOTE — NURSING
1405 Taxol stopped, pt with c/o back pain, nausea, excessive salivation, dizziness unable to get BP    1407 Benadryl 50 mg IVP given    1408 solumedrol 100 mg IV given     1409 PZ=704/75 HR= 120 R=18  SPO2= 98%    1413 message to Dr. Eduardo dr aware , pt states feeling better, no longer having pain    1430 will not rechallenge per Dr. Eduardo  Will give 500 ml NS and d/c to home when fully recovered

## 2023-09-25 ENCOUNTER — PATIENT MESSAGE (OUTPATIENT)
Dept: HEMATOLOGY/ONCOLOGY | Facility: CLINIC | Age: 63
End: 2023-09-25
Payer: COMMERCIAL

## 2023-09-25 LAB — BACTERIA SPEC AEROBE CULT: NO GROWTH

## 2023-09-25 NOTE — PROGRESS NOTES
PATIENT: Radha Ervin  MRN: [id number]  DATE: 9/25/2023    Subjective:     Chief complaint:  Chief Complaint   Patient presents with    Lung Cancer    Follow-up       Oncologic History:      Ms. Ervin is a 63-year-old female with 30 pack-year history of smoking, quit approximately 4 months ago, who was recently diagnosed with left lung cancer after evaluation for an abnormal chest x-ray.  A subsequent CT of the chest without contrast on May 26, 2023 revealed a spiculated nodule along the paramediastinal left upper lobe measuring 2.7 x 1.9 cm.  A mildly enlarged right paratracheal lymph node was seen measuring 1.1 cm.  She underwent EBUS and biopsy on June 23, 2023.  Pathology revealed the left upper lobe lesion positive for adenocarcinoma.  Station 4R was negative for malignancy.       A PET scan on July 13, 2023 revealed the left upper lobe mass measuring 2.6 x 1.6 cm with SUV max 7.4.  There was mildly metabolic prevascular mediastinal lymph node measuring 0.9 cm seen in short axis with SUV max 2.3.  She underwent robotic left upper lobectomy and lymph node sampling on August 3, 2023.  Pathology revealed invasive poorly differentiated adenocarcinoma measuring 2.1 cm.  There was visceral pleural invasion noted.  Vascular resection revealed invasive tumor present in the adventitial soft tissue.  There was lymphovascular invasion noted.  Invasive carcinoma is present at the vascular margin.  3/4 level 6 lymph nodes, 1/2 level 11 lymph nodes and 1/4 level 12 lymph nodes were involved out of a total of 23 lymph nodes.  Surgery was complicated by intraoperative bleeding of pulmonary arterial branches which resolved with pressure.      Her case was discussed at [facility] on 8/24/23: Recommend chemoRT followed by immunotherapy.     Established care with med onc 8/28. She had seen Dr. Quevedo who had recommended sequential chemoradiation then to be followed by immunotherapy. Consented for sequential CRT with carbo/taxol.      "    Interval History: Ms. Ervin returns for follow up. She has gotten her port. She is in her usual state of health att this time. She is scheduled to start carbo/taxol sequential with thoracic radiation tomorrow. Discussed supportive care/antiemesis in detail. All questions answered.      Review of Systems     A comprehensive review of systems was performed; pertinent positives and negatives are noted in the HPI.      ECOG Performance Status:   ECOG SCORE    1 - Restricted in strenuous activity-ambulatory and able to carry out work of a light nature         Objective:      Vitals:   Vitals:    09/21/23 1315   BP: 120/84   BP Location: Left arm   Patient Position: Sitting   BP Method: Medium (Automatic)   Pulse: 89   Resp: 16   SpO2: 97%   Weight: 86.4 kg (190 lb 7.6 oz)   Height: 4' 11" (1.499 m)     BMI: Body mass index is 38.47 kg/m².      Physical Exam:   Physical Exam  Vitals and nursing note reviewed.   Constitutional:       General: She is not in acute distress.     Appearance: Normal appearance. She is not toxic-appearing.   HENT:      Head: Normocephalic and atraumatic.   Eyes:      General: No scleral icterus.     Conjunctiva/sclera: Conjunctivae normal.   Cardiovascular:      Rate and Rhythm: Normal rate and regular rhythm.      Heart sounds: Normal heart sounds. No murmur heard.  Pulmonary:      Effort: Pulmonary effort is normal. No respiratory distress.      Breath sounds: Examination of the left-upper field reveals decreased breath sounds. Decreased breath sounds present. No wheezing, rhonchi or rales.   Abdominal:      General: There is no distension.      Palpations: Abdomen is soft.      Tenderness: There is no abdominal tenderness.   Musculoskeletal:         General: No swelling, tenderness or deformity.      Cervical back: Neck supple. No tenderness.   Skin:     Coloration: Skin is not jaundiced.      Findings: No erythema.   Neurological:      General: No focal deficit present.      Mental " Status: She is alert and oriented to person, place, and time.      Motor: No weakness.   Psychiatric:         Mood and Affect: Mood normal.         Behavior: Behavior normal.           Laboratory Data:  WBC   Date Value Ref Range Status   09/21/2023 13.93 (H) 3.90 - 12.70 K/uL Final     Hemoglobin   Date Value Ref Range Status   09/21/2023 14.1 12.0 - 16.0 g/dL Final     POC Hematocrit   Date Value Ref Range Status   08/03/2023 34 (L) 36 - 54 %PCV Final     Hematocrit   Date Value Ref Range Status   09/21/2023 44.6 37.0 - 48.5 % Final     Platelets   Date Value Ref Range Status   09/21/2023 399 150 - 450 K/uL Final     Gran # (ANC)   Date Value Ref Range Status   09/21/2023 8.7 (H) 1.8 - 7.7 K/uL Final     Gran %   Date Value Ref Range Status   09/21/2023 62.6 38.0 - 73.0 % Final       Chemistry        Component Value Date/Time     09/21/2023 1239    K 4.6 09/21/2023 1239     09/21/2023 1239    CO2 21 (L) 09/21/2023 1239    BUN 18 09/21/2023 1239    BUN 12.0 04/28/2023 1207    CREATININE 1.3 09/21/2023 1239     09/21/2023 1239        Component Value Date/Time    CALCIUM 9.8 09/21/2023 1239    ALKPHOS 95 09/21/2023 1239    AST 15 09/21/2023 1239    ALT 19 09/21/2023 1239    BILITOT 0.5 09/21/2023 1239    ESTGFRAFRICA >60.0 04/06/2020 1002    EGFRNONAA >60.0 04/06/2020 1002        StgIIIA NSCLC s/p surgery with positive margins--plan to treat residual disease with sequential chemoradiation followed by immunotherapy.      Assessment/Plan:     1. Adenocarcinoma of left lung    2. CINV (chemotherapy-induced nausea and vomiting)    3. Situational anxiety    4. Centrilobular emphysema      Start carbo/taxol as scheduled tomorrow. Antiemesis regimen and supportive care discussed in detail and all questions answered.  RTC prior to C#2 for toxicity evaluation.    Med and Orders:  Orders Placed This Encounter    OLANZapine (ZYPREXA) 5 MG tablet    dexAMETHasone (DECADRON) 4 MG Tab       Follow Up:  Follow up  in about 2 weeks (around 10/5/2023).    Patient instructions:   There are no Patient Instructions on file for this visit.    Above care plan was discussed with patient and all questions were addressed to their expressed satisfaction.       Roger Eduardo MD, FACP  Hematology & Medical Oncology  [facility]     High Risk Conditions:    Patient has a condition that poses threat to life and bodily function: NSCLC  Patient is currently on drug therapy requiring intensive monitoring for toxicity: carbo/taxol chemotherapy

## 2023-10-02 DIAGNOSIS — L40.9 PSORIASIS: Primary | ICD-10-CM

## 2023-10-03 RX ORDER — APREMILAST 30 MG/1
TABLET, FILM COATED ORAL
Qty: 60 TABLET | Refills: 2 | Status: ACTIVE | OUTPATIENT
Start: 2023-10-03 | End: 2024-02-02 | Stop reason: SDUPTHER

## 2023-10-04 ENCOUNTER — PATIENT MESSAGE (OUTPATIENT)
Dept: HEMATOLOGY/ONCOLOGY | Facility: CLINIC | Age: 63
End: 2023-10-04
Payer: COMMERCIAL

## 2023-10-09 ENCOUNTER — OFFICE VISIT (OUTPATIENT)
Dept: HEMATOLOGY/ONCOLOGY | Facility: CLINIC | Age: 63
End: 2023-10-09
Payer: COMMERCIAL

## 2023-10-09 VITALS
SYSTOLIC BLOOD PRESSURE: 109 MMHG | WEIGHT: 189.81 LBS | HEART RATE: 87 BPM | DIASTOLIC BLOOD PRESSURE: 76 MMHG | RESPIRATION RATE: 18 BRPM | OXYGEN SATURATION: 97 % | HEIGHT: 59 IN | BODY MASS INDEX: 38.27 KG/M2

## 2023-10-09 DIAGNOSIS — R11.2 CINV (CHEMOTHERAPY-INDUCED NAUSEA AND VOMITING): ICD-10-CM

## 2023-10-09 DIAGNOSIS — Z01.818 EXAMINATION PRIOR TO CHEMOTHERAPY: ICD-10-CM

## 2023-10-09 DIAGNOSIS — C34.92 ADENOCARCINOMA OF LEFT LUNG: Primary | ICD-10-CM

## 2023-10-09 DIAGNOSIS — Z91.89 AT RISK FOR ALLERGIC REACTION TO MEDICATION: ICD-10-CM

## 2023-10-09 DIAGNOSIS — T45.1X5A CINV (CHEMOTHERAPY-INDUCED NAUSEA AND VOMITING): ICD-10-CM

## 2023-10-09 PROCEDURE — 3008F PR BODY MASS INDEX (BMI) DOCUMENTED: ICD-10-PCS | Mod: CPTII,S$GLB,, | Performed by: INTERNAL MEDICINE

## 2023-10-09 PROCEDURE — 3074F PR MOST RECENT SYSTOLIC BLOOD PRESSURE < 130 MM HG: ICD-10-PCS | Mod: CPTII,S$GLB,, | Performed by: INTERNAL MEDICINE

## 2023-10-09 PROCEDURE — 99215 PR OFFICE/OUTPT VISIT, EST, LEVL V, 40-54 MIN: ICD-10-PCS | Mod: S$GLB,,, | Performed by: INTERNAL MEDICINE

## 2023-10-09 PROCEDURE — 1159F PR MEDICATION LIST DOCUMENTED IN MEDICAL RECORD: ICD-10-PCS | Mod: CPTII,S$GLB,, | Performed by: INTERNAL MEDICINE

## 2023-10-09 PROCEDURE — 3074F SYST BP LT 130 MM HG: CPT | Mod: CPTII,S$GLB,, | Performed by: INTERNAL MEDICINE

## 2023-10-09 PROCEDURE — 1160F RVW MEDS BY RX/DR IN RCRD: CPT | Mod: CPTII,S$GLB,, | Performed by: INTERNAL MEDICINE

## 2023-10-09 PROCEDURE — 3044F PR MOST RECENT HEMOGLOBIN A1C LEVEL <7.0%: ICD-10-PCS | Mod: CPTII,S$GLB,, | Performed by: INTERNAL MEDICINE

## 2023-10-09 PROCEDURE — 3044F HG A1C LEVEL LT 7.0%: CPT | Mod: CPTII,S$GLB,, | Performed by: INTERNAL MEDICINE

## 2023-10-09 PROCEDURE — 99999 PR PBB SHADOW E&M-EST. PATIENT-LVL IV: ICD-10-PCS | Mod: PBBFAC,,, | Performed by: INTERNAL MEDICINE

## 2023-10-09 PROCEDURE — 3078F PR MOST RECENT DIASTOLIC BLOOD PRESSURE < 80 MM HG: ICD-10-PCS | Mod: CPTII,S$GLB,, | Performed by: INTERNAL MEDICINE

## 2023-10-09 PROCEDURE — 99999 PR PBB SHADOW E&M-EST. PATIENT-LVL IV: CPT | Mod: PBBFAC,,, | Performed by: INTERNAL MEDICINE

## 2023-10-09 PROCEDURE — 1159F MED LIST DOCD IN RCRD: CPT | Mod: CPTII,S$GLB,, | Performed by: INTERNAL MEDICINE

## 2023-10-09 PROCEDURE — 1160F PR REVIEW ALL MEDS BY PRESCRIBER/CLIN PHARMACIST DOCUMENTED: ICD-10-PCS | Mod: CPTII,S$GLB,, | Performed by: INTERNAL MEDICINE

## 2023-10-09 PROCEDURE — 3008F BODY MASS INDEX DOCD: CPT | Mod: CPTII,S$GLB,, | Performed by: INTERNAL MEDICINE

## 2023-10-09 PROCEDURE — 3078F DIAST BP <80 MM HG: CPT | Mod: CPTII,S$GLB,, | Performed by: INTERNAL MEDICINE

## 2023-10-09 PROCEDURE — 99215 OFFICE O/P EST HI 40 MIN: CPT | Mod: S$GLB,,, | Performed by: INTERNAL MEDICINE

## 2023-10-09 RX ORDER — DEXAMETHASONE 4 MG/1
8 TABLET ORAL DAILY
Qty: 12 TABLET | Refills: 0 | Status: SHIPPED | OUTPATIENT
Start: 2023-10-09 | End: 2023-10-15

## 2023-10-12 ENCOUNTER — LAB VISIT (OUTPATIENT)
Dept: LAB | Facility: HOSPITAL | Age: 63
End: 2023-10-12
Attending: INTERNAL MEDICINE
Payer: COMMERCIAL

## 2023-10-12 DIAGNOSIS — C34.92 ADENOCARCINOMA OF LEFT LUNG: ICD-10-CM

## 2023-10-12 LAB
ALBUMIN SERPL BCP-MCNC: 3.6 G/DL (ref 3.5–5.2)
ALP SERPL-CCNC: 93 U/L (ref 55–135)
ALT SERPL W/O P-5'-P-CCNC: 18 U/L (ref 10–44)
ANION GAP SERPL CALC-SCNC: 12 MMOL/L (ref 8–16)
AST SERPL-CCNC: 14 U/L (ref 10–40)
BASOPHILS # BLD AUTO: 0.11 K/UL (ref 0–0.2)
BASOPHILS NFR BLD: 0.8 % (ref 0–1.9)
BILIRUB SERPL-MCNC: 0.6 MG/DL (ref 0.1–1)
BUN SERPL-MCNC: 16 MG/DL (ref 8–23)
CALCIUM SERPL-MCNC: 9.3 MG/DL (ref 8.7–10.5)
CHLORIDE SERPL-SCNC: 108 MMOL/L (ref 95–110)
CO2 SERPL-SCNC: 18 MMOL/L (ref 23–29)
CREAT SERPL-MCNC: 1.2 MG/DL (ref 0.5–1.4)
DIFFERENTIAL METHOD: ABNORMAL
EOSINOPHIL # BLD AUTO: 0.8 K/UL (ref 0–0.5)
EOSINOPHIL NFR BLD: 6.2 % (ref 0–8)
ERYTHROCYTE [DISTWIDTH] IN BLOOD BY AUTOMATED COUNT: 13.1 % (ref 11.5–14.5)
EST. GFR  (NO RACE VARIABLE): 51 ML/MIN/1.73 M^2
GLUCOSE SERPL-MCNC: 150 MG/DL (ref 70–110)
HCT VFR BLD AUTO: 41.3 % (ref 37–48.5)
HGB BLD-MCNC: 13.9 G/DL (ref 12–16)
IMM GRANULOCYTES # BLD AUTO: 0.04 K/UL (ref 0–0.04)
IMM GRANULOCYTES NFR BLD AUTO: 0.3 % (ref 0–0.5)
LYMPHOCYTES # BLD AUTO: 2.6 K/UL (ref 1–4.8)
LYMPHOCYTES NFR BLD: 20 % (ref 18–48)
MCH RBC QN AUTO: 31.4 PG (ref 27–31)
MCHC RBC AUTO-ENTMCNC: 33.7 G/DL (ref 32–36)
MCV RBC AUTO: 93 FL (ref 82–98)
MONOCYTES # BLD AUTO: 0.9 K/UL (ref 0.3–1)
MONOCYTES NFR BLD: 6.8 % (ref 4–15)
NEUTROPHILS # BLD AUTO: 8.6 K/UL (ref 1.8–7.7)
NEUTROPHILS NFR BLD: 65.9 % (ref 38–73)
NRBC BLD-RTO: 0 /100 WBC
PLATELET # BLD AUTO: 343 K/UL (ref 150–450)
PMV BLD AUTO: 10.1 FL (ref 9.2–12.9)
POTASSIUM SERPL-SCNC: 4.2 MMOL/L (ref 3.5–5.1)
PROT SERPL-MCNC: 7.3 G/DL (ref 6–8.4)
RBC # BLD AUTO: 4.43 M/UL (ref 4–5.4)
SODIUM SERPL-SCNC: 138 MMOL/L (ref 136–145)
TSH SERPL DL<=0.005 MIU/L-ACNC: 1.76 UIU/ML (ref 0.4–4)
WBC # BLD AUTO: 13.1 K/UL (ref 3.9–12.7)

## 2023-10-12 PROCEDURE — 85025 COMPLETE CBC W/AUTO DIFF WBC: CPT | Performed by: INTERNAL MEDICINE

## 2023-10-12 PROCEDURE — 80053 COMPREHEN METABOLIC PANEL: CPT | Performed by: INTERNAL MEDICINE

## 2023-10-12 PROCEDURE — 84443 ASSAY THYROID STIM HORMONE: CPT | Performed by: INTERNAL MEDICINE

## 2023-10-12 PROCEDURE — 36415 COLL VENOUS BLD VENIPUNCTURE: CPT | Performed by: INTERNAL MEDICINE

## 2023-10-12 RX ORDER — HEPARIN 100 UNIT/ML
500 SYRINGE INTRAVENOUS
Status: CANCELLED | OUTPATIENT
Start: 2023-10-13

## 2023-10-12 RX ORDER — FAMOTIDINE 10 MG/ML
20 INJECTION INTRAVENOUS
Status: CANCELLED | OUTPATIENT
Start: 2023-10-13

## 2023-10-12 RX ORDER — SODIUM CHLORIDE 0.9 % (FLUSH) 0.9 %
10 SYRINGE (ML) INJECTION
Status: CANCELLED | OUTPATIENT
Start: 2023-10-13

## 2023-10-12 RX ORDER — CETIRIZINE HYDROCHLORIDE 10 MG/1
10 TABLET ORAL DAILY
Status: CANCELLED
Start: 2023-10-13

## 2023-10-12 RX ORDER — MONTELUKAST SODIUM 10 MG/1
10 TABLET ORAL DAILY
Status: CANCELLED
Start: 2023-10-13

## 2023-10-12 RX ORDER — EPINEPHRINE 0.3 MG/.3ML
0.3 INJECTION SUBCUTANEOUS ONCE AS NEEDED
Status: CANCELLED | OUTPATIENT
Start: 2023-10-13

## 2023-10-12 RX ORDER — DIPHENHYDRAMINE HYDROCHLORIDE 50 MG/ML
50 INJECTION INTRAMUSCULAR; INTRAVENOUS ONCE AS NEEDED
Status: CANCELLED | OUTPATIENT
Start: 2023-10-13

## 2023-10-12 RX ORDER — PROCHLORPERAZINE EDISYLATE 5 MG/ML
10 INJECTION INTRAMUSCULAR; INTRAVENOUS ONCE AS NEEDED
Status: CANCELLED
Start: 2023-10-13

## 2023-10-12 NOTE — PROGRESS NOTES
PATIENT: Radha Ervin  MRN: [id number]  DATE: 10/12/2023    Subjective:     Chief complaint:  Chief Complaint   Patient presents with    Lung Cancer    Follow-up       Oncologic History:      Ms. Ervin is a 63-year-old female with 30 pack-year history of smoking, quit approximately 4 months ago, who was recently diagnosed with left lung cancer after evaluation for an abnormal chest x-ray.  A subsequent CT of the chest without contrast on May 26, 2023 revealed a spiculated nodule along the paramediastinal left upper lobe measuring 2.7 x 1.9 cm.  A mildly enlarged right paratracheal lymph node was seen measuring 1.1 cm.  She underwent EBUS and biopsy on June 23, 2023.  Pathology revealed the left upper lobe lesion positive for adenocarcinoma.  Station 4R was negative for malignancy.       A PET scan on July 13, 2023 revealed the left upper lobe mass measuring 2.6 x 1.6 cm with SUV max 7.4.  There was mildly metabolic prevascular mediastinal lymph node measuring 0.9 cm seen in short axis with SUV max 2.3.  She underwent robotic left upper lobectomy and lymph node sampling on August 3, 2023.  Pathology revealed invasive poorly differentiated adenocarcinoma measuring 2.1 cm.  There was visceral pleural invasion noted.  Vascular resection revealed invasive tumor present in the adventitial soft tissue.  There was lymphovascular invasion noted.  Invasive carcinoma is present at the vascular margin.  3/4 level 6 lymph nodes, 1/2 level 11 lymph nodes and 1/4 level 12 lymph nodes were involved out of a total of 23 lymph nodes.  Surgery was complicated by intraoperative bleeding of pulmonary arterial branches which resolved with pressure.      Her case was discussed at [facility] on 8/24/23: Recommend chemoRT followed by immunotherapy.     Established care with med onc 8/28. She had seen Dr. Quevedo who had recommended sequential chemoradiation then to be followed by immunotherapy. Consented for sequential CRT with  "carbo/taxol.    On 9/22/23 she presented to the infusion center for carbo/taxol C#1 but developed infusion reaction to taxol so that was stopped.         Interval History: Ms. Ervin returns for follow up. Developed infusion reaftion to taxol on 9/22/23 and infusion was stopped. Symptoms did resolve with supportive care medications.  We are planning to administer taxol as desensitization formulation later this week.       Review of Systems   A comprehensive review of systems was performed; pertinent positives and negatives are noted in the HPI.        ECOG Performance Status:   ECOG SCORE    2 - Capable of all selfcare but unable to carry out any work activities, active > 50% of hours         Objective:      Vitals:   Vitals:    10/09/23 1007   BP: 109/76   BP Location: Right arm   Patient Position: Sitting   BP Method: Medium (Automatic)   Pulse: 87   Resp: 18   SpO2: 97%   Weight: 86.1 kg (189 lb 13.1 oz)   Height: 4' 11" (1.499 m)     BMI: Body mass index is 38.34 kg/m².      Physical Exam:   Physical Exam  Vitals and nursing note reviewed.   Constitutional:       General: She is not in acute distress.     Appearance: Normal appearance. She is not toxic-appearing.   HENT:      Head: Normocephalic and atraumatic.   Eyes:      General: No scleral icterus.     Conjunctiva/sclera: Conjunctivae normal.   Cardiovascular:      Rate and Rhythm: Normal rate and regular rhythm.      Heart sounds: Normal heart sounds. No murmur heard.  Pulmonary:      Effort: Pulmonary effort is normal. No respiratory distress.      Breath sounds: Examination of the left-upper field reveals decreased breath sounds. Decreased breath sounds present. No wheezing, rhonchi or rales.   Abdominal:      General: There is no distension.      Palpations: Abdomen is soft.      Tenderness: There is no abdominal tenderness.   Musculoskeletal:         General: No swelling, tenderness or deformity.      Cervical back: Neck supple. No tenderness. "   Skin:     Coloration: Skin is not jaundiced.      Findings: No erythema.   Neurological:      General: No focal deficit present.      Mental Status: She is alert and oriented to person, place, and time.      Motor: No weakness.   Psychiatric:         Mood and Affect: Mood normal.         Behavior: Behavior normal.           Laboratory Data:  WBC   Date Value Ref Range Status   10/12/2023 13.10 (H) 3.90 - 12.70 K/uL Final     Hemoglobin   Date Value Ref Range Status   10/12/2023 13.9 12.0 - 16.0 g/dL Final     POC Hematocrit   Date Value Ref Range Status   08/03/2023 34 (L) 36 - 54 %PCV Final     Hematocrit   Date Value Ref Range Status   10/12/2023 41.3 37.0 - 48.5 % Final     Platelets   Date Value Ref Range Status   10/12/2023 343 150 - 450 K/uL Final     Gran # (ANC)   Date Value Ref Range Status   10/12/2023 8.6 (H) 1.8 - 7.7 K/uL Final     Gran %   Date Value Ref Range Status   10/12/2023 65.9 38.0 - 73.0 % Final       Chemistry        Component Value Date/Time     10/12/2023 0828    K 4.2 10/12/2023 0828     10/12/2023 0828    CO2 18 (L) 10/12/2023 0828    BUN 16 10/12/2023 0828    BUN 12.0 04/28/2023 1207    CREATININE 1.2 10/12/2023 0828     (H) 10/12/2023 0828        Component Value Date/Time    CALCIUM 9.3 10/12/2023 0828    ALKPHOS 93 10/12/2023 0828    AST 14 10/12/2023 0828    ALT 18 10/12/2023 0828    BILITOT 0.6 10/12/2023 0828    ESTGFRAFRICA >60.0 04/06/2020 1002    EGFRNONAA >60.0 04/06/2020 1002              Assessment/Plan:     1. Adenocarcinoma of left lung    2. CINV (chemotherapy-induced nausea and vomiting)    3. Examination prior to chemotherapy    4. At risk for allergic reaction to medication      C#1 carbo taxol with taxol desensitization protocol this Friday, 10/13/23.  Answered questions and counseled on supportive care meds.  F/U prior to C#2    Med and Orders:  Orders Placed This Encounter    dexAMETHasone (DECADRON) 4 MG Tab       Follow Up:  Follow up in about 3  weeks (around 10/30/2023).      Above care plan was discussed with patient and all questions were addressed to their expressed satisfaction.       Roger Eduardo MD, FACP  Hematology & Medical Oncology  [facility]         High Risk Conditions:    Patient has a condition that poses threat to life and bodily function: lung adenocarcinoma  Patient is currently on drug therapy requiring intensive monitoring for toxicity: carbo/taxol, h/o taxol infusion reaction.

## 2023-10-13 ENCOUNTER — INFUSION (OUTPATIENT)
Dept: INFUSION THERAPY | Facility: HOSPITAL | Age: 63
End: 2023-10-13
Payer: COMMERCIAL

## 2023-10-13 VITALS
HEIGHT: 59 IN | SYSTOLIC BLOOD PRESSURE: 148 MMHG | HEART RATE: 94 BPM | TEMPERATURE: 98 F | WEIGHT: 191.56 LBS | BODY MASS INDEX: 38.62 KG/M2 | DIASTOLIC BLOOD PRESSURE: 77 MMHG | OXYGEN SATURATION: 99 % | RESPIRATION RATE: 18 BRPM

## 2023-10-13 DIAGNOSIS — C34.92 ADENOCARCINOMA, LUNG, LEFT: Primary | ICD-10-CM

## 2023-10-13 PROCEDURE — 96367 TX/PROPH/DG ADDL SEQ IV INF: CPT

## 2023-10-13 PROCEDURE — 25000003 PHARM REV CODE 250: Performed by: STUDENT IN AN ORGANIZED HEALTH CARE EDUCATION/TRAINING PROGRAM

## 2023-10-13 PROCEDURE — 25000003 PHARM REV CODE 250: Performed by: INTERNAL MEDICINE

## 2023-10-13 PROCEDURE — 96375 TX/PRO/DX INJ NEW DRUG ADDON: CPT

## 2023-10-13 PROCEDURE — 96413 CHEMO IV INFUSION 1 HR: CPT

## 2023-10-13 PROCEDURE — 63600175 PHARM REV CODE 636 W HCPCS: Performed by: STUDENT IN AN ORGANIZED HEALTH CARE EDUCATION/TRAINING PROGRAM

## 2023-10-13 PROCEDURE — 96361 HYDRATE IV INFUSION ADD-ON: CPT

## 2023-10-13 RX ORDER — MONTELUKAST SODIUM 10 MG/1
10 TABLET ORAL DAILY
Status: DISCONTINUED | OUTPATIENT
Start: 2023-10-13 | End: 2023-10-13 | Stop reason: HOSPADM

## 2023-10-13 RX ORDER — SODIUM CHLORIDE 9 MG/ML
INJECTION, SOLUTION INTRAVENOUS ONCE
Status: COMPLETED | OUTPATIENT
Start: 2023-10-13 | End: 2023-10-13

## 2023-10-13 RX ORDER — HEPARIN 100 UNIT/ML
500 SYRINGE INTRAVENOUS
Status: DISCONTINUED | OUTPATIENT
Start: 2023-10-13 | End: 2023-10-13 | Stop reason: HOSPADM

## 2023-10-13 RX ORDER — CETIRIZINE HYDROCHLORIDE 10 MG/1
10 TABLET ORAL DAILY
Status: DISCONTINUED | OUTPATIENT
Start: 2023-10-13 | End: 2023-10-13 | Stop reason: HOSPADM

## 2023-10-13 RX ORDER — PROCHLORPERAZINE EDISYLATE 5 MG/ML
10 INJECTION INTRAMUSCULAR; INTRAVENOUS ONCE AS NEEDED
Status: DISCONTINUED | OUTPATIENT
Start: 2023-10-13 | End: 2023-10-13 | Stop reason: HOSPADM

## 2023-10-13 RX ORDER — EPINEPHRINE 0.3 MG/.3ML
0.3 INJECTION SUBCUTANEOUS ONCE AS NEEDED
Status: DISCONTINUED | OUTPATIENT
Start: 2023-10-13 | End: 2023-10-13 | Stop reason: HOSPADM

## 2023-10-13 RX ORDER — FAMOTIDINE 10 MG/ML
20 INJECTION INTRAVENOUS
Status: COMPLETED | OUTPATIENT
Start: 2023-10-13 | End: 2023-10-13

## 2023-10-13 RX ORDER — DIPHENHYDRAMINE HYDROCHLORIDE 50 MG/ML
50 INJECTION INTRAMUSCULAR; INTRAVENOUS ONCE AS NEEDED
Status: DISCONTINUED | OUTPATIENT
Start: 2023-10-13 | End: 2023-10-13 | Stop reason: HOSPADM

## 2023-10-13 RX ORDER — SODIUM CHLORIDE 0.9 % (FLUSH) 0.9 %
10 SYRINGE (ML) INJECTION
Status: DISCONTINUED | OUTPATIENT
Start: 2023-10-13 | End: 2023-10-13 | Stop reason: HOSPADM

## 2023-10-13 RX ADMIN — DEXAMETHASONE SODIUM PHOSPHATE 0.25 MG: 4 INJECTION, SOLUTION INTRA-ARTICULAR; INTRALESIONAL; INTRAMUSCULAR; INTRAVENOUS; SOFT TISSUE at 09:10

## 2023-10-13 RX ADMIN — SODIUM CHLORIDE: 0.9 INJECTION, SOLUTION INTRAVENOUS at 09:10

## 2023-10-13 RX ADMIN — PACLITAXEL 6 MG: 6 INJECTION, SOLUTION, CONCENTRATE INTRAVENOUS at 11:10

## 2023-10-13 RX ADMIN — CETIRIZINE HYDROCHLORIDE 10 MG: 10 TABLET, FILM COATED ORAL at 09:10

## 2023-10-13 RX ADMIN — MONTELUKAST 10 MG: 10 TABLET, FILM COATED ORAL at 09:10

## 2023-10-13 RX ADMIN — FAMOTIDINE 20 MG: 10 INJECTION INTRAVENOUS at 10:10

## 2023-10-13 RX ADMIN — HYDROCORTISONE SODIUM SUCCINATE 100 MG: 100 INJECTION, POWDER, FOR SOLUTION INTRAMUSCULAR; INTRAVENOUS at 11:10

## 2023-10-13 RX ADMIN — DIPHENHYDRAMINE HYDROCHLORIDE 50 MG: 50 INJECTION, SOLUTION INTRAMUSCULAR; INTRAVENOUS at 09:10

## 2023-10-13 RX ADMIN — APREPITANT 130 MG: 130 INJECTION, EMULSION INTRAVENOUS at 10:10

## 2023-10-13 RX ADMIN — SODIUM CHLORIDE: 0.9 INJECTION, SOLUTION INTRAVENOUS at 10:10

## 2023-10-13 NOTE — PLAN OF CARE
"Pt received desense Taxol today and was unsuccessful at the 4' infusion point. Experienced back pain, coughing and head fuzzy. Given SoluCortef IVP and resolved, Dr Fournier aware, Taxol stopped. Do not further challenge. VSS throughout infusion. Educated patient about desense Taxol (indications, side effects, possible reactions, chemotherapy precautions) and verbalized understanding. PIV positive for blood return, saline locked and removed prior to DC, catheter tip intact. Pt DC with no distress noted, ambulated off of unit, w/ fx, w/o event, pleased, after obs x 1 hr. Stated feels "normal." Fx comfortable w/ DC and Pt.  Pt and fx ok with see ing Dr Eduardo prior to next alternative.  "

## 2023-10-18 ENCOUNTER — PATIENT MESSAGE (OUTPATIENT)
Dept: HEMATOLOGY/ONCOLOGY | Facility: CLINIC | Age: 63
End: 2023-10-18
Payer: COMMERCIAL

## 2023-10-18 DIAGNOSIS — C34.92 ADENOCARCINOMA, LUNG, LEFT: ICD-10-CM

## 2023-10-18 DIAGNOSIS — R05.9 COUGH, UNSPECIFIED TYPE: Primary | ICD-10-CM

## 2023-10-18 RX ORDER — FOLIC ACID 1 MG/1
1 TABLET ORAL DAILY
Qty: 30 TABLET | Refills: 11 | Status: SHIPPED | OUTPATIENT
Start: 2023-10-26 | End: 2024-03-06

## 2023-10-18 RX ORDER — DEXAMETHASONE 4 MG/1
8 TABLET ORAL SEE ADMIN INSTRUCTIONS
Qty: 24 TABLET | Refills: 5 | Status: SHIPPED | OUTPATIENT
Start: 2023-10-26 | End: 2024-02-11

## 2023-10-18 RX ORDER — ONDANSETRON 8 MG/1
8 TABLET, ORALLY DISINTEGRATING ORAL EVERY 8 HOURS PRN
Qty: 90 TABLET | Refills: 5 | Status: SHIPPED | OUTPATIENT
Start: 2023-10-26 | End: 2024-02-11

## 2023-10-18 RX ORDER — BENZONATATE 100 MG/1
100 CAPSULE ORAL 3 TIMES DAILY PRN
Qty: 30 CAPSULE | Refills: 1 | Status: SHIPPED | OUTPATIENT
Start: 2023-10-18 | End: 2023-11-07

## 2023-11-02 ENCOUNTER — OFFICE VISIT (OUTPATIENT)
Dept: HEMATOLOGY/ONCOLOGY | Facility: CLINIC | Age: 63
End: 2023-11-02
Payer: COMMERCIAL

## 2023-11-02 ENCOUNTER — LAB VISIT (OUTPATIENT)
Dept: LAB | Facility: HOSPITAL | Age: 63
End: 2023-11-02
Attending: INTERNAL MEDICINE
Payer: COMMERCIAL

## 2023-11-02 VITALS — HEIGHT: 59 IN | WEIGHT: 190.81 LBS | BODY MASS INDEX: 38.47 KG/M2

## 2023-11-02 DIAGNOSIS — C34.92 ADENOCARCINOMA OF LEFT LUNG: ICD-10-CM

## 2023-11-02 DIAGNOSIS — R11.2 CINV (CHEMOTHERAPY-INDUCED NAUSEA AND VOMITING): ICD-10-CM

## 2023-11-02 DIAGNOSIS — Z91.89 AT RISK FOR ALLERGIC REACTION TO MEDICATION: ICD-10-CM

## 2023-11-02 DIAGNOSIS — J43.2 CENTRILOBULAR EMPHYSEMA: ICD-10-CM

## 2023-11-02 DIAGNOSIS — T45.1X5A CINV (CHEMOTHERAPY-INDUCED NAUSEA AND VOMITING): ICD-10-CM

## 2023-11-02 DIAGNOSIS — C34.92 ADENOCARCINOMA, LUNG, LEFT: Primary | ICD-10-CM

## 2023-11-02 LAB
ALBUMIN SERPL BCP-MCNC: 3.5 G/DL (ref 3.5–5.2)
ALP SERPL-CCNC: 108 U/L (ref 55–135)
ALT SERPL W/O P-5'-P-CCNC: 13 U/L (ref 10–44)
ANION GAP SERPL CALC-SCNC: 9 MMOL/L (ref 8–16)
AST SERPL-CCNC: 11 U/L (ref 10–40)
BASOPHILS # BLD AUTO: 0.08 K/UL (ref 0–0.2)
BASOPHILS NFR BLD: 0.7 % (ref 0–1.9)
BILIRUB SERPL-MCNC: 0.6 MG/DL (ref 0.1–1)
BUN SERPL-MCNC: 13 MG/DL (ref 8–23)
CALCIUM SERPL-MCNC: 9.4 MG/DL (ref 8.7–10.5)
CHLORIDE SERPL-SCNC: 108 MMOL/L (ref 95–110)
CO2 SERPL-SCNC: 21 MMOL/L (ref 23–29)
CREAT SERPL-MCNC: 1.2 MG/DL (ref 0.5–1.4)
DIFFERENTIAL METHOD: ABNORMAL
EOSINOPHIL # BLD AUTO: 0.5 K/UL (ref 0–0.5)
EOSINOPHIL NFR BLD: 4.5 % (ref 0–8)
ERYTHROCYTE [DISTWIDTH] IN BLOOD BY AUTOMATED COUNT: 13.3 % (ref 11.5–14.5)
EST. GFR  (NO RACE VARIABLE): 50.5 ML/MIN/1.73 M^2
GLUCOSE SERPL-MCNC: 144 MG/DL (ref 70–110)
HCT VFR BLD AUTO: 41.5 % (ref 37–48.5)
HGB BLD-MCNC: 13.6 G/DL (ref 12–16)
IMM GRANULOCYTES # BLD AUTO: 0.04 K/UL (ref 0–0.04)
IMM GRANULOCYTES NFR BLD AUTO: 0.3 % (ref 0–0.5)
LYMPHOCYTES # BLD AUTO: 2.3 K/UL (ref 1–4.8)
LYMPHOCYTES NFR BLD: 18.8 % (ref 18–48)
MCH RBC QN AUTO: 30.8 PG (ref 27–31)
MCHC RBC AUTO-ENTMCNC: 32.8 G/DL (ref 32–36)
MCV RBC AUTO: 94 FL (ref 82–98)
MONOCYTES # BLD AUTO: 0.7 K/UL (ref 0.3–1)
MONOCYTES NFR BLD: 5.9 % (ref 4–15)
NEUTROPHILS # BLD AUTO: 8.4 K/UL (ref 1.8–7.7)
NEUTROPHILS NFR BLD: 69.8 % (ref 38–73)
NRBC BLD-RTO: 0 /100 WBC
PLATELET # BLD AUTO: 304 K/UL (ref 150–450)
PMV BLD AUTO: 9.9 FL (ref 9.2–12.9)
POTASSIUM SERPL-SCNC: 4.3 MMOL/L (ref 3.5–5.1)
PROT SERPL-MCNC: 7.2 G/DL (ref 6–8.4)
RBC # BLD AUTO: 4.42 M/UL (ref 4–5.4)
SODIUM SERPL-SCNC: 138 MMOL/L (ref 136–145)
TSH SERPL DL<=0.005 MIU/L-ACNC: 1.45 UIU/ML (ref 0.4–4)
WBC # BLD AUTO: 11.99 K/UL (ref 3.9–12.7)

## 2023-11-02 PROCEDURE — 99215 OFFICE O/P EST HI 40 MIN: CPT | Mod: S$GLB,,, | Performed by: INTERNAL MEDICINE

## 2023-11-02 PROCEDURE — 84443 ASSAY THYROID STIM HORMONE: CPT | Performed by: INTERNAL MEDICINE

## 2023-11-02 PROCEDURE — 85025 COMPLETE CBC W/AUTO DIFF WBC: CPT | Performed by: INTERNAL MEDICINE

## 2023-11-02 PROCEDURE — 99999 PR PBB SHADOW E&M-EST. PATIENT-LVL III: CPT | Mod: PBBFAC,,, | Performed by: INTERNAL MEDICINE

## 2023-11-02 PROCEDURE — 1159F MED LIST DOCD IN RCRD: CPT | Mod: CPTII,S$GLB,, | Performed by: INTERNAL MEDICINE

## 2023-11-02 PROCEDURE — 1159F PR MEDICATION LIST DOCUMENTED IN MEDICAL RECORD: ICD-10-PCS | Mod: CPTII,S$GLB,, | Performed by: INTERNAL MEDICINE

## 2023-11-02 PROCEDURE — 3008F PR BODY MASS INDEX (BMI) DOCUMENTED: ICD-10-PCS | Mod: CPTII,S$GLB,, | Performed by: INTERNAL MEDICINE

## 2023-11-02 PROCEDURE — 99999 PR PBB SHADOW E&M-EST. PATIENT-LVL III: ICD-10-PCS | Mod: PBBFAC,,, | Performed by: INTERNAL MEDICINE

## 2023-11-02 PROCEDURE — 3044F PR MOST RECENT HEMOGLOBIN A1C LEVEL <7.0%: ICD-10-PCS | Mod: CPTII,S$GLB,, | Performed by: INTERNAL MEDICINE

## 2023-11-02 PROCEDURE — 36415 COLL VENOUS BLD VENIPUNCTURE: CPT | Performed by: INTERNAL MEDICINE

## 2023-11-02 PROCEDURE — 99215 PR OFFICE/OUTPT VISIT, EST, LEVL V, 40-54 MIN: ICD-10-PCS | Mod: S$GLB,,, | Performed by: INTERNAL MEDICINE

## 2023-11-02 PROCEDURE — 3008F BODY MASS INDEX DOCD: CPT | Mod: CPTII,S$GLB,, | Performed by: INTERNAL MEDICINE

## 2023-11-02 PROCEDURE — 80053 COMPREHEN METABOLIC PANEL: CPT | Performed by: INTERNAL MEDICINE

## 2023-11-02 PROCEDURE — 3044F HG A1C LEVEL LT 7.0%: CPT | Mod: CPTII,S$GLB,, | Performed by: INTERNAL MEDICINE

## 2023-11-02 RX ORDER — HEPARIN 100 UNIT/ML
500 SYRINGE INTRAVENOUS
Status: CANCELLED | OUTPATIENT
Start: 2023-11-02

## 2023-11-02 RX ORDER — CYANOCOBALAMIN 1000 UG/ML
1000 INJECTION, SOLUTION INTRAMUSCULAR; SUBCUTANEOUS
Status: CANCELLED | OUTPATIENT
Start: 2023-11-02

## 2023-11-02 RX ORDER — DIPHENHYDRAMINE HYDROCHLORIDE 50 MG/ML
50 INJECTION INTRAMUSCULAR; INTRAVENOUS ONCE AS NEEDED
Status: CANCELLED | OUTPATIENT
Start: 2023-11-02

## 2023-11-02 RX ORDER — SODIUM CHLORIDE 0.9 % (FLUSH) 0.9 %
10 SYRINGE (ML) INJECTION
Status: CANCELLED | OUTPATIENT
Start: 2023-11-02

## 2023-11-02 RX ORDER — EPINEPHRINE 0.3 MG/.3ML
0.3 INJECTION SUBCUTANEOUS ONCE AS NEEDED
Status: CANCELLED | OUTPATIENT
Start: 2023-11-02

## 2023-11-02 RX ORDER — PROCHLORPERAZINE EDISYLATE 5 MG/ML
10 INJECTION INTRAMUSCULAR; INTRAVENOUS
Status: CANCELLED
Start: 2023-11-02

## 2023-11-02 NOTE — PROGRESS NOTES
PATIENT: Radha Ervin  MRN: [id number]  DATE: 11/2/2023    Subjective:     Chief complaint:  Chief Complaint   Patient presents with    Lung Cancer    Follow-up       Oncologic History:      Ms. Ervin is a 63-year-old female with 30 pack-year history of smoking, quit approximately 4 months ago, who was recently diagnosed with left lung cancer after evaluation for an abnormal chest x-ray.  A subsequent CT of the chest without contrast on May 26, 2023 revealed a spiculated nodule along the paramediastinal left upper lobe measuring 2.7 x 1.9 cm.  A mildly enlarged right paratracheal lymph node was seen measuring 1.1 cm.  She underwent EBUS and biopsy on June 23, 2023.  Pathology revealed the left upper lobe lesion positive for adenocarcinoma.  Station 4R was negative for malignancy.       A PET scan on July 13, 2023 revealed the left upper lobe mass measuring 2.6 x 1.6 cm with SUV max 7.4.  There was mildly metabolic prevascular mediastinal lymph node measuring 0.9 cm seen in short axis with SUV max 2.3.  She underwent robotic left upper lobectomy and lymph node sampling on August 3, 2023.  Pathology revealed invasive poorly differentiated adenocarcinoma measuring 2.1 cm.  There was visceral pleural invasion noted.  Vascular resection revealed invasive tumor present in the adventitial soft tissue.  There was lymphovascular invasion noted.  Invasive carcinoma is present at the vascular margin.  3/4 level 6 lymph nodes, 1/2 level 11 lymph nodes and 1/4 level 12 lymph nodes were involved out of a total of 23 lymph nodes.  Surgery was complicated by intraoperative bleeding of pulmonary arterial branches which resolved with pressure.      Her case was discussed at [facility] on 8/24/23: Recommend chemoRT followed by immunotherapy.     Established care with med onc 8/28. She had seen Dr. Quevedo who had recommended sequential chemoradiation then to be followed by immunotherapy. Consented for sequential CRT with  "carbo/taxol.    On 9/22/23 she presented to the infusion center for carbo/taxol C#1 but developed infusion reaction to taxol so that was stopped.         Interval History: Ms. Ervin returns for follow up. Desensitization protocol taxol scheduled for 10/13 resulted in another anaphylactoid reaction and treatment again aborted. At that point we made decision to change treatment to carbo/pemetrexed. She presents today prior to C#1 carbo/pem scheduled for tomorrow. She is feeling well in general and has no complaints at this time.       Review of Systems   A comprehensive review of systems was performed; pertinent positives and negatives are noted in the HPI.        ECOG Performance Status:   ECOG SCORE    1 - Restricted in strenuous activity-ambulatory and able to carry out work of a light nature         Objective:      Vitals:   Vitals:    11/02/23 0938   Weight: 86.5 kg (190 lb 12.9 oz)   Height: 4' 11" (1.499 m)     BMI: Body mass index is 38.54 kg/m².      Physical Exam:   Physical Exam  Vitals and nursing note reviewed.   Constitutional:       General: She is not in acute distress.     Appearance: Normal appearance. She is not toxic-appearing.   HENT:      Head: Normocephalic and atraumatic.   Eyes:      General: No scleral icterus.     Conjunctiva/sclera: Conjunctivae normal.   Cardiovascular:      Rate and Rhythm: Normal rate and regular rhythm.      Heart sounds: Normal heart sounds. No murmur heard.  Pulmonary:      Effort: Pulmonary effort is normal. No respiratory distress.      Breath sounds: Examination of the left-upper field reveals decreased breath sounds. Decreased breath sounds present. No wheezing, rhonchi or rales.   Abdominal:      General: There is no distension.      Palpations: Abdomen is soft.      Tenderness: There is no abdominal tenderness.   Musculoskeletal:         General: No swelling, tenderness or deformity.      Cervical back: Neck supple. No tenderness.   Skin:     Coloration: Skin " is not jaundiced.      Findings: No erythema.   Neurological:      General: No focal deficit present.      Mental Status: She is alert and oriented to person, place, and time.      Motor: No weakness.   Psychiatric:         Mood and Affect: Mood normal.         Behavior: Behavior normal.           Laboratory Data:  WBC   Date Value Ref Range Status   11/02/2023 11.99 3.90 - 12.70 K/uL Final     Hemoglobin   Date Value Ref Range Status   11/02/2023 13.6 12.0 - 16.0 g/dL Final     POC Hematocrit   Date Value Ref Range Status   08/03/2023 34 (L) 36 - 54 %PCV Final     Hematocrit   Date Value Ref Range Status   11/02/2023 41.5 37.0 - 48.5 % Final     Platelets   Date Value Ref Range Status   11/02/2023 304 150 - 450 K/uL Final     Gran # (ANC)   Date Value Ref Range Status   11/02/2023 8.4 (H) 1.8 - 7.7 K/uL Final     Gran %   Date Value Ref Range Status   11/02/2023 69.8 38.0 - 73.0 % Final       Chemistry        Component Value Date/Time     11/02/2023 0925    K 4.3 11/02/2023 0925     11/02/2023 0925    CO2 21 (L) 11/02/2023 0925    BUN 13 11/02/2023 0925    BUN 12.0 04/28/2023 1207    CREATININE 1.2 11/02/2023 0925     (H) 11/02/2023 0925        Component Value Date/Time    CALCIUM 9.4 11/02/2023 0925    ALKPHOS 108 11/02/2023 0925    AST 11 11/02/2023 0925    ALT 13 11/02/2023 0925    BILITOT 0.6 11/02/2023 0925    ESTGFRAFRICA >60.0 04/06/2020 1002    EGFRNONAA >60.0 04/06/2020 1002              Assessment/Plan:     1. Adenocarcinoma, lung, left    2. CINV (chemotherapy-induced nausea and vomiting)    3. At risk for allergic reaction to medication    4. Centrilobular emphysema      Developed anaphylactoid to taxol with C#1  Re-challenged 3 weeks later with desensitization protocol and had recurrent reaction.   We discussed changing treatment plan to carbo/pemetrexed. C#1 scheduled for tomorrow. Risks/benefits, expected side effects, and supportive care/symptomatic management discussed in detail  today. Consent signed in clinic today.     Med and Orders:       Follow Up:  Follow up in about 3 weeks (around 11/23/2023).      Above care plan was discussed with patient and all questions were addressed to their expressed satisfaction.       Roger Eduardo MD, FACP  Hematology & Medical Oncology  [facility]         High Risk Conditions:    Patient has a condition that poses threat to life and bodily function: lung adenocarcinoma  Patient is currently on drug therapy requiring intensive monitoring for toxicity: carboplatin/pemetrexed chemo

## 2023-11-03 ENCOUNTER — INFUSION (OUTPATIENT)
Dept: INFUSION THERAPY | Facility: HOSPITAL | Age: 63
End: 2023-11-03
Payer: COMMERCIAL

## 2023-11-03 ENCOUNTER — PATIENT MESSAGE (OUTPATIENT)
Dept: HEMATOLOGY/ONCOLOGY | Facility: CLINIC | Age: 63
End: 2023-11-03
Payer: COMMERCIAL

## 2023-11-03 VITALS
RESPIRATION RATE: 18 BRPM | TEMPERATURE: 98 F | HEART RATE: 70 BPM | DIASTOLIC BLOOD PRESSURE: 61 MMHG | SYSTOLIC BLOOD PRESSURE: 110 MMHG

## 2023-11-03 DIAGNOSIS — C34.92 ADENOCARCINOMA, LUNG, LEFT: Primary | ICD-10-CM

## 2023-11-03 PROCEDURE — 96372 THER/PROPH/DIAG INJ SC/IM: CPT

## 2023-11-03 PROCEDURE — 25000003 PHARM REV CODE 250: Performed by: INTERNAL MEDICINE

## 2023-11-03 PROCEDURE — 63600175 PHARM REV CODE 636 W HCPCS: Performed by: INTERNAL MEDICINE

## 2023-11-03 RX ORDER — SODIUM CHLORIDE 0.9 % (FLUSH) 0.9 %
10 SYRINGE (ML) INJECTION
Status: DISCONTINUED | OUTPATIENT
Start: 2023-11-03 | End: 2023-11-03 | Stop reason: HOSPADM

## 2023-11-03 RX ORDER — DIPHENHYDRAMINE HYDROCHLORIDE 50 MG/ML
50 INJECTION INTRAMUSCULAR; INTRAVENOUS ONCE AS NEEDED
Status: DISCONTINUED | OUTPATIENT
Start: 2023-11-03 | End: 2023-11-03 | Stop reason: HOSPADM

## 2023-11-03 RX ORDER — HEPARIN 100 UNIT/ML
500 SYRINGE INTRAVENOUS
Status: DISCONTINUED | OUTPATIENT
Start: 2023-11-03 | End: 2023-11-03 | Stop reason: HOSPADM

## 2023-11-03 RX ORDER — EPINEPHRINE 0.3 MG/.3ML
0.3 INJECTION SUBCUTANEOUS ONCE AS NEEDED
Status: DISCONTINUED | OUTPATIENT
Start: 2023-11-03 | End: 2023-11-03 | Stop reason: HOSPADM

## 2023-11-03 RX ORDER — PROCHLORPERAZINE EDISYLATE 5 MG/ML
10 INJECTION INTRAMUSCULAR; INTRAVENOUS
Status: DISCONTINUED | OUTPATIENT
Start: 2023-11-03 | End: 2023-11-03 | Stop reason: HOSPADM

## 2023-11-03 RX ORDER — CYANOCOBALAMIN 1000 UG/ML
1000 INJECTION, SOLUTION INTRAMUSCULAR; SUBCUTANEOUS
Status: COMPLETED | OUTPATIENT
Start: 2023-11-03 | End: 2023-11-03

## 2023-11-03 RX ADMIN — SODIUM CHLORIDE: 0.9 INJECTION, SOLUTION INTRAVENOUS at 08:11

## 2023-11-03 RX ADMIN — PEMETREXED DISODIUM 950 MG: 500 INJECTION, POWDER, LYOPHILIZED, FOR SOLUTION INTRAVENOUS at 09:11

## 2023-11-03 RX ADMIN — CYANOCOBALAMIN 1000 MCG: 1000 INJECTION, SOLUTION INTRAMUSCULAR at 08:11

## 2023-11-03 RX ADMIN — DEXAMETHASONE SODIUM PHOSPHATE 0.25 MG: 4 INJECTION, SOLUTION INTRA-ARTICULAR; INTRALESIONAL; INTRAMUSCULAR; INTRAVENOUS; SOFT TISSUE at 08:11

## 2023-11-03 RX ADMIN — CARBOPLATIN 450 MG: 10 INJECTION, SOLUTION INTRAVENOUS at 09:11

## 2023-11-03 RX ADMIN — APREPITANT 130 MG: 130 INJECTION, EMULSION INTRAVENOUS at 09:11

## 2023-11-08 ENCOUNTER — TELEPHONE (OUTPATIENT)
Dept: DERMATOLOGY | Facility: CLINIC | Age: 63
End: 2023-11-08
Payer: COMMERCIAL

## 2023-11-08 NOTE — TELEPHONE ENCOUNTER
----- Message from Taylor Campuzano sent at 11/8/2023  9:17 AM CST -----  Regarding: reschedule  Contact: pt [phone]  RESCHEDULE  Pt is calling to reschedule her appt. No appt in  Epic until February pls call pt at  [phone]

## 2023-11-18 ENCOUNTER — PATIENT MESSAGE (OUTPATIENT)
Dept: DERMATOLOGY | Facility: CLINIC | Age: 63
End: 2023-11-18
Payer: COMMERCIAL

## 2023-11-20 ENCOUNTER — OFFICE VISIT (OUTPATIENT)
Dept: DERMATOLOGY | Facility: CLINIC | Age: 63
End: 2023-11-20
Payer: COMMERCIAL

## 2023-11-20 DIAGNOSIS — L73.2 HIDRADENITIS SUPPURATIVA: Primary | ICD-10-CM

## 2023-11-20 PROCEDURE — 99214 PR OFFICE/OUTPT VISIT, EST, LEVL IV, 30-39 MIN: ICD-10-PCS | Mod: 25,S$GLB,, | Performed by: DERMATOLOGY

## 2023-11-20 PROCEDURE — 99999 PR PBB SHADOW E&M-EST. PATIENT-LVL III: ICD-10-PCS | Mod: PBBFAC,,, | Performed by: DERMATOLOGY

## 2023-11-20 PROCEDURE — 99214 OFFICE O/P EST MOD 30 MIN: CPT | Mod: 25,S$GLB,, | Performed by: DERMATOLOGY

## 2023-11-20 PROCEDURE — 3044F PR MOST RECENT HEMOGLOBIN A1C LEVEL <7.0%: ICD-10-PCS | Mod: CPTII,S$GLB,, | Performed by: DERMATOLOGY

## 2023-11-20 PROCEDURE — 3044F HG A1C LEVEL LT 7.0%: CPT | Mod: CPTII,S$GLB,, | Performed by: DERMATOLOGY

## 2023-11-20 PROCEDURE — 1159F PR MEDICATION LIST DOCUMENTED IN MEDICAL RECORD: ICD-10-PCS | Mod: CPTII,S$GLB,, | Performed by: DERMATOLOGY

## 2023-11-20 PROCEDURE — 11900 INJECT SKIN LESIONS </W 7: CPT | Mod: S$GLB,,, | Performed by: DERMATOLOGY

## 2023-11-20 PROCEDURE — 11900 PR INJECTION INTO SKIN LESIONS, UP TO 7: ICD-10-PCS | Mod: S$GLB,,, | Performed by: DERMATOLOGY

## 2023-11-20 PROCEDURE — 1159F MED LIST DOCD IN RCRD: CPT | Mod: CPTII,S$GLB,, | Performed by: DERMATOLOGY

## 2023-11-20 PROCEDURE — 1160F RVW MEDS BY RX/DR IN RCRD: CPT | Mod: CPTII,S$GLB,, | Performed by: DERMATOLOGY

## 2023-11-20 PROCEDURE — 99999 PR PBB SHADOW E&M-EST. PATIENT-LVL III: CPT | Mod: PBBFAC,,, | Performed by: DERMATOLOGY

## 2023-11-20 PROCEDURE — 1160F PR REVIEW ALL MEDS BY PRESCRIBER/CLIN PHARMACIST DOCUMENTED: ICD-10-PCS | Mod: CPTII,S$GLB,, | Performed by: DERMATOLOGY

## 2023-11-20 RX ORDER — TRIAMCINOLONE ACETONIDE 40 MG/ML
40 INJECTION, SUSPENSION INTRA-ARTICULAR; INTRAMUSCULAR ONCE
Status: DISCONTINUED | OUTPATIENT
Start: 2023-11-20 | End: 2024-01-09

## 2023-11-20 RX ORDER — SPIRONOLACTONE 50 MG/1
TABLET, FILM COATED ORAL
Qty: 90 TABLET | Refills: 1 | Status: SHIPPED | OUTPATIENT
Start: 2023-11-20

## 2023-11-20 RX ORDER — SPIRONOLACTONE 50 MG/1
TABLET, FILM COATED ORAL
Qty: 90 TABLET | Refills: 1 | Status: SHIPPED | OUTPATIENT
Start: 2023-11-20 | End: 2023-11-20 | Stop reason: SDUPTHER

## 2023-11-20 NOTE — PROGRESS NOTES
"  Subjective:      Patient ID:  Radha Ervin is a 63 y.o. female who presents for   Chief Complaint   Patient presents with    Hidradenitis Suppurativa     flare     Patient with Hidradenitis suppurativa  Last seen on 9/21/23 for I&D on flare on L breast - better.    Condition overall - Improved. Flares every 2-3 months. No current flares.    Current treatment regimen:  Otezla 30mg BID (dx with pso 2/2 humira)  Spironolactone 50mg every day - needs refill  Antibacterial wash prn flares    Has not yet started:  Turmeric    Lifestyle modifications - diet, smoking, shaving etc  Limiting "white" foods, sugars and processed foods    Not shaving    Dx with lung cancer 5/23 - started chemo on November 3rd, 2023     Review of Systems   Constitutional:  Positive for weight loss (188#). Negative for weight gain.   HENT:  Negative for nosebleeds and headaches.    Gastrointestinal:  Positive for diarrhea (resolved. never had colonoscopy). Negative for Sensitivity to oral antibiotics.   Genitourinary:  Negative for irregular periods (post menopausal).   Musculoskeletal:  Negative for arthralgias.   Skin:  Positive for abscesses (L axilla - "busted"). Negative for recent sunburn.   Neurological:  Negative for headaches.   Psychiatric/Behavioral:  Negative for depressed mood.    Hematologic/Lymphatic: Bruises/bleeds easily.       Objective:   Physical Exam   Constitutional: She appears well-developed and well-nourished. She is obese.  No distress.   Neurological: She is alert and oriented to person, place, and time. She is not disoriented.   Psychiatric: She has a normal mood and affect.   Skin:   Areas Examined (abnormalities noted in diagram):   Chest / Axilla Inspection Performed  Abdomen Inspection Performed                Diagram Legend     Erythematous scaling macule/papule c/w actinic keratosis       Vascular papule c/w angioma      Pigmented verrucoid papule/plaque c/w seborrheic keratosis      Yellow umbilicated " papule c/w sebaceous hyperplasia      Irregularly shaped tan macule c/w lentigo     1-2 mm smooth white papules consistent with Milia      Movable subcutaneous cyst with punctum c/w epidermal inclusion cyst      Subcutaneous movable cyst c/w pilar cyst      Firm pink to brown papule c/w dermatofibroma      Pedunculated fleshy papule(s) c/w skin tag(s)      Evenly pigmented macule c/w junctional nevus     Mildly variegated pigmented, slightly irregular-bordered macule c/w mildly atypical nevus      Flesh colored to evenly pigmented papule c/w intradermal nevus       Pink pearly papule/plaque c/w basal cell carcinoma      Erythematous hyperkeratotic cursted plaque c/w SCC      Surgical scar with no sign of skin cancer recurrence      Open and closed comedones      Inflammatory papules and pustules      Verrucoid papule consistent consistent with wart     Erythematous eczematous patches and plaques     Dystrophic onycholytic nail with subungual debris c/w onychomycosis     Umbilicated papule    Erythematous-base heme-crusted tan verrucoid plaque consistent with inflamed seborrheic keratosis     Erythematous Silvery Scaling Plaque c/w Psoriasis     See annotation  Lab Results   Component Value Date    WBC 11.99 11/02/2023    HGB 13.6 11/02/2023    HCT 41.5 11/02/2023    MCV 94 11/02/2023     11/02/2023         CMP  Sodium   Date Value Ref Range Status   11/02/2023 138 136 - 145 mmol/L Final     Potassium   Date Value Ref Range Status   11/02/2023 4.3 3.5 - 5.1 mmol/L Final     Chloride   Date Value Ref Range Status   11/02/2023 108 95 - 110 mmol/L Final     CO2   Date Value Ref Range Status   11/02/2023 21 (L) 23 - 29 mmol/L Final     Glucose   Date Value Ref Range Status   11/02/2023 144 (H) 70 - 110 mg/dL Final     BUN   Date Value Ref Range Status   11/02/2023 13 8 - 23 mg/dL Final   04/28/2023 12.0 7.0 - 21.0 mg/dL Final     Creatinine   Date Value Ref Range Status   11/02/2023 1.2 0.5 - 1.4 mg/dL Final      Calcium   Date Value Ref Range Status   11/02/2023 9.4 8.7 - 10.5 mg/dL Final     Total Protein   Date Value Ref Range Status   11/02/2023 7.2 6.0 - 8.4 g/dL Final     Albumin   Date Value Ref Range Status   11/02/2023 3.5 3.5 - 5.2 g/dL Final     Total Bilirubin   Date Value Ref Range Status   11/02/2023 0.6 0.1 - 1.0 mg/dL Final     Comment:     For infants and newborns, interpretation of results should be based  on gestational age, weight and in agreement with clinical  observations.    Premature Infant recommended reference ranges:  Up to 24 hours.............<8.0 mg/dL  Up to 48 hours............<12.0 mg/dL  3-5 days..................<15.0 mg/dL  6-29 days.................<15.0 mg/dL       Alkaline Phosphatase   Date Value Ref Range Status   11/02/2023 108 55 - 135 U/L Final     AST   Date Value Ref Range Status   11/02/2023 11 10 - 40 U/L Final     ALT   Date Value Ref Range Status   11/02/2023 13 10 - 44 U/L Final     Anion Gap   Date Value Ref Range Status   11/02/2023 9 8 - 16 mmol/L Final   04/28/2023 13.3 9 - 18 mmol/L Final     eGFR   Date Value Ref Range Status   11/02/2023 50.5 (A) >60 mL/min/1.73 m^2 Final   04/28/2023 76 (L) >=90 mL/min Final     Comment:     Calculation based on the Chronic Kidney Disease Epidemiology Collaboration (CKD-EPI) equation refit  without adjustment for race.               Assessment / Plan:        Hidradenitis suppurativa  -     Discontinue: spironolactone (ALDACTONE) 50 MG tablet; Take 1 po qday  Hold if blood pressure is low  Dispense: 90 tablet; Refill: 1  -     spironolactone (ALDACTONE) 50 MG tablet; Take 1 po qday  Dispense: 90 tablet; Refill: 1  -     triamcinolone acetonide injection 10 mg  -     NV [id number] ACETONIDE INJ PER 10MG  -     NV INJECTION INTO SKIN LESIONS, UP TO 7  -     triamcinolone acetonide injection 40 mg  -     NV [id number] ACETONIDE INJ PER 10MG  -     NV INJECTION INTO SKIN LESIONS, UP TO 7    Intralesional Kenalog 10mg/cc ( cc  "total) injected into 2 lesions on the right inframammary and left upper abdomen today after obtaining verbal consent including risk of surrounding hypopigmentation. Patient tolerated procedure well.    Units: 1  NDC for Kenalog 10mg/cc:  1558-5456-26    Intralesional Kenalog 20mg/cc ( cc total) injected into 1 lesions on the left axilla today after obtaining verbal consent including risk of surrounding hypopigmentation. Patient tolerated procedure well.    Units:2  NDC for Kenalog 40mg/cc:  6642-1492-64              Hidradenitis suppurativa  Today's Plan:      LIFESTYLE:    Continue weight loss and dietary modifications   Avoid sugars and processed foods  Limit "white" foods ie. Bread, rice, pasta, potatoes  - recommend limiting to 1/2 cup per serving  Limit dairy     Keep skin cool as overheating can flare HS    Avoid shaving affected areas and wearing tight fitting clothing as friction exacerbates disease    TREATMENT:    Continue:  Wash affected areas with Hibiclens +/- Benzoyl peroxide every day  Spironolactone (aldactone) at 50 mg every day  Clindamycin solution 1x/day to "quiet affected areas" and 2x/day to flared affected areas  Otezla 2x/day    Start:  Turmeric (with black pepper) supplement at 500mg to 1 gram per day (available over the counter and on [employer])   Can cause upset stomach    For flares:  Dilute bleach to affected areas: compresses daily to affected/flared area(s) - can use CLn wash on warm wet rag as compress  If have groin involvement, may want to take dilute bleach baths (daily when flared; 2x/week for maintenance).     Consider:  Adding metformin  Adding dapsone  Change from Otezla to Cosentyx    RESOURCE:  [facility]      FOR FLARES (new painful bump):  [facility] which will often hasten resolution of tender, red bump    Can apply warm/hot compresses on a painful, deep lump    If notice a foul-smelling drainage, can use Hydrogen Peroxide to cleanse " area followed by application of Medi-honey to open area (apply Medi-honey 1x/day)                        Follow up in about 3 months (around 2/20/2024) for HS clinic.

## 2023-11-20 NOTE — PATIENT INSTRUCTIONS
"LIFESTYLE:    Continue weight loss and dietary modifications   Avoid sugars and processed foods  Limit "white" foods ie. Bread, rice, pasta, potatoes  - recommend limiting to 1/2 cup per serving  Limit dairy     Keep skin cool as overheating can flare HS    Avoid shaving affected areas and wearing tight fitting clothing as friction exacerbates disease    TREATMENT:    Continue:  Wash affected areas with Hibiclens +/- Benzoyl peroxide every day  Spironolactone (aldactone) at 50 mg every day  Clindamycin solution 1x/day to "quiet affected areas" and 2x/day to flared affected areas  Otezla 2x/day    Start:  Turmeric (with black pepper) supplement at 500mg to 1 gram per day (available over the counter and on [employer])   Can cause upset stomach    For flares:  Dilute bleach to affected areas: compresses daily to affected/flared area(s) - can use CLn wash on warm wet rag as compress  If have groin involvement, may want to take dilute bleach baths (daily when flared; 2x/week for maintenance).     RESOURCE:  [facility]      FOR FLARES (new painful bump):  Message office through [facility] for injection which will often hasten resolution of tender, red bump    Can apply warm/hot compresses on a painful, deep lump    If notice a foul-smelling drainage, can use Hydrogen Peroxide to cleanse area followed by application of Medi-honey to open area (apply Medi-honey 1x/day)                      "

## 2023-11-20 NOTE — ASSESSMENT & PLAN NOTE
"Today's Plan:      LIFESTYLE:    Continue weight loss and dietary modifications   Avoid sugars and processed foods  Limit "white" foods ie. Bread, rice, pasta, potatoes  - recommend limiting to 1/2 cup per serving  Limit dairy     Keep skin cool as overheating can flare HS    Avoid shaving affected areas and wearing tight fitting clothing as friction exacerbates disease    TREATMENT:    Continue:  Wash affected areas with Hibiclens +/- Benzoyl peroxide every day  Spironolactone (aldactone) at 50 mg every day  Clindamycin solution 1x/day to "quiet affected areas" and 2x/day to flared affected areas  Otezla 2x/day    Start:  Turmeric (with black pepper) supplement at 500mg to 1 gram per day (available over the counter and on [employer])   Can cause upset stomach    For flares:  Dilute bleach to affected areas: compresses daily to affected/flared area(s) - can use CLn wash on warm wet rag as compress  If have groin involvement, may want to take dilute bleach baths (daily when flared; 2x/week for maintenance).     Consider:  Adding metformin  Adding dapsone  Change from Otezla to Cosentyx    RESOURCE:  [facility]      FOR FLARES (new painful bump):  Message office through [facility] for injection which will often hasten resolution of tender, red bump    Can apply warm/hot compresses on a painful, deep lump    If notice a foul-smelling drainage, can use Hydrogen Peroxide to cleanse area followed by application of Medi-honey to open area (apply Medi-honey 1x/day)                      "

## 2023-11-22 ENCOUNTER — LAB VISIT (OUTPATIENT)
Dept: LAB | Facility: HOSPITAL | Age: 63
End: 2023-11-22
Attending: INTERNAL MEDICINE
Payer: COMMERCIAL

## 2023-11-22 ENCOUNTER — OFFICE VISIT (OUTPATIENT)
Dept: HEMATOLOGY/ONCOLOGY | Facility: CLINIC | Age: 63
End: 2023-11-22
Payer: COMMERCIAL

## 2023-11-22 VITALS
HEIGHT: 59 IN | RESPIRATION RATE: 18 BRPM | DIASTOLIC BLOOD PRESSURE: 77 MMHG | BODY MASS INDEX: 38.04 KG/M2 | OXYGEN SATURATION: 97 % | SYSTOLIC BLOOD PRESSURE: 109 MMHG | HEART RATE: 87 BPM | TEMPERATURE: 98 F | WEIGHT: 188.69 LBS

## 2023-11-22 DIAGNOSIS — T45.1X5A CINV (CHEMOTHERAPY-INDUCED NAUSEA AND VOMITING): ICD-10-CM

## 2023-11-22 DIAGNOSIS — C34.92 ADENOCARCINOMA, LUNG, LEFT: Primary | ICD-10-CM

## 2023-11-22 DIAGNOSIS — Z86.69 HISTORY OF MIGRAINE HEADACHES: ICD-10-CM

## 2023-11-22 DIAGNOSIS — R11.2 CINV (CHEMOTHERAPY-INDUCED NAUSEA AND VOMITING): ICD-10-CM

## 2023-11-22 DIAGNOSIS — K52.1 CHEMOTHERAPY INDUCED DIARRHEA: ICD-10-CM

## 2023-11-22 DIAGNOSIS — T45.1X5A CHEMOTHERAPY INDUCED DIARRHEA: ICD-10-CM

## 2023-11-22 DIAGNOSIS — N18.31 STAGE 3A CHRONIC KIDNEY DISEASE: ICD-10-CM

## 2023-11-22 DIAGNOSIS — L73.2 HIDRADENITIS SUPPURATIVA: ICD-10-CM

## 2023-11-22 DIAGNOSIS — C34.92 ADENOCARCINOMA OF LEFT LUNG: ICD-10-CM

## 2023-11-22 LAB
ALBUMIN SERPL BCP-MCNC: 3.5 G/DL (ref 3.5–5.2)
ALP SERPL-CCNC: 91 U/L (ref 55–135)
ALT SERPL W/O P-5'-P-CCNC: 27 U/L (ref 10–44)
ANION GAP SERPL CALC-SCNC: 11 MMOL/L (ref 8–16)
AST SERPL-CCNC: 16 U/L (ref 10–40)
BASOPHILS # BLD AUTO: 0.07 K/UL (ref 0–0.2)
BASOPHILS NFR BLD: 0.7 % (ref 0–1.9)
BILIRUB SERPL-MCNC: 0.2 MG/DL (ref 0.1–1)
BUN SERPL-MCNC: 21 MG/DL (ref 8–23)
CALCIUM SERPL-MCNC: 9.5 MG/DL (ref 8.7–10.5)
CHLORIDE SERPL-SCNC: 107 MMOL/L (ref 95–110)
CO2 SERPL-SCNC: 19 MMOL/L (ref 23–29)
CREAT SERPL-MCNC: 1.3 MG/DL (ref 0.5–1.4)
DIFFERENTIAL METHOD: ABNORMAL
EOSINOPHIL # BLD AUTO: 0.2 K/UL (ref 0–0.5)
EOSINOPHIL NFR BLD: 2.5 % (ref 0–8)
ERYTHROCYTE [DISTWIDTH] IN BLOOD BY AUTOMATED COUNT: 13.6 % (ref 11.5–14.5)
EST. GFR  (NO RACE VARIABLE): 45.9 ML/MIN/1.73 M^2
GLUCOSE SERPL-MCNC: 128 MG/DL (ref 70–110)
HCT VFR BLD AUTO: 36.3 % (ref 37–48.5)
HGB BLD-MCNC: 12.1 G/DL (ref 12–16)
IMM GRANULOCYTES # BLD AUTO: 0.22 K/UL (ref 0–0.04)
IMM GRANULOCYTES NFR BLD AUTO: 2.3 % (ref 0–0.5)
LYMPHOCYTES # BLD AUTO: 3.4 K/UL (ref 1–4.8)
LYMPHOCYTES NFR BLD: 34.8 % (ref 18–48)
MCH RBC QN AUTO: 31.5 PG (ref 27–31)
MCHC RBC AUTO-ENTMCNC: 33.3 G/DL (ref 32–36)
MCV RBC AUTO: 95 FL (ref 82–98)
MONOCYTES # BLD AUTO: 1.1 K/UL (ref 0.3–1)
MONOCYTES NFR BLD: 11.6 % (ref 4–15)
NEUTROPHILS # BLD AUTO: 4.7 K/UL (ref 1.8–7.7)
NEUTROPHILS NFR BLD: 48.1 % (ref 38–73)
NRBC BLD-RTO: 0 /100 WBC
PLATELET # BLD AUTO: 344 K/UL (ref 150–450)
PMV BLD AUTO: 8.9 FL (ref 9.2–12.9)
POTASSIUM SERPL-SCNC: 4.5 MMOL/L (ref 3.5–5.1)
PROT SERPL-MCNC: 7.2 G/DL (ref 6–8.4)
RBC # BLD AUTO: 3.84 M/UL (ref 4–5.4)
SODIUM SERPL-SCNC: 137 MMOL/L (ref 136–145)
TSH SERPL DL<=0.005 MIU/L-ACNC: 2.99 UIU/ML (ref 0.4–4)
WBC # BLD AUTO: 9.69 K/UL (ref 3.9–12.7)

## 2023-11-22 PROCEDURE — 1159F PR MEDICATION LIST DOCUMENTED IN MEDICAL RECORD: ICD-10-PCS | Mod: CPTII,S$GLB,, | Performed by: NURSE PRACTITIONER

## 2023-11-22 PROCEDURE — 3044F HG A1C LEVEL LT 7.0%: CPT | Mod: CPTII,S$GLB,, | Performed by: NURSE PRACTITIONER

## 2023-11-22 PROCEDURE — 84443 ASSAY THYROID STIM HORMONE: CPT | Performed by: INTERNAL MEDICINE

## 2023-11-22 PROCEDURE — 99999 PR PBB SHADOW E&M-EST. PATIENT-LVL V: ICD-10-PCS | Mod: PBBFAC,,, | Performed by: NURSE PRACTITIONER

## 2023-11-22 PROCEDURE — 1160F RVW MEDS BY RX/DR IN RCRD: CPT | Mod: CPTII,S$GLB,, | Performed by: NURSE PRACTITIONER

## 2023-11-22 PROCEDURE — 1160F PR REVIEW ALL MEDS BY PRESCRIBER/CLIN PHARMACIST DOCUMENTED: ICD-10-PCS | Mod: CPTII,S$GLB,, | Performed by: NURSE PRACTITIONER

## 2023-11-22 PROCEDURE — 85025 COMPLETE CBC W/AUTO DIFF WBC: CPT | Performed by: INTERNAL MEDICINE

## 2023-11-22 PROCEDURE — 3008F PR BODY MASS INDEX (BMI) DOCUMENTED: ICD-10-PCS | Mod: CPTII,S$GLB,, | Performed by: NURSE PRACTITIONER

## 2023-11-22 PROCEDURE — 3044F PR MOST RECENT HEMOGLOBIN A1C LEVEL <7.0%: ICD-10-PCS | Mod: CPTII,S$GLB,, | Performed by: NURSE PRACTITIONER

## 2023-11-22 PROCEDURE — 99215 PR OFFICE/OUTPT VISIT, EST, LEVL V, 40-54 MIN: ICD-10-PCS | Mod: S$GLB,,, | Performed by: NURSE PRACTITIONER

## 2023-11-22 PROCEDURE — 3074F SYST BP LT 130 MM HG: CPT | Mod: CPTII,S$GLB,, | Performed by: NURSE PRACTITIONER

## 2023-11-22 PROCEDURE — 3078F DIAST BP <80 MM HG: CPT | Mod: CPTII,S$GLB,, | Performed by: NURSE PRACTITIONER

## 2023-11-22 PROCEDURE — 36415 COLL VENOUS BLD VENIPUNCTURE: CPT | Performed by: INTERNAL MEDICINE

## 2023-11-22 PROCEDURE — 99999 PR PBB SHADOW E&M-EST. PATIENT-LVL V: CPT | Mod: PBBFAC,,, | Performed by: NURSE PRACTITIONER

## 2023-11-22 PROCEDURE — 3008F BODY MASS INDEX DOCD: CPT | Mod: CPTII,S$GLB,, | Performed by: NURSE PRACTITIONER

## 2023-11-22 PROCEDURE — 99215 OFFICE O/P EST HI 40 MIN: CPT | Mod: S$GLB,,, | Performed by: NURSE PRACTITIONER

## 2023-11-22 PROCEDURE — 3078F PR MOST RECENT DIASTOLIC BLOOD PRESSURE < 80 MM HG: ICD-10-PCS | Mod: CPTII,S$GLB,, | Performed by: NURSE PRACTITIONER

## 2023-11-22 PROCEDURE — 80053 COMPREHEN METABOLIC PANEL: CPT | Performed by: INTERNAL MEDICINE

## 2023-11-22 PROCEDURE — 1159F MED LIST DOCD IN RCRD: CPT | Mod: CPTII,S$GLB,, | Performed by: NURSE PRACTITIONER

## 2023-11-22 PROCEDURE — 3074F PR MOST RECENT SYSTOLIC BLOOD PRESSURE < 130 MM HG: ICD-10-PCS | Mod: CPTII,S$GLB,, | Performed by: NURSE PRACTITIONER

## 2023-11-22 RX ORDER — DIPHENHYDRAMINE HYDROCHLORIDE 50 MG/ML
50 INJECTION INTRAMUSCULAR; INTRAVENOUS ONCE AS NEEDED
Status: CANCELLED | OUTPATIENT
Start: 2023-11-22

## 2023-11-22 RX ORDER — SODIUM CHLORIDE 0.9 % (FLUSH) 0.9 %
10 SYRINGE (ML) INJECTION
Status: CANCELLED | OUTPATIENT
Start: 2023-11-22

## 2023-11-22 RX ORDER — HEPARIN 100 UNIT/ML
500 SYRINGE INTRAVENOUS
Status: CANCELLED | OUTPATIENT
Start: 2023-11-22

## 2023-11-22 RX ORDER — EPINEPHRINE 0.3 MG/.3ML
0.3 INJECTION SUBCUTANEOUS ONCE AS NEEDED
Status: CANCELLED | OUTPATIENT
Start: 2023-11-22

## 2023-11-22 RX ORDER — PROCHLORPERAZINE EDISYLATE 5 MG/ML
10 INJECTION INTRAMUSCULAR; INTRAVENOUS
Status: CANCELLED
Start: 2023-11-22

## 2023-11-22 NOTE — PROGRESS NOTES
PATIENT: Radha Ervin  MRN: [id number]  DATE: 11/22/2023    Subjective:     Chief complaint:  Chief Complaint   Patient presents with    Adenocarcinoma, lung, left       Oncologic History:      Ms. Ervin is a 63-year-old female with 30 pack-year history of smoking, quit approximately 4 months ago, who was recently diagnosed with left lung cancer after evaluation for an abnormal chest x-ray.  A subsequent CT of the chest without contrast on May 26, 2023 revealed a spiculated nodule along the paramediastinal left upper lobe measuring 2.7 x 1.9 cm.  A mildly enlarged right paratracheal lymph node was seen measuring 1.1 cm.  She underwent EBUS and biopsy on June 23, 2023.  Pathology revealed the left upper lobe lesion positive for adenocarcinoma.  Station 4R was negative for malignancy.       A PET scan on July 13, 2023 revealed the left upper lobe mass measuring 2.6 x 1.6 cm with SUV max 7.4.  There was mildly metabolic prevascular mediastinal lymph node measuring 0.9 cm seen in short axis with SUV max 2.3.  She underwent robotic left upper lobectomy and lymph node sampling on August 3, 2023.  Pathology revealed invasive poorly differentiated adenocarcinoma measuring 2.1 cm.  There was visceral pleural invasion noted.  Vascular resection revealed invasive tumor present in the adventitial soft tissue.  There was lymphovascular invasion noted.  Invasive carcinoma is present at the vascular margin.  3/4 level 6 lymph nodes, 1/2 level 11 lymph nodes and 1/4 level 12 lymph nodes were involved out of a total of 23 lymph nodes.  Surgery was complicated by intraoperative bleeding of pulmonary arterial branches which resolved with pressure.      Her case was discussed at [facility] on 8/24/23: Recommend chemoRT followed by immunotherapy.     Established care with med onc 8/28. She had seen Dr. Quevedo who had recommended sequential chemoradiation then to be followed by immunotherapy. Consented for sequential CRT with  "carbo/taxol.    On 9/22/23 she presented to the infusion center for carbo/taxol C#1 but developed infusion reaction to taxol so that was stopped.         Interval History: Ms. Ervin returns for follow up. Tolerated C1 of carbo/pemetrexed without complications. Experienced bad headaches for about 3 days after chemo. Eventually resolved with Ibuprofen. Diarrhea (>3 BMs per day) for a few days after chemo and used Pepto Bismol with success. Mild nausea. States she did not take scheduled dex or zyprexa d/t not feeling nauseated. Having flare of  Hidradenitis suppurativa. Saw Dr. Quan 2 days ago and received kenalog injections into lesions.        Review of Systems   A comprehensive review of systems was performed; pertinent positives and negatives are noted in the HPI.        ECOG Performance Status:   ECOG SCORE             Objective:      Vitals:   Vitals:    11/22/23 0829   BP: 109/77   BP Location: Right arm   Patient Position: Sitting   BP Method: Medium (Automatic)   Pulse: 87   Resp: 18   Temp: 97.9 °F (36.6 °C)   TempSrc: Oral   SpO2: 97%   Weight: 85.6 kg (188 lb 11.4 oz)   Height: 4' 11" (1.499 m)       BMI: Body mass index is 38.12 kg/m².      Physical Exam:   Physical Exam  Vitals and nursing note reviewed.   Constitutional:       General: She is not in acute distress.     Appearance: Normal appearance. She is not toxic-appearing.   HENT:      Head: Normocephalic and atraumatic.   Eyes:      General: No scleral icterus.     Conjunctiva/sclera: Conjunctivae normal.   Cardiovascular:      Rate and Rhythm: Normal rate and regular rhythm.      Heart sounds: Normal heart sounds. No murmur heard.  Pulmonary:      Effort: Pulmonary effort is normal. No respiratory distress.      Breath sounds: Examination of the left-upper field reveals decreased breath sounds. Decreased breath sounds present. No wheezing, rhonchi or rales.   Abdominal:      General: There is no distension.      Palpations: Abdomen is soft. "      Tenderness: There is no abdominal tenderness.   Musculoskeletal:         General: No swelling, tenderness or deformity.      Cervical back: Neck supple. No tenderness.   Skin:     Coloration: Skin is not jaundiced.      Findings: No erythema.   Neurological:      General: No focal deficit present.      Mental Status: She is alert and oriented to person, place, and time.      Motor: No weakness.   Psychiatric:         Mood and Affect: Mood normal.         Behavior: Behavior normal.           Laboratory Data:  WBC   Date Value Ref Range Status   11/22/2023 9.69 3.90 - 12.70 K/uL Final     Hemoglobin   Date Value Ref Range Status   11/22/2023 12.1 12.0 - 16.0 g/dL Final     POC Hematocrit   Date Value Ref Range Status   08/03/2023 34 (L) 36 - 54 %PCV Final     Hematocrit   Date Value Ref Range Status   11/22/2023 36.3 (L) 37.0 - 48.5 % Final     Platelets   Date Value Ref Range Status   11/22/2023 344 150 - 450 K/uL Final     Gran # (ANC)   Date Value Ref Range Status   11/22/2023 4.7 1.8 - 7.7 K/uL Final     Gran %   Date Value Ref Range Status   11/22/2023 48.1 38.0 - 73.0 % Final       Chemistry        Component Value Date/Time     11/22/2023 0741    K 4.5 11/22/2023 0741     11/22/2023 0741    CO2 19 (L) 11/22/2023 0741    BUN 21 11/22/2023 0741    BUN 12.0 04/28/2023 1207    CREATININE 1.3 11/22/2023 0741     (H) 11/22/2023 0741        Component Value Date/Time    CALCIUM 9.5 11/22/2023 0741    ALKPHOS 91 11/22/2023 0741    AST 16 11/22/2023 0741    ALT 27 11/22/2023 0741    BILITOT 0.2 11/22/2023 0741    ESTGFRAFRICA >60.0 04/06/2020 1002    EGFRNONAA >60.0 04/06/2020 1002              Assessment/Plan:     1. Adenocarcinoma, lung, left    2. CINV (chemotherapy-induced nausea and vomiting)    3. Hidradenitis suppurativa    4. Chemotherapy induced diarrhea    5. History of migraine headaches    6. Stage 3a chronic kidney disease        1. Stage IIIA Lung Adenocarcinoma -  Developed  anaphylactoid to taxol with C#1  Re-challenged 3 weeks later with desensitization protocol and had recurrent reaction.   Treatment plan changed to carbo/pemetrexed.   Tolerated C1 of Carbo/Pem. Labs acceptable to proceed with C2.     2. Discussed anti-emetic regimen. Will use dex this cycle. Add zyprexa if nausea uncontrolled. Zofran and phenergan PRN.     3. Following with Derm. Continue to follow recommendations.     4. Discussed using imodium/pepto once she has had 2 loose stools.     5. Hx of migraines. Bad headaches for several days with C1. Discussed using Tylenol and may use Ibuprofen sparingly if unresponsive to Tylenol.     6. Baseline Cr 1.1-1.4. Cr 1.3 today - CrCl 59 ml/min. Ok to continue Alimta.  Discussed importance of hydration.     Patient is in agreement with the proposed treatment plan. All questions were answered to the patient's satisfaction. Pt knows to call clinic if anything is needed before the next clinic visit.    Tova Alejandro, MSN, APRN, FNP-C  Hematology and Medical Oncology  Nurse Practitioner to Dr. Dakotah Wynne  Nurse Practitioner, [facility]      Route Chart for Scheduling    Med Onc Chart Routing      Follow up with physician 3 weeks. see Dr. Eduardo prior to Carbo/Alimta   Follow up with MARIA ALEJANDRA    Infusion scheduling note    Injection scheduling note    Labs CBC and CMP   Scheduling:  Preferred lab:  Lab interval:     Imaging    Pharmacy appointment    Other referrals                  Treatment Plan Information   OP NSCLC PEMETREXED + CARBOPLATIN (AUC) Q3W   Roger Eduardo MD   Upcoming Treatment Dates - OP NSCLC PEMETREXED + CARBOPLATIN (AUC) Q3W    11/17/2023       Chemotherapy       PEMEtrexed disodium (ALIMTA) 950 mg in sodium chloride 0.9% SolP 100 mL chemo infusion       CARBOplatin (PARAPLATIN) 425 mg in sodium chloride 0.9% 292.5 mL chemo infusion       Antiemetics       aprepitant (CINVANTI) injection 130 mg       palonosetron (ALOXI) 0.25  mg with Dexamethasone (DECADRON) 12 mg in NS 50 mL IVPB  12/8/2023       Chemotherapy       PEMEtrexed disodium (ALIMTA) 950 mg in sodium chloride 0.9% SolP 100 mL chemo infusion       CARBOplatin (PARAPLATIN) 425 mg in sodium chloride 0.9% 292.5 mL chemo infusion       Antiemetics       aprepitant (CINVANTI) injection 130 mg       palonosetron (ALOXI) 0.25 mg with Dexamethasone (DECADRON) 12 mg in NS 50 mL IVPB  12/29/2023       Chemotherapy       PEMEtrexed disodium (ALIMTA) 950 mg in sodium chloride 0.9% SolP 100 mL chemo infusion       CARBOplatin (PARAPLATIN) 450 mg in sodium chloride 0.9% 295 mL chemo infusion       Supportive Care       cyanocobalamin injection 1,000 mcg       Antiemetics       aprepitant (CINVANTI) injection 130 mg       palonosetron (ALOXI) 0.25 mg with Dexamethasone (DECADRON) 12 mg in NS 50 mL IVPB

## 2023-11-24 ENCOUNTER — INFUSION (OUTPATIENT)
Dept: INFUSION THERAPY | Facility: HOSPITAL | Age: 63
End: 2023-11-24
Payer: COMMERCIAL

## 2023-11-24 VITALS
DIASTOLIC BLOOD PRESSURE: 77 MMHG | HEART RATE: 85 BPM | OXYGEN SATURATION: 98 % | SYSTOLIC BLOOD PRESSURE: 128 MMHG | RESPIRATION RATE: 20 BRPM | BODY MASS INDEX: 37.91 KG/M2 | HEIGHT: 59 IN | WEIGHT: 188.06 LBS | TEMPERATURE: 98 F

## 2023-11-24 DIAGNOSIS — C34.92 ADENOCARCINOMA, LUNG, LEFT: Primary | ICD-10-CM

## 2023-11-24 PROCEDURE — 96417 CHEMO IV INFUS EACH ADDL SEQ: CPT

## 2023-11-24 PROCEDURE — A4216 STERILE WATER/SALINE, 10 ML: HCPCS | Performed by: NURSE PRACTITIONER

## 2023-11-24 PROCEDURE — 96375 TX/PRO/DX INJ NEW DRUG ADDON: CPT

## 2023-11-24 PROCEDURE — 96367 TX/PROPH/DG ADDL SEQ IV INF: CPT

## 2023-11-24 PROCEDURE — 25000003 PHARM REV CODE 250: Performed by: NURSE PRACTITIONER

## 2023-11-24 PROCEDURE — 63600175 PHARM REV CODE 636 W HCPCS: Mod: JW,JG | Performed by: NURSE PRACTITIONER

## 2023-11-24 PROCEDURE — 96413 CHEMO IV INFUSION 1 HR: CPT

## 2023-11-24 RX ORDER — HEPARIN 100 UNIT/ML
500 SYRINGE INTRAVENOUS
Status: DISCONTINUED | OUTPATIENT
Start: 2023-11-24 | End: 2023-11-24 | Stop reason: HOSPADM

## 2023-11-24 RX ORDER — PROCHLORPERAZINE EDISYLATE 5 MG/ML
10 INJECTION INTRAMUSCULAR; INTRAVENOUS
Status: DISCONTINUED | OUTPATIENT
Start: 2023-11-24 | End: 2023-11-24 | Stop reason: HOSPADM

## 2023-11-24 RX ORDER — DIPHENHYDRAMINE HYDROCHLORIDE 50 MG/ML
50 INJECTION INTRAMUSCULAR; INTRAVENOUS ONCE AS NEEDED
Status: DISCONTINUED | OUTPATIENT
Start: 2023-11-24 | End: 2023-11-24 | Stop reason: HOSPADM

## 2023-11-24 RX ORDER — EPINEPHRINE 0.3 MG/.3ML
0.3 INJECTION SUBCUTANEOUS ONCE AS NEEDED
Status: DISCONTINUED | OUTPATIENT
Start: 2023-11-24 | End: 2023-11-24 | Stop reason: HOSPADM

## 2023-11-24 RX ORDER — SODIUM CHLORIDE 0.9 % (FLUSH) 0.9 %
10 SYRINGE (ML) INJECTION
Status: DISCONTINUED | OUTPATIENT
Start: 2023-11-24 | End: 2023-11-24 | Stop reason: HOSPADM

## 2023-11-24 RX ADMIN — Medication 10 ML: at 03:11

## 2023-11-24 RX ADMIN — SODIUM CHLORIDE: 9 INJECTION, SOLUTION INTRAVENOUS at 02:11

## 2023-11-24 RX ADMIN — APREPITANT 130 MG: 130 INJECTION, EMULSION INTRAVENOUS at 02:11

## 2023-11-24 RX ADMIN — DEXAMETHASONE SODIUM PHOSPHATE 0.25 MG: 4 INJECTION, SOLUTION INTRA-ARTICULAR; INTRALESIONAL; INTRAMUSCULAR; INTRAVENOUS; SOFT TISSUE at 02:11

## 2023-11-24 RX ADMIN — PEMETREXED DISODIUM 950 MG: 500 INJECTION, POWDER, LYOPHILIZED, FOR SOLUTION INTRAVENOUS at 03:11

## 2023-11-24 RX ADMIN — CARBOPLATIN 425 MG: 10 INJECTION INTRAVENOUS at 03:11

## 2023-11-24 NOTE — NURSING
PT tolerated Alimta and Carbo infusion well. No adverse reaction noted. Pt education reinforced on side effects, what to expect and when to contact the doctor. Pt verbalized understanding. PIV d/c per protocol, pt tolerated well.

## 2023-12-11 ENCOUNTER — PATIENT MESSAGE (OUTPATIENT)
Dept: ADMINISTRATIVE | Facility: OTHER | Age: 63
End: 2023-12-11
Payer: COMMERCIAL

## 2023-12-14 ENCOUNTER — OFFICE VISIT (OUTPATIENT)
Dept: HEMATOLOGY/ONCOLOGY | Facility: CLINIC | Age: 63
End: 2023-12-14
Payer: COMMERCIAL

## 2023-12-14 ENCOUNTER — LAB VISIT (OUTPATIENT)
Dept: LAB | Facility: HOSPITAL | Age: 63
End: 2023-12-14
Attending: INTERNAL MEDICINE
Payer: COMMERCIAL

## 2023-12-14 VITALS
RESPIRATION RATE: 18 BRPM | SYSTOLIC BLOOD PRESSURE: 115 MMHG | DIASTOLIC BLOOD PRESSURE: 67 MMHG | WEIGHT: 184.19 LBS | OXYGEN SATURATION: 98 % | HEART RATE: 96 BPM | BODY MASS INDEX: 37.13 KG/M2 | HEIGHT: 59 IN

## 2023-12-14 DIAGNOSIS — Z01.818 EXAMINATION PRIOR TO CHEMOTHERAPY: ICD-10-CM

## 2023-12-14 DIAGNOSIS — Z91.89 AT RISK FOR ALLERGIC REACTION TO MEDICATION: ICD-10-CM

## 2023-12-14 DIAGNOSIS — C34.92 ADENOCARCINOMA, LUNG, LEFT: Primary | ICD-10-CM

## 2023-12-14 DIAGNOSIS — N18.31 STAGE 3A CHRONIC KIDNEY DISEASE: ICD-10-CM

## 2023-12-14 DIAGNOSIS — T45.1X5A CINV (CHEMOTHERAPY-INDUCED NAUSEA AND VOMITING): ICD-10-CM

## 2023-12-14 DIAGNOSIS — J43.2 CENTRILOBULAR EMPHYSEMA: ICD-10-CM

## 2023-12-14 DIAGNOSIS — R11.2 CINV (CHEMOTHERAPY-INDUCED NAUSEA AND VOMITING): ICD-10-CM

## 2023-12-14 DIAGNOSIS — C34.92 ADENOCARCINOMA OF LEFT LUNG: ICD-10-CM

## 2023-12-14 LAB
ALBUMIN SERPL BCP-MCNC: 3.6 G/DL (ref 3.5–5.2)
ALP SERPL-CCNC: 97 U/L (ref 55–135)
ALT SERPL W/O P-5'-P-CCNC: 32 U/L (ref 10–44)
ANION GAP SERPL CALC-SCNC: 11 MMOL/L (ref 8–16)
AST SERPL-CCNC: 21 U/L (ref 10–40)
BASOPHILS # BLD AUTO: 0.05 K/UL (ref 0–0.2)
BASOPHILS NFR BLD: 0.7 % (ref 0–1.9)
BILIRUB SERPL-MCNC: 0.3 MG/DL (ref 0.1–1)
BUN SERPL-MCNC: 16 MG/DL (ref 8–23)
CALCIUM SERPL-MCNC: 9.7 MG/DL (ref 8.7–10.5)
CHLORIDE SERPL-SCNC: 108 MMOL/L (ref 95–110)
CO2 SERPL-SCNC: 22 MMOL/L (ref 23–29)
CREAT SERPL-MCNC: 1.6 MG/DL (ref 0.5–1.4)
DIFFERENTIAL METHOD: ABNORMAL
EOSINOPHIL # BLD AUTO: 0.1 K/UL (ref 0–0.5)
EOSINOPHIL NFR BLD: 1.2 % (ref 0–8)
ERYTHROCYTE [DISTWIDTH] IN BLOOD BY AUTOMATED COUNT: 14.8 % (ref 11.5–14.5)
EST. GFR  (NO RACE VARIABLE): 35.8 ML/MIN/1.73 M^2
GLUCOSE SERPL-MCNC: 122 MG/DL (ref 70–110)
HCT VFR BLD AUTO: 32.2 % (ref 37–48.5)
HGB BLD-MCNC: 10.8 G/DL (ref 12–16)
IMM GRANULOCYTES # BLD AUTO: 0.25 K/UL (ref 0–0.04)
IMM GRANULOCYTES NFR BLD AUTO: 3.3 % (ref 0–0.5)
LYMPHOCYTES # BLD AUTO: 2.6 K/UL (ref 1–4.8)
LYMPHOCYTES NFR BLD: 34 % (ref 18–48)
MCH RBC QN AUTO: 31.9 PG (ref 27–31)
MCHC RBC AUTO-ENTMCNC: 33.5 G/DL (ref 32–36)
MCV RBC AUTO: 95 FL (ref 82–98)
MONOCYTES # BLD AUTO: 1.4 K/UL (ref 0.3–1)
MONOCYTES NFR BLD: 18.2 % (ref 4–15)
NEUTROPHILS # BLD AUTO: 3.2 K/UL (ref 1.8–7.7)
NEUTROPHILS NFR BLD: 42.6 % (ref 38–73)
NRBC BLD-RTO: 0 /100 WBC
PLATELET # BLD AUTO: 481 K/UL (ref 150–450)
PMV BLD AUTO: 8.9 FL (ref 9.2–12.9)
POTASSIUM SERPL-SCNC: 4.9 MMOL/L (ref 3.5–5.1)
PROT SERPL-MCNC: 7.3 G/DL (ref 6–8.4)
RBC # BLD AUTO: 3.39 M/UL (ref 4–5.4)
SODIUM SERPL-SCNC: 141 MMOL/L (ref 136–145)
TSH SERPL DL<=0.005 MIU/L-ACNC: 2.13 UIU/ML (ref 0.4–4)
WBC # BLD AUTO: 7.54 K/UL (ref 3.9–12.7)

## 2023-12-14 PROCEDURE — 84443 ASSAY THYROID STIM HORMONE: CPT | Performed by: INTERNAL MEDICINE

## 2023-12-14 PROCEDURE — 99999 PR PBB SHADOW E&M-EST. PATIENT-LVL IV: ICD-10-PCS | Mod: PBBFAC,,, | Performed by: INTERNAL MEDICINE

## 2023-12-14 PROCEDURE — 1160F RVW MEDS BY RX/DR IN RCRD: CPT | Mod: CPTII,S$GLB,, | Performed by: INTERNAL MEDICINE

## 2023-12-14 PROCEDURE — 99215 OFFICE O/P EST HI 40 MIN: CPT | Mod: S$GLB,,, | Performed by: INTERNAL MEDICINE

## 2023-12-14 PROCEDURE — 3008F PR BODY MASS INDEX (BMI) DOCUMENTED: ICD-10-PCS | Mod: CPTII,S$GLB,, | Performed by: INTERNAL MEDICINE

## 2023-12-14 PROCEDURE — 3074F SYST BP LT 130 MM HG: CPT | Mod: CPTII,S$GLB,, | Performed by: INTERNAL MEDICINE

## 2023-12-14 PROCEDURE — 3044F PR MOST RECENT HEMOGLOBIN A1C LEVEL <7.0%: ICD-10-PCS | Mod: CPTII,S$GLB,, | Performed by: INTERNAL MEDICINE

## 2023-12-14 PROCEDURE — 36415 COLL VENOUS BLD VENIPUNCTURE: CPT | Performed by: INTERNAL MEDICINE

## 2023-12-14 PROCEDURE — 1160F PR REVIEW ALL MEDS BY PRESCRIBER/CLIN PHARMACIST DOCUMENTED: ICD-10-PCS | Mod: CPTII,S$GLB,, | Performed by: INTERNAL MEDICINE

## 2023-12-14 PROCEDURE — 1159F PR MEDICATION LIST DOCUMENTED IN MEDICAL RECORD: ICD-10-PCS | Mod: CPTII,S$GLB,, | Performed by: INTERNAL MEDICINE

## 2023-12-14 PROCEDURE — 80053 COMPREHEN METABOLIC PANEL: CPT | Performed by: INTERNAL MEDICINE

## 2023-12-14 PROCEDURE — 3078F PR MOST RECENT DIASTOLIC BLOOD PRESSURE < 80 MM HG: ICD-10-PCS | Mod: CPTII,S$GLB,, | Performed by: INTERNAL MEDICINE

## 2023-12-14 PROCEDURE — 85025 COMPLETE CBC W/AUTO DIFF WBC: CPT | Performed by: INTERNAL MEDICINE

## 2023-12-14 PROCEDURE — 3074F PR MOST RECENT SYSTOLIC BLOOD PRESSURE < 130 MM HG: ICD-10-PCS | Mod: CPTII,S$GLB,, | Performed by: INTERNAL MEDICINE

## 2023-12-14 PROCEDURE — 99215 PR OFFICE/OUTPT VISIT, EST, LEVL V, 40-54 MIN: ICD-10-PCS | Mod: S$GLB,,, | Performed by: INTERNAL MEDICINE

## 2023-12-14 PROCEDURE — 1159F MED LIST DOCD IN RCRD: CPT | Mod: CPTII,S$GLB,, | Performed by: INTERNAL MEDICINE

## 2023-12-14 PROCEDURE — 3078F DIAST BP <80 MM HG: CPT | Mod: CPTII,S$GLB,, | Performed by: INTERNAL MEDICINE

## 2023-12-14 PROCEDURE — 3044F HG A1C LEVEL LT 7.0%: CPT | Mod: CPTII,S$GLB,, | Performed by: INTERNAL MEDICINE

## 2023-12-14 PROCEDURE — 3008F BODY MASS INDEX DOCD: CPT | Mod: CPTII,S$GLB,, | Performed by: INTERNAL MEDICINE

## 2023-12-14 PROCEDURE — 99999 PR PBB SHADOW E&M-EST. PATIENT-LVL IV: CPT | Mod: PBBFAC,,, | Performed by: INTERNAL MEDICINE

## 2023-12-15 ENCOUNTER — INFUSION (OUTPATIENT)
Dept: INFUSION THERAPY | Facility: HOSPITAL | Age: 63
End: 2023-12-15
Payer: COMMERCIAL

## 2023-12-15 ENCOUNTER — LAB VISIT (OUTPATIENT)
Dept: LAB | Facility: HOSPITAL | Age: 63
End: 2023-12-15
Attending: INTERNAL MEDICINE
Payer: COMMERCIAL

## 2023-12-15 VITALS
HEIGHT: 59 IN | HEART RATE: 110 BPM | DIASTOLIC BLOOD PRESSURE: 72 MMHG | WEIGHT: 184.31 LBS | TEMPERATURE: 98 F | RESPIRATION RATE: 18 BRPM | BODY MASS INDEX: 37.16 KG/M2 | SYSTOLIC BLOOD PRESSURE: 102 MMHG

## 2023-12-15 DIAGNOSIS — C34.92 ADENOCARCINOMA, LUNG, LEFT: ICD-10-CM

## 2023-12-15 DIAGNOSIS — C34.92 ADENOCARCINOMA, LUNG, LEFT: Primary | ICD-10-CM

## 2023-12-15 LAB
ANION GAP SERPL CALC-SCNC: 11 MMOL/L (ref 8–16)
BUN SERPL-MCNC: 19 MG/DL (ref 8–23)
CALCIUM SERPL-MCNC: 9.3 MG/DL (ref 8.7–10.5)
CHLORIDE SERPL-SCNC: 106 MMOL/L (ref 95–110)
CO2 SERPL-SCNC: 22 MMOL/L (ref 23–29)
CREAT SERPL-MCNC: 1.5 MG/DL (ref 0.5–1.4)
EST. GFR  (NO RACE VARIABLE): 38.7 ML/MIN/1.73 M^2
GLUCOSE SERPL-MCNC: 149 MG/DL (ref 70–110)
POTASSIUM SERPL-SCNC: 4.8 MMOL/L (ref 3.5–5.1)
SODIUM SERPL-SCNC: 139 MMOL/L (ref 136–145)

## 2023-12-15 PROCEDURE — 96375 TX/PRO/DX INJ NEW DRUG ADDON: CPT

## 2023-12-15 PROCEDURE — 63600175 PHARM REV CODE 636 W HCPCS: Performed by: INTERNAL MEDICINE

## 2023-12-15 PROCEDURE — 36415 COLL VENOUS BLD VENIPUNCTURE: CPT | Performed by: INTERNAL MEDICINE

## 2023-12-15 PROCEDURE — 25000003 PHARM REV CODE 250: Performed by: INTERNAL MEDICINE

## 2023-12-15 PROCEDURE — 96413 CHEMO IV INFUSION 1 HR: CPT

## 2023-12-15 PROCEDURE — 80048 BASIC METABOLIC PNL TOTAL CA: CPT | Performed by: INTERNAL MEDICINE

## 2023-12-15 PROCEDURE — 96367 TX/PROPH/DG ADDL SEQ IV INF: CPT

## 2023-12-15 PROCEDURE — 96361 HYDRATE IV INFUSION ADD-ON: CPT

## 2023-12-15 RX ORDER — SODIUM CHLORIDE 9 MG/ML
INJECTION, SOLUTION INTRAVENOUS CONTINUOUS
Status: CANCELLED
Start: 2023-12-15

## 2023-12-15 RX ORDER — DIPHENHYDRAMINE HYDROCHLORIDE 50 MG/ML
50 INJECTION INTRAMUSCULAR; INTRAVENOUS ONCE AS NEEDED
Status: CANCELLED | OUTPATIENT
Start: 2023-12-15

## 2023-12-15 RX ORDER — EPINEPHRINE 0.3 MG/.3ML
0.3 INJECTION SUBCUTANEOUS ONCE AS NEEDED
Status: CANCELLED | OUTPATIENT
Start: 2023-12-15

## 2023-12-15 RX ORDER — SODIUM CHLORIDE 0.9 % (FLUSH) 0.9 %
10 SYRINGE (ML) INJECTION
Status: CANCELLED | OUTPATIENT
Start: 2023-12-15

## 2023-12-15 RX ORDER — SODIUM CHLORIDE 0.9 % (FLUSH) 0.9 %
10 SYRINGE (ML) INJECTION
Status: DISCONTINUED | OUTPATIENT
Start: 2023-12-15 | End: 2023-12-15 | Stop reason: HOSPADM

## 2023-12-15 RX ORDER — HEPARIN 100 UNIT/ML
500 SYRINGE INTRAVENOUS
Status: CANCELLED | OUTPATIENT
Start: 2023-12-15

## 2023-12-15 RX ORDER — EPINEPHRINE 0.3 MG/.3ML
0.3 INJECTION SUBCUTANEOUS ONCE AS NEEDED
Status: DISCONTINUED | OUTPATIENT
Start: 2023-12-15 | End: 2023-12-15 | Stop reason: HOSPADM

## 2023-12-15 RX ORDER — PROCHLORPERAZINE EDISYLATE 5 MG/ML
10 INJECTION INTRAMUSCULAR; INTRAVENOUS
Status: CANCELLED
Start: 2023-12-15

## 2023-12-15 RX ORDER — DIPHENHYDRAMINE HYDROCHLORIDE 50 MG/ML
50 INJECTION INTRAMUSCULAR; INTRAVENOUS ONCE AS NEEDED
Status: DISCONTINUED | OUTPATIENT
Start: 2023-12-15 | End: 2023-12-15 | Stop reason: HOSPADM

## 2023-12-15 RX ORDER — HEPARIN 100 UNIT/ML
500 SYRINGE INTRAVENOUS
Status: DISCONTINUED | OUTPATIENT
Start: 2023-12-15 | End: 2023-12-15 | Stop reason: HOSPADM

## 2023-12-15 RX ORDER — PROCHLORPERAZINE EDISYLATE 5 MG/ML
10 INJECTION INTRAMUSCULAR; INTRAVENOUS
Status: DISCONTINUED | OUTPATIENT
Start: 2023-12-15 | End: 2023-12-15 | Stop reason: HOSPADM

## 2023-12-15 RX ORDER — SODIUM CHLORIDE 9 MG/ML
INJECTION, SOLUTION INTRAVENOUS CONTINUOUS
Status: ACTIVE | OUTPATIENT
Start: 2023-12-15 | End: 2023-12-15

## 2023-12-15 RX ADMIN — DEXAMETHASONE SODIUM PHOSPHATE 0.25 MG: 4 INJECTION, SOLUTION INTRA-ARTICULAR; INTRALESIONAL; INTRAMUSCULAR; INTRAVENOUS; SOFT TISSUE at 11:12

## 2023-12-15 RX ADMIN — APREPITANT 130 MG: 130 INJECTION, EMULSION INTRAVENOUS at 12:12

## 2023-12-15 RX ADMIN — CARBOPLATIN 385 MG: 10 INJECTION, SOLUTION INTRAVENOUS at 12:12

## 2023-12-15 RX ADMIN — SODIUM CHLORIDE: 0.9 INJECTION, SOLUTION INTRAVENOUS at 10:12

## 2023-12-17 NOTE — PROGRESS NOTES
PATIENT: Radha Ervin  MRN: [id number]  DATE: 12/14/2023    Subjective:     Chief complaint:  Chief Complaint   Patient presents with    Lung Cancer    Follow-up       Oncologic History:      Ms. Ervin is a 63-year-old female with 30 pack-year history of smoking, quit approximately 4 months ago, who was recently diagnosed with left lung cancer after evaluation for an abnormal chest x-ray.  A subsequent CT of the chest without contrast on May 26, 2023 revealed a spiculated nodule along the paramediastinal left upper lobe measuring 2.7 x 1.9 cm.  A mildly enlarged right paratracheal lymph node was seen measuring 1.1 cm.  She underwent EBUS and biopsy on June 23, 2023.  Pathology revealed the left upper lobe lesion positive for adenocarcinoma.  Station 4R was negative for malignancy.       A PET scan on July 13, 2023 revealed the left upper lobe mass measuring 2.6 x 1.6 cm with SUV max 7.4.  There was mildly metabolic prevascular mediastinal lymph node measuring 0.9 cm seen in short axis with SUV max 2.3.  She underwent robotic left upper lobectomy and lymph node sampling on August 3, 2023.  Pathology revealed invasive poorly differentiated adenocarcinoma measuring 2.1 cm.  There was visceral pleural invasion noted.  Vascular resection revealed invasive tumor present in the adventitial soft tissue.  There was lymphovascular invasion noted.  Invasive carcinoma is present at the vascular margin.  3/4 level 6 lymph nodes, 1/2 level 11 lymph nodes and 1/4 level 12 lymph nodes were involved out of a total of 23 lymph nodes.  Surgery was complicated by intraoperative bleeding of pulmonary arterial branches which resolved with pressure.      Her case was discussed at [facility] on 8/24/23: Recommend chemoRT followed by immunotherapy.     Established care with med onc 8/28. She had seen Dr. Quevedo who had recommended sequential chemoradiation then to be followed by immunotherapy. Consented for sequential CRT with  "carbo/taxol.    On 9/22/23 she presented to the infusion center for carbo/taxol C#1 but developed infusion reaction to taxol so that was stopped.  Had reaction with second infusion as well--carbo/taxol discontinued.  Consented for carbo/pemetrexed on 11/2/23    C#1 carbo/pemetrexed 11/3/23  C#2 carbo/pemetrexed 11/24/23         Interval History: Ms. Ervin returns for follow up. She tolerated C#2 of carbo/pemetrexed quite well overall. Developed grade 1 fatigue and grade 1 diarrhea, all of brief duration. Denies acute issues at this time. Labs today showing worsening renal function--advised to hydrate and we will repeat labs tomorrow--if not improved, would plan to hold pemetrexed.      Review of Systems   A comprehensive review of systems was performed; pertinent positives and negatives are noted in the HPI.        ECOG Performance Status:   ECOG SCORE    1 - Restricted in strenuous activity-ambulatory and able to carry out work of a light nature         Objective:      Vitals:   Vitals:    12/14/23 0901   BP: 115/67   BP Location: Left arm   Patient Position: Sitting   BP Method: Large (Automatic)   Pulse: 96   Resp: 18   SpO2: 98%   Weight: 83.6 kg (184 lb 3.1 oz)   Height: 4' 11" (1.499 m)     BMI: Body mass index is 37.2 kg/m².      Physical Exam:   Physical Exam  Vitals and nursing note reviewed.   Constitutional:       General: She is not in acute distress.     Appearance: Normal appearance. She is not toxic-appearing.   HENT:      Head: Normocephalic and atraumatic.   Eyes:      General: No scleral icterus.     Conjunctiva/sclera: Conjunctivae normal.   Cardiovascular:      Rate and Rhythm: Normal rate and regular rhythm.      Heart sounds: Normal heart sounds. No murmur heard.  Pulmonary:      Effort: Pulmonary effort is normal. No respiratory distress.      Breath sounds: Examination of the left-upper field reveals decreased breath sounds. Decreased breath sounds present. No wheezing, rhonchi or rales. "   Abdominal:      General: There is no distension.      Palpations: Abdomen is soft.      Tenderness: There is no abdominal tenderness.   Musculoskeletal:         General: No swelling, tenderness or deformity.      Cervical back: Neck supple. No tenderness.   Skin:     Coloration: Skin is not jaundiced.      Findings: No erythema.   Neurological:      General: No focal deficit present.      Mental Status: She is alert and oriented to person, place, and time.      Motor: No weakness.   Psychiatric:         Mood and Affect: Mood normal.         Behavior: Behavior normal.           Laboratory Data:  WBC   Date Value Ref Range Status   12/14/2023 7.54 3.90 - 12.70 K/uL Final     Hemoglobin   Date Value Ref Range Status   12/14/2023 10.8 (L) 12.0 - 16.0 g/dL Final     POC Hematocrit   Date Value Ref Range Status   08/03/2023 34 (L) 36 - 54 %PCV Final     Hematocrit   Date Value Ref Range Status   12/14/2023 32.2 (L) 37.0 - 48.5 % Final     Platelets   Date Value Ref Range Status   12/14/2023 481 (H) 150 - 450 K/uL Final     Gran # (ANC)   Date Value Ref Range Status   12/14/2023 3.2 1.8 - 7.7 K/uL Final     Gran %   Date Value Ref Range Status   12/14/2023 42.6 38.0 - 73.0 % Final       Chemistry        Component Value Date/Time     12/15/2023 0903    K 4.8 12/15/2023 0903     12/15/2023 0903    CO2 22 (L) 12/15/2023 0903    BUN 19 12/15/2023 0903    BUN 12.0 04/28/2023 1207    CREATININE 1.5 (H) 12/15/2023 0903     (H) 12/15/2023 0903        Component Value Date/Time    CALCIUM 9.3 12/15/2023 0903    ALKPHOS 97 12/14/2023 0857    AST 21 12/14/2023 0857    ALT 32 12/14/2023 0857    BILITOT 0.3 12/14/2023 0857    ESTGFRAFRICA >60.0 04/06/2020 1002    EGFRNONAA >60.0 04/06/2020 1002              Assessment/Plan:     1. Adenocarcinoma, lung, left    2. CINV (chemotherapy-induced nausea and vomiting)    3. Stage 3a chronic kidney disease    4. At risk for allergic reaction to medication    5. Centrilobular  emphysema    6. Examination prior to chemotherapy      Grade 1 toxicity reported clinically however objectively lab evidence of Grade 1 KESHA as well however with the degree of renal dysfunction now present, pemetrexed would be unsafe to administer tomorrow. Given significant staffing concerns making it truly impossible to get her C#3 start date delayed for a week to allow for resolution of KESHA, we will have to make the decision between a) holding pemetrexed for this cycle or b) delaying C#3 until her next scheduled appointment which would be in 3 weeks. Given the significant delays in treatment she has experienced related to unforeseen events (I.e. taxol anyphlyactoid rxn x2 even with dose-escalation/desensitization protocol) I do not want to delay her next cycle at all if possible or at least as little as possible.  She is advised to increase hydration today and repeat a BMP tomorrow. If her renal function improves to baseline then we will proceed with carboplatin but in the interest of not delaying treatments we will plan to hold pemetrexed for this cycle.    Also--her last scans were in September--we ought to have surveillance scans.     Med and Orders:  Orders Placed This Encounter    CT Chest Abdomen Pelvis With IV Contrast (XPD) Routine Oral Contrast    BMP       Follow Up:  Follow up in about 3 weeks (around 1/4/2024).      Above care plan was discussed with patient and all questions were addressed to their expressed satisfaction.       Roger Eduardo MD, FACP  Hematology & Medical Oncology  [facility]         High Risk Conditions:    Patient has a condition that poses threat to life and bodily function: NSCLC  Patient is currently on drug therapy requiring intensive monitoring for toxicity: carbo/pemetrexed chemo

## 2023-12-27 ENCOUNTER — PATIENT MESSAGE (OUTPATIENT)
Dept: HEMATOLOGY/ONCOLOGY | Facility: CLINIC | Age: 63
End: 2023-12-27
Payer: COMMERCIAL

## 2023-12-27 ENCOUNTER — HOSPITAL ENCOUNTER (OUTPATIENT)
Dept: RADIOLOGY | Facility: HOSPITAL | Age: 63
Discharge: HOME | End: 2023-12-27
Attending: INTERNAL MEDICINE
Payer: COMMERCIAL

## 2023-12-27 DIAGNOSIS — C34.92 ADENOCARCINOMA, LUNG, LEFT: ICD-10-CM

## 2023-12-27 PROCEDURE — 71260 CT THORAX DX C+: CPT | Mod: TC

## 2023-12-27 PROCEDURE — 25500020 PHARM REV CODE 255: Performed by: INTERNAL MEDICINE

## 2023-12-27 PROCEDURE — 71260 CT THORAX DX C+: CPT | Mod: 26,,, | Performed by: RADIOLOGY

## 2023-12-27 PROCEDURE — 74177 CT ABD & PELVIS W/CONTRAST: CPT | Mod: 26,,, | Performed by: RADIOLOGY

## 2023-12-27 PROCEDURE — 71260 CT CHEST ABDOMEN PELVIS WITH IV CONTRAST (XPD): ICD-10-PCS | Mod: 26,,, | Performed by: RADIOLOGY

## 2023-12-27 PROCEDURE — A9698 NON-RAD CONTRAST MATERIALNOC: HCPCS | Performed by: INTERNAL MEDICINE

## 2023-12-27 PROCEDURE — 74177 CT CHEST ABDOMEN PELVIS WITH IV CONTRAST (XPD): ICD-10-PCS | Mod: 26,,, | Performed by: RADIOLOGY

## 2023-12-27 PROCEDURE — 74177 CT ABD & PELVIS W/CONTRAST: CPT | Mod: TC

## 2023-12-27 RX ORDER — HEPARIN 100 UNIT/ML
500 SYRINGE INTRAVENOUS
Status: CANCELLED | OUTPATIENT
Start: 2023-12-29

## 2023-12-27 RX ORDER — SODIUM CHLORIDE 0.9 % (FLUSH) 0.9 %
10 SYRINGE (ML) INJECTION
Status: CANCELLED | OUTPATIENT
Start: 2023-12-29

## 2023-12-27 RX ADMIN — IOHEXOL 1000 ML: 9 SOLUTION ORAL at 08:12

## 2023-12-27 RX ADMIN — IOHEXOL 75 ML: 350 INJECTION, SOLUTION INTRAVENOUS at 09:12

## 2023-12-29 ENCOUNTER — INFUSION (OUTPATIENT)
Dept: INFUSION THERAPY | Facility: HOSPITAL | Age: 63
End: 2023-12-29
Attending: INTERNAL MEDICINE
Payer: COMMERCIAL

## 2023-12-29 VITALS
BODY MASS INDEX: 37.16 KG/M2 | DIASTOLIC BLOOD PRESSURE: 66 MMHG | WEIGHT: 184.31 LBS | OXYGEN SATURATION: 97 % | RESPIRATION RATE: 18 BRPM | HEART RATE: 86 BPM | HEIGHT: 59 IN | TEMPERATURE: 98 F | SYSTOLIC BLOOD PRESSURE: 111 MMHG

## 2023-12-29 DIAGNOSIS — C34.92 ADENOCARCINOMA, LUNG, LEFT: Primary | ICD-10-CM

## 2023-12-29 PROCEDURE — A4216 STERILE WATER/SALINE, 10 ML: HCPCS | Performed by: INTERNAL MEDICINE

## 2023-12-29 PROCEDURE — 96361 HYDRATE IV INFUSION ADD-ON: CPT

## 2023-12-29 PROCEDURE — 96360 HYDRATION IV INFUSION INIT: CPT

## 2023-12-29 PROCEDURE — 25000003 PHARM REV CODE 250: Performed by: INTERNAL MEDICINE

## 2023-12-29 RX ORDER — SODIUM CHLORIDE 0.9 % (FLUSH) 0.9 %
10 SYRINGE (ML) INJECTION
Status: DISCONTINUED | OUTPATIENT
Start: 2023-12-29 | End: 2023-12-29 | Stop reason: HOSPADM

## 2023-12-29 RX ORDER — SODIUM CHLORIDE 0.9 % (FLUSH) 0.9 %
10 SYRINGE (ML) INJECTION
Status: CANCELLED | OUTPATIENT
Start: 2023-12-29

## 2023-12-29 RX ORDER — HEPARIN 100 UNIT/ML
500 SYRINGE INTRAVENOUS
Status: CANCELLED | OUTPATIENT
Start: 2023-12-29

## 2023-12-29 RX ADMIN — Medication 10 ML: at 11:12

## 2023-12-29 RX ADMIN — SODIUM CHLORIDE 1000 ML: 9 INJECTION, SOLUTION INTRAVENOUS at 09:12

## 2023-12-29 NOTE — PLAN OF CARE
Pt received IV Normal Saline infusion.. Pt tolerated it well. AVS given.  Pt will follow up with MD. Discharged with no acute distress.

## 2024-01-04 ENCOUNTER — PATIENT MESSAGE (OUTPATIENT)
Dept: HEMATOLOGY/ONCOLOGY | Facility: CLINIC | Age: 64
End: 2024-01-04

## 2024-01-04 ENCOUNTER — LAB VISIT (OUTPATIENT)
Dept: LAB | Facility: HOSPITAL | Age: 64
End: 2024-01-04
Attending: INTERNAL MEDICINE
Payer: COMMERCIAL

## 2024-01-04 ENCOUNTER — PATIENT MESSAGE (OUTPATIENT)
Dept: HEMATOLOGY/ONCOLOGY | Facility: CLINIC | Age: 64
End: 2024-01-04
Payer: COMMERCIAL

## 2024-01-04 ENCOUNTER — OFFICE VISIT (OUTPATIENT)
Dept: HEMATOLOGY/ONCOLOGY | Facility: CLINIC | Age: 64
End: 2024-01-04
Payer: COMMERCIAL

## 2024-01-04 VITALS
WEIGHT: 185.44 LBS | DIASTOLIC BLOOD PRESSURE: 73 MMHG | HEIGHT: 59 IN | SYSTOLIC BLOOD PRESSURE: 118 MMHG | RESPIRATION RATE: 18 BRPM | HEART RATE: 95 BPM | BODY MASS INDEX: 37.38 KG/M2 | OXYGEN SATURATION: 98 %

## 2024-01-04 DIAGNOSIS — N18.31 STAGE 3A CHRONIC KIDNEY DISEASE: ICD-10-CM

## 2024-01-04 DIAGNOSIS — J43.2 CENTRILOBULAR EMPHYSEMA: ICD-10-CM

## 2024-01-04 DIAGNOSIS — Z91.89 AT RISK FOR ALLERGIC REACTION TO MEDICATION: ICD-10-CM

## 2024-01-04 DIAGNOSIS — C34.92 ADENOCARCINOMA, LUNG, LEFT: Primary | ICD-10-CM

## 2024-01-04 DIAGNOSIS — Z01.818 EXAMINATION PRIOR TO CHEMOTHERAPY: ICD-10-CM

## 2024-01-04 DIAGNOSIS — C34.92 ADENOCARCINOMA OF LEFT LUNG: ICD-10-CM

## 2024-01-04 LAB
ALBUMIN SERPL BCP-MCNC: 3.7 G/DL (ref 3.5–5.2)
ALP SERPL-CCNC: 92 U/L (ref 55–135)
ALT SERPL W/O P-5'-P-CCNC: 18 U/L (ref 10–44)
ANION GAP SERPL CALC-SCNC: 15 MMOL/L (ref 8–16)
AST SERPL-CCNC: 16 U/L (ref 10–40)
BASOPHILS # BLD AUTO: 0.01 K/UL (ref 0–0.2)
BASOPHILS NFR BLD: 0.2 % (ref 0–1.9)
BILIRUB SERPL-MCNC: 0.5 MG/DL (ref 0.1–1)
BUN SERPL-MCNC: 20 MG/DL (ref 8–23)
CALCIUM SERPL-MCNC: 9.7 MG/DL (ref 8.7–10.5)
CHLORIDE SERPL-SCNC: 106 MMOL/L (ref 95–110)
CO2 SERPL-SCNC: 21 MMOL/L (ref 23–29)
CREAT SERPL-MCNC: 1.6 MG/DL (ref 0.5–1.4)
DIFFERENTIAL METHOD BLD: ABNORMAL
EOSINOPHIL # BLD AUTO: 0.1 K/UL (ref 0–0.5)
EOSINOPHIL NFR BLD: 2.2 % (ref 0–8)
ERYTHROCYTE [DISTWIDTH] IN BLOOD BY AUTOMATED COUNT: 19.1 % (ref 11.5–14.5)
EST. GFR  (NO RACE VARIABLE): 35.8 ML/MIN/1.73 M^2
GLUCOSE SERPL-MCNC: 110 MG/DL (ref 70–110)
HCT VFR BLD AUTO: 27.2 % (ref 37–48.5)
HGB BLD-MCNC: 9 G/DL (ref 12–16)
IMM GRANULOCYTES # BLD AUTO: 0.02 K/UL (ref 0–0.04)
IMM GRANULOCYTES NFR BLD AUTO: 0.3 % (ref 0–0.5)
LYMPHOCYTES # BLD AUTO: 2.2 K/UL (ref 1–4.8)
LYMPHOCYTES NFR BLD: 33.7 % (ref 18–48)
MCH RBC QN AUTO: 33 PG (ref 27–31)
MCHC RBC AUTO-ENTMCNC: 33.1 G/DL (ref 32–36)
MCV RBC AUTO: 100 FL (ref 82–98)
MONOCYTES # BLD AUTO: 0.7 K/UL (ref 0.3–1)
MONOCYTES NFR BLD: 10.1 % (ref 4–15)
NEUTROPHILS # BLD AUTO: 3.4 K/UL (ref 1.8–7.7)
NEUTROPHILS NFR BLD: 53.5 % (ref 38–73)
NRBC BLD-RTO: 0 /100 WBC
PLATELET # BLD AUTO: 195 K/UL (ref 150–450)
PMV BLD AUTO: 10.1 FL (ref 9.2–12.9)
POTASSIUM SERPL-SCNC: 4.7 MMOL/L (ref 3.5–5.1)
PROT SERPL-MCNC: 7.2 G/DL (ref 6–8.4)
RBC # BLD AUTO: 2.73 M/UL (ref 4–5.4)
SODIUM SERPL-SCNC: 142 MMOL/L (ref 136–145)
TSH SERPL DL<=0.005 MIU/L-ACNC: 3.9 UIU/ML (ref 0.4–4)
WBC # BLD AUTO: 6.41 K/UL (ref 3.9–12.7)

## 2024-01-04 PROCEDURE — 3074F SYST BP LT 130 MM HG: CPT | Mod: CPTII,S$GLB,, | Performed by: INTERNAL MEDICINE

## 2024-01-04 PROCEDURE — 3078F DIAST BP <80 MM HG: CPT | Mod: CPTII,S$GLB,, | Performed by: INTERNAL MEDICINE

## 2024-01-04 PROCEDURE — 99999 PR PBB SHADOW E&M-EST. PATIENT-LVL V: CPT | Mod: PBBFAC,,, | Performed by: INTERNAL MEDICINE

## 2024-01-04 PROCEDURE — 80053 COMPREHEN METABOLIC PANEL: CPT | Performed by: INTERNAL MEDICINE

## 2024-01-04 PROCEDURE — 84443 ASSAY THYROID STIM HORMONE: CPT | Performed by: INTERNAL MEDICINE

## 2024-01-04 PROCEDURE — 99215 OFFICE O/P EST HI 40 MIN: CPT | Mod: S$GLB,,, | Performed by: INTERNAL MEDICINE

## 2024-01-04 PROCEDURE — 1159F MED LIST DOCD IN RCRD: CPT | Mod: CPTII,S$GLB,, | Performed by: INTERNAL MEDICINE

## 2024-01-04 PROCEDURE — 85025 COMPLETE CBC W/AUTO DIFF WBC: CPT | Performed by: INTERNAL MEDICINE

## 2024-01-04 PROCEDURE — 3008F BODY MASS INDEX DOCD: CPT | Mod: CPTII,S$GLB,, | Performed by: INTERNAL MEDICINE

## 2024-01-04 PROCEDURE — 36415 COLL VENOUS BLD VENIPUNCTURE: CPT | Performed by: INTERNAL MEDICINE

## 2024-01-04 RX ORDER — CYANOCOBALAMIN 1000 UG/ML
1000 INJECTION, SOLUTION INTRAMUSCULAR; SUBCUTANEOUS
Status: CANCELLED | OUTPATIENT
Start: 2024-01-04

## 2024-01-04 RX ORDER — EPINEPHRINE 0.3 MG/.3ML
0.3 INJECTION SUBCUTANEOUS ONCE AS NEEDED
Status: CANCELLED | OUTPATIENT
Start: 2024-01-04

## 2024-01-04 RX ORDER — HEPARIN 100 UNIT/ML
500 SYRINGE INTRAVENOUS
Status: CANCELLED | OUTPATIENT
Start: 2024-01-04

## 2024-01-04 RX ORDER — PROCHLORPERAZINE EDISYLATE 5 MG/ML
10 INJECTION INTRAMUSCULAR; INTRAVENOUS
Status: CANCELLED
Start: 2024-01-04

## 2024-01-04 RX ORDER — SODIUM CHLORIDE 9 MG/ML
INJECTION, SOLUTION INTRAVENOUS CONTINUOUS
Status: CANCELLED
Start: 2024-01-04

## 2024-01-04 RX ORDER — SODIUM CHLORIDE 0.9 % (FLUSH) 0.9 %
10 SYRINGE (ML) INJECTION
Status: CANCELLED | OUTPATIENT
Start: 2024-01-04

## 2024-01-04 RX ORDER — DIPHENHYDRAMINE HYDROCHLORIDE 50 MG/ML
50 INJECTION, SOLUTION INTRAMUSCULAR; INTRAVENOUS ONCE AS NEEDED
Status: CANCELLED | OUTPATIENT
Start: 2024-01-04

## 2024-01-04 NOTE — PROGRESS NOTES
PATIENT: Radha Ervin  MRN: [id number]  DATE: 1/4/2024      Subjective:     Chief complaint:  Chief Complaint   Patient presents with    Lung Cancer    Follow-up       Oncologic History:      Ms. Ervin is a 63-year-old female with 30 pack-year history of smoking, quit approximately 4 months ago, who was recently diagnosed with left lung cancer after evaluation for an abnormal chest x-ray.  A subsequent CT of the chest without contrast on May 26, 2023 revealed a spiculated nodule along the paramediastinal left upper lobe measuring 2.7 x 1.9 cm.  A mildly enlarged right paratracheal lymph node was seen measuring 1.1 cm.  She underwent EBUS and biopsy on June 23, 2023.  Pathology revealed the left upper lobe lesion positive for adenocarcinoma.  Station 4R was negative for malignancy.       A PET scan on July 13, 2023 revealed the left upper lobe mass measuring 2.6 x 1.6 cm with SUV max 7.4.  There was mildly metabolic prevascular mediastinal lymph node measuring 0.9 cm seen in short axis with SUV max 2.3.  She underwent robotic left upper lobectomy and lymph node sampling on August 3, 2023.  Pathology revealed invasive poorly differentiated adenocarcinoma measuring 2.1 cm.  There was visceral pleural invasion noted.  Vascular resection revealed invasive tumor present in the adventitial soft tissue.  There was lymphovascular invasion noted.  Invasive carcinoma is present at the vascular margin.  3/4 level 6 lymph nodes, 1/2 level 11 lymph nodes and 1/4 level 12 lymph nodes were involved out of a total of 23 lymph nodes.  Surgery was complicated by intraoperative bleeding of pulmonary arterial branches which resolved with pressure.      Her case was discussed at [facility] on 8/24/23: Recommend chemoRT followed by immunotherapy.     Established care with med onc 8/28. She had seen Dr. Quevedo who had recommended sequential chemoradiation then to be followed by immunotherapy. Consented for sequential CRT with  "carbo/taxol.    On 9/22/23 she presented to the infusion center for carbo/taxol C#1 but developed infusion reaction to taxol so that was stopped.  Had reaction with second infusion as well--carbo/taxol discontinued.  Consented for carbo/pemetrexed on 11/2/23    C#1 carbo/pemetrexed 11/3/23  C#2 carbo/pemetrexed 11/24/23  C#3 acute worsening of renal function--pemetrexed held: 12/15/23         Interval History: Ms. Ervin returns for follow up. She had pemetrexed held with C#3 last time due to renal dysfunction. We added additional hydration to her treatment/supportive care plan to attempt to prevent dehydration however labs done prior to clinic today show that her creatinine remains 1.6 which is worse than her usual baseline. She notes that she did fine with last cycle of treatment with expected toxicities such as fatigue which were consistent with toxicities from the first two cycles. She is scheduled for tomorrow. Will hold pemetrexed tomorrow.   She requests referral to a genetics counselor.        Review of Systems   A comprehensive review of systems was performed; pertinent positives and negatives are noted in the HPI.        ECOG Performance Status:   ECOG SCORE    1 - Restricted in strenuous activity-ambulatory and able to carry out work of a light nature         Objective:      Vitals:   Vitals:    01/04/24 0849   BP: 118/73   BP Location: Right arm   Patient Position: Sitting   BP Method: Large (Automatic)   Pulse: 95   Resp: 18   SpO2: 98%   Weight: 84.1 kg (185 lb 6.5 oz)   Height: 4' 11" (1.499 m)     BMI: Body mass index is 37.45 kg/m².      Physical Exam:   Physical Exam  Vitals and nursing note reviewed.   Constitutional:       General: She is not in acute distress.     Appearance: Normal appearance. She is not toxic-appearing.   HENT:      Head: Normocephalic and atraumatic.   Eyes:      General: No scleral icterus.     Conjunctiva/sclera: Conjunctivae normal.   Cardiovascular:      Rate and Rhythm: " Normal rate and regular rhythm.      Heart sounds: Normal heart sounds. No murmur heard.  Pulmonary:      Effort: Pulmonary effort is normal. No respiratory distress.      Breath sounds: Examination of the left-upper field reveals decreased breath sounds. Decreased breath sounds present. No wheezing, rhonchi or rales.   Abdominal:      General: There is no distension.      Palpations: Abdomen is soft.      Tenderness: There is no abdominal tenderness.   Musculoskeletal:         General: No swelling, tenderness or deformity.      Cervical back: Neck supple. No tenderness.   Skin:     Coloration: Skin is not jaundiced.      Findings: No erythema.   Neurological:      General: No focal deficit present.      Mental Status: She is alert and oriented to person, place, and time.      Motor: No weakness.   Psychiatric:         Mood and Affect: Mood normal.         Behavior: Behavior normal.           Laboratory Data:  WBC   Date Value Ref Range Status   01/04/2024 6.41 3.90 - 12.70 K/uL Final     Hemoglobin   Date Value Ref Range Status   01/04/2024 9.0 (L) 12.0 - 16.0 g/dL Final     POC Hematocrit   Date Value Ref Range Status   08/03/2023 34 (L) 36 - 54 %PCV Final     Hematocrit   Date Value Ref Range Status   01/04/2024 27.2 (L) 37.0 - 48.5 % Final     Platelets   Date Value Ref Range Status   01/04/2024 195 150 - 450 K/uL Final     Gran # (ANC)   Date Value Ref Range Status   01/04/2024 3.4 1.8 - 7.7 K/uL Final     Gran %   Date Value Ref Range Status   01/04/2024 53.5 38.0 - 73.0 % Final       Chemistry        Component Value Date/Time     01/04/2024 0833    K 4.7 01/04/2024 0833     01/04/2024 0833    CO2 21 (L) 01/04/2024 0833    BUN 20 01/04/2024 0833    BUN 12.0 04/28/2023 1207    CREATININE 1.6 (H) 01/04/2024 0833     01/04/2024 0833        Component Value Date/Time    CALCIUM 9.7 01/04/2024 0833    ALKPHOS 92 01/04/2024 0833    AST 16 01/04/2024 0833    ALT 18 01/04/2024 0833    BILITOT 0.5  01/04/2024 0833    ESTGFRAFRICA >60.0 04/06/2020 1002    EGFRNONAA >60.0 04/06/2020 1002        CT Chest Abdomen Pelvis With IV Contrast (XPD) Routine Oral Contrast  Order: [id number]  Status: Final result       Visible to patient: Yes (seen)       Next appt: 01/05/2024 at 11:00 AM in Chemotherapy (NURSE 11, [facility] CHEMO)       Dx: Adenocarcinoma, lung, left    0 Result Notes  Details    Reading Physician Reading Date Result Priority   Abram Aranda MD  [phone]  [phone] 12/27/2023 Routine     Narrative & Impression  EXAMINATION:  CT CHEST ABDOMEN PELVIS WITH IV CONTRAST (XPD)     CLINICAL HISTORY:  Non-small cell lung cancer (NSCLC), metastatic, assess treatment response; Malignant neoplasm of unspecified part of left bronchus or lung     TECHNIQUE:  Low dose axial images, sagittal and coronal reformations were obtained from the thoracic inlet to the pubic symphysis following the IV administration of 75 mL of Omnipaque 350 and the oral administration of 1000-cc of Omnipaque 9.     COMPARISON:  CT chest 09/13/2023     FINDINGS:  Chest:     - Base of the neck: Imaged portions of the base of the neck are grossly unremarkable.     - Axilla/mediastinum/samantha: No axillary, mediastinal or hilar lymphadenopathy.     - Heart: Heart is not enlarged. No significant pericardial thickening. No appreciable coronary artery calcifications. No ade aneurysmal degeneration of the thoracic aorta.     - Airways: Trachea and mainstem bronchi are patent.     - Lungs: Postsurgical changes of left upper lobectomy, unchanged.     Abdomen:     - Liver: Liver is normal in size, morphology and attenuation without appreciable surface nodularity or suspicious lesion.  No intrahepatic/extrahepatic biliary dilatation.     - Gallbladder: Gallbladder surgically absent.     - Pancreas: Pancreas is normal in size and attenuation without the surrounding inflammatory fat stranding, suspicious mass or fluid/fluid collection.     - Spleen:  Spleen is normal in size, morphology and attenuation without appreciable suspicious lesion.     - Adrenals: Bilateral adrenal glands are normal in size and morphology without appreciable nodularity.     - Kidneys: Kidneys are normal in size, location, morphology and attenuation. 2.4-cm simple appearing exophytic renal cortical cystic lesion projecting from the posterolateral aspect of the left inferior pole (4:71).  Multiple additional subcentimeter renal cortical hypodensities bilaterally that are too small to accurately characterize.  Otherwise, no appreciable suspcious sizable lesion, nephroliths or hydroureteronephrosis bilaterally.  Urinary bladder is completely collapsed, limiting evaluation.     - Bowel/Mesentery: Stomach, small bowel and colon are normal in course and caliber.  No evidence of small bowel obstruction, significant inflammatory fat stranding, free air or free fluid.  Appendix is normal.  Bowel mesentery is grossly unremarkable.     - Retroperitoneum:  Aorta is normal in course and caliber without evidence of aneurysmal degeneration.  No sizable retroperitoneal mass or fluid collection.     Pelvis:     - Reproductive organs: Reproductive organs are within normal limits.  No suspicious sizable pelvic mass, lymphadenopathy or fluid.     - Soft tissues:  Imaged soft tissues are grossly unremarkable.     - Bones:  Multilevel degenerative changes of the imaged spine.  No acute displaced fracture, dislocation or suspicious lytic/blastic lesion.     Impression:     1. No acute/emergent [id number] findings appreciated.  2. Postsurgical changes of left upper lobectomy without CT evidence of residual/recurrent disease above the diaphragm in this patient with reported history of NSCLC.  3. 2.4-cm simple appearing left inferior pole renal cortical cystic lesion and multiple additional subcentimeter renal cortical hypodensities bilaterally that are too small to accurately characterize.         Electronically signed by: Abram Aranda  Date:                                            12/27/2023  Time:                                           13:52           Exam Ended: 12/27/23 09:17 CST Last Resulted: 12/27/23 13:52 CST               Assessment/Plan:     1. Adenocarcinoma, lung, left    2. Stage 3a chronic kidney disease    3. At risk for allergic reaction to medication    4. Centrilobular emphysema    5. Examination prior to chemotherapy      64yo woman with Stg IIIA NSCLC s/p resection; currently receiving adjuvant chemo with carbo/pemetrexed to be followed by radiation. I have alerted Dr. Quevedo that the patient will be completing chemo soon and ready for radiation thereafter.    Between cycles 2 and 3 there was a significant change in her kidney function and despite additional hydration being administered, the renal dysfunction appears to persist. Etiology of this is not clear to me--I would not really expect any of the drugs in her therapeutic regimen to be responsible for this however due to its renal clearance, pemetrexed must be held for renal dysfunction--we will hold it for scheduled C#4 tomorrow. I would like her to see a neprologist to further evaluation.  \  She requests referral to genetics.     Med and Orders:  Orders Placed This Encounter    Ambulatory referral/consult to Genetics    Ambulatory referral/consult to Nephrology       Follow Up:  Follow up in about 4 weeks (around 2/1/2024).      Above care plan was discussed with patient and all questions were addressed to their expressed satisfaction.       Roger Eduardo MD, FACP  Hematology & Medical Oncology  [facility]         High Risk Conditions:    Patient has a condition that poses threat to life and bodily function: NSCLC  Patient is currently on drug therapy requiring intensive monitoring for toxicity: carbo/pemetrexed chemo

## 2024-01-05 ENCOUNTER — PATIENT MESSAGE (OUTPATIENT)
Dept: ADMINISTRATIVE | Facility: OTHER | Age: 64
End: 2024-01-05
Payer: COMMERCIAL

## 2024-01-05 ENCOUNTER — INFUSION (OUTPATIENT)
Dept: INFUSION THERAPY | Facility: HOSPITAL | Age: 64
End: 2024-01-05
Payer: COMMERCIAL

## 2024-01-05 VITALS
SYSTOLIC BLOOD PRESSURE: 127 MMHG | TEMPERATURE: 98 F | BODY MASS INDEX: 37.38 KG/M2 | OXYGEN SATURATION: 99 % | WEIGHT: 185.44 LBS | HEIGHT: 59 IN | DIASTOLIC BLOOD PRESSURE: 72 MMHG | HEART RATE: 85 BPM | RESPIRATION RATE: 18 BRPM

## 2024-01-05 DIAGNOSIS — C34.92 ADENOCARCINOMA, LUNG, LEFT: Primary | ICD-10-CM

## 2024-01-05 PROCEDURE — 96413 CHEMO IV INFUSION 1 HR: CPT

## 2024-01-05 PROCEDURE — 25000003 PHARM REV CODE 250: Performed by: INTERNAL MEDICINE

## 2024-01-05 PROCEDURE — 96375 TX/PRO/DX INJ NEW DRUG ADDON: CPT

## 2024-01-05 PROCEDURE — 96367 TX/PROPH/DG ADDL SEQ IV INF: CPT

## 2024-01-05 PROCEDURE — 63600175 PHARM REV CODE 636 W HCPCS: Mod: JZ,JG | Performed by: INTERNAL MEDICINE

## 2024-01-05 RX ORDER — SODIUM CHLORIDE 0.9 % (FLUSH) 0.9 %
10 SYRINGE (ML) INJECTION
Status: DISCONTINUED | OUTPATIENT
Start: 2024-01-05 | End: 2024-01-05 | Stop reason: HOSPADM

## 2024-01-05 RX ORDER — HEPARIN 100 UNIT/ML
500 SYRINGE INTRAVENOUS
Status: DISCONTINUED | OUTPATIENT
Start: 2024-01-05 | End: 2024-01-05 | Stop reason: HOSPADM

## 2024-01-05 RX ORDER — EPINEPHRINE 0.3 MG/.3ML
0.3 INJECTION SUBCUTANEOUS ONCE AS NEEDED
Status: DISCONTINUED | OUTPATIENT
Start: 2024-01-05 | End: 2024-01-05 | Stop reason: HOSPADM

## 2024-01-05 RX ORDER — CYANOCOBALAMIN 1000 UG/ML
1000 INJECTION, SOLUTION INTRAMUSCULAR; SUBCUTANEOUS
Status: DISCONTINUED | OUTPATIENT
Start: 2024-01-05 | End: 2024-01-05 | Stop reason: HOSPADM

## 2024-01-05 RX ORDER — DIPHENHYDRAMINE HYDROCHLORIDE 50 MG/ML
50 INJECTION, SOLUTION INTRAMUSCULAR; INTRAVENOUS ONCE AS NEEDED
Status: DISCONTINUED | OUTPATIENT
Start: 2024-01-05 | End: 2024-01-05 | Stop reason: HOSPADM

## 2024-01-05 RX ORDER — PROCHLORPERAZINE EDISYLATE 5 MG/ML
10 INJECTION INTRAMUSCULAR; INTRAVENOUS
Status: DISCONTINUED | OUTPATIENT
Start: 2024-01-05 | End: 2024-01-05 | Stop reason: HOSPADM

## 2024-01-05 RX ORDER — SODIUM CHLORIDE 9 MG/ML
INJECTION, SOLUTION INTRAVENOUS CONTINUOUS
Status: ACTIVE | OUTPATIENT
Start: 2024-01-05 | End: 2024-01-05

## 2024-01-05 RX ADMIN — DEXAMETHASONE SODIUM PHOSPHATE 0.25 MG: 4 INJECTION, SOLUTION INTRA-ARTICULAR; INTRALESIONAL; INTRAMUSCULAR; INTRAVENOUS; SOFT TISSUE at 11:01

## 2024-01-05 RX ADMIN — CARBOPLATIN 370 MG: 10 INJECTION, SOLUTION INTRAVENOUS at 12:01

## 2024-01-05 RX ADMIN — APREPITANT 130 MG: 130 INJECTION, EMULSION INTRAVENOUS at 11:01

## 2024-01-05 RX ADMIN — SODIUM CHLORIDE: 0.9 INJECTION, SOLUTION INTRAVENOUS at 10:01

## 2024-01-05 NOTE — PLAN OF CARE
1300-Pt tolerated IVF/carbo infusion well today, no complaints or complications,. VSS through duration of treatment. Pt aware to call provider with any questions or concerns and is aware of upcoming appts. Pt ambulatory from clinic with steady gait, no distress noted.

## 2024-01-05 NOTE — PLAN OF CARE
1020-Labs, hx, and medications reviewed, pt meets parameters for treatment today. Assessment completed and plan of care reviewed. Pt verbalized understanding. Pt voices no new complaints or concerns, will continue to monitor for safety.

## 2024-01-08 ENCOUNTER — TELEPHONE (OUTPATIENT)
Dept: RADIATION ONCOLOGY | Facility: CLINIC | Age: 64
End: 2024-01-08
Payer: COMMERCIAL

## 2024-01-08 NOTE — TELEPHONE ENCOUNTER
Call to pt to schedule f/u appt w/CT simulation per Dr Quevedo. Appt scheduled 1/25/24 at 10:00 with Dr Quevedo and Ct simulation at 11:00 AM. Pt accepts these appts. Appt reminder mailed to pt's home.

## 2024-01-08 NOTE — TELEPHONE ENCOUNTER
----- Message from Catarino Bonilla sent at 1/4/2024  5:21 PM CST -----  Jennifer Quevedo MD Atkinson, Thomas S, MD  Cc: Pedro Pablo Herndon RN  Caller: Unspecified (Today,  9:13 AM)  Thanks,    Will get her in for follow up and sim in about 3 weeks after chemo.

## 2024-01-09 ENCOUNTER — OFFICE VISIT (OUTPATIENT)
Dept: DERMATOLOGY | Facility: CLINIC | Age: 64
End: 2024-01-09
Payer: COMMERCIAL

## 2024-01-09 ENCOUNTER — INFUSION (OUTPATIENT)
Dept: INFUSION THERAPY | Facility: HOSPITAL | Age: 64
End: 2024-01-09
Payer: COMMERCIAL

## 2024-01-09 VITALS
RESPIRATION RATE: 18 BRPM | HEART RATE: 122 BPM | DIASTOLIC BLOOD PRESSURE: 73 MMHG | HEIGHT: 59 IN | WEIGHT: 185.44 LBS | TEMPERATURE: 98 F | SYSTOLIC BLOOD PRESSURE: 110 MMHG | BODY MASS INDEX: 37.38 KG/M2

## 2024-01-09 DIAGNOSIS — C34.92 ADENOCARCINOMA, LUNG, LEFT: Primary | ICD-10-CM

## 2024-01-09 DIAGNOSIS — L73.2 HIDRADENITIS SUPPURATIVA: Primary | ICD-10-CM

## 2024-01-09 PROCEDURE — 1160F RVW MEDS BY RX/DR IN RCRD: CPT | Mod: CPTII,S$GLB,, | Performed by: DERMATOLOGY

## 2024-01-09 PROCEDURE — 99999 PR PBB SHADOW E&M-EST. PATIENT-LVL III: CPT | Mod: PBBFAC,,, | Performed by: DERMATOLOGY

## 2024-01-09 PROCEDURE — 99214 OFFICE O/P EST MOD 30 MIN: CPT | Mod: 25,S$GLB,, | Performed by: DERMATOLOGY

## 2024-01-09 PROCEDURE — 25000003 PHARM REV CODE 250: Performed by: INTERNAL MEDICINE

## 2024-01-09 PROCEDURE — 96360 HYDRATION IV INFUSION INIT: CPT

## 2024-01-09 PROCEDURE — 1159F MED LIST DOCD IN RCRD: CPT | Mod: CPTII,S$GLB,, | Performed by: DERMATOLOGY

## 2024-01-09 PROCEDURE — 11900 INJECT SKIN LESIONS </W 7: CPT | Mod: S$GLB,,, | Performed by: DERMATOLOGY

## 2024-01-09 RX ORDER — SODIUM CHLORIDE 0.9 % (FLUSH) 0.9 %
10 SYRINGE (ML) INJECTION
Status: DISCONTINUED | OUTPATIENT
Start: 2024-01-09 | End: 2024-01-09 | Stop reason: HOSPADM

## 2024-01-09 RX ORDER — SODIUM CHLORIDE 0.9 % (FLUSH) 0.9 %
10 SYRINGE (ML) INJECTION
OUTPATIENT
Start: 2024-01-09

## 2024-01-09 RX ORDER — HEPARIN 100 UNIT/ML
500 SYRINGE INTRAVENOUS
Status: DISCONTINUED | OUTPATIENT
Start: 2024-01-09 | End: 2024-01-09 | Stop reason: HOSPADM

## 2024-01-09 RX ORDER — HEPARIN 100 UNIT/ML
500 SYRINGE INTRAVENOUS
OUTPATIENT
Start: 2024-01-09

## 2024-01-09 RX ORDER — MOMETASONE FUROATE 1 MG/G
CREAM TOPICAL
Qty: 60 G | Refills: 2 | Status: SHIPPED | OUTPATIENT
Start: 2024-01-09

## 2024-01-09 RX ADMIN — SODIUM CHLORIDE 1000 ML: 9 INJECTION, SOLUTION INTRAVENOUS at 02:01

## 2024-01-09 NOTE — PROGRESS NOTES
"  Subjective:      Patient ID:  Radha Ervin is a 63 y.o. female who presents for   Chief Complaint   Patient presents with    Hidradenitis Suppurativa     Left breast     Patient with Hidradenitis suppurativa  Last seen on 11/20/2023 for I&D on flare on L breast - better.    Condition overall - Improved. Small flare left inframammary    Current treatment regimen:  Otezla 30mg BID (dx with pso 2/2 humira) - Pso well controlled with no flares  Spironolactone 50mg every day   Antibacterial wash prn flares    Has not yet started:  Turmeric    Lifestyle modifications - diet, smoking, shaving etc  Limiting "white" foods, sugars and processed foods    Not shaving    Dx with lung cancer 5/23 - finished chemo last week      Review of Systems   Constitutional:  Positive for weight gain (30 lb). Negative for weight loss (188#).   HENT:  Negative for nosebleeds and headaches.    Gastrointestinal:  Negative for diarrhea (resolved. never had colonoscopy) and Sensitivity to oral antibiotics.   Genitourinary:  Negative for irregular periods (post menopausal).   Musculoskeletal:  Negative for arthralgias.   Skin:  Positive for abscesses (L axilla - "busted"). Negative for recent sunburn.   Neurological:  Negative for headaches.   Psychiatric/Behavioral:  Negative for depressed mood.    Hematologic/Lymphatic: Bruises/bleeds easily.       Objective:   Physical Exam   Constitutional: She appears well-developed and well-nourished. She is obese.  No distress.   Neurological: She is alert and oriented to person, place, and time. She is not disoriented.   Psychiatric: She has a normal mood and affect.   Skin:   Areas Examined (abnormalities noted in diagram):   Chest / Axilla Inspection Performed  Abdomen Inspection Performed                Diagram Legend     Erythematous scaling macule/papule c/w actinic keratosis       Vascular papule c/w angioma      Pigmented verrucoid papule/plaque c/w seborrheic keratosis      Yellow umbilicated " papule c/w sebaceous hyperplasia      Irregularly shaped tan macule c/w lentigo     1-2 mm smooth white papules consistent with Milia      Movable subcutaneous cyst with punctum c/w epidermal inclusion cyst      Subcutaneous movable cyst c/w pilar cyst      Firm pink to brown papule c/w dermatofibroma      Pedunculated fleshy papule(s) c/w skin tag(s)      Evenly pigmented macule c/w junctional nevus     Mildly variegated pigmented, slightly irregular-bordered macule c/w mildly atypical nevus      Flesh colored to evenly pigmented papule c/w intradermal nevus       Pink pearly papule/plaque c/w basal cell carcinoma      Erythematous hyperkeratotic cursted plaque c/w SCC      Surgical scar with no sign of skin cancer recurrence      Open and closed comedones      Inflammatory papules and pustules      Verrucoid papule consistent consistent with wart     Erythematous eczematous patches and plaques     Dystrophic onycholytic nail with subungual debris c/w onychomycosis     Umbilicated papule    Erythematous-base heme-crusted tan verrucoid plaque consistent with inflamed seborrheic keratosis     Erythematous Silvery Scaling Plaque c/w Psoriasis     See annotation  Lab Results   Component Value Date    WBC 6.41 01/04/2024    HGB 9.0 (L) 01/04/2024    HCT 27.2 (L) 01/04/2024     (H) 01/04/2024     01/04/2024 12/2-23 CT chest - nl        CMP  Sodium   Date Value Ref Range Status   01/04/2024 142 136 - 145 mmol/L Final     Potassium   Date Value Ref Range Status   01/04/2024 4.7 3.5 - 5.1 mmol/L Final     Chloride   Date Value Ref Range Status   01/04/2024 106 95 - 110 mmol/L Final     CO2   Date Value Ref Range Status   01/04/2024 21 (L) 23 - 29 mmol/L Final     Glucose   Date Value Ref Range Status   01/04/2024 110 70 - 110 mg/dL Final     BUN   Date Value Ref Range Status   01/04/2024 20 8 - 23 mg/dL Final   04/28/2023 12.0 7.0 - 21.0 mg/dL Final     Creatinine   Date Value Ref Range Status    01/04/2024 1.6 (H) 0.5 - 1.4 mg/dL Final     Calcium   Date Value Ref Range Status   01/04/2024 9.7 8.7 - 10.5 mg/dL Final     Total Protein   Date Value Ref Range Status   01/04/2024 7.2 6.0 - 8.4 g/dL Final     Albumin   Date Value Ref Range Status   01/04/2024 3.7 3.5 - 5.2 g/dL Final     Total Bilirubin   Date Value Ref Range Status   01/04/2024 0.5 0.1 - 1.0 mg/dL Final     Comment:     For infants and newborns, interpretation of results should be based  on gestational age, weight and in agreement with clinical  observations.    Premature Infant recommended reference ranges:  Up to 24 hours.............<8.0 mg/dL  Up to 48 hours............<12.0 mg/dL  3-5 days..................<15.0 mg/dL  6-29 days.................<15.0 mg/dL       Alkaline Phosphatase   Date Value Ref Range Status   01/04/2024 92 55 - 135 U/L Final     AST   Date Value Ref Range Status   01/04/2024 16 10 - 40 U/L Final     ALT   Date Value Ref Range Status   01/04/2024 18 10 - 44 U/L Final     Anion Gap   Date Value Ref Range Status   01/04/2024 15 8 - 16 mmol/L Final   04/28/2023 13.3 9 - 18 mmol/L Final     eGFR   Date Value Ref Range Status   01/04/2024 35.8 (A) >60 mL/min/1.73 m^2 Final   04/28/2023 76 (L) >=90 mL/min Final     Comment:     Calculation based on the Chronic Kidney Disease Epidemiology Collaboration (CKD-EPI) equation refit  without adjustment for race.           Assessment / Plan:        Hidradenitis suppurativa  -     triamcinolone acetonide injection 10 mg  -     IA [id number] ACETONIDE INJ PER 10MG  -     IA INJECTION INTO SKIN LESIONS, UP TO 7  -     mometasone 0.1% (ELOCON) 0.1 % cream; AAA every day when red and tender under occlusion  Dispense: 60 g; Refill: 2    Intralesional Kenalog 5mg/cc (0.4 cc total) injected into 2 lesions on the left inframammary today after obtaining verbal consent including risk of surrounding hypopigmentation. Patient tolerated procedure well.    Units: 1  NDC for Kenalog 10mg/cc:   "[id number]      Hidradenitis suppurativa  Today's Plan:      TREATMENT:    Decrease:  Otezla to 30mg 1x/day    Continue:  Wash affected areas with Hibiclens +/- Benzoyl peroxide every day  Spironolactone (aldactone) at 50 mg every day  Clindamycin solution 1x/day to "quiet affected areas" and 2x/day to flared affected areas     Start:  Turmeric (with black pepper) supplement at 500mg to 1 gram per day (available over the counter and on Amazon)              Can cause upset stomach  Mometasone cream every day under occlusion to flare     For flares:  Dilute bleach to affected areas: compresses daily to affected/flared area(s) - can use CLn wash on warm wet rag as compress  If have groin involvement, may want to take dilute bleach baths (daily when flared; 2x/week for maintenance).      Consider:  Adding metformin  Adding dapsone     RESOURCE:  [facility]-foundation.org        FOR FLARES (new painful bump):  [facility] for injection which will often hasten resolution of tender, red bump     Can apply warm/hot compresses on a painful, deep lump     If notice a foul-smelling drainage, can use Hydrogen Peroxide to cleanse area followed by application of Medi-honey to open area (apply Medi-honey 1x/day)               Follow up in about 3 months (around 4/9/2024) for HS clinic.    "

## 2024-01-09 NOTE — ASSESSMENT & PLAN NOTE
"Today's Plan:      TREATMENT:    Decrease:  Otezla to 30mg 1x/day    Continue:  Wash affected areas with Hibiclens +/- Benzoyl peroxide every day  Spironolactone (aldactone) at 50 mg every day  Clindamycin solution 1x/day to "quiet affected areas" and 2x/day to flared affected areas     Start:  Turmeric (with black pepper) supplement at 500mg to 1 gram per day (available over the counter and on Amazon)              Can cause upset stomach  Mometasone cream every day under occlusion to flare     For flares:  Dilute bleach to affected areas: compresses daily to affected/flared area(s) - can use CLn wash on warm wet rag as compress  If have groin involvement, may want to take dilute bleach baths (daily when flared; 2x/week for maintenance).      Consider:  Adding metformin  Adding dapsone     RESOURCE:  -foundation.org        FOR FLARES (new painful bump):  [facility] injection which will often hasten resolution of tender, red bump     Can apply warm/hot compresses on a painful, deep lump     If notice a foul-smelling drainage, can use Hydrogen Peroxide to cleanse area followed by application of Medi-honey to open area (apply Medi-honey 1x/day)           "

## 2024-01-09 NOTE — PATIENT INSTRUCTIONS
"TREATMENT:    Decrease:  Otezla to 30mg 1x/day    Continue:  Wash affected areas with Hibiclens +/- Benzoyl peroxide every day  Spironolactone (aldactone) at 50 mg every day  Clindamycin solution 1x/day to "quiet affected areas" and 2x/day to flared affected areas     Start:  Turmeric (with black pepper) supplement at 500mg to 1 gram per day (available over the counter and on Amazon)              Can cause upset stomach  Mometasone cream every day under occlusion to flare     For flares:  Dilute bleach to affected areas: compresses daily to affected/flared area(s) - can use CLn wash on warm wet rag as compress  If have groin involvement, may want to take dilute bleach baths (daily when flared; 2x/week for maintenance).      Consider:  Adding metformin  Adding dapsone     RESOURCE:  -foundation.org        FOR FLARES (new painful bump):  Message office through [facility] for injection which will often hasten resolution of tender, red bump     Can apply warm/hot compresses on a painful, deep lump     If notice a foul-smelling drainage, can use Hydrogen Peroxide to cleanse area followed by application of Medi-honey to open area (apply Medi-honey 1x/day)           "

## 2024-01-11 ENCOUNTER — PATIENT MESSAGE (OUTPATIENT)
Dept: HEMATOLOGY/ONCOLOGY | Facility: CLINIC | Age: 64
End: 2024-01-11
Payer: COMMERCIAL

## 2024-01-12 ENCOUNTER — PATIENT MESSAGE (OUTPATIENT)
Dept: DERMATOLOGY | Facility: CLINIC | Age: 64
End: 2024-01-12
Payer: COMMERCIAL

## 2024-01-16 ENCOUNTER — LAB VISIT (OUTPATIENT)
Dept: LAB | Facility: HOSPITAL | Age: 64
End: 2024-01-16
Attending: HOSPITALIST
Payer: COMMERCIAL

## 2024-01-16 ENCOUNTER — OFFICE VISIT (OUTPATIENT)
Dept: NEPHROLOGY | Facility: CLINIC | Age: 64
End: 2024-01-16
Payer: COMMERCIAL

## 2024-01-16 VITALS
WEIGHT: 183.44 LBS | SYSTOLIC BLOOD PRESSURE: 110 MMHG | HEIGHT: 59 IN | HEART RATE: 128 BPM | BODY MASS INDEX: 36.98 KG/M2 | OXYGEN SATURATION: 99 % | DIASTOLIC BLOOD PRESSURE: 82 MMHG

## 2024-01-16 DIAGNOSIS — N17.0 ACUTE KIDNEY INJURY (AKI) WITH ACUTE TUBULAR NECROSIS (ATN): ICD-10-CM

## 2024-01-16 DIAGNOSIS — N18.32 STAGE 3B CHRONIC KIDNEY DISEASE: Primary | ICD-10-CM

## 2024-01-16 DIAGNOSIS — D84.9 IMMUNOSUPPRESSION: ICD-10-CM

## 2024-01-16 DIAGNOSIS — Z79.899 HIGH RISK MEDICATIONS (NOT ANTICOAGULANTS) LONG-TERM USE: ICD-10-CM

## 2024-01-16 DIAGNOSIS — C34.92 ADENOCARCINOMA, LUNG, LEFT: ICD-10-CM

## 2024-01-16 DIAGNOSIS — N18.31 STAGE 3A CHRONIC KIDNEY DISEASE: ICD-10-CM

## 2024-01-16 LAB
CREAT UR-MCNC: 292 MG/DL (ref 15–325)
PROT UR-MCNC: 56 MG/DL (ref 0–15)
PROT/CREAT UR: 0.19 MG/G{CREAT} (ref 0–0.2)

## 2024-01-16 PROCEDURE — 3066F NEPHROPATHY DOC TX: CPT | Mod: CPTII,S$GLB,, | Performed by: HOSPITALIST

## 2024-01-16 PROCEDURE — 3074F SYST BP LT 130 MM HG: CPT | Mod: CPTII,S$GLB,, | Performed by: HOSPITALIST

## 2024-01-16 PROCEDURE — 3008F BODY MASS INDEX DOCD: CPT | Mod: CPTII,S$GLB,, | Performed by: HOSPITALIST

## 2024-01-16 PROCEDURE — 99999 PR PBB SHADOW E&M-EST. PATIENT-LVL IV: CPT | Mod: PBBFAC,,, | Performed by: HOSPITALIST

## 2024-01-16 PROCEDURE — 99204 OFFICE O/P NEW MOD 45 MIN: CPT | Mod: S$GLB,,, | Performed by: HOSPITALIST

## 2024-01-16 PROCEDURE — 3079F DIAST BP 80-89 MM HG: CPT | Mod: CPTII,S$GLB,, | Performed by: HOSPITALIST

## 2024-01-16 PROCEDURE — 82570 ASSAY OF URINE CREATININE: CPT | Performed by: HOSPITALIST

## 2024-01-16 PROCEDURE — 1159F MED LIST DOCD IN RCRD: CPT | Mod: CPTII,S$GLB,, | Performed by: HOSPITALIST

## 2024-01-17 NOTE — PROGRESS NOTES
REASON FOR CONSULT/CHIEF COMPLAIN: CKD stage 3b     REFERRING PHYSICIAN: Suraj Herrera III, MD      HISTORY OF PRESENT ILLNESS: 63 y.o. female who is new to me  has a past medical history of Allergy, Amblyopia, Cataract, Eczema, HS (hereditary spherocytosis), total knee replacement (01/19/2016), and Joint pain. patient was referred here for abnormal renal function of serum creatinine ranging from 1.5-1.6 mg/dl      63 yr old with a recent left upper lobe lesion positive for adenocarcinoma with a A PET scan on July 13, 2023 revealed the left upper lobe mass measuring 2.6 x 1.6 cm with SUV max 7.4.  There was mildly metabolic prevascular mediastinal lymph node measuring 0.9 cm seen in short axis with SUV max 2.3.    She is s/p  robotic left upper lobectomy with  surgery was complicated by intraoperative bleeding of pulmonary arterial branches which resolved with pressure.       On 9/22/23 she was started on carbo/taxol C#1 but developed infusion reaction to taxol so that was stopped.   C#1 carbo/pemetrexed 11/3/23  C#2 carbo/pemetrexed 11/24/23  C#3 acute worsening of renal function--pemetrexed held: 12/15/23     Her renal function with serum creatinine ranging from 1.1 -1.3 mg/dL until 11/2023. Since then serum creatinine increased to 1.4< 1.5< 1.6 mg/dl and has been at 1.6 mg/dl . Pt was also on Spironolactone for her hydradenitis     No chronic NSAID use, no known exposure to lithium, lead.  Denied any issues with urination including dysuria, hematuria, urgency, hesitancy, nocturia, incomplete voiding. Denied chest pain, nausea, vomiting, abd pain, nausea, vomiting, diarrhea, shortness of breath, pedal edema, Orthopnea, PND.    ROS:  General: negative for chills, or fatigue  ENT: No epistaxis or headaches  Hematological and Lymphatic: No bleeding problems or blood clots.  Endocrine: No skin changes or temperature intolerance  Respiratory: No cough, shortness of breath, or wheezing  Cardiovascular: No chest pain  or dyspnea   Gastrointestinal: No abdominal pain, change in bowel habits  Genito-Urinary: No dysuria, trouble voiding, or hematuria  Musculoskeletal: ROS: negative for - joint pain, joint stiffness, joint swelling, muscle pain or muscular weakness  Neurological: No new focal weakness, no numbness  Dermatological: No rash or ulcers.    PAST MEDICAL HISTORY:  Past Medical History:   Diagnosis Date    Allergy     Amblyopia     Cataract     Eczema     HS (hereditary spherocytosis)     Hx of total knee replacement 01/19/2016    right knee    Joint pain        PAST SURGICAL HISTORY:  Past Surgical History:   Procedure Laterality Date    achilles tendon repair      BRONCHOSCOPY N/A 8/3/2023    Procedure: Bronchoscopy;  Surgeon: Saeed Gould MD;  Location: [facility] OR [address] [place] [address];  Service: Cardiothoracic;  Laterality: N/A;    CHOLECYSTECTOMY      INJECTION OF ANESTHETIC AGENT AROUND MULTIPLE INTERCOSTAL NERVES N/A 8/3/2023    Procedure: BLOCK, NERVE, INTERCOSTAL, 2 OR MORE;  Surgeon: Saeed Gould MD;  Location: [facility] OR [address] [place] [address];  Service: Cardiothoracic;  Laterality: N/A;    LYMPHADENECTOMY Left 8/3/2023    Procedure: LYMPHADENECTOMY;  Surgeon: Saeed Gould MD;  Location: [facility] OR [address] [place] [address];  Service: Cardiothoracic;  Laterality: Left;    NAVIGATIONAL BRONCHOSCOPY N/A 6/23/2023    Procedure: BRONCHOSCOPY, NAVIGATIONAL;  Surgeon: Radha Mccollum MD;  Location: [facility] OR [address] [place] [address];  Service: Pulmonary;  Laterality: N/A;    TOTAL KNEE ARTHROPLASTY      XI ROBOTIC RATS Left 8/3/2023    Procedure: XI ROBOTIC RATS upper lobectomy;  Surgeon: Saeed Gould MD;  Location: [facility] OR [address] [place] [address];  Service: Cardiothoracic;  Laterality: Left;       FAMILY HISTORY:   Family History   Problem Relation Age of Onset    Cancer Mother     Cancer Father         lung CA    Cancer Son         hogdkins    Breast cancer Sister         1/2 sister    Liver cancer Maternal Uncle     Blindness Cousin     Diabetes Cousin     Amblyopia Neg Hx      Cataracts Neg Hx     Glaucoma Neg Hx     Macular degeneration Neg Hx     Retinal detachment Neg Hx     Strabismus Neg Hx        SOCIAL HISTORY:  Social History     Socioeconomic History    Marital status: Single   Tobacco Use    Smoking status: Former     Current packs/day: 0.00     Average packs/day: 1 pack/day for 46.3 years (46.3 ttl pk-yrs)     Types: Vaping with nicotine, Cigarettes     Start date:      Quit date: 2023     Years since quittin.7    Smokeless tobacco: Never    Tobacco comments:     Patient is currently on the patch    Substance and Sexual Activity    Alcohol use: Yes     Comment: occassionally    Drug use: No    Sexual activity: Not Currently     Partners: Male     Social Determinants of Health     Financial Resource Strain: High Risk (2024)    Overall Financial Resource Strain (CARDIA)     Difficulty of Paying Living Expenses: Hard   Food Insecurity: No Food Insecurity (2024)    Hunger Vital Sign     Worried About Running Out of Food in the Last Year: Never true     Ran Out of Food in the Last Year: Never true   Transportation Needs: No Transportation Needs (2024)    PRAPARE - Transportation     Lack of Transportation (Medical): No     Lack of Transportation (Non-Medical): No   Physical Activity: Insufficiently Active (2024)    Exercise Vital Sign     Days of Exercise per Week: 1 day     Minutes of Exercise per Session: 10 min   Stress: Stress Concern Present (2024)    [employer] - Occupational Stress Questionnaire     Feeling of Stress : To some extent   Social Connections: Socially Isolated (2024)    Social Connection and Isolation Panel [NHANES]     Frequency of Communication with Friends and Family: More than three times a week     Frequency of Social Gatherings with Friends and Family: Once a week     Attends Sikhism Services: Never     Active Member of Clubs or Organizations: No     Attends Club or Organization  "Meetings: Never     Marital Status:    Housing Stability: High Risk (1/11/2024)    Housing Stability Vital Sign     Unable to Pay for Housing in the Last Year: Yes     Number of Places Lived in the Last Year: 1     Unstable Housing in the Last Year: No       ALLERGIES:  Review of patient's allergies indicates:   Allergen Reactions    Paclitaxel Anaphylaxis and Other (See Comments)     Pt states "Anaphylaxis" last encounter w/ throat swelling. Encountered back pain, coughing and head fuzzy today.      Morphine Other (See Comments)     headaches    Latex, natural rubber Rash and Other (See Comments)     Redness and peeling only with bandaids    Penicillins Nausea And Vomiting and Rash       MEDICATIONS:    Current Outpatient Medications:     acetaminophen (TYLENOL) 500 MG tablet, Take 2 tablets (1,000 mg total) by mouth every 8 (eight) hours., Disp: , Rfl: 0    betamethasone dipropionate (DIPROLENE) 0.05 % ointment, Apply topically 2 (two) times daily. Prn psoriasis flares, Disp: 45 g, Rfl: 3    clindamycin phosphate 1% (CLINDAGEL) 1 % gel, AAA every day, Disp: 60 g, Rfl: 5    dexAMETHasone (DECADRON) 4 MG Tab, Take 2 tablets (8 mg total) by mouth As instructed. Take 8 mg the day prior to chemotherapy, and then 8 mg after chemo on days 2, 3, 4, Disp: 24 tablet, Rfl: 5    folic acid (FOLVITE) 1 MG tablet, Take 1 tablet (1 mg total) by mouth once daily., Disp: 30 tablet, Rfl: 11    mometasone 0.1% (ELOCON) 0.1 % cream, AAA every day when red and tender under occlusion, Disp: 60 g, Rfl: 2    OLANZapine (ZYPREXA) 5 MG tablet, Take 1 tablet (5 mg total) by mouth every evening., Disp: 30 tablet, Rfl: 2    ondansetron (ZOFRAN) 8 MG tablet, Take 1 tablet (8 mg total) by mouth every 8 (eight) hours as needed for Nausea., Disp: 60 tablet, Rfl: 2    ondansetron (ZOFRAN-ODT) 8 MG TbDL, Take 1 tablet (8 mg total) by mouth every 8 (eight) hours as needed (nausea/vomitting)., Disp: 90 tablet, Rfl: 5    OTEZLA 30 mg Tab, TAKE " 1 TABLET BY MOUTH  TWICE DAILY, Disp: 60 tablet, Rfl: 2    promethazine (PHENERGAN) 25 MG tablet, Take 1 tablet (25 mg total) by mouth every 6 (six) hours as needed for Nausea., Disp: 60 tablet, Rfl: 0    spironolactone (ALDACTONE) 50 MG tablet, Take 1 po qday, Disp: 90 tablet, Rfl: 1    LORazepam (ATIVAN) 1 MG tablet, Take 1 tablet (1 mg total) by mouth every 6 (six) hours as needed for Anxiety (and half an hour before MRI). (Patient not taking: Reported on 1/16/2024), Disp: 10 tablet, Rfl: 3    Current Facility-Administered Medications:     triamcinolone acetonide injection 10 mg, 10 mg, Intradermal, Once, Kathe Quan MD   Medication List with Changes/Refills   Current Medications    ACETAMINOPHEN (TYLENOL) 500 MG TABLET    Take 2 tablets (1,000 mg total) by mouth every 8 (eight) hours.    BETAMETHASONE DIPROPIONATE (DIPROLENE) 0.05 % OINTMENT    Apply topically 2 (two) times daily. Prn psoriasis flares    CLINDAMYCIN PHOSPHATE 1% (CLINDAGEL) 1 % GEL    AAA every day    DEXAMETHASONE (DECADRON) 4 MG TAB    Take 2 tablets (8 mg total) by mouth As instructed. Take 8 mg the day prior to chemotherapy, and then 8 mg after chemo on days 2, 3, 4    FOLIC ACID (FOLVITE) 1 MG TABLET    Take 1 tablet (1 mg total) by mouth once daily.    LORAZEPAM (ATIVAN) 1 MG TABLET    Take 1 tablet (1 mg total) by mouth every 6 (six) hours as needed for Anxiety (and half an hour before MRI).    MOMETASONE 0.1% (ELOCON) 0.1 % CREAM    AAA every day when red and tender under occlusion    OLANZAPINE (ZYPREXA) 5 MG TABLET    Take 1 tablet (5 mg total) by mouth every evening.    ONDANSETRON (ZOFRAN) 8 MG TABLET    Take 1 tablet (8 mg total) by mouth every 8 (eight) hours as needed for Nausea.    ONDANSETRON (ZOFRAN-ODT) 8 MG TBDL    Take 1 tablet (8 mg total) by mouth every 8 (eight) hours as needed (nausea/vomitting).    OTEZLA 30 MG TAB    TAKE 1 TABLET BY MOUTH  TWICE DAILY    PROMETHAZINE (PHENERGAN) 25 MG TABLET    Take 1 tablet  "(25 mg total) by mouth every 6 (six) hours as needed for Nausea.    SPIRONOLACTONE (ALDACTONE) 50 MG TABLET    Take 1 po qday        PHYSICAL EXAM:  /82   Pulse (!) 128   Ht 4' 11" (1.499 m)   Wt 83.2 kg (183 lb 6.8 oz)   LMP  (LMP Unknown)   SpO2 99%   BMI 37.05 kg/m²     General: No distress, No fever or chills  Head: Normocephalic,atraumatic  Eyes: conjunctivae/corneas clear. PERRL, EOM's intact.  Nose: Nares normal. Mucosa normal. No drainage or sinus tenderness.  Neck: No adenopathy,no carotid bruit,no JVD  Lungs:Clear to auscultation bilaterally, No Crackles  Heart: Regular rate and rhythm, no murmur, gallops or rubs  Abdomen: Soft, no tenderness, bowel sounds normal  Extremities: Atraumatic, no edema in LE  Skin: Turgor normal. No rashes or ulcers  Neurologic: No focal weakness, oriented.  Dialysis Access: Non applicable         LABS:  Lab Results   Component Value Date    HGB 9.0 (L) 01/04/2024        Lab Results   Component Value Date    CREATININE 1.6 (H) 01/04/2024       Prot/Creat Ratio, Urine   Date Value Ref Range Status   01/16/2024 0.19 0.00 - 0.20 Final       Lab Results   Component Value Date     01/04/2024    K 4.7 01/04/2024    CO2 21 (L) 01/04/2024       last PTH   Lab Results   Component Value Date    CALCIUM 9.7 01/04/2024       Lab Results   Component Value Date    HGBA1C 6.3 (H) 04/28/2023       Lab Results   Component Value Date    LDLCALC 104 05/26/2023         Creatinine, Urine   Date Value Ref Range Status   01/16/2024 292.0 15.0 - 325.0 mg/dL Final       Protein, Urine Random   Date Value Ref Range Status   01/16/2024 56 (H) 0 - 15 mg/dL Final       Prot/Creat Ratio, Urine   Date Value Ref Range Status   01/16/2024 0.19 0.00 - 0.20 Final         No images are attached to the encounter.       Problem List   Chronic kidney disease stage 3b, likely iATN from Chemo  High risk medication use  Metabolic Acidosis  Adenocarcinoma of left upper lobe      ASSESSMENT and " PLAN    Chronic kidney disease stage 3b, likely iATN from Chemo    Pt was diagnosed with adenocarcinoma of left upper lobe 9/2023 with S/p chemo with carboplatin and taxo followed by 3 cycles of carboplatin and pemetrexed    Baseline creatinine ranging from 1.1-1.3 mg/dl and with a recent increase to 1.5 / 1.6 mg/dl   -Urine sediment with granular and waxy casts suggestive of carboplatin or pemetrexed associated ATI  Urine micro albumin to creatinine ratio is with in limits   Electrolytes, Acid Base, Hemoglobin, Bone Mineral in acceptable range.  Will check basic KESHA work up including C3, C4, IgA, MPO, PR3, [place], SPEP, sFLC, ETC   Will also check UA and UPCR  Renal Ultrasound, U/A, urine sodium, potasium, renal function panel, uric acid, urine protein to creatinine   Ordered  -Avoid NSAIDs intake   Encouraged to hold off of spironolactone and consider increasing po intake      Left lung adenocarcinoma s/p chemo with carboplatin and pemetrexed   C#1 carbo/pemetrexed 11/3/23  C#2 carbo/pemetrexed 11/24/23  C#3 acute worsening of renal function--pemetrexed held: 12/15/23     Mild Metabolic Acidosis   Sodium bicarb 21   Will monitor for now     42 minutes of total time spent on the encounter, which includes face to face time and non-face to face time preparing to see the patient (eg, review of tests), Obtaining and/or reviewing separately obtained history, documenting clinical information in the electronic or other health record, independently interpreting results (not separately reported) and communicating results to the patient/family/caregiver, or Care coordination (not separately reported).           RTC in 2 weeks     Diagnosis and plan of care explained to the patient.  Pt Verbalized understanding. Answered all questions.  Thanks for allowing me to participate in the care of this patient.       Debra Horn MD  Nephrology  [facility].

## 2024-01-22 ENCOUNTER — OFFICE VISIT (OUTPATIENT)
Dept: INTERNAL MEDICINE | Facility: CLINIC | Age: 64
End: 2024-01-22
Payer: COMMERCIAL

## 2024-01-22 VITALS
WEIGHT: 183.88 LBS | HEART RATE: 111 BPM | DIASTOLIC BLOOD PRESSURE: 68 MMHG | SYSTOLIC BLOOD PRESSURE: 114 MMHG | BODY MASS INDEX: 37.07 KG/M2 | HEIGHT: 59 IN | OXYGEN SATURATION: 99 %

## 2024-01-22 DIAGNOSIS — R42 DIZZINESS: Primary | ICD-10-CM

## 2024-01-22 PROCEDURE — 3078F DIAST BP <80 MM HG: CPT | Mod: CPTII,S$GLB,, | Performed by: FAMILY MEDICINE

## 2024-01-22 PROCEDURE — 99999 PR PBB SHADOW E&M-EST. PATIENT-LVL V: CPT | Mod: PBBFAC,,, | Performed by: FAMILY MEDICINE

## 2024-01-22 PROCEDURE — 99213 OFFICE O/P EST LOW 20 MIN: CPT | Mod: S$GLB,,, | Performed by: FAMILY MEDICINE

## 2024-01-22 PROCEDURE — 1160F RVW MEDS BY RX/DR IN RCRD: CPT | Mod: CPTII,S$GLB,, | Performed by: FAMILY MEDICINE

## 2024-01-22 PROCEDURE — 3066F NEPHROPATHY DOC TX: CPT | Mod: CPTII,S$GLB,, | Performed by: FAMILY MEDICINE

## 2024-01-22 PROCEDURE — 1159F MED LIST DOCD IN RCRD: CPT | Mod: CPTII,S$GLB,, | Performed by: FAMILY MEDICINE

## 2024-01-22 PROCEDURE — 3074F SYST BP LT 130 MM HG: CPT | Mod: CPTII,S$GLB,, | Performed by: FAMILY MEDICINE

## 2024-01-22 PROCEDURE — 3008F BODY MASS INDEX DOCD: CPT | Mod: CPTII,S$GLB,, | Performed by: FAMILY MEDICINE

## 2024-01-22 RX ORDER — MINERAL OIL
180 ENEMA (ML) RECTAL DAILY
Qty: 90 TABLET | Refills: 3 | Status: SHIPPED | OUTPATIENT
Start: 2024-01-22 | End: 2024-05-17 | Stop reason: SDUPTHER

## 2024-01-22 NOTE — PROGRESS NOTES
"Subjective:     Patient ID: Radha Ervin is a 63 y.o. female.   Chief Complaint: Otalgia (Right side feel like heart beat and ringing.) and Tinnitus (Right side feel like heart beat and ringing.)    HPI:  Patient of Dr. Suraj Herrera, seen acutely today for R ear discomfort.    Hx L lung adenocarcinoma (Stage IIIA), followed by Heme/Onc. She has completed a course of chemo with carboplatin/pemetrexed. Her last session was 1/5/24. A few days after she began to experience tinnitus and subjective fullness in that ear. She sometimes feels like she can feel her heartbeat in that ear. No loss of hearing. Has had vertigo before and is unable to trigger vertiginous sx in relation to her discomfort. She does sometimes have a HA when laying down which improves with sitting up. The HA is localized to the occiput and does not radiate. She has had extensive imaging. She had an MRI brain 08/2023 which did not demonstrate any intracranial metastases.    She is due to start radiation therapy.    Review of Systems   Constitutional:  Positive for chills (subj, since starting chemo). Negative for fever.   HENT:  Positive for ear pain (right) and tinnitus. Negative for nasal congestion, ear discharge, facial swelling, hearing loss, nosebleeds, postnasal drip, rhinorrhea, sinus pressure/congestion, sneezing and sore throat.    Respiratory:  Negative for cough and shortness of breath.    Cardiovascular:  Negative for chest pain.   Endocrine: Positive for cold intolerance.   Neurological:  Positive for headaches. Negative for dizziness, vertigo, syncope and numbness.   All other systems reviewed and are negative.         Objective:      Vitals:    01/22/24 1251   BP: 114/68   BP Location: Left arm   Patient Position: Sitting   BP Method: Large (Manual)   Pulse: (!) 111   SpO2: 99%   Weight: 83.4 kg (183 lb 13.8 oz)   Height: 4' 11" (1.499 m)      Physical Exam  Constitutional:       General: She is not in acute distress.     Appearance: " Normal appearance. She is not ill-appearing.   HENT:      Head: Normocephalic and atraumatic.      Right Ear: Hearing, ear canal and external ear normal. No drainage, swelling or tenderness. A middle ear effusion is present. There is no impacted cerumen. No foreign body. Tympanic membrane is not erythematous or bulging.      Left Ear: Hearing, tympanic membrane, ear canal and external ear normal. No drainage, swelling or tenderness.  No middle ear effusion. There is no impacted cerumen. No foreign body. Tympanic membrane is not erythematous or bulging.      Nose: Nose normal. No congestion or rhinorrhea.      Mouth/Throat:      Mouth: Mucous membranes are moist.      Pharynx: Oropharynx is clear. No oropharyngeal exudate or posterior oropharyngeal erythema.   Eyes:      General: No scleral icterus.        Right eye: No discharge.         Left eye: No discharge.      Conjunctiva/sclera: Conjunctivae normal.   Cardiovascular:      Rate and Rhythm: Normal rate and regular rhythm.      Heart sounds: Normal heart sounds. No murmur heard.     No friction rub. No gallop.   Pulmonary:      Effort: No respiratory distress.      Breath sounds: Normal breath sounds. No wheezing, rhonchi or rales.   Musculoskeletal:      Cervical back: Normal range of motion and neck supple.   Lymphadenopathy:      Cervical: No cervical adenopathy.   Skin:     General: Skin is warm and dry.   Neurological:      General: No focal deficit present.      Mental Status: She is alert and oriented to person, place, and time. Mental status is at baseline.   Psychiatric:         Mood and Affect: Mood normal.         Behavior: Behavior normal.         Thought Content: Thought content normal.         Judgment: Judgment normal.           Assessment:       Problem List Items Addressed This Visit    None  Visit Diagnoses       Dizziness    -  Primary    Relevant Medications    fexofenadine (ALLEGRA) 180 MG tablet              Plan:       1.  Dizziness  Several possible etiologies for dizziness and tinnitus  Mild effusion noted with R>L  No evidence of infection  Reviewed most recent cranial imaging, which was clear for mets; less likely something could have developed quickly enough to cause these significant sx - though repeat imaging should be considered if sx fail to improve or worsen  Would also consider side effect of chemo meds - specifically carboplatin, which has some ototoxicity  Discussed tx options with pt. She does not tolerate intranasal sprays well. Had considered flonase but due to this we will try allegra instead.  -     fexofenadine (ALLEGRA) 180 MG tablet; Take 1 tablet (180 mg total) by mouth once daily.  Dispense: 90 tablet; Refill: 3           Follow up if symptoms worsen or fail to improve.     Josse Narvaez MD, FAAFP  Family Medicine Physician  [facility]  01/22/2024

## 2024-01-23 ENCOUNTER — HOSPITAL ENCOUNTER (OUTPATIENT)
Dept: RADIOLOGY | Facility: HOSPITAL | Age: 64
Discharge: HOME | End: 2024-01-23
Attending: HOSPITALIST
Payer: COMMERCIAL

## 2024-01-23 DIAGNOSIS — N18.31 STAGE 3A CHRONIC KIDNEY DISEASE: ICD-10-CM

## 2024-01-23 PROCEDURE — 76770 US EXAM ABDO BACK WALL COMP: CPT | Mod: 26,,, | Performed by: INTERNAL MEDICINE

## 2024-01-23 PROCEDURE — 76770 US EXAM ABDO BACK WALL COMP: CPT | Mod: TC

## 2024-01-23 NOTE — PROGRESS NOTES
REFERRING PHYSICIAN: Saeed Gould MD, Roger Eduardo MD    DIAGNOSIS: pT1c N2 M0, stage IIIA adenocarcinoma of the left upper lobe of the lung    INTERVAL HISTORY:  Ms. Ervin is a 63-year-old female with 30 pack-year history of smoking who was diagnosed with stage IIIA adenocarcinoma of the left upper lobe of the lung who is well known to me after consultation on August 25, 2023.  Please see full consultation note on that date for details.    Briefly, she was diagnosed with left lung cancer after evaluation for an abnormal chest x-ray. A subsequent CT of the chest without contrast on May 26, 2023 revealed a spiculated nodule along the paramediastinal left upper lobe measuring 2.7 x 1.9 cm. A mildly enlarged right paratracheal lymph node was seen measuring 1.1 cm. She underwent EBUS and biopsy on June 23, 2023. Pathology revealed the left upper lobe lesion positive for adenocarcinoma. Station 4R was negative for malignancy.     A PET scan on July 13, 2023 revealed the left upper lobe mass measuring 2.6 x 1.6 cm with SUV max 7.4. There was mildly metabolic prevascular mediastinal lymph node measuring 0.9 cm seen in short axis with SUV max 2.3. She underwent robotic left upper lobectomy and lymph node sampling on August 3, 2023. Pathology revealed invasive poorly differentiated adenocarcinoma measuring 2.1 cm. There was visceral pleural invasion noted. Vascular resection revealed invasive tumor present in the adventitial soft tissue. There was lymphovascular invasion noted. Invasive carcinoma is present at the vascular margin. 3/4 level 6 lymph nodes, 1/2 level 11 lymph nodes and 1/4 level 12 lymph nodes were involved out of a total of 23 lymph nodes. Surgery was complicated by intraoperative bleeding of pulmonary arterial branches which resolved with pressure. After discussion in the multidisciplinary thoracic conference, she is recommended to undergo adjuvant chemotherapy and radiation.     Since I saw her last,  an MRI of the brain on 8/30/2023 is negative for metastatic disease.  A repeat CT of the chest on September 12, 2023 revealed no mediastinal, hilar, or axillary lymphadenopathy.  There has been resection of the left upper lobe lesion with no local recurrence or metastatic disease seen.  She she received 3 cycles of chemotherapy which ended on January 5, 2023.  She developed allergy to Taxol which was switched to pemetrexed, as well as tinnitus.  On cycle 3, she was noted to have decline in her renal function as being evaluated by Nephrology.  She is here today to initiate adjuvant radiation to the mediastinum.    At present, she reports shortness of breath with less than one block with some worsening after the last chemo as well as dry cough. She also reports intermittent chest pain since surgery as well as tinnit. She denies fever, night sweats, or weight loss.     PHYSICAL EXAMINATION:  Vitals:    01/25/24 1016   BP: 120/61   BP Location: Left arm   Patient Position: Sitting   BP Method: Medium (Automatic)   Pulse: 85   Temp: 97.9 °F (36.6 °C)   TempSrc: Oral   SpO2: 100%   Weight: 85.3 kg (188 lb 0.8 oz)   Body mass index is 37.98 kg/m².   GENERAL: Patient is alert and oriented, in no acute distress.  HEENT: Extraocular muscles are intact.  Oropharynx is clear without lesions.  There is no cervical or supraclavicular adenopathy palpated.    CHEST: Breath sounds clear bilaterally.  No rales.  No rhonchi.  Unlabored respirations.  CARDIOVASCULAR: Normal S1, S2.  Normal rate.  Regular rhythm.  ABDOMEN: Bowel sounds normal.  No tenderness.  No abdominal distention.  No hepatomegaly.  No splenomegaly.  EXTREMITIES: No clubbing, cyanosis, edema  NEUROLOGIC: Cranial nerves II through XII are grossly intact.  Sensation is intact.  Strength is 5 out of 5 in the upper and lower extremities bilaterally.    ASSESSMENT:   This is a 63-year-old female with pT1c N2 M0, poorly differentiated adenocarcinoma of the left upper lobe  who underwent robotic left upper lobectomy and mediastinal lymph node sampling on August 3, 2023 with a 2.1 cm lesion with positive vascular margin, and level 6, 11, and 12 lymph nodes positive for malignancy (5/23).    PLAN:   After discussion in the multidisciplinary thoracic conference and review of the available pathology and radiological studies, Ms. Ervin is noted to have multiple mediastinal lymph nodes positive as well as positive margin with tumor in the adventitial soft tissue. The recommendation is for adjuvant radiation and chemotherapy. She has completed chemotherapy. She is recommended to initiate adjuvant radiation as planned. I plan to deliver approximately 54-60 Gy.     The risks, benefits, and side effects of radiation were again explained in detail to the patient and her sister. All questions were answered. She will return in approximately 1-2 weeks to initiate radiation.

## 2024-01-24 ENCOUNTER — PATIENT MESSAGE (OUTPATIENT)
Dept: NEPHROLOGY | Facility: CLINIC | Age: 64
End: 2024-01-24
Payer: COMMERCIAL

## 2024-01-25 ENCOUNTER — OFFICE VISIT (OUTPATIENT)
Dept: RADIATION ONCOLOGY | Facility: CLINIC | Age: 64
End: 2024-01-25
Payer: COMMERCIAL

## 2024-01-25 ENCOUNTER — HOSPITAL ENCOUNTER (OUTPATIENT)
Dept: RADIATION THERAPY | Facility: HOSPITAL | Age: 64
Discharge: HOME | End: 2024-01-25
Attending: INTERNAL MEDICINE
Payer: COMMERCIAL

## 2024-01-25 VITALS
TEMPERATURE: 98 F | DIASTOLIC BLOOD PRESSURE: 61 MMHG | SYSTOLIC BLOOD PRESSURE: 120 MMHG | WEIGHT: 188.06 LBS | OXYGEN SATURATION: 100 % | BODY MASS INDEX: 37.98 KG/M2 | HEART RATE: 85 BPM

## 2024-01-25 DIAGNOSIS — C34.92 ADENOCARCINOMA, LUNG, LEFT: Primary | ICD-10-CM

## 2024-01-25 DIAGNOSIS — N18.32 STAGE 3B CHRONIC KIDNEY DISEASE: Primary | ICD-10-CM

## 2024-01-25 PROCEDURE — 3008F BODY MASS INDEX DOCD: CPT | Mod: CPTII,S$GLB,, | Performed by: RADIOLOGY

## 2024-01-25 PROCEDURE — 3074F SYST BP LT 130 MM HG: CPT | Mod: CPTII,S$GLB,, | Performed by: RADIOLOGY

## 2024-01-25 PROCEDURE — 3066F NEPHROPATHY DOC TX: CPT | Mod: CPTII,S$GLB,, | Performed by: RADIOLOGY

## 2024-01-25 PROCEDURE — 77334 RADIATION TREATMENT AID(S): CPT | Mod: TC | Performed by: RADIOLOGY

## 2024-01-25 PROCEDURE — 99213 OFFICE O/P EST LOW 20 MIN: CPT | Mod: 25,S$GLB,, | Performed by: RADIOLOGY

## 2024-01-25 PROCEDURE — 77263 THER RADIOLOGY TX PLNG CPLX: CPT | Mod: ,,, | Performed by: RADIOLOGY

## 2024-01-25 PROCEDURE — 1159F MED LIST DOCD IN RCRD: CPT | Mod: CPTII,S$GLB,, | Performed by: RADIOLOGY

## 2024-01-25 PROCEDURE — 3078F DIAST BP <80 MM HG: CPT | Mod: CPTII,S$GLB,, | Performed by: RADIOLOGY

## 2024-01-25 PROCEDURE — 77334 RADIATION TREATMENT AID(S): CPT | Mod: 26,,, | Performed by: RADIOLOGY

## 2024-01-25 PROCEDURE — 99999 PR PBB SHADOW E&M-EST. PATIENT-LVL IV: CPT | Mod: PBBFAC,,, | Performed by: RADIOLOGY

## 2024-01-25 PROCEDURE — 77014 HC CT GUIDANCE RADIATION THERAPY FLDS PLACEMENT: CPT | Mod: TC | Performed by: RADIOLOGY

## 2024-01-25 PROCEDURE — 77014 PR  CT GUIDANCE PLACEMENT RAD THERAPY FIELDS: CPT | Mod: 26,,, | Performed by: RADIOLOGY

## 2024-01-25 RX ORDER — APREMILAST 30 MG/1
30 TABLET, FILM COATED ORAL DAILY
COMMUNITY
End: 2024-05-17 | Stop reason: SDUPTHER

## 2024-01-26 ENCOUNTER — LAB VISIT (OUTPATIENT)
Dept: LAB | Facility: HOSPITAL | Age: 64
End: 2024-01-26
Attending: HOSPITALIST
Payer: COMMERCIAL

## 2024-01-26 DIAGNOSIS — N18.32 STAGE 3B CHRONIC KIDNEY DISEASE: ICD-10-CM

## 2024-01-26 LAB
ALBUMIN SERPL BCP-MCNC: 3.5 G/DL (ref 3.5–5.2)
ANION GAP SERPL CALC-SCNC: 11 MMOL/L (ref 8–16)
BASOPHILS # BLD AUTO: 0.01 K/UL (ref 0–0.2)
BASOPHILS NFR BLD: 0.2 % (ref 0–1.9)
BUN SERPL-MCNC: 23 MG/DL (ref 8–23)
CALCIUM SERPL-MCNC: 9.8 MG/DL (ref 8.7–10.5)
CHLORIDE SERPL-SCNC: 109 MMOL/L (ref 95–110)
CO2 SERPL-SCNC: 20 MMOL/L (ref 23–29)
CREAT SERPL-MCNC: 1.3 MG/DL (ref 0.5–1.4)
DIFFERENTIAL METHOD BLD: ABNORMAL
EOSINOPHIL # BLD AUTO: 0.1 K/UL (ref 0–0.5)
EOSINOPHIL NFR BLD: 1.4 % (ref 0–8)
ERYTHROCYTE [DISTWIDTH] IN BLOOD BY AUTOMATED COUNT: 22 % (ref 11.5–14.5)
EST. GFR  (NO RACE VARIABLE): 45.9 ML/MIN/1.73 M^2
GLUCOSE SERPL-MCNC: 139 MG/DL (ref 70–110)
HCT VFR BLD AUTO: 22.1 % (ref 37–48.5)
HGB BLD-MCNC: 7.4 G/DL (ref 12–16)
IMM GRANULOCYTES # BLD AUTO: 0.01 K/UL (ref 0–0.04)
IMM GRANULOCYTES NFR BLD AUTO: 0.2 % (ref 0–0.5)
LYMPHOCYTES # BLD AUTO: 2.1 K/UL (ref 1–4.8)
LYMPHOCYTES NFR BLD: 36.2 % (ref 18–48)
MCH RBC QN AUTO: 34.9 PG (ref 27–31)
MCHC RBC AUTO-ENTMCNC: 33.5 G/DL (ref 32–36)
MCV RBC AUTO: 104 FL (ref 82–98)
MONOCYTES # BLD AUTO: 0.8 K/UL (ref 0.3–1)
MONOCYTES NFR BLD: 13.8 % (ref 4–15)
NEUTROPHILS # BLD AUTO: 2.8 K/UL (ref 1.8–7.7)
NEUTROPHILS NFR BLD: 48.2 % (ref 38–73)
NRBC BLD-RTO: 1 /100 WBC
PHOSPHATE SERPL-MCNC: 4 MG/DL (ref 2.7–4.5)
PLATELET # BLD AUTO: 176 K/UL (ref 150–450)
PMV BLD AUTO: 10.7 FL (ref 9.2–12.9)
POTASSIUM SERPL-SCNC: 3.8 MMOL/L (ref 3.5–5.1)
PTH-INTACT SERPL-MCNC: 112.6 PG/ML (ref 9–77)
RBC # BLD AUTO: 2.12 M/UL (ref 4–5.4)
SODIUM SERPL-SCNC: 140 MMOL/L (ref 136–145)
WBC # BLD AUTO: 5.74 K/UL (ref 3.9–12.7)

## 2024-01-26 PROCEDURE — 83970 ASSAY OF PARATHORMONE: CPT | Performed by: HOSPITALIST

## 2024-01-26 PROCEDURE — 85025 COMPLETE CBC W/AUTO DIFF WBC: CPT | Performed by: HOSPITALIST

## 2024-01-26 PROCEDURE — 80069 RENAL FUNCTION PANEL: CPT | Performed by: HOSPITALIST

## 2024-01-26 PROCEDURE — 36415 COLL VENOUS BLD VENIPUNCTURE: CPT | Mod: PO | Performed by: HOSPITALIST

## 2024-01-28 ENCOUNTER — PATIENT MESSAGE (OUTPATIENT)
Dept: DERMATOLOGY | Facility: CLINIC | Age: 64
End: 2024-01-28
Payer: COMMERCIAL

## 2024-01-29 ENCOUNTER — OFFICE VISIT (OUTPATIENT)
Dept: DERMATOLOGY | Facility: CLINIC | Age: 64
End: 2024-01-29
Payer: COMMERCIAL

## 2024-01-29 DIAGNOSIS — L73.2 HIDRADENITIS SUPPURATIVA: Primary | ICD-10-CM

## 2024-01-29 PROCEDURE — 1159F MED LIST DOCD IN RCRD: CPT | Mod: CPTII,S$GLB,, | Performed by: DERMATOLOGY

## 2024-01-29 PROCEDURE — 3066F NEPHROPATHY DOC TX: CPT | Mod: CPTII,S$GLB,, | Performed by: DERMATOLOGY

## 2024-01-29 PROCEDURE — 87070 CULTURE OTHR SPECIMN AEROBIC: CPT | Performed by: DERMATOLOGY

## 2024-01-29 PROCEDURE — 11900 INJECT SKIN LESIONS </W 7: CPT | Mod: S$GLB,,, | Performed by: DERMATOLOGY

## 2024-01-29 PROCEDURE — 99999 PR PBB SHADOW E&M-EST. PATIENT-LVL III: CPT | Mod: PBBFAC,,, | Performed by: DERMATOLOGY

## 2024-01-29 PROCEDURE — 99214 OFFICE O/P EST MOD 30 MIN: CPT | Mod: 25,S$GLB,, | Performed by: DERMATOLOGY

## 2024-01-29 RX ORDER — TRIAMCINOLONE ACETONIDE 40 MG/ML
40 INJECTION, SUSPENSION INTRA-ARTICULAR; INTRAMUSCULAR ONCE
Status: DISCONTINUED | OUTPATIENT
Start: 2024-01-29 | End: 2024-04-18

## 2024-01-29 RX ORDER — CIPROFLOXACIN 500 MG/1
TABLET ORAL
Qty: 20 TABLET | Refills: 0 | Status: SHIPPED | OUTPATIENT
Start: 2024-01-29 | End: 2024-05-17

## 2024-01-29 NOTE — PROGRESS NOTES
"  Subjective:      Patient ID:  Radha Ervin is a 63 y.o. female who presents for   Chief Complaint   Patient presents with    Hidradenitis Suppurativa     Flare     Patient with Hidradenitis suppurativa  Last seen on 1/9/24 for IL K to abscesses on chest - resolved.    Condition overall - stable.    C/o flare x right and left groin x 2-3 days. Tender+    Current treatment regimen:  Otezla 30mg 1x/day (dx with pso 2/2 humira) - Pso well controlled with no flares  Spironolactone 50mg every day - stopped per nephrology (stopped x 2 weeks)   Hibiclens alt BPO wash qday  Clindamycin soln prn flares  Mometasone cream prn flares    Has not yet started:  Turmeric    Lifestyle modifications - diet, smoking, shaving etc  Limiting "white" foods, sugars and processed foods    Not shaving    Dx with lung cancer 5/23 - finished chemo- starts radiation next week (6 weeks)      Review of Systems   Constitutional:  Positive for weight loss (183# down 5# (30 in total)). Negative for weight gain.   HENT:  Negative for nosebleeds and headaches.    Gastrointestinal:  Negative for diarrhea (resolved. never had colonoscopy) and Sensitivity to oral antibiotics.   Genitourinary:  Negative for irregular periods (post menopausal).   Musculoskeletal:  Negative for arthralgias.   Skin:  Positive for abscesses. Negative for recent sunburn.   Neurological:  Negative for headaches.   Psychiatric/Behavioral:  Negative for depressed mood.    Hematologic/Lymphatic: Bruises/bleeds easily.       Objective:   Physical Exam   Constitutional: She appears well-developed and well-nourished. She is obese.  No distress.   Neurological: She is alert and oriented to person, place, and time. She is not disoriented.   Psychiatric: She has a normal mood and affect.   Skin:   Areas Examined (abnormalities noted in diagram):   Genitals / Buttocks / Groin Inspection Performed  RLE Inspected  LLE Inspection Performed       Diagram Legend     Erythematous scaling " macule/papule c/w actinic keratosis       Vascular papule c/w angioma      Pigmented verrucoid papule/plaque c/w seborrheic keratosis      Yellow umbilicated papule c/w sebaceous hyperplasia      Irregularly shaped tan macule c/w lentigo     1-2 mm smooth white papules consistent with Milia      Movable subcutaneous cyst with punctum c/w epidermal inclusion cyst      Subcutaneous movable cyst c/w pilar cyst      Firm pink to brown papule c/w dermatofibroma      Pedunculated fleshy papule(s) c/w skin tag(s)      Evenly pigmented macule c/w junctional nevus     Mildly variegated pigmented, slightly irregular-bordered macule c/w mildly atypical nevus      Flesh colored to evenly pigmented papule c/w intradermal nevus       Pink pearly papule/plaque c/w basal cell carcinoma      Erythematous hyperkeratotic cursted plaque c/w SCC      Surgical scar with no sign of skin cancer recurrence      Open and closed comedones      Inflammatory papules and pustules      Verrucoid papule consistent consistent with wart     Erythematous eczematous patches and plaques     Dystrophic onycholytic nail with subungual debris c/w onychomycosis     Umbilicated papule    Erythematous-base heme-crusted tan verrucoid plaque consistent with inflamed seborrheic keratosis     Erythematous Silvery Scaling Plaque c/w Psoriasis     See annotation      Assessment / Plan:        Hidradenitis suppurativa  -     Aerobic culture  -     ciprofloxacin HCl (CIPRO) 500 MG tablet; 1 po bid x 10 days  Dispense: 20 tablet; Refill: 0  -     triamcinolone acetonide injection 40 mg  -     NY [id number] ACETONIDE INJ PER 10MG  -     NY INJECTION INTO SKIN LESIONS, UP TO 7    Lesion incised with 3mm punch and drained on today's date. Copius odiferous purulence expressed  and Bacterial culture performed.  Patient instructed to perform warm compresses 2 - 3 times/daily.    Intralesional Kenalog 20mg/cc (1.0 cc total) injected into 2 lesions on the right and left  upper inner thigh today after obtaining verbal consent including risk of surrounding hypopigmentation. Patient tolerated procedure well.    Units:2  NDC for Kenalog 40mg/cc:  8191-7530-99                     No follow-ups on file.

## 2024-01-30 ENCOUNTER — OFFICE VISIT (OUTPATIENT)
Dept: NEPHROLOGY | Facility: CLINIC | Age: 64
End: 2024-01-30
Payer: COMMERCIAL

## 2024-01-30 VITALS
OXYGEN SATURATION: 99 % | BODY MASS INDEX: 37.11 KG/M2 | SYSTOLIC BLOOD PRESSURE: 106 MMHG | HEIGHT: 59 IN | WEIGHT: 184.06 LBS | HEART RATE: 95 BPM | DIASTOLIC BLOOD PRESSURE: 72 MMHG

## 2024-01-30 DIAGNOSIS — D84.9 IMMUNOSUPPRESSION: ICD-10-CM

## 2024-01-30 DIAGNOSIS — E66.01 MORBID OBESITY: ICD-10-CM

## 2024-01-30 DIAGNOSIS — N18.31 STAGE 3A CHRONIC KIDNEY DISEASE: Primary | ICD-10-CM

## 2024-01-30 DIAGNOSIS — Z79.899 HIGH RISK MEDICATIONS (NOT ANTICOAGULANTS) LONG-TERM USE: ICD-10-CM

## 2024-01-30 DIAGNOSIS — E66.9 OBESITY (BMI 35.0-39.9 WITHOUT COMORBIDITY): ICD-10-CM

## 2024-01-30 DIAGNOSIS — C34.92 ADENOCARCINOMA, LUNG, LEFT: ICD-10-CM

## 2024-01-30 DIAGNOSIS — N17.0 ACUTE KIDNEY INJURY (AKI) WITH ACUTE TUBULAR NECROSIS (ATN): ICD-10-CM

## 2024-01-30 DIAGNOSIS — N20.0 KIDNEY STONES: ICD-10-CM

## 2024-01-30 DIAGNOSIS — E87.20 METABOLIC ACIDOSIS: ICD-10-CM

## 2024-01-30 PROCEDURE — 3066F NEPHROPATHY DOC TX: CPT | Mod: CPTII,S$GLB,, | Performed by: HOSPITALIST

## 2024-01-30 PROCEDURE — 3074F SYST BP LT 130 MM HG: CPT | Mod: CPTII,S$GLB,, | Performed by: HOSPITALIST

## 2024-01-30 PROCEDURE — 3078F DIAST BP <80 MM HG: CPT | Mod: CPTII,S$GLB,, | Performed by: HOSPITALIST

## 2024-01-30 PROCEDURE — 3008F BODY MASS INDEX DOCD: CPT | Mod: CPTII,S$GLB,, | Performed by: HOSPITALIST

## 2024-01-30 PROCEDURE — 99999 PR PBB SHADOW E&M-EST. PATIENT-LVL IV: CPT | Mod: PBBFAC,,, | Performed by: HOSPITALIST

## 2024-01-30 PROCEDURE — 1159F MED LIST DOCD IN RCRD: CPT | Mod: CPTII,S$GLB,, | Performed by: HOSPITALIST

## 2024-01-30 PROCEDURE — 99215 OFFICE O/P EST HI 40 MIN: CPT | Mod: S$GLB,,, | Performed by: HOSPITALIST

## 2024-01-30 NOTE — PROGRESS NOTES
REASON FOR CONSULT/CHIEF COMPLAIN: CKD stage 3b     REFERRING PHYSICIAN: Suraj Herrera III, MD      HISTORY OF PRESENT ILLNESS: 63 y.o. female who is established  to me  has a past medical history of Allergy, Amblyopia, Cataract, Eczema, HS (hereditary spherocytosis), total knee replacement (01/19/2016), and Joint pain. patient was referred here for abnormal renal function of serum creatinine ranging from 1.5-1.6 mg/dl      63 yr old with a recent left upper lobe lesion positive for adenocarcinoma with a A PET scan on July 13, 2023 revealed the left upper lobe mass measuring 2.6 x 1.6 cm with SUV max 7.4.  There was mildly metabolic prevascular mediastinal lymph node measuring 0.9 cm seen in short axis with SUV max 2.3.    She is s/p  robotic left upper lobectomy with  surgery was complicated by intraoperative bleeding of pulmonary arterial branches which resolved with pressure.       On 9/22/23 she was started on carbo/taxol C#1 but developed infusion reaction to taxol so that was stopped.   C#1 carbo/pemetrexed 11/3/23  C#2 carbo/pemetrexed 11/24/23  C#3 acute worsening of renal function--pemetrexed held: 12/15/23     Her renal function with serum creatinine ranging from 1.1 -1.3 mg/dL until 11/2023. Since then serum creatinine increased to 1.4< 1.5< 1.6 mg/dl and has been at 1.6 mg/dl . Pt was also on Spironolactone for her hydradenitis     No chronic NSAID use, no known exposure to lithium, lead.  Denied any issues with urination including dysuria, hematuria, urgency, hesitancy, nocturia, incomplete voiding. Denied chest pain, nausea, vomiting, abd pain, nausea, vomiting, diarrhea, shortness of breath, pedal edema, Orthopnea, PND.     Interval H/o 1/30/24    Pt comes for the follow up visit. Mentions her hydradenitis was worsening and had to get a steroid shot 2 days ago. Good urine o/p. Kidney ultrasound with few stones and the largest being 7 mm.    ROS:  General: negative for chills, or fatigue  ENT: No  epistaxis or headaches  Hematological and Lymphatic: No bleeding problems or blood clots.  Endocrine: No skin changes or temperature intolerance  Respiratory: No cough, shortness of breath, or wheezing  Cardiovascular: No chest pain or dyspnea   Gastrointestinal: No abdominal pain, change in bowel habits  Genito-Urinary: No dysuria, trouble voiding, or hematuria  Musculoskeletal: ROS: negative for - joint pain, joint stiffness, joint swelling, muscle pain or muscular weakness  Neurological: No new focal weakness, no numbness  Dermatological: No rash or ulcers.    PAST MEDICAL HISTORY:  Past Medical History:   Diagnosis Date    Allergy     Amblyopia     Cataract     Eczema     HS (hereditary spherocytosis)     Hx of total knee replacement 01/19/2016    right knee    Joint pain        PAST SURGICAL HISTORY:  Past Surgical History:   Procedure Laterality Date    achilles tendon repair      BRONCHOSCOPY N/A 8/3/2023    Procedure: Bronchoscopy;  Surgeon: Saeed Gould MD;  Location: [facility] OR [address] [place] [address];  Service: Cardiothoracic;  Laterality: N/A;    CHOLECYSTECTOMY      INJECTION OF ANESTHETIC AGENT AROUND MULTIPLE INTERCOSTAL NERVES N/A 8/3/2023    Procedure: BLOCK, NERVE, INTERCOSTAL, 2 OR MORE;  Surgeon: Saeed Gould MD;  [facility];  Service: Cardiothoracic;  Laterality: N/A;    LYMPHADENECTOMY Left 8/3/2023    Procedure: LYMPHADENECTOMY;  Surgeon: Saeed Gould MD;  Location: [facility] OR [address] [place] [address];  Service: Cardiothoracic;  Laterality: Left;    NAVIGATIONAL BRONCHOSCOPY N/A 6/23/2023    Procedure: BRONCHOSCOPY, NAVIGATIONAL;  Surgeon: Radha Mccollum MD;  Location: [facility] OR [address] [place] [address];  Service: Pulmonary;  Laterality: N/A;    TOTAL KNEE ARTHROPLASTY      XI ROBOTIC RATS Left 8/3/2023    Procedure: XI ROBOTIC RATS upper lobectomy;  Surgeon: Saeed Gould MD;  Location: [facility] OR [address] [place] [address];  Service: Cardiothoracic;  Laterality: Left;       FAMILY HISTORY:   Family History   Problem Relation Age of  Onset    Cancer Mother     Cancer Father         lung CA    Cancer Son         wade    Breast cancer Sister         1/2 sister    Liver cancer Maternal Uncle     Blindness Cousin     Diabetes Cousin     Amblyopia Neg Hx     Cataracts Neg Hx     Glaucoma Neg Hx     Macular degeneration Neg Hx     Retinal detachment Neg Hx     Strabismus Neg Hx        SOCIAL HISTORY:  Social History     Socioeconomic History    Marital status: Single   Tobacco Use    Smoking status: Former     Current packs/day: 0.00     Average packs/day: 1 pack/day for 46.3 years (46.3 ttl pk-yrs)     Types: Vaping with nicotine, Cigarettes     Start date:      Quit date: 2023     Years since quittin.7    Smokeless tobacco: Never    Tobacco comments:     Patient is currently on the patch    Substance and Sexual Activity    Alcohol use: Yes     Comment: occassionally    Drug use: No    Sexual activity: Not Currently     Partners: Male     Social Determinants of Health     Financial Resource Strain: High Risk (2024)    Overall Financial Resource Strain (CARDIA)     Difficulty of Paying Living Expenses: Hard   Food Insecurity: No Food Insecurity (2024)    Hunger Vital Sign     Worried About Running Out of Food in the Last Year: Never true     Ran Out of Food in the Last Year: Never true   Transportation Needs: No Transportation Needs (2024)    PRAPARE - Transportation     Lack of Transportation (Medical): No     Lack of Transportation (Non-Medical): No   Physical Activity: Insufficiently Active (2024)    Exercise Vital Sign     Days of Exercise per Week: 1 day     Minutes of Exercise per Session: 10 min   Stress: Stress Concern Present (2024)    [place] [employer] - Occupational Stress Questionnaire     Feeling of Stress : To some extent   Social Connections: Socially Isolated (2024)    Social Connection and Isolation Panel [NHANES]     Frequency of Communication with Friends and  "Family: More than three times a week     Frequency of Social Gatherings with Friends and Family: Once a week     Attends Christianity Services: Never     Active Member of Clubs or Organizations: No     Attends Club or Organization Meetings: Never     Marital Status:    Housing Stability: High Risk (1/11/2024)    Housing Stability Vital Sign     Unable to Pay for Housing in the Last Year: Yes     Number of Places Lived in the Last Year: 1     Unstable Housing in the Last Year: No       ALLERGIES:  Review of patient's allergies indicates:   Allergen Reactions    Paclitaxel Anaphylaxis and Other (See Comments)     Pt states "Anaphylaxis" last encounter w/ throat swelling. Encountered back pain, coughing and head fuzzy today.      Morphine Other (See Comments)     headaches    Latex, natural rubber Rash and Other (See Comments)     Redness and peeling only with bandaids    Penicillins Nausea And Vomiting and Rash       MEDICATIONS:    Current Outpatient Medications:     acetaminophen (TYLENOL) 500 MG tablet, Take 2 tablets (1,000 mg total) by mouth every 8 (eight) hours., Disp: , Rfl: 0    apremilast (OTEZLA) 30 mg Tab, Take 30 mg by mouth once daily., Disp: , Rfl:     betamethasone dipropionate (DIPROLENE) 0.05 % ointment, Apply topically 2 (two) times daily. Prn psoriasis flares, Disp: 45 g, Rfl: 3    ciprofloxacin HCl (CIPRO) 500 MG tablet, 1 po bid x 10 days, Disp: 20 tablet, Rfl: 0    clindamycin phosphate 1% (CLINDAGEL) 1 % gel, AAA every day, Disp: 60 g, Rfl: 5    dexAMETHasone (DECADRON) 4 MG Tab, Take 2 tablets (8 mg total) by mouth As instructed. Take 8 mg the day prior to chemotherapy, and then 8 mg after chemo on days 2, 3, 4, Disp: 24 tablet, Rfl: 5    fexofenadine (ALLEGRA) 180 MG tablet, Take 1 tablet (180 mg total) by mouth once daily., Disp: 90 tablet, Rfl: 3    folic acid (FOLVITE) 1 MG tablet, Take 1 tablet (1 mg total) by mouth once daily., Disp: 30 tablet, Rfl: 11    mometasone 0.1% (ELOCON) " 0.1 % cream, AAA every day when red and tender under occlusion, Disp: 60 g, Rfl: 2    OLANZapine (ZYPREXA) 5 MG tablet, Take 1 tablet (5 mg total) by mouth every evening., Disp: 30 tablet, Rfl: 2    OTEZLA 30 mg Tab, TAKE 1 TABLET BY MOUTH  TWICE DAILY, Disp: 60 tablet, Rfl: 2    promethazine (PHENERGAN) 25 MG tablet, Take 1 tablet (25 mg total) by mouth every 6 (six) hours as needed for Nausea., Disp: 60 tablet, Rfl: 0    LORazepam (ATIVAN) 1 MG tablet, Take 1 tablet (1 mg total) by mouth every 6 (six) hours as needed for Anxiety (and half an hour before MRI)., Disp: 10 tablet, Rfl: 3    ondansetron (ZOFRAN-ODT) 8 MG TbDL, Take 1 tablet (8 mg total) by mouth every 8 (eight) hours as needed (nausea/vomitting). (Patient not taking: Reported on 1/25/2024), Disp: 90 tablet, Rfl: 5    spironolactone (ALDACTONE) 50 MG tablet, Take 1 po qday (Patient not taking: Reported on 1/22/2024), Disp: 90 tablet, Rfl: 1    Current Facility-Administered Medications:     triamcinolone acetonide injection 40 mg, 40 mg, Intradermal, Once, Kathe Quan MD   Medication List with Changes/Refills   Current Medications    ACETAMINOPHEN (TYLENOL) 500 MG TABLET    Take 2 tablets (1,000 mg total) by mouth every 8 (eight) hours.    APREMILAST (OTEZLA) 30 MG TAB    Take 30 mg by mouth once daily.    BETAMETHASONE DIPROPIONATE (DIPROLENE) 0.05 % OINTMENT    Apply topically 2 (two) times daily. Prn psoriasis flares    CIPROFLOXACIN HCL (CIPRO) 500 MG TABLET    1 po bid x 10 days    CLINDAMYCIN PHOSPHATE 1% (CLINDAGEL) 1 % GEL    AAA every day    DEXAMETHASONE (DECADRON) 4 MG TAB    Take 2 tablets (8 mg total) by mouth As instructed. Take 8 mg the day prior to chemotherapy, and then 8 mg after chemo on days 2, 3, 4    FEXOFENADINE (ALLEGRA) 180 MG TABLET    Take 1 tablet (180 mg total) by mouth once daily.    FOLIC ACID (FOLVITE) 1 MG TABLET    Take 1 tablet (1 mg total) by mouth once daily.    LORAZEPAM (ATIVAN) 1 MG TABLET    Take 1 tablet  "(1 mg total) by mouth every 6 (six) hours as needed for Anxiety (and half an hour before MRI).    MOMETASONE 0.1% (ELOCON) 0.1 % CREAM    AAA every day when red and tender under occlusion    OLANZAPINE (ZYPREXA) 5 MG TABLET    Take 1 tablet (5 mg total) by mouth every evening.    ONDANSETRON (ZOFRAN-ODT) 8 MG TBDL    Take 1 tablet (8 mg total) by mouth every 8 (eight) hours as needed (nausea/vomitting).    OTEZLA 30 MG TAB    TAKE 1 TABLET BY MOUTH  TWICE DAILY    PROMETHAZINE (PHENERGAN) 25 MG TABLET    Take 1 tablet (25 mg total) by mouth every 6 (six) hours as needed for Nausea.    SPIRONOLACTONE (ALDACTONE) 50 MG TABLET    Take 1 po qday        PHYSICAL EXAM:  /72   Pulse 95   Ht 4' 11" (1.499 m)   Wt 83.5 kg (184 lb 1.4 oz)   LMP  (LMP Unknown)   SpO2 99%   BMI 37.18 kg/m²     General: No distress, No fever or chills  Head: Normocephalic,atraumatic  Eyes: conjunctivae/corneas clear. PERRL, EOM's intact.  Nose: Nares normal. Mucosa normal. No drainage or sinus tenderness.  Neck: No adenopathy,no carotid bruit,no JVD  Lungs:Clear to auscultation bilaterally, No Crackles  Heart: Regular rate and rhythm, no murmur, gallops or rubs  Abdomen: Soft, no tenderness, bowel sounds normal  Extremities: Atraumatic, no edema in LE  Skin: Turgor normal. No rashes or ulcers  Neurologic: No focal weakness, oriented.  Dialysis Access: Non applicable         LABS:  Lab Results   Component Value Date    HGB 7.4 (L) 01/26/2024        Lab Results   Component Value Date    CREATININE 1.3 01/26/2024       Prot/Creat Ratio, Urine   Date Value Ref Range Status   01/26/2024 0.22 (H) 0.00 - 0.20 Final   01/16/2024 0.19 0.00 - 0.20 Final       Lab Results   Component Value Date     01/26/2024    K 3.8 01/26/2024    CO2 20 (L) 01/26/2024       last PTH   Lab Results   Component Value Date    .6 (H) 01/26/2024    CALCIUM 9.8 01/26/2024    PHOS 4.0 01/26/2024       Lab Results   Component Value Date    HGBA1C 6.3 (H) " 04/28/2023       Lab Results   Component Value Date    LDLCALC 104 05/26/2023         Creatinine, Urine   Date Value Ref Range Status   01/26/2024 257.0 15.0 - 325.0 mg/dL Final   01/16/2024 292.0 15.0 - 325.0 mg/dL Final       Protein, Urine Random   Date Value Ref Range Status   01/26/2024 57 (H) 0 - 15 mg/dL Final   01/16/2024 56 (H) 0 - 15 mg/dL Final       Prot/Creat Ratio, Urine   Date Value Ref Range Status   01/26/2024 0.22 (H) 0.00 - 0.20 Final   01/16/2024 0.19 0.00 - 0.20 Final         No images are attached to the encounter.       Problem List   Chronic kidney disease stage 3a, likely iATN from Chemo  High risk medication use  Metabolic Acidosis  Adenocarcinoma of left upper lobe  Bilateral Nephrolithiasis  Iron def anemia  Morbid obesity, BMI 37 kg/m2        ASSESSMENT and PLAN    Chronic kidney disease stage 3b, likely iATN from Chemo    Pt was diagnosed with adenocarcinoma of left upper lobe 9/2023 with S/p chemo with carboplatin and taxo followed by 3 cycles of carboplatin and pemetrexed    Baseline creatinine ranging from 1.1-1.3 mg/dl with a recent increase to 1.5 / 1.6 mg/dl    Current serum creatinine down to 1.3 mg/dl   Pt is holding spironolactone  -Urine sediment with granular and waxy casts suggestive of carboplatin or pemetrexed associated ATI  Urine micro albumin to creatinine ratio is with in limits   Electrolytes, Acid Base, Hemoglobin, Bone Mineral in acceptable range.   Basic KESHA work up including C3, C4, IgA, MPO, PR3, [place], SPEP, sFLC, have been negative   Renal Ultrasound with multiple kidney stones 7mm and 6 mm in right and left kidneys   Encouraged to consider starting spironolactone three tines a week to help with her hydradenitis      Left lung adenocarcinoma s/p chemo with carboplatin and pemetrexed   C#1 carbo/pemetrexed 11/3/23  C#2 carbo/pemetrexed 11/24/23  C#3 acute worsening of renal function--pemetrexed held: 12/15/23     Kidney ultrasound with B/l kidney stones    Kidney  US with both left and right kidney with multiple kidney stones   Encouraged more water intake, low salt diet and low animal protein   Not symptomatic currently     Mild Metabolic Acidosis   Sodium bicarb 21   Recommended sodium bicarbonate tablets    Iron Def Anemia   Hb down to 7.4 mg/dL   Encouraged to start ferrous sulphate 325 mg po BID    Obesity < BMI 37 kg/m2   Counseling provided      41 minutes of total time spent on the encounter, which includes face to face time and non-face to face time preparing to see the patient (eg, review of tests), Obtaining and/or reviewing separately obtained history, documenting clinical information in the electronic or other health record, independently interpreting results (not separately reported) and communicating results to the patient/family/caregiver, or Care coordination (not separately reported).           RTC in 2 weeks     Diagnosis and plan of care explained to the patient.  Pt Verbalized understanding. Answered all questions.  Thanks for allowing me to participate in the care of this patient.       Debra Horn MD  Nephrology  [facility].

## 2024-02-01 ENCOUNTER — HOSPITAL ENCOUNTER (OUTPATIENT)
Dept: RADIATION THERAPY | Facility: HOSPITAL | Age: 64
Discharge: HOME | End: 2024-02-01
Attending: RADIOLOGY
Payer: COMMERCIAL

## 2024-02-01 LAB — BACTERIA SPEC AEROBE CULT: NORMAL

## 2024-02-02 DIAGNOSIS — L40.9 PSORIASIS: ICD-10-CM

## 2024-02-02 PROCEDURE — 77301 RADIOTHERAPY DOSE PLAN IMRT: CPT | Mod: 26,,, | Performed by: RADIOLOGY

## 2024-02-02 PROCEDURE — 77301 RADIOTHERAPY DOSE PLAN IMRT: CPT | Mod: TC | Performed by: RADIOLOGY

## 2024-02-03 PROCEDURE — 77338 DESIGN MLC DEVICE FOR IMRT: CPT | Mod: TC | Performed by: RADIOLOGY

## 2024-02-03 PROCEDURE — 77300 RADIATION THERAPY DOSE PLAN: CPT | Mod: 26,,, | Performed by: RADIOLOGY

## 2024-02-03 PROCEDURE — 77300 RADIATION THERAPY DOSE PLAN: CPT | Mod: TC | Performed by: RADIOLOGY

## 2024-02-03 PROCEDURE — 77338 DESIGN MLC DEVICE FOR IMRT: CPT | Mod: 26,,, | Performed by: RADIOLOGY

## 2024-02-05 PROCEDURE — 77427 RADIATION TX MANAGEMENT X5: CPT | Mod: ,,, | Performed by: RADIOLOGY

## 2024-02-05 PROCEDURE — 77014 PR  CT GUIDANCE PLACEMENT RAD THERAPY FIELDS: CPT | Mod: 26,,, | Performed by: RADIOLOGY

## 2024-02-05 PROCEDURE — 77386 HC IMRT, COMPLEX: CPT | Performed by: RADIOLOGY

## 2024-02-05 RX ORDER — APREMILAST 30 MG/1
TABLET, FILM COATED ORAL
Qty: 30 TABLET | Refills: 2 | Status: ACTIVE | OUTPATIENT
Start: 2024-02-05 | End: 2024-03-06 | Stop reason: SDUPTHER

## 2024-02-06 ENCOUNTER — DOCUMENTATION ONLY (OUTPATIENT)
Dept: RADIATION ONCOLOGY | Facility: CLINIC | Age: 64
End: 2024-02-06
Payer: COMMERCIAL

## 2024-02-06 PROCEDURE — 77386 HC IMRT, COMPLEX: CPT | Performed by: RADIOLOGY

## 2024-02-06 PROCEDURE — 77417 THER RADIOLOGY PORT IMAGE(S): CPT | Performed by: RADIOLOGY

## 2024-02-06 PROCEDURE — 77387 GUIDANCE FOR RADJ TX DLVR: CPT | Mod: 26,,, | Performed by: RADIOLOGY

## 2024-02-07 PROCEDURE — 77014 PR  CT GUIDANCE PLACEMENT RAD THERAPY FIELDS: CPT | Mod: 26,,, | Performed by: RADIOLOGY

## 2024-02-07 PROCEDURE — 77386 HC IMRT, COMPLEX: CPT | Performed by: RADIOLOGY

## 2024-02-08 ENCOUNTER — TELEPHONE (OUTPATIENT)
Dept: OTOLARYNGOLOGY | Facility: CLINIC | Age: 64
End: 2024-02-08
Payer: COMMERCIAL

## 2024-02-08 ENCOUNTER — PATIENT MESSAGE (OUTPATIENT)
Dept: HEMATOLOGY/ONCOLOGY | Facility: CLINIC | Age: 64
End: 2024-02-08

## 2024-02-08 ENCOUNTER — OFFICE VISIT (OUTPATIENT)
Dept: HEMATOLOGY/ONCOLOGY | Facility: CLINIC | Age: 64
End: 2024-02-08
Payer: COMMERCIAL

## 2024-02-08 VITALS
HEART RATE: 104 BPM | HEIGHT: 59 IN | BODY MASS INDEX: 36.96 KG/M2 | SYSTOLIC BLOOD PRESSURE: 106 MMHG | WEIGHT: 183.31 LBS | DIASTOLIC BLOOD PRESSURE: 70 MMHG | OXYGEN SATURATION: 97 % | RESPIRATION RATE: 18 BRPM

## 2024-02-08 DIAGNOSIS — N18.31 STAGE 3A CHRONIC KIDNEY DISEASE: ICD-10-CM

## 2024-02-08 DIAGNOSIS — H93.11 TINNITUS OF RIGHT EAR: ICD-10-CM

## 2024-02-08 DIAGNOSIS — C34.92 ADENOCARCINOMA, LUNG, LEFT: Primary | ICD-10-CM

## 2024-02-08 PROCEDURE — 77387 GUIDANCE FOR RADJ TX DLVR: CPT | Mod: 26,,, | Performed by: RADIOLOGY

## 2024-02-08 PROCEDURE — 3074F SYST BP LT 130 MM HG: CPT | Mod: CPTII,S$GLB,, | Performed by: INTERNAL MEDICINE

## 2024-02-08 PROCEDURE — 1159F MED LIST DOCD IN RCRD: CPT | Mod: CPTII,S$GLB,, | Performed by: INTERNAL MEDICINE

## 2024-02-08 PROCEDURE — 77386 HC IMRT, COMPLEX: CPT | Performed by: RADIOLOGY

## 2024-02-08 PROCEDURE — 99213 OFFICE O/P EST LOW 20 MIN: CPT | Mod: S$GLB,,, | Performed by: INTERNAL MEDICINE

## 2024-02-08 PROCEDURE — 99999 PR PBB SHADOW E&M-EST. PATIENT-LVL IV: CPT | Mod: PBBFAC,,, | Performed by: INTERNAL MEDICINE

## 2024-02-08 PROCEDURE — 3008F BODY MASS INDEX DOCD: CPT | Mod: CPTII,S$GLB,, | Performed by: INTERNAL MEDICINE

## 2024-02-08 PROCEDURE — 3066F NEPHROPATHY DOC TX: CPT | Mod: CPTII,S$GLB,, | Performed by: INTERNAL MEDICINE

## 2024-02-08 PROCEDURE — 3078F DIAST BP <80 MM HG: CPT | Mod: CPTII,S$GLB,, | Performed by: INTERNAL MEDICINE

## 2024-02-08 NOTE — TELEPHONE ENCOUNTER
I returned patient call to schedule audiogram appointment.    ----- Message from Chris Yepez sent at 2/8/2024 11:25 AM CST -----  Type:  Patient Returning Call    Who Called: pt  Who Left Message for Patient: Soniya Noriega MA  Does the patient know what this is regarding?:   Would the patient rather a call back or a response via [facility]? call  Best Call Back Number: [phone]  Additional Information:

## 2024-02-08 NOTE — TELEPHONE ENCOUNTER
I left patient an message saying to feel free to reach back out to us to have an appointment scheduled.     ----- Message from Marla Will MA sent at 2/8/2024  8:44 AM CST -----  Regarding: audiogram  Good morning, this patient is in need of an audiogram if someone can reach out to get her scheduled. She is off on Wednesday's so that would be the easiest if possible.

## 2024-02-08 NOTE — LETTER
February 8, 2024      [facility] - Hem Onc 2nd Fl  [address] [place] [id number]  Phone: [phone]       Patient: Radha Ervin   YOB: 1960  Date of Visit: 02/08/2024    To Whom It May Concern:    Eri Ervin  was at [facility] on 02/08/2024. The patient may return to work/school on 2/15/24 with no restrictions. If you have any questions or concerns, or if I can be of further assistance, please do not hesitate to contact me.    Sincerely,      Roger Eduardo MD

## 2024-02-09 PROCEDURE — 77386 HC IMRT, COMPLEX: CPT | Performed by: RADIOLOGY

## 2024-02-09 PROCEDURE — 77387 GUIDANCE FOR RADJ TX DLVR: CPT | Mod: 26,,, | Performed by: RADIOLOGY

## 2024-02-09 PROCEDURE — 77336 RADIATION PHYSICS CONSULT: CPT | Performed by: RADIOLOGY

## 2024-02-11 NOTE — PROGRESS NOTES
PATIENT: Radha Ervin  MRN: [id number]  DATE: 2/8/2024      Subjective:     Chief complaint:  Chief Complaint   Patient presents with    Lung Cancer    Follow-up       Oncologic History:      Ms. Ervin is a 63-year-old female with 30 pack-year history of smoking, quit approximately 4 months ago, who was recently diagnosed with left lung cancer after evaluation for an abnormal chest x-ray.  A subsequent CT of the chest without contrast on May 26, 2023 revealed a spiculated nodule along the paramediastinal left upper lobe measuring 2.7 x 1.9 cm.  A mildly enlarged right paratracheal lymph node was seen measuring 1.1 cm.  She underwent EBUS and biopsy on June 23, 2023.  Pathology revealed the left upper lobe lesion positive for adenocarcinoma.  Station 4R was negative for malignancy.       A PET scan on July 13, 2023 revealed the left upper lobe mass measuring 2.6 x 1.6 cm with SUV max 7.4.  There was mildly metabolic prevascular mediastinal lymph node measuring 0.9 cm seen in short axis with SUV max 2.3.  She underwent robotic left upper lobectomy and lymph node sampling on August 3, 2023.  Pathology revealed invasive poorly differentiated adenocarcinoma measuring 2.1 cm.  There was visceral pleural invasion noted.  Vascular resection revealed invasive tumor present in the adventitial soft tissue.  There was lymphovascular invasion noted.  Invasive carcinoma is present at the vascular margin.  3/4 level 6 lymph nodes, 1/2 level 11 lymph nodes and 1/4 level 12 lymph nodes were involved out of a total of 23 lymph nodes.  Surgery was complicated by intraoperative bleeding of pulmonary arterial branches which resolved with pressure.      Her case was discussed at [facility] on 8/24/23: Recommend chemoRT followed by immunotherapy.     Established care with med onc 8/28. She had seen Dr. Quevedo who had recommended sequential chemoradiation then to be followed by immunotherapy. Consented for sequential CRT with  "carbo/taxol.    On 9/22/23 she presented to the infusion center for carbo/taxol C#1 but developed infusion reaction to taxol so that was stopped.  Had reaction with second infusion as well--carbo/taxol discontinued.  Consented for carbo/pemetrexed on 11/2/23    C#1 carbo/pemetrexed 11/3/23  C#2 carbo/pemetrexed 11/24/23  C#3 acute worsening of renal function--pemetrexed held: 12/15/23  C#4 carboplatin only 1/5/24         Interval History: Ms. Ervin returns for follow up. She is currently getting radiation and doing well. No particular issues at this time. We discussed plan for imaging following radiation to be followed by plan for immunotherapy. All questions answered.       Review of Systems   A comprehensive review of systems was performed; pertinent positives and negatives are noted in the HPI.        ECOG Performance Status:   ECOG SCORE    1 - Restricted in strenuous activity-ambulatory and able to carry out work of a light nature         Objective:      Vitals:   Vitals:    02/08/24 0801   BP: 106/70   BP Location: Right arm   Patient Position: Sitting   BP Method: Medium (Automatic)   Pulse: 104   Resp: 18   SpO2: 97%   Weight: 83.2 kg (183 lb 5 oz)   Height: 4' 11" (1.499 m)     BMI: Body mass index is 37.02 kg/m².      Physical Exam:   Physical Exam  Vitals and nursing note reviewed.   Constitutional:       General: She is not in acute distress.     Appearance: Normal appearance. She is not toxic-appearing.   HENT:      Head: Normocephalic and atraumatic.   Eyes:      General: No scleral icterus.     Conjunctiva/sclera: Conjunctivae normal.   Cardiovascular:      Rate and Rhythm: Normal rate and regular rhythm.      Heart sounds: Normal heart sounds. No murmur heard.  Pulmonary:      Effort: Pulmonary effort is normal. No respiratory distress.      Breath sounds: Examination of the left-upper field reveals decreased breath sounds. Decreased breath sounds present. No wheezing, rhonchi or rales. "   Abdominal:      General: There is no distension.      Palpations: Abdomen is soft.      Tenderness: There is no abdominal tenderness.   Musculoskeletal:         General: No swelling, tenderness or deformity.      Cervical back: Neck supple. No tenderness.   Skin:     Coloration: Skin is not jaundiced.      Findings: No erythema.   Neurological:      General: No focal deficit present.      Mental Status: She is alert and oriented to person, place, and time.      Motor: No weakness.   Psychiatric:         Mood and Affect: Mood normal.         Behavior: Behavior normal.           Laboratory Data:  WBC   Date Value Ref Range Status   01/26/2024 5.74 3.90 - 12.70 K/uL Final     Hemoglobin   Date Value Ref Range Status   01/26/2024 7.4 (L) 12.0 - 16.0 g/dL Final     POC Hematocrit   Date Value Ref Range Status   08/03/2023 34 (L) 36 - 54 %PCV Final     Hematocrit   Date Value Ref Range Status   01/26/2024 22.1 (L) 37.0 - 48.5 % Final     Platelets   Date Value Ref Range Status   01/26/2024 176 150 - 450 K/uL Final     Gran # (ANC)   Date Value Ref Range Status   01/26/2024 2.8 1.8 - 7.7 K/uL Final     Gran %   Date Value Ref Range Status   01/26/2024 48.2 38.0 - 73.0 % Final       Chemistry        Component Value Date/Time     01/26/2024 1032    K 3.8 01/26/2024 1032     01/26/2024 1032    CO2 20 (L) 01/26/2024 1032    BUN 23 01/26/2024 1032    BUN 12.0 04/28/2023 1207    CREATININE 1.3 01/26/2024 1032     (H) 01/26/2024 1032        Component Value Date/Time    CALCIUM 9.8 01/26/2024 1032    ALKPHOS 92 01/04/2024 0833    AST 16 01/04/2024 0833    ALT 18 01/04/2024 0833    BILITOT 0.5 01/04/2024 0833    ESTGFRAFRICA >60.0 04/06/2020 1002    EGFRNONAA >60.0 04/06/2020 1002              Assessment/Plan:     1. Adenocarcinoma, lung, left    2. Tinnitus of right ear    3. Stage 3a chronic kidney disease      Currently receiving adjuvant radiation following adjuvant chemotherapy. Subsequently would plan to  initiate maintenance durvalumab when done with radiation. Follow-up in clinic after completion of radiation to review scans and start immunotherapy.     Med and Orders:  Orders Placed This Encounter    CT Chest Without Contrast    Comprehensive audiogram       Follow Up:  Follow up in about 2 months (around 4/8/2024).      Above care plan was discussed with patient and all questions were addressed to their expressed satisfaction.       Roger Eduarod MD, FACP  Hematology & Medical Oncology  [facility]     High Risk Conditions:    Patient has a condition that poses threat to life and bodily function: NSCLC

## 2024-02-12 PROCEDURE — 77014 PR  CT GUIDANCE PLACEMENT RAD THERAPY FIELDS: CPT | Mod: 26,,, | Performed by: RADIOLOGY

## 2024-02-12 PROCEDURE — 77386 HC IMRT, COMPLEX: CPT | Performed by: RADIOLOGY

## 2024-02-12 PROCEDURE — 77427 RADIATION TX MANAGEMENT X5: CPT | Mod: ,,, | Performed by: RADIOLOGY

## 2024-02-14 ENCOUNTER — CLINICAL SUPPORT (OUTPATIENT)
Dept: OTOLARYNGOLOGY | Facility: CLINIC | Age: 64
End: 2024-02-14
Payer: COMMERCIAL

## 2024-02-14 ENCOUNTER — DOCUMENTATION ONLY (OUTPATIENT)
Dept: RADIATION ONCOLOGY | Facility: CLINIC | Age: 64
End: 2024-02-14
Payer: COMMERCIAL

## 2024-02-14 DIAGNOSIS — H93.13 TINNITUS OF BOTH EARS: ICD-10-CM

## 2024-02-14 DIAGNOSIS — H90.3 SENSORINEURAL HEARING LOSS, BILATERAL: Primary | ICD-10-CM

## 2024-02-14 PROCEDURE — 99999 PR PBB SHADOW E&M-EST. PATIENT-LVL I: CPT | Mod: PBBFAC,,, | Performed by: PHYSICIAN ASSISTANT

## 2024-02-14 PROCEDURE — 92557 COMPREHENSIVE HEARING TEST: CPT | Mod: S$GLB,,, | Performed by: PHYSICIAN ASSISTANT

## 2024-02-14 PROCEDURE — 92567 TYMPANOMETRY: CPT | Mod: S$GLB,,, | Performed by: PHYSICIAN ASSISTANT

## 2024-02-14 PROCEDURE — 92588 EVOKED AUDITORY TST COMPLETE: CPT | Mod: S$GLB,,, | Performed by: PHYSICIAN ASSISTANT

## 2024-02-14 PROCEDURE — 77386 HC IMRT, COMPLEX: CPT | Performed by: RADIOLOGY

## 2024-02-14 PROCEDURE — 77387 GUIDANCE FOR RADJ TX DLVR: CPT | Mod: 26,,, | Performed by: RADIOLOGY

## 2024-02-14 NOTE — PROGRESS NOTES
Radha Ervin, a 63 y.o. female, was seen today in the clinic for an audiologic evaluation for ototoxic monitoring.  Patients main complaint was tinnitus, primarily in the right ear which she said started after her chemotherapy treatment in early January of this year. She reported no family history of hearing loss and no history of significant noise exposure.  She denies hearing loss, ear pain or ear drainage.     Audiogram results revealed a mild sensorineural hearing loss from 2k-6k Hz in the right ear and a mild sensorineural hearing loss from 1k-6k Hz in the left ear.  Speech reception thresholds were noted at 15 dB in the right ear and 20 dB in the left ear.  Speech discrimination scores were 96% in the right ear and 92% in the left ear.  Tympanometry revealed Type A in the right ear and Type A in the left ear with normal ECV's.    Distortion Product Otoacoustic Emissions (DPOAE's) were tested from 1500-67491 Hz and were present at 4k and 8k-10k Hz in the right ear and at 3k and 10k Hz in the left ear.       Recommendations:  Otologic evaluation  Routine monitoring of hearing levels during ototoxic treatment, than annually  Hearing protection when in noise  Hearing aid consultation when patient is ready and motivated.

## 2024-02-14 NOTE — PLAN OF CARE
Day 7 of outpatient radiation to the left lung.Overall doing well. Reports improvement in shortness of breath. Denies cough. Ringing in ears persists and has an appt this morning for audiogram.Ringing started after chemo.

## 2024-02-15 PROCEDURE — 77386 HC IMRT, COMPLEX: CPT | Performed by: RADIOLOGY

## 2024-02-15 PROCEDURE — 77387 GUIDANCE FOR RADJ TX DLVR: CPT | Mod: 26,,, | Performed by: RADIOLOGY

## 2024-02-16 PROCEDURE — 77386 HC IMRT, COMPLEX: CPT | Performed by: RADIOLOGY

## 2024-02-16 PROCEDURE — 77387 GUIDANCE FOR RADJ TX DLVR: CPT | Mod: 26,,, | Performed by: RADIOLOGY

## 2024-02-19 PROCEDURE — 77386 HC IMRT, COMPLEX: CPT | Performed by: RADIOLOGY

## 2024-02-19 PROCEDURE — 77336 RADIATION PHYSICS CONSULT: CPT | Performed by: RADIOLOGY

## 2024-02-19 PROCEDURE — 77387 GUIDANCE FOR RADJ TX DLVR: CPT | Mod: 26,,, | Performed by: RADIOLOGY

## 2024-02-20 ENCOUNTER — DOCUMENTATION ONLY (OUTPATIENT)
Dept: RADIATION ONCOLOGY | Facility: CLINIC | Age: 64
End: 2024-02-20
Payer: COMMERCIAL

## 2024-02-20 PROCEDURE — 77014 PR  CT GUIDANCE PLACEMENT RAD THERAPY FIELDS: CPT | Mod: 26,,, | Performed by: RADIOLOGY

## 2024-02-20 PROCEDURE — 77386 HC IMRT, COMPLEX: CPT | Performed by: RADIOLOGY

## 2024-02-20 PROCEDURE — 77427 RADIATION TX MANAGEMENT X5: CPT | Mod: ,,, | Performed by: RADIOLOGY

## 2024-02-20 NOTE — PLAN OF CARE
Day 11 of outpatient radiation to the left lung. States the shortness of breath is no worse than her usual. C/o area of tenderness left mid chest.

## 2024-02-21 PROCEDURE — 77387 GUIDANCE FOR RADJ TX DLVR: CPT | Mod: 26,,, | Performed by: RADIOLOGY

## 2024-02-21 PROCEDURE — 77386 HC IMRT, COMPLEX: CPT | Performed by: RADIOLOGY

## 2024-02-22 PROCEDURE — 77386 HC IMRT, COMPLEX: CPT | Performed by: RADIOLOGY

## 2024-02-22 PROCEDURE — 77387 GUIDANCE FOR RADJ TX DLVR: CPT | Mod: 26,,, | Performed by: RADIOLOGY

## 2024-02-23 PROCEDURE — 77386 HC IMRT, COMPLEX: CPT | Performed by: RADIOLOGY

## 2024-02-23 PROCEDURE — 77387 GUIDANCE FOR RADJ TX DLVR: CPT | Mod: 26,,, | Performed by: RADIOLOGY

## 2024-02-26 PROCEDURE — 77387 GUIDANCE FOR RADJ TX DLVR: CPT | Mod: 26,,, | Performed by: RADIOLOGY

## 2024-02-26 PROCEDURE — 77386 HC IMRT, COMPLEX: CPT | Performed by: RADIOLOGY

## 2024-02-26 PROCEDURE — 77336 RADIATION PHYSICS CONSULT: CPT | Performed by: RADIOLOGY

## 2024-02-27 ENCOUNTER — DOCUMENTATION ONLY (OUTPATIENT)
Dept: RADIATION ONCOLOGY | Facility: CLINIC | Age: 64
End: 2024-02-27
Payer: COMMERCIAL

## 2024-02-27 PROCEDURE — 77386 HC IMRT, COMPLEX: CPT | Performed by: RADIOLOGY

## 2024-02-27 PROCEDURE — 77427 RADIATION TX MANAGEMENT X5: CPT | Mod: ,,, | Performed by: RADIOLOGY

## 2024-02-27 PROCEDURE — 77014 PR  CT GUIDANCE PLACEMENT RAD THERAPY FIELDS: CPT | Mod: 26,,, | Performed by: RADIOLOGY

## 2024-02-27 NOTE — PLAN OF CARE
Day 16 of outpatient radiation to the left lung. Reports back pain, which is a chronic issue for her. States she has improvement in breathing with stair climbing. Overall, doing well.

## 2024-02-28 PROCEDURE — 77387 GUIDANCE FOR RADJ TX DLVR: CPT | Mod: 26,,, | Performed by: RADIOLOGY

## 2024-02-28 PROCEDURE — 77386 HC IMRT, COMPLEX: CPT | Performed by: RADIOLOGY

## 2024-02-29 PROCEDURE — 77387 GUIDANCE FOR RADJ TX DLVR: CPT | Mod: 26,,, | Performed by: RADIOLOGY

## 2024-02-29 PROCEDURE — 77386 HC IMRT, COMPLEX: CPT | Performed by: RADIOLOGY

## 2024-03-01 ENCOUNTER — HOSPITAL ENCOUNTER (OUTPATIENT)
Dept: RADIATION THERAPY | Facility: HOSPITAL | Age: 64
Discharge: HOME | End: 2024-03-01
Attending: RADIOLOGY
Payer: COMMERCIAL

## 2024-03-01 PROCEDURE — 77386 HC IMRT, COMPLEX: CPT | Performed by: RADIOLOGY

## 2024-03-01 PROCEDURE — 77387 GUIDANCE FOR RADJ TX DLVR: CPT | Mod: 26,,, | Performed by: RADIOLOGY

## 2024-03-04 PROCEDURE — 77387 GUIDANCE FOR RADJ TX DLVR: CPT | Mod: 26,,, | Performed by: RADIOLOGY

## 2024-03-04 PROCEDURE — 77386 HC IMRT, COMPLEX: CPT | Performed by: RADIOLOGY

## 2024-03-04 PROCEDURE — 77336 RADIATION PHYSICS CONSULT: CPT | Performed by: RADIOLOGY

## 2024-03-05 ENCOUNTER — DOCUMENTATION ONLY (OUTPATIENT)
Dept: RADIATION ONCOLOGY | Facility: CLINIC | Age: 64
End: 2024-03-05
Payer: COMMERCIAL

## 2024-03-05 DIAGNOSIS — C34.92 ADENOCARCINOMA, LUNG, LEFT: Primary | ICD-10-CM

## 2024-03-05 PROBLEM — C77.1 SECONDARY AND UNSPECIFIED MALIGNANT NEOPLASM OF INTRATHORACIC LYMPH NODES: Status: ACTIVE | Noted: 2024-03-05

## 2024-03-05 PROCEDURE — 77014 PR  CT GUIDANCE PLACEMENT RAD THERAPY FIELDS: CPT | Mod: 26,,, | Performed by: RADIOLOGY

## 2024-03-05 PROCEDURE — 77427 RADIATION TX MANAGEMENT X5: CPT | Mod: ,,, | Performed by: RADIOLOGY

## 2024-03-05 PROCEDURE — 77386 HC IMRT, COMPLEX: CPT | Performed by: RADIOLOGY

## 2024-03-05 NOTE — PROGRESS NOTES
Assessment:         1. Viral URI with cough    2. Secondary and unspecified malignant neoplasm of intrathoracic lymph nodes    3. Tinnitus of both ears          Plan:           Radha was seen today for annual exam.    Diagnoses and all orders for this visit:    Viral URI with cough  Signs, symptoms consistent with viral upper respiratory illness  history and pyhsical exam does not suggest menintigits Pneumonia Influenza ABRS AOM   I have reviewed prior testing and additional testing was was given  New medication as below   I provided instruction on supportive care measures   reviewed signs and symptoms that should prompt return to provider or evaluation in the ED  -     POCT COVID-19 Rapid Screening  -     POCT Influenza A/B    Secondary and unspecified malignant neoplasm of intrathoracic lymph nodes  chronic  stable  Continue current regimen and follow up with  heme/onc and RAD onc      Tinnitus of both ears  chronic  stable  Continue current regimen and follow up with  ENT/audiology              Subjective:       Patient ID: Radha Ervin is a 63 y.o. female.    Chief Complaint: Annual Exam          Interim Hx  Last seen by me in 6/2023  Seen by audiology  Seen by heme/onc - Roger Eduardo   Currently receiving adjuvant radiation following adjuvant chemotherapy. Subsequently would plan to initiate maintenance durvalumab when done with radiation. Follow-up in clinic after completion of radiation to review scans and start immunotherap   Seen by enphrology - Debra Horn,   Seen by derm - Kathe Quan,   Seen by rad onc - Sudhakar Quevedo in January for dizziness  Seen by cardiothroacic surgery     Concerns today    Still has rining in the ear from chemo     URI   Patient reports that symptoms started   3-4 days  prior to presentation .   Symptoms are show no change   Symptoms include sore throat, cough  Patient denies fever, night sweats, nasal congestion, rhinorrhea, facial pain, swollen  glands, chest pain, hemoptysis, dyspnea, wheezing or change taste/smell  They have tried - was rx dukes solution  , but not yet PICKED UP   for the symptoms   Associated symptoms include dry cough  none known sick contact. But 3 peeople at the other office sick with covid   Recent travel;none  Recent visits;none    Immunization History   Administered Date(s) Administered    COVID-19 Vaccine 11/16/2022    COVID-19, MRNA, LN-S, PF (Pfizer) (Purple Cap) 03/20/2021, 04/11/2021, 12/05/2021    COVID-19, mRNA, LNP-S, bivalent booster, PF (Moderna Omicron)12 + YEARS 11/16/2022    Influenza 08/28/2023    Influenza - Intradermal - Trivalent - PF 02/04/2014    Influenza - Quadrivalent - PF *Preferred* (6 months and older) 10/18/2018, 10/08/2020, 09/22/2021, 09/28/2022    PPD Test 11/06/2017    Pneumococcal Conjugate - 20 Valent 08/28/2023    Tdap 09/22/2021    Zoster Recombinant 09/12/2023             Chronic problems     Patient Active Problem List   Diagnosis    Primary localized osteoarthrosis of right lower leg    BMI 40.0-44.9, adult    Long-term current use of intravenous immunoglobulin (IVIG)    Hidradenitis suppurativa    Tobacco dependence quit 5/2023    Nuclear sclerosis of both eyes    Visual field defect    Morbid obesity    Drug-induced immunodeficiency    Emphysema lung    Pulmonary nodule    Adenocarcinoma, lung, left    Secondary and unspecified malignant neoplasm of intrathoracic lymph nodes    Tinnitus of both ears       HPI    Review of Systems   All other systems reviewed and are negative.            Health Maintenance Due   Topic Date Due    Cervical Cancer Screening  Never done    HIV Screening  Never done    RSV Vaccine (Age 60+ and Pregnant patients) (1 - 1-dose 60+ series) Never done    COVID-19 Vaccine (6 - 2023-24 season) 09/01/2023    Colorectal Cancer Screening  06/06/2024         Objective:     /70 (BP Location: Right arm, Patient Position: Sitting, BP Method: Large (Manual))   Pulse 101    "Ht 4' 11" (1.499 m)   Wt 83.1 kg (183 lb 3.2 oz)   LMP  (LMP Unknown)   SpO2 98%   BMI 37.00 kg/m²         3/6/2024     9:26 AM 2/8/2024     8:01 AM 1/30/2024     2:33 PM 1/25/2024    10:16 AM 1/22/2024    12:51 PM   Vitals   Height 4' 11" (1.499 m) 4' 11" (1.499 m) 4' 11" (1.499 m)  4' 11" (1.499 m)   Weight (lbs) 183.2 183.31 184.08 188.05 183.86   BMI (kg/m2) 37 37 37.2 38 37.1                Physical Exam  Vitals and nursing note reviewed.   Constitutional:       General: She is not in acute distress.     Appearance: She is well-developed. She is not diaphoretic.   HENT:      Head: Normocephalic.      Nose: Nose normal.   Eyes:      General:         Right eye: No discharge.         Left eye: No discharge.      Conjunctiva/sclera: Conjunctivae normal.      Pupils: Pupils are equal, round, and reactive to light.   Cardiovascular:      Rate and Rhythm: Normal rate and regular rhythm.      Heart sounds: Normal heart sounds. No murmur heard.     No friction rub. No gallop.   Pulmonary:      Effort: Pulmonary effort is normal. No respiratory distress.   Abdominal:      General: Bowel sounds are normal. There is no distension.      Palpations: Abdomen is soft.   Musculoskeletal:         General: No deformity. Normal range of motion.      Cervical back: Normal range of motion.   Skin:     General: Skin is warm.   Neurological:      Mental Status: She is alert and oriented to person, place, and time.      Cranial Nerves: No cranial nerve deficit.         Recent Results (from the past 336 hour(s))   POCT COVID-19 Rapid Screening    Collection Time: 03/06/24 10:02 AM   Result Value Ref Range    POC Rapid COVID Negative Negative     Acceptable Yes    POCT Influenza A/B    Collection Time: 03/06/24 10:03 AM   Result Value Ref Range    Rapid Influenza A Ag Negative Negative    Rapid Influenza B Ag Negative Negative     Acceptable Yes          Future Appointments   Date Time Provider " Department Center   4/17/2024  8:00 AM [facility] CT1 LIMIT 450 LBS [facility] CT SCAN [facility]   4/18/2024  1:00 PM Kathe Quan MD [facility] DERM [address]   4/18/2024  3:30 PM Roger Eduardo MD [facility] HEMONC2 [address]   9/6/2024  9:40 AM Suraj Herrera III, MD [facility]         Medication List with Changes/Refills   Current Medications    APREMILAST (OTEZLA) 30 MG TAB    Take 30 mg by mouth once daily.    BETAMETHASONE DIPROPIONATE (DIPROLENE) 0.05 % OINTMENT    Apply topically 2 (two) times daily. Prn psoriasis flares    CIPROFLOXACIN HCL (CIPRO) 500 MG TABLET    1 po bid x 10 days    CLINDAMYCIN PHOSPHATE 1% (CLINDAGEL) 1 % GEL    AAA every day    DUKE'S SOLN (BENADRYL 30 ML, MYLANTA 30 ML, LIDOCAINE 30 ML, NYSTATIN 30 ML) 120ML    Take 10 mLs by mouth 4 (four) times daily. for 240 doses    FEXOFENADINE (ALLEGRA) 180 MG TABLET    Take 1 tablet (180 mg total) by mouth once daily.    LORAZEPAM (ATIVAN) 1 MG TABLET    Take 1 tablet (1 mg total) by mouth every 6 (six) hours as needed for Anxiety (and half an hour before MRI).    MOMETASONE 0.1% (ELOCON) 0.1 % CREAM    AAA every day when red and tender under occlusion    SPIRONOLACTONE (ALDACTONE) 50 MG TABLET    Take 1 po qday   Discontinued Medications    ACETAMINOPHEN (TYLENOL) 500 MG TABLET    Take 2 tablets (1,000 mg total) by mouth every 8 (eight) hours.    FOLIC ACID (FOLVITE) 1 MG TABLET    Take 1 tablet (1 mg total) by mouth once daily.    OTEZLA 30 MG TAB    TAKE 1 TABLET BY MOUTH  DAILY         Disclaimer:  This note has been generated using voice-recognition software. There may be grammatical or spelling errors that have been missed during proof-reading Visit complexity inherent to evaluation and management associated with medical care services that serve as the continuing focal point for all needed health care services and/or with medical care services that are part of ongoing care related to a patient's single, serious condition or a complex  condition

## 2024-03-05 NOTE — PLAN OF CARE
Day 21 of outpatient radiation to the left lung. Reports a raw sore throat on scale of 6/10, 10 being most pain. Has fullness in both ears. Denies SOB. Positive fatigue.

## 2024-03-06 ENCOUNTER — PATIENT OUTREACH (OUTPATIENT)
Dept: ADMINISTRATIVE | Facility: OTHER | Age: 64
End: 2024-03-06
Payer: COMMERCIAL

## 2024-03-06 ENCOUNTER — OFFICE VISIT (OUTPATIENT)
Dept: INTERNAL MEDICINE | Facility: CLINIC | Age: 64
End: 2024-03-06
Payer: COMMERCIAL

## 2024-03-06 ENCOUNTER — PATIENT MESSAGE (OUTPATIENT)
Dept: RADIATION ONCOLOGY | Facility: CLINIC | Age: 64
End: 2024-03-06
Payer: COMMERCIAL

## 2024-03-06 VITALS
HEART RATE: 101 BPM | HEIGHT: 59 IN | WEIGHT: 183.19 LBS | OXYGEN SATURATION: 98 % | DIASTOLIC BLOOD PRESSURE: 70 MMHG | SYSTOLIC BLOOD PRESSURE: 128 MMHG | BODY MASS INDEX: 36.93 KG/M2

## 2024-03-06 DIAGNOSIS — J06.9 VIRAL URI WITH COUGH: Primary | ICD-10-CM

## 2024-03-06 DIAGNOSIS — H93.13 TINNITUS OF BOTH EARS: ICD-10-CM

## 2024-03-06 DIAGNOSIS — C77.1 SECONDARY AND UNSPECIFIED MALIGNANT NEOPLASM OF INTRATHORACIC LYMPH NODES: ICD-10-CM

## 2024-03-06 LAB
CTP QC/QA: YES
CTP QC/QA: YES
FLUAV AG NPH QL: NEGATIVE
FLUBV AG NPH QL: NEGATIVE
SARS-COV-2 RDRP RESP QL NAA+PROBE: NEGATIVE

## 2024-03-06 PROCEDURE — 99999 PR PBB SHADOW E&M-EST. PATIENT-LVL IV: CPT | Mod: PBBFAC,,, | Performed by: INTERNAL MEDICINE

## 2024-03-06 PROCEDURE — 87635 SARS-COV-2 COVID-19 AMP PRB: CPT | Mod: QW,S$GLB,, | Performed by: INTERNAL MEDICINE

## 2024-03-06 PROCEDURE — 1160F RVW MEDS BY RX/DR IN RCRD: CPT | Mod: CPTII,S$GLB,, | Performed by: INTERNAL MEDICINE

## 2024-03-06 PROCEDURE — 3074F SYST BP LT 130 MM HG: CPT | Mod: CPTII,S$GLB,, | Performed by: INTERNAL MEDICINE

## 2024-03-06 PROCEDURE — 3008F BODY MASS INDEX DOCD: CPT | Mod: CPTII,S$GLB,, | Performed by: INTERNAL MEDICINE

## 2024-03-06 PROCEDURE — 3078F DIAST BP <80 MM HG: CPT | Mod: CPTII,S$GLB,, | Performed by: INTERNAL MEDICINE

## 2024-03-06 PROCEDURE — 77014 PR  CT GUIDANCE PLACEMENT RAD THERAPY FIELDS: CPT | Mod: 26,,, | Performed by: RADIOLOGY

## 2024-03-06 PROCEDURE — 87804 INFLUENZA ASSAY W/OPTIC: CPT | Mod: 59,QW,S$GLB, | Performed by: INTERNAL MEDICINE

## 2024-03-06 PROCEDURE — G2211 COMPLEX E/M VISIT ADD ON: HCPCS | Mod: S$GLB,,, | Performed by: INTERNAL MEDICINE

## 2024-03-06 PROCEDURE — 3066F NEPHROPATHY DOC TX: CPT | Mod: CPTII,S$GLB,, | Performed by: INTERNAL MEDICINE

## 2024-03-06 PROCEDURE — 99214 OFFICE O/P EST MOD 30 MIN: CPT | Mod: S$GLB,,, | Performed by: INTERNAL MEDICINE

## 2024-03-06 PROCEDURE — 77386 HC IMRT, COMPLEX: CPT | Performed by: RADIOLOGY

## 2024-03-06 PROCEDURE — 1159F MED LIST DOCD IN RCRD: CPT | Mod: CPTII,S$GLB,, | Performed by: INTERNAL MEDICINE

## 2024-03-06 NOTE — PROGRESS NOTES
CHW - Case Closure    This Community Health Worker spoke to patient during clinic visit today.   Pt/Caregiver reported: Pt stated that she lost her job in the past and her sister assisted her with paying her rent. She is now back working and she does not need any assistance at this time. Pt was provided with the information to the local Community Centers for when assistance is needed in the future ([facility]). SDOH completed verified updated by CHW  Pt/Caregiver denied any additional needs at this time and agrees with episode closure at this time.  Provided patient with Community Health Worker's contact information and encouraged him/her to contact this Community Health Worker if additional needs arise.

## 2024-03-07 PROCEDURE — 77387 GUIDANCE FOR RADJ TX DLVR: CPT | Mod: 26,,, | Performed by: RADIOLOGY

## 2024-03-07 PROCEDURE — 77386 HC IMRT, COMPLEX: CPT | Performed by: RADIOLOGY

## 2024-03-08 ENCOUNTER — PATIENT MESSAGE (OUTPATIENT)
Dept: RADIATION ONCOLOGY | Facility: CLINIC | Age: 64
End: 2024-03-08
Payer: COMMERCIAL

## 2024-03-08 DIAGNOSIS — L40.9 PSORIASIS: ICD-10-CM

## 2024-03-10 ENCOUNTER — OFFICE VISIT (OUTPATIENT)
Dept: URGENT CARE | Facility: CLINIC | Age: 64
End: 2024-03-10
Payer: COMMERCIAL

## 2024-03-10 ENCOUNTER — PATIENT MESSAGE (OUTPATIENT)
Dept: RADIATION ONCOLOGY | Facility: CLINIC | Age: 64
End: 2024-03-10
Payer: COMMERCIAL

## 2024-03-10 VITALS
TEMPERATURE: 98 F | DIASTOLIC BLOOD PRESSURE: 76 MMHG | RESPIRATION RATE: 18 BRPM | SYSTOLIC BLOOD PRESSURE: 108 MMHG | HEART RATE: 126 BPM | WEIGHT: 183.19 LBS | HEIGHT: 59 IN | OXYGEN SATURATION: 95 % | BODY MASS INDEX: 36.93 KG/M2

## 2024-03-10 DIAGNOSIS — R05.9 COUGH, UNSPECIFIED TYPE: ICD-10-CM

## 2024-03-10 DIAGNOSIS — U07.1 COVID-19: Primary | ICD-10-CM

## 2024-03-10 DIAGNOSIS — J34.89 TENDERNESS OVER MAXILLARY SINUS: ICD-10-CM

## 2024-03-10 LAB
CTP QC/QA: YES
SARS-COV-2 AG RESP QL IA.RAPID: POSITIVE

## 2024-03-10 PROCEDURE — 87811 SARS-COV-2 COVID19 W/OPTIC: CPT | Mod: QW,S$GLB,,

## 2024-03-10 PROCEDURE — 99203 OFFICE O/P NEW LOW 30 MIN: CPT | Mod: S$GLB,,,

## 2024-03-10 RX ORDER — DOXYCYCLINE HYCLATE 100 MG
100 TABLET ORAL EVERY 12 HOURS
Qty: 14 EACH | Refills: 0 | Status: SHIPPED | OUTPATIENT
Start: 2024-03-10 | End: 2024-03-17

## 2024-03-10 NOTE — PROGRESS NOTES
"Subjective:      Patient ID: Radha Ervin is a 63 y.o. female.    Vitals:  height is 4' 11" (1.499 m) and weight is 83.1 kg (183 lb 3.2 oz). Her oral temperature is 98.3 °F (36.8 °C). Her blood pressure is 108/76 and her pulse is 126 (abnormal). Her respiration is 18 and oxygen saturation is 95%.     Chief Complaint: Cough    Pt is a 62 yo female with PMHx of bronchitis is presenting with rhinorrhea, cough, sore throat, sinus pressure, PND, chills.  Onset of symptoms was 1 week. She was tested for COVID and Flu 4 days ago and both were negative. Pt reports using OTC Dayquil and Nyquil with no relief.  .    Cough  This is a new problem. The current episode started 1 to 4 weeks ago. The problem has been rapidly worsening. The problem occurs constantly. The cough is Non-productive. Associated symptoms include chills, postnasal drip and a sore throat. Pertinent negatives include no chest pain, ear pain, eye redness, fever, headaches, myalgias, rash, shortness of breath or wheezing. Treatments tried: dayquil. Her past medical history is significant for bronchitis.       Constitution: Positive for chills. Negative for activity change, appetite change and fever.   HENT:  Positive for postnasal drip and sore throat. Negative for ear pain, congestion, sinus pain and sinus pressure.    Neck: Negative for neck pain.   Cardiovascular:  Negative for chest pain and sob on exertion.   Eyes:  Negative for eye trauma, eye discharge, eye itching, eye redness, photophobia and blurred vision.   Respiratory:  Positive for cough. Negative for shortness of breath, wheezing and asthma.    Gastrointestinal:  Negative for abdominal pain, nausea, vomiting, constipation and diarrhea.   Genitourinary:  Negative for dysuria, frequency, urgency, urine decreased and hematuria.   Musculoskeletal:  Negative for pain and muscle ache.   Skin:  Negative for color change, rash and hives.   Allergic/Immunologic: Negative for seasonal allergies, " asthma, hives and sneezing.   Neurological:  Negative for dizziness, light-headedness, headaches and altered mental status.   Psychiatric/Behavioral:  Negative for altered mental status and confusion.       Past Medical History:   Diagnosis Date    Allergy     Amblyopia     Cataract     Eczema     HS (hereditary spherocytosis)     Hx of total knee replacement 01/19/2016    right knee    Joint pain        Past Surgical History:   Procedure Laterality Date    achilles tendon repair      BRONCHOSCOPY N/A 8/3/2023    Procedure: Bronchoscopy;  Surgeon: Saeed Gould MD;  Location: [facility] OR [facility];  Service: Cardiothoracic;  Laterality: N/A;    CHOLECYSTECTOMY      INJECTION OF ANESTHETIC AGENT AROUND MULTIPLE INTERCOSTAL NERVES N/A 8/3/2023    Procedure: BLOCK, NERVE, INTERCOSTAL, 2 OR MORE;  Surgeon: Saeed Gould MD;  [facility];  Service: Cardiothoracic;  Laterality: N/A;    LYMPHADENECTOMY Left 8/3/2023    Procedure: LYMPHADENECTOMY;  Surgeon: Saeed Gould MD;  Location: [facility] OR [address] [place] [address];  Service: Cardiothoracic;  Laterality: Left;    NAVIGATIONAL BRONCHOSCOPY N/A 6/23/2023    Procedure: BRONCHOSCOPY, NAVIGATIONAL;  Surgeon: Radha Mccollum MD;  Location: [facility] OR [address] [place] [address];  Service: Pulmonary;  Laterality: N/A;    TOTAL KNEE ARTHROPLASTY      XI ROBOTIC RATS Left 8/3/2023    Procedure: XI ROBOTIC RATS upper lobectomy;  Surgeon: Saeed Gould MD;  Location: [facility] OR [address] [place] [address];  Service: Cardiothoracic;  Laterality: Left;       Family History   Problem Relation Age of Onset    Cancer Mother     Cancer Father         lung CA    Cancer Son         hogdkanaya    Breast cancer Sister         1/2 sister    Liver cancer Maternal Uncle     Blindness Cousin     Diabetes Cousin     Amblyopia Neg Hx     Cataracts Neg Hx     Glaucoma Neg Hx     Macular degeneration Neg Hx     Retinal detachment Neg Hx     Strabismus Neg Hx        Social History     Socioeconomic History     Marital status: Single   Tobacco Use    Smoking status: Former     Current packs/day: 0.00     Average packs/day: 1 pack/day for 46.3 years (46.3 ttl pk-yrs)     Types: Vaping with nicotine, Cigarettes     Start date:      Quit date: 2023     Years since quittin.8    Smokeless tobacco: Never    Tobacco comments:     Patient is currently on the patch    Substance and Sexual Activity    Alcohol use: Yes     Comment: occassionally    Drug use: No    Sexual activity: Not Currently     Partners: Male     Social Determinants of Health     Financial Resource Strain: Low Risk  (3/6/2024)    Overall Financial Resource Strain (CARDIA)     Difficulty of Paying Living Expenses: Not hard at all   Recent Concern: Financial Resource Strain - High Risk (2024)    Overall Financial Resource Strain (CARDIA)     Difficulty of Paying Living Expenses: Hard   Food Insecurity: No Food Insecurity (2024)    Hunger Vital Sign     Worried About Running Out of Food in the Last Year: Never true     Ran Out of Food in the Last Year: Never true   Transportation Needs: No Transportation Needs (2024)    PRAPARE - Transportation     Lack of Transportation (Medical): No     Lack of Transportation (Non-Medical): No   Physical Activity: Insufficiently Active (2024)    Exercise Vital Sign     Days of Exercise per Week: 1 day     Minutes of Exercise per Session: 10 min   Stress: Stress Concern Present (2024)    [employer] - Occupational Stress Questionnaire     Feeling of Stress : To some extent   Social Connections: Socially Isolated (2024)    Social Connection and Isolation Panel [NHANES]     Frequency of Communication with Friends and Family: More than three times a week     Frequency of Social Gatherings with Friends and Family: Once a week     Attends Zoroastrian Services: Never     Active Member of Clubs or Organizations: No     Attends Club or Organization  Meetings: Never     Marital Status:    Housing Stability: Low Risk  (3/6/2024)    Housing Stability Vital Sign     Unable to Pay for Housing in the Last Year: No     Number of Places Lived in the Last Year: 1     Unstable Housing in the Last Year: No   Recent Concern: Housing Stability - High Risk (1/11/2024)    Housing Stability Vital Sign     Unable to Pay for Housing in the Last Year: Yes     Number of Places Lived in the Last Year: 1     Unstable Housing in the Last Year: No       Current Outpatient Medications   Medication Sig Dispense Refill    apremilast (OTEZLA) 30 mg Tab Take 30 mg by mouth once daily.      betamethasone dipropionate (DIPROLENE) 0.05 % ointment Apply topically 2 (two) times daily. Prn psoriasis flares 45 g 3    ciprofloxacin HCl (CIPRO) 500 MG tablet 1 po bid x 10 days 20 tablet 0    clindamycin phosphate 1% (CLINDAGEL) 1 % gel AAA every day 60 g 5    duke's soln (benadryl 30 mL, mylanta 30 mL, LIDOcaine 30 mL, nystatin 30 mL) 120mL Take 10 mLs by mouth 4 (four) times daily. for 240 doses 480 mL 4    fexofenadine (ALLEGRA) 180 MG tablet Take 1 tablet (180 mg total) by mouth once daily. 90 tablet 3    mometasone 0.1% (ELOCON) 0.1 % cream AAA every day when red and tender under occlusion 60 g 2    spironolactone (ALDACTONE) 50 MG tablet Take 1 po qday 90 tablet 1    doxycycline (VIBRA-TABS) 100 MG tablet Take 1 tablet (100 mg total) by mouth every 12 (twelve) hours. for 7 days 14 each 0    LORazepam (ATIVAN) 1 MG tablet Take 1 tablet (1 mg total) by mouth every 6 (six) hours as needed for Anxiety (and half an hour before MRI). 10 tablet 3     Current Facility-Administered Medications   Medication Dose Route Frequency Provider Last Rate Last Admin    triamcinolone acetonide injection 40 mg  40 mg Intradermal Once Kathe Quan MD           Review of patient's allergies indicates:   Allergen Reactions    Paclitaxel Anaphylaxis and Other (See Comments)     Pt  "states "Anaphylaxis" last encounter w/ throat swelling. Encountered back pain, coughing and head fuzzy today.      Morphine Other (See Comments)     headaches    Latex, natural rubber Rash and Other (See Comments)     Redness and peeling only with bandaids    Penicillins Nausea And Vomiting and Rash      Objective:     Physical Exam   Constitutional: She is oriented to person, place, and time. She appears well-developed. She is cooperative.  Non-toxic appearance. She does not appear ill. No distress.      Comments:Pt sitting erect on examination table. No acute respiratory distress, no use of accessory muscles, no notice of nasal flaring.        HENT:   Head: Normocephalic and atraumatic.   Ears:   Right Ear: Hearing, tympanic membrane, external ear and ear canal normal.   Left Ear: Hearing, tympanic membrane, external ear and ear canal normal.   Nose: Congestion present. No mucosal edema, rhinorrhea or nasal deformity. No epistaxis. Right sinus exhibits maxillary sinus tenderness. Right sinus exhibits no frontal sinus tenderness. Left sinus exhibits no maxillary sinus tenderness and no frontal sinus tenderness.   Mouth/Throat: Uvula is midline, oropharynx is clear and moist and mucous membranes are normal. Mucous membranes are moist. No trismus in the jaw. Normal dentition. No uvula swelling. No oropharyngeal exudate, posterior oropharyngeal edema or posterior oropharyngeal erythema. Oropharynx is clear.   Eyes: Conjunctivae and lids are normal. Pupils are equal, round, and reactive to light. No scleral icterus. Extraocular movement intact   Neck: Trachea normal and phonation normal. Neck supple. No edema present. No erythema present. No neck rigidity present.   Cardiovascular: Normal rate, regular rhythm, normal heart sounds and normal pulses.   Pulmonary/Chest: Effort normal and breath sounds normal. No accessory muscle usage. No apnea, no tachypnea and no bradypnea. No respiratory distress. She has no " decreased breath sounds. She has no wheezes. She has no rhonchi.   Lungs clear to auscultation B/L           Comments: Lungs clear to auscultation B/L      Abdominal: Normal appearance.   Musculoskeletal: Normal range of motion.         General: No deformity. Normal range of motion.   Neurological: She is alert and oriented to person, place, and time. She exhibits normal muscle tone. Coordination normal.   Skin: Skin is warm, dry, intact, not diaphoretic and not pale.   Psychiatric: Her speech is normal and behavior is normal. Judgment and thought content normal.   Nursing note and vitals reviewed.    Results for orders placed or performed in visit on 03/10/24   SARS Coronavirus 2 Antigen, POCT Manual Read   Result Value Ref Range    SARS Coronavirus 2 Antigen Positive (A) Negative     Acceptable Yes        Assessment:     1. COVID-19    2. Cough, unspecified type    3. Tenderness over maxillary sinus        Plan:   I have reviewed the patient chart and pertinent past imaging/labs.  Pt educated on COVId-19 disease process and diagnosis. COVID risk score of 4. Paxlovid, renal dosing, not offered due to >72 hours onset. Sinus tenderness to palpation of R maxillary, therefore treated with Doxycycline. Strict Er protocol.       COVID-19    Cough, unspecified type  -     SARS Coronavirus 2 Antigen, POCT Manual Read    Tenderness over maxillary sinus  -     doxycycline (VIBRA-TABS) 100 MG tablet; Take 1 tablet (100 mg total) by mouth every 12 (twelve) hours. for 7 days  Dispense: 14 each; Refill: 0

## 2024-03-10 NOTE — LETTER
[address]  [place] [address]  Phone: [phone]  Fax: [phone]          Return to Work/School    Patient: Radha Ervin  YOB: 1960   Date: 03/10/2024     To Whom It May Concern:     Radha Ervin was in contact with/seen in my office on 03/10/2024. COVID-19 is present in our communities across the state. There is limited testing for COVID at this time, so not all patients can be tested. In this situation, your employee meets the following criteria:     Radha Ervin has met the criteria for COVID-19 testing and has a POSITIVE result. She can return to work once they are asymptomatic for 24 hours without the use of fever reducing medications AND at least five days from the start of symptoms (or from the first positive result if they have no symptoms).      If you have any questions or concerns, or if I can be of further assistance, please do not hesitate to contact me.     Sincerely,          Jassi Adair PA-C

## 2024-03-10 NOTE — PATIENT INSTRUCTIONS
Sinus Infection: Doxycycline twice daily for 7 days   Fever/Pain: Alternate Tylenol and Ibuprofen as needed every 4-6 hours  Cough: Cough syrup as needed  Monitor for chest pain, shortness of breath, worsening of symptoms, or fever unresponsive to medication.   Please drink plenty of fluids.  Please get plenty of rest.  Please return here or go to the Emergency Department for any concerns or worsening of condition.  If you were prescribed antibiotics, please take them to completion.  If you were given wait & see antibiotics, please wait 5-7 days before taking them, and only take them if your symptoms have worsened or not improved.  If you do begin taking the antibiotics, please take them to completion.  If you were prescribed a narcotic medication, do not drive or operate heavy equipment or machinery while taking these medications.  If you were given a steroid shot in the clinic and have also been given a prescription for a steroid such as Prednisone or a Medrol Dose Pack, please begin taking them tomorrow.  If you do not have Hypertension or any history of palpitations, it is ok to take over the counter Sudafed or Mucinex D or Allegra-D or Claritin-D or Zyrtec-D.  If you do take one of the above, it is ok to combine that with plain over the counter Mucinex or Allegra or Claritin or Zyrtec.  If for example you are taking Zyrtec -D, you can combine that with Mucinex, but not Mucinex-D.  If you are taking Mucinex-D, you can combine that with plain Allegra or Claritin or Zyrtec.   If you do have Hypertension or palpitations, it is safe to take Coricidin HBP for relief of sinus symptoms.  If not allergic, please take over the counter Tylenol (Acetaminophen) and/or Motrin (Ibuprofen) as directed for control of pain and/or fever.  Please follow up with your primary care doctor or specialist as needed.    If you  smoke, please stop smoking.

## 2024-03-11 RX ORDER — APREMILAST 30 MG/1
TABLET, FILM COATED ORAL
Qty: 60 TABLET | Refills: 1 | Status: ACTIVE | OUTPATIENT
Start: 2024-03-11

## 2024-03-14 ENCOUNTER — PATIENT MESSAGE (OUTPATIENT)
Dept: RADIATION ONCOLOGY | Facility: CLINIC | Age: 64
End: 2024-03-14
Payer: COMMERCIAL

## 2024-03-18 ENCOUNTER — PATIENT MESSAGE (OUTPATIENT)
Dept: RESEARCH | Facility: HOSPITAL | Age: 64
End: 2024-03-18
Payer: COMMERCIAL

## 2024-03-18 PROCEDURE — 77387 GUIDANCE FOR RADJ TX DLVR: CPT | Mod: 26,,, | Performed by: RADIOLOGY

## 2024-03-18 PROCEDURE — 77386 HC IMRT, COMPLEX: CPT | Performed by: RADIOLOGY

## 2024-03-19 ENCOUNTER — RESEARCH ENCOUNTER (OUTPATIENT)
Dept: RESEARCH | Facility: HOSPITAL | Age: 64
End: 2024-03-19
Payer: COMMERCIAL

## 2024-03-19 ENCOUNTER — PATIENT MESSAGE (OUTPATIENT)
Dept: RESEARCH | Facility: HOSPITAL | Age: 64
End: 2024-03-19
Payer: COMMERCIAL

## 2024-03-19 ENCOUNTER — DOCUMENTATION ONLY (OUTPATIENT)
Dept: RADIATION ONCOLOGY | Facility: CLINIC | Age: 64
End: 2024-03-19
Payer: COMMERCIAL

## 2024-03-19 DIAGNOSIS — U07.1 COVID: Primary | ICD-10-CM

## 2024-03-19 DIAGNOSIS — Z00.6 EXAMINATION OF PARTICIPANT IN CLINICAL TRIAL: ICD-10-CM

## 2024-03-19 PROCEDURE — 77014 PR  CT GUIDANCE PLACEMENT RAD THERAPY FIELDS: CPT | Mod: 26,,, | Performed by: RADIOLOGY

## 2024-03-19 PROCEDURE — 77336 RADIATION PHYSICS CONSULT: CPT | Performed by: RADIOLOGY

## 2024-03-19 PROCEDURE — 77386 HC IMRT, COMPLEX: CPT | Performed by: RADIOLOGY

## 2024-03-19 NOTE — PLAN OF CARE
Day 25 pf outpatient radiation to the left lung.Recently tested positive for Covid, recovering well. Still has a bit of a lingering cough, but afebrile.Denies SOB.

## 2024-03-19 NOTE — PROGRESS NOTES
Ms. Ervin was called today regarding her participation in (IRB #[id number] PI: Jose).   The Verbal Informed Consent was read and discussed by the consenter. The following was discussed:  Types of specimens to be collected  All medical information released to researchers will be stripped of identifiers and no patient information will be given to anyone outside of this research project.   Participating in a research study is not the same as getting regular medical care and will not improve the patient's health. The purpose of a research study is to gather information.  Being in this study does not interfere with your regular medical care.  The patient does not have to participate in this study. If they do not join, their care at [facility] will not be affected.  The person granting permission was provided adequate time to ask questions regarding the scope and purpose of the study.  Permission was obtained by telephone.   The above statements were read by the person obtaining permission to the person granting permission and witnessed by Nikhil Mejia. The witness information was documented on the verbal consent form as well.  This Verbal Informed Consent process was conducted prior to initiation of any study procedures.

## 2024-03-20 ENCOUNTER — LAB VISIT (OUTPATIENT)
Dept: LAB | Facility: HOSPITAL | Age: 64
End: 2024-03-20
Attending: INTERNAL MEDICINE
Payer: COMMERCIAL

## 2024-03-20 DIAGNOSIS — U07.1 COVID: ICD-10-CM

## 2024-03-20 DIAGNOSIS — Z00.6 EXAMINATION OF PARTICIPANT IN CLINICAL TRIAL: ICD-10-CM

## 2024-03-20 LAB — DRUG STUDY SPECIMEN TYPE: NORMAL

## 2024-03-20 PROCEDURE — 77387 GUIDANCE FOR RADJ TX DLVR: CPT | Mod: 26,,, | Performed by: RADIOLOGY

## 2024-03-20 PROCEDURE — 36415 COLL VENOUS BLD VENIPUNCTURE: CPT | Performed by: INTERNAL MEDICINE

## 2024-03-20 PROCEDURE — 77427 RADIATION TX MANAGEMENT X5: CPT | Mod: ,,, | Performed by: RADIOLOGY

## 2024-03-20 PROCEDURE — 77386 HC IMRT, COMPLEX: CPT | Performed by: RADIOLOGY

## 2024-03-21 PROCEDURE — 77387 GUIDANCE FOR RADJ TX DLVR: CPT | Mod: 26,,, | Performed by: RADIOLOGY

## 2024-03-21 PROCEDURE — 77386 HC IMRT, COMPLEX: CPT | Performed by: RADIOLOGY

## 2024-03-22 PROCEDURE — 77387 GUIDANCE FOR RADJ TX DLVR: CPT | Mod: 26,,, | Performed by: RADIOLOGY

## 2024-03-22 PROCEDURE — 77386 HC IMRT, COMPLEX: CPT | Performed by: RADIOLOGY

## 2024-03-25 ENCOUNTER — PATIENT MESSAGE (OUTPATIENT)
Dept: HEMATOLOGY/ONCOLOGY | Facility: CLINIC | Age: 64
End: 2024-03-25
Payer: COMMERCIAL

## 2024-03-25 PROCEDURE — 77387 GUIDANCE FOR RADJ TX DLVR: CPT | Mod: 26,,, | Performed by: RADIOLOGY

## 2024-03-25 PROCEDURE — 77386 HC IMRT, COMPLEX: CPT | Performed by: RADIOLOGY

## 2024-03-26 PROCEDURE — 77336 RADIATION PHYSICS CONSULT: CPT | Performed by: RADIOLOGY

## 2024-03-26 PROCEDURE — 77386 HC IMRT, COMPLEX: CPT | Performed by: RADIOLOGY

## 2024-03-26 PROCEDURE — 77387 GUIDANCE FOR RADJ TX DLVR: CPT | Mod: 26,,, | Performed by: RADIOLOGY

## 2024-04-08 ENCOUNTER — TELEPHONE (OUTPATIENT)
Dept: INFUSION THERAPY | Facility: HOSPITAL | Age: 64
End: 2024-04-08
Payer: COMMERCIAL

## 2024-04-08 NOTE — TELEPHONE ENCOUNTER
Confirmed upcoming infusion appointments with the patient. Reviewed pre infusion instructions with the patient  4/17 labs after CT scan  4/18 office visit with Dr. Eduardo  4/19 at 8a infusion

## 2024-04-15 ENCOUNTER — TELEPHONE (OUTPATIENT)
Dept: RADIATION ONCOLOGY | Facility: CLINIC | Age: 64
End: 2024-04-15
Payer: COMMERCIAL

## 2024-04-15 NOTE — TELEPHONE ENCOUNTER
Call to pt to see how she is doing since completing lung radiation 3/26/24. Pt reports doing well. No issues reported. Has appt 5/14/24, but pt needs to changed it. Appt rescheduled to 5/21/24 at 9:00 AM.

## 2024-04-17 ENCOUNTER — HOSPITAL ENCOUNTER (OUTPATIENT)
Dept: RADIOLOGY | Facility: HOSPITAL | Age: 64
Discharge: HOME | End: 2024-04-17
Attending: INTERNAL MEDICINE
Payer: COMMERCIAL

## 2024-04-17 DIAGNOSIS — C34.92 ADENOCARCINOMA, LUNG, LEFT: ICD-10-CM

## 2024-04-17 PROCEDURE — 71250 CT THORAX DX C-: CPT | Mod: 26,,, | Performed by: RADIOLOGY

## 2024-04-17 PROCEDURE — 71250 CT THORAX DX C-: CPT | Mod: TC

## 2024-04-18 ENCOUNTER — OFFICE VISIT (OUTPATIENT)
Dept: HEMATOLOGY/ONCOLOGY | Facility: CLINIC | Age: 64
End: 2024-04-18
Payer: COMMERCIAL

## 2024-04-18 ENCOUNTER — OFFICE VISIT (OUTPATIENT)
Dept: DERMATOLOGY | Facility: CLINIC | Age: 64
End: 2024-04-18
Payer: COMMERCIAL

## 2024-04-18 VITALS
BODY MASS INDEX: 37.22 KG/M2 | HEART RATE: 104 BPM | WEIGHT: 184.31 LBS | SYSTOLIC BLOOD PRESSURE: 118 MMHG | OXYGEN SATURATION: 97 % | DIASTOLIC BLOOD PRESSURE: 77 MMHG

## 2024-04-18 DIAGNOSIS — C34.92 ADENOCARCINOMA, LUNG, LEFT: Primary | ICD-10-CM

## 2024-04-18 DIAGNOSIS — H93.11 TINNITUS OF RIGHT EAR: ICD-10-CM

## 2024-04-18 DIAGNOSIS — Z09 CHEMOTHERAPY FOLLOW-UP EXAMINATION: ICD-10-CM

## 2024-04-18 DIAGNOSIS — N18.31 STAGE 3A CHRONIC KIDNEY DISEASE: ICD-10-CM

## 2024-04-18 DIAGNOSIS — L73.2 HIDRADENITIS SUPPURATIVA: Primary | ICD-10-CM

## 2024-04-18 PROCEDURE — 3066F NEPHROPATHY DOC TX: CPT | Mod: CPTII,S$GLB,, | Performed by: INTERNAL MEDICINE

## 2024-04-18 PROCEDURE — 3066F NEPHROPATHY DOC TX: CPT | Mod: CPTII,S$GLB,, | Performed by: DERMATOLOGY

## 2024-04-18 PROCEDURE — 1160F RVW MEDS BY RX/DR IN RCRD: CPT | Mod: CPTII,S$GLB,, | Performed by: DERMATOLOGY

## 2024-04-18 PROCEDURE — 99214 OFFICE O/P EST MOD 30 MIN: CPT | Mod: 25,S$GLB,, | Performed by: DERMATOLOGY

## 2024-04-18 PROCEDURE — 99999 PR PBB SHADOW E&M-EST. PATIENT-LVL III: CPT | Mod: PBBFAC,,, | Performed by: DERMATOLOGY

## 2024-04-18 PROCEDURE — 99999 PR PBB SHADOW E&M-EST. PATIENT-LVL IV: CPT | Mod: PBBFAC,,, | Performed by: INTERNAL MEDICINE

## 2024-04-18 PROCEDURE — 99215 OFFICE O/P EST HI 40 MIN: CPT | Mod: S$GLB,,, | Performed by: INTERNAL MEDICINE

## 2024-04-18 PROCEDURE — 1159F MED LIST DOCD IN RCRD: CPT | Mod: CPTII,S$GLB,, | Performed by: DERMATOLOGY

## 2024-04-18 PROCEDURE — 3074F SYST BP LT 130 MM HG: CPT | Mod: CPTII,S$GLB,, | Performed by: INTERNAL MEDICINE

## 2024-04-18 PROCEDURE — 3078F DIAST BP <80 MM HG: CPT | Mod: CPTII,S$GLB,, | Performed by: INTERNAL MEDICINE

## 2024-04-18 PROCEDURE — 11900 INJECT SKIN LESIONS </W 7: CPT | Mod: S$GLB,,, | Performed by: DERMATOLOGY

## 2024-04-18 PROCEDURE — 3008F BODY MASS INDEX DOCD: CPT | Mod: CPTII,S$GLB,, | Performed by: INTERNAL MEDICINE

## 2024-04-18 RX ORDER — TRIAMCINOLONE ACETONIDE 40 MG/ML
40 INJECTION, SUSPENSION INTRA-ARTICULAR; INTRAMUSCULAR ONCE
Status: SHIPPED | OUTPATIENT
Start: 2024-04-18

## 2024-04-18 NOTE — ASSESSMENT & PLAN NOTE
"Today's Plan:      LIFESTYLE:    Continue weight loss and dietary modifications   Avoid sugars and processed foods  Limit "white" foods ie. Bread, rice, pasta, potatoes  - recommend limiting to 1/2 cup per serving  Limit cow's milk     Keep skin cool as overheating can flare HS    Avoid shaving affected areas and wearing tight fitting clothing as friction exacerbates disease    TREATMENT:    Stop:  Spironolactone     Continue:  Wash affected areas with Antimicrobial Washes - daily to affected areas; lather into affected areas and let sit for 5 minutes prior to rinsing:  Benzoyl peroxide 5 - 10% - rinse thoroughly as can bleach towels/clothing  Hibiclens (Chlorhexidine)  Zinc pyrithione (Zinc bar available on Amazon or [employer] Should[employer] shampoo)  CLn wash (dilute bleach) - available on Amazon  Otezla 1 pill every day    Start:  Turmeric (with black pepper) supplement at 500mg to 1 gram per day (available over the counter and on Amazon)   Can cause upset stomach  OR  Take Zinc supplement daily    Can cause upset stomach  Schedule Deroofing procedure for right upper thigh/inguinal fold      For flares:  Mometasone cream under bandaid  Dilute bleach to affected areas: compresses daily to affected/flared area(s) - can use CLn wash on warm wet rag as compress  If have groin involvement, may want to take dilute bleach baths (daily when flared; 2x/week for maintenance).     Recipe for dilute bleach bath:    add 1/2 cup of regular strength (6%) bleach to a full tub of lukewarm water and soak for 10 - 15 minutes. (use 1/4 cup for a half-full tub of water)   OR Add Clorox (2 teaspoons/gal of water, max. 2/3 cup) to bath water     Future treatment considerations:  Metformin ER at 500mg every night with dinner   Dapsone at 50mg every day  Trental at 400mg 3x/day - can cause stomach upset    RESOURCE:  [facility]      FOR FLARES (new painful bump):  Message office through [facility] for injection which will often hasten " resolution of tender, red bump    Can apply warm/hot compresses on a painful, deep lump    If notice a foul-smelling drainage, can use Hydrogen Peroxide to cleanse area followed by application of Medi-honey to open area (apply Medi-honey 1x/day)

## 2024-04-18 NOTE — Clinical Note
Horacio Ruelas Am taking care of your pt for her HS. Looks like her last HgA1c (4/23) was elevated at 6.3. what are your thoughts on adding metformin ER for her? (Metformin as also been shown to help HS). JAM

## 2024-04-18 NOTE — PATIENT INSTRUCTIONS
"LIFESTYLE:    Continue weight loss and dietary modifications   Avoid sugars and processed foods  Limit "white" foods ie. Bread, rice, pasta, potatoes  - recommend limiting to 1/2 cup per serving  Limit cow's milk     Keep skin cool as overheating can flare HS    Avoid shaving affected areas and wearing tight fitting clothing as friction exacerbates disease    TREATMENT:    Stop:  Spironolactone     Continue:  Wash affected areas with Antimicrobial Washes - daily to affected areas; lather into affected areas and let sit for 5 minutes prior to rinsing:  Benzoyl peroxide 5 - 10% - rinse thoroughly as can bleach towels/clothing  Hibiclens (Chlorhexidine)  Zinc pyrithione (Zinc bar available on Amazon or Head and Shoulders shampoo)  CLn wash (dilute bleach) - available on Amazon  Otezla 1 pill every day    Start:  Turmeric (with black pepper) supplement at 500mg to 1 gram per day (available over the counter and on Amazon)   Can cause upset stomach  OR  Take Zinc supplement daily    Can cause upset stomach  Schedule Deroofing procedure for right upper thigh/inguinal fold      For flares:  Mometasone cream under bandaid  Dilute bleach to affected areas: compresses daily to affected/flared area(s) - can use CLn wash on warm wet rag as compress  If have groin involvement, may want to take dilute bleach baths (daily when flared; 2x/week for maintenance).     Recipe for dilute bleach bath:    add 1/2 cup of regular strength (6%) bleach to a full tub of lukewarm water and soak for 10 - 15 minutes. (use 1/4 cup for a half-full tub of water)   OR Add Clorox (2 teaspoons/gal of water, max. 2/3 cup) to bath water       RESOURCE:  [facility]      FOR FLARES (new painful bump):  [facility] injection which will often hasten resolution of tender, red bump    Can apply warm/hot compresses on a painful, deep lump    If notice a foul-smelling drainage, can use Hydrogen Peroxide to cleanse area followed by " application of Medi-honey to open area (apply Medi-honey 1x/day)

## 2024-04-18 NOTE — PROGRESS NOTES
"  Subjective:      Patient ID:  Radha Ervin is a 63 y.o. female who presents for   Chief Complaint   Patient presents with    Hidradenitis Suppurativa     F/u     Patient with Hidradenitis suppurativa  Last seen on 1/29/24 for I&D and IL K20 to 2 lesions on the right and left upper inner thigh- resolved. Completed 7 days of cipro 500mg bid.    Condition overall - same.    C/o flare R upper inner thigh. Tender+ today.    Current treatment regimen:  Otezla 30mg 1x/day (dx with pso 2/2 humira) - Pso well controlled with no flares  Spironolactone 50mg 3x/week  Hibiclens alt BPO wash qday  Clindamycin soln prn flares  Mometasone cream prn flares    Has not yet started:  Turmeric    Lifestyle modifications - diet, smoking, shaving etc  Limiting "white" foods, sugars and processed foods    Not shaving    Dx with lung cancer 5/23 - finished chemo and radiation -- start immunotherapy tomorrow        Review of Systems   Constitutional:  Positive for weight gain (188# (5#)). Negative for weight loss.   HENT:  Negative for nosebleeds and headaches.    Eyes:  Eyelid inflammation: 188# up 5#.   Gastrointestinal:  Negative for diarrhea (resolved. never had colonoscopy) and Sensitivity to oral antibiotics.   Genitourinary:  Negative for irregular periods (post menopausal).   Musculoskeletal:  Negative for arthralgias.   Skin:  Positive for abscesses. Negative for recent sunburn.   Neurological:  Negative for headaches.   Psychiatric/Behavioral:  Negative for depressed mood.    Hematologic/Lymphatic: Bruises/bleeds easily.       Objective:   Physical Exam   Constitutional: She appears well-developed and well-nourished. She is obese.  No distress.   Neurological: She is alert and oriented to person, place, and time. She is not disoriented.   Psychiatric: She has a normal mood and affect.   Skin:   Areas Examined (abnormalities noted in diagram):   Genitals / Buttocks / Groin Inspection Performed  RLE Inspected  LLE Inspection " Performed                Diagram Legend     Erythematous scaling macule/papule c/w actinic keratosis       Vascular papule c/w angioma      Pigmented verrucoid papule/plaque c/w seborrheic keratosis      Yellow umbilicated papule c/w sebaceous hyperplasia      Irregularly shaped tan macule c/w lentigo     1-2 mm smooth white papules consistent with Milia      Movable subcutaneous cyst with punctum c/w epidermal inclusion cyst      Subcutaneous movable cyst c/w pilar cyst      Firm pink to brown papule c/w dermatofibroma      Pedunculated fleshy papule(s) c/w skin tag(s)      Evenly pigmented macule c/w junctional nevus     Mildly variegated pigmented, slightly irregular-bordered macule c/w mildly atypical nevus      Flesh colored to evenly pigmented papule c/w intradermal nevus       Pink pearly papule/plaque c/w basal cell carcinoma      Erythematous hyperkeratotic cursted plaque c/w SCC      Surgical scar with no sign of skin cancer recurrence      Open and closed comedones      Inflammatory papules and pustules      Verrucoid papule consistent consistent with wart     Erythematous eczematous patches and plaques     Dystrophic onycholytic nail with subungual debris c/w onychomycosis     Umbilicated papule    Erythematous-base heme-crusted tan verrucoid plaque consistent with inflamed seborrheic keratosis     Erythematous Silvery Scaling Plaque c/w Psoriasis     See annotation    CMP  Sodium   Date Value Ref Range Status   04/17/2024 138 136 - 145 mmol/L Final     Potassium   Date Value Ref Range Status   04/17/2024 4.4 3.5 - 5.1 mmol/L Final     Chloride   Date Value Ref Range Status   04/17/2024 108 95 - 110 mmol/L Final     CO2   Date Value Ref Range Status   04/17/2024 21 (L) 23 - 29 mmol/L Final     Glucose   Date Value Ref Range Status   04/17/2024 113 (H) 70 - 110 mg/dL Final     BUN   Date Value Ref Range Status   04/17/2024 29 (H) 8 - 23 mg/dL Final   04/28/2023 12.0 7.0 - 21.0 mg/dL Final     Creatinine    Date Value Ref Range Status   04/17/2024 1.6 (H) 0.5 - 1.4 mg/dL Final     Calcium   Date Value Ref Range Status   04/17/2024 9.7 8.7 - 10.5 mg/dL Final     Total Protein   Date Value Ref Range Status   04/17/2024 7.7 6.0 - 8.4 g/dL Final     Albumin   Date Value Ref Range Status   04/17/2024 3.5 3.5 - 5.2 g/dL Final     Total Bilirubin   Date Value Ref Range Status   04/17/2024 0.4 0.1 - 1.0 mg/dL Final     Comment:     For infants and newborns, interpretation of results should be based  on gestational age, weight and in agreement with clinical  observations.    Premature Infant recommended reference ranges:  Up to 24 hours.............<8.0 mg/dL  Up to 48 hours............<12.0 mg/dL  3-5 days..................<15.0 mg/dL  6-29 days.................<15.0 mg/dL       Alkaline Phosphatase   Date Value Ref Range Status   04/17/2024 94 55 - 135 U/L Final     AST   Date Value Ref Range Status   04/17/2024 11 10 - 40 U/L Final     ALT   Date Value Ref Range Status   04/17/2024 9 (L) 10 - 44 U/L Final     Anion Gap   Date Value Ref Range Status   04/17/2024 9 8 - 16 mmol/L Final   04/28/2023 13.3 9 - 18 mmol/L Final     eGFR   Date Value Ref Range Status   04/17/2024 36 (A) >60 mL/min/1.73 m^2 Final   04/28/2023 76 (L) >=90 mL/min Final     Comment:     Calculation based on the Chronic Kidney Disease Epidemiology Collaboration (CKD-EPI) equation refit  without adjustment for race.       Lab Results   Component Value Date    HGBA1C 6.3 (H) 04/28/2023         Assessment / Plan:        Hidradenitis suppurativa  -     triamcinolone acetonide injection 40 mg  -     AL [id number] ACETONIDE INJ PER 10MG  -     AL INJECTION INTO SKIN LESIONS, UP TO 7    Intralesional Kenalog 40mg/cc (0.7 cc total) injected into 1 lesions on the right upper inner thigh today after obtaining verbal consent including risk of surrounding hypopigmentation. Patient tolerated procedure well.    Units:4  NDC for Kenalog 40mg/cc:   "[id number]    Intralesional Kenalog 20mg/cc (0.5 cc total) injected into 1 lesions on the left upper inner thigh today after obtaining verbal consent including risk of surrounding hypopigmentation. Patient tolerated procedure well.    Units:2  NDC for Kenalog 40mg/cc:  [id number]      Hidradenitis suppurativa  Today's Plan:      LIFESTYLE:    Continue weight loss and dietary modifications   Avoid sugars and processed foods  Limit "white" foods ie. Bread, rice, pasta, potatoes  - recommend limiting to 1/2 cup per serving  Limit cow's milk     Keep skin cool as overheating can flare HS    Avoid shaving affected areas and wearing tight fitting clothing as friction exacerbates disease    TREATMENT:    Stop:  Spironolactone     Continue:  Wash affected areas with Antimicrobial Washes - daily to affected areas; lather into affected areas and let sit for 5 minutes prior to rinsing:  Benzoyl peroxide 5 - 10% - rinse thoroughly as can bleach towels/clothing  Hibiclens (Chlorhexidine)  Zinc pyrithione (Zinc bar available on [employer] or Head and Shoulders shampoo)  CLn wash (dilute bleach) - available on Amazon  Otezla 1 pill every day    Start:  Turmeric (with black pepper) supplement at 500mg to 1 gram per day (available over the counter and on Amazon)   Can cause upset stomach  OR  Take Zinc supplement daily    Can cause upset stomach  Schedule Deroofing procedure for right upper thigh/inguinal fold      For flares:  Mometasone cream under bandaid  Dilute bleach to affected areas: compresses daily to affected/flared area(s) - can use CLn wash on warm wet rag as compress  If have groin involvement, may want to take dilute bleach baths (daily when flared; 2x/week for maintenance).     Recipe for dilute bleach bath:    add 1/2 cup of regular strength (6%) bleach to a full tub of lukewarm water and soak for 10 - 15 minutes. (use 1/4 cup for a half-full tub of water)   OR Add Clorox (2 teaspoons/gal of water, max. 2/3 cup) to " bath water     Future treatment considerations:  Metformin ER at 500mg every night with dinner - messaged PCP  Dapsone at 50mg every day  Trental at 400mg 3x/day - can cause stomach upset    RESOURCE:  [employer]      FOR FLARES (new painful bump):  [facility] which will often hasten resolution of tender, red bump    Can apply warm/hot compresses on a painful, deep lump    If notice a foul-smelling drainage, can use Hydrogen Peroxide to cleanse area followed by application of Medi-honey to open area (apply Medi-honey 1x/day)                              Follow up for deroofing right upper thigh - june.

## 2024-04-19 ENCOUNTER — INFUSION (OUTPATIENT)
Dept: INFUSION THERAPY | Facility: HOSPITAL | Age: 64
End: 2024-04-19
Attending: INTERNAL MEDICINE
Payer: COMMERCIAL

## 2024-04-19 VITALS
TEMPERATURE: 98 F | SYSTOLIC BLOOD PRESSURE: 108 MMHG | HEART RATE: 87 BPM | HEIGHT: 59 IN | WEIGHT: 184.31 LBS | DIASTOLIC BLOOD PRESSURE: 67 MMHG | OXYGEN SATURATION: 96 % | RESPIRATION RATE: 18 BRPM | BODY MASS INDEX: 37.16 KG/M2

## 2024-04-19 DIAGNOSIS — C34.92 ADENOCARCINOMA, LUNG, LEFT: Primary | ICD-10-CM

## 2024-04-19 PROCEDURE — 96413 CHEMO IV INFUSION 1 HR: CPT

## 2024-04-19 PROCEDURE — 63600175 PHARM REV CODE 636 W HCPCS: Mod: JZ,JG | Performed by: INTERNAL MEDICINE

## 2024-04-19 PROCEDURE — 25000003 PHARM REV CODE 250: Performed by: INTERNAL MEDICINE

## 2024-04-19 RX ORDER — SODIUM CHLORIDE 0.9 % (FLUSH) 0.9 %
10 SYRINGE (ML) INJECTION
Status: DISCONTINUED | OUTPATIENT
Start: 2024-04-19 | End: 2024-04-19 | Stop reason: HOSPADM

## 2024-04-19 RX ORDER — EPINEPHRINE 0.3 MG/.3ML
0.3 INJECTION SUBCUTANEOUS ONCE AS NEEDED
Status: DISCONTINUED | OUTPATIENT
Start: 2024-04-19 | End: 2024-04-19 | Stop reason: HOSPADM

## 2024-04-19 RX ORDER — DIPHENHYDRAMINE HYDROCHLORIDE 50 MG/ML
50 INJECTION INTRAMUSCULAR; INTRAVENOUS ONCE AS NEEDED
Status: CANCELLED | OUTPATIENT
Start: 2024-04-19

## 2024-04-19 RX ORDER — HEPARIN 100 UNIT/ML
500 SYRINGE INTRAVENOUS
Status: DISCONTINUED | OUTPATIENT
Start: 2024-04-19 | End: 2024-04-19 | Stop reason: HOSPADM

## 2024-04-19 RX ORDER — HEPARIN 100 UNIT/ML
500 SYRINGE INTRAVENOUS
Status: CANCELLED | OUTPATIENT
Start: 2024-04-19

## 2024-04-19 RX ORDER — SODIUM CHLORIDE 0.9 % (FLUSH) 0.9 %
10 SYRINGE (ML) INJECTION
Status: CANCELLED | OUTPATIENT
Start: 2024-04-19

## 2024-04-19 RX ORDER — DIPHENHYDRAMINE HYDROCHLORIDE 50 MG/ML
50 INJECTION INTRAMUSCULAR; INTRAVENOUS ONCE AS NEEDED
Status: DISCONTINUED | OUTPATIENT
Start: 2024-04-19 | End: 2024-04-19 | Stop reason: HOSPADM

## 2024-04-19 RX ORDER — EPINEPHRINE 0.3 MG/.3ML
0.3 INJECTION SUBCUTANEOUS ONCE AS NEEDED
Status: CANCELLED | OUTPATIENT
Start: 2024-04-19

## 2024-04-19 RX ADMIN — SODIUM CHLORIDE: 9 INJECTION, SOLUTION INTRAVENOUS at 08:04

## 2024-04-19 RX ADMIN — SODIUM CHLORIDE 1500 MG: 9 INJECTION, SOLUTION INTRAVENOUS at 09:04

## 2024-04-19 NOTE — PLAN OF CARE
Imfinzi given through PIV, noted for having positive blood return.  Dosage and BSA checked by two chemotherapy certified nurses. Education provided.  Immunotherapy precautions in place throughout therapy. Pt tolerated it well, no adverse reactions noted.  Next appointment scheduled.  AVS given. Discharged with no acute distress.

## 2024-04-22 ENCOUNTER — PATIENT MESSAGE (OUTPATIENT)
Dept: DERMATOLOGY | Facility: CLINIC | Age: 64
End: 2024-04-22
Payer: COMMERCIAL

## 2024-04-23 ENCOUNTER — PATIENT MESSAGE (OUTPATIENT)
Dept: DERMATOLOGY | Facility: CLINIC | Age: 64
End: 2024-04-23
Payer: COMMERCIAL

## 2024-04-23 DIAGNOSIS — L73.2 HIDRADENITIS SUPPURATIVA: Primary | ICD-10-CM

## 2024-04-23 RX ORDER — METFORMIN HYDROCHLORIDE 500 MG/1
TABLET, EXTENDED RELEASE ORAL
Qty: 30 TABLET | Refills: 3 | Status: SHIPPED | OUTPATIENT
Start: 2024-04-23 | End: 2024-05-17 | Stop reason: SINTOL

## 2024-04-24 ENCOUNTER — CLINICAL SUPPORT (OUTPATIENT)
Dept: REHABILITATION | Facility: HOSPITAL | Age: 64
End: 2024-04-24
Payer: COMMERCIAL

## 2024-04-24 DIAGNOSIS — Z09 CHEMOTHERAPY FOLLOW-UP EXAMINATION: ICD-10-CM

## 2024-04-24 DIAGNOSIS — H93.11 TINNITUS OF RIGHT EAR: ICD-10-CM

## 2024-04-24 DIAGNOSIS — C34.92 ADENOCARCINOMA, LUNG, LEFT: ICD-10-CM

## 2024-04-24 PROCEDURE — 97810 ACUP 1/> WO ESTIM 1ST 15 MIN: CPT | Performed by: ACUPUNCTURIST

## 2024-04-24 PROCEDURE — 97811 ACUP 1/> W/O ESTIM EA ADD 15: CPT | Performed by: ACUPUNCTURIST

## 2024-04-24 NOTE — PROGRESS NOTES
"  Acupuncture Evaluation Note     Name: Radha Ervin  Clinic Number: [id number]    Traditional Chinese Medicine (TCM) Diagnosis: Qi Stagnation and Qi Deficiency  Medical Diagnosis:   Encounter Diagnoses   Name Primary?    Adenocarcinoma, lung, left     Chemotherapy follow-up examination     Tinnitus of right ear         Evaluation Date: 4/24/2024    Visit #/Visits authorized: 12, 1/12    Precautions: Diabetes and cancer    Subjective     Chief Concern: Started immunotherapy last Friday (4/19/2024), every 3 weeks for lung cancer. Tinnitus onset January 2024 after finishing chemo, worsened again after having covid in March 2024. High pitch, worse in right ear. Chronic low back pain midline.     Medical necessity is demonstrated by the following IMPAIRMENTS: Medical Necessity: Decreased quality of life              Aggravating Factors:   unknown    Relieving Factors:  unknown    Symptom Description:     Quality:  pressure before tinnitus starts  Severity:  10  Frequency:  every day and at night/ wakes from sleep at night    Previous Treatments Tried:  Medication and allegra - fluid in ear     HEENT:  tinnitus in both ears, worse in right. Occipital headaches would precipitate tinnitus. Vertigo, lightheaded. No nausea no vomiting.     Chest:  SOB with exertion - going up stairs     Digestion:     Diet: in general, an "unhealthy" diet   Fluids: denies use, is drinking moderate amounts of fluids,  occasional   Taste/Appetite: appetite low, no nausea, can have reflux    Symptoms: bloating and incomplete stools, every day     Sleep: trouble falling asleep and staying asleep     Energy Levels:  fatigue sharath in afternoons    Psychological Symptoms:  situation anxiety    Other Symptoms: Surgery August 3rd 2023 to remove 1/4 of left lung.     GYN Symptoms: post-menopause, hx of night sweats    Objective     Observation:     Tongue:  pink/red, red tip, center line crack, thin coating,     Body:     Color:     Coating:  "     Pulse:  cun, dionna excess, chi deep             New Findings:      Treatment     Treatment Principles:  Move qi, nourish qi, transform damp, stop pain     Acupuncture points used:      Bilateral points: TW17, 4 [place], TW5, GB41, SP6, SP9, KD6, LU7, KD3, ST36, GB34, LI11  Unilateral points:  Auricular Treatment:      Needles In: 26  Needles Out: 26  Needles W/O STIM placed: 1:30  Needles W/O STIM removed: 1:50      Other Traditional Chinese Medicine Modalities - [facility]  Heat lamp    Assessment     After treatment, patient felt good, relaxed     Patient prognosis is Good.     Patient will continue to benefit from acupuncture treatment to address the deficits listed in the problem list box on initial evaluation, provide patient family education and to maximize pt's level of independence in the home and community environment.     Patient's spiritual, cultural and educational needs considered and pt agreeable to plan of care and goals.     Anticipated barriers to treatment: none    Plan     Recommend 1 /week for 5 sessions then re-assess.      Education:  Patient is aware of cumulative benefit of acupuncture

## 2024-04-24 NOTE — PROGRESS NOTES
PATIENT: Radha Ervin  MRN: [id number]  DATE: 4/18/2024      Subjective:     Chief complaint:  Chief Complaint   Patient presents with    Lung Cancer    Follow-up       Oncologic History:      Ms. Ervin is a 63-year-old female with 30 pack-year history of smoking, quit approximately 4 months ago, who was recently diagnosed with left lung cancer after evaluation for an abnormal chest x-ray.  A subsequent CT of the chest without contrast on May 26, 2023 revealed a spiculated nodule along the paramediastinal left upper lobe measuring 2.7 x 1.9 cm.  A mildly enlarged right paratracheal lymph node was seen measuring 1.1 cm.  She underwent EBUS and biopsy on June 23, 2023.  Pathology revealed the left upper lobe lesion positive for adenocarcinoma.  Station 4R was negative for malignancy.       A PET scan on July 13, 2023 revealed the left upper lobe mass measuring 2.6 x 1.6 cm with SUV max 7.4.  There was mildly metabolic prevascular mediastinal lymph node measuring 0.9 cm seen in short axis with SUV max 2.3.  She underwent robotic left upper lobectomy and lymph node sampling on August 3, 2023.  Pathology revealed invasive poorly differentiated adenocarcinoma measuring 2.1 cm.  There was visceral pleural invasion noted.  Vascular resection revealed invasive tumor present in the adventitial soft tissue.  There was lymphovascular invasion noted.  Invasive carcinoma is present at the vascular margin.  3/4 level 6 lymph nodes, 1/2 level 11 lymph nodes and 1/4 level 12 lymph nodes were involved out of a total of 23 lymph nodes.  Surgery was complicated by intraoperative bleeding of pulmonary arterial branches which resolved with pressure.      Her case was discussed at [facility] on 8/24/23: Recommend chemoRT followed by immunotherapy.     Established care with med onc 8/28. She had seen Dr. Quevedo who had recommended sequential chemoradiation then to be followed by immunotherapy. Consented for sequential CRT with  carbo/taxol.    On 9/22/23 she presented to the infusion center for carbo/taxol C#1 but developed infusion reaction to taxol so that was stopped.  Had reaction with second infusion as well--carbo/taxol discontinued.  Consented for carbo/pemetrexed on 11/2/23    C#1 carbo/pemetrexed 11/3/23  C#2 carbo/pemetrexed 11/24/23  C#3 acute worsening of renal function--pemetrexed held: 12/15/23  C#4 carboplatin only 1/5/24         Interval History: Ms. Ervin returns for follow up. She has now completed radiation. She is noting tinnitus. Discussed acupuncture referral--she would like to try that. No other significant complaints. Answered questions related to immunotherapy regimen that is to begin tomorrow.       Review of Systems   A comprehensive review of systems was performed; pertinent positives and negatives are noted in the HPI.        ECOG Performance Status:   ECOG SCORE    1 - Restricted in strenuous activity-ambulatory and able to carry out work of a light nature         Objective:      Vitals:   Vitals:    04/18/24 1456   BP: 118/77   BP Location: Right arm   Patient Position: Sitting   BP Method: Medium (Automatic)   Pulse: 104   SpO2: 97%   Weight: 83.6 kg (184 lb 4.9 oz)     BMI: Body mass index is 37.22 kg/m².      Physical Exam:   Physical Exam  Vitals and nursing note reviewed.   Constitutional:       General: She is not in acute distress.     Appearance: Normal appearance. She is not toxic-appearing.   HENT:      Head: Normocephalic and atraumatic.   Eyes:      General: No scleral icterus.     Conjunctiva/sclera: Conjunctivae normal.   Cardiovascular:      Rate and Rhythm: Normal rate.   Pulmonary:      Effort: Pulmonary effort is normal. No respiratory distress.   Abdominal:      General: There is no distension.   Musculoskeletal:         General: No swelling or deformity.      Cervical back: Neck supple.   Skin:     Coloration: Skin is not jaundiced.      Findings: No erythema.   Neurological:       General: No focal deficit present.      Mental Status: She is alert and oriented to person, place, and time.      Motor: No weakness.   Psychiatric:         Mood and Affect: Mood normal.         Behavior: Behavior normal.           Laboratory Data:  WBC   Date Value Ref Range Status   04/17/2024 9.85 3.90 - 12.70 K/uL Final     Hemoglobin   Date Value Ref Range Status   04/17/2024 10.9 (L) 12.0 - 16.0 g/dL Final     POC Hematocrit   Date Value Ref Range Status   08/03/2023 34 (L) 36 - 54 %PCV Final     Hematocrit   Date Value Ref Range Status   04/17/2024 32.8 (L) 37.0 - 48.5 % Final     Platelets   Date Value Ref Range Status   04/17/2024 320 150 - 450 K/uL Final     Gran # (ANC)   Date Value Ref Range Status   04/17/2024 6.0 1.8 - 7.7 K/uL Final     Gran %   Date Value Ref Range Status   04/17/2024 60.7 38.0 - 73.0 % Final       Chemistry        Component Value Date/Time     04/17/2024 0755    K 4.4 04/17/2024 0755     04/17/2024 0755    CO2 21 (L) 04/17/2024 0755    BUN 29 (H) 04/17/2024 0755    BUN 12.0 04/28/2023 1207    CREATININE 1.6 (H) 04/17/2024 0755     (H) 04/17/2024 0755        Component Value Date/Time    CALCIUM 9.7 04/17/2024 0755    ALKPHOS 94 04/17/2024 0755    AST 11 04/17/2024 0755    ALT 9 (L) 04/17/2024 0755    BILITOT 0.4 04/17/2024 0755    ESTGFRAFRICA >60.0 04/06/2020 1002    EGFRNONAA >60.0 04/06/2020 1002              Assessment/Plan:     1. Adenocarcinoma, lung, left    2. Stage 3a chronic kidney disease    3. Chemotherapy follow-up examination    4. Tinnitus of right ear      Proceed with C#1 durva tomorrow.    Med and Orders:  Orders Placed This Encounter    Acupuncture    TSH    Comprehensive Metabolic Panel    CBC Auto Differential       Follow Up:  Follow up in about 4 weeks (around 5/16/2024).      Above care plan was discussed with patient and all questions were addressed to their expressed satisfaction.       Roger Eduardo MD, FACP  Hematology & Medical  Oncology  [facility]         High Risk Conditions:    Patient has a condition that poses threat to life and bodily function: NSCLC  Patient is currently on drug therapy requiring intensive monitoring for toxicity: durvalumab

## 2024-04-25 ENCOUNTER — TELEPHONE (OUTPATIENT)
Dept: RADIATION ONCOLOGY | Facility: CLINIC | Age: 64
End: 2024-04-25
Payer: COMMERCIAL

## 2024-04-25 NOTE — TELEPHONE ENCOUNTER
Call to pt to reschedule f/u appt with Dr Quevedo 5/21/24. MD will be out of the office that day. Appt rescheduled to 5/28/24 at 9:00 AM.Pt verbalized understanding.

## 2024-05-01 ENCOUNTER — CLINICAL SUPPORT (OUTPATIENT)
Dept: REHABILITATION | Facility: HOSPITAL | Age: 64
End: 2024-05-01
Payer: COMMERCIAL

## 2024-05-01 DIAGNOSIS — Z09 CHEMOTHERAPY FOLLOW-UP EXAMINATION: ICD-10-CM

## 2024-05-01 DIAGNOSIS — H93.11 TINNITUS OF RIGHT EAR: ICD-10-CM

## 2024-05-01 DIAGNOSIS — C34.92 ADENOCARCINOMA, LUNG, LEFT: Primary | ICD-10-CM

## 2024-05-01 PROCEDURE — 97811 ACUP 1/> W/O ESTIM EA ADD 15: CPT | Performed by: ACUPUNCTURIST

## 2024-05-01 PROCEDURE — 97810 ACUP 1/> WO ESTIM 1ST 15 MIN: CPT | Performed by: ACUPUNCTURIST

## 2024-05-01 NOTE — PROGRESS NOTES
Acupuncture Evaluation Note     Name: Radha Ervin  [facility] Number: [id number]    Traditional Chinese Medicine (TCM) Diagnosis: Qi Stagnation and Qi Deficiency  Medical Diagnosis:   Encounter Diagnoses   Name Primary?    Adenocarcinoma, lung, left Yes    Chemotherapy follow-up examination     Tinnitus of right ear         Evaluation Date: 5/1/2024    Visit #/Visits authorized: 12, 2/12    Precautions: Diabetes and cancer    Subjective     Chief Concern: Started immunotherapy last Friday (4/19/2024), every 3 weeks for lung cancer. Tinnitus onset January 2024 after finishing chemo, worsened again after having covid in March 2024. High pitch, worse in right ear. Chronic low back pain midline.     Medical necessity is demonstrated by the following IMPAIRMENTS: Medical Necessity: Decreased quality of life              Aggravating Factors:   unknown    Relieving Factors:  unknown    Symptom Description:     Quality:  pressure before tinnitus starts  Severity:  10  Frequency:  every day and at night/ wakes from sleep at night      Objective       New Findings:      Treatment     Treatment Principles:  Move qi, nourish qi, transform damp, stop pain     Acupuncture points used:    Yintang, DU20  Bilateral points: TW17, ST25  Unilateral points: BL62, GB40, GB41, ST36, GB34, SP9, SP6, KD3, KD6, LV3, SI3, TW3, LI4, TW5, LI11, LU7, PC6, HT7, LU5, CV12, CV6  Auricular Treatment:      Needles In: 27  Needles Out: 27  Needles W/O STIM placed: 1:20  Needles W/O STIM removed: 1:50      Assessment     After treatment, patient felt tinnitus stopped for a couple days following treatment, when it returned it was lower in volume. Dizziness/vertigo symptoms lessened.     Patient prognosis is Good.     Patient will continue to benefit from acupuncture treatment to address the deficits listed in the problem list box on initial evaluation, provide patient family education and to maximize pt's level of independence in the home and community  environment.     Patient's spiritual, cultural and educational needs considered and pt agreeable to plan of care and goals.     Anticipated barriers to treatment: none    Plan     Recommend 1 /week for 4 sessions then re-assess.      Education:  Patient is aware of cumulative benefit of acupuncture

## 2024-05-04 ENCOUNTER — PATIENT MESSAGE (OUTPATIENT)
Dept: DERMATOLOGY | Facility: CLINIC | Age: 64
End: 2024-05-04
Payer: COMMERCIAL

## 2024-05-04 ENCOUNTER — PATIENT MESSAGE (OUTPATIENT)
Dept: INTERNAL MEDICINE | Facility: CLINIC | Age: 64
End: 2024-05-04
Payer: COMMERCIAL

## 2024-05-08 ENCOUNTER — CLINICAL SUPPORT (OUTPATIENT)
Dept: REHABILITATION | Facility: HOSPITAL | Age: 64
End: 2024-05-08
Payer: COMMERCIAL

## 2024-05-08 ENCOUNTER — PATIENT MESSAGE (OUTPATIENT)
Dept: REHABILITATION | Facility: HOSPITAL | Age: 64
End: 2024-05-08

## 2024-05-08 DIAGNOSIS — H93.11 TINNITUS OF RIGHT EAR: ICD-10-CM

## 2024-05-08 DIAGNOSIS — C34.92 ADENOCARCINOMA, LUNG, LEFT: Primary | ICD-10-CM

## 2024-05-08 DIAGNOSIS — Z09 CHEMOTHERAPY FOLLOW-UP EXAMINATION: ICD-10-CM

## 2024-05-08 PROCEDURE — 97810 ACUP 1/> WO ESTIM 1ST 15 MIN: CPT | Performed by: ACUPUNCTURIST

## 2024-05-08 PROCEDURE — 97811 ACUP 1/> W/O ESTIM EA ADD 15: CPT | Performed by: ACUPUNCTURIST

## 2024-05-10 NOTE — PROGRESS NOTES
Acupuncture Evaluation Note     Name: Radha Ervin  [facility] Number: [id number]    Traditional Chinese Medicine (TCM) Diagnosis: Qi Stagnation and Qi Deficiency  Medical Diagnosis:   Encounter Diagnoses   Name Primary?    Adenocarcinoma, lung, left Yes    Chemotherapy follow-up examination     Tinnitus of right ear         Evaluation Date: 5/10/2024    Visit #/Visits authorized: 12, 3/12    Precautions: Diabetes and cancer    Subjective     Chief Concern: Started immunotherapy last Friday (4/19/2024), every 3 weeks for lung cancer. Tinnitus onset January 2024 after finishing chemo, worsened again after having covid in March 2024. High pitch, worse in right ear. Chronic low back pain midline.     Medical necessity is demonstrated by the following IMPAIRMENTS: Medical Necessity: Decreased quality of life              Aggravating Factors:   unknown    Relieving Factors:  unknown    Symptom Description:     Quality:  pressure before tinnitus starts  Severity:  10  Frequency:  every day and at night/ wakes from sleep at night      Objective       New Findings:      Treatment     Treatment Principles:  Move qi, nourish qi, transform damp, stop pain     Acupuncture points used:    Yintang, DU20, 4 [place]  Bilateral points: TW17, GB41, TW5, SP9, SP6, ST36, GB34, KD3  Unilateral points:   Auricular Treatment:      Needles In: 22  Needles Out: 22  Needles W/O STIM placed: 4:20  Needles W/O STIM removed: 4:50      Assessment     After treatment, patient felt tinnitus stopped for a couple days following treatment, when it returned it was lower in volume. Intermittent now, not constant. Dizziness/vertigo symptoms lessened.     Patient prognosis is Good.     Patient will continue to benefit from acupuncture treatment to address the deficits listed in the problem list box on initial evaluation, provide patient family education and to maximize pt's level of independence in the home and community environment.     Patient's  spiritual, cultural and educational needs considered and pt agreeable to plan of care and goals.     Anticipated barriers to treatment: none    Plan     Recommend 1 /week for 3 sessions then re-assess.      Education:  Patient is aware of cumulative benefit of acupuncture

## 2024-05-16 ENCOUNTER — LAB VISIT (OUTPATIENT)
Dept: LAB | Facility: HOSPITAL | Age: 64
End: 2024-05-16
Attending: INTERNAL MEDICINE
Payer: COMMERCIAL

## 2024-05-16 DIAGNOSIS — C34.92 ADENOCARCINOMA OF LEFT LUNG: ICD-10-CM

## 2024-05-16 LAB
ALBUMIN SERPL BCP-MCNC: 3.4 G/DL (ref 3.5–5.2)
ALP SERPL-CCNC: 115 U/L (ref 55–135)
ALT SERPL W/O P-5'-P-CCNC: 22 U/L (ref 10–44)
ANION GAP SERPL CALC-SCNC: 11 MMOL/L (ref 8–16)
AST SERPL-CCNC: 17 U/L (ref 10–40)
BASOPHILS # BLD AUTO: 0.09 K/UL (ref 0–0.2)
BASOPHILS NFR BLD: 0.8 % (ref 0–1.9)
BILIRUB SERPL-MCNC: 0.4 MG/DL (ref 0.1–1)
BUN SERPL-MCNC: 16 MG/DL (ref 8–23)
CALCIUM SERPL-MCNC: 9.6 MG/DL (ref 8.7–10.5)
CHLORIDE SERPL-SCNC: 109 MMOL/L (ref 95–110)
CO2 SERPL-SCNC: 20 MMOL/L (ref 23–29)
CREAT SERPL-MCNC: 1.3 MG/DL (ref 0.5–1.4)
DIFFERENTIAL METHOD BLD: ABNORMAL
EOSINOPHIL # BLD AUTO: 1.2 K/UL (ref 0–0.5)
EOSINOPHIL NFR BLD: 10 % (ref 0–8)
ERYTHROCYTE [DISTWIDTH] IN BLOOD BY AUTOMATED COUNT: 13.6 % (ref 11.5–14.5)
EST. GFR  (NO RACE VARIABLE): 46 ML/MIN/1.73 M^2
GLUCOSE SERPL-MCNC: 121 MG/DL (ref 70–110)
HCT VFR BLD AUTO: 34.9 % (ref 37–48.5)
HGB BLD-MCNC: 11.6 G/DL (ref 12–16)
IMM GRANULOCYTES # BLD AUTO: 0.05 K/UL (ref 0–0.04)
IMM GRANULOCYTES NFR BLD AUTO: 0.4 % (ref 0–0.5)
LYMPHOCYTES # BLD AUTO: 1.9 K/UL (ref 1–4.8)
LYMPHOCYTES NFR BLD: 15.7 % (ref 18–48)
MCH RBC QN AUTO: 32.9 PG (ref 27–31)
MCHC RBC AUTO-ENTMCNC: 33.2 G/DL (ref 32–36)
MCV RBC AUTO: 99 FL (ref 82–98)
MONOCYTES # BLD AUTO: 0.9 K/UL (ref 0.3–1)
MONOCYTES NFR BLD: 7.6 % (ref 4–15)
NEUTROPHILS # BLD AUTO: 7.8 K/UL (ref 1.8–7.7)
NEUTROPHILS NFR BLD: 65.5 % (ref 38–73)
NRBC BLD-RTO: 0 /100 WBC
PLATELET # BLD AUTO: 337 K/UL (ref 150–450)
PMV BLD AUTO: 9.1 FL (ref 9.2–12.9)
POTASSIUM SERPL-SCNC: 4.5 MMOL/L (ref 3.5–5.1)
PROT SERPL-MCNC: 7.6 G/DL (ref 6–8.4)
RBC # BLD AUTO: 3.53 M/UL (ref 4–5.4)
SODIUM SERPL-SCNC: 140 MMOL/L (ref 136–145)
TSH SERPL DL<=0.005 MIU/L-ACNC: 1.59 UIU/ML (ref 0.4–4)
WBC # BLD AUTO: 11.83 K/UL (ref 3.9–12.7)

## 2024-05-16 PROCEDURE — 80053 COMPREHEN METABOLIC PANEL: CPT | Performed by: INTERNAL MEDICINE

## 2024-05-16 PROCEDURE — 36415 COLL VENOUS BLD VENIPUNCTURE: CPT | Performed by: INTERNAL MEDICINE

## 2024-05-16 PROCEDURE — 84443 ASSAY THYROID STIM HORMONE: CPT | Performed by: INTERNAL MEDICINE

## 2024-05-16 PROCEDURE — 85025 COMPLETE CBC W/AUTO DIFF WBC: CPT | Performed by: INTERNAL MEDICINE

## 2024-05-17 ENCOUNTER — OFFICE VISIT (OUTPATIENT)
Dept: HEMATOLOGY/ONCOLOGY | Facility: CLINIC | Age: 64
End: 2024-05-17
Payer: COMMERCIAL

## 2024-05-17 ENCOUNTER — INFUSION (OUTPATIENT)
Dept: INFUSION THERAPY | Facility: HOSPITAL | Age: 64
End: 2024-05-17
Attending: INTERNAL MEDICINE
Payer: COMMERCIAL

## 2024-05-17 VITALS
RESPIRATION RATE: 18 BRPM | HEIGHT: 59 IN | TEMPERATURE: 98 F | SYSTOLIC BLOOD PRESSURE: 125 MMHG | BODY MASS INDEX: 38.13 KG/M2 | OXYGEN SATURATION: 97 % | WEIGHT: 189.13 LBS | RESPIRATION RATE: 18 BRPM | DIASTOLIC BLOOD PRESSURE: 70 MMHG | OXYGEN SATURATION: 97 % | BODY MASS INDEX: 38.13 KG/M2 | HEIGHT: 59 IN | DIASTOLIC BLOOD PRESSURE: 70 MMHG | HEART RATE: 96 BPM | HEART RATE: 96 BPM | WEIGHT: 189.13 LBS | SYSTOLIC BLOOD PRESSURE: 125 MMHG | TEMPERATURE: 98 F

## 2024-05-17 DIAGNOSIS — J43.2 CENTRILOBULAR EMPHYSEMA: ICD-10-CM

## 2024-05-17 DIAGNOSIS — C34.92 ADENOCARCINOMA, LUNG, LEFT: Primary | ICD-10-CM

## 2024-05-17 DIAGNOSIS — R42 DIZZINESS: ICD-10-CM

## 2024-05-17 DIAGNOSIS — Z51.12 ENCOUNTER FOR ANTINEOPLASTIC IMMUNOTHERAPY: ICD-10-CM

## 2024-05-17 DIAGNOSIS — N18.31 STAGE 3A CHRONIC KIDNEY DISEASE: ICD-10-CM

## 2024-05-17 PROCEDURE — 63600175 PHARM REV CODE 636 W HCPCS: Mod: JZ,JG | Performed by: INTERNAL MEDICINE

## 2024-05-17 PROCEDURE — A4216 STERILE WATER/SALINE, 10 ML: HCPCS | Performed by: INTERNAL MEDICINE

## 2024-05-17 PROCEDURE — 3078F DIAST BP <80 MM HG: CPT | Mod: CPTII,S$GLB,, | Performed by: INTERNAL MEDICINE

## 2024-05-17 PROCEDURE — 99215 OFFICE O/P EST HI 40 MIN: CPT | Mod: S$GLB,,, | Performed by: INTERNAL MEDICINE

## 2024-05-17 PROCEDURE — 3008F BODY MASS INDEX DOCD: CPT | Mod: CPTII,S$GLB,, | Performed by: INTERNAL MEDICINE

## 2024-05-17 PROCEDURE — 3074F SYST BP LT 130 MM HG: CPT | Mod: CPTII,S$GLB,, | Performed by: INTERNAL MEDICINE

## 2024-05-17 PROCEDURE — 99999 PR PBB SHADOW E&M-EST. PATIENT-LVL IV: CPT | Mod: PBBFAC,,, | Performed by: INTERNAL MEDICINE

## 2024-05-17 PROCEDURE — 3066F NEPHROPATHY DOC TX: CPT | Mod: CPTII,S$GLB,, | Performed by: INTERNAL MEDICINE

## 2024-05-17 PROCEDURE — 96413 CHEMO IV INFUSION 1 HR: CPT

## 2024-05-17 PROCEDURE — 25000003 PHARM REV CODE 250: Performed by: INTERNAL MEDICINE

## 2024-05-17 RX ORDER — SODIUM CHLORIDE 0.9 % (FLUSH) 0.9 %
10 SYRINGE (ML) INJECTION
Status: DISCONTINUED | OUTPATIENT
Start: 2024-05-17 | End: 2024-05-17 | Stop reason: HOSPADM

## 2024-05-17 RX ORDER — EPINEPHRINE 0.3 MG/.3ML
0.3 INJECTION SUBCUTANEOUS ONCE AS NEEDED
Status: DISCONTINUED | OUTPATIENT
Start: 2024-05-17 | End: 2024-05-17 | Stop reason: HOSPADM

## 2024-05-17 RX ORDER — DIPHENHYDRAMINE HYDROCHLORIDE 50 MG/ML
50 INJECTION INTRAMUSCULAR; INTRAVENOUS ONCE AS NEEDED
Status: DISCONTINUED | OUTPATIENT
Start: 2024-05-17 | End: 2024-05-17 | Stop reason: HOSPADM

## 2024-05-17 RX ORDER — HEPARIN 100 UNIT/ML
500 SYRINGE INTRAVENOUS
Status: CANCELLED | OUTPATIENT
Start: 2024-05-17

## 2024-05-17 RX ORDER — EPINEPHRINE 0.3 MG/.3ML
0.3 INJECTION SUBCUTANEOUS ONCE AS NEEDED
Status: CANCELLED | OUTPATIENT
Start: 2024-05-17

## 2024-05-17 RX ORDER — SODIUM CHLORIDE 0.9 % (FLUSH) 0.9 %
10 SYRINGE (ML) INJECTION
Status: CANCELLED | OUTPATIENT
Start: 2024-05-17

## 2024-05-17 RX ORDER — DIPHENHYDRAMINE HYDROCHLORIDE 50 MG/ML
50 INJECTION INTRAMUSCULAR; INTRAVENOUS ONCE AS NEEDED
Status: CANCELLED | OUTPATIENT
Start: 2024-05-17

## 2024-05-17 RX ADMIN — SODIUM CHLORIDE 1500 MG: 9 INJECTION, SOLUTION INTRAVENOUS at 09:05

## 2024-05-17 RX ADMIN — SODIUM CHLORIDE: 9 INJECTION, SOLUTION INTRAVENOUS at 08:05

## 2024-05-17 RX ADMIN — Medication 10 ML: at 10:05

## 2024-05-17 NOTE — PLAN OF CARE
Imfinzi given through PIV, noted for having positive blood return.  Dosage and BSA checked by two chemotherapy certified nurses.  Chemotherapeutic precautions in place throughout therapy. Pt tolerated it well, no adverse reactions noted.  Next appointment scheduled.  AVS given. Discharged with no acute distress.

## 2024-05-17 NOTE — TELEPHONE ENCOUNTER
No care due was identified.  [facility] Embedded Care Due Messages. Reference number: [id number].   5/17/2024 4:48:19 PM CDT

## 2024-05-17 NOTE — PROGRESS NOTES
PATIENT: Radha Ervin  MRN: [id number]  DATE: 5/17/2024      Subjective:     Chief complaint:  Chief Complaint   Patient presents with    Lung Cancer    Follow-up       Oncologic History:      Ms. Ervin is a 63-year-old female with 30 pack-year history of smoking, quit approximately 4 months ago, who was recently diagnosed with left lung cancer after evaluation for an abnormal chest x-ray.  A subsequent CT of the chest without contrast on May 26, 2023 revealed a spiculated nodule along the paramediastinal left upper lobe measuring 2.7 x 1.9 cm.  A mildly enlarged right paratracheal lymph node was seen measuring 1.1 cm.  She underwent EBUS and biopsy on June 23, 2023.  Pathology revealed the left upper lobe lesion positive for adenocarcinoma.  Station 4R was negative for malignancy.       A PET scan on July 13, 2023 revealed the left upper lobe mass measuring 2.6 x 1.6 cm with SUV max 7.4.  There was mildly metabolic prevascular mediastinal lymph node measuring 0.9 cm seen in short axis with SUV max 2.3.  She underwent robotic left upper lobectomy and lymph node sampling on August 3, 2023.  Pathology revealed invasive poorly differentiated adenocarcinoma measuring 2.1 cm.  There was visceral pleural invasion noted.  Vascular resection revealed invasive tumor present in the adventitial soft tissue.  There was lymphovascular invasion noted.  Invasive carcinoma is present at the vascular margin.  3/4 level 6 lymph nodes, 1/2 level 11 lymph nodes and 1/4 level 12 lymph nodes were involved out of a total of 23 lymph nodes.  Surgery was complicated by intraoperative bleeding of pulmonary arterial branches which resolved with pressure.      Her case was discussed at [facility] on 8/24/23: Recommend chemoRT followed by immunotherapy.     Established care with med onc 8/28. She had seen Dr. Quevedo who had recommended sequential chemoradiation then to be followed by immunotherapy. Consented for sequential CRT with  "carbo/taxol.    On 9/22/23 she presented to the infusion center for carbo/taxol C#1 but developed infusion reaction to taxol so that was stopped.  Had reaction with second infusion as well--carbo/taxol discontinued.  Consented for carbo/pemetrexed on 11/2/23    C#1 carbo/pemetrexed 11/3/23  C#2 carbo/pemetrexed 11/24/23  C#3 acute worsening of renal function--pemetrexed held: 12/15/23  C#4 carboplatin only 1/5/24    Radiation 60Gy in 30Fx 2/5/24--3/26/24    C#1 durvalumab 4/19/24         Interval History: Ms. Ervin returns for follow up. She tolerated C#1 of durvalumab very well. She denies any new symptoms since starting that. Denies SOB, no diarrhea, no new arthralgias. No rashes. Labs look good. Treatment today.       Review of Systems   A comprehensive review of systems was performed; pertinent positives and negatives are noted in the HPI.        ECOG Performance Status:   ECOG SCORE    0 - Fully active-able to carry on all pre-disease performance without restriction         Objective:      Vitals:   Vitals:    05/17/24 0808   BP: 125/70   BP Location: Left arm   Patient Position: Sitting   BP Method: Medium (Automatic)   Pulse: 96   Resp: 18   Temp: 98.2 °F (36.8 °C)   TempSrc: Oral   SpO2: 97%   Weight: 85.8 kg (189 lb 2.5 oz)   Height: 4' 11" (1.499 m)     BMI: Body mass index is 38.2 kg/m².      Physical Exam:   Physical Exam  Vitals and nursing note reviewed.   Constitutional:       General: She is not in acute distress.     Appearance: Normal appearance. She is not toxic-appearing.   HENT:      Head: Normocephalic and atraumatic.   Eyes:      General: No scleral icterus.     Conjunctiva/sclera: Conjunctivae normal.   Cardiovascular:      Rate and Rhythm: Normal rate and regular rhythm.      Heart sounds: Normal heart sounds. No murmur heard.  Pulmonary:      Effort: Pulmonary effort is normal. No respiratory distress.      Breath sounds: Normal breath sounds. No wheezing, rhonchi or rales.   Abdominal: "      General: There is no distension.      Palpations: Abdomen is soft.      Tenderness: There is no abdominal tenderness.   Musculoskeletal:         General: No swelling, tenderness or deformity.      Cervical back: Neck supple. No tenderness.   Skin:     Coloration: Skin is not jaundiced.      Findings: No erythema.   Neurological:      General: No focal deficit present.      Mental Status: She is alert and oriented to person, place, and time.      Motor: No weakness.   Psychiatric:         Mood and Affect: Mood normal.         Behavior: Behavior normal.           Laboratory Data:  WBC   Date Value Ref Range Status   05/16/2024 11.83 3.90 - 12.70 K/uL Final     Hemoglobin   Date Value Ref Range Status   05/16/2024 11.6 (L) 12.0 - 16.0 g/dL Final     POC Hematocrit   Date Value Ref Range Status   08/03/2023 34 (L) 36 - 54 %PCV Final     Hematocrit   Date Value Ref Range Status   05/16/2024 34.9 (L) 37.0 - 48.5 % Final     Platelets   Date Value Ref Range Status   05/16/2024 337 150 - 450 K/uL Final     Gran # (ANC)   Date Value Ref Range Status   05/16/2024 7.8 (H) 1.8 - 7.7 K/uL Final     Gran %   Date Value Ref Range Status   05/16/2024 65.5 38.0 - 73.0 % Final       Chemistry        Component Value Date/Time     05/16/2024 0909    K 4.5 05/16/2024 0909     05/16/2024 0909    CO2 20 (L) 05/16/2024 0909    BUN 16 05/16/2024 0909    BUN 12.0 04/28/2023 1207    CREATININE 1.3 05/16/2024 0909     (H) 05/16/2024 0909        Component Value Date/Time    CALCIUM 9.6 05/16/2024 0909    ALKPHOS 115 05/16/2024 0909    AST 17 05/16/2024 0909    ALT 22 05/16/2024 0909    BILITOT 0.4 05/16/2024 0909    ESTGFRAFRICA >60.0 04/06/2020 1002    EGFRNONAA >60.0 04/06/2020 1002              Assessment/Plan:     1. Adenocarcinoma, lung, left    2. Stage 3a chronic kidney disease    3. Encounter for antineoplastic immunotherapy    4. Centrilobular emphysema      Tolerated C#1 adjuvant durvalumab following definitive  CRT very well, no reported toxicities. Proceed with C#2 as scheduled today. Plan for repeat scans in July approx.     Med and Orders:       Follow Up:  Follow up in about 4 weeks (around 6/14/2024).      Above care plan was discussed with patient and all questions were addressed to their expressed satisfaction.       Roger Eduardo MD, FACP  Hematology & Medical Oncology  [facility]         High Risk Conditions:    Patient has a condition that poses threat to life and bodily function: NSCLC  Patient is currently on drug therapy requiring intensive monitoring for toxicity: durva

## 2024-05-20 RX ORDER — MINERAL OIL
180 ENEMA (ML) RECTAL DAILY
Qty: 90 TABLET | Refills: 3 | Status: SHIPPED | OUTPATIENT
Start: 2024-05-20

## 2024-05-20 NOTE — TELEPHONE ENCOUNTER
Refill Routing Note   Medication(s) are not appropriate for processing by [facility] for the following reason(s):        No active prescription written by provider: not yet signed by PCP    [facility] action(s):  Defer      Medication Therapy Plan:         Appointments  past 12m or future 3m with PCP    Date Provider   Last Visit   3/6/2024 Suraj Herrera III, MD   Next Visit   9/6/2024 Suraj Herrera III, MD   ED visits in past 90 days: 0        Note composed:10:34 AM 05/20/2024

## 2024-05-21 ENCOUNTER — TELEPHONE (OUTPATIENT)
Dept: DERMATOLOGY | Facility: CLINIC | Age: 64
End: 2024-05-21
Payer: COMMERCIAL

## 2024-05-21 NOTE — TELEPHONE ENCOUNTER
----- Message from Kathe Quan MD sent at 4/18/2024  1:28 PM CDT -----   clinic (early) August 2024

## 2024-05-22 ENCOUNTER — CLINICAL SUPPORT (OUTPATIENT)
Dept: REHABILITATION | Facility: HOSPITAL | Age: 64
End: 2024-05-22
Payer: COMMERCIAL

## 2024-05-22 DIAGNOSIS — C34.92 ADENOCARCINOMA, LUNG, LEFT: Primary | ICD-10-CM

## 2024-05-22 DIAGNOSIS — H93.11 TINNITUS OF RIGHT EAR: ICD-10-CM

## 2024-05-22 DIAGNOSIS — Z09 CHEMOTHERAPY FOLLOW-UP EXAMINATION: ICD-10-CM

## 2024-05-22 PROCEDURE — 97810 ACUP 1/> WO ESTIM 1ST 15 MIN: CPT | Performed by: ACUPUNCTURIST

## 2024-05-22 PROCEDURE — 97811 ACUP 1/> W/O ESTIM EA ADD 15: CPT | Performed by: ACUPUNCTURIST

## 2024-05-23 NOTE — PROGRESS NOTES
Acupuncture Evaluation Note     Name: Radha Ervin  [facility] Number: [id number]    Traditional Chinese Medicine (TCM) Diagnosis: Qi Stagnation and Qi Deficiency  Medical Diagnosis:   Encounter Diagnoses   Name Primary?    Adenocarcinoma, lung, left Yes    Chemotherapy follow-up examination     Tinnitus of right ear         Evaluation Date: 5/23/2024    Visit #/Visits authorized: 12, 4/12    Precautions: Diabetes and cancer    Subjective     Chief Concern: Started immunotherapy 4/19/2024, every 3 weeks for lung cancer. Tinnitus onset January 2024 after finishing chemo, worsened again after having covid in March 2024. High pitch, worse in right ear. Chronic low back pain midline.     Medical necessity is demonstrated by the following IMPAIRMENTS: Medical Necessity: Decreased quality of life              Aggravating Factors:   unknown    Relieving Factors:  unknown    Symptom Description:     Quality:  pressure before tinnitus starts  Severity:  10  Frequency:  every day and at night/ wakes from sleep at night      Objective       New Findings:  tinnitus is present but is intermittent now, not constant, and volume has decreased    Treatment     Treatment Principles:  Move qi, nourish qi, transform damp, stop pain     Acupuncture points used:    Yintang, DU20, 4 [place]  Bilateral points: TW17, GB41, TW5, SP9, SP6, LV7, SP10, ST36, GB34, KD3, LI11, SI19  Unilateral points:   Auricular Treatment:      Needles In: 30  Needles Out: 30  Needles W/O STIM placed: 1:20  Needles W/O STIM removed: 1:50      Assessment     After treatment, patient felt tinnitus stopped for a couple days following 1st treatment, when it returned it was lower in volume. Intermittent now, not constant. Dizziness/vertigo symptoms lessened.     Patient prognosis is Good.     Patient will continue to benefit from acupuncture treatment to address the deficits listed in the problem list box on initial evaluation, provide patient family education and to  maximize pt's level of independence in the home and community environment.     Patient's spiritual, cultural and educational needs considered and pt agreeable to plan of care and goals.     Anticipated barriers to treatment: none    Plan     Recommend 1 /week for 2 sessions then re-assess.      Education:  Patient is aware of cumulative benefit of acupuncture

## 2024-05-24 NOTE — PROGRESS NOTES
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 Ervin is a 63-year-old female who completed adjuvant radiation to the lung as above who returns for routine follow-up.    She has 30 pack-year history of smoking and was diagnosed with left lung cancer after evaluation for an abnormal chest x-ray. A subsequent CT of the chest without contrast on May 26, 2023 revealed a spiculated nodule along the paramediastinal left upper lobe measuring 2.7 x 1.9 cm. A mildly enlarged right paratracheal lymph node was seen measuring 1.1 cm. She underwent EBUS and biopsy on June 23, 2023. Pathology revealed the left upper lobe lesion positive for adenocarcinoma. Station 4R was negative for malignancy.     A PET scan on July 13, 2023 revealed the left upper lobe mass measuring 2.6 x 1.6 cm with SUV max 7.4. There was mildly metabolic prevascular mediastinal lymph node measuring 0.9 cm seen in short axis with SUV max 2.3. She underwent robotic left upper lobectomy and lymph node sampling on August 3, 2023. Pathology revealed invasive poorly differentiated adenocarcinoma measuring 2.1 cm. There was visceral pleural invasion noted. Vascular resection revealed invasive tumor present in the adventitial soft tissue. There was lymphovascular invasion noted. Invasive carcinoma is present at the vascular margin. 3/4 level 6 lymph nodes, 1/2 level 11 lymph nodes and 1/4 level 12 lymph nodes were involved out of a total of 23 lymph nodes. Surgery was complicated by intraoperative bleeding of pulmonary arterial branches which resolved with pressure. An MRI of the brain is scheduled on 8/30/2023. After discussion in the multidisciplinary thoracic  "conference, she received adjuvant chemotherapy followed by adjuvant radiation as above.  Of note, she was diagnosed with COVID infection towards the end of treatment.  She returns for routine follow-up.    Since completion of radiation, she has started durvalumab which she is tolerating without any unexpected side effects.  She was recovered from COVID suspected.  At present, she reports occasional cough without hemoptysis. She denies chest pain or change in shortness of breath. She continues to have tinnitus secondary to chemotherapy. She denies fever, night sweats, or weight loss.     PHYSICAL EXAMINATION:  Vitals:    05/28/24 0843   BP: 120/83   Pulse: 98   SpO2: (!) 94%   Weight: 86.8 kg (191 lb 5.8 oz)   Height: 4' 11" (1.499 m)   Body mass index is 38.65 kg/m².   GENERAL: Patient is alert and oriented, in no acute distress.  HEENT: Extraocular muscles are intact.  Oropharynx is clear without lesions.  There is no cervical or supraclavicular adenopathy palpated.    CHEST: Breath sounds clear bilaterally.  No rales.  No rhonchi.  Unlabored respirations.  CARDIOVASCULAR: Normal S1, S2.  Normal rate.  Regular rhythm.  BREAST EXAM: ABDOMEN: Bowel sounds normal.  No tenderness.  No abdominal distention.  No hepatomegaly.  No splenomegaly.  EXTREMITIES: No clubbing, cyanosis, edema  NEUROLOGIC: Cranial nerves II through XII are grossly intact.  Sensation is intact.  Strength is 5 out of 5 in the upper and lower extremities bilaterally.    ASSESSMENT:   This is a 63-year-old female with pT1c N2 M0, poorly differentiated adenocarcinoma of the left upper lobe who underwent robotic left upper lobectomy and mediastinal lymph node sampling on August 3, 2023 with a 2.1 cm lesion with positive vascular margin, and level 6, 11, and 12 lymph nodes positive for malignancy (5/23).    PLAN:   Ms. Ervin is recovering from radiation without any unexpected side effects.  She will continue durvalumab as planned.  She will repeat " imaging in July 2024 per Dr. Eduardo.  I plan to see her back as needed, unless symptoms warrant otherwise.

## 2024-05-27 ENCOUNTER — PATIENT MESSAGE (OUTPATIENT)
Dept: REHABILITATION | Facility: HOSPITAL | Age: 64
End: 2024-05-27
Payer: COMMERCIAL

## 2024-05-28 ENCOUNTER — PATIENT MESSAGE (OUTPATIENT)
Dept: REHABILITATION | Facility: HOSPITAL | Age: 64
End: 2024-05-28
Payer: COMMERCIAL

## 2024-05-28 ENCOUNTER — OFFICE VISIT (OUTPATIENT)
Dept: RADIATION ONCOLOGY | Facility: CLINIC | Age: 64
End: 2024-05-28
Payer: COMMERCIAL

## 2024-05-28 VITALS
OXYGEN SATURATION: 94 % | DIASTOLIC BLOOD PRESSURE: 83 MMHG | BODY MASS INDEX: 38.58 KG/M2 | HEIGHT: 59 IN | WEIGHT: 191.38 LBS | HEART RATE: 98 BPM | SYSTOLIC BLOOD PRESSURE: 120 MMHG

## 2024-05-28 DIAGNOSIS — C77.1 SECONDARY AND UNSPECIFIED MALIGNANT NEOPLASM OF INTRATHORACIC LYMPH NODES: Primary | ICD-10-CM

## 2024-05-28 DIAGNOSIS — C34.92 ADENOCARCINOMA, LUNG, LEFT: ICD-10-CM

## 2024-05-28 PROCEDURE — 99024 POSTOP FOLLOW-UP VISIT: CPT | Mod: S$GLB,,, | Performed by: RADIOLOGY

## 2024-05-28 PROCEDURE — 3074F SYST BP LT 130 MM HG: CPT | Mod: CPTII,S$GLB,, | Performed by: RADIOLOGY

## 2024-05-28 PROCEDURE — 99999 PR PBB SHADOW E&M-EST. PATIENT-LVL III: CPT | Mod: PBBFAC,,, | Performed by: RADIOLOGY

## 2024-05-28 PROCEDURE — 3079F DIAST BP 80-89 MM HG: CPT | Mod: CPTII,S$GLB,, | Performed by: RADIOLOGY

## 2024-05-28 PROCEDURE — 1159F MED LIST DOCD IN RCRD: CPT | Mod: CPTII,S$GLB,, | Performed by: RADIOLOGY

## 2024-05-28 PROCEDURE — 3008F BODY MASS INDEX DOCD: CPT | Mod: CPTII,S$GLB,, | Performed by: RADIOLOGY

## 2024-05-28 PROCEDURE — 1160F RVW MEDS BY RX/DR IN RCRD: CPT | Mod: CPTII,S$GLB,, | Performed by: RADIOLOGY

## 2024-05-28 PROCEDURE — 3066F NEPHROPATHY DOC TX: CPT | Mod: CPTII,S$GLB,, | Performed by: RADIOLOGY

## 2024-06-05 ENCOUNTER — CLINICAL SUPPORT (OUTPATIENT)
Dept: REHABILITATION | Facility: HOSPITAL | Age: 64
End: 2024-06-05
Payer: COMMERCIAL

## 2024-06-05 DIAGNOSIS — C34.92 ADENOCARCINOMA, LUNG, LEFT: Primary | ICD-10-CM

## 2024-06-05 DIAGNOSIS — Z09 CHEMOTHERAPY FOLLOW-UP EXAMINATION: ICD-10-CM

## 2024-06-05 DIAGNOSIS — H93.11 TINNITUS OF RIGHT EAR: ICD-10-CM

## 2024-06-05 PROCEDURE — 97810 ACUP 1/> WO ESTIM 1ST 15 MIN: CPT | Performed by: ACUPUNCTURIST

## 2024-06-05 PROCEDURE — 97811 ACUP 1/> W/O ESTIM EA ADD 15: CPT | Performed by: ACUPUNCTURIST

## 2024-06-06 ENCOUNTER — PATIENT MESSAGE (OUTPATIENT)
Dept: REHABILITATION | Facility: HOSPITAL | Age: 64
End: 2024-06-06
Payer: COMMERCIAL

## 2024-06-10 ENCOUNTER — PATIENT MESSAGE (OUTPATIENT)
Dept: HEMATOLOGY/ONCOLOGY | Facility: CLINIC | Age: 64
End: 2024-06-10
Payer: COMMERCIAL

## 2024-06-10 DIAGNOSIS — R30.0 DYSURIA: Primary | ICD-10-CM

## 2024-06-10 RX ORDER — SULFAMETHOXAZOLE AND TRIMETHOPRIM 800; 160 MG/1; MG/1
1 TABLET ORAL 2 TIMES DAILY
Qty: 10 TABLET | Refills: 0 | Status: SHIPPED | OUTPATIENT
Start: 2024-06-10 | End: 2024-06-15

## 2024-06-10 NOTE — PROGRESS NOTES
Acupuncture Evaluation Note     Name: Radha Ervin  [facility] Number: [id number]    Traditional Chinese Medicine (TCM) Diagnosis: Qi Stagnation and Qi Deficiency  Medical Diagnosis:   Encounter Diagnoses   Name Primary?    Adenocarcinoma, lung, left Yes    Chemotherapy follow-up examination     Tinnitus of right ear         Evaluation Date: 6/10/2024    Visit #/Visits authorized: 12, 5/12    Precautions: Diabetes and cancer    Subjective     Chief Concern: Started immunotherapy 4/19/2024, every 3 weeks for lung cancer. Tinnitus onset January 2024 after finishing chemo, worsened again after having covid in March 2024. High pitch, worse in right ear. Chronic low back pain midline.     Medical necessity is demonstrated by the following IMPAIRMENTS: Medical Necessity: Decreased quality of life              Aggravating Factors:   unknown    Relieving Factors:  unknown    Symptom Description:     Quality:  pressure before tinnitus starts  Severity:  10  Frequency:  every day and at night/ wakes from sleep at night      Objective       New Findings:  tinnitus is present but is intermittent now, not constant, and volume has decreased    Treatment     Treatment Principles:  Move qi, nourish qi, transform damp, stop pain     Acupuncture points used:    Yintang, DU20, 4 Montgomery  Bilateral points: TW17, GB41, TW5, SP9, SP6, ST36, GB34, KD3, LI11  Unilateral points:   Auricular Treatment:      Needles In: 24  Needles Out: 24  Needles W/O STIM placed: 4:50  Needles W/O STIM removed: 5:20      Assessment     After treatment, patient felt tinnitus stopped for a couple days following 1st treatment, when it returned it was lower in volume. Intermittent now, not constant. Dizziness/vertigo symptoms lessened.     Patient prognosis is Good.     Patient will continue to benefit from acupuncture treatment to address the deficits listed in the problem list box on initial evaluation, provide patient family education and to maximize pt's level  of independence in the home and community environment.     Patient's spiritual, cultural and educational needs considered and pt agreeable to plan of care and goals.     Anticipated barriers to treatment: none    Plan     Recommend 1 /week for 1 sessions then re-assess.      Education:  Patient is aware of cumulative benefit of acupuncture

## 2024-06-13 ENCOUNTER — LAB VISIT (OUTPATIENT)
Dept: LAB | Facility: HOSPITAL | Age: 64
End: 2024-06-13
Attending: INTERNAL MEDICINE
Payer: COMMERCIAL

## 2024-06-13 DIAGNOSIS — N18.31 STAGE 3A CHRONIC KIDNEY DISEASE: ICD-10-CM

## 2024-06-13 DIAGNOSIS — C34.92 ADENOCARCINOMA, LUNG, LEFT: ICD-10-CM

## 2024-06-13 LAB
ALBUMIN SERPL BCP-MCNC: 3.4 G/DL (ref 3.5–5.2)
ALP SERPL-CCNC: 119 U/L (ref 55–135)
ALT SERPL W/O P-5'-P-CCNC: 10 U/L (ref 10–44)
ANION GAP SERPL CALC-SCNC: 13 MMOL/L (ref 8–16)
AST SERPL-CCNC: 12 U/L (ref 10–40)
BASOPHILS # BLD AUTO: 0.06 K/UL (ref 0–0.2)
BASOPHILS NFR BLD: 0.6 % (ref 0–1.9)
BILIRUB SERPL-MCNC: 0.4 MG/DL (ref 0.1–1)
BUN SERPL-MCNC: 20 MG/DL (ref 8–23)
CALCIUM SERPL-MCNC: 9.3 MG/DL (ref 8.7–10.5)
CHLORIDE SERPL-SCNC: 108 MMOL/L (ref 95–110)
CO2 SERPL-SCNC: 17 MMOL/L (ref 23–29)
CREAT SERPL-MCNC: 2 MG/DL (ref 0.5–1.4)
DIFFERENTIAL METHOD BLD: ABNORMAL
EOSINOPHIL # BLD AUTO: 0.7 K/UL (ref 0–0.5)
EOSINOPHIL NFR BLD: 6.5 % (ref 0–8)
ERYTHROCYTE [DISTWIDTH] IN BLOOD BY AUTOMATED COUNT: 14 % (ref 11.5–14.5)
EST. GFR  (NO RACE VARIABLE): 27 ML/MIN/1.73 M^2
GLUCOSE SERPL-MCNC: 111 MG/DL (ref 70–110)
HCT VFR BLD AUTO: 33 % (ref 37–48.5)
HGB BLD-MCNC: 10.8 G/DL (ref 12–16)
IMM GRANULOCYTES # BLD AUTO: 0.04 K/UL (ref 0–0.04)
IMM GRANULOCYTES NFR BLD AUTO: 0.4 % (ref 0–0.5)
LYMPHOCYTES # BLD AUTO: 1.4 K/UL (ref 1–4.8)
LYMPHOCYTES NFR BLD: 12.7 % (ref 18–48)
MCH RBC QN AUTO: 31.7 PG (ref 27–31)
MCHC RBC AUTO-ENTMCNC: 32.7 G/DL (ref 32–36)
MCV RBC AUTO: 97 FL (ref 82–98)
MONOCYTES # BLD AUTO: 0.8 K/UL (ref 0.3–1)
MONOCYTES NFR BLD: 7.2 % (ref 4–15)
NEUTROPHILS # BLD AUTO: 7.9 K/UL (ref 1.8–7.7)
NEUTROPHILS NFR BLD: 72.6 % (ref 38–73)
NRBC BLD-RTO: 0 /100 WBC
PLATELET # BLD AUTO: 297 K/UL (ref 150–450)
PMV BLD AUTO: 9 FL (ref 9.2–12.9)
POTASSIUM SERPL-SCNC: 4.7 MMOL/L (ref 3.5–5.1)
PROT SERPL-MCNC: 7.5 G/DL (ref 6–8.4)
RBC # BLD AUTO: 3.41 M/UL (ref 4–5.4)
SODIUM SERPL-SCNC: 138 MMOL/L (ref 136–145)
TSH SERPL DL<=0.005 MIU/L-ACNC: 1.96 UIU/ML (ref 0.4–4)
WBC # BLD AUTO: 10.87 K/UL (ref 3.9–12.7)

## 2024-06-13 PROCEDURE — 80053 COMPREHEN METABOLIC PANEL: CPT | Performed by: INTERNAL MEDICINE

## 2024-06-13 PROCEDURE — 36415 COLL VENOUS BLD VENIPUNCTURE: CPT | Performed by: INTERNAL MEDICINE

## 2024-06-13 PROCEDURE — 85025 COMPLETE CBC W/AUTO DIFF WBC: CPT | Performed by: INTERNAL MEDICINE

## 2024-06-13 PROCEDURE — 84443 ASSAY THYROID STIM HORMONE: CPT | Performed by: INTERNAL MEDICINE

## 2024-06-14 ENCOUNTER — HOSPITAL ENCOUNTER (OUTPATIENT)
Dept: RADIOLOGY | Facility: HOSPITAL | Age: 64
Discharge: HOME | End: 2024-06-14
Attending: INTERNAL MEDICINE
Payer: COMMERCIAL

## 2024-06-14 ENCOUNTER — INFUSION (OUTPATIENT)
Dept: INFUSION THERAPY | Facility: HOSPITAL | Age: 64
End: 2024-06-14
Attending: INTERNAL MEDICINE
Payer: COMMERCIAL

## 2024-06-14 ENCOUNTER — OFFICE VISIT (OUTPATIENT)
Dept: HEMATOLOGY/ONCOLOGY | Facility: CLINIC | Age: 64
End: 2024-06-14
Payer: COMMERCIAL

## 2024-06-14 VITALS
BODY MASS INDEX: 38.04 KG/M2 | RESPIRATION RATE: 18 BRPM | HEIGHT: 59 IN | DIASTOLIC BLOOD PRESSURE: 74 MMHG | WEIGHT: 188.69 LBS | HEART RATE: 74 BPM | SYSTOLIC BLOOD PRESSURE: 107 MMHG | TEMPERATURE: 99 F | OXYGEN SATURATION: 96 %

## 2024-06-14 VITALS
OXYGEN SATURATION: 96 % | BODY MASS INDEX: 38.04 KG/M2 | TEMPERATURE: 99 F | HEIGHT: 59 IN | SYSTOLIC BLOOD PRESSURE: 107 MMHG | WEIGHT: 188.69 LBS | RESPIRATION RATE: 18 BRPM | HEART RATE: 74 BPM | DIASTOLIC BLOOD PRESSURE: 74 MMHG

## 2024-06-14 DIAGNOSIS — Z51.12 ENCOUNTER FOR ANTINEOPLASTIC IMMUNOTHERAPY: ICD-10-CM

## 2024-06-14 DIAGNOSIS — C34.92 ADENOCARCINOMA, LUNG, LEFT: Primary | ICD-10-CM

## 2024-06-14 DIAGNOSIS — N18.31 STAGE 3A CHRONIC KIDNEY DISEASE: ICD-10-CM

## 2024-06-14 DIAGNOSIS — Z12.31 ENCOUNTER FOR SCREENING MAMMOGRAM FOR BREAST CANCER: ICD-10-CM

## 2024-06-14 PROCEDURE — 63600175 PHARM REV CODE 636 W HCPCS: Mod: JZ,JG | Performed by: INTERNAL MEDICINE

## 2024-06-14 PROCEDURE — 3078F DIAST BP <80 MM HG: CPT | Mod: CPTII,S$GLB,, | Performed by: INTERNAL MEDICINE

## 2024-06-14 PROCEDURE — 3066F NEPHROPATHY DOC TX: CPT | Mod: CPTII,S$GLB,, | Performed by: INTERNAL MEDICINE

## 2024-06-14 PROCEDURE — 25000003 PHARM REV CODE 250: Performed by: INTERNAL MEDICINE

## 2024-06-14 PROCEDURE — 77063 BREAST TOMOSYNTHESIS BI: CPT | Mod: 26,,, | Performed by: RADIOLOGY

## 2024-06-14 PROCEDURE — 3008F BODY MASS INDEX DOCD: CPT | Mod: CPTII,S$GLB,, | Performed by: INTERNAL MEDICINE

## 2024-06-14 PROCEDURE — A4216 STERILE WATER/SALINE, 10 ML: HCPCS | Performed by: INTERNAL MEDICINE

## 2024-06-14 PROCEDURE — 99999 PR PBB SHADOW E&M-EST. PATIENT-LVL IV: CPT | Mod: PBBFAC,,, | Performed by: INTERNAL MEDICINE

## 2024-06-14 PROCEDURE — 77067 SCR MAMMO BI INCL CAD: CPT | Mod: TC

## 2024-06-14 PROCEDURE — 96413 CHEMO IV INFUSION 1 HR: CPT

## 2024-06-14 PROCEDURE — 3074F SYST BP LT 130 MM HG: CPT | Mod: CPTII,S$GLB,, | Performed by: INTERNAL MEDICINE

## 2024-06-14 PROCEDURE — 77067 SCR MAMMO BI INCL CAD: CPT | Mod: 26,,, | Performed by: RADIOLOGY

## 2024-06-14 PROCEDURE — 99215 OFFICE O/P EST HI 40 MIN: CPT | Mod: S$GLB,,, | Performed by: INTERNAL MEDICINE

## 2024-06-14 RX ORDER — DIPHENHYDRAMINE HYDROCHLORIDE 50 MG/ML
50 INJECTION INTRAMUSCULAR; INTRAVENOUS ONCE AS NEEDED
Status: CANCELLED | OUTPATIENT
Start: 2024-06-14

## 2024-06-14 RX ORDER — SODIUM CHLORIDE 0.9 % (FLUSH) 0.9 %
10 SYRINGE (ML) INJECTION
Status: DISCONTINUED | OUTPATIENT
Start: 2024-06-14 | End: 2024-06-14 | Stop reason: HOSPADM

## 2024-06-14 RX ORDER — SODIUM CHLORIDE 0.9 % (FLUSH) 0.9 %
10 SYRINGE (ML) INJECTION
Status: CANCELLED | OUTPATIENT
Start: 2024-06-14

## 2024-06-14 RX ORDER — EPINEPHRINE 0.3 MG/.3ML
0.3 INJECTION SUBCUTANEOUS ONCE AS NEEDED
Status: CANCELLED | OUTPATIENT
Start: 2024-06-14

## 2024-06-14 RX ORDER — HEPARIN 100 UNIT/ML
500 SYRINGE INTRAVENOUS
Status: CANCELLED | OUTPATIENT
Start: 2024-06-14

## 2024-06-14 RX ADMIN — SODIUM CHLORIDE: 9 INJECTION, SOLUTION INTRAVENOUS at 10:06

## 2024-06-14 RX ADMIN — SODIUM CHLORIDE 1500 MG: 9 INJECTION, SOLUTION INTRAVENOUS at 11:06

## 2024-06-14 RX ADMIN — Medication 10 ML: at 12:06

## 2024-06-14 NOTE — PLAN OF CARE
Pt tolerated Imfinzi infusion today. PIV flushed, + blood return, saline locked, and removed prior to discharge. VSS. AVS provided. Discharged off unit without complaints.

## 2024-06-14 NOTE — PROGRESS NOTES
PATIENT: Radha Ervin  MRN: [id number]  DATE: 6/14/2024      Subjective:     Chief complaint:  Chief Complaint   Patient presents with    Lung Cancer    Follow-up       Oncologic History:      Ms. Ervin is a 63-year-old female with 30 pack-year history of smoking, quit approximately 4 months ago, who was recently diagnosed with left lung cancer after evaluation for an abnormal chest x-ray.  A subsequent CT of the chest without contrast on May 26, 2023 revealed a spiculated nodule along the paramediastinal left upper lobe measuring 2.7 x 1.9 cm.  A mildly enlarged right paratracheal lymph node was seen measuring 1.1 cm.  She underwent EBUS and biopsy on June 23, 2023.  Pathology revealed the left upper lobe lesion positive for adenocarcinoma.  Station 4R was negative for malignancy.       A PET scan on July 13, 2023 revealed the left upper lobe mass measuring 2.6 x 1.6 cm with SUV max 7.4.  There was mildly metabolic prevascular mediastinal lymph node measuring 0.9 cm seen in short axis with SUV max 2.3.  She underwent robotic left upper lobectomy and lymph node sampling on August 3, 2023.  Pathology revealed invasive poorly differentiated adenocarcinoma measuring 2.1 cm.  There was visceral pleural invasion noted.  Vascular resection revealed invasive tumor present in the adventitial soft tissue.  There was lymphovascular invasion noted.  Invasive carcinoma is present at the vascular margin.  3/4 level 6 lymph nodes, 1/2 level 11 lymph nodes and 1/4 level 12 lymph nodes were involved out of a total of 23 lymph nodes.  Surgery was complicated by intraoperative bleeding of pulmonary arterial branches which resolved with pressure.      Her case was discussed at [facility] on 8/24/23: Recommend chemoRT followed by immunotherapy.     Established care with med onc 8/28. She had seen Dr. Quevedo who had recommended sequential chemoradiation then to be followed by immunotherapy. Consented for sequential CRT with  "carbo/taxol.    On 9/22/23 she presented to the infusion center for carbo/taxol C#1 but developed infusion reaction to taxol so that was stopped.  Had reaction with second infusion as well--carbo/taxol discontinued.  Consented for carbo/pemetrexed on 11/2/23    C#1 carbo/pemetrexed 11/3/23  C#2 carbo/pemetrexed 11/24/23  C#3 acute worsening of renal function--pemetrexed held: 12/15/23  C#4 carboplatin only 1/5/24    Radiation 60Gy in 30Fx 2/5/24--3/26/24    C#1 durvalumab 4/19/24  C#2 5/17/24  C#3 6/14/24         Interval History: Ms. Ervin returns for follow up while on "maintenance" durvalumab post-CRT. She has tolerated treatment with durvalumab well and denies any signs/symptoms suggestive of IRAE. She notes recent development of dysuria for which she was stared on bactrim. She states the dysuria has resolved.       Review of Systems   A comprehensive review of systems was performed; pertinent positives and negatives are noted in the HPI.        ECOG Performance Status:   ECOG SCORE    1 - Restricted in strenuous activity-ambulatory and able to carry out work of a light nature         Objective:      Vitals:   Vitals:    06/14/24 0911   BP: 107/74   BP Location: Right arm   Patient Position: Sitting   BP Method: Medium (Automatic)   Pulse: 74   Resp: 18   Temp: 98.5 °F (36.9 °C)   TempSrc: Oral   SpO2: 96%   Weight: 85.6 kg (188 lb 11.4 oz)   Height: 4' 11" (1.499 m)     BMI: Body mass index is 38.12 kg/m².      Physical Exam:   Physical Exam  Vitals and nursing note reviewed.   Constitutional:       General: She is not in acute distress.     Appearance: Normal appearance. She is not toxic-appearing.   HENT:      Head: Normocephalic and atraumatic.   Eyes:      General: No scleral icterus.     Conjunctiva/sclera: Conjunctivae normal.   Cardiovascular:      Rate and Rhythm: Normal rate and regular rhythm.      Heart sounds: Normal heart sounds. No murmur heard.  Pulmonary:      Effort: Pulmonary effort is " normal. No respiratory distress.      Breath sounds: Normal breath sounds. No wheezing, rhonchi or rales.   Abdominal:      General: There is no distension.      Palpations: Abdomen is soft.      Tenderness: There is no abdominal tenderness.   Musculoskeletal:         General: No swelling, tenderness or deformity.      Cervical back: Neck supple. No tenderness.   Skin:     Coloration: Skin is not jaundiced.      Findings: No erythema.   Neurological:      General: No focal deficit present.      Mental Status: She is alert and oriented to person, place, and time.      Motor: No weakness.   Psychiatric:         Mood and Affect: Mood normal.         Behavior: Behavior normal.           Laboratory Data:  WBC   Date Value Ref Range Status   06/13/2024 10.87 3.90 - 12.70 K/uL Final     Hemoglobin   Date Value Ref Range Status   06/13/2024 10.8 (L) 12.0 - 16.0 g/dL Final     POC Hematocrit   Date Value Ref Range Status   08/03/2023 34 (L) 36 - 54 %PCV Final     Hematocrit   Date Value Ref Range Status   06/13/2024 33.0 (L) 37.0 - 48.5 % Final     Platelets   Date Value Ref Range Status   06/13/2024 297 150 - 450 K/uL Final     Gran # (ANC)   Date Value Ref Range Status   06/13/2024 7.9 (H) 1.8 - 7.7 K/uL Final     Gran %   Date Value Ref Range Status   06/13/2024 72.6 38.0 - 73.0 % Final       Chemistry        Component Value Date/Time     06/13/2024 0950    K 4.7 06/13/2024 0950     06/13/2024 0950    CO2 17 (L) 06/13/2024 0950    BUN 20 06/13/2024 0950    BUN 12.0 04/28/2023 1207    CREATININE 2.0 (H) 06/13/2024 0950     (H) 06/13/2024 0950        Component Value Date/Time    CALCIUM 9.3 06/13/2024 0950    ALKPHOS 119 06/13/2024 0950    AST 12 06/13/2024 0950    ALT 10 06/13/2024 0950    BILITOT 0.4 06/13/2024 0950    ESTGFRAFRICA >60.0 04/06/2020 1002    EGFRNONAA >60.0 04/06/2020 1002              Assessment/Plan:     1. Adenocarcinoma, lung, left    2. Stage 3a chronic kidney disease    3. Encounter  for antineoplastic immunotherapy      Proceed with durva today as scheduled  Repeat bmp next week--suspect bump in creatinine related to bactrim use for uti. Will recheck cr level after bactrim has been completed.   Will be due for restaging scans next month.     Med and Orders:  Orders Placed This Encounter    CT Chest Abdomen Pelvis Without Contrast (XPD)    BMP       Follow Up:  Follow up in about 4 weeks (around 7/12/2024).      Above care plan was discussed with patient and all questions were addressed to their expressed satisfaction.       Roger Eduardo MD, FACP  Hematology & Medical Oncology  [facility]         High Risk Conditions:    Patient has a condition that poses threat to life and bodily function: NSCLC  Patient is currently on drug therapy requiring intensive monitoring for toxicity: durvalumab

## 2024-06-19 ENCOUNTER — LAB VISIT (OUTPATIENT)
Dept: LAB | Facility: HOSPITAL | Age: 64
End: 2024-06-19
Attending: INTERNAL MEDICINE
Payer: COMMERCIAL

## 2024-06-19 ENCOUNTER — CLINICAL SUPPORT (OUTPATIENT)
Dept: REHABILITATION | Facility: HOSPITAL | Age: 64
End: 2024-06-19
Payer: COMMERCIAL

## 2024-06-19 DIAGNOSIS — C34.92 ADENOCARCINOMA, LUNG, LEFT: Primary | ICD-10-CM

## 2024-06-19 DIAGNOSIS — N18.31 STAGE 3A CHRONIC KIDNEY DISEASE: ICD-10-CM

## 2024-06-19 DIAGNOSIS — Z09 CHEMOTHERAPY FOLLOW-UP EXAMINATION: ICD-10-CM

## 2024-06-19 DIAGNOSIS — H93.11 TINNITUS OF RIGHT EAR: ICD-10-CM

## 2024-06-19 DIAGNOSIS — C34.92 ADENOCARCINOMA, LUNG, LEFT: ICD-10-CM

## 2024-06-19 LAB
ANION GAP SERPL CALC-SCNC: 6 MMOL/L (ref 8–16)
BUN SERPL-MCNC: 24 MG/DL (ref 8–23)
CALCIUM SERPL-MCNC: 9.4 MG/DL (ref 8.7–10.5)
CHLORIDE SERPL-SCNC: 109 MMOL/L (ref 95–110)
CO2 SERPL-SCNC: 23 MMOL/L (ref 23–29)
CREAT SERPL-MCNC: 1.6 MG/DL (ref 0.5–1.4)
EST. GFR  (NO RACE VARIABLE): 35.8 ML/MIN/1.73 M^2
GLUCOSE SERPL-MCNC: 165 MG/DL (ref 70–110)
POTASSIUM SERPL-SCNC: 4.3 MMOL/L (ref 3.5–5.1)
SODIUM SERPL-SCNC: 138 MMOL/L (ref 136–145)

## 2024-06-19 PROCEDURE — 80048 BASIC METABOLIC PNL TOTAL CA: CPT | Performed by: INTERNAL MEDICINE

## 2024-06-19 PROCEDURE — 36415 COLL VENOUS BLD VENIPUNCTURE: CPT | Performed by: INTERNAL MEDICINE

## 2024-06-19 PROCEDURE — 97810 ACUP 1/> WO ESTIM 1ST 15 MIN: CPT | Performed by: ACUPUNCTURIST

## 2024-06-19 PROCEDURE — 97811 ACUP 1/> W/O ESTIM EA ADD 15: CPT | Performed by: ACUPUNCTURIST

## 2024-06-20 NOTE — PROGRESS NOTES
Acupuncture Evaluation Note     Name: Radha Ervin  [facility] Number: [id number]    Traditional Chinese Medicine (TCM) Diagnosis: Qi Stagnation and Qi Deficiency  Medical Diagnosis:   Encounter Diagnoses   Name Primary?    Adenocarcinoma, lung, left Yes    Chemotherapy follow-up examination     Tinnitus of right ear         Evaluation Date: 6/20/2024    Visit #/Visits authorized: 12, 6/12    Precautions: Diabetes and cancer    Subjective     Chief Concern: Started immunotherapy 4/19/2024, every 3 weeks. Tinnitus onset January 2024 after finishing chemo, worsened again after having covid in March 2024. High pitch, worse in right ear. Chronic low back pain midline.     Medical necessity is demonstrated by the following IMPAIRMENTS: Medical Necessity: Decreased quality of life              Aggravating Factors:   unknown    Relieving Factors:  unknown    Symptom Description:     Quality:  pressure before tinnitus starts  Severity:  10  Frequency:  every day and at night/ wakes from sleep at night      Objective       New Findings:  tinnitus is present but is intermittent and volume has decreased    Treatment     Treatment Principles:  Move qi, nourish qi, transform damp, stop pain     Acupuncture points used:    Yintang, DU20, 4 [place]  Bilateral points: TW17, GB41, GB40, TW5, SP9, SP6, ST36, GB34, KD3, LI11, SP4  Unilateral points:   Auricular Treatment:      Needles In: 28  Needles Out: 28  Needles W/O STIM placed: 4:50  Needles W/O STIM removed: 5:20      Assessment     After treatment, patient felt tinnitus present and loud over the week.     Patient prognosis is Good.     Patient will continue to benefit from acupuncture treatment to address the deficits listed in the problem list box on initial evaluation, provide patient family education and to maximize pt's level of independence in the home and community environment.     Patient's spiritual, cultural and educational needs considered and pt agreeable to plan of  care and goals.     Anticipated barriers to treatment: none    Plan     Recommend 1 /week for 6 sessions then re-assess.      Education:  Patient is aware of cumulative benefit of acupuncture

## 2024-06-27 ENCOUNTER — PROCEDURE VISIT (OUTPATIENT)
Dept: DERMATOLOGY | Facility: CLINIC | Age: 64
End: 2024-06-27
Payer: COMMERCIAL

## 2024-06-27 DIAGNOSIS — L73.2 HIDRADENITIS SUPPURATIVA: Primary | ICD-10-CM

## 2024-06-27 RX ORDER — TRIAMCINOLONE ACETONIDE 40 MG/ML
40 INJECTION, SUSPENSION INTRA-ARTICULAR; INTRAMUSCULAR ONCE
Status: SHIPPED | OUTPATIENT
Start: 2024-06-27

## 2024-06-27 NOTE — PATIENT INSTRUCTIONS
Post- Operative Wound Care    Your doctor has performed local skin surgery today.  Vaseline ointment and a pressure bandage were placed after the surgery.  It is very important that you keep this bandage in place for 24 hours.  This will decrease the risk of post - operative infection and bleeding.  After 24 hours, you may remove the band aid and wash the area with warm soap and water and apply Medihoney It is important to keep the area moist.  Letting it dry out and get air slows healing time, will worsen the scar, and make it more difficult to remove the stitches.  Band aid is optional after first 24 hours.    It is best NOT to use hydrogen peroxide or antibacterial soap to wash the operative site.       If you notice increasing redness, tenderness, pain, or yellow drainage at the biopsy or surgical site, please notify your doctor.  These are signs of an infection.    If your biopsy/surgical site is bleeding, apply firm pressure for 15 minutes straight.  Repeat for another 15 minutes, if it is still bleeding.   If the surgical site continues to bleed, then please contact your doctor.      For [facility] users:   You will receive your pathology results in [facility] as soon as they are available. Please be assured that your physician/provider will review your results and contact you should additional treatment be required. This is one more way [facility] is putting you first.       [facility] [facility] [place] [address]/ [phone] FAX/ [email or web address]

## 2024-06-27 NOTE — PROGRESS NOTES
Pt here for deroofing procedure of recurrent HS abscess right upper thigh/inguinal fold.      PROCEDURE: Deroofing procedure followed by curettage of base      ANESTHETIC: 9 cc 1% Xylocaine with Epinephrine 1:100,000, buffered     SURGEON: Kathe Quan M.D.     ASSISTANTS: Cherie Jett MA     PREOPERATIVE DIAGNOSIS:  Hidradenitis suppurativa     POSTOPERATIVE DIAGNOSIS:  Same as preoperative diagnosis     LOCATION: ight upper thigh/inguinal fold     INITIAL LESION SIZE: 3 x 2cm     EXCISED DIAMETER: 4 x 3cm     PREPARATION: The diagnosis, procedure, alternatives, benefits and risks, including but not limited to: infection, bleeding/bruising, drug reactions, pain, scar or cosmetic defect, local sensation disturbances, wound dehiscence (separation of wound edges after sutures removed) and/or recurrence of present condition were explained to the patient. The patient elected to proceed.  Patient's identity was verified using 2 patient identifiers and the side and site was verified.  Time out period with surgeon, assistant and patient in surgical suite was taken.     PROCEDURE: The location noted above was prepped, draped, and anesthetized in the usual sterile fashion per Niru Nascimento MA. Lesional tissue was carefully marked. A incision was made with #15 blade carried down into the sac. The roof was then removed with scissors. Blunt probe was used to determine tracts and one was found superior laterally. This area was also deroofed and curetted. Debridement down to level of subcutaneous fat. Electrocoagulation was used to obtain hemostasis. Blood loss was minimal.    The patient tolerated the procedure well.     The area was cleaned and dressed appropriately and the patient was given wound care instructions, as well as an appointment for follow-up evaluation.     Apply Medihoney after gentle cleansing daily.      And     ILK 40 to erythematous subcut nodule c/w HS - left upper inner thigh  Intralesional  Kenalog 40mg/cc (0.5 cc total) injected into 1 lesions on the left upper inner thigh today after obtaining verbal consent including risk of surrounding hypopigmentation. Patient tolerated procedure well.    Units:4  NDC for Kenalog 40mg/cc:  4036-2018-10

## 2024-07-08 ENCOUNTER — TELEPHONE (OUTPATIENT)
Dept: HEMATOLOGY/ONCOLOGY | Facility: CLINIC | Age: 64
End: 2024-07-08
Payer: MEDICARE

## 2024-07-08 DIAGNOSIS — M54.59 OTHER LOW BACK PAIN: Primary | ICD-10-CM

## 2024-07-10 ENCOUNTER — TELEPHONE (OUTPATIENT)
Dept: HEMATOLOGY/ONCOLOGY | Facility: CLINIC | Age: 64
End: 2024-07-10
Payer: MEDICARE

## 2024-07-11 ENCOUNTER — PATIENT MESSAGE (OUTPATIENT)
Dept: DERMATOLOGY | Facility: CLINIC | Age: 64
End: 2024-07-11
Payer: MEDICARE

## 2024-07-11 ENCOUNTER — HOSPITAL ENCOUNTER (OUTPATIENT)
Dept: RADIOLOGY | Facility: HOSPITAL | Age: 64
Discharge: HOME | End: 2024-07-11
Attending: INTERNAL MEDICINE
Payer: MEDICARE

## 2024-07-11 DIAGNOSIS — C34.92 ADENOCARCINOMA, LUNG, LEFT: ICD-10-CM

## 2024-07-11 DIAGNOSIS — N18.31 STAGE 3A CHRONIC KIDNEY DISEASE: ICD-10-CM

## 2024-07-11 PROCEDURE — 74176 CT ABD & PELVIS W/O CONTRAST: CPT | Mod: TC,HCNC

## 2024-07-11 PROCEDURE — 71250 CT THORAX DX C-: CPT | Mod: TC,HCNC

## 2024-07-11 PROCEDURE — 71250 CT THORAX DX C-: CPT | Mod: 26,HCNC,, | Performed by: RADIOLOGY

## 2024-07-11 PROCEDURE — 74176 CT ABD & PELVIS W/O CONTRAST: CPT | Mod: 26,HCNC,, | Performed by: RADIOLOGY

## 2024-07-12 ENCOUNTER — TELEPHONE (OUTPATIENT)
Dept: DERMATOLOGY | Facility: CLINIC | Age: 64
End: 2024-07-12
Payer: MEDICARE

## 2024-07-12 ENCOUNTER — INFUSION (OUTPATIENT)
Dept: INFUSION THERAPY | Facility: HOSPITAL | Age: 64
End: 2024-07-12
Attending: INTERNAL MEDICINE
Payer: MEDICARE

## 2024-07-12 ENCOUNTER — OFFICE VISIT (OUTPATIENT)
Dept: DERMATOLOGY | Facility: CLINIC | Age: 64
End: 2024-07-12
Payer: MEDICARE

## 2024-07-12 ENCOUNTER — OFFICE VISIT (OUTPATIENT)
Dept: HEMATOLOGY/ONCOLOGY | Facility: CLINIC | Age: 64
End: 2024-07-12
Payer: MEDICARE

## 2024-07-12 VITALS
OXYGEN SATURATION: 96 % | SYSTOLIC BLOOD PRESSURE: 104 MMHG | WEIGHT: 186.75 LBS | HEIGHT: 59 IN | HEART RATE: 108 BPM | RESPIRATION RATE: 20 BRPM | DIASTOLIC BLOOD PRESSURE: 70 MMHG | TEMPERATURE: 98 F | BODY MASS INDEX: 37.65 KG/M2

## 2024-07-12 VITALS
DIASTOLIC BLOOD PRESSURE: 70 MMHG | TEMPERATURE: 98 F | SYSTOLIC BLOOD PRESSURE: 104 MMHG | HEART RATE: 108 BPM | RESPIRATION RATE: 20 BRPM | BODY MASS INDEX: 37.65 KG/M2 | HEIGHT: 59 IN | WEIGHT: 186.75 LBS | OXYGEN SATURATION: 96 %

## 2024-07-12 DIAGNOSIS — L08.9 WOUND INFECTION: Primary | ICD-10-CM

## 2024-07-12 DIAGNOSIS — J43.2 CENTRILOBULAR EMPHYSEMA: ICD-10-CM

## 2024-07-12 DIAGNOSIS — C34.92 ADENOCARCINOMA, LUNG, LEFT: Primary | ICD-10-CM

## 2024-07-12 DIAGNOSIS — T14.8XXA WOUND INFECTION: Primary | ICD-10-CM

## 2024-07-12 DIAGNOSIS — Z51.12 ENCOUNTER FOR ANTINEOPLASTIC IMMUNOTHERAPY: ICD-10-CM

## 2024-07-12 DIAGNOSIS — N18.31 STAGE 3A CHRONIC KIDNEY DISEASE: ICD-10-CM

## 2024-07-12 PROCEDURE — 25000003 PHARM REV CODE 250: Mod: HCNC | Performed by: INTERNAL MEDICINE

## 2024-07-12 PROCEDURE — 87070 CULTURE OTHR SPECIMN AEROBIC: CPT | Mod: HCNC | Performed by: PHYSICIAN ASSISTANT

## 2024-07-12 PROCEDURE — 99999 PR PBB SHADOW E&M-EST. PATIENT-LVL III: CPT | Mod: PBBFAC,HCNC,, | Performed by: PHYSICIAN ASSISTANT

## 2024-07-12 PROCEDURE — 99999 PR PBB SHADOW E&M-EST. PATIENT-LVL IV: CPT | Mod: PBBFAC,HCNC,, | Performed by: NURSE PRACTITIONER

## 2024-07-12 PROCEDURE — 96413 CHEMO IV INFUSION 1 HR: CPT | Mod: HCNC

## 2024-07-12 PROCEDURE — A4216 STERILE WATER/SALINE, 10 ML: HCPCS | Mod: HCNC | Performed by: INTERNAL MEDICINE

## 2024-07-12 RX ORDER — SODIUM CHLORIDE 0.9 % (FLUSH) 0.9 %
10 SYRINGE (ML) INJECTION
Status: CANCELLED | OUTPATIENT
Start: 2024-07-12

## 2024-07-12 RX ORDER — SODIUM CHLORIDE 0.9 % (FLUSH) 0.9 %
10 SYRINGE (ML) INJECTION
Status: DISCONTINUED | OUTPATIENT
Start: 2024-07-12 | End: 2024-07-12 | Stop reason: HOSPADM

## 2024-07-12 RX ORDER — EPINEPHRINE 0.3 MG/.3ML
0.3 INJECTION SUBCUTANEOUS ONCE AS NEEDED
Status: DISCONTINUED | OUTPATIENT
Start: 2024-07-12 | End: 2024-07-12 | Stop reason: HOSPADM

## 2024-07-12 RX ORDER — DIPHENHYDRAMINE HYDROCHLORIDE 50 MG/ML
50 INJECTION INTRAMUSCULAR; INTRAVENOUS ONCE AS NEEDED
Status: CANCELLED | OUTPATIENT
Start: 2024-07-12

## 2024-07-12 RX ORDER — HEPARIN 100 UNIT/ML
500 SYRINGE INTRAVENOUS
Status: CANCELLED | OUTPATIENT
Start: 2024-07-12

## 2024-07-12 RX ORDER — EPINEPHRINE 0.3 MG/.3ML
0.3 INJECTION SUBCUTANEOUS ONCE AS NEEDED
Status: CANCELLED | OUTPATIENT
Start: 2024-07-12

## 2024-07-12 RX ORDER — DOXYCYCLINE 100 MG/1
CAPSULE ORAL
Qty: 14 CAPSULE | Refills: 0 | Status: SHIPPED | OUTPATIENT
Start: 2024-07-12

## 2024-07-12 RX ORDER — DIPHENHYDRAMINE HYDROCHLORIDE 50 MG/ML
50 INJECTION INTRAMUSCULAR; INTRAVENOUS ONCE AS NEEDED
Status: DISCONTINUED | OUTPATIENT
Start: 2024-07-12 | End: 2024-07-12 | Stop reason: HOSPADM

## 2024-07-12 RX ADMIN — Medication 10 ML: at 11:07

## 2024-07-12 RX ADMIN — SODIUM CHLORIDE 1500 MG: 9 INJECTION, SOLUTION INTRAVENOUS at 10:07

## 2024-07-12 RX ADMIN — SODIUM CHLORIDE: 9 INJECTION, SOLUTION INTRAVENOUS at 10:07

## 2024-07-12 NOTE — PROGRESS NOTES
Subjective:      Patient ID:  Radha Ervin is a 63 y.o. female who presents for   Chief Complaint   Patient presents with    Follow-up     deroofing     Pt here today for wound check from HS deroofing procedure done by Dr. Quan on 6/27/24 to the right upper thigh/inguinal fold.  Pt reports that wound has started to drain x few days. Notes that there is a slight odor from wound site. Tender (5/10).  Pt is using medi-honey daily and keeping area covered. Of note, she is only using medi-honey dressing when she gets home after work as the bandage is too uncomfortable and the medi-honey seeps through the bandage.     Pt has history of HS - sees Dr. Quan.      Review of Systems   Skin:  Positive for abscesses.   Hematologic/Lymphatic: Bruises/bleeds easily.       Objective:   Physical Exam   Constitutional: She appears well-developed and well-nourished. No distress.   Neurological: She is alert and oriented to person, place, and time. She is not disoriented.   Psychiatric: She has a normal mood and affect.   Skin:   Areas Examined (abnormalities noted in diagram):   Genitals / Buttocks / Groin Inspection Performed            Diagram Legend     Erythematous scaling macule/papule c/w actinic keratosis       Vascular papule c/w angioma      Pigmented verrucoid papule/plaque c/w seborrheic keratosis      Yellow umbilicated papule c/w sebaceous hyperplasia      Irregularly shaped tan macule c/w lentigo     1-2 mm smooth white papules consistent with Milia      Movable subcutaneous cyst with punctum c/w epidermal inclusion cyst      Subcutaneous movable cyst c/w pilar cyst      Firm pink to brown papule c/w dermatofibroma      Pedunculated fleshy papule(s) c/w skin tag(s)      Evenly pigmented macule c/w junctional nevus     Mildly variegated pigmented, slightly irregular-bordered macule c/w mildly atypical nevus      Flesh colored to evenly pigmented papule c/w intradermal nevus       Pink pearly papule/plaque  c/w basal cell carcinoma      Erythematous hyperkeratotic cursted plaque c/w SCC      Surgical scar with no sign of skin cancer recurrence      Open and closed comedones      Inflammatory papules and pustules      Verrucoid papule consistent consistent with wart     Erythematous eczematous patches and plaques     Dystrophic onycholytic nail with subungual debris c/w onychomycosis     Umbilicated papule    Erythematous-base heme-crusted tan verrucoid plaque consistent with inflamed seborrheic keratosis     Erythematous Silvery Scaling Plaque c/w Psoriasis     See annotation      Assessment / Plan:        Wound infection post HS de-kei procedure  -     doxycycline (VIBRAMYCIN) 100 MG Cap; Take 1 po BID with food and not within 1 hour prior to lying down  Dispense: 14 capsule; Refill: 0  - Continue daily washing with mild cleanser   -Really tried to stress trying to wear the medi-honey throughout the day, but patient reports this isn't feasible for her   -Aerobic culture performed today; will be in touch with results    Discussed benefits and risks of doxycyline therapy including but not limited to GI discomfort, esophageal irritation/ulceration, and increased sun sensitivity. Patient was counseled to take medicine with meals and at least 1 hour before lying down.     Discussed letting us know if there is any increased redness, drainage, tenderness.                Follow up in about 2 weeks (around 7/26/2024) for wound check with me or Dr. Quan.

## 2024-07-12 NOTE — PATIENT INSTRUCTIONS
Wound infection post HS de-kei procedure  -     doxycycline (VIBRAMYCIN) 100 MG Cap; Take 1 po BID with food and not within 1 hour prior to lying down  Dispense: 14 capsule; Refill: 0  - Continue daily washing with mild cleanser   -Really tried to stress trying to wear the medi-honey throughout the day, but patient reports this isn't feasible for her   -Aerobic culture performed today; will be in touch with results    Discussed benefits and risks of doxycyline therapy including but not limited to GI discomfort, esophageal irritation/ulceration, and increased sun sensitivity. Patient was counseled to take medicine with meals and at least 1 hour before lying down.     Discussed letting us know if there is any increased redness, drainage, tenderness.                Follow up in about 2 weeks (around 7/26/2024) for wound check with me or Dr. Quan.

## 2024-07-12 NOTE — PROGRESS NOTES
PATIENT: Radha Ervin  MRN: [id number]  DATE: 7/12/2024      Subjective:     Chief complaint:  Chief Complaint   Patient presents with    Adenocarcinoma, left lung       Oncologic History:      Ms. Ervin is a 63-year-old female with 30 pack-year history of smoking, quit approximately 4 months ago, who was recently diagnosed with left lung cancer after evaluation for an abnormal chest x-ray.  A subsequent CT of the chest without contrast on May 26, 2023 revealed a spiculated nodule along the paramediastinal left upper lobe measuring 2.7 x 1.9 cm.  A mildly enlarged right paratracheal lymph node was seen measuring 1.1 cm.  She underwent EBUS and biopsy on June 23, 2023.  Pathology revealed the left upper lobe lesion positive for adenocarcinoma.  Station 4R was negative for malignancy.       A PET scan on July 13, 2023 revealed the left upper lobe mass measuring 2.6 x 1.6 cm with SUV max 7.4.  There was mildly metabolic prevascular mediastinal lymph node measuring 0.9 cm seen in short axis with SUV max 2.3.  She underwent robotic left upper lobectomy and lymph node sampling on August 3, 2023.  Pathology revealed invasive poorly differentiated adenocarcinoma measuring 2.1 cm.  There was visceral pleural invasion noted.  Vascular resection revealed invasive tumor present in the adventitial soft tissue.  There was lymphovascular invasion noted.  Invasive carcinoma is present at the vascular margin.  3/4 level 6 lymph nodes, 1/2 level 11 lymph nodes and 1/4 level 12 lymph nodes were involved out of a total of 23 lymph nodes.  Surgery was complicated by intraoperative bleeding of pulmonary arterial branches which resolved with pressure.      Her case was discussed at [facility] on 8/24/23: Recommend chemoRT followed by immunotherapy.     Established care with med onc 8/28. She had seen Dr. Quevedo who had recommended sequential chemoradiation then to be followed by immunotherapy. Consented for sequential CRT with  "carbo/taxol.    On 9/22/23 she presented to the infusion center for carbo/taxol C#1 but developed infusion reaction to taxol so that was stopped.  Had reaction with second infusion as well--carbo/taxol discontinued.  Consented for carbo/pemetrexed on 11/2/23    C#1 carbo/pemetrexed 11/3/23  C#2 carbo/pemetrexed 11/24/23  C#3 acute worsening of renal function--pemetrexed held: 12/15/23  C#4 carboplatin only 1/5/24    Radiation 60Gy in 30Fx 2/5/24--3/26/24    C#1 durvalumab 4/19/24  C#2 5/17/24  C#3 6/14/24  C#4 7/12/24         Interval History: Ms. Ervin returns for follow up while on "maintenance" durvalumab post-CRT. She has tolerated treatment with durvalumab well and denies any signs/symptoms suggestive of IRAE. She notes recent development of dysuria for which she was stared on bactrim. She states the dysuria has resolved. She does currently have a groin abscess and seeing dermatology later today for follow up on 2wk ago deroofing, improving, still with some inflammation.       Review of Systems   A comprehensive review of systems was performed; pertinent positives and negatives are noted in the HPI.        ECOG Performance Status:   ECOG SCORE             Objective:      Vitals:   Vitals:    07/12/24 0841   BP: 104/70   BP Location: Left arm   Patient Position: Sitting   BP Method: Medium (Automatic)   Pulse: 108   Resp: 20   Temp: 98.2 °F (36.8 °C)   TempSrc: Oral   SpO2: 96%   Weight: 84.7 kg (186 lb 11.7 oz)   Height: 4' 11" (1.499 m)     BMI: Body mass index is 37.71 kg/m².      Physical Exam:   Physical Exam  Vitals and nursing note reviewed.   Constitutional:       General: She is not in acute distress.     Appearance: Normal appearance. She is not toxic-appearing.   HENT:      Head: Normocephalic and atraumatic.   Eyes:      General: No scleral icterus.     Conjunctiva/sclera: Conjunctivae normal.   Cardiovascular:      Rate and Rhythm: Normal rate and regular rhythm.      Heart sounds: Normal heart " sounds. No murmur heard.  Pulmonary:      Effort: Pulmonary effort is normal. No respiratory distress.      Breath sounds: Normal breath sounds. No wheezing, rhonchi or rales.   Abdominal:      General: There is no distension.      Palpations: Abdomen is soft.      Tenderness: There is no abdominal tenderness.   Musculoskeletal:         General: No swelling, tenderness or deformity.      Cervical back: Neck supple. No tenderness.   Skin:     Coloration: Skin is not jaundiced.      Findings: No erythema.   Neurological:      General: No focal deficit present.      Mental Status: She is alert and oriented to person, place, and time.      Motor: No weakness.   Psychiatric:         Mood and Affect: Mood normal.         Behavior: Behavior normal.           Laboratory Data:  WBC   Date Value Ref Range Status   07/11/2024 13.14 (H) 3.90 - 12.70 K/uL Final     Hemoglobin   Date Value Ref Range Status   07/11/2024 11.6 (L) 12.0 - 16.0 g/dL Final     POC Hematocrit   Date Value Ref Range Status   08/03/2023 34 (L) 36 - 54 %PCV Final     Hematocrit   Date Value Ref Range Status   07/11/2024 36.1 (L) 37.0 - 48.5 % Final     Platelets   Date Value Ref Range Status   07/11/2024 375 150 - 450 K/uL Final     Gran # (ANC)   Date Value Ref Range Status   07/11/2024 9.5 (H) 1.8 - 7.7 K/uL Final     Gran %   Date Value Ref Range Status   07/11/2024 72.3 38.0 - 73.0 % Final       Chemistry        Component Value Date/Time     07/11/2024 0758    K 4.3 07/11/2024 0758     07/11/2024 0758    CO2 21 (L) 07/11/2024 0758    BUN 21 07/11/2024 0758    BUN 12.0 04/28/2023 1207    CREATININE 1.4 07/11/2024 0758     (H) 07/11/2024 0758        Component Value Date/Time    CALCIUM 9.7 07/11/2024 0758    ALKPHOS 114 07/11/2024 0758    AST 15 07/11/2024 0758    ALT 13 07/11/2024 0758    BILITOT 0.4 07/11/2024 0758    ESTGFRAFRICA >60.0 04/06/2020 1002    EGFRNONAA >60.0 04/06/2020 1002              Assessment/Plan:     1.  Adenocarcinoma, lung, left    2. Encounter for antineoplastic immunotherapy    3. Stage 3a chronic kidney disease    4. Centrilobular emphysema        Proceed with durva today as scheduled  Creatinine is now normal (7/12/24) with improved GFR to 42.    CT CAP 7/11/24 left sided pleural thickening, likely inflammation post radiation, no cough, fever, change from baseline respiratory status. Has COPD, no acute exacerbation.    Med and Orders:         Follow Up:        Above care plan was discussed with patient and all questions were addressed to their expressed satisfaction.      Becky Gutierrez, LESLIE-C  [facility]  Hematology/Oncology  [address] [place] [address] [address] [place]  [id number]  [phone]        High Risk Conditions:    Patient has a condition that poses threat to life and bodily function: NSCLC  Patient is currently on drug therapy requiring intensive monitoring for toxicity: durvalumab    Visit today included increased complexity associated with the care of the episodic problem NSCLC on drug therapy addressed and managing the longitudinal care of the patient due to the serious and/or complex managed problem(s) IRAE risks, imaging follow up and chemistry follow up.

## 2024-07-17 ENCOUNTER — TELEPHONE (OUTPATIENT)
Dept: DERMATOLOGY | Facility: CLINIC | Age: 64
End: 2024-07-17
Payer: MEDICARE

## 2024-07-17 ENCOUNTER — CLINICAL SUPPORT (OUTPATIENT)
Dept: REHABILITATION | Facility: HOSPITAL | Age: 64
End: 2024-07-17
Payer: MEDICARE

## 2024-07-17 DIAGNOSIS — M54.59 OTHER LOW BACK PAIN: Primary | ICD-10-CM

## 2024-07-17 DIAGNOSIS — T14.8XXA WOUND INFECTION: Primary | ICD-10-CM

## 2024-07-17 DIAGNOSIS — Z78.0 MENOPAUSE: ICD-10-CM

## 2024-07-17 DIAGNOSIS — L08.9 WOUND INFECTION: Primary | ICD-10-CM

## 2024-07-17 PROCEDURE — 97813 ACUP 1/> W/ESTIM 1ST 15 MIN: CPT | Mod: HCNC | Performed by: ACUPUNCTURIST

## 2024-07-17 PROCEDURE — 97811 ACUP 1/> W/O ESTIM EA ADD 15: CPT | Mod: HCNC | Performed by: ACUPUNCTURIST

## 2024-07-17 RX ORDER — SULFAMETHOXAZOLE AND TRIMETHOPRIM 800; 160 MG/1; MG/1
1 TABLET ORAL 2 TIMES DAILY
Qty: 20 TABLET | Refills: 0 | Status: SHIPPED | OUTPATIENT
Start: 2024-07-17 | End: 2024-07-27

## 2024-07-17 NOTE — TELEPHONE ENCOUNTER
Component 5 d ago  Aerobic Bacterial Culture   Abnormal  ACINETOBACTER BAUMANNII/HAEMOLYTICUS  Few        Sensitive to Bactrim. Discussed with patient by phone to stop doxycycline and switch to Bactrim that was prescribed and sent to her pharmacy of choice today. Discussed continuing to take worth food to prevent GI upset.     Reviewed CMP today and based on current values and UptoDate guidelines, no renal dose adjustment needs to be made.

## 2024-07-18 NOTE — PROGRESS NOTES
Acupuncture Evaluation Note     Name: Radha Ervin  [facility] Number: [id number]    Traditional Chinese Medicine (TCM) Diagnosis: Qi Stagnation and Qi Deficiency  Medical Diagnosis:   Encounter Diagnosis   Name Primary?    Other low back pain Yes        Evaluation Date: 7/18/2024    Visit #/Visits authorized: 12, 1/12    Precautions: Diabetes and cancer    Subjective     Chief Concern: Chronic low back pain, midline, recently re-injured with bending over/flexion. Started immunotherapy 4/19/2024, every 3 weeks. Tinnitus onset January 2024 after finishing chemo, worsened again after having covid in March 2024. High pitch, worse in right ear.         Medical necessity is demonstrated by the following IMPAIRMENTS: Medical Necessity: Decreased quality of life              Aggravating Factors:   unknown    Relieving Factors:  unknown    Symptom Description:     Quality:  pressure before tinnitus starts  Severity:  10  Frequency:  every day and at night/ wakes from sleep at night      Objective       New Findings:      Treatment     Treatment Principles:  Move qi, nourish qi, transform damp, stop pain     Acupuncture points used:      Bilateral points: BL23, BL25, BL26, BL27, BL28, GB30, BL62, BL40, TW17, GB20, BL10  Unilateral points: KD3, BL58  Auricular Treatment:      Needles In: 24  Needles Out: 24  [place] W/ STIM placed: 8:50 AM  [place] W/O STIM removed: 9:20 AM      Assessment     After treatment, patient felt low back pain and tinnitus present     Patient prognosis is Good.     Patient will continue to benefit from acupuncture treatment to address the deficits listed in the problem list box on initial evaluation, provide patient family education and to maximize pt's level of independence in the home and community environment.     Patient's spiritual, cultural and educational needs considered and pt agreeable to plan of care and goals.     Anticipated barriers to treatment: none    Plan     Recommend 1 /week for 5  sessions then re-assess.      Education:  Patient is aware of cumulative benefit of acupuncture

## 2024-07-21 ENCOUNTER — OFFICE VISIT (OUTPATIENT)
Dept: URGENT CARE | Facility: CLINIC | Age: 64
End: 2024-07-21
Payer: MEDICARE

## 2024-07-21 VITALS
BODY MASS INDEX: 37.33 KG/M2 | RESPIRATION RATE: 20 BRPM | DIASTOLIC BLOOD PRESSURE: 62 MMHG | HEIGHT: 59 IN | OXYGEN SATURATION: 93 % | WEIGHT: 185.19 LBS | HEART RATE: 81 BPM | TEMPERATURE: 97 F | SYSTOLIC BLOOD PRESSURE: 92 MMHG

## 2024-07-21 DIAGNOSIS — M62.830 LUMBAR PARASPINAL MUSCLE SPASM: Primary | ICD-10-CM

## 2024-07-21 DIAGNOSIS — M47.816 SPONDYLOSIS OF LUMBAR REGION WITHOUT MYELOPATHY OR RADICULOPATHY: ICD-10-CM

## 2024-07-21 PROCEDURE — 96372 THER/PROPH/DIAG INJ SC/IM: CPT | Mod: S$GLB,,, | Performed by: FAMILY MEDICINE

## 2024-07-21 PROCEDURE — 99213 OFFICE O/P EST LOW 20 MIN: CPT | Mod: 25,S$GLB,, | Performed by: FAMILY MEDICINE

## 2024-07-21 RX ORDER — CYCLOBENZAPRINE HCL 10 MG
10 TABLET ORAL NIGHTLY
Qty: 10 TABLET | Refills: 0 | Status: SHIPPED | OUTPATIENT
Start: 2024-07-21 | End: 2024-07-31

## 2024-07-21 RX ORDER — PREDNISONE 20 MG/1
40 TABLET ORAL DAILY
Qty: 10 TABLET | Refills: 0 | Status: SHIPPED | OUTPATIENT
Start: 2024-07-21 | End: 2024-07-26

## 2024-07-21 RX ORDER — KETOROLAC TROMETHAMINE 30 MG/ML
30 INJECTION, SOLUTION INTRAMUSCULAR; INTRAVENOUS
Status: COMPLETED | OUTPATIENT
Start: 2024-07-21 | End: 2024-07-21

## 2024-07-21 RX ADMIN — KETOROLAC TROMETHAMINE 30 MG: 30 INJECTION, SOLUTION INTRAMUSCULAR; INTRAVENOUS at 01:07

## 2024-07-21 NOTE — PROGRESS NOTES
"Subjective:      Patient ID: Radha Ervin is a 64 y.o. female.    Vitals:  height is 4' 11" (1.499 m) and weight is 84 kg (185 lb 3 oz). Her oral temperature is 97 °F (36.1 °C). Her blood pressure is 92/62 and her pulse is 81. Her respiration is 20 and oxygen saturation is 93% (abnormal).     Chief Complaint: Back Pain    Pt present with back pain, possible pulled muscle. Started 1 week ago. Tx include nothing at home. Pain 08/10    Back Pain  This is a new problem. The current episode started in the past 7 days. The problem occurs constantly. The problem has been gradually worsening since onset. The quality of the pain is described as aching. The pain does not radiate. The pain is at a severity of 8/10. The pain is severe. The pain is The same all the time. The symptoms are aggravated by bending, lying down, standing and sitting. Stiffness is present All day. She has tried nothing for the symptoms.     Musculoskeletal:  Positive for back pain.      Objective:     Physical Exam   Constitutional: She is oriented to person, place, and time. She appears well-developed. She is cooperative. No distress.   HENT:   Head: Normocephalic and atraumatic.   Nose: Nose normal.   Mouth/Throat: Oropharynx is clear and moist and mucous membranes are normal.   Eyes: Conjunctivae and lids are normal.   Neck: Trachea normal and phonation normal. Neck supple.   Cardiovascular: Normal rate, regular rhythm, normal heart sounds and normal pulses.   Pulmonary/Chest: Effort normal and breath sounds normal.   Abdominal: Normal appearance and bowel sounds are normal. She exhibits no mass. Soft.   Musculoskeletal:         General: No deformity.   Neurological: She is alert and oriented to person, place, and time. She has normal strength and normal reflexes. No sensory deficit.   Skin: Skin is warm, dry, intact and not diaphoretic.   Psychiatric: Her speech is normal and behavior is normal. Judgment and thought content normal.   Nursing note " and vitals reviewed.      Assessment:     1. Lumbar paraspinal muscle spasm    2. Spondylosis of lumbar region without myelopathy or radiculopathy        Plan:       Lumbar paraspinal muscle spasm  -     ketorolac injection 30 mg  -     predniSONE (DELTASONE) 20 MG tablet; Take 2 tablets (40 mg total) by mouth once daily. for 5 days  Dispense: 10 tablet; Refill: 0  -     cyclobenzaprine (FLEXERIL) 10 MG tablet; Take 1 tablet (10 mg total) by mouth every evening. for 10 days  Dispense: 10 tablet; Refill: 0    Spondylosis of lumbar region without myelopathy or radiculopathy  -     predniSONE (DELTASONE) 20 MG tablet; Take 2 tablets (40 mg total) by mouth once daily. for 5 days  Dispense: 10 tablet; Refill: 0        Thank you for choosing [facility]!     Our goal in the Urgent Care is to always provide outstanding medical care. You may receive a survey by mail or e-mail in the next week regarding your experience today. We would greatly appreciate you completing and returning the survey. Your feedback provides us with a way to recognize our staff who provide very good care, and it helps us learn how to improve when your experience was below our aspiration of excellence.       We appreciate you trusting us with your medical care. We hope you feel better soon. We will be happy to take care of you for all of your future medical needs.  You must understand that you've received an Urgent Care treatment only and that you may be released before all your medical problems are known or treated. You, the patient, will arrange for follow up care as instructed.  Follow up with your PCP or specialty clinic as directed in the next 1-2 weeks if not improved or as needed.  You can call [phone] to schedule an appointment with the appropriate provider.  Another option is to follow up with [facility] ([email or web address]) virtually for quick simple medical advice.  If your  condition worsens we recommend that you receive another evaluation at the emergency room immediately or contact your primary medical clinics after hours call service to discuss your concerns.  Please return here or go to the Emergency Department for any concerns or worsening of condition.      *If you were prescribed a narcotic or controlled medication, do not drive or operate heavy equipment or machinery while taking these medications.

## 2024-07-24 ENCOUNTER — CLINICAL SUPPORT (OUTPATIENT)
Dept: REHABILITATION | Facility: HOSPITAL | Age: 64
End: 2024-07-24
Payer: MEDICARE

## 2024-07-24 DIAGNOSIS — M54.59 OTHER LOW BACK PAIN: Primary | ICD-10-CM

## 2024-07-24 PROCEDURE — 97813 ACUP 1/> W/ESTIM 1ST 15 MIN: CPT | Mod: HCNC | Performed by: ACUPUNCTURIST

## 2024-07-24 PROCEDURE — 97811 ACUP 1/> W/O ESTIM EA ADD 15: CPT | Mod: HCNC | Performed by: ACUPUNCTURIST

## 2024-07-25 NOTE — PROGRESS NOTES
Acupuncture Evaluation Note     Name: Radha Ervin  [facility] Number: [id number]    Traditional Chinese Medicine (TCM) Diagnosis: Qi Stagnation and Qi Deficiency  Medical Diagnosis:   Encounter Diagnosis   Name Primary?    Other low back pain Yes        Evaluation Date: 7/25/2024    Visit #/Visits authorized: 12, 2/12    Precautions: Diabetes and cancer    Subjective     Chief Concern: cLBP midline, pain extends across low back, worse on right side. Recently re-injured with bending over/flexion. Started immunotherapy 4/19/2024, every 3 weeks. Tinnitus onset January 2024 after finishing chemo, worsened again after having covid in March 2024. High pitch, worse in right ear.         Medical necessity is demonstrated by the following IMPAIRMENTS: Medical Necessity: Decreased quality of life              Aggravating Factors:   unknown    Relieving Factors:  unknown    Symptom Description:     Quality:  pressure before tinnitus starts  Severity:  10  Frequency:  every day and at night/ wakes from sleep at night      Objective       New Findings:      Treatment     Treatment Principles:  Move qi, nourish qi, transform damp, stop pain     Acupuncture points used:      Bilateral points: ST25, TW17  Unilateral points: LU7, BL62, GB40, BL65, ST36, KD6, KD3, LV3, SP6, SP9, SI3, TW3, TW5, LI4, LI11, CV12, CV6  Auricular Treatment:      Needles In: 21  Needles Out: 21  [place] W/ STIM placed: 2:50 PM  Needles W/O STIM removed: 3:20 PM      Assessment     After treatment, patient felt low back pain and tinnitus present, no improvement with acupuncture      Patient prognosis is Good.     Patient will continue to benefit from acupuncture treatment to address the deficits listed in the problem list box on initial evaluation, provide patient family education and to maximize pt's level of independence in the home and community environment.     Patient's spiritual, cultural and educational needs considered and pt agreeable to plan of  Pt did schedule for Friday, [facility]'s first opening but is requesting to speak to [facility] over the phone    care and goals.     Anticipated barriers to treatment: none    Plan     Recommend 1 /week for 4 sessions then re-assess.      Education:  Patient is aware of cumulative benefit of acupuncture

## 2024-07-26 ENCOUNTER — PATIENT MESSAGE (OUTPATIENT)
Dept: HEMATOLOGY/ONCOLOGY | Facility: CLINIC | Age: 64
End: 2024-07-26
Payer: MEDICARE

## 2024-07-26 ENCOUNTER — OFFICE VISIT (OUTPATIENT)
Dept: DERMATOLOGY | Facility: CLINIC | Age: 64
End: 2024-07-26
Payer: MEDICARE

## 2024-07-26 DIAGNOSIS — T81.41XD INFECTION OF SUPERFICIAL INCISIONAL SURGICAL SITE AFTER PROCEDURE, SUBSEQUENT ENCOUNTER: Primary | ICD-10-CM

## 2024-07-26 PROCEDURE — 99999 PR PBB SHADOW E&M-EST. PATIENT-LVL III: CPT | Mod: PBBFAC,HCNC,, | Performed by: DERMATOLOGY

## 2024-07-26 NOTE — PROGRESS NOTES
Subjective:      Patient ID:  Radha Ervin is a 64 y.o. female who presents for   Chief Complaint   Patient presents with    Follow-up     Wound check     History of Present Illness: The patient presents for follow up wound check from [facility] procx done on 6/27/24. Right inguinal fold    The patient was last seen on: 7/12/24 by PA for wound check; nga cx done:    Aerobic Bacterial Culture   Abnormal  ACINETOBACTER BAUMANNII/HAEMOLYTICUS  Few        Sensitive to Bactrim. Pt day 5/7 of bactrim ds bid - much improved and no longer tender      Still using medi-honey daily.         Review of Systems   Constitutional:  Negative for fever and chills.   Gastrointestinal:  Negative for nausea and vomiting.   Skin:  Negative for itching, rash and abscesses (currently clear).   Hematologic/Lymphatic: Bruises/bleeds easily.       Objective:   Physical Exam   Constitutional: She appears well-developed and well-nourished. No distress.   Neurological: She is alert and oriented to person, place, and time. She is not disoriented.   Psychiatric: She has a normal mood and affect.   Skin:   Areas Examined (abnormalities noted in diagram):   Genitals / Buttocks / Groin Inspection Performed            Diagram Legend     Erythematous scaling macule/papule c/w actinic keratosis       Vascular papule c/w angioma      Pigmented verrucoid papule/plaque c/w seborrheic keratosis      Yellow umbilicated papule c/w sebaceous hyperplasia      Irregularly shaped tan macule c/w lentigo     1-2 mm smooth white papules consistent with Milia      Movable subcutaneous cyst with punctum c/w epidermal inclusion cyst      Subcutaneous movable cyst c/w pilar cyst      Firm pink to brown papule c/w dermatofibroma      Pedunculated fleshy papule(s) c/w skin tag(s)      Evenly pigmented macule c/w junctional nevus     Mildly variegated pigmented, slightly irregular-bordered macule c/w mildly atypical nevus      Flesh colored to evenly pigmented  papule c/w intradermal nevus       Pink pearly papule/plaque c/w basal cell carcinoma      Erythematous hyperkeratotic cursted plaque c/w SCC      Surgical scar with no sign of skin cancer recurrence      Open and closed comedones      Inflammatory papules and pustules      Verrucoid papule consistent consistent with wart     Erythematous eczematous patches and plaques     Dystrophic onycholytic nail with subungual debris c/w onychomycosis     Umbilicated papule    Erythematous-base heme-crusted tan verrucoid plaque consistent with inflamed seborrheic keratosis     Erythematous Silvery Scaling Plaque c/w Psoriasis     See annotation      Assessment / Plan:        Infection of superficial incisional surgical site after procedure, subsequent encounter  Much improved. Cont course of bactrim ds bid x 1 week total. Cont daily gentle cleansing followed by medihoney    F/[facility] clinic in 3 - 4 months             No follow-ups on file.     I have personally evaluated and examined the patient. The Attending was available to me as a supervising provider if needed. I have personally evaluated and examined the patient. The Attending was available to me as a supervising provider if needed./Attending Attestation (For Attendings USE Only)... Attending Attestation (For Attendings USE Only)...

## 2024-07-30 DIAGNOSIS — Z00.00 ENCOUNTER FOR MEDICARE ANNUAL WELLNESS EXAM: ICD-10-CM

## 2024-07-31 ENCOUNTER — CLINICAL SUPPORT (OUTPATIENT)
Dept: REHABILITATION | Facility: HOSPITAL | Age: 64
End: 2024-07-31
Payer: MEDICARE

## 2024-07-31 DIAGNOSIS — M54.59 OTHER LOW BACK PAIN: Primary | ICD-10-CM

## 2024-07-31 PROCEDURE — 97813 ACUP 1/> W/ESTIM 1ST 15 MIN: CPT | Mod: HCNC | Performed by: ACUPUNCTURIST

## 2024-07-31 PROCEDURE — 97811 ACUP 1/> W/O ESTIM EA ADD 15: CPT | Mod: HCNC | Performed by: ACUPUNCTURIST

## 2024-08-01 NOTE — PROGRESS NOTES
Acupuncture Evaluation Note     Name: Radha Ervin  [facility] Number: [id number]    Traditional Chinese Medicine (TCM) Diagnosis: Qi Stagnation and Qi Deficiency  Medical Diagnosis:   Encounter Diagnosis   Name Primary?    Other low back pain Yes        Evaluation Date: 8/1/2024    Visit #/Visits authorized: 12, 3/12    Precautions: Diabetes and cancer    Subjective     Chief Concern: cLBP midline, pain extends across low back, worse on right side. Recently re-injured with bending over/flexion. Started immunotherapy 4/19/2024, every 3 weeks. Tinnitus onset January 2024 after finishing chemo, worsened again after having covid in March 2024. High pitch, worse in right ear.         Medical necessity is demonstrated by the following IMPAIRMENTS: Medical Necessity: Decreased quality of life              Aggravating Factors:   unknown    Relieving Factors:  unknown    Symptom Description:     Quality:  pressure before tinnitus starts  Severity:  10  Frequency:  every day and at night/ wakes from sleep at night      Objective       New Findings:  dry mouth     Treatment     Treatment Principles:  Move qi, nourish qi, transform damp, stop pain     Acupuncture points used:      Bilateral points: ST4, ST6, TW17, TW5, GB41, ST36, SP6, SP9, KD3, 4 frankel, LI11  Unilateral points:   Auricular Treatment:      Needles In: 24  Needles Out: 24  [place] W/ STIM placed: 2:20 PM  Needles W/O STIM removed: 2:50 PM      Assessment     After treatment, patient felt low back pain improved, tinnitus better for a few days following treatment. Xerostomia and dysgeusia starting from infusions.      Patient prognosis is Good.     Patient will continue to benefit from acupuncture treatment to address the deficits listed in the problem list box on initial evaluation, provide patient family education and to maximize pt's level of independence in the home and community environment.     Patient's spiritual, cultural and educational needs considered  and pt agreeable to plan of care and goals.     Anticipated barriers to treatment: none    Plan     Recommend 1 /week for 3 sessions then re-assess.      Education:  Patient is aware of cumulative benefit of acupuncture

## 2024-08-05 ENCOUNTER — PATIENT MESSAGE (OUTPATIENT)
Dept: REHABILITATION | Facility: HOSPITAL | Age: 64
End: 2024-08-05
Payer: MEDICARE

## 2024-08-08 ENCOUNTER — LAB VISIT (OUTPATIENT)
Dept: LAB | Facility: HOSPITAL | Age: 64
End: 2024-08-08
Attending: INTERNAL MEDICINE
Payer: MEDICARE

## 2024-08-08 DIAGNOSIS — C34.92 ADENOCARCINOMA, LUNG, LEFT: ICD-10-CM

## 2024-08-08 DIAGNOSIS — N18.31 STAGE 3A CHRONIC KIDNEY DISEASE: ICD-10-CM

## 2024-08-08 LAB
ALBUMIN SERPL BCP-MCNC: 3.1 G/DL (ref 3.5–5.2)
ALP SERPL-CCNC: 115 U/L (ref 55–135)
ALT SERPL W/O P-5'-P-CCNC: 13 U/L (ref 10–44)
ANION GAP SERPL CALC-SCNC: 11 MMOL/L (ref 8–16)
AST SERPL-CCNC: 11 U/L (ref 10–40)
BASOPHILS # BLD AUTO: 0.06 K/UL (ref 0–0.2)
BASOPHILS NFR BLD: 0.5 % (ref 0–1.9)
BILIRUB SERPL-MCNC: 0.5 MG/DL (ref 0.1–1)
BUN SERPL-MCNC: 30 MG/DL (ref 8–23)
CALCIUM SERPL-MCNC: 9.3 MG/DL (ref 8.7–10.5)
CHLORIDE SERPL-SCNC: 109 MMOL/L (ref 95–110)
CO2 SERPL-SCNC: 17 MMOL/L (ref 23–29)
CREAT SERPL-MCNC: 1.4 MG/DL (ref 0.5–1.4)
DIFFERENTIAL METHOD BLD: ABNORMAL
EOSINOPHIL # BLD AUTO: 0.5 K/UL (ref 0–0.5)
EOSINOPHIL NFR BLD: 4.8 % (ref 0–8)
ERYTHROCYTE [DISTWIDTH] IN BLOOD BY AUTOMATED COUNT: 13.8 % (ref 11.5–14.5)
EST. GFR  (NO RACE VARIABLE): 42 ML/MIN/1.73 M^2
GLUCOSE SERPL-MCNC: 112 MG/DL (ref 70–110)
HCT VFR BLD AUTO: 33.4 % (ref 37–48.5)
HGB BLD-MCNC: 11.2 G/DL (ref 12–16)
IMM GRANULOCYTES # BLD AUTO: 0.04 K/UL (ref 0–0.04)
IMM GRANULOCYTES NFR BLD AUTO: 0.4 % (ref 0–0.5)
LYMPHOCYTES # BLD AUTO: 1.9 K/UL (ref 1–4.8)
LYMPHOCYTES NFR BLD: 17.4 % (ref 18–48)
MCH RBC QN AUTO: 30.8 PG (ref 27–31)
MCHC RBC AUTO-ENTMCNC: 33.5 G/DL (ref 32–36)
MCV RBC AUTO: 92 FL (ref 82–98)
MONOCYTES # BLD AUTO: 1.1 K/UL (ref 0.3–1)
MONOCYTES NFR BLD: 9.8 % (ref 4–15)
NEUTROPHILS # BLD AUTO: 7.5 K/UL (ref 1.8–7.7)
NEUTROPHILS NFR BLD: 67.1 % (ref 38–73)
NRBC BLD-RTO: 0 /100 WBC
PLATELET # BLD AUTO: 306 K/UL (ref 150–450)
PMV BLD AUTO: 9.3 FL (ref 9.2–12.9)
POTASSIUM SERPL-SCNC: 4 MMOL/L (ref 3.5–5.1)
PROT SERPL-MCNC: 6.9 G/DL (ref 6–8.4)
RBC # BLD AUTO: 3.64 M/UL (ref 4–5.4)
SODIUM SERPL-SCNC: 137 MMOL/L (ref 136–145)
T4 FREE SERPL-MCNC: 1.74 NG/DL (ref 0.71–1.51)
TSH SERPL DL<=0.005 MIU/L-ACNC: <0.01 UIU/ML (ref 0.4–4)
WBC # BLD AUTO: 11.15 K/UL (ref 3.9–12.7)

## 2024-08-08 PROCEDURE — 80053 COMPREHEN METABOLIC PANEL: CPT | Mod: HCNC | Performed by: INTERNAL MEDICINE

## 2024-08-08 PROCEDURE — 84443 ASSAY THYROID STIM HORMONE: CPT | Mod: HCNC | Performed by: INTERNAL MEDICINE

## 2024-08-08 PROCEDURE — 36415 COLL VENOUS BLD VENIPUNCTURE: CPT | Mod: HCNC | Performed by: INTERNAL MEDICINE

## 2024-08-08 PROCEDURE — 84439 ASSAY OF FREE THYROXINE: CPT | Mod: HCNC | Performed by: INTERNAL MEDICINE

## 2024-08-08 PROCEDURE — 85025 COMPLETE CBC W/AUTO DIFF WBC: CPT | Mod: HCNC | Performed by: INTERNAL MEDICINE

## 2024-08-09 ENCOUNTER — INFUSION (OUTPATIENT)
Dept: INFUSION THERAPY | Facility: HOSPITAL | Age: 64
End: 2024-08-09
Attending: INTERNAL MEDICINE
Payer: MEDICARE

## 2024-08-09 ENCOUNTER — OFFICE VISIT (OUTPATIENT)
Dept: HEMATOLOGY/ONCOLOGY | Facility: CLINIC | Age: 64
End: 2024-08-09
Payer: MEDICARE

## 2024-08-09 VITALS
HEIGHT: 59 IN | DIASTOLIC BLOOD PRESSURE: 73 MMHG | SYSTOLIC BLOOD PRESSURE: 115 MMHG | BODY MASS INDEX: 37.16 KG/M2 | OXYGEN SATURATION: 96 % | HEART RATE: 96 BPM | WEIGHT: 184.31 LBS | TEMPERATURE: 98 F | RESPIRATION RATE: 18 BRPM

## 2024-08-09 VITALS — BODY MASS INDEX: 37.22 KG/M2 | WEIGHT: 184.31 LBS

## 2024-08-09 DIAGNOSIS — Z51.12 ENCOUNTER FOR ANTINEOPLASTIC IMMUNOTHERAPY: ICD-10-CM

## 2024-08-09 DIAGNOSIS — J43.2 CENTRILOBULAR EMPHYSEMA: ICD-10-CM

## 2024-08-09 DIAGNOSIS — C34.92 ADENOCARCINOMA, LUNG, LEFT: Primary | ICD-10-CM

## 2024-08-09 DIAGNOSIS — K11.7 CLINICAL XEROSTOMIA: ICD-10-CM

## 2024-08-09 DIAGNOSIS — N18.31 STAGE 3A CHRONIC KIDNEY DISEASE: ICD-10-CM

## 2024-08-09 PROCEDURE — 3066F NEPHROPATHY DOC TX: CPT | Mod: HCNC,CPTII,S$GLB, | Performed by: INTERNAL MEDICINE

## 2024-08-09 PROCEDURE — 1160F RVW MEDS BY RX/DR IN RCRD: CPT | Mod: HCNC,CPTII,S$GLB, | Performed by: INTERNAL MEDICINE

## 2024-08-09 PROCEDURE — 3008F BODY MASS INDEX DOCD: CPT | Mod: HCNC,CPTII,S$GLB, | Performed by: INTERNAL MEDICINE

## 2024-08-09 PROCEDURE — 1159F MED LIST DOCD IN RCRD: CPT | Mod: HCNC,CPTII,S$GLB, | Performed by: INTERNAL MEDICINE

## 2024-08-09 PROCEDURE — 3288F FALL RISK ASSESSMENT DOCD: CPT | Mod: HCNC,CPTII,S$GLB, | Performed by: INTERNAL MEDICINE

## 2024-08-09 PROCEDURE — 63600175 PHARM REV CODE 636 W HCPCS: Mod: JZ,JG,HCNC | Performed by: INTERNAL MEDICINE

## 2024-08-09 PROCEDURE — 99999 PR PBB SHADOW E&M-EST. PATIENT-LVL IV: CPT | Mod: PBBFAC,HCNC,, | Performed by: INTERNAL MEDICINE

## 2024-08-09 PROCEDURE — 96413 CHEMO IV INFUSION 1 HR: CPT | Mod: HCNC

## 2024-08-09 PROCEDURE — 99215 OFFICE O/P EST HI 40 MIN: CPT | Mod: HCNC,S$GLB,, | Performed by: INTERNAL MEDICINE

## 2024-08-09 PROCEDURE — 3074F SYST BP LT 130 MM HG: CPT | Mod: HCNC,CPTII,S$GLB, | Performed by: INTERNAL MEDICINE

## 2024-08-09 PROCEDURE — A4216 STERILE WATER/SALINE, 10 ML: HCPCS | Mod: HCNC | Performed by: INTERNAL MEDICINE

## 2024-08-09 PROCEDURE — 25000003 PHARM REV CODE 250: Mod: HCNC | Performed by: INTERNAL MEDICINE

## 2024-08-09 PROCEDURE — 3078F DIAST BP <80 MM HG: CPT | Mod: HCNC,CPTII,S$GLB, | Performed by: INTERNAL MEDICINE

## 2024-08-09 PROCEDURE — 1101F PT FALLS ASSESS-DOCD LE1/YR: CPT | Mod: HCNC,CPTII,S$GLB, | Performed by: INTERNAL MEDICINE

## 2024-08-09 RX ORDER — SODIUM CHLORIDE 0.9 % (FLUSH) 0.9 %
10 SYRINGE (ML) INJECTION
Status: DISCONTINUED | OUTPATIENT
Start: 2024-08-09 | End: 2024-08-09 | Stop reason: HOSPADM

## 2024-08-09 RX ORDER — HEPARIN 100 UNIT/ML
500 SYRINGE INTRAVENOUS
Status: CANCELLED | OUTPATIENT
Start: 2024-08-09

## 2024-08-09 RX ORDER — EPINEPHRINE 0.3 MG/.3ML
0.3 INJECTION SUBCUTANEOUS ONCE AS NEEDED
Status: DISCONTINUED | OUTPATIENT
Start: 2024-08-09 | End: 2024-08-09 | Stop reason: HOSPADM

## 2024-08-09 RX ORDER — DIPHENHYDRAMINE HYDROCHLORIDE 50 MG/ML
50 INJECTION INTRAMUSCULAR; INTRAVENOUS ONCE AS NEEDED
Status: DISCONTINUED | OUTPATIENT
Start: 2024-08-09 | End: 2024-08-09 | Stop reason: HOSPADM

## 2024-08-09 RX ORDER — PILOCARPINE HYDROCHLORIDE 5 MG/1
5 TABLET, FILM COATED ORAL 3 TIMES DAILY
Qty: 90 TABLET | Refills: 11 | Status: SHIPPED | OUTPATIENT
Start: 2024-08-09 | End: 2025-08-09

## 2024-08-09 RX ORDER — EPINEPHRINE 0.3 MG/.3ML
0.3 INJECTION SUBCUTANEOUS ONCE AS NEEDED
Status: CANCELLED | OUTPATIENT
Start: 2024-08-09

## 2024-08-09 RX ORDER — SODIUM CHLORIDE 0.9 % (FLUSH) 0.9 %
10 SYRINGE (ML) INJECTION
Status: CANCELLED | OUTPATIENT
Start: 2024-08-09

## 2024-08-09 RX ORDER — DIPHENHYDRAMINE HYDROCHLORIDE 50 MG/ML
50 INJECTION INTRAMUSCULAR; INTRAVENOUS ONCE AS NEEDED
Status: CANCELLED | OUTPATIENT
Start: 2024-08-09

## 2024-08-09 RX ADMIN — SODIUM CHLORIDE 1500 MG: 9 INJECTION, SOLUTION INTRAVENOUS at 11:08

## 2024-08-09 RX ADMIN — SODIUM CHLORIDE: 9 INJECTION, SOLUTION INTRAVENOUS at 10:08

## 2024-08-09 RX ADMIN — Medication 10 ML: at 12:08

## 2024-08-11 ENCOUNTER — PATIENT MESSAGE (OUTPATIENT)
Dept: REHABILITATION | Facility: HOSPITAL | Age: 64
End: 2024-08-11
Payer: MEDICARE

## 2024-08-12 NOTE — PROGRESS NOTES
PATIENT: Radha Ervin  MRN: [id number]  DATE: 8/9/2024      Subjective:     Chief complaint:  Chief Complaint   Patient presents with    Lung Cancer    Follow-up       Oncologic History:      Ms. Ervin is a 63-year-old female with 30 pack-year history of smoking, quit approximately 4 months ago, who was recently diagnosed with left lung cancer after evaluation for an abnormal chest x-ray.  A subsequent CT of the chest without contrast on May 26, 2023 revealed a spiculated nodule along the paramediastinal left upper lobe measuring 2.7 x 1.9 cm.  A mildly enlarged right paratracheal lymph node was seen measuring 1.1 cm.  She underwent EBUS and biopsy on June 23, 2023.  Pathology revealed the left upper lobe lesion positive for adenocarcinoma.  Station 4R was negative for malignancy.       A PET scan on July 13, 2023 revealed the left upper lobe mass measuring 2.6 x 1.6 cm with SUV max 7.4.  There was mildly metabolic prevascular mediastinal lymph node measuring 0.9 cm seen in short axis with SUV max 2.3.  She underwent robotic left upper lobectomy and lymph node sampling on August 3, 2023.  Pathology revealed invasive poorly differentiated adenocarcinoma measuring 2.1 cm.  There was visceral pleural invasion noted.  Vascular resection revealed invasive tumor present in the adventitial soft tissue.  There was lymphovascular invasion noted.  Invasive carcinoma is present at the vascular margin.  3/4 level 6 lymph nodes, 1/2 level 11 lymph nodes and 1/4 level 12 lymph nodes were involved out of a total of 23 lymph nodes.  Surgery was complicated by intraoperative bleeding of pulmonary arterial branches which resolved with pressure.      Her case was discussed at [facility] on 8/24/23: Recommend chemoRT followed by immunotherapy.     Established care with med onc 8/28. She had seen Dr. Quevedo who had recommended sequential chemoradiation then to be followed by immunotherapy. Consented for sequential CRT with  "carbo/taxol.    On 9/22/23 she presented to the infusion center for carbo/taxol C#1 but developed infusion reaction to taxol so that was stopped.  Had reaction with second infusion as well--carbo/taxol discontinued.  Consented for carbo/pemetrexed on 11/2/23    C#1 carbo/pemetrexed 11/3/23  C#2 carbo/pemetrexed 11/24/23  C#3 acute worsening of renal function--pemetrexed held: 12/15/23  C#4 carboplatin only 1/5/24    Radiation 60Gy in 30Fx 2/5/24--3/26/24    C#1 durvalumab 4/19/24  C#2 5/17/24  C#3 6/14/24  C#4 7/12/24  C#5 8/9/24         Interval History: Ms. Ervin returns for follow up. She is scheduled for keytruda today after this clinic visit. Overall she is doing okay. She does note complaint of dry mouth which is a relatively new phenomenon. Her labs showed suppressed TSH but she denies tachycardia, hypertension, anxiety, palpitations.       Review of Systems   A comprehensive review of systems was performed; pertinent positives and negatives are noted in the HPI.        ECOG Performance Status:   ECOG SCORE    1 - Restricted in strenuous activity-ambulatory and able to carry out work of a light nature         Objective:      Vitals:   Vitals:    08/09/24 0953   BP: 115/73   BP Location: Left arm   Patient Position: Sitting   BP Method: Medium (Automatic)   Pulse: 96   Resp: 18   Temp: 98 °F (36.7 °C)   TempSrc: Oral   SpO2: 96%   Weight: 83.6 kg (184 lb 4.9 oz)   Height: 4' 11" (1.499 m)     BMI: Body mass index is 37.22 kg/m².      Physical Exam:   Physical Exam  Vitals and nursing note reviewed.   Constitutional:       General: She is not in acute distress.     Appearance: Normal appearance. She is not toxic-appearing.   HENT:      Head: Normocephalic and atraumatic.   Eyes:      General: No scleral icterus.     Conjunctiva/sclera: Conjunctivae normal.   Cardiovascular:      Rate and Rhythm: Normal rate and regular rhythm.      Heart sounds: Normal heart sounds. No murmur heard.  Pulmonary:      Effort: " Pulmonary effort is normal. No respiratory distress.      Breath sounds: Normal breath sounds. No wheezing, rhonchi or rales.   Abdominal:      General: There is no distension.      Palpations: Abdomen is soft.      Tenderness: There is no abdominal tenderness.   Musculoskeletal:         General: No swelling, tenderness or deformity.      Cervical back: Neck supple. No tenderness.   Skin:     Coloration: Skin is not jaundiced.      Findings: No erythema.   Neurological:      General: No focal deficit present.      Mental Status: She is alert and oriented to person, place, and time.      Motor: No weakness.   Psychiatric:         Mood and Affect: Mood normal.         Behavior: Behavior normal.           Laboratory Data:  WBC   Date Value Ref Range Status   08/08/2024 11.15 3.90 - 12.70 K/uL Final     Hemoglobin   Date Value Ref Range Status   08/08/2024 11.2 (L) 12.0 - 16.0 g/dL Final     POC Hematocrit   Date Value Ref Range Status   08/03/2023 34 (L) 36 - 54 %PCV Final     Hematocrit   Date Value Ref Range Status   08/08/2024 33.4 (L) 37.0 - 48.5 % Final     Platelets   Date Value Ref Range Status   08/08/2024 306 150 - 450 K/uL Final     Gran # (ANC)   Date Value Ref Range Status   08/08/2024 7.5 1.8 - 7.7 K/uL Final     Gran %   Date Value Ref Range Status   08/08/2024 67.1 38.0 - 73.0 % Final       Chemistry        Component Value Date/Time     08/08/2024 0932    K 4.0 08/08/2024 0932     08/08/2024 0932    CO2 17 (L) 08/08/2024 0932    BUN 30 (H) 08/08/2024 0932    BUN 12.0 04/28/2023 1207    CREATININE 1.4 08/08/2024 0932     (H) 08/08/2024 0932        Component Value Date/Time    CALCIUM 9.3 08/08/2024 0932    ALKPHOS 115 08/08/2024 0932    AST 11 08/08/2024 0932    ALT 13 08/08/2024 0932    BILITOT 0.5 08/08/2024 0932    ESTGFRAFRICA >60.0 04/06/2020 1002    EGFRNONAA >60.0 04/06/2020 1002              Assessment/Plan:     1. Adenocarcinoma, lung, left    2. Clinical xerostomia    3.  Encounter for antineoplastic immunotherapy    4. Stage 3a chronic kidney disease    5. Centrilobular emphysema      Stg IIIA lung adenocarcinoma, s/p MARY lobectomy with R1 resection in August 2023 followed by definitive chemoradiation due to R1 resection status.   Currently on post-CRT durvalumab.   Will need to watch TSH closely in coming weeks/months given the drop in TSH. Currently clinically/symptomatically euthyroid.  Pilocarpine for dry mouth offered as therapeutic trial.     Med and Orders:  Orders Placed This Encounter    pilocarpine (SALAGEN, PILOCARPINE,) 5 MG Tab       Follow Up:  Follow up in about 4 weeks (around 9/6/2024).      Above care plan was discussed with patient and all questions were addressed to their expressed satisfaction.       Roger Eduardo MD, FACP  Hematology & Medical Oncology  [facility]         High Risk Conditions:    Patient has a condition that poses threat to life and bodily function: nsclc  Patient is currently on drug therapy requiring intensive monitoring for toxicity: durvalumab

## 2024-08-14 ENCOUNTER — HOSPITAL ENCOUNTER (OUTPATIENT)
Dept: RADIOLOGY | Facility: HOSPITAL | Age: 64
Discharge: HOME | End: 2024-08-14
Attending: INTERNAL MEDICINE
Payer: MEDICARE

## 2024-08-14 DIAGNOSIS — Z78.0 MENOPAUSE: ICD-10-CM

## 2024-08-14 PROCEDURE — 77080 DXA BONE DENSITY AXIAL: CPT | Mod: TC,HCNC

## 2024-08-14 PROCEDURE — 77080 DXA BONE DENSITY AXIAL: CPT | Mod: 26,HCNC,ICN, | Performed by: RADIOLOGY

## 2024-08-19 ENCOUNTER — PATIENT OUTREACH (OUTPATIENT)
Dept: ADMINISTRATIVE | Facility: HOSPITAL | Age: 64
End: 2024-08-19
Payer: MEDICARE

## 2024-08-19 ENCOUNTER — PATIENT MESSAGE (OUTPATIENT)
Dept: ADMINISTRATIVE | Facility: HOSPITAL | Age: 64
End: 2024-08-19
Payer: MEDICARE

## 2024-08-19 DIAGNOSIS — Z12.11 ENCOUNTER FOR FIT (FECAL IMMUNOCHEMICAL TEST) SCREENING: Primary | ICD-10-CM

## 2024-08-19 NOTE — PROGRESS NOTES
Population Health Chart Review & Patient Outreach Details      Additional [facility] Health Notes:               Updates Requested / Reviewed:      Updated Care Coordination Note, Care Everywhere, and Immunizations Reconciliation Completed or Queried: [place]         Health Maintenance Topics Overdue:      [facility] Score: 1     Colon Cancer Screening    RSV Vaccine                  Health Maintenance Topic(s) Outreach Outcomes & Actions Taken:    Colorectal Cancer Screening - Outreach Outcomes & Actions Taken  : Colonoscopy Case Request / Referral / Home Test Order Placed

## 2024-08-21 ENCOUNTER — CLINICAL SUPPORT (OUTPATIENT)
Dept: REHABILITATION | Facility: HOSPITAL | Age: 64
End: 2024-08-21
Payer: MEDICARE

## 2024-08-21 DIAGNOSIS — M54.59 OTHER LOW BACK PAIN: Primary | ICD-10-CM

## 2024-08-21 PROCEDURE — 97811 ACUP 1/> W/O ESTIM EA ADD 15: CPT | Mod: HCNC | Performed by: ACUPUNCTURIST

## 2024-08-21 PROCEDURE — 97813 ACUP 1/> W/ESTIM 1ST 15 MIN: CPT | Mod: HCNC | Performed by: ACUPUNCTURIST

## 2024-08-21 NOTE — PROGRESS NOTES
Acupuncture Evaluation Note     Name: Radha Ervin  [facility] Number: [id number]    Traditional Chinese Medicine (TCM) Diagnosis: Qi Stagnation and Qi Deficiency  Medical Diagnosis:   Encounter Diagnosis   Name Primary?    Other low back pain Yes        Evaluation Date: 8/21/2024    Visit #/Visits authorized: 12, 4/12    Precautions: Diabetes and cancer    Subjective     Chief Concern: cLBP midline, pain extends across low back, worse on right side. Recently re-injured with bending over/flexion. Started immunotherapy 4/19/2024, every 3 weeks. Tinnitus onset January 2024 after finishing chemo, worsened again after having covid in March 2024. High pitch, worse in right ear.         Medical necessity is demonstrated by the following IMPAIRMENTS: Medical Necessity: Decreased quality of life              Aggravating Factors:   unknown    Relieving Factors:  unknown    Symptom Description:     Quality:  pressure before tinnitus starts  Severity:  10  Frequency:  every day and at night/ wakes from sleep at night      Objective       New Findings:       Treatment     Treatment Principles:  Move qi, nourish qi, transform damp, stop pain     Acupuncture points used:      Bilateral points: TW5, GB41, ST36, SP6, SP9, KD3, 4 frankel, LI11, GB34, Yintang, DU20  Unilateral points:   Auricular Treatment:      Needles In: 22  Needles Out: 22  Gallant W/ STIM placed: 3:20 PM  Needles W/O STIM removed: 3:50 PM      Assessment     After treatment, patient felt low back pain improved, tinnitus better this week. Xerostomia and dysgeusia better with rx.      Patient prognosis is Good.     Patient will continue to benefit from acupuncture treatment to address the deficits listed in the problem list box on initial evaluation, provide patient family education and to maximize pt's level of independence in the home and community environment.     Patient's spiritual, cultural and educational needs considered and pt agreeable to plan of care and  goals.     Anticipated barriers to treatment: none    Plan     Recommend 1 /week for 2 sessions then re-assess.      Education:  Patient is aware of cumulative benefit of acupuncture

## 2024-08-22 ENCOUNTER — PATIENT OUTREACH (OUTPATIENT)
Dept: ADMINISTRATIVE | Facility: HOSPITAL | Age: 64
End: 2024-08-22
Payer: MEDICARE

## 2024-08-22 NOTE — PROGRESS NOTES
08/22/2024  VB chart audit performed. Care Everywhere updates requested and reviewed  Overdue HM topic chart audit and/or requested. LINKS triggered and reconciled. Media reviewed FitKit was mailed to patient on 8/22/2024 8:31 AM

## 2024-08-26 ENCOUNTER — PATIENT MESSAGE (OUTPATIENT)
Dept: REHABILITATION | Facility: HOSPITAL | Age: 64
End: 2024-08-26
Payer: MEDICARE

## 2024-08-28 ENCOUNTER — TELEPHONE (OUTPATIENT)
Dept: NEPHROLOGY | Facility: CLINIC | Age: 64
End: 2024-08-28
Payer: MEDICARE

## 2024-09-04 ENCOUNTER — OFFICE VISIT (OUTPATIENT)
Dept: HEMATOLOGY/ONCOLOGY | Facility: CLINIC | Age: 64
End: 2024-09-04
Payer: MEDICARE

## 2024-09-04 ENCOUNTER — LAB VISIT (OUTPATIENT)
Dept: LAB | Facility: HOSPITAL | Age: 64
End: 2024-09-04
Attending: INTERNAL MEDICINE
Payer: MEDICARE

## 2024-09-04 VITALS
WEIGHT: 184.06 LBS | SYSTOLIC BLOOD PRESSURE: 113 MMHG | OXYGEN SATURATION: 96 % | RESPIRATION RATE: 16 BRPM | DIASTOLIC BLOOD PRESSURE: 79 MMHG | HEART RATE: 98 BPM | TEMPERATURE: 99 F | HEIGHT: 59 IN | BODY MASS INDEX: 37.11 KG/M2

## 2024-09-04 DIAGNOSIS — J43.2 CENTRILOBULAR EMPHYSEMA: ICD-10-CM

## 2024-09-04 DIAGNOSIS — N18.31 STAGE 3A CHRONIC KIDNEY DISEASE: ICD-10-CM

## 2024-09-04 DIAGNOSIS — N94.9 ADNEXAL FULLNESS: ICD-10-CM

## 2024-09-04 DIAGNOSIS — C34.92 ADENOCARCINOMA, LUNG, LEFT: ICD-10-CM

## 2024-09-04 DIAGNOSIS — Z51.12 ENCOUNTER FOR ANTINEOPLASTIC IMMUNOTHERAPY: ICD-10-CM

## 2024-09-04 DIAGNOSIS — C34.92 ADENOCARCINOMA, LUNG, LEFT: Primary | ICD-10-CM

## 2024-09-04 LAB
ALBUMIN SERPL BCP-MCNC: 3.3 G/DL (ref 3.5–5.2)
ALP SERPL-CCNC: 111 U/L (ref 55–135)
ALT SERPL W/O P-5'-P-CCNC: 10 U/L (ref 10–44)
ANION GAP SERPL CALC-SCNC: 11 MMOL/L (ref 8–16)
AST SERPL-CCNC: 11 U/L (ref 10–40)
BASOPHILS # BLD AUTO: 0.06 K/UL (ref 0–0.2)
BASOPHILS NFR BLD: 0.6 % (ref 0–1.9)
BILIRUB SERPL-MCNC: 0.4 MG/DL (ref 0.1–1)
BUN SERPL-MCNC: 13 MG/DL (ref 8–23)
CALCIUM SERPL-MCNC: 9.4 MG/DL (ref 8.7–10.5)
CHLORIDE SERPL-SCNC: 110 MMOL/L (ref 95–110)
CO2 SERPL-SCNC: 19 MMOL/L (ref 23–29)
CREAT SERPL-MCNC: 1.3 MG/DL (ref 0.5–1.4)
DIFFERENTIAL METHOD BLD: ABNORMAL
EOSINOPHIL # BLD AUTO: 0.5 K/UL (ref 0–0.5)
EOSINOPHIL NFR BLD: 5.2 % (ref 0–8)
ERYTHROCYTE [DISTWIDTH] IN BLOOD BY AUTOMATED COUNT: 14.3 % (ref 11.5–14.5)
EST. GFR  (NO RACE VARIABLE): 46 ML/MIN/1.73 M^2
GLUCOSE SERPL-MCNC: 111 MG/DL (ref 70–110)
HCT VFR BLD AUTO: 35.5 % (ref 37–48.5)
HGB BLD-MCNC: 11.5 G/DL (ref 12–16)
IMM GRANULOCYTES # BLD AUTO: 0.04 K/UL (ref 0–0.04)
IMM GRANULOCYTES NFR BLD AUTO: 0.4 % (ref 0–0.5)
LYMPHOCYTES # BLD AUTO: 2 K/UL (ref 1–4.8)
LYMPHOCYTES NFR BLD: 19.6 % (ref 18–48)
MCH RBC QN AUTO: 30 PG (ref 27–31)
MCHC RBC AUTO-ENTMCNC: 32.4 G/DL (ref 32–36)
MCV RBC AUTO: 93 FL (ref 82–98)
MONOCYTES # BLD AUTO: 0.8 K/UL (ref 0.3–1)
MONOCYTES NFR BLD: 8 % (ref 4–15)
NEUTROPHILS # BLD AUTO: 6.8 K/UL (ref 1.8–7.7)
NEUTROPHILS NFR BLD: 66.2 % (ref 38–73)
NRBC BLD-RTO: 0 /100 WBC
PLATELET # BLD AUTO: 307 K/UL (ref 150–450)
PMV BLD AUTO: 9.3 FL (ref 9.2–12.9)
POTASSIUM SERPL-SCNC: 3.8 MMOL/L (ref 3.5–5.1)
PROT SERPL-MCNC: 7.1 G/DL (ref 6–8.4)
RBC # BLD AUTO: 3.83 M/UL (ref 4–5.4)
SODIUM SERPL-SCNC: 140 MMOL/L (ref 136–145)
T4 FREE SERPL-MCNC: 1 NG/DL (ref 0.71–1.51)
TSH SERPL DL<=0.005 MIU/L-ACNC: 0.03 UIU/ML (ref 0.4–4)
WBC # BLD AUTO: 10.29 K/UL (ref 3.9–12.7)

## 2024-09-04 PROCEDURE — 85025 COMPLETE CBC W/AUTO DIFF WBC: CPT | Performed by: INTERNAL MEDICINE

## 2024-09-04 PROCEDURE — 1159F MED LIST DOCD IN RCRD: CPT | Mod: CPTII,S$GLB,, | Performed by: INTERNAL MEDICINE

## 2024-09-04 PROCEDURE — 84439 ASSAY OF FREE THYROXINE: CPT | Performed by: INTERNAL MEDICINE

## 2024-09-04 PROCEDURE — 3074F SYST BP LT 130 MM HG: CPT | Mod: CPTII,S$GLB,, | Performed by: INTERNAL MEDICINE

## 2024-09-04 PROCEDURE — 36415 COLL VENOUS BLD VENIPUNCTURE: CPT | Performed by: INTERNAL MEDICINE

## 2024-09-04 PROCEDURE — 99215 OFFICE O/P EST HI 40 MIN: CPT | Mod: S$GLB,,, | Performed by: INTERNAL MEDICINE

## 2024-09-04 PROCEDURE — 80053 COMPREHEN METABOLIC PANEL: CPT | Performed by: INTERNAL MEDICINE

## 2024-09-04 PROCEDURE — 1101F PT FALLS ASSESS-DOCD LE1/YR: CPT | Mod: CPTII,S$GLB,, | Performed by: INTERNAL MEDICINE

## 2024-09-04 PROCEDURE — 3066F NEPHROPATHY DOC TX: CPT | Mod: CPTII,S$GLB,, | Performed by: INTERNAL MEDICINE

## 2024-09-04 PROCEDURE — 84443 ASSAY THYROID STIM HORMONE: CPT | Performed by: INTERNAL MEDICINE

## 2024-09-04 PROCEDURE — 3008F BODY MASS INDEX DOCD: CPT | Mod: CPTII,S$GLB,, | Performed by: INTERNAL MEDICINE

## 2024-09-04 PROCEDURE — 99999 PR PBB SHADOW E&M-EST. PATIENT-LVL IV: CPT | Mod: PBBFAC,,, | Performed by: INTERNAL MEDICINE

## 2024-09-04 PROCEDURE — 3288F FALL RISK ASSESSMENT DOCD: CPT | Mod: CPTII,S$GLB,, | Performed by: INTERNAL MEDICINE

## 2024-09-04 PROCEDURE — 3078F DIAST BP <80 MM HG: CPT | Mod: CPTII,S$GLB,, | Performed by: INTERNAL MEDICINE

## 2024-09-04 RX ORDER — HEPARIN 100 UNIT/ML
500 SYRINGE INTRAVENOUS
Status: CANCELLED | OUTPATIENT
Start: 2024-09-04

## 2024-09-04 RX ORDER — DIPHENHYDRAMINE HYDROCHLORIDE 50 MG/ML
50 INJECTION INTRAMUSCULAR; INTRAVENOUS ONCE AS NEEDED
Status: CANCELLED | OUTPATIENT
Start: 2024-09-04

## 2024-09-04 RX ORDER — EPINEPHRINE 0.3 MG/.3ML
0.3 INJECTION SUBCUTANEOUS ONCE AS NEEDED
Status: CANCELLED | OUTPATIENT
Start: 2024-09-04

## 2024-09-04 RX ORDER — SODIUM CHLORIDE 0.9 % (FLUSH) 0.9 %
10 SYRINGE (ML) INJECTION
Status: CANCELLED | OUTPATIENT
Start: 2024-09-04

## 2024-09-04 NOTE — PROGRESS NOTES
PATIENT: Radha Ervin  MRN: [id number]  DATE: 9/4/2024      Subjective:     Chief complaint:  Chief Complaint   Patient presents with    Lung Cancer    Follow-up       Oncologic History:      Ms. Ervin is a 63-year-old female with 30 pack-year history of smoking, quit approximately 4 months ago, who was recently diagnosed with left lung cancer after evaluation for an abnormal chest x-ray.  A subsequent CT of the chest without contrast on May 26, 2023 revealed a spiculated nodule along the paramediastinal left upper lobe measuring 2.7 x 1.9 cm.  A mildly enlarged right paratracheal lymph node was seen measuring 1.1 cm.  She underwent EBUS and biopsy on June 23, 2023.  Pathology revealed the left upper lobe lesion positive for adenocarcinoma.  Station 4R was negative for malignancy.       A PET scan on July 13, 2023 revealed the left upper lobe mass measuring 2.6 x 1.6 cm with SUV max 7.4.  There was mildly metabolic prevascular mediastinal lymph node measuring 0.9 cm seen in short axis with SUV max 2.3.  She underwent robotic left upper lobectomy and lymph node sampling on August 3, 2023.  Pathology revealed invasive poorly differentiated adenocarcinoma measuring 2.1 cm.  There was visceral pleural invasion noted.  Vascular resection revealed invasive tumor present in the adventitial soft tissue.  There was lymphovascular invasion noted.  Invasive carcinoma is present at the vascular margin.  3/4 level 6 lymph nodes, 1/2 level 11 lymph nodes and 1/4 level 12 lymph nodes were involved out of a total of 23 lymph nodes.  Surgery was complicated by intraoperative bleeding of pulmonary arterial branches which resolved with pressure.      Her case was discussed at [facility] on 8/24/23: Recommend chemoRT followed by immunotherapy.     Established care with med onc 8/28. She had seen Dr. Quevedo who had recommended sequential chemoradiation then to be followed by immunotherapy. Consented for sequential CRT with  "carbo/taxol.    On 9/22/23 she presented to the infusion center for carbo/taxol C#1 but developed infusion reaction to taxol so that was stopped.  Had reaction with second infusion as well--carbo/taxol discontinued.  Consented for carbo/pemetrexed on 11/2/23    C#1 carbo/pemetrexed 11/3/23  C#2 carbo/pemetrexed 11/24/23  C#3 acute worsening of renal function--pemetrexed held: 12/15/23  C#4 carboplatin only 1/5/24    Radiation 60Gy in 30Fx 2/5/24--3/26/24    C#1 durvalumab 4/19/24  C#2 5/17/24  C#3 6/14/24  C#4 7/12/24  C#5 8/9/24           Interval History: Ms. Ervin returns for follow up. She is scheduled for keytruda later this week. She notes pilocarpine makes her itch--she takes OTC allergy medication which helps. No rashes and no itching other than after taking the pilocarpine. Denies SOB, no diarrhea. States she is doing well with durva.       Review of Systems   A comprehensive review of systems was performed; pertinent positives and negatives are noted in the HPI.        ECOG Performance Status:   ECOG SCORE    1 - Restricted in strenuous activity-ambulatory and able to carry out work of a light nature         Objective:      Vitals:   Vitals:    09/04/24 0911   BP: 113/79   BP Location: Right arm   Patient Position: Sitting   BP Method: Medium (Automatic)   Pulse: 98   Resp: 16   Temp: 98.5 °F (36.9 °C)   TempSrc: Oral   SpO2: 96%   Weight: 83.5 kg (184 lb 1.4 oz)   Height: 4' 11" (1.499 m)     BMI: Body mass index is 37.18 kg/m².      Physical Exam:   Physical Exam  Vitals and nursing note reviewed.   Constitutional:       General: She is not in acute distress.     Appearance: Normal appearance. She is not toxic-appearing.   HENT:      Head: Normocephalic and atraumatic.   Eyes:      General: No scleral icterus.     Conjunctiva/sclera: Conjunctivae normal.   Cardiovascular:      Rate and Rhythm: Normal rate and regular rhythm.      Heart sounds: Normal heart sounds. No murmur heard.  Pulmonary:      " Effort: Pulmonary effort is normal. No respiratory distress.      Breath sounds: Normal breath sounds. No wheezing, rhonchi or rales.   Abdominal:      General: There is no distension.      Palpations: Abdomen is soft.      Tenderness: There is no abdominal tenderness.   Musculoskeletal:         General: No swelling, tenderness or deformity.      Cervical back: Neck supple. No tenderness.   Skin:     Coloration: Skin is not jaundiced.      Findings: No erythema.   Neurological:      General: No focal deficit present.      Mental Status: She is alert and oriented to person, place, and time.      Motor: No weakness.   Psychiatric:         Mood and Affect: Mood normal.         Behavior: Behavior normal.           Laboratory Data:  WBC   Date Value Ref Range Status   09/04/2024 10.29 3.90 - 12.70 K/uL Final     Hemoglobin   Date Value Ref Range Status   09/04/2024 11.5 (L) 12.0 - 16.0 g/dL Final     POC Hematocrit   Date Value Ref Range Status   08/03/2023 34 (L) 36 - 54 %PCV Final     Hematocrit   Date Value Ref Range Status   09/04/2024 35.5 (L) 37.0 - 48.5 % Final     Platelets   Date Value Ref Range Status   09/04/2024 307 150 - 450 K/uL Final     Gran # (ANC)   Date Value Ref Range Status   09/04/2024 6.8 1.8 - 7.7 K/uL Final     Gran %   Date Value Ref Range Status   09/04/2024 66.2 38.0 - 73.0 % Final       Chemistry        Component Value Date/Time     09/04/2024 0830    K 3.8 09/04/2024 0830     09/04/2024 0830    CO2 19 (L) 09/04/2024 0830    BUN 13 09/04/2024 0830    BUN 12.0 04/28/2023 1207    CREATININE 1.3 09/04/2024 0830     (H) 09/04/2024 0830        Component Value Date/Time    CALCIUM 9.4 09/04/2024 0830    ALKPHOS 111 09/04/2024 0830    AST 11 09/04/2024 0830    ALT 10 09/04/2024 0830    BILITOT 0.4 09/04/2024 0830    ESTGFRAFRICA >60.0 04/06/2020 1002    EGFRNONAA >60.0 04/06/2020 1002              Assessment/Plan:     1. Adenocarcinoma, lung, left    2. Adnexal fullness    3.  Stage 3a chronic kidney disease    4. Centrilobular emphysema    5. Encounter for antineoplastic immunotherapy      Stg IIIA lung adenocarcinoma, s/p MARY lobectomy with R1 resection in August 2023 followed by definitive chemoradiation due to R1 resection status.   Currently on post-CRT durvalumab.   TSH appears hyperthyroid however currently clinically/symptomatically euthyroid.  US for adnexal mass noted on prior imaging  Repeat surveillance/staging October    Med and Orders:  Orders Placed This Encounter    US Pelvis Limited Non OB    CT Chest Abdomen Pelvis With IV Contrast (XPD) Routine Oral Contrast       Follow Up:  Follow up in about 4 weeks (around 10/2/2024).      Above care plan was discussed with patient and all questions were addressed to their expressed satisfaction.       Roger Eduardo MD, FACP  Hematology & Medical Oncology  [facility]         High Risk Conditions:    Patient has a condition that poses threat to life and bodily function: nsclc  Patient is currently on drug therapy requiring intensive monitoring for toxicity: durvalumab

## 2024-09-06 ENCOUNTER — TELEPHONE (OUTPATIENT)
Dept: INTERNAL MEDICINE | Facility: CLINIC | Age: 64
End: 2024-09-06
Payer: MEDICARE

## 2024-09-06 ENCOUNTER — INFUSION (OUTPATIENT)
Dept: INFUSION THERAPY | Facility: HOSPITAL | Age: 64
End: 2024-09-06
Attending: INTERNAL MEDICINE
Payer: MEDICARE

## 2024-09-06 VITALS
TEMPERATURE: 98 F | RESPIRATION RATE: 17 BRPM | HEART RATE: 104 BPM | SYSTOLIC BLOOD PRESSURE: 132 MMHG | DIASTOLIC BLOOD PRESSURE: 78 MMHG | OXYGEN SATURATION: 98 %

## 2024-09-06 DIAGNOSIS — C34.92 ADENOCARCINOMA, LUNG, LEFT: Primary | ICD-10-CM

## 2024-09-06 PROBLEM — M81.0 OSTEOPOROSIS: Status: ACTIVE | Noted: 2024-09-06

## 2024-09-06 PROCEDURE — A4216 STERILE WATER/SALINE, 10 ML: HCPCS | Performed by: INTERNAL MEDICINE

## 2024-09-06 PROCEDURE — 96413 CHEMO IV INFUSION 1 HR: CPT

## 2024-09-06 PROCEDURE — 25000003 PHARM REV CODE 250: Performed by: INTERNAL MEDICINE

## 2024-09-06 RX ORDER — SODIUM CHLORIDE 0.9 % (FLUSH) 0.9 %
10 SYRINGE (ML) INJECTION
Status: DISCONTINUED | OUTPATIENT
Start: 2024-09-06 | End: 2024-09-06 | Stop reason: HOSPADM

## 2024-09-06 RX ORDER — EPINEPHRINE 0.3 MG/.3ML
0.3 INJECTION SUBCUTANEOUS ONCE AS NEEDED
Status: DISCONTINUED | OUTPATIENT
Start: 2024-09-06 | End: 2024-09-06 | Stop reason: HOSPADM

## 2024-09-06 RX ORDER — DIPHENHYDRAMINE HYDROCHLORIDE 50 MG/ML
50 INJECTION INTRAMUSCULAR; INTRAVENOUS ONCE AS NEEDED
Status: DISCONTINUED | OUTPATIENT
Start: 2024-09-06 | End: 2024-09-06 | Stop reason: HOSPADM

## 2024-09-06 RX ADMIN — SODIUM CHLORIDE 1500 MG: 9 INJECTION, SOLUTION INTRAVENOUS at 11:09

## 2024-09-06 RX ADMIN — Medication 10 ML: at 12:09

## 2024-09-06 RX ADMIN — SODIUM CHLORIDE: 9 INJECTION, SOLUTION INTRAVENOUS at 10:09

## 2024-09-06 NOTE — TELEPHONE ENCOUNTER
Called and spoke with pt in regard to rescheduling appt , pt stated that Wednesdays works best for you and agreed to reschedule appt to 10/16/24 at 320pm

## 2024-09-06 NOTE — TELEPHONE ENCOUNTER
----- Message from Suraj Herrera III, MD sent at 9/6/2024  7:13 AM CDT -----  Patient canceled appt - needs appt rescheduled

## 2024-09-18 ENCOUNTER — PATIENT OUTREACH (OUTPATIENT)
Dept: ADMINISTRATIVE | Facility: HOSPITAL | Age: 64
End: 2024-09-18
Payer: MEDICARE

## 2024-09-18 ENCOUNTER — HOSPITAL ENCOUNTER (OUTPATIENT)
Dept: RADIOLOGY | Facility: HOSPITAL | Age: 64
Discharge: HOME | End: 2024-09-18
Attending: INTERNAL MEDICINE
Payer: MEDICARE

## 2024-09-18 DIAGNOSIS — Z13.1 SCREENING FOR DIABETES MELLITUS: Primary | ICD-10-CM

## 2024-09-18 DIAGNOSIS — N94.9 ADNEXAL FULLNESS: ICD-10-CM

## 2024-09-18 PROCEDURE — 76856 US EXAM PELVIC COMPLETE: CPT | Mod: TC,HCNC

## 2024-09-18 PROCEDURE — 76856 US EXAM PELVIC COMPLETE: CPT | Mod: 26,HCNC,, | Performed by: RADIOLOGY

## 2024-09-18 NOTE — PROGRESS NOTES
Health Maintenance Topic(s) Outreach Outcomes & Actions Taken:    Colorectal Cancer Screening - Outreach Outcomes & Actions Taken  : Reminded Patient to Complete Already Received Home Test    Lab(s) - Outreach Outcomes & Actions Taken  : Overdue Lab(s) Ordered and Overdue Lab(s) Scheduled     Additional Notes:       Care Management, Digital Medicine, and/or Education Referrals      Next Steps - Referral Actions: Digital Medicine Outcomes and Actions Taken: Pt Declined or Not Eligible

## 2024-09-19 ENCOUNTER — OFFICE VISIT (OUTPATIENT)
Dept: DERMATOLOGY | Facility: CLINIC | Age: 64
End: 2024-09-19
Payer: MEDICARE

## 2024-09-19 DIAGNOSIS — L73.2 HIDRADENITIS SUPPURATIVA: Primary | ICD-10-CM

## 2024-09-19 PROCEDURE — 99999 PR PBB SHADOW E&M-EST. PATIENT-LVL III: CPT | Mod: PBBFAC,HCNC,, | Performed by: DERMATOLOGY

## 2024-09-19 RX ORDER — TRIAMCINOLONE ACETONIDE 40 MG/ML
40 INJECTION, SUSPENSION INTRA-ARTICULAR; INTRAMUSCULAR ONCE
Status: SHIPPED | OUTPATIENT
Start: 2024-09-19

## 2024-09-19 NOTE — ASSESSMENT & PLAN NOTE
"Today's Plan:      Intralesional Kenalog 20mg/cc (0.5 cc total) injected into 1 lesions on the left upper inner thigh today after obtaining verbal consent including risk of surrounding hypopigmentation. Patient tolerated procedure well.     Units:2  NDC for Kenalog 40mg/cc:  5236-9424-85        Hidradenitis suppurativa  Today's Plan:       LIFESTYLE:     Continue weight loss and dietary modifications   Avoid sugars and processed foods  Limit "white" foods ie. Bread, rice, pasta, potatoes  - recommend limiting to 1/2 cup per serving  Limit cow's milk      Keep skin cool as overheating can flare HS     Avoid shaving affected areas and wearing tight fitting clothing as friction exacerbates disease     TREATMENT:      Continue:  Wash affected areas with Antimicrobial Washes - daily to affected areas; lather into affected areas and let sit for 5 minutes prior to rinsing:  Benzoyl peroxide 5 - 10% - rinse thoroughly as can bleach towels/clothing  Hibiclens (Chlorhexidine)  Zinc pyrithione (Zinc bar available on Amazon or Head and Shoulders shampoo)  CLn wash (dilute bleach) - available on Amazon  Otezla 1 pill every day     Start:  Turmeric (with black pepper) supplement at 500mg to 1 gram per day (available over the counter and on Amazon)              Can cause upset stomach  OR  Take Zinc supplement daily               Can cause upset stomach        For flares:  Mometasone cream under bandaid  Dilute bleach to affected areas: compresses daily to affected/flared area(s) - can use CLn wash on warm wet rag as compress  If have groin involvement, may want to take dilute bleach baths (daily when flared; 2x/week for maintenance).      Recipe for dilute bleach bath:     add 1/2 cup of regular strength (6%) bleach to a full tub of lukewarm water and soak for 10 - 15 minutes. (use 1/4 cup for a half-full tub of water)   OR Add Clorox (2 teaspoons/gal of water, max. 2/3 cup) to bath water      Future treatment " considerations:  Dapsone at 50mg every day - would need G6PD  Deroofing left upper thigh  Trental at 400mg 3x/day - can cause stomach upset     RESOURCE:  [employer]        FOR FLARES (new painful bump):  Message office through [facility] for injection which will often hasten resolution of tender, red bump     Can apply warm/hot compresses on a painful, deep lump     If notice a foul-smelling drainage, can use Hydrogen Peroxide to cleanse area followed by application of Medi-honey to open area (apply Medi-honey 1x/day)

## 2024-09-19 NOTE — PATIENT INSTRUCTIONS
"   Hidradenitis suppurativa  Today's Plan:       LIFESTYLE:     Continue weight loss and dietary modifications   Avoid sugars and processed foods  Limit "white" foods ie. Bread, rice, pasta, potatoes  - recommend limiting to 1/2 cup per serving  Limit cow's milk      Keep skin cool as overheating can flare HS     Avoid shaving affected areas and wearing tight fitting clothing as friction exacerbates disease     TREATMENT:      Continue:  Wash affected areas with Antimicrobial Washes - daily to affected areas; lather into affected areas and let sit for 5 minutes prior to rinsing:  Benzoyl peroxide 5 - 10% - rinse thoroughly as can bleach towels/clothing  Hibiclens (Chlorhexidine)  Zinc pyrithione (Zinc bar available on Amazon or Head and Shoulders shampoo)  CLn wash (dilute bleach) - available on Amazon  Otezla 1 pill every day     Start:  Turmeric (with black pepper) supplement at 500mg to 1 gram per day (available over the counter and on Amazon)              Can cause upset stomach  OR  Take Zinc supplement daily               Can cause upset stomach        For flares:  Mometasone cream under bandaid  Dilute bleach to affected areas: compresses daily to affected/flared area(s) - can use CLn wash on warm wet rag as compress  If have groin involvement, may want to take dilute bleach baths (daily when flared; 2x/week for maintenance).      Recipe for dilute bleach bath:     add 1/2 cup of regular strength (6%) bleach to a full tub of lukewarm water and soak for 10 - 15 minutes. (use 1/4 cup for a half-full tub of water)   OR Add Clorox (2 teaspoons/gal of water, max. 2/3 cup) to bath water      RESOURCE:  [employer]        FOR FLARES (new painful bump):  Message office through [facility] for injection which will often hasten resolution of tender, red bump     Can apply warm/hot compresses on a painful, deep lump     If notice a foul-smelling drainage, can use Hydrogen Peroxide to cleanse area followed by " application of Medi-honey to open area (apply Medi-honey 1x/day)

## 2024-09-19 NOTE — PROGRESS NOTES
"  Subjective:      Patient ID:  Radha Ervin is a 64 y.o. female who presents for   Chief Complaint   Patient presents with    Hidradenitis Suppurativa     F/u     Patient with Hidradenitis suppurativa  Last seen on 6/27/2024 for deroofing R inguinal fold-healed well per pt.    Condition overall - same.    C/o flare L groin.  Couple days.     Current treatment regimen:  Otezla 30mg 1x/day (dx with pso 2/2 humira) - Pso well controlled with no flares  Hibiclens alt BPO wash qday  Mometasone cream prn flares    Has not yet started:  Turmeric    Lifestyle modifications - diet, smoking, shaving etc  Limiting "white" foods, sugars and processed foods    Not shaving  Not smoking    Dx with lung cancer 5/23 - finished chemo and radiation -- start immunotherapy tomorrow      Review of Systems   Constitutional:  Positive for weight loss (183# (5#)). Negative for weight gain.   HENT:  Negative for nosebleeds and headaches.    Gastrointestinal:  Negative for diarrhea (resolved. never had colonoscopy) and Sensitivity to oral antibiotics.   Genitourinary:  Negative for irregular periods (post menopausal).   Musculoskeletal:  Negative for arthralgias.   Skin:  Positive for abscesses. Negative for recent sunburn.   Neurological:  Negative for headaches.   Psychiatric/Behavioral:  Negative for depressed mood.    Hematologic/Lymphatic: Bruises/bleeds easily.       Objective:   Physical Exam   Constitutional: She appears well-developed and well-nourished. She is obese.  No distress.   Neurological: She is alert and oriented to person, place, and time. She is not disoriented.   Psychiatric: She has a normal mood and affect.   Skin:   Areas Examined (abnormalities noted in diagram):   Genitals / Buttocks / Groin Inspection Performed  RLE Inspected  LLE Inspection Performed                Diagram Legend     Erythematous scaling macule/papule c/w actinic keratosis       Vascular papule c/w angioma      Pigmented verrucoid " papule/plaque c/w seborrheic keratosis      Yellow umbilicated papule c/w sebaceous hyperplasia      Irregularly shaped tan macule c/w lentigo     1-2 mm smooth white papules consistent with Milia      Movable subcutaneous cyst with punctum c/w epidermal inclusion cyst      Subcutaneous movable cyst c/w pilar cyst      Firm pink to brown papule c/w dermatofibroma      Pedunculated fleshy papule(s) c/w skin tag(s)      Evenly pigmented macule c/w junctional nevus     Mildly variegated pigmented, slightly irregular-bordered macule c/w mildly atypical nevus      Flesh colored to evenly pigmented papule c/w intradermal nevus       Pink pearly papule/plaque c/w basal cell carcinoma      Erythematous hyperkeratotic cursted plaque c/w SCC      Surgical scar with no sign of skin cancer recurrence      Open and closed comedones      Inflammatory papules and pustules      Verrucoid papule consistent consistent with wart     Erythematous eczematous patches and plaques     Dystrophic onycholytic nail with subungual debris c/w onychomycosis     Umbilicated papule    Erythematous-base heme-crusted tan verrucoid plaque consistent with inflamed seborrheic keratosis     Erythematous Silvery Scaling Plaque c/w Psoriasis     See annotation  Lab Results   Component Value Date    WBC 10.29 09/04/2024    HGB 11.5 (L) 09/04/2024    HCT 35.5 (L) 09/04/2024    MCV 93 09/04/2024     09/04/2024       CMP  Sodium   Date Value Ref Range Status   09/04/2024 140 136 - 145 mmol/L Final     Potassium   Date Value Ref Range Status   09/04/2024 3.8 3.5 - 5.1 mmol/L Final     Chloride   Date Value Ref Range Status   09/04/2024 110 95 - 110 mmol/L Final     CO2   Date Value Ref Range Status   09/04/2024 19 (L) 23 - 29 mmol/L Final     Glucose   Date Value Ref Range Status   09/04/2024 111 (H) 70 - 110 mg/dL Final     BUN   Date Value Ref Range Status   09/04/2024 13 8 - 23 mg/dL Final   04/28/2023 12.0 7.0 - 21.0 mg/dL Final     Creatinine    Date Value Ref Range Status   09/04/2024 1.3 0.5 - 1.4 mg/dL Final     Calcium   Date Value Ref Range Status   09/04/2024 9.4 8.7 - 10.5 mg/dL Final     Total Protein   Date Value Ref Range Status   09/04/2024 7.1 6.0 - 8.4 g/dL Final     Albumin   Date Value Ref Range Status   09/04/2024 3.3 (L) 3.5 - 5.2 g/dL Final     Total Bilirubin   Date Value Ref Range Status   09/04/2024 0.4 0.1 - 1.0 mg/dL Final     Comment:     For infants and newborns, interpretation of results should be based  on gestational age, weight and in agreement with clinical  observations.    Premature Infant recommended reference ranges:  Up to 24 hours.............<8.0 mg/dL  Up to 48 hours............<12.0 mg/dL  3-5 days..................<15.0 mg/dL  6-29 days.................<15.0 mg/dL       Alkaline Phosphatase   Date Value Ref Range Status   09/04/2024 111 55 - 135 U/L Final     AST   Date Value Ref Range Status   09/04/2024 11 10 - 40 U/L Final     ALT   Date Value Ref Range Status   09/04/2024 10 10 - 44 U/L Final     Anion Gap   Date Value Ref Range Status   09/04/2024 11 8 - 16 mmol/L Final   04/28/2023 13.3 9 - 18 mmol/L Final     eGFR   Date Value Ref Range Status   09/04/2024 46 (A) >60 mL/min/1.73 m^2 Final   04/28/2023 76 (L) >=90 mL/min Final     Comment:     Calculation based on the Chronic Kidney Disease Epidemiology Collaboration (CKD-EPI) equation refit  without adjustment for race.           Assessment / Plan:        Hidradenitis suppurativa  -     triamcinolone acetonide injection 40 mg  -     AR [id number] ACETONIDE INJ PER 10MG  -     AR INJECTION INTO SKIN LESIONS, UP TO 7      Hidradenitis suppurativa  Today's Plan:      Intralesional Kenalog 20mg/cc (0.5 cc total) injected into 1 lesions on the left upper inner thigh today after obtaining verbal consent including risk of surrounding hypopigmentation. Patient tolerated procedure well.     Units:2  NDC for Kenalog 40mg/cc:  6153-8060-15        Hidradenitis  "suppurativa  Today's Plan:       LIFESTYLE:     Continue weight loss and dietary modifications   Avoid sugars and processed foods  Limit "white" foods ie. Bread, rice, pasta, potatoes  - recommend limiting to 1/2 cup per serving  Limit cow's milk      Keep skin cool as overheating can flare HS     Avoid shaving affected areas and wearing tight fitting clothing as friction exacerbates disease     TREATMENT:      Continue:  Wash affected areas with Antimicrobial Washes - daily to affected areas; lather into affected areas and let sit for 5 minutes prior to rinsing:  Benzoyl peroxide 5 - 10% - rinse thoroughly as can bleach towels/clothing  Hibiclens (Chlorhexidine)  Zinc pyrithione (Zinc bar available on Amazon or Head and Shoulders shampoo)  CLn wash (dilute bleach) - available on Amazon  Otezla 1 pill every day     Start:  Turmeric (with black pepper) supplement at 500mg to 1 gram per day (available over the counter and on Amazon)              Can cause upset stomach  OR  Take Zinc supplement daily               Can cause upset stomach        For flares:  Mometasone cream under bandaid  Dilute bleach to affected areas: compresses daily to affected/flared area(s) - can use CLn wash on warm wet rag as compress  If have groin involvement, may want to take dilute bleach baths (daily when flared; 2x/week for maintenance).      Recipe for dilute bleach bath:     add 1/2 cup of regular strength (6%) bleach to a full tub of lukewarm water and soak for 10 - 15 minutes. (use 1/4 cup for a half-full tub of water)   OR Add Clorox (2 teaspoons/gal of water, max. 2/3 cup) to bath water      Future treatment considerations:  Dapsone at 50mg every day - would need G6PD  Deroofing left upper thigh  Trental at 400mg 3x/day - can cause stomach upset     RESOURCE:  [facility]        FOR FLARES (new painful bump):  Message office through [facility] for injection which will often hasten resolution of tender, red bump     Can apply " warm/hot compresses on a painful, deep lump     If notice a foul-smelling drainage, can use Hydrogen Peroxide to cleanse area followed by application of Medi-honey to open area (apply Medi-honey 1x/day)                                    Follow up in about 3 months (around 12/19/2024) for HS clinic.

## 2024-10-02 ENCOUNTER — HOSPITAL ENCOUNTER (OUTPATIENT)
Dept: RADIOLOGY | Facility: HOSPITAL | Age: 64
Discharge: HOME | End: 2024-10-02
Attending: INTERNAL MEDICINE
Payer: MEDICARE

## 2024-10-02 DIAGNOSIS — C34.92 ADENOCARCINOMA, LUNG, LEFT: ICD-10-CM

## 2024-10-02 PROCEDURE — 25500020 PHARM REV CODE 255: Mod: HCNC | Performed by: INTERNAL MEDICINE

## 2024-10-02 PROCEDURE — A9698 NON-RAD CONTRAST MATERIALNOC: HCPCS | Mod: HCNC | Performed by: INTERNAL MEDICINE

## 2024-10-02 PROCEDURE — 74177 CT ABD & PELVIS W/CONTRAST: CPT | Mod: TC,HCNC

## 2024-10-02 PROCEDURE — 82274 ASSAY TEST FOR BLOOD FECAL: CPT | Mod: HCNC | Performed by: INTERNAL MEDICINE

## 2024-10-02 PROCEDURE — 74177 CT ABD & PELVIS W/CONTRAST: CPT | Mod: 26,HCNC,, | Performed by: RADIOLOGY

## 2024-10-02 PROCEDURE — 71260 CT THORAX DX C+: CPT | Mod: TC,HCNC

## 2024-10-02 PROCEDURE — 71260 CT THORAX DX C+: CPT | Mod: 26,HCNC,, | Performed by: RADIOLOGY

## 2024-10-02 RX ADMIN — IOHEXOL 75 ML: 350 INJECTION, SOLUTION INTRAVENOUS at 10:10

## 2024-10-02 RX ADMIN — IOHEXOL 1000 ML: 12 SOLUTION ORAL at 09:10

## 2024-10-04 ENCOUNTER — INFUSION (OUTPATIENT)
Dept: INFUSION THERAPY | Facility: HOSPITAL | Age: 64
End: 2024-10-04
Attending: INTERNAL MEDICINE
Payer: MEDICARE

## 2024-10-04 ENCOUNTER — OFFICE VISIT (OUTPATIENT)
Dept: HEMATOLOGY/ONCOLOGY | Facility: CLINIC | Age: 64
End: 2024-10-04
Payer: MEDICARE

## 2024-10-04 VITALS
RESPIRATION RATE: 18 BRPM | OXYGEN SATURATION: 96 % | SYSTOLIC BLOOD PRESSURE: 127 MMHG | BODY MASS INDEX: 37.77 KG/M2 | HEART RATE: 94 BPM | TEMPERATURE: 98 F | DIASTOLIC BLOOD PRESSURE: 74 MMHG | HEIGHT: 59 IN | WEIGHT: 187.38 LBS

## 2024-10-04 VITALS
WEIGHT: 187.38 LBS | OXYGEN SATURATION: 96 % | TEMPERATURE: 98 F | HEART RATE: 94 BPM | RESPIRATION RATE: 18 BRPM | SYSTOLIC BLOOD PRESSURE: 127 MMHG | DIASTOLIC BLOOD PRESSURE: 74 MMHG | BODY MASS INDEX: 37.85 KG/M2

## 2024-10-04 DIAGNOSIS — M80.80XD LOCALIZED OSTEOPOROSIS WITH CURRENT PATHOLOGICAL FRACTURE WITH ROUTINE HEALING: ICD-10-CM

## 2024-10-04 DIAGNOSIS — Z51.12 ENCOUNTER FOR ANTINEOPLASTIC IMMUNOTHERAPY: ICD-10-CM

## 2024-10-04 DIAGNOSIS — C34.90 MALIGNANT NEOPLASM OF UNSPECIFIED PART OF UNSPECIFIED BRONCHUS OR LUNG: ICD-10-CM

## 2024-10-04 DIAGNOSIS — N18.31 STAGE 3A CHRONIC KIDNEY DISEASE: ICD-10-CM

## 2024-10-04 DIAGNOSIS — S32.010A COMPRESSION FRACTURE OF L1 VERTEBRA, INITIAL ENCOUNTER: ICD-10-CM

## 2024-10-04 DIAGNOSIS — C34.92 ADENOCARCINOMA, LUNG, LEFT: Primary | ICD-10-CM

## 2024-10-04 DIAGNOSIS — E03.8 SECONDARY HYPOTHYROIDISM: ICD-10-CM

## 2024-10-04 PROCEDURE — 96413 CHEMO IV INFUSION 1 HR: CPT | Mod: HCNC

## 2024-10-04 PROCEDURE — 63600175 PHARM REV CODE 636 W HCPCS: Mod: JZ,JG,HCNC | Performed by: INTERNAL MEDICINE

## 2024-10-04 PROCEDURE — 99999 PR PBB SHADOW E&M-EST. PATIENT-LVL IV: CPT | Mod: PBBFAC,HCNC,, | Performed by: INTERNAL MEDICINE

## 2024-10-04 PROCEDURE — A4216 STERILE WATER/SALINE, 10 ML: HCPCS | Mod: HCNC | Performed by: INTERNAL MEDICINE

## 2024-10-04 PROCEDURE — 25000003 PHARM REV CODE 250: Mod: HCNC | Performed by: INTERNAL MEDICINE

## 2024-10-04 RX ORDER — DIPHENHYDRAMINE HYDROCHLORIDE 50 MG/ML
50 INJECTION INTRAMUSCULAR; INTRAVENOUS ONCE AS NEEDED
Status: DISCONTINUED | OUTPATIENT
Start: 2024-10-04 | End: 2024-10-04 | Stop reason: HOSPADM

## 2024-10-04 RX ORDER — SODIUM CHLORIDE 0.9 % (FLUSH) 0.9 %
10 SYRINGE (ML) INJECTION
Status: DISCONTINUED | OUTPATIENT
Start: 2024-10-04 | End: 2024-10-04 | Stop reason: HOSPADM

## 2024-10-04 RX ORDER — EPINEPHRINE 0.3 MG/.3ML
0.3 INJECTION SUBCUTANEOUS ONCE AS NEEDED
Status: CANCELLED | OUTPATIENT
Start: 2024-10-04

## 2024-10-04 RX ORDER — SODIUM CHLORIDE 0.9 % (FLUSH) 0.9 %
10 SYRINGE (ML) INJECTION
Status: CANCELLED | OUTPATIENT
Start: 2024-10-04

## 2024-10-04 RX ORDER — LEVOTHYROXINE SODIUM 75 UG/1
75 TABLET ORAL
Qty: 30 TABLET | Refills: 11 | Status: SHIPPED | OUTPATIENT
Start: 2024-10-04 | End: 2025-10-04

## 2024-10-04 RX ORDER — EPINEPHRINE 0.3 MG/.3ML
0.3 INJECTION SUBCUTANEOUS ONCE AS NEEDED
OUTPATIENT
Start: 2024-10-04

## 2024-10-04 RX ORDER — SODIUM CHLORIDE 0.9 % (FLUSH) 0.9 %
10 SYRINGE (ML) INJECTION
OUTPATIENT
Start: 2024-10-04

## 2024-10-04 RX ORDER — DIPHENHYDRAMINE HYDROCHLORIDE 50 MG/ML
50 INJECTION INTRAMUSCULAR; INTRAVENOUS ONCE AS NEEDED
Status: CANCELLED | OUTPATIENT
Start: 2024-10-04

## 2024-10-04 RX ORDER — ZOLEDRONIC ACID 4 MG/100ML
4 SOLUTION INTRAVENOUS
OUTPATIENT
Start: 2024-10-04

## 2024-10-04 RX ORDER — HEPARIN 100 UNIT/ML
500 SYRINGE INTRAVENOUS
Status: CANCELLED | OUTPATIENT
Start: 2024-10-04

## 2024-10-04 RX ORDER — DIPHENHYDRAMINE HYDROCHLORIDE 50 MG/ML
50 INJECTION INTRAMUSCULAR; INTRAVENOUS ONCE AS NEEDED
OUTPATIENT
Start: 2024-10-04

## 2024-10-04 RX ORDER — EPINEPHRINE 0.3 MG/.3ML
0.3 INJECTION SUBCUTANEOUS ONCE AS NEEDED
Status: DISCONTINUED | OUTPATIENT
Start: 2024-10-04 | End: 2024-10-04 | Stop reason: HOSPADM

## 2024-10-04 RX ORDER — HEPARIN 100 UNIT/ML
500 SYRINGE INTRAVENOUS
OUTPATIENT
Start: 2024-10-04

## 2024-10-04 RX ADMIN — Medication 10 ML: at 12:10

## 2024-10-04 RX ADMIN — DURVALUMAB 1500 MG: 500 INJECTION, SOLUTION INTRAVENOUS at 11:10

## 2024-10-04 RX ADMIN — Medication 10 ML: at 11:10

## 2024-10-04 RX ADMIN — SODIUM CHLORIDE: 9 INJECTION, SOLUTION INTRAVENOUS at 11:10

## 2024-10-04 NOTE — PROGRESS NOTES
PATIENT: Radha Ervin  MRN: [id number]  DATE: 10/4/2024      Subjective:     Chief complaint:  Chief Complaint   Patient presents with    Lung Cancer    Follow-up       Oncologic History:      Ms. Ervin is a 63-year-old female with 30 pack-year history of smoking, quit approximately 4 months ago, who was recently diagnosed with left lung cancer after evaluation for an abnormal chest x-ray.  A subsequent CT of the chest without contrast on May 26, 2023 revealed a spiculated nodule along the paramediastinal left upper lobe measuring 2.7 x 1.9 cm.  A mildly enlarged right paratracheal lymph node was seen measuring 1.1 cm.  She underwent EBUS and biopsy on June 23, 2023.  Pathology revealed the left upper lobe lesion positive for adenocarcinoma.  Station 4R was negative for malignancy.       A PET scan on July 13, 2023 revealed the left upper lobe mass measuring 2.6 x 1.6 cm with SUV max 7.4.  There was mildly metabolic prevascular mediastinal lymph node measuring 0.9 cm seen in short axis with SUV max 2.3.  She underwent robotic left upper lobectomy and lymph node sampling on August 3, 2023.  Pathology revealed invasive poorly differentiated adenocarcinoma measuring 2.1 cm.  There was visceral pleural invasion noted.  Vascular resection revealed invasive tumor present in the adventitial soft tissue.  There was lymphovascular invasion noted.  Invasive carcinoma is present at the vascular margin.  3/4 level 6 lymph nodes, 1/2 level 11 lymph nodes and 1/4 level 12 lymph nodes were involved out of a total of 23 lymph nodes.  Surgery was complicated by intraoperative bleeding of pulmonary arterial branches which resolved with pressure.      Her case was discussed at [facility] on 8/24/23: Recommend chemoRT followed by immunotherapy.     Established care with med onc 8/28. She had seen Dr. Quevedo who had recommended sequential chemoradiation then to be followed by immunotherapy. Consented for sequential CRT with  "carbo/taxol.    On 9/22/23 she presented to the infusion center for carbo/taxol C#1 but developed infusion reaction to taxol so that was stopped.  Had reaction with second infusion as well--carbo/taxol discontinued.  Consented for carbo/pemetrexed on 11/2/23    C#1 carbo/pemetrexed 11/3/23  C#2 carbo/pemetrexed 11/24/23  C#3 acute worsening of renal function--pemetrexed held: 12/15/23  C#4 carboplatin only 1/5/24    Radiation 60Gy in 30Fx 2/5/24--3/26/24    C#1 durvalumab 4/19/24  C#2 5/17/24  C#3 6/14/24  C#4 7/12/24  C#5 8/9/24   C#6 9/6/24          Interval History: Ms. Ervin returns for follow up. She is scheduled for durvalumab today. She notes that she developed some acute back pain in July and it has since improved--this correlates to the area on her scans which were done earlier this week and showed a compression fracture at L1 vertebral body. Prior bone density scans suggestive of osteoporosis.       Review of Systems   A comprehensive review of systems was performed; pertinent positives and negatives are noted in the HPI.        ECOG Performance Status:   ECOG SCORE    1 - Restricted in strenuous activity-ambulatory and able to carry out work of a light nature         Objective:      Vitals:   Vitals:    10/04/24 0954   BP: 127/74   BP Location: Right arm   Patient Position: Sitting   Pulse: 94   Resp: 18   Temp: 98 °F (36.7 °C)   TempSrc: Oral   SpO2: 96%   Weight: 85 kg (187 lb 6.3 oz)   Height: 4' 11" (1.499 m)     BMI: Body mass index is 37.85 kg/m².      Physical Exam:   Physical Exam  Vitals and nursing note reviewed.   Constitutional:       General: She is not in acute distress.     Appearance: Normal appearance. She is not toxic-appearing.   HENT:      Head: Normocephalic and atraumatic.   Eyes:      General: No scleral icterus.     Conjunctiva/sclera: Conjunctivae normal.   Cardiovascular:      Rate and Rhythm: Normal rate and regular rhythm.      Heart sounds: Normal heart sounds. No murmur " heard.  Pulmonary:      Effort: Pulmonary effort is normal. No respiratory distress.      Breath sounds: Normal breath sounds. No wheezing, rhonchi or rales.   Abdominal:      General: There is no distension.      Palpations: Abdomen is soft.      Tenderness: There is no abdominal tenderness.   Musculoskeletal:         General: No swelling, tenderness or deformity.      Cervical back: Neck supple. No tenderness.   Skin:     Coloration: Skin is not jaundiced.      Findings: No erythema.   Neurological:      General: No focal deficit present.      Mental Status: She is alert and oriented to person, place, and time.      Motor: No weakness.   Psychiatric:         Mood and Affect: Mood normal.         Behavior: Behavior normal.           Laboratory Data:  WBC   Date Value Ref Range Status   10/02/2024 11.52 3.90 - 12.70 K/uL Final     Hemoglobin   Date Value Ref Range Status   10/02/2024 12.1 12.0 - 16.0 g/dL Final     POC Hematocrit   Date Value Ref Range Status   08/03/2023 34 (L) 36 - 54 %PCV Final     Hematocrit   Date Value Ref Range Status   10/02/2024 37.3 37.0 - 48.5 % Final     Platelets   Date Value Ref Range Status   10/02/2024 348 150 - 450 K/uL Final     Gran # (ANC)   Date Value Ref Range Status   10/02/2024 8.1 (H) 1.8 - 7.7 K/uL Final     Gran %   Date Value Ref Range Status   10/02/2024 70.0 38.0 - 73.0 % Final       Chemistry        Component Value Date/Time     10/02/2024 0908    K 3.9 10/02/2024 0908     10/02/2024 0908    CO2 17 (L) 10/02/2024 0908    BUN 19 10/02/2024 0908    BUN 12.0 04/28/2023 1207    CREATININE 1.4 10/02/2024 0908     (H) 10/02/2024 0908        Component Value Date/Time    CALCIUM 9.8 10/02/2024 0908    ALKPHOS 118 10/02/2024 0908    AST 17 10/02/2024 0908    ALT 15 10/02/2024 0908    BILITOT 0.5 10/02/2024 0908    ESTGFRAFRICA >60.0 04/06/2020 1002    EGFRNONAA >60.0 04/06/2020 1002        ntains abnormal data CT Chest Abdomen Pelvis With IV Contrast (XPD)  Routine Oral Contrast  Order: [id number]  Status: Final result       Visible to patient: Yes (seen)       Next appt: 10/09/2024 at 04:00 PM in Family Medicine (Rosanne Ashford NP)       Dx: Adenocarcinoma, lung, left    0 Result Notes  Details    Reading Physician Reading Date Result Priority   Abram Aranda MD  [phone]  [phone] 10/2/2024 Routine     Narrative & Impression  EXAMINATION:  CT CHEST ABDOMEN PELVIS WITH IV CONTRAST (XPD)     CLINICAL HISTORY:  Non-small cell lung cancer (NSCLC), metastatic, assess treatment response; Malignant neoplasm of unspecified part of left bronchus or lung     TECHNIQUE:  Low dose axial images, sagittal and coronal reformations were obtained from the thoracic inlet to the pubic symphysis following the IV administration of 75 mL of Omnipaque 350 and the oral administration of 1000-cc of Omnipaque 12.     COMPARISON:  CT chest/abdomen/pelvis 07/11/2024     FINDINGS:  Chest:     - Base of the neck: Imaged portions of the base of the neck are grossly unremarkable.     - Axilla/mediastinum/samantha: No axillary, mediastinal or hilar lymphadenopathy.     - Heart: Heart is not enlarged. No significant pericardial thickening. No appreciable coronary artery calcifications. No ade aneurysmal degeneration of the thoracic aorta.     - Airways: Trachea and mainstem bronchi are patent.     - Lungs: Postsurgical changes of left upper lobectomy, unchanged.  No appreciable new or enlarging pulmonary nodules throughout the bilateral lung fields.     Abdomen:     - Liver: Liver is normal in size, morphology and attenuation without appreciable surface nodularity or suspicious lesion.  No intrahepatic/extrahepatic biliary dilatation.     - Gallbladder: Status post cholecystectomy, unchanged.     - Pancreas: Pancreas is normal in size and attenuation without the surrounding inflammatory fat stranding, suspicious mass or fluid/fluid collection.     - Spleen: Spleen is normal in size,  morphology and attenuation without appreciable suspicious lesion.     - Adrenals: Bilateral adrenal glands are normal in size and morphology without appreciable nodularity.     - Kidneys: Kidneys are normal in size, location, morphology and attenuation. Simple appearing renal cortical cystic lesions bilaterally and a few additional subcentimeter renal cortical hypodensities bilaterally that are too small to accurately characterize but not significantly changed.  Otherwise, no appreciable suspcious sizable lesion, nephroliths or hydroureteronephrosis bilaterally.  Urinary bladder is completely collapsed, limiting evaluation.     - Bowel/Mesentery: Stomach, small bowel and colon are normal in course and caliber.  No evidence of small bowel obstruction, significant inflammatory fat stranding, free air or free fluid.  Appendix is normal.  Bowel mesentery is grossly unremarkable.     - Retroperitoneum:  Aorta is normal in course and caliber without evidence of aneurysmal degeneration.  No sizable retroperitoneal mass or fluid collection.     Pelvis:     - Reproductive organs: 6.5-cm right adnexal 6 cystic lesion, unchanged.  No suspicious sizable pelvic mass, lymphadenopathy or fluid.     - Soft tissues:  Imaged soft tissues are grossly unremarkable.     - Bones:  Multilevel degenerative changes of the imaged spine.  Lytic lesion in the posterior aspect of the L1 vertebral body associated with pathologic fracture with loss of approximately 40% vertebral body height anteriorly.     Impression:     1. No acute/emergent fvnntal-ngxayfte-mxcsdk findings appreciated.  2. Postsurgical changes of left upper lobectomy, unchanged.  No appreciable new or enlarging pulmonary nodules or lymphadenopathy above the diaphragm in this patient with reported history of NSCLC.  3. Suspicious lytic lesion in the posterior aspect of the L1 vertebral body associated with pathologic fracture with loss of approximately 40% vertebral body height  anteriorly.  4. 6.5-cm right adnexal 6 cystic lesion, unchanged.  This report was flagged in Epic as abnormal.        Electronically signed by:Abram Aranda  Date:                                            10/02/2024  Time:                                           13:44        Exam Ended: 10/02/24 11:21 CDT Last Resulted: 10/02/24 13:44 CDT         Assessment/Plan:     1. Adenocarcinoma, lung, left    2. Malignant neoplasm of unspecified part of unspecified bronchus or lung    3. Secondary hypothyroidism    4. Encounter for antineoplastic immunotherapy    5. Stage 3a chronic kidney disease    6. Compression fracture of L1 vertebra, initial encounter    7. Localized osteoporosis with current pathological fracture with routine healing      Stg IIIA lung adenocarcinoma, s/p MARY lobectomy with R1 resection in August 2023 followed by definitive chemoradiation due to R1 resection status.   Currently on post-CRT durvalumab. Proceed with maintenance durvalumab as scheduled today.  Immunotherapy related hypothyroidism--start synthroid    CT scans equivocal for POD with new bone metastasis and related compression fracture vs osteoporosis-related pathologic fracture. Will order PET for re-staging to determine if there are bony mets.     Med and Orders:  Orders Placed This Encounter    NM PET CT FDG Skull Base to Mid Thigh    levothyroxine (SYNTHROID) 75 MCG tablet       Follow Up:  No follow-ups on file.      Above care plan was discussed with patient and all questions were addressed to their expressed satisfaction.       Roger Eduardo MD, FACP  Hematology & Medical Oncology  [facility]         High Risk Conditions:    Patient has a condition that poses threat to life and bodily function: nsclc  Patient is currently on drug therapy requiring intensive monitoring for toxicity: durvalumab

## 2024-10-04 NOTE — PLAN OF CARE
Patient tolerated Imfinzi infusion well. No s/s adverse reaction. PIV removed, calendar given and patient ambulated out of clinic in NAD.

## 2024-10-08 ENCOUNTER — PATIENT MESSAGE (OUTPATIENT)
Dept: HEMATOLOGY/ONCOLOGY | Facility: CLINIC | Age: 64
End: 2024-10-08
Payer: MEDICARE

## 2024-10-08 DIAGNOSIS — F41.8 SITUATIONAL ANXIETY: ICD-10-CM

## 2024-10-08 RX ORDER — LORAZEPAM 1 MG/1
1 TABLET ORAL EVERY 6 HOURS PRN
Qty: 10 TABLET | Refills: 3 | Status: SHIPPED | OUTPATIENT
Start: 2024-10-08 | End: 2024-11-07

## 2024-10-09 ENCOUNTER — OFFICE VISIT (OUTPATIENT)
Dept: FAMILY MEDICINE | Facility: CLINIC | Age: 64
End: 2024-10-09
Payer: MEDICARE

## 2024-10-09 VITALS
WEIGHT: 187.38 LBS | HEART RATE: 94 BPM | DIASTOLIC BLOOD PRESSURE: 70 MMHG | OXYGEN SATURATION: 98 % | SYSTOLIC BLOOD PRESSURE: 120 MMHG | BODY MASS INDEX: 37.77 KG/M2 | HEIGHT: 59 IN

## 2024-10-09 DIAGNOSIS — D84.821 DRUG-INDUCED IMMUNODEFICIENCY: ICD-10-CM

## 2024-10-09 DIAGNOSIS — F41.9 ANXIETY: ICD-10-CM

## 2024-10-09 DIAGNOSIS — C34.92 ADENOCARCINOMA, LUNG, LEFT: ICD-10-CM

## 2024-10-09 DIAGNOSIS — Z23 NEED FOR COVID-19 VACCINE: ICD-10-CM

## 2024-10-09 DIAGNOSIS — J43.9 PULMONARY EMPHYSEMA, UNSPECIFIED EMPHYSEMA TYPE: ICD-10-CM

## 2024-10-09 DIAGNOSIS — Z23 NEEDS FLU SHOT: ICD-10-CM

## 2024-10-09 DIAGNOSIS — Z79.899 DRUG-INDUCED IMMUNODEFICIENCY: ICD-10-CM

## 2024-10-09 DIAGNOSIS — N18.32 STAGE 3B CHRONIC KIDNEY DISEASE: ICD-10-CM

## 2024-10-09 DIAGNOSIS — E66.01 SEVERE OBESITY (BMI 35.0-39.9) WITH COMORBIDITY: ICD-10-CM

## 2024-10-09 DIAGNOSIS — I70.0 THORACIC AORTA ATHEROSCLEROSIS: ICD-10-CM

## 2024-10-09 DIAGNOSIS — Z00.00 ENCOUNTER FOR MEDICARE ANNUAL WELLNESS EXAM: Primary | ICD-10-CM

## 2024-10-09 DIAGNOSIS — E21.3 HYPERPARATHYROIDISM: ICD-10-CM

## 2024-10-09 PROCEDURE — 99999 PR PBB SHADOW E&M-EST. PATIENT-LVL IV: CPT | Mod: PBBFAC,,, | Performed by: NURSE PRACTITIONER

## 2024-10-09 PROCEDURE — 1159F MED LIST DOCD IN RCRD: CPT | Mod: CPTII,S$GLB,, | Performed by: NURSE PRACTITIONER

## 2024-10-09 PROCEDURE — 90480 ADMN SARSCOV2 VAC 1/ONLY CMP: CPT | Mod: S$GLB,,, | Performed by: NURSE PRACTITIONER

## 2024-10-09 PROCEDURE — 91322 SARSCOV2 VAC 50 MCG/0.5ML IM: CPT | Mod: S$GLB,,, | Performed by: NURSE PRACTITIONER

## 2024-10-09 PROCEDURE — 1160F RVW MEDS BY RX/DR IN RCRD: CPT | Mod: CPTII,S$GLB,, | Performed by: NURSE PRACTITIONER

## 2024-10-09 PROCEDURE — 1101F PT FALLS ASSESS-DOCD LE1/YR: CPT | Mod: CPTII,S$GLB,, | Performed by: NURSE PRACTITIONER

## 2024-10-09 PROCEDURE — 3074F SYST BP LT 130 MM HG: CPT | Mod: CPTII,S$GLB,, | Performed by: NURSE PRACTITIONER

## 2024-10-09 PROCEDURE — G0402 INITIAL PREVENTIVE EXAM: HCPCS | Mod: S$GLB,,, | Performed by: NURSE PRACTITIONER

## 2024-10-09 PROCEDURE — 3288F FALL RISK ASSESSMENT DOCD: CPT | Mod: CPTII,S$GLB,, | Performed by: NURSE PRACTITIONER

## 2024-10-09 PROCEDURE — 3078F DIAST BP <80 MM HG: CPT | Mod: CPTII,S$GLB,, | Performed by: NURSE PRACTITIONER

## 2024-10-09 PROCEDURE — 3066F NEPHROPATHY DOC TX: CPT | Mod: CPTII,S$GLB,, | Performed by: NURSE PRACTITIONER

## 2024-10-09 PROCEDURE — 90653 IIV ADJUVANT VACCINE IM: CPT | Mod: S$GLB,,, | Performed by: NURSE PRACTITIONER

## 2024-10-09 PROCEDURE — G0008 ADMIN INFLUENZA VIRUS VAC: HCPCS | Mod: S$GLB,,, | Performed by: NURSE PRACTITIONER

## 2024-10-09 PROCEDURE — 1158F ADVNC CARE PLAN TLK DOCD: CPT | Mod: CPTII,S$GLB,, | Performed by: NURSE PRACTITIONER

## 2024-10-09 RX ORDER — HYDROGEN PEROXIDE 3 %
20 SOLUTION, NON-ORAL MISCELLANEOUS NIGHTLY
COMMUNITY

## 2024-10-09 NOTE — PATIENT INSTRUCTIONS
Counseling and Referral of Other Preventative  (Italic type indicates deductible and co-insurance are waived)    Patient Name: Radha Ervin  Today's Date: 10/9/2024    Health Maintenance       Date Due Completion Date    HIV Screening Never done ---    RSV Vaccine (Age 60+ and Pregnant patients) (1 - Risk 60-74 years 1-dose series) Never done ---    COVID-19 Vaccine (6 - 2024-25 season) 12/04/2024 10/9/2024    Mammogram 06/14/2025 6/14/2024    Colorectal Cancer Screening 10/02/2025 10/2/2024    Annual UACr 10/02/2025 10/2/2024    Hemoglobin A1c (Diabetic Prevention Screening) 04/28/2026 4/28/2023    DEXA Scan 08/14/2027 8/14/2024    Lipid Panel 05/26/2028 5/26/2023    TETANUS VACCINE 09/22/2031 9/22/2021        No orders of the defined types were placed in this encounter.    The following information is provided to all patients.  This information is to help you find resources for any of the problems found today that may be affecting your health:                  Living healthy guide: www.[facility].gov      Understanding Diabetes: www.diabetes.org      Eating healthy: www.cdc.gov/healthyweight      CDC home safety checklist: www.cdc.gov/steadi/patient.html      Agency on Aging: www.goea.[place].[facility]      Alcoholics anonymous (AA): www.aa.org      Physical Activity: www.antonella.nih.gov/er3ohlw      Tobacco use: www.quitwithusla.org

## 2024-10-09 NOTE — PROGRESS NOTES
"  Radha Ervin presented for a  Medicare AWV and comprehensive Health Risk Assessment today. The following components were reviewed and updated:    Medical history  Family History  Social history  Allergies and Current Medications  Health Risk Assessment  Health Maintenance  Care Team         ** See Completed Assessments for Annual Wellness Visit within the encounter summary.**         The following assessments were completed:  Living Situation  CAGE  Depression Screening  Timed Get Up and Go  Whisper Test  Cognitive Function Screening      Nutrition Screening  ADL Screening  PAQ Screening      Opioid documentation:      Patient does not have a current opioid prescription.        Vitals:    10/09/24 1431   BP: 120/70   BP Location: Right arm   Patient Position: Sitting   Pulse: 94   SpO2: 98%   Weight: 85 kg (187 lb 6.3 oz)   Height: 4' 11" (1.499 m)     Body mass index is 37.85 kg/m².    Physical Exam  Vitals reviewed.   Constitutional:       General: She is not in acute distress.     Appearance: Normal appearance. She is well-developed and well-groomed. She is obese.   HENT:      Head: Normocephalic.   Eyes:      General:         Right eye: No discharge.         Left eye: No discharge.   Cardiovascular:      Rate and Rhythm: Normal rate.   Pulmonary:      Effort: Pulmonary effort is normal. No respiratory distress.   Abdominal:      General: There is no distension.   Skin:     Coloration: Skin is not pale.   Neurological:      Mental Status: She is alert and oriented to person, place, and time.      Coordination: Coordination normal.      Gait: Gait normal.   Psychiatric:         Attention and Perception: Attention normal.         Mood and Affect: Mood and affect normal.         Speech: Speech normal.         Behavior: Behavior normal. Behavior is cooperative.         Thought Content: Thought content normal.               Diagnoses and health risks identified today and associated recommendations/orders:    1. " Encounter for Medicare annual wellness exam  - Ambulatory Referral/Consult to Enhanced Annual Wellness Visit (eAWV)    2. Adenocarcinoma, lung, left  Previously diagnosed; has undergone lobectomy and chemoradiation. Stable on durvalumab; followed by Hem/Onc.    3. Drug-induced immunodeficiency  Patient previously diagnosed with left lung cancer; s/p lobectomy. Stable on durvalumab. Followed by Hem/Onc.    4. Pulmonary emphysema, unspecified emphysema type  Chronic; stable. Follow up with PCP.    5. Thoracic aorta atherosclerosis - seen on CXR 8/5/23  Chronic; stable. Follow up with PCP.    6. Hyperparathyroidism  Chronic; stable. As seen with recent blood results. Follow up with PCP.    7. Stage 3b chronic kidney disease  Chronic; stable. Follow up with PCP.    8. Anxiety  Suffers with anxiety for MRI procedure. Using lorazepam only prior to MRI, patient is otherwise stable. At low risk for substance abuse. Follow up with PCP.    9. Needs flu shot  - influenza (adjuvanted) (Fluad) 45 mcg/0.5 mL IM vaccine (> or = 66 yo) 0.5 mL    10. Need for COVID-19 vaccine  - COVID-19 (Moderna) 50 mcg/0.5 mL IM vaccine (>/= 13 yo) 0.5 mL    11. Severe obesity (BMI 35.0-39.9) with comorbidity  Eat a low salt/low fat diet and discussed importance of engaging in physical activity at least 5x/week for a minimum of 30 min/day.      Provided Radha with a 5-10 year written screening schedule and personal prevention plan. Recommendations were developed using the USPSTF age appropriate recommendations. Education, counseling, and referrals were provided as needed. After Visit Summary given to patient which includes a list of additional screenings/tests needed.    Follow up for your next annual wellness visit.    Rosanne Ashford NP      Advance Care Planning     I offered to discuss advanced care planning, including how to pick a person who would make decisions for you if you were unable to make them for yourself, called a health care  power of , and what kind of decisions you might make such as use of life sustaining treatments such as ventilators and tube feeding when faced with a life limiting illness recorded on a living will that they will need to know. (How you want to be cared for as you near the end of your natural life)     X  Patient has advanced directives written and agrees to provide copies to the institution.

## 2024-10-12 PROBLEM — I70.0 THORACIC AORTA ATHEROSCLEROSIS: Status: ACTIVE | Noted: 2024-10-12

## 2024-10-12 PROBLEM — E21.3 HYPERPARATHYROIDISM: Status: ACTIVE | Noted: 2024-10-12

## 2024-10-12 PROBLEM — F41.9 ANXIETY: Status: ACTIVE | Noted: 2024-10-12

## 2024-10-12 PROBLEM — N18.32 STAGE 3B CHRONIC KIDNEY DISEASE: Status: ACTIVE | Noted: 2024-10-12

## 2024-10-14 ENCOUNTER — PATIENT MESSAGE (OUTPATIENT)
Dept: FAMILY MEDICINE | Facility: CLINIC | Age: 64
End: 2024-10-14
Payer: MEDICARE

## 2024-10-16 ENCOUNTER — HOSPITAL ENCOUNTER (OUTPATIENT)
Dept: RADIOLOGY | Facility: HOSPITAL | Age: 64
Discharge: HOME | End: 2024-10-16
Attending: INTERNAL MEDICINE
Payer: MEDICARE

## 2024-10-16 ENCOUNTER — OFFICE VISIT (OUTPATIENT)
Dept: INTERNAL MEDICINE | Facility: CLINIC | Age: 64
End: 2024-10-16
Payer: MEDICARE

## 2024-10-16 VITALS
HEART RATE: 86 BPM | HEIGHT: 59 IN | WEIGHT: 190.06 LBS | BODY MASS INDEX: 38.32 KG/M2 | SYSTOLIC BLOOD PRESSURE: 120 MMHG | OXYGEN SATURATION: 98 % | DIASTOLIC BLOOD PRESSURE: 72 MMHG

## 2024-10-16 DIAGNOSIS — C34.90 MALIGNANT NEOPLASM OF UNSPECIFIED PART OF UNSPECIFIED BRONCHUS OR LUNG: ICD-10-CM

## 2024-10-16 DIAGNOSIS — Z13.1 SCREENING FOR DIABETES MELLITUS: ICD-10-CM

## 2024-10-16 DIAGNOSIS — N32.81 OAB (OVERACTIVE BLADDER): Primary | ICD-10-CM

## 2024-10-16 LAB — POCT GLUCOSE: 98 MG/DL (ref 70–110)

## 2024-10-16 PROCEDURE — 78815 PET IMAGE W/CT SKULL-THIGH: CPT | Mod: TC,HCNC,PS

## 2024-10-16 PROCEDURE — 1101F PT FALLS ASSESS-DOCD LE1/YR: CPT | Mod: HCNC,CPTII,S$GLB, | Performed by: INTERNAL MEDICINE

## 2024-10-16 PROCEDURE — 99214 OFFICE O/P EST MOD 30 MIN: CPT | Mod: HCNC,S$GLB,, | Performed by: INTERNAL MEDICINE

## 2024-10-16 PROCEDURE — 99999 PR PBB SHADOW E&M-EST. PATIENT-LVL IV: CPT | Mod: PBBFAC,HCNC,, | Performed by: INTERNAL MEDICINE

## 2024-10-16 PROCEDURE — 3008F BODY MASS INDEX DOCD: CPT | Mod: HCNC,CPTII,S$GLB, | Performed by: INTERNAL MEDICINE

## 2024-10-16 PROCEDURE — 3078F DIAST BP <80 MM HG: CPT | Mod: HCNC,CPTII,S$GLB, | Performed by: INTERNAL MEDICINE

## 2024-10-16 PROCEDURE — 1160F RVW MEDS BY RX/DR IN RCRD: CPT | Mod: HCNC,CPTII,S$GLB, | Performed by: INTERNAL MEDICINE

## 2024-10-16 PROCEDURE — A9552 F18 FDG: HCPCS | Mod: HCNC | Performed by: INTERNAL MEDICINE

## 2024-10-16 PROCEDURE — 1159F MED LIST DOCD IN RCRD: CPT | Mod: HCNC,CPTII,S$GLB, | Performed by: INTERNAL MEDICINE

## 2024-10-16 PROCEDURE — 3066F NEPHROPATHY DOC TX: CPT | Mod: HCNC,CPTII,S$GLB, | Performed by: INTERNAL MEDICINE

## 2024-10-16 PROCEDURE — 3074F SYST BP LT 130 MM HG: CPT | Mod: HCNC,CPTII,S$GLB, | Performed by: INTERNAL MEDICINE

## 2024-10-16 PROCEDURE — 78815 PET IMAGE W/CT SKULL-THIGH: CPT | Mod: 26,PS,HCNC, | Performed by: NUCLEAR MEDICINE

## 2024-10-16 PROCEDURE — 3288F FALL RISK ASSESSMENT DOCD: CPT | Mod: HCNC,CPTII,S$GLB, | Performed by: INTERNAL MEDICINE

## 2024-10-16 RX ORDER — FLUDEOXYGLUCOSE F18 500 MCI/ML
13.35 INJECTION INTRAVENOUS
Status: COMPLETED | OUTPATIENT
Start: 2024-10-16 | End: 2024-10-16

## 2024-10-16 RX ADMIN — FLUDEOXYGLUCOSE F-18 13.35 MILLICURIE: 500 INJECTION INTRAVENOUS at 01:10

## 2024-10-16 NOTE — PATIENT INSTRUCTIONS
Please avoid bladder irritants    Avoidance of activities or food or beverages that exacerbate symptoms (eg, smoking, caffeine, alcohol, artificial sweeteners, hot pepper, and vitamin C-containing foods)  Please make sure your bowel movments are regular        Try the Kegel exercises at home or with THERAPY  Start by going to the toilet and trying to urinate as often as your shortest voiding interval (the length of time between trips to the bathroom) based on your voiding diary. For example, go every hour if that is what your bladder diary indicates is the shortest interval of time between visits to urinate. Make these regular trips to the toilet while you are awake. You do not have to get up during the night!  You must try to urinate whether you feel the need or not. You must try to urinate even if you have just been incontinent.  If you get a strong urge to go to the bathroom before your scheduled time, use distraction or relaxation:  Stop, do not run to the bathroom!  Stand still or sit down if you can.  RELAX. Take a deep breath and let it out slowly.  Concentrate on making the urge decrease or even go away anyway you can (imagine the pressure becoming less and less). You can also try doing quick contractions of your pelvic floor muscles.  DISTRACT yourself, for example by doing math problems in your head.  When you feel IN CONTROL OF YOUR BLADDER, walk slowly to the bathroom, and then go.  Keep this schedule until you can go one day without urine leakage. Then, increase the time between scheduled trips to the toilet by 15 minutes. When you can go one day on this new schedule without urine leakage, extend the time between bathroom trips again by 15 minutes.  Keep this up until you can go four hours between trips to the toilet (which is NORMAL), or until you are comfortable with a shorter time interval. This may take several weeks.  DO NOT GET DISCOURAGED! Bladder training takes time and effort, but it is an  effective way to get rid of incontinence without medication or surgery.

## 2024-10-16 NOTE — PROGRESS NOTES
"Assessment:         1. OAB (overactive bladder)    2. Screening for diabetes mellitus          Plan:           Radha Flor" was seen today for follow-up.    Diagnoses and all orders for this visit:    OAB (overactive bladder)  -     vibegron 75 mg Tab; Take 1 tablet (75 mg total) by mouth once daily.    Screening for diabetes mellitus  -     Microalbumin/creatinine urine ratio; Future        Assessment & Plan    Assessed mild proteinuria (urine protein 37, slightly above normal 30) in context of patient's chronic kidney disease (CKD)  Determined to monitor kidney function via regular lab work, noting stable filtration rate in 40% range  Evaluated urinary urgency symptoms, likely overactive bladder  Considered potential link between immunotherapy and urinary symptoms, though not commonly associated  Reviewed previous nephrology consult, noting stable kidney function and presence of small kidney stones (7mm right, 6mm left)    PATIENT EDUCATION:  - Explained proteinuria as a potential indicator of kidney filtration issues  - Discussed EGFR as a measure of kidney function  - Clarified difference between EGFR (kidney function) and TSH (thyroid function)  - Educated on overactive bladder and potential management strategies  - Informed about chronic kidney disease based on persistent low GFR    ACTION ITEMS/LIFESTYLE:  - Recommend increased water intake  - Patient to maintain low salt diet  - Patient should avoid bladder irritants (e.g., coffee, tea)  - Recommend limiting fluid intake before bedtime  - Patient can consider pelvic floor exercises (Kegel exercises) for overactive bladder    MEDICATIONS:  - Started Jaya Kristi (mirabegron) 1 pill daily for overactive bladder    ORDERS:  - Urine protein test ordered for December labs    FOLLOW UP:  - Follow up in a day or so for PET scan results  - Contact the office to monitor effectiveness of Jaya Kristi for overactive bladder symptoms  - Follow up monthly for lab work and " immunotherapy appointments             Subjective:       Patient ID: Radha Ervin is a 64 y.o. female.    Chief Complaint: Follow-up      Interim Hx           History of Present Illness    Patient presents today for routine follow-up.    She has a history of lung cancer and is currently on maintenance chemotherapy. A PET scan was performed today for restaging, but results are not yet available. She is receiving monthly immunotherapy treatments, though she expresses confusion about the process and its effects. She took an anxiety medication prior to her PET scan.    She has chronic kidney disease with a filtration percentage in the 40% range, below the normal cutoff of 60%. Recent tests show elevated EGFR levels of 37 (normal level 30). Urine tests revealed slightly elevated protein levels of 37 (normal level 30). She expresses confusion about her kidney condition, stating she does not believe she has a kidney problem. A previous consultation with a nephrologist was unsatisfactory. Kidney stones measuring 7 mm and 6 mm on the right and left sides respectively were detected on ultrasound, but are not causing discomfort. She was advised to increase water intake and reduce salt consumption.    She reports experiencing urinary symptoms consistent with overactive bladder for the past few months, including urgency and increased frequency. She denies pain with urination or visible blood in her urine.    She reports a history of COVID-19 infection in March and hidradenitis. She mentions experiencing back pain, possibly related to a fracture.    She reports dry mouth, which she is managing by increasing fluid intake, particularly before bedtime. She believes this may persist for the duration of her immunotherapy treatment.    She denies drinking coffee. She reports increasing fluid intake to facilitate intravenous access for immunotherapy and to manage dry mouth symptoms.      ROS:  ENT: +dry mouth  Genitourinary:  "+frequency, +urgency, -painful urination  Musculoskeletal: +back pain           Review of Systems        Health Maintenance Due   Topic Date Due    HIV Screening  Never done    High Dose Statin  Never done    RSV Vaccine (Age 60+ and Pregnant patients) (1 - Risk 60-74 years 1-dose series) Never done         Objective:     /72 (BP Location: Right arm, Patient Position: Sitting)   Pulse 86   Ht 4' 11" (1.499 m)   Wt 86.2 kg (190 lb 0.6 oz)   LMP  (LMP Unknown)   SpO2 98%   BMI 38.38 kg/m²   .Physical Exam    General: No acute distress. Well-developed. Well-nourished.  Eyes: EOMI. Sclerae anicteric.  HENT: Normocephalic. Atraumatic. Nares patent. Moist oral mucosa.  Ears: Bilateral TMs clear. Bilateral EACs clear.  Cardiovascular: Regular rate. Regular rhythm. No murmurs. No rubs. No gallops. Normal S1, S2.  Respiratory: Normal respiratory effort. Clear to auscultation bilaterally. No rales. No rhonchi. No wheezing. Normal breath sounds.  Abdomen: Soft. Non-tender. Non-distended. Normoactive bowel sounds.  Musculoskeletal: No  obvious deformity.  Extremities: No lower extremity edema.  Neurological: Alert & oriented x3. No slurred speech. Normal gait.  Psychiatric: Normal mood. Normal affect. Good insight. Good judgment.  Skin: Warm. Dry. No rash.                Physical Exam      Recent Results (from the past 2 weeks)   POCT glucose    Collection Time: 10/16/24 12:49 PM   Result Value Ref Range    POCT Glucose 98 70 - 110 mg/dL         Future Appointments   Date Time Provider Department Center   10/30/2024  8:20 AM APPOINTMENT [facility] [place] [facility]   10/30/2024  9:00 AM Roger Eduardo MD [facility] HEM ONC [place] [facility]   11/1/2024 11:00 AM CHAIR 02 [facility] CHEMO [place] [facility]   11/27/2024  9:00 AM APPOINTMENT [facility] [place] [facility]   11/29/2024  9:30 AM Roger Eduardo MD [facility] HEM ONC [place] [facility]   11/29/2024 11:00 AM CHAIR 02 [facility] CHEMO [place] [facility]   12/19/2024  " 1:20 PM Kathe Quan MD [facility] DERM [address]   12/26/2024  9:00 AM APPOINTMENT LAB, [facility] [place] [facility]   12/26/2024  9:10 AM LAB, SPECIMEN [facility] [place] [facility]   12/27/2024 10:00 AM Roger Eduardo MD [facility] HEM ONC [place] [facility]   12/27/2024 11:00 AM CHAIR 02 [facility] CHEMO [place] [facility]   10/17/2025  2:00 PM Suraj Herrera III, MD [facility]         Medication List with Changes/Refills   New Medications    VIBEGRON 75 MG TAB    Take 1 tablet (75 mg total) by mouth once daily.   Current Medications    BETAMETHASONE DIPROPIONATE (DIPROLENE) 0.05 % OINTMENT    Apply topically 2 (two) times daily. Prn psoriasis flares    DIPHENHYDRAMINE HCL (ALLERGY ORAL)    Take 1 tablet by mouth.    ESOMEPRAZOLE (NEXIUM) 20 MG CAPSULE    Take 20 mg by mouth every evening.    LEVOTHYROXINE (SYNTHROID) 75 MCG TABLET    Take 1 tablet (75 mcg total) by mouth before breakfast.    LORAZEPAM (ATIVAN) 1 MG TABLET    Take 1 tablet (1 mg total) by mouth every 6 (six) hours as needed for Anxiety (and half an hour before MRI).    MOMETASONE 0.1% (ELOCON) 0.1 % CREAM    AAA every day when red and tender under occlusion    OTEZLA 30 MG TAB    TAKE 1 TABLET BY MOUTH DAILY    PILOCARPINE (SALAGEN, PILOCARPINE,) 5 MG TAB    Take 1 tablet (5 mg total) by mouth 3 (three) times daily.         Disclaimer:  This note has been generated using voice-recognition software. There may be grammatical or spelling errors that have been missed during proof-reading   This note was generated with the assistance of ambient listening technology. Verbal consent was obtained by the patient and accompanying visitor(s) for the recording of patient appointment to facilitate this note. I attest to having reviewed and edited the generated note for accuracy, though some syntax or spelling errors may persist. Please contact the author of this note for any clarification.

## 2024-10-18 ENCOUNTER — TELEPHONE (OUTPATIENT)
Dept: HEMATOLOGY/ONCOLOGY | Facility: CLINIC | Age: 64
End: 2024-10-18
Payer: MEDICARE

## 2024-10-18 DIAGNOSIS — C79.51 SECONDARY MALIGNANT NEOPLASM OF BONE: Primary | ICD-10-CM

## 2024-10-18 NOTE — TELEPHONE ENCOUNTER
----- Message from Xochitl sent at 10/18/2024  1:07 PM CDT -----  Type:  Patient Returning Call    Who Called:pt  Who Left Message for Patient:Dr. DASH  Does the patient know what this is regarding?:results  Would the patient rather a call back or a response via [employer]? call  Best Call Back Number: [phone]  Additional Information:

## 2024-10-22 DIAGNOSIS — D84.9 COVID-19 IN IMMUNOCOMPROMISED PATIENT: ICD-10-CM

## 2024-10-22 DIAGNOSIS — U07.1 COVID-19 IN IMMUNOCOMPROMISED PATIENT: ICD-10-CM

## 2024-10-22 DIAGNOSIS — Z86.16 HISTORY OF COVID-19: Primary | ICD-10-CM

## 2024-10-22 DIAGNOSIS — Z00.6 EXAMINATION OF PARTICIPANT IN CLINICAL TRIAL: ICD-10-CM

## 2024-10-22 DIAGNOSIS — Z00.6 EXAMINATION OF PARTICIPANT IN CLINICAL TRIAL: Primary | ICD-10-CM

## 2024-10-23 ENCOUNTER — TUMOR BOARD CONFERENCE (OUTPATIENT)
Dept: HEMATOLOGY/ONCOLOGY | Facility: CLINIC | Age: 64
End: 2024-10-23
Payer: MEDICARE

## 2024-10-24 ENCOUNTER — PATIENT MESSAGE (OUTPATIENT)
Dept: RADIATION ONCOLOGY | Facility: CLINIC | Age: 64
End: 2024-10-24
Payer: MEDICARE

## 2024-10-30 ENCOUNTER — OFFICE VISIT (OUTPATIENT)
Dept: HEMATOLOGY/ONCOLOGY | Facility: CLINIC | Age: 64
End: 2024-10-30
Payer: MEDICARE

## 2024-10-30 ENCOUNTER — OFFICE VISIT (OUTPATIENT)
Dept: RADIATION ONCOLOGY | Facility: CLINIC | Age: 64
End: 2024-10-30
Payer: MEDICARE

## 2024-10-30 ENCOUNTER — LAB VISIT (OUTPATIENT)
Dept: LAB | Facility: HOSPITAL | Age: 64
End: 2024-10-30
Attending: INTERNAL MEDICINE
Payer: MEDICARE

## 2024-10-30 VITALS
HEART RATE: 98 BPM | RESPIRATION RATE: 17 BRPM | TEMPERATURE: 97 F | DIASTOLIC BLOOD PRESSURE: 83 MMHG | BODY MASS INDEX: 38 KG/M2 | SYSTOLIC BLOOD PRESSURE: 122 MMHG | WEIGHT: 188.5 LBS | OXYGEN SATURATION: 96 % | HEIGHT: 59 IN

## 2024-10-30 VITALS
WEIGHT: 188.5 LBS | DIASTOLIC BLOOD PRESSURE: 83 MMHG | BODY MASS INDEX: 38 KG/M2 | OXYGEN SATURATION: 96 % | HEART RATE: 98 BPM | RESPIRATION RATE: 18 BRPM | SYSTOLIC BLOOD PRESSURE: 122 MMHG | HEIGHT: 59 IN

## 2024-10-30 DIAGNOSIS — C34.90 MALIGNANT NEOPLASM OF UNSPECIFIED PART OF UNSPECIFIED BRONCHUS OR LUNG: ICD-10-CM

## 2024-10-30 DIAGNOSIS — N18.31 STAGE 3A CHRONIC KIDNEY DISEASE: ICD-10-CM

## 2024-10-30 DIAGNOSIS — Z00.6 EXAMINATION OF PARTICIPANT IN CLINICAL TRIAL: ICD-10-CM

## 2024-10-30 DIAGNOSIS — C79.51 SECONDARY MALIGNANT NEOPLASM OF BONE: Primary | ICD-10-CM

## 2024-10-30 DIAGNOSIS — C34.92 ADENOCARCINOMA, LUNG, LEFT: ICD-10-CM

## 2024-10-30 DIAGNOSIS — Z86.16 HISTORY OF COVID-19: ICD-10-CM

## 2024-10-30 LAB
ALBUMIN SERPL BCP-MCNC: 3.6 G/DL (ref 3.5–5.2)
ALP SERPL-CCNC: 132 U/L (ref 40–150)
ALT SERPL W/O P-5'-P-CCNC: 15 U/L (ref 10–44)
ANION GAP SERPL CALC-SCNC: 10 MMOL/L (ref 8–16)
AST SERPL-CCNC: 19 U/L (ref 10–40)
BASOPHILS # BLD AUTO: 0.09 K/UL (ref 0–0.2)
BASOPHILS NFR BLD: 1.1 % (ref 0–1.9)
BILIRUB SERPL-MCNC: 0.5 MG/DL (ref 0.1–1)
BUN SERPL-MCNC: 23 MG/DL (ref 8–23)
CALCIUM SERPL-MCNC: 9.6 MG/DL (ref 8.7–10.5)
CHLORIDE SERPL-SCNC: 110 MMOL/L (ref 95–110)
CO2 SERPL-SCNC: 19 MMOL/L (ref 23–29)
CREAT SERPL-MCNC: 1.5 MG/DL (ref 0.5–1.4)
DIFFERENTIAL METHOD BLD: ABNORMAL
EOSINOPHIL # BLD AUTO: 0.3 K/UL (ref 0–0.5)
EOSINOPHIL NFR BLD: 4 % (ref 0–8)
ERYTHROCYTE [DISTWIDTH] IN BLOOD BY AUTOMATED COUNT: 15 % (ref 11.5–14.5)
EST. GFR  (NO RACE VARIABLE): 38 ML/MIN/1.73 M^2
GLUCOSE SERPL-MCNC: 107 MG/DL (ref 70–110)
HCT VFR BLD AUTO: 36.5 % (ref 37–48.5)
HGB BLD-MCNC: 11.8 G/DL (ref 12–16)
IMM GRANULOCYTES # BLD AUTO: 0.03 K/UL (ref 0–0.04)
IMM GRANULOCYTES NFR BLD AUTO: 0.4 % (ref 0–0.5)
LYMPHOCYTES # BLD AUTO: 1.6 K/UL (ref 1–4.8)
LYMPHOCYTES NFR BLD: 19.1 % (ref 18–48)
MCH RBC QN AUTO: 30.6 PG (ref 27–31)
MCHC RBC AUTO-ENTMCNC: 32.3 G/DL (ref 32–36)
MCV RBC AUTO: 95 FL (ref 82–98)
MONOCYTES # BLD AUTO: 0.7 K/UL (ref 0.3–1)
MONOCYTES NFR BLD: 7.9 % (ref 4–15)
NEUTROPHILS # BLD AUTO: 5.8 K/UL (ref 1.8–7.7)
NEUTROPHILS NFR BLD: 67.5 % (ref 38–73)
NRBC BLD-RTO: 0 /100 WBC
PLATELET # BLD AUTO: 300 K/UL (ref 150–450)
PMV BLD AUTO: 9.5 FL (ref 9.2–12.9)
POTASSIUM SERPL-SCNC: 4.1 MMOL/L (ref 3.5–5.1)
PROT SERPL-MCNC: 7.7 G/DL (ref 6–8.4)
RBC # BLD AUTO: 3.85 M/UL (ref 4–5.4)
SODIUM SERPL-SCNC: 139 MMOL/L (ref 136–145)
TSH SERPL DL<=0.005 MIU/L-ACNC: 0.6 UIU/ML (ref 0.4–4)
WBC # BLD AUTO: 8.52 K/UL (ref 3.9–12.7)

## 2024-10-30 PROCEDURE — 3066F NEPHROPATHY DOC TX: CPT | Mod: HCNC,CPTII,S$GLB, | Performed by: INTERNAL MEDICINE

## 2024-10-30 PROCEDURE — 85025 COMPLETE CBC W/AUTO DIFF WBC: CPT | Mod: HCNC | Performed by: INTERNAL MEDICINE

## 2024-10-30 PROCEDURE — 1101F PT FALLS ASSESS-DOCD LE1/YR: CPT | Mod: HCNC,CPTII,S$GLB, | Performed by: RADIOLOGY

## 2024-10-30 PROCEDURE — 99215 OFFICE O/P EST HI 40 MIN: CPT | Mod: HCNC,S$GLB,, | Performed by: INTERNAL MEDICINE

## 2024-10-30 PROCEDURE — 3079F DIAST BP 80-89 MM HG: CPT | Mod: HCNC,CPTII,S$GLB, | Performed by: RADIOLOGY

## 2024-10-30 PROCEDURE — 84443 ASSAY THYROID STIM HORMONE: CPT | Mod: HCNC | Performed by: INTERNAL MEDICINE

## 2024-10-30 PROCEDURE — 80053 COMPREHEN METABOLIC PANEL: CPT | Mod: HCNC | Performed by: INTERNAL MEDICINE

## 2024-10-30 PROCEDURE — 99999 PR PBB SHADOW E&M-EST. PATIENT-LVL IV: CPT | Mod: PBBFAC,HCNC,, | Performed by: INTERNAL MEDICINE

## 2024-10-30 PROCEDURE — 36415 COLL VENOUS BLD VENIPUNCTURE: CPT | Mod: HCNC | Performed by: INTERNAL MEDICINE

## 2024-10-30 PROCEDURE — 3008F BODY MASS INDEX DOCD: CPT | Mod: HCNC,CPTII,S$GLB, | Performed by: INTERNAL MEDICINE

## 2024-10-30 PROCEDURE — 1101F PT FALLS ASSESS-DOCD LE1/YR: CPT | Mod: HCNC,CPTII,S$GLB, | Performed by: INTERNAL MEDICINE

## 2024-10-30 PROCEDURE — 99999 PR PBB SHADOW E&M-EST. PATIENT-LVL IV: CPT | Mod: PBBFAC,HCNC,, | Performed by: RADIOLOGY

## 2024-10-30 PROCEDURE — 3074F SYST BP LT 130 MM HG: CPT | Mod: HCNC,CPTII,S$GLB, | Performed by: INTERNAL MEDICINE

## 2024-10-30 PROCEDURE — 3066F NEPHROPATHY DOC TX: CPT | Mod: HCNC,CPTII,S$GLB, | Performed by: RADIOLOGY

## 2024-10-30 PROCEDURE — 3288F FALL RISK ASSESSMENT DOCD: CPT | Mod: HCNC,CPTII,S$GLB, | Performed by: RADIOLOGY

## 2024-10-30 PROCEDURE — 3079F DIAST BP 80-89 MM HG: CPT | Mod: HCNC,CPTII,S$GLB, | Performed by: INTERNAL MEDICINE

## 2024-10-30 PROCEDURE — 99214 OFFICE O/P EST MOD 30 MIN: CPT | Mod: HCNC,S$GLB,, | Performed by: RADIOLOGY

## 2024-10-30 PROCEDURE — 3074F SYST BP LT 130 MM HG: CPT | Mod: HCNC,CPTII,S$GLB, | Performed by: RADIOLOGY

## 2024-10-30 PROCEDURE — 1159F MED LIST DOCD IN RCRD: CPT | Mod: HCNC,CPTII,S$GLB, | Performed by: RADIOLOGY

## 2024-10-30 PROCEDURE — 3288F FALL RISK ASSESSMENT DOCD: CPT | Mod: HCNC,CPTII,S$GLB, | Performed by: INTERNAL MEDICINE

## 2024-10-30 PROCEDURE — 3008F BODY MASS INDEX DOCD: CPT | Mod: HCNC,CPTII,S$GLB, | Performed by: RADIOLOGY

## 2024-10-30 RX ORDER — SOTORASIB 320 MG/1
960 TABLET, COATED ORAL DAILY
Qty: 90 TABLET | Refills: 3 | Status: ACTIVE | OUTPATIENT
Start: 2024-10-30 | End: 2024-11-08 | Stop reason: SDUPTHER

## 2024-10-30 RX ORDER — TRAMADOL HYDROCHLORIDE 50 MG/1
50 TABLET ORAL EVERY 6 HOURS PRN
Qty: 28 EACH | Refills: 0 | Status: SHIPPED | OUTPATIENT
Start: 2024-10-30

## 2024-11-01 ENCOUNTER — INFUSION (OUTPATIENT)
Dept: INFUSION THERAPY | Facility: HOSPITAL | Age: 64
End: 2024-11-01
Attending: INTERNAL MEDICINE
Payer: MEDICARE

## 2024-11-01 VITALS
TEMPERATURE: 98 F | OXYGEN SATURATION: 98 % | SYSTOLIC BLOOD PRESSURE: 126 MMHG | RESPIRATION RATE: 18 BRPM | DIASTOLIC BLOOD PRESSURE: 78 MMHG | HEART RATE: 101 BPM

## 2024-11-01 DIAGNOSIS — C34.92 ADENOCARCINOMA, LUNG, LEFT: Primary | ICD-10-CM

## 2024-11-01 DIAGNOSIS — M80.80XD LOCALIZED OSTEOPOROSIS WITH CURRENT PATHOLOGICAL FRACTURE WITH ROUTINE HEALING: ICD-10-CM

## 2024-11-01 PROCEDURE — 25000003 PHARM REV CODE 250: Mod: HCNC | Performed by: INTERNAL MEDICINE

## 2024-11-01 PROCEDURE — 96365 THER/PROPH/DIAG IV INF INIT: CPT | Mod: HCNC

## 2024-11-01 PROCEDURE — 63600175 PHARM REV CODE 636 W HCPCS: Mod: HCNC | Performed by: INTERNAL MEDICINE

## 2024-11-01 PROCEDURE — A4216 STERILE WATER/SALINE, 10 ML: HCPCS | Mod: HCNC | Performed by: INTERNAL MEDICINE

## 2024-11-01 RX ORDER — SODIUM CHLORIDE 0.9 % (FLUSH) 0.9 %
10 SYRINGE (ML) INJECTION
Status: DISCONTINUED | OUTPATIENT
Start: 2024-11-01 | End: 2024-11-01 | Stop reason: HOSPADM

## 2024-11-01 RX ORDER — HEPARIN 100 UNIT/ML
500 SYRINGE INTRAVENOUS
OUTPATIENT
Start: 2025-01-24

## 2024-11-01 RX ORDER — EPINEPHRINE 0.3 MG/.3ML
0.3 INJECTION SUBCUTANEOUS ONCE AS NEEDED
OUTPATIENT
Start: 2025-01-24

## 2024-11-01 RX ORDER — SODIUM CHLORIDE 0.9 % (FLUSH) 0.9 %
10 SYRINGE (ML) INJECTION
OUTPATIENT
Start: 2025-01-24

## 2024-11-01 RX ORDER — ZOLEDRONIC ACID 0.04 MG/ML
4 INJECTION, SOLUTION INTRAVENOUS
OUTPATIENT
Start: 2025-01-24

## 2024-11-01 RX ORDER — HEPARIN 100 UNIT/ML
500 SYRINGE INTRAVENOUS
Status: DISCONTINUED | OUTPATIENT
Start: 2024-11-01 | End: 2024-11-01 | Stop reason: HOSPADM

## 2024-11-01 RX ORDER — DIPHENHYDRAMINE HYDROCHLORIDE 50 MG/ML
50 INJECTION INTRAMUSCULAR; INTRAVENOUS ONCE AS NEEDED
Status: DISCONTINUED | OUTPATIENT
Start: 2024-11-01 | End: 2024-11-01 | Stop reason: HOSPADM

## 2024-11-01 RX ORDER — DIPHENHYDRAMINE HYDROCHLORIDE 50 MG/ML
50 INJECTION INTRAMUSCULAR; INTRAVENOUS ONCE AS NEEDED
OUTPATIENT
Start: 2025-01-24

## 2024-11-01 RX ORDER — EPINEPHRINE 0.3 MG/.3ML
0.3 INJECTION SUBCUTANEOUS ONCE AS NEEDED
Status: DISCONTINUED | OUTPATIENT
Start: 2024-11-01 | End: 2024-11-01 | Stop reason: HOSPADM

## 2024-11-01 RX ADMIN — SODIUM CHLORIDE: 9 INJECTION, SOLUTION INTRAVENOUS at 10:11

## 2024-11-01 RX ADMIN — Medication 10 ML: at 11:11

## 2024-11-01 RX ADMIN — ZOLEDRONIC ACID 3 MG: 4 INJECTION, SOLUTION, CONCENTRATE INTRAVENOUS at 10:11

## 2024-11-03 ENCOUNTER — HOSPITAL ENCOUNTER (OUTPATIENT)
Dept: RADIOLOGY | Facility: HOSPITAL | Age: 64
Discharge: HOME | End: 2024-11-03
Attending: INTERNAL MEDICINE
Payer: MEDICARE

## 2024-11-03 DIAGNOSIS — C34.90 MALIGNANT NEOPLASM OF UNSPECIFIED PART OF UNSPECIFIED BRONCHUS OR LUNG: ICD-10-CM

## 2024-11-03 PROCEDURE — A9585 GADOBUTROL INJECTION: HCPCS | Mod: HCNC | Performed by: INTERNAL MEDICINE

## 2024-11-03 PROCEDURE — 25500020 PHARM REV CODE 255: Mod: HCNC | Performed by: INTERNAL MEDICINE

## 2024-11-03 PROCEDURE — 70553 MRI BRAIN STEM W/O & W/DYE: CPT | Mod: 26,HCNC,, | Performed by: RADIOLOGY

## 2024-11-03 PROCEDURE — 70553 MRI BRAIN STEM W/O & W/DYE: CPT | Mod: TC,HCNC

## 2024-11-03 RX ORDER — GADOBUTROL 604.72 MG/ML
9 INJECTION INTRAVENOUS
Status: COMPLETED | OUTPATIENT
Start: 2024-11-03 | End: 2024-11-03

## 2024-11-03 RX ADMIN — GADOBUTROL 9 ML: 604.72 INJECTION INTRAVENOUS at 11:11

## 2024-11-07 ENCOUNTER — TELEPHONE (OUTPATIENT)
Dept: HEMATOLOGY/ONCOLOGY | Facility: CLINIC | Age: 64
End: 2024-11-07
Payer: MEDICARE

## 2024-11-07 NOTE — TELEPHONE ENCOUNTER
Spoke with Areli with [employer] I let her know that we currently don't have a PA or test claim being the [facility]ty Pharm[facility] is working on the authorization but I will reach out to them to see if I can get the information. I gave her a verbal with the dx codes, provider NPI and Tax ID as well as the fax number. She verbalized understanding and will wait for the fax with the rest of the information requested.      ----- Message from Gabe Brar sent at 11/7/2024  9:04 AM CST -----  Regarding: addl info needed  PATIENT CALL    Areli from [employer] called regarding sotorasib (LUMAKRAS) 320 mg Tab. States that she needs the below info. Please call back at [phone] option 4 w/ any addl questions, and docs can be faxed to [phone]. CASE ID: [id number]    - copy of insurance cards and prescription cards (front and back)  - dX code (she can accept up to 3)  - provider NPI and [facility] location Tax ID number  - best fax number for further correspondence  - PA approval  - copy of test claim

## 2024-11-08 ENCOUNTER — HOSPITAL ENCOUNTER (OUTPATIENT)
Dept: RADIOLOGY | Facility: HOSPITAL | Age: 64
Discharge: HOME | End: 2024-11-08
Attending: RADIOLOGY
Payer: MEDICARE

## 2024-11-08 DIAGNOSIS — C34.90 MALIGNANT NEOPLASM OF UNSPECIFIED PART OF UNSPECIFIED BRONCHUS OR LUNG: ICD-10-CM

## 2024-11-08 DIAGNOSIS — C79.51 SECONDARY MALIGNANT NEOPLASM OF BONE: ICD-10-CM

## 2024-11-08 PROCEDURE — A9585 GADOBUTROL INJECTION: HCPCS | Mod: HCNC | Performed by: RADIOLOGY

## 2024-11-08 PROCEDURE — 72157 MRI CHEST SPINE W/O & W/DYE: CPT | Mod: 26,HCNC,, | Performed by: RADIOLOGY

## 2024-11-08 PROCEDURE — 72158 MRI LUMBAR SPINE W/O & W/DYE: CPT | Mod: 26,HCNC,, | Performed by: RADIOLOGY

## 2024-11-08 PROCEDURE — 72158 MRI LUMBAR SPINE W/O & W/DYE: CPT | Mod: TC,HCNC

## 2024-11-08 PROCEDURE — 25500020 PHARM REV CODE 255: Mod: HCNC | Performed by: RADIOLOGY

## 2024-11-08 PROCEDURE — 72157 MRI CHEST SPINE W/O & W/DYE: CPT | Mod: TC,HCNC

## 2024-11-08 RX ORDER — SOTORASIB 320 MG/1
960 TABLET, COATED ORAL DAILY
Qty: 90 TABLET | Refills: 3 | Status: SHIPPED | OUTPATIENT
Start: 2024-11-08 | End: 2024-11-08

## 2024-11-08 RX ORDER — SOTORASIB 320 MG/1
960 TABLET, COATED ORAL DAILY
Qty: 90 TABLET | Refills: 3 | Status: SHIPPED | OUTPATIENT
Start: 2024-11-08 | End: 2024-11-08 | Stop reason: SDUPTHER

## 2024-11-08 RX ORDER — SOTORASIB 320 MG/1
960 TABLET, COATED ORAL DAILY
Qty: 90 TABLET | Refills: 3 | Status: SHIPPED | OUTPATIENT
Start: 2024-11-08

## 2024-11-08 RX ORDER — GADOBUTROL 604.72 MG/ML
10 INJECTION INTRAVENOUS
Status: COMPLETED | OUTPATIENT
Start: 2024-11-08 | End: 2024-11-08

## 2024-11-08 RX ADMIN — GADOBUTROL 10 ML: 604.72 INJECTION INTRAVENOUS at 07:11

## 2024-11-08 NOTE — PROGRESS NOTES
PATIENT: Radha Ervin  MRN: [id number]  DATE: 10/30/2024      Subjective:     Chief complaint:  Chief Complaint   Patient presents with    Lung Cancer    Follow-up       Oncologic History:      Ms. Ervin is a 63-year-old female with 30 pack-year history of smoking, quit approximately 4 months ago, who was recently diagnosed with left lung cancer after evaluation for an abnormal chest x-ray.  A subsequent CT of the chest without contrast on May 26, 2023 revealed a spiculated nodule along the paramediastinal left upper lobe measuring 2.7 x 1.9 cm.  A mildly enlarged right paratracheal lymph node was seen measuring 1.1 cm.  She underwent EBUS and biopsy on June 23, 2023.  Pathology revealed the left upper lobe lesion positive for adenocarcinoma.  Station 4R was negative for malignancy.       A PET scan on July 13, 2023 revealed the left upper lobe mass measuring 2.6 x 1.6 cm with SUV max 7.4.  There was mildly metabolic prevascular mediastinal lymph node measuring 0.9 cm seen in short axis with SUV max 2.3.  She underwent robotic left upper lobectomy and lymph node sampling on August 3, 2023.  Pathology revealed invasive poorly differentiated adenocarcinoma measuring 2.1 cm.  There was visceral pleural invasion noted.  Vascular resection revealed invasive tumor present in the adventitial soft tissue.  There was lymphovascular invasion noted.  Invasive carcinoma is present at the vascular margin.  3/4 level 6 lymph nodes, 1/2 level 11 lymph nodes and 1/4 level 12 lymph nodes were involved out of a total of 23 lymph nodes.  Surgery was complicated by intraoperative bleeding of pulmonary arterial branches which resolved with pressure.      Her case was discussed at [facility] on 8/24/23: Recommend chemoRT followed by immunotherapy.     Established care with med onc 8/28. She had seen Dr. Quevedo who had recommended sequential chemoradiation then to be followed by immunotherapy. Consented for sequential CRT with  "carbo/taxol.    On 9/22/23 she presented to the infusion center for carbo/taxol C#1 but developed infusion reaction to taxol so that was stopped.  Had reaction with second infusion as well--carbo/taxol discontinued.  Consented for carbo/pemetrexed on 11/2/23    C#1 carbo/pemetrexed 11/3/23  C#2 carbo/pemetrexed 11/24/23  C#3 acute worsening of renal function--pemetrexed held: 12/15/23  C#4 carboplatin only 1/5/24    Radiation 60Gy in 30Fx 2/5/24--3/26/24    C#1 durvalumab 4/19/24  C#2 5/17/24  C#3 6/14/24  C#4 7/12/24  C#5 8/9/24   C#6 9/6/24  C#7 10/4/24    CT CAP 10/2/24 suspicious for lumbar spinal metastasis.  Pet CT 10/16/24 confirmed uptake in L-spine           Interval History: Ms. Ervin returns for follow up. Unfortuantely her scans are compatible with progression of disease. Discussed addition of zometa for bony mets. Pt to see rad onc for palliative radiation to bone met. Discussed next step in systemic therapy--she does have KRAS mutant so we would recommend proceed with KRAS inhibitor as next-line therapy.        Review of Systems   A comprehensive review of systems was performed; pertinent positives and negatives are noted in the HPI.        ECOG Performance Status:   ECOG SCORE    1 - Restricted in strenuous activity-ambulatory and able to carry out work of a light nature         Objective:      Vitals:   Vitals:    10/30/24 0905   BP: 122/83   BP Location: Left arm   Patient Position: Sitting   Pulse: 98   Resp: 18   SpO2: 96%   Weight: 85.5 kg (188 lb 7.9 oz)   Height: 4' 11" (1.499 m)     BMI: Body mass index is 38.07 kg/m².      Physical Exam:   Physical Exam  Vitals and nursing note reviewed.   Constitutional:       General: She is not in acute distress.     Appearance: Normal appearance. She is not toxic-appearing.   HENT:      Head: Normocephalic and atraumatic.   Eyes:      General: No scleral icterus.     Conjunctiva/sclera: Conjunctivae normal.   Cardiovascular:      Rate and Rhythm: Normal " rate and regular rhythm.      Heart sounds: Normal heart sounds. No murmur heard.  Pulmonary:      Effort: Pulmonary effort is normal. No respiratory distress.      Breath sounds: Normal breath sounds. No wheezing, rhonchi or rales.   Abdominal:      General: There is no distension.      Palpations: Abdomen is soft.      Tenderness: There is no abdominal tenderness.   Musculoskeletal:         General: No swelling, tenderness or deformity.      Cervical back: Neck supple. No tenderness.   Skin:     Coloration: Skin is not jaundiced.      Findings: No erythema.   Neurological:      General: No focal deficit present.      Mental Status: She is alert and oriented to person, place, and time.      Motor: No weakness.   Psychiatric:         Mood and Affect: Mood normal.         Behavior: Behavior normal.           Laboratory Data:  WBC   Date Value Ref Range Status   10/30/2024 8.52 3.90 - 12.70 K/uL Final     Hemoglobin   Date Value Ref Range Status   10/30/2024 11.8 (L) 12.0 - 16.0 g/dL Final     POC Hematocrit   Date Value Ref Range Status   08/03/2023 34 (L) 36 - 54 %PCV Final     Hematocrit   Date Value Ref Range Status   10/30/2024 36.5 (L) 37.0 - 48.5 % Final     Platelets   Date Value Ref Range Status   10/30/2024 300 150 - 450 K/uL Final     Gran # (ANC)   Date Value Ref Range Status   10/30/2024 5.8 1.8 - 7.7 K/uL Final     Gran %   Date Value Ref Range Status   10/30/2024 67.5 38.0 - 73.0 % Final       Chemistry        Component Value Date/Time     10/30/2024 0939    K 4.1 10/30/2024 0939     10/30/2024 0939    CO2 19 (L) 10/30/2024 0939    BUN 23 10/30/2024 0939    BUN 12.0 04/28/2023 1207    CREATININE 1.5 (H) 10/30/2024 0939     10/30/2024 0939        Component Value Date/Time    CALCIUM 9.6 10/30/2024 0939    ALKPHOS 132 10/30/2024 0939    AST 19 10/30/2024 0939    ALT 15 10/30/2024 0939    BILITOT 0.5 10/30/2024 0939    ESTGFRAFRICA >60.0 04/06/2020 1002    EGFRNONAA >60.0 04/06/2020 1002               Assessment/Plan:     1. Secondary malignant neoplasm of bone    2. Malignant neoplasm of unspecified part of unspecified bronchus or lung    3. Stage 3a chronic kidney disease      Stg IIIA NSCLC s/p resection followed by CRT due to residual disease. Developed metastatic progression while on maintenance durvalumab.   Referred for clinical trial--not eligible for anything currently.  Recommend next-line therapy with KRAS-inhib. Will start lumakras.     Med and Orders:  Orders Placed This Encounter    MRI Brain W WO Contrast    traMADoL (ULTRAM) 50 mg tablet       Follow Up:  Follow up in about 4 weeks (around 11/27/2024).      Above care plan was discussed with patient and all questions were addressed to their expressed satisfaction.       Roger Eduardo MD, FACP  Hematology & Medical Oncology  [facility]         High Risk Conditions:    Patient has a condition that poses threat to life and bodily function: NSCLC  Patient is currently on drug therapy requiring intensive monitoring for toxicity: lumakras

## 2024-11-13 ENCOUNTER — HOSPITAL ENCOUNTER (OUTPATIENT)
Dept: RADIATION THERAPY | Facility: HOSPITAL | Age: 64
Discharge: HOME | End: 2024-11-13
Payer: MEDICARE

## 2024-11-13 PROCEDURE — 77263 THER RADIOLOGY TX PLNG CPLX: CPT | Mod: HCNC,,, | Performed by: RADIOLOGY

## 2024-11-13 PROCEDURE — 77334 RADIATION TREATMENT AID(S): CPT | Mod: 26,HCNC,, | Performed by: RADIOLOGY

## 2024-11-13 PROCEDURE — 77014 PR  CT GUIDANCE PLACEMENT RAD THERAPY FIELDS: CPT | Mod: 26,HCNC,, | Performed by: RADIOLOGY

## 2024-11-13 PROCEDURE — 77334 RADIATION TREATMENT AID(S): CPT | Mod: TC,HCNC | Performed by: RADIOLOGY

## 2024-11-13 PROCEDURE — 77014 HC CT GUIDANCE RADIATION THERAPY FLDS PLACEMENT: CPT | Mod: TC,HCNC | Performed by: RADIOLOGY

## 2024-11-14 DIAGNOSIS — C79.51 SECONDARY MALIGNANT NEOPLASM OF BONE: ICD-10-CM

## 2024-11-14 DIAGNOSIS — C34.90 MALIGNANT NEOPLASM OF UNSPECIFIED PART OF UNSPECIFIED BRONCHUS OR LUNG: ICD-10-CM

## 2024-11-14 RX ORDER — SOTORASIB 320 MG/1
960 TABLET, COATED ORAL DAILY
Qty: 90 TABLET | Refills: 3 | Status: SHIPPED | OUTPATIENT
Start: 2024-11-14

## 2024-11-15 ENCOUNTER — OFFICE VISIT (OUTPATIENT)
Dept: PAIN MEDICINE | Facility: CLINIC | Age: 64
End: 2024-11-15
Payer: MEDICARE

## 2024-11-15 ENCOUNTER — TELEPHONE (OUTPATIENT)
Dept: PAIN MEDICINE | Facility: CLINIC | Age: 64
End: 2024-11-15

## 2024-11-15 VITALS
DIASTOLIC BLOOD PRESSURE: 65 MMHG | SYSTOLIC BLOOD PRESSURE: 84 MMHG | HEART RATE: 104 BPM | WEIGHT: 186.75 LBS | HEIGHT: 59 IN | BODY MASS INDEX: 37.65 KG/M2

## 2024-11-15 DIAGNOSIS — C79.51 SECONDARY MALIGNANT NEOPLASM OF BONE: ICD-10-CM

## 2024-11-15 PROCEDURE — 99999 PR PBB SHADOW E&M-EST. PATIENT-LVL V: CPT | Mod: PBBFAC,,, | Performed by: EMERGENCY MEDICINE

## 2024-11-15 NOTE — PROGRESS NOTES
Interventional Pain Management - New Patient Visit      Original HPI 11/15/24: Radha Ervin is a 64 y.o. year old female patient who has a past medical history of Allergy, Amblyopia, Cataract, Eczema, HS (hereditary spherocytosis), total knee replacement, and Joint pain. She presents in referral from Dr. Roger Eduardo for lbp for the past 4 months     Original Pain Description:  The pain is located in the lower back and is axial. The pain is described as aching. Exacerbating factors: Sitting, Standing, Laying, Bending, Walking, and Getting out of bed/chair. Mitigating factors medications. Symptoms interfere with daily activity and sleeping. The patient feels like symptoms have been worsening. Patient denies significant motor weakness.    Patient also reports another pain that is lower down in her back and  radiating to the bilateral hip area, right > left.     PAIN SCORES:  Best: Pain is 6  Current: Pain is 8  Worst: Pain is 10    6 weeks of Conservative therapy:  PT: Not yet  Chiro:  HEP: Unable due to pain    Treatments / Medications:   Ativan  Tramadol 50mg qhs     Antiplatelets/Anticoagulants:  NA    Interventional Pain Procedures:   NA    IMAGING:    MRI LUMBAR SPINE W WO CONTRAST; MRI THORACIC SPINE W WO CONTRAST  11/09/2024  T10-T11: symmetric degenerative endplate changes with endplate edema  T12-L1: Mild spinal canal stenosis  L1:  Lytic lesion posterior body 1.0 cm.  Approx 30% height loss superior endplate w/edema. Mild bulging of the posterior cortex resulting in mild spinal canal stenosis.   L3-L4: Mild CDB  L4-L5: CDB, L>R facet arthropathy, & LF thickening=>mod spinal canal stenosis.     L5-S1: Mild CDB and mod facet arthropathy.      Past Surgical History:   Procedure Laterality Date    achilles tendon repair      BRONCHOSCOPY N/A 8/3/2023    Procedure: Bronchoscopy;  Surgeon: Saeed Gould MD;  Location: [facility] OR [address] [place] [address];  Service: Cardiothoracic;  Laterality: N/A;    CHOLECYSTECTOMY       INJECTION OF ANESTHETIC AGENT AROUND MULTIPLE INTERCOSTAL NERVES N/A 8/3/2023    Procedure: BLOCK, NERVE, INTERCOSTAL, 2 OR MORE;  Surgeon: Saeed Gould MD;  Location: [facility] OR 2ND FLR;  Service: Cardiothoracic;  Laterality: N/A;    LYMPHADENECTOMY Left 8/3/2023    Procedure: LYMPHADENECTOMY;  Surgeon: Saeed Gould MD;  Location: [facility] OR 2ND FLR;  Service: Cardiothoracic;  Laterality: Left;    NAVIGATIONAL BRONCHOSCOPY N/A 2023    Procedure: BRONCHOSCOPY, NAVIGATIONAL;  Surgeon: Radha Mccollum MD;  Location: [facility] OR 2ND FLR;  Service: Pulmonary;  Laterality: N/A;    TOTAL KNEE ARTHROPLASTY      XI ROBOTIC RATS Left 8/3/2023    Procedure: XI ROBOTIC RATS upper lobectomy;  Surgeon: Saeed Gould MD;  [facility];  Service: Cardiothoracic;  Laterality: Left;       Social History     Socioeconomic History    Marital status: Single   Tobacco Use    Smoking status: Former     Current packs/day: 0.00     Average packs/day: 1 pack/day for 46.3 years (46.3 ttl pk-yrs)     Types: Vaping with nicotine, Cigarettes     Start date:      Quit date: 2023     Years since quittin.5    Smokeless tobacco: Never   Substance and Sexual Activity    Alcohol use: Not Currently     Comment: occassionally    Drug use: No    Sexual activity: Not Currently     Partners: Male     Social Drivers of Health     Financial Resource Strain: Low Risk  (10/9/2024)    Overall Financial Resource Strain (CARDIA)     Difficulty of Paying Living Expenses: Not hard at all   Food Insecurity: No Food Insecurity (10/9/2024)    Hunger Vital Sign     Worried About Running Out of Food in the Last Year: Never true     Ran Out of Food in the Last Year: Never true   Transportation Needs: No Transportation Needs (10/9/2024)    PRAPARE - Transportation     Lack of Transportation (Medical): No     Lack of Transportation (Non-Medical): No   Physical Activity: Inactive (10/9/2024)    Exercise Vital Sign     Days of Exercise per  "Week: 0 days     Minutes of Exercise per Session: 0 min   Stress: No Stress Concern Present (10/9/2024)    [employer] - Occupational Stress Questionnaire     Feeling of Stress : Not at all   Housing Stability: Low Risk  (10/9/2024)    Housing Stability Vital Sign     Unable to Pay for Housing in the Last Year: No     Homeless in the Last Year: No       Medications/Allergies: See med card    ROS:  GENERAL: No fever. No chills. No fatigue. Denies weight loss. Denies weight gain.  Back / musculoskeletal / neuro : See HPI    VITALS:   Vitals:    11/15/24 0828   BP: (!) 84/65   Pulse: 104   Weight: 84.7 kg (186 lb 11.7 oz)   Height: 4' 11" (1.499 m)   PainSc:   8   PainLoc: Back     Body mass index is 37.71 kg/m².      11/15/2024     8:34 AM   Last 3 PDI Scores   Pain Disability Index (PDI) 56       PHYSICAL EXAM:   GENERAL: Well appearing, in no acute distress, alert and oriented x3.  PSYCH:  Mood and affect appropriate.  SKIN: Skin color, texture, turgor normal, no rashes or lesions.  HEENT:  Normocephalic, atraumatic. Cranial nerves grossly intact.  NECK: No pain to palpation over the cervical paraspinous muscles. No pain to palpation over facets. No pain with neck flexion, extension, or lateral flexion.   PULM: No evidence of respiratory difficulty, symmetric chest rise.  GI:  Non-distended  BACK: Normal range of motion. Pain to palpation over the spinous processes. No pain to palpation over facet joints. There is no pain with palpation over the sacroiliac joints bilaterally.   EXTREMITIES: No deformities, edema, or skin discoloration.   MUSCULOSKELETAL: Shoulder, hip, and knee provocative maneuvers are negative. No atrophy is noted.  NEURO: Sensation is equal and appropriate bilaterally. Bilateral upper and lower extremity strength is normal and symmetric. Bilateral upper and lower extremity coordination and muscle stretch reflexes are physiologic and symmetric. Plantar response are " downgoing. Straight leg raising in the supine position is negative to radicular pain.   GAIT: normal.      LABS:    Lab Results   Component Value Date    HGBA1C 6.3 (H) 04/28/2023       Lab Results   Component Value Date    CREATININE 1.5 (H) 10/30/2024         ASSESSMENT: 64 y.o. year old female with pain, consistent with:    Encounter Diagnosis   Name Primary?    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 We will proceed with L1 Osteocool and kyphoplasty. We will then pursue her L4/5 stenosis     PLAN:  - I have stressed the importance of physical activity and a home exercise plan to help with pain and improve health.  - Patient can continue with medications for now since they are providing benefits, using them appropriately, and without side effects.  - Counseled patient regarding the importance of activity modification and constant sleeping habits.  - Interventions:  L1 Osteocool with kyphoplasty . Explained the risks and benefits of the procedure in detail with the patient today in clinic along with alternative treatment options, and the patient elected to pursue the intervention at this time.    - Imaging: Reviewed available imaging with patient and answered any questions they had regarding study.  - The patient's pathophysiology was explained in detail with reference to x-rays, models, other visual aids as appropriate.   - Follow up visit: return to clinic in 3-4 weeks      I would like to thank Roger Eduardo MD for the opportunity to assist in the care of this patient. We had a very nice visit and I look forward to continuing their care. Please let me know if I can be of further assistance.     Andre Handy MD  11/15/2024

## 2024-11-22 ENCOUNTER — HOSPITAL ENCOUNTER (EMERGENCY)
Facility: HOSPITAL | Age: 64
Discharge: HOME | End: 2024-11-23
Attending: EMERGENCY MEDICINE
Payer: MEDICARE

## 2024-11-22 ENCOUNTER — PATIENT MESSAGE (OUTPATIENT)
Dept: HEMATOLOGY/ONCOLOGY | Facility: CLINIC | Age: 64
End: 2024-11-22
Payer: MEDICARE

## 2024-11-22 DIAGNOSIS — C34.92 ADENOCARCINOMA, LUNG, LEFT: Primary | ICD-10-CM

## 2024-11-22 DIAGNOSIS — R11.2 NAUSEA AND VOMITING, UNSPECIFIED VOMITING TYPE: Primary | ICD-10-CM

## 2024-11-22 DIAGNOSIS — R11.0 NAUSEA: ICD-10-CM

## 2024-11-22 DIAGNOSIS — R11.2 CINV (CHEMOTHERAPY-INDUCED NAUSEA AND VOMITING): ICD-10-CM

## 2024-11-22 DIAGNOSIS — T45.1X5A CINV (CHEMOTHERAPY-INDUCED NAUSEA AND VOMITING): ICD-10-CM

## 2024-11-22 LAB
ALBUMIN SERPL BCP-MCNC: 3.5 G/DL (ref 3.5–5.2)
ALP SERPL-CCNC: 94 U/L (ref 40–150)
ALT SERPL W/O P-5'-P-CCNC: 23 U/L (ref 10–44)
ANION GAP SERPL CALC-SCNC: 16 MMOL/L (ref 8–16)
APTT PPP: 31.7 SEC (ref 21–32)
AST SERPL-CCNC: 25 U/L (ref 10–40)
BASOPHILS # BLD AUTO: 0.06 K/UL (ref 0–0.2)
BASOPHILS NFR BLD: 0.7 % (ref 0–1.9)
BILIRUB SERPL-MCNC: 0.7 MG/DL (ref 0.1–1)
BUN SERPL-MCNC: 19 MG/DL (ref 8–23)
CALCIUM SERPL-MCNC: 9.8 MG/DL (ref 8.7–10.5)
CHLORIDE SERPL-SCNC: 105 MMOL/L (ref 95–110)
CO2 SERPL-SCNC: 15 MMOL/L (ref 23–29)
CREAT SERPL-MCNC: 2 MG/DL (ref 0.5–1.4)
DIFFERENTIAL METHOD BLD: ABNORMAL
EOSINOPHIL # BLD AUTO: 0.5 K/UL (ref 0–0.5)
EOSINOPHIL NFR BLD: 6.3 % (ref 0–8)
ERYTHROCYTE [DISTWIDTH] IN BLOOD BY AUTOMATED COUNT: 14.4 % (ref 11.5–14.5)
EST. GFR  (NO RACE VARIABLE): 27 ML/MIN/1.73 M^2
GLUCOSE SERPL-MCNC: 78 MG/DL (ref 70–110)
HCT VFR BLD AUTO: 34 % (ref 37–48.5)
HGB BLD-MCNC: 11.5 G/DL (ref 12–16)
IMM GRANULOCYTES # BLD AUTO: 0.02 K/UL (ref 0–0.04)
IMM GRANULOCYTES NFR BLD AUTO: 0.2 % (ref 0–0.5)
INR PPP: 1.1 (ref 0.8–1.2)
LACTATE SERPL-SCNC: 1 MMOL/L (ref 0.5–2.2)
LIPASE SERPL-CCNC: 30 U/L (ref 4–60)
LYMPHOCYTES # BLD AUTO: 1.6 K/UL (ref 1–4.8)
LYMPHOCYTES NFR BLD: 18.5 % (ref 18–48)
MAGNESIUM SERPL-MCNC: 2 MG/DL (ref 1.6–2.6)
MCH RBC QN AUTO: 31.3 PG (ref 27–31)
MCHC RBC AUTO-ENTMCNC: 33.8 G/DL (ref 32–36)
MCV RBC AUTO: 93 FL (ref 82–98)
MONOCYTES # BLD AUTO: 1 K/UL (ref 0.3–1)
MONOCYTES NFR BLD: 12 % (ref 4–15)
NEUTROPHILS # BLD AUTO: 5.3 K/UL (ref 1.8–7.7)
NEUTROPHILS NFR BLD: 62.3 % (ref 38–73)
NRBC BLD-RTO: 0 /100 WBC
PLATELET # BLD AUTO: 293 K/UL (ref 150–450)
PMV BLD AUTO: 9.6 FL (ref 9.2–12.9)
POTASSIUM SERPL-SCNC: 3.9 MMOL/L (ref 3.5–5.1)
PROT SERPL-MCNC: 7.9 G/DL (ref 6–8.4)
PROTHROMBIN TIME: 11.9 SEC (ref 9–12.5)
RBC # BLD AUTO: 3.67 M/UL (ref 4–5.4)
SODIUM SERPL-SCNC: 136 MMOL/L (ref 136–145)
WBC # BLD AUTO: 8.58 K/UL (ref 3.9–12.7)

## 2024-11-22 PROCEDURE — 83735 ASSAY OF MAGNESIUM: CPT | Mod: HCNC

## 2024-11-22 PROCEDURE — 96361 HYDRATE IV INFUSION ADD-ON: CPT | Mod: HCNC

## 2024-11-22 PROCEDURE — 85610 PROTHROMBIN TIME: CPT | Mod: HCNC | Performed by: EMERGENCY MEDICINE

## 2024-11-22 PROCEDURE — 93005 ELECTROCARDIOGRAM TRACING: CPT | Mod: HCNC

## 2024-11-22 PROCEDURE — 80053 COMPREHEN METABOLIC PANEL: CPT | Mod: HCNC

## 2024-11-22 PROCEDURE — 63600175 PHARM REV CODE 636 W HCPCS: Mod: HCNC

## 2024-11-22 PROCEDURE — 96375 TX/PRO/DX INJ NEW DRUG ADDON: CPT | Mod: HCNC

## 2024-11-22 PROCEDURE — 99285 EMERGENCY DEPT VISIT HI MDM: CPT | Mod: 25,HCNC

## 2024-11-22 PROCEDURE — 25000003 PHARM REV CODE 250: Mod: HCNC | Performed by: EMERGENCY MEDICINE

## 2024-11-22 PROCEDURE — 63600175 PHARM REV CODE 636 W HCPCS: Mod: HCNC | Performed by: EMERGENCY MEDICINE

## 2024-11-22 PROCEDURE — 96374 THER/PROPH/DIAG INJ IV PUSH: CPT | Mod: HCNC

## 2024-11-22 PROCEDURE — 93010 ELECTROCARDIOGRAM REPORT: CPT | Mod: HCNC,,, | Performed by: STUDENT IN AN ORGANIZED HEALTH CARE EDUCATION/TRAINING PROGRAM

## 2024-11-22 PROCEDURE — 83605 ASSAY OF LACTIC ACID: CPT | Mod: HCNC | Performed by: EMERGENCY MEDICINE

## 2024-11-22 PROCEDURE — 85730 THROMBOPLASTIN TIME PARTIAL: CPT | Mod: HCNC | Performed by: EMERGENCY MEDICINE

## 2024-11-22 PROCEDURE — 85025 COMPLETE CBC W/AUTO DIFF WBC: CPT | Mod: HCNC

## 2024-11-22 PROCEDURE — 83690 ASSAY OF LIPASE: CPT | Mod: HCNC | Performed by: EMERGENCY MEDICINE

## 2024-11-22 RX ORDER — ONDANSETRON HYDROCHLORIDE 2 MG/ML
4 INJECTION, SOLUTION INTRAVENOUS ONCE
Status: COMPLETED | OUTPATIENT
Start: 2024-11-22 | End: 2024-11-22

## 2024-11-22 RX ORDER — ONDANSETRON HYDROCHLORIDE 8 MG/1
4 TABLET, FILM COATED ORAL EVERY 8 HOURS PRN
Qty: 30 TABLET | Refills: 3 | Status: SHIPPED | OUTPATIENT
Start: 2024-11-22

## 2024-11-22 RX ORDER — PROMETHAZINE HYDROCHLORIDE 25 MG/1
25 TABLET ORAL EVERY 6 HOURS PRN
Qty: 30 TABLET | Refills: 3 | Status: SHIPPED | OUTPATIENT
Start: 2024-11-22

## 2024-11-22 RX ORDER — HYDROMORPHONE HYDROCHLORIDE 1 MG/ML
0.2 INJECTION, SOLUTION INTRAMUSCULAR; INTRAVENOUS; SUBCUTANEOUS
Status: COMPLETED | OUTPATIENT
Start: 2024-11-22 | End: 2024-11-22

## 2024-11-22 RX ADMIN — ONDANSETRON 4 MG: 2 INJECTION INTRAMUSCULAR; INTRAVENOUS at 10:11

## 2024-11-22 RX ADMIN — SODIUM CHLORIDE 1000 ML: 9 INJECTION, SOLUTION INTRAVENOUS at 10:11

## 2024-11-22 RX ADMIN — HYDROMORPHONE HYDROCHLORIDE 0.2 MG: 1 INJECTION, SOLUTION INTRAMUSCULAR; INTRAVENOUS; SUBCUTANEOUS at 10:11

## 2024-11-22 NOTE — Clinical Note
"Radha Flor" Arcadio was seen and treated in our emergency department on 11/22/2024.  She may return to work on 11/26/2024.       If you have any questions or concerns, please don't hesitate to call.      Rakesh Collins MD"

## 2024-11-23 ENCOUNTER — PATIENT MESSAGE (OUTPATIENT)
Dept: DERMATOLOGY | Facility: CLINIC | Age: 64
End: 2024-11-23
Payer: MEDICARE

## 2024-11-23 VITALS
DIASTOLIC BLOOD PRESSURE: 62 MMHG | WEIGHT: 181.75 LBS | HEART RATE: 105 BPM | SYSTOLIC BLOOD PRESSURE: 113 MMHG | TEMPERATURE: 98 F | BODY MASS INDEX: 36.64 KG/M2 | RESPIRATION RATE: 18 BRPM | OXYGEN SATURATION: 95 % | HEIGHT: 59 IN

## 2024-11-23 RX ORDER — DIPHENHYDRAMINE HCL 25 MG
25 CAPSULE ORAL
Status: DISCONTINUED | OUTPATIENT
Start: 2024-11-23 | End: 2024-11-23 | Stop reason: HOSPADM

## 2024-11-23 RX ORDER — ONDANSETRON 4 MG/1
8 TABLET, ORALLY DISINTEGRATING ORAL EVERY 6 HOURS PRN
Qty: 30 TABLET | Refills: 0 | Status: ON HOLD | OUTPATIENT
Start: 2024-11-23

## 2024-11-23 NOTE — DISCHARGE INSTRUCTIONS
Thank you for coming in to see us at [facility] It was nice to meet you, and I hope you feel better soon. Please feel free to return to the ER at any time should your symptoms get worse, or if you have different emergent concerns.    Our goal in the emergency department is to always give you outstanding care and exceptional service. You may receive a survey by mail or e-mail in the next week regarding your experience in our ED. We would greatly appreciate your completing and returning the survey. Your feedback provides us with a way to recognize our staff who give very good care and it helps us learn how to improve when your experience was below our aspiration of excellence.      It is important to remember that some problems or medical conditions are difficult to diagnose and may not be found or addressed during your Emergency Department visit.  These conditions often start with non-specific symptoms and can only be diagnosed on follow up visits with your primary care physician or specialist when the symptoms continue or change. Please remember that all medical conditions can change, and we cannot predict how you will be feeling tomorrow or the next day.    Return to the ER with any questions/concerns, new/concerning symptoms, worsening, failure to improve or inability to obtain follow-up.       Be sure to follow up with your primary care doctor and review all labs/imaging/tests that were performed during your ER visit with them. It is very common for us to identify non-emergent incidental findings which must be followed up with your primary care physician.  Some labs/imaging/tests may be outside of the normal range, and require non-emergent follow-up and/or further investigation/treatment/procedures/testing to help diagnose/exclude/prevent complications or other potentially serious medical conditions. Some abnormalities may not have been discussed or addressed during your ER visit. Some lab  results may not return during your ER visit but can be accessible by downloading the free [facility] genevieve or by visiting [email or web address] . It is important for you to review all labs/imaging/tests which are outside of the normal range with your physician.    An ER visit does not replace a primary care visit, and many screening tests or follow-up tests cannot be ordered by an ER doctor or performed by the ER. Some tests may even require pre-approval.    If you do not have a primary care doctor, you may contact the one listed on your discharge paperwork or you may also call the [facility] Appointment Desk at [phone] , or [facility]. at  [phone] to schedule an appointment, or establish care with a primary care doctor or even a specialist and to obtain information about local resources. It is important to your health that you have a primary care doctor.    Please take all medications as directed. We have done our best to select a medication for you that will treat your condition however, all medications may potentially have side-effects and it is impossible to predict which medications may give you side-effects or what those side-effects (if any) those medications may give you.  If you feel that you are having a negative effect or side-effect of any medication you should stop taking those medications immediately and seek medical attention. If you feel that you are having a life-threatening reaction call 911.        Do not drive, swim, climb to height, take a bath, operate heavy machinery, drink alcohol or take potentially sedating medications, sign any legal documents or make any important decisions for 24 hours if you have received any pain medications, sedatives or mood altering drugs during your ER visit or within 24 hours of taking them if they have been prescribed to you.     You can find additional resources for Dentists, hearing aids, durable medical equipment, low cost pharmacies and  other resources at [email or web address]

## 2024-11-23 NOTE — ED PROVIDER NOTES
"Encounter Date: 11/22/2024       History     Chief Complaint   Patient presents with    Abdominal Pain     Abdominal pain, nausea, vomiting x3-4 days progressively getting worse. Hx of lung cancer. Supposed to start radiation on Monday. Attempted to put TENS unit on back this evening and noticed brusing to mid and lower spine. No known injury.      HPI    Pleasant 64-year-old female extensive medical history including metastatic lung cancer to the spine presents the ER for evaluation 3-4 days of progressively worsening abdominal pain nausea vomiting worsening back pain.  No fever or chills.  Came to the ER for further evaluation.    Review of patient's allergies indicates:   Allergen Reactions    Paclitaxel Anaphylaxis and Other (See Comments)     Pt states "Anaphylaxis" last encounter w/ throat swelling. Encountered back pain, coughing and head fuzzy today.      Metformin Itching    Morphine Other (See Comments)     headaches    Latex, natural rubber Rash and Other (See Comments)     Redness and peeling only with bandaids    Penicillins Nausea And Vomiting and Rash     Past Medical History:   Diagnosis Date    Allergy     Amblyopia     Cataract     Eczema     HS (hereditary spherocytosis)     Hx of total knee replacement 01/19/2016    right knee    Joint pain      Past Surgical History:   Procedure Laterality Date    achilles tendon repair      BRONCHOSCOPY N/A 8/3/2023    Procedure: Bronchoscopy;  Surgeon: Saeed Gould MD;  Location: [facility] OR [address] [place] [address];  Service: Cardiothoracic;  Laterality: N/A;    CHOLECYSTECTOMY      INJECTION OF ANESTHETIC AGENT AROUND MULTIPLE INTERCOSTAL NERVES N/A 8/3/2023    Procedure: BLOCK, NERVE, INTERCOSTAL, 2 OR MORE;  Surgeon: Saeed Gould MD;  [facility];  Service: Cardiothoracic;  Laterality: N/A;    LYMPHADENECTOMY Left 8/3/2023    Procedure: LYMPHADENECTOMY;  Surgeon: Saeed Gould MD;  Location: [facility] OR [address] [place] [address];  Service: Cardiothoracic;  Laterality: " Left;    NAVIGATIONAL BRONCHOSCOPY N/A 2023    Procedure: BRONCHOSCOPY, NAVIGATIONAL;  Surgeon: Radha Mccollum MD;  Location: [facility] OR [address] [place] [address];  Service: Pulmonary;  Laterality: N/A;    TOTAL KNEE ARTHROPLASTY      XI ROBOTIC RATS Left 8/3/2023    Procedure: XI ROBOTIC RATS upper lobectomy;  Surgeon: Saeed Gould MD;  Location: [facility] OR [address] [place] [address];  Service: Cardiothoracic;  Laterality: Left;     Family History   Problem Relation Name Age of Onset    Cancer Mother      Cancer Father          lung CA    Breast cancer Sister 1/2         1/2 sister    Cancer Son x2         hogdkins    Hodgkin's lymphoma Son x2     Liver cancer Maternal Uncle      Blindness Cousin      Diabetes Cousin      Amblyopia Neg Hx      Cataracts Neg Hx      Glaucoma Neg Hx      Macular degeneration Neg Hx      Retinal detachment Neg Hx      Strabismus Neg Hx       Social History     Tobacco Use    Smoking status: Former     Current packs/day: 0.00     Average packs/day: 1 pack/day for 46.3 years (46.3 ttl pk-yrs)     Types: Vaping with nicotine, Cigarettes     Start date:      Quit date: 2023     Years since quittin.5    Smokeless tobacco: Never   Substance Use Topics    Alcohol use: Not Currently     Comment: occassionally    Drug use: No     Review of Systems   Constitutional:  Positive for fatigue.   Gastrointestinal:  Positive for abdominal pain, nausea and vomiting.   All other systems reviewed and are negative.      Physical Exam     Initial Vitals   BP Pulse Resp Temp SpO2   24 2030 24   115/72 (!) 113 16 98.4 °F (36.9 °C) 98 %      MAP       --                Physical Exam    Nursing note and vitals reviewed.  Constitutional:   Elderly chronically ill-appearing no distress   HENT:   Head: Normocephalic and atraumatic.   Dry mucous membranes   Eyes: EOM are normal. Pupils are equal, round, and reactive to light.   Neck:   Normal range of motion.  Cardiovascular:   Normal rate and regular rhythm.           Pulmonary/Chest: Breath sounds normal. No respiratory distress.   Abdominal: Abdomen is soft.   No abdominal tenderness guarding or rebound   Musculoskeletal:         General: Normal range of motion.      Cervical back: Normal range of motion.      Comments: Reproducible chronic paraspinal spinal tenderness     Neurological: She is alert and oriented to person, place, and time. She has normal strength. GCS score is 15. GCS eye subscore is 4. GCS verbal subscore is 5. GCS motor subscore is 6.   Skin: Skin is warm and dry. Capillary refill takes less than 2 seconds.   Subacute contusion noted to the back   Psychiatric: She has a normal mood and affect.         ED Course   Procedures  Labs Reviewed   CBC W/ AUTO DIFFERENTIAL - Abnormal       Result Value    WBC 8.58      RBC 3.67 (*)     Hemoglobin 11.5 (*)     Hematocrit 34.0 (*)     MCV 93      MCH 31.3 (*)     MCHC 33.8      RDW 14.4      Platelets 293      MPV 9.6      Immature Granulocytes 0.2      Gran # (ANC) 5.3      Immature Grans (Abs) 0.02      Lymph # 1.6      Mono # 1.0      Eos # 0.5      Baso # 0.06      nRBC 0      Gran % 62.3      Lymph % 18.5      Mono % 12.0      Eosinophil % 6.3      Basophil % 0.7      Differential Method Automated     COMPREHENSIVE METABOLIC PANEL - Abnormal    Sodium 136      Potassium 3.9      Chloride 105      CO2 15 (*)     Glucose 78      BUN 19      Creatinine 2.0 (*)     Calcium 9.8      Total Protein 7.9      Albumin 3.5      Total Bilirubin 0.7      Alkaline Phosphatase 94      AST 25      ALT 23      eGFR 27 (*)     Anion Gap 16     MAGNESIUM    Magnesium 2.0     LIPASE    Lipase 30     PROTIME-INR    Prothrombin Time 11.9      INR 1.1     APTT    aPTT 31.7     LACTIC ACID, PLASMA    Lactate (Lactic Acid) 1.0            Imaging Results              CT Abdomen Pelvis  Without Contrast (Final result)  Result time 11/23/24 00:32:17      Final result by Lukas Jiménez MD  (11/23/24 00:32:17)                   Impression:      Stable CT of the abdomen and pelvis with no acute finding.    Stable lytic change in L1 compatible with lytic metastatic disease likely from known lung carcinoma with postoperative changes in the left lung.      Electronically signed by: Lukas Jiménez  Date:    11/23/2024  Time:    00:32               Narrative:    EXAMINATION:  CT ABDOMEN PELVIS WITHOUT CONTRAST    CLINICAL HISTORY:  Nausea/vomiting;History of metastatic lung cancer to the spine with abdominal pain nausea vomiting KESHA;    TECHNIQUE:  Low dose axial images, sagittal and coronal reformations were obtained from the lung bases to the pubic symphysis.  Oral contrast was not administered.    COMPARISON:  None    FINDINGS:  Heart: Stable in size. No pericardial effusion.    Lung Bases: Scarring changes in the left perihilar region are again noted compatible with postop changes.    Liver: Stable in size and attenuation, with no focal hepatic lesions.    Gallbladder: Resected.    Bile Ducts: No evidence of dilated ducts.    Pancreas: No mass or peripancreatic fat stranding.    Spleen: Unremarkable.    Adrenals: Unremarkable.    Kidneys/ Ureters: Stable bilateral cysts and nonobstructive calculus in both kidneys.    Bladder: No evidence of wall thickening.    Reproductive organs: Unremarkable.    GI Tract/Mesentery: No evidence of bowel obstruction or inflammation.    Peritoneal Space: No ascites. No free air.    Retroperitoneum: No significant adenopathy.    Abdominal wall: Unremarkable.    Vasculature: No significant atherosclerosis or aneurysm.    Bones: Lytic bone lesion in the L1 vertebral body appears stable.  No new vertebral lytic or blastic change.                                       Medications   ondansetron injection 4 mg (4 mg Intravenous Given 11/22/24 2222)   sodium chloride 0.9% bolus 1,000 mL 1,000 mL (0 mLs Intravenous Stopped 11/22/24 7982)   HYDROmorphone injection 0.2 mg (0.2 mg  Intravenous Given 11/22/24 2223)     Medical Decision Making  64-year-old female history of metastatic lung cancer presents the ER for 4 days of abdominal pain nausea vomiting decreased oral intake worsening back pain.  No fever chills.  Patient is not on active chemotherapy at this time.  She endorses she was having progressively worsening symptoms her sister came to check on her and noticed that she unwell in brought her to the ER.  She arrived in the ER no acute distress no significant abdominal tenderness.  Chronic reproducible back pain.  Of note there appears to be a subacute contusion to the back.  Differential includes gastroenteritis, gastritis, bowel obstruction, sepsis, neutropenia thrombocytopenia other cause.  Will plan blood work symptomatic support CT scan observation reassess.  Low threshold for admission    Amount and/or Complexity of Data Reviewed  Independent Historian: friend  External Data Reviewed: notes.  Labs: ordered. Decision-making details documented in ED Course.  Radiology: ordered and independent interpretation performed. Decision-making details documented in ED Course.  ECG/medicine tests: ordered and independent interpretation performed. Decision-making details documented in ED Course.    Risk  Prescription drug management.               ED Course as of 11/23/24 0427   Sat Nov 23, 2024   0105 APTT [SE]   0105 Lipase [SE]   0105 Magnesium [SE]   0105 CBC auto differential(!) [SE]   0105 Comprehensive metabolic panel(!) [SE]   0105 Protime-INR [SE]   0105 CT Abdomen Pelvis  Without Contrast [SE]   0105 Patient resting comfortably in bed no acute distress labs imaging obtained and reviewed.  No leukocytosis no significant left shift lipase magnesium lactic within normal limits.  INR PTT within normal limits.  CT abdomen pelvis without acute intra-abdominal findings.  Known back fracture noted appears stable.  Patient was feeling better after medications, tolerated p.o..  Discussed with  patient will plan discharge return precautions discussed patient understood agreed plan patient to be discharged. [SE]      ED Course User Index  [SE] Rakesh Collins MD                           Clinical Impression:  Final diagnoses:  [R11.0] Nausea  [R11.2] Nausea and vomiting, unspecified vomiting type (Primary)          ED Disposition Condition    Discharge Stable          ED Prescriptions       Medication Sig Dispense Start Date End Date Auth. Provider    ondansetron (ZOFRAN-ODT) 4 MG TbDL Take 2 tablets (8 mg total) by mouth every 6 (six) hours as needed (n/v). 30 tablet 11/23/2024 -- Rakesh Collins MD          Follow-up Information       Follow up With Specialties Details Why Contact Info    Suraj Herrera III, MD Internal Medicine Schedule an appointment as soon as possible for a visit  As needed [address] [facility] [address]  [phone]               Rakesh Collins MD  11/23/24 1216

## 2024-11-24 ENCOUNTER — HOSPITAL ENCOUNTER (INPATIENT)
Facility: HOSPITAL | Age: 64
LOS: 6 days | Discharge: HOME | DRG: 690 | End: 2024-12-01
Attending: EMERGENCY MEDICINE | Admitting: INTERNAL MEDICINE
Payer: MEDICARE

## 2024-11-24 DIAGNOSIS — M54.9 BACK PAIN, UNSPECIFIED BACK LOCATION, UNSPECIFIED BACK PAIN LATERALITY, UNSPECIFIED CHRONICITY: ICD-10-CM

## 2024-11-24 DIAGNOSIS — C34.90 NON-SMALL CELL LUNG CANCER, UNSPECIFIED LATERALITY: ICD-10-CM

## 2024-11-24 DIAGNOSIS — R07.9 CHEST PAIN: ICD-10-CM

## 2024-11-24 DIAGNOSIS — R10.9 ABDOMINAL PAIN, UNSPECIFIED ABDOMINAL LOCATION: ICD-10-CM

## 2024-11-24 DIAGNOSIS — K92.1 MELENA: ICD-10-CM

## 2024-11-24 DIAGNOSIS — R63.8 DECREASED ORAL INTAKE: ICD-10-CM

## 2024-11-24 DIAGNOSIS — R53.1 WEAKNESS: ICD-10-CM

## 2024-11-24 DIAGNOSIS — R11.2 INTRACTABLE NAUSEA AND VOMITING: ICD-10-CM

## 2024-11-24 DIAGNOSIS — R11.2 NAUSEA AND VOMITING, UNSPECIFIED VOMITING TYPE: Primary | ICD-10-CM

## 2024-11-24 PROBLEM — E03.9 HYPOTHYROIDISM: Status: ACTIVE | Noted: 2024-11-24

## 2024-11-24 PROBLEM — N17.9 AKI (ACUTE KIDNEY INJURY): Status: ACTIVE | Noted: 2024-11-24

## 2024-11-24 PROBLEM — N39.0 UTI (URINARY TRACT INFECTION): Status: ACTIVE | Noted: 2024-11-24

## 2024-11-24 LAB
ALBUMIN SERPL BCP-MCNC: 3.1 G/DL (ref 3.5–5.2)
ALP SERPL-CCNC: 84 U/L (ref 40–150)
ALT SERPL W/O P-5'-P-CCNC: 18 U/L (ref 10–44)
ANION GAP SERPL CALC-SCNC: 10 MMOL/L (ref 8–16)
AST SERPL-CCNC: 21 U/L (ref 10–40)
BACTERIA #/AREA URNS AUTO: ABNORMAL /HPF
BASOPHILS # BLD AUTO: 0.07 K/UL (ref 0–0.2)
BASOPHILS NFR BLD: 1 % (ref 0–1.9)
BILIRUB SERPL-MCNC: 0.5 MG/DL (ref 0.1–1)
BILIRUB UR QL STRIP: NEGATIVE
BUN SERPL-MCNC: 15 MG/DL (ref 8–23)
CALCIUM SERPL-MCNC: 9.1 MG/DL (ref 8.7–10.5)
CHLORIDE SERPL-SCNC: 105 MMOL/L (ref 95–110)
CLARITY UR REFRACT.AUTO: ABNORMAL
CO2 SERPL-SCNC: 18 MMOL/L (ref 23–29)
COLOR UR AUTO: YELLOW
CREAT SERPL-MCNC: 1.7 MG/DL (ref 0.5–1.4)
DIFFERENTIAL METHOD BLD: ABNORMAL
EOSINOPHIL # BLD AUTO: 0.5 K/UL (ref 0–0.5)
EOSINOPHIL NFR BLD: 6.6 % (ref 0–8)
ERYTHROCYTE [DISTWIDTH] IN BLOOD BY AUTOMATED COUNT: 14.3 % (ref 11.5–14.5)
EST. GFR  (NO RACE VARIABLE): 33.1 ML/MIN/1.73 M^2
GLUCOSE SERPL-MCNC: 89 MG/DL (ref 70–110)
GLUCOSE UR QL STRIP: NEGATIVE
HCT VFR BLD AUTO: 33.2 % (ref 37–48.5)
HCV AB SERPL QL IA: NORMAL
HGB BLD-MCNC: 10.8 G/DL (ref 12–16)
HGB UR QL STRIP: NEGATIVE
HIV 1+2 AB+HIV1 P24 AG SERPL QL IA: NORMAL
IMM GRANULOCYTES # BLD AUTO: 0.03 K/UL (ref 0–0.04)
IMM GRANULOCYTES NFR BLD AUTO: 0.4 % (ref 0–0.5)
KETONES UR QL STRIP: ABNORMAL
LEUKOCYTE ESTERASE UR QL STRIP: ABNORMAL
LYMPHOCYTES # BLD AUTO: 1.4 K/UL (ref 1–4.8)
LYMPHOCYTES NFR BLD: 19.4 % (ref 18–48)
MCH RBC QN AUTO: 30.8 PG (ref 27–31)
MCHC RBC AUTO-ENTMCNC: 32.5 G/DL (ref 32–36)
MCV RBC AUTO: 95 FL (ref 82–98)
MICROSCOPIC COMMENT: ABNORMAL
MONOCYTES # BLD AUTO: 0.8 K/UL (ref 0.3–1)
MONOCYTES NFR BLD: 10.9 % (ref 4–15)
NEUTROPHILS # BLD AUTO: 4.3 K/UL (ref 1.8–7.7)
NEUTROPHILS NFR BLD: 61.7 % (ref 38–73)
NITRITE UR QL STRIP: NEGATIVE
NRBC BLD-RTO: 0 /100 WBC
OHS QRS DURATION: 92 MS
OHS QTC CALCULATION: 462 MS
PH UR STRIP: 6 [PH] (ref 5–8)
PLATELET # BLD AUTO: 305 K/UL (ref 150–450)
PMV BLD AUTO: 9.5 FL (ref 9.2–12.9)
POTASSIUM SERPL-SCNC: 3.6 MMOL/L (ref 3.5–5.1)
PROT SERPL-MCNC: 7.1 G/DL (ref 6–8.4)
PROT UR QL STRIP: NEGATIVE
RBC # BLD AUTO: 3.51 M/UL (ref 4–5.4)
RBC #/AREA URNS AUTO: 14 /HPF (ref 0–4)
SARS-COV-2 RDRP RESP QL NAA+PROBE: NEGATIVE
SODIUM SERPL-SCNC: 133 MMOL/L (ref 136–145)
SP GR UR STRIP: 1.01 (ref 1–1.03)
SQUAMOUS #/AREA URNS AUTO: 5 /HPF
URN SPEC COLLECT METH UR: ABNORMAL
WBC # BLD AUTO: 6.97 K/UL (ref 3.9–12.7)
WBC #/AREA URNS AUTO: 9 /HPF (ref 0–5)
YEAST UR QL AUTO: ABNORMAL

## 2024-11-24 PROCEDURE — 63600175 PHARM REV CODE 636 W HCPCS: Mod: HCNC | Performed by: EMERGENCY MEDICINE

## 2024-11-24 PROCEDURE — 86803 HEPATITIS C AB TEST: CPT | Mod: HCNC | Performed by: PHYSICIAN ASSISTANT

## 2024-11-24 PROCEDURE — 63600175 PHARM REV CODE 636 W HCPCS: Mod: HCNC

## 2024-11-24 PROCEDURE — 81001 URINALYSIS AUTO W/SCOPE: CPT | Mod: HCNC | Performed by: EMERGENCY MEDICINE

## 2024-11-24 PROCEDURE — 96375 TX/PRO/DX INJ NEW DRUG ADDON: CPT | Mod: HCNC

## 2024-11-24 PROCEDURE — 25000003 PHARM REV CODE 250: Mod: HCNC | Performed by: FAMILY MEDICINE

## 2024-11-24 PROCEDURE — G0378 HOSPITAL OBSERVATION PER HR: HCPCS | Mod: HCNC

## 2024-11-24 PROCEDURE — 63600175 PHARM REV CODE 636 W HCPCS: Mod: HCNC | Performed by: FAMILY MEDICINE

## 2024-11-24 PROCEDURE — 87389 HIV-1 AG W/HIV-1&-2 AB AG IA: CPT | Mod: HCNC | Performed by: PHYSICIAN ASSISTANT

## 2024-11-24 PROCEDURE — 99285 EMERGENCY DEPT VISIT HI MDM: CPT | Mod: 25,HCNC

## 2024-11-24 PROCEDURE — 99223 1ST HOSP IP/OBS HIGH 75: CPT | Mod: AI,HCNC,GC, | Performed by: INTERNAL MEDICINE

## 2024-11-24 PROCEDURE — 25000003 PHARM REV CODE 250: Mod: HCNC

## 2024-11-24 PROCEDURE — 87635 SARS-COV-2 COVID-19 AMP PRB: CPT | Mod: HCNC | Performed by: EMERGENCY MEDICINE

## 2024-11-24 PROCEDURE — 96374 THER/PROPH/DIAG INJ IV PUSH: CPT | Mod: HCNC

## 2024-11-24 PROCEDURE — 85025 COMPLETE CBC W/AUTO DIFF WBC: CPT | Mod: HCNC | Performed by: EMERGENCY MEDICINE

## 2024-11-24 PROCEDURE — 96361 HYDRATE IV INFUSION ADD-ON: CPT | Mod: HCNC

## 2024-11-24 PROCEDURE — 80053 COMPREHEN METABOLIC PANEL: CPT | Mod: HCNC | Performed by: EMERGENCY MEDICINE

## 2024-11-24 RX ORDER — PILOCARPINE HYDROCHLORIDE 5 MG/1
5 TABLET, FILM COATED ORAL 3 TIMES DAILY
Status: DISCONTINUED | OUTPATIENT
Start: 2024-11-24 | End: 2024-11-25

## 2024-11-24 RX ORDER — IBUPROFEN 200 MG
24 TABLET ORAL
Status: DISCONTINUED | OUTPATIENT
Start: 2024-11-24 | End: 2024-12-01 | Stop reason: HOSPADM

## 2024-11-24 RX ORDER — KETOROLAC TROMETHAMINE 30 MG/ML
10 INJECTION, SOLUTION INTRAMUSCULAR; INTRAVENOUS
Status: COMPLETED | OUTPATIENT
Start: 2024-11-24 | End: 2024-11-24

## 2024-11-24 RX ORDER — CEFTRIAXONE 1 G/1
1 INJECTION, POWDER, FOR SOLUTION INTRAMUSCULAR; INTRAVENOUS
Status: COMPLETED | OUTPATIENT
Start: 2024-11-24 | End: 2024-11-24

## 2024-11-24 RX ORDER — ONDANSETRON HYDROCHLORIDE 2 MG/ML
4 INJECTION, SOLUTION INTRAVENOUS EVERY 8 HOURS PRN
Status: DISCONTINUED | OUTPATIENT
Start: 2024-11-24 | End: 2024-11-24

## 2024-11-24 RX ORDER — ONDANSETRON HYDROCHLORIDE 2 MG/ML
4 INJECTION, SOLUTION INTRAVENOUS EVERY 6 HOURS PRN
Status: DISCONTINUED | OUTPATIENT
Start: 2024-11-24 | End: 2024-11-26

## 2024-11-24 RX ORDER — SODIUM CHLORIDE 9 MG/ML
INJECTION, SOLUTION INTRAVENOUS CONTINUOUS
Status: ACTIVE | OUTPATIENT
Start: 2024-11-24 | End: 2024-11-24

## 2024-11-24 RX ORDER — NALOXONE HCL 0.4 MG/ML
0.02 VIAL (ML) INJECTION
Status: DISCONTINUED | OUTPATIENT
Start: 2024-11-24 | End: 2024-12-01 | Stop reason: HOSPADM

## 2024-11-24 RX ORDER — SODIUM CHLORIDE 0.9 % (FLUSH) 0.9 %
10 SYRINGE (ML) INJECTION EVERY 12 HOURS PRN
Status: DISCONTINUED | OUTPATIENT
Start: 2024-11-24 | End: 2024-12-01 | Stop reason: HOSPADM

## 2024-11-24 RX ORDER — GLUCAGON 1 MG
1 KIT INJECTION
Status: DISCONTINUED | OUTPATIENT
Start: 2024-11-24 | End: 2024-12-01 | Stop reason: HOSPADM

## 2024-11-24 RX ORDER — IBUPROFEN 200 MG
16 TABLET ORAL
Status: DISCONTINUED | OUTPATIENT
Start: 2024-11-24 | End: 2024-12-01 | Stop reason: HOSPADM

## 2024-11-24 RX ORDER — PROCHLORPERAZINE EDISYLATE 5 MG/ML
10 INJECTION INTRAMUSCULAR; INTRAVENOUS EVERY 6 HOURS PRN
Status: DISCONTINUED | OUTPATIENT
Start: 2024-11-24 | End: 2024-12-01 | Stop reason: HOSPADM

## 2024-11-24 RX ORDER — PANTOPRAZOLE SODIUM 40 MG/10ML
40 INJECTION, POWDER, LYOPHILIZED, FOR SOLUTION INTRAVENOUS DAILY
Status: DISCONTINUED | OUTPATIENT
Start: 2024-11-24 | End: 2024-11-26

## 2024-11-24 RX ORDER — ACETAMINOPHEN 325 MG/1
650 TABLET ORAL EVERY 4 HOURS PRN
Status: DISCONTINUED | OUTPATIENT
Start: 2024-11-24 | End: 2024-11-26

## 2024-11-24 RX ORDER — LEVOTHYROXINE SODIUM 75 UG/1
75 TABLET ORAL
Status: DISCONTINUED | OUTPATIENT
Start: 2024-11-24 | End: 2024-12-01 | Stop reason: HOSPADM

## 2024-11-24 RX ORDER — NITROFURANTOIN 25; 75 MG/1; MG/1
100 CAPSULE ORAL EVERY 12 HOURS
Status: DISCONTINUED | OUTPATIENT
Start: 2024-11-24 | End: 2024-11-26

## 2024-11-24 RX ORDER — NITROFURANTOIN 25; 75 MG/1; MG/1
100 CAPSULE ORAL 2 TIMES DAILY
Qty: 10 CAPSULE | Refills: 0 | Status: SHIPPED | OUTPATIENT
Start: 2024-11-24 | End: 2024-11-24

## 2024-11-24 RX ADMIN — PILOCARPINE HYDROCHLORIDE 5 MG: 5 TABLET, FILM COATED ORAL at 09:11

## 2024-11-24 RX ADMIN — LEVOTHYROXINE SODIUM 75 MCG: 75 TABLET ORAL at 05:11

## 2024-11-24 RX ADMIN — NITROFURANTOIN MONOHYDRATE/MACROCRYSTALS 100 MG: 25; 75 CAPSULE ORAL at 09:11

## 2024-11-24 RX ADMIN — PILOCARPINE HYDROCHLORIDE 5 MG: 5 TABLET, FILM COATED ORAL at 02:11

## 2024-11-24 RX ADMIN — NITROFURANTOIN MONOHYDRATE/MACROCRYSTALS 100 MG: 25; 75 CAPSULE ORAL at 02:11

## 2024-11-24 RX ADMIN — SODIUM CHLORIDE: 9 INJECTION, SOLUTION INTRAVENOUS at 05:11

## 2024-11-24 RX ADMIN — ACETAMINOPHEN 650 MG: 325 TABLET ORAL at 05:11

## 2024-11-24 RX ADMIN — PILOCARPINE HYDROCHLORIDE 5 MG: 5 TABLET, FILM COATED ORAL at 08:11

## 2024-11-24 RX ADMIN — KETOROLAC TROMETHAMINE 10 MG: 30 INJECTION, SOLUTION INTRAMUSCULAR; INTRAVENOUS at 03:11

## 2024-11-24 RX ADMIN — ONDANSETRON 4 MG: 2 INJECTION INTRAMUSCULAR; INTRAVENOUS at 02:11

## 2024-11-24 RX ADMIN — SODIUM CHLORIDE, POTASSIUM CHLORIDE, SODIUM LACTATE AND CALCIUM CHLORIDE 1000 ML: 600; 310; 30; 20 INJECTION, SOLUTION INTRAVENOUS at 02:11

## 2024-11-24 RX ADMIN — PANTOPRAZOLE SODIUM 40 MG: 40 INJECTION, POWDER, LYOPHILIZED, FOR SOLUTION INTRAVENOUS at 08:11

## 2024-11-24 RX ADMIN — CEFTRIAXONE SODIUM 1 G: 1 INJECTION, POWDER, FOR SOLUTION INTRAMUSCULAR; INTRAVENOUS at 03:11

## 2024-11-24 RX ADMIN — ACETAMINOPHEN 650 MG: 325 TABLET ORAL at 02:11

## 2024-11-24 NOTE — ASSESSMENT & PLAN NOTE
Chronic back pain. She is scheduled to have a nerve ablation 12/16/24 for the back pain due to disc bulges in her spine.   - Takes tramadol at home. Holding in setting of KESHA.  - Will consider adding pain medications if needed, holding at this time to see if N/V improves.

## 2024-11-24 NOTE — H&P
"    [facility] - Oncology ([facility])  Hematology/Oncology  H&P    Patient Name: Radha Ervin  MRN: [id number]  Admission Date: 11/24/2024  Code Status: Full Code   Attending Provider: Daniel Solorzano MD  Primary Care Physician: Suraj Herrera III, MD  Principal Problem:Intractable nausea and vomiting    Subjective:     HPI: Ms. Ervin is a 64-year-old lady who has a history of non small cell lung adenocarcinoma, s/p left upper lobe resection in 2023, Allergy, Amblyopia, Cataract, Eczema, HS (hereditary spherocytosis), total knee replacement, and Joint pain, here for nausea, vomiting, and inability to tolerate PO. The nausea and vomiting has started this week and not improved. Started Zometa at the beginning of the month. Nothing alleviates or worsens The patient says she has lost 4 pounds in the last month.    Ms. Ervin follows with Dr. Eduardo. Set to start treatment Monday, 11/25/2024 for the bony mets.She presented to [place] yesterday for the same complaints, was given fluids, and discharged with Zofran. She had a recent PET scan (10/2024) that showed metastases to the spine: "post treatment change in the left lung in this patient with a history of lung adenocarcinoma. There are hypermetabolic lesions in the L1 with pathologic compression deformity and T11 vertebral body suspicious for osseous metastases."     Lastly, she complains about foul vaginal odor and concern for a yeast infection. Decreased urination due to decrease in PO intake. Last BM was two days ago (11/21/2024).     Oncology History:  Stg IIIA NSCLC s/p resection followed by CRT due to residual disease. Developed metastatic progression while on maintenance durvalumab.      Oncology Treatment Plan:   [No matching plan found]    Medications:  Continuous Infusions:   0.9% NaCl   Intravenous Continuous 125 mL/hr at 11/24/24 0519 New Bag at 11/24/24 0519     Scheduled Meds:   levothyroxine  75 mcg Oral Before breakfast    nitrofurantoin " "(macrocrystal-monohydrate)  100 mg Oral Q12H    pantoprazole  40 mg Intravenous Daily    pilocarpine  5 mg Oral TID     PRN Meds:  Current Facility-Administered Medications:     acetaminophen, 650 mg, Oral, Q4H PRN    dextrose 10%, 12.5 g, Intravenous, PRN    dextrose 10%, 25 g, Intravenous, PRN    glucagon (human recombinant), 1 mg, Intramuscular, PRN    glucose, 16 g, Oral, PRN    glucose, 24 g, Oral, PRN    naloxone, 0.02 mg, Intravenous, PRN    ondansetron, 4 mg, Intravenous, Q6H PRN    prochlorperazine, 10 mg, Intravenous, Q6H PRN    sodium chloride 0.9%, 10 mL, Intravenous, Q12H PRN     Review of patient's allergies indicates:   Allergen Reactions    Paclitaxel Anaphylaxis and Other (See Comments)     Pt states "Anaphylaxis" last encounter w/ throat swelling. Encountered back pain, coughing and head fuzzy today.      Metformin Itching    Morphine Other (See Comments)     headaches    Latex, natural rubber Rash and Other (See Comments)     Redness and peeling only with bandaids    Penicillins Nausea And Vomiting and Rash        Past Medical History:   Diagnosis Date    Allergy     Amblyopia     Cataract     Eczema     HS (hereditary spherocytosis)     Hx of total knee replacement 01/19/2016    right knee    Joint pain      Past Surgical History:   Procedure Laterality Date    achilles tendon repair      BRONCHOSCOPY N/A 8/3/2023    Procedure: Bronchoscopy;  Surgeon: Saeed Gould MD;  Location: [address];  Service: Cardiothoracic;  Laterality: N/A;    CHOLECYSTECTOMY      INJECTION OF ANESTHETIC AGENT AROUND MULTIPLE INTERCOSTAL NERVES N/A 8/3/2023    Procedure: BLOCK, NERVE, INTERCOSTAL, 2 OR MORE;  Surgeon: Saeed Gould MD;  Location: [facility] OR [address] [place] [address];  Service: Cardiothoracic;  Laterality: N/A;    LYMPHADENECTOMY Left 8/3/2023    Procedure: LYMPHADENECTOMY;  Surgeon: Saeed Gould MD;  Location: [facility] OR [address] [place] [address];  Service: Cardiothoracic;  Laterality: Left;    NAVIGATIONAL BRONCHOSCOPY " N/A 2023    Procedure: BRONCHOSCOPY, NAVIGATIONAL;  Surgeon: Radha Mccollum MD;  Location: [facility] OR 2ND FLR;  Service: Pulmonary;  Laterality: N/A;    TOTAL KNEE ARTHROPLASTY      XI ROBOTIC RATS Left 8/3/2023    Procedure: XI ROBOTIC RATS upper lobectomy;  Surgeon: Saeed Gould MD;  [facility];  Service: Cardiothoracic;  Laterality: Left;     Family History       Problem Relation (Age of Onset)    Blindness Cousin    Breast cancer Sister    Cancer Mother, Father, Son    Diabetes Cousin    Hodgkin's lymphoma Son    Liver cancer Maternal Uncle          Tobacco Use    Smoking status: Former     Current packs/day: 0.00     Average packs/day: 1 pack/day for 46.3 years (46.3 ttl pk-yrs)     Types: Vaping with nicotine, Cigarettes     Start date:      Quit date: 2023     Years since quittin.5    Smokeless tobacco: Never   Substance and Sexual Activity    Alcohol use: Not Currently     Comment: occassionally    Drug use: No    Sexual activity: Not Currently     Partners: Male       Review of Systems   Constitutional:  Positive for activity change, appetite change, fatigue and unexpected weight change. Negative for chills and fever.   HENT:  Negative for trouble swallowing.    Respiratory:  Negative for cough, choking and shortness of breath.    Cardiovascular:  Negative for chest pain, palpitations and leg swelling.   Gastrointestinal:  Positive for nausea and vomiting. Negative for abdominal distention, abdominal pain and diarrhea.   Genitourinary:  Positive for decreased urine volume. Negative for dysuria and hematuria.   Musculoskeletal:  Positive for back pain.   Neurological:  Negative for seizures, weakness, numbness and headaches.     Objective:     Vital Signs (Most Recent):  Temp: 97.9 °F (36.6 °C) (24 111)  Pulse: 91 (24 1112)  Resp: 18 (24 111)  BP: 114/80 (24 1112)  SpO2: (!) 92 % (24 111) Vital Signs (24h Range):  Temp:  [97.6 °F (36.4 °C)-98  °F (36.7 °C)] 97.9 °F (36.6 °C)  Pulse:  [] 91  Resp:  [16-19] 18  SpO2:  [92 %-97 %] 92 %  BP: ()/(57-80) 114/80     Weight: 82.1 kg (181 lb)  Body mass index is 36.56 kg/m².  Body surface area is 1.85 meters squared.      Intake/Output Summary (Last 24 hours) at 11/24/2024 1246  Last data filed at 11/24/2024 0452  Gross per 24 hour   Intake 1000 ml   Output --   Net 1000 ml        Physical Exam  Vitals and nursing note reviewed.   Constitutional:       General: She is sleeping. She is not in acute distress.  HENT:      Head: Normocephalic and atraumatic.      Mouth/Throat:      Mouth: Mucous membranes are dry.      Pharynx: Oropharynx is clear.   Eyes:      Extraocular Movements: Extraocular movements intact.      Conjunctiva/sclera: Conjunctivae normal.   Cardiovascular:      Rate and Rhythm: Normal rate and regular rhythm.   Pulmonary:      Effort: Pulmonary effort is normal. No respiratory distress.   Abdominal:      General: There is no distension.      Tenderness: There is no abdominal tenderness.   Musculoskeletal:         General: No swelling.      Right lower leg: No edema.      Left lower leg: No edema.   Skin:     General: Skin is warm and dry.      Coloration: Skin is not pale.   Neurological:      General: No focal deficit present.      Mental Status: She is oriented to person, place, and time and easily aroused. Mental status is at baseline.          Significant Labs:   All pertinent labs from the last 24 hours have been reviewed.    Diagnostic Results:  I have reviewed all pertinent imaging results/findings within the past 24 hours.  Assessment/Plan:     * Intractable nausea and vomiting  The nausea and vomiting has started this week and not improved. Nothing alleviates or worsens The patient says she has lost 4 pounds in the last month.  - Received 1L in ED  - NS 125cc/hr x8hr  - PRN Zofran  - Advance diet as tolerated    Hypothyroidism  - home synthroid    UTI (urinary tract  "infection)  Pt complains about foul vaginal odor and concern for a yeast infection. Decreased urination due to decrease in PO intake. Denies dysuria or hematuria.  Urine dipstick shows positive for WBC's, positive for RBC's, and positive for leukocytes.  Micro exam: 9 WBC's per HPF, 14 RBC's per HPF, occasional+ bacteria, occasional yeast, and contaminated specimen with 5 squamous cells.  - 1 dose gentamicin in ED  - NF BID x5 days    KESHA (acute kidney injury)  KESHA is likely due to pre-renal azotemia due to dehydration. Baseline creatinine is  1.3-1.4 . Most recent creatinine and eGFR are listed below.  Recent Labs     11/22/24  2213 11/24/24  0123   CREATININE 2.0* 1.7*   EGFRNORACEVR 27* 33.1*      Plan  - KESHA is improving  - Avoid nephrotoxins and renally dose meds for GFR listed above  - Monitor urine output, serial BMP, and adjust therapy as needed  - Continue IVF  - Follow up with Dr. Eduardo before resuming Zometa treatment.      Back pain  Chronic back pain. She is scheduled to have a nerve ablation 12/16/24 for the back pain due to disc bulges in her spine.   - Takes tramadol at home. Holding in setting of KESHA.  - Will consider adding pain medications if needed, holding at this time to see if N/V improves.    Secondary malignant neoplasm of bone  She had a recent PET scan (10/2024) that showed metastases to the spine: "post treatment change in the left lung in this patient with a history of lung adenocarcinoma. There are hypermetabolic lesions in the L1 with pathologic compression deformity and T11 vertebral body suspicious for osseous metastases."  - Takes tramadol at home, holding in setting of KESHA  - Will add pain medications if needed    Stage 3b chronic kidney disease  BMP reviewed- noted Estimated Creatinine Clearance: 32.3 mL/min (A) (based on SCr of 1.7 mg/dL (H)). according to latest data. Based on current GFR, CKD stage is stage 3 - GFR 30-59.  Monitor UOP and serial BMP and adjust therapy as " needed. Renally dose meds. Avoid nephrotoxic medications and procedures.      Adenocarcinoma, lung, left  Stg IIIA NSCLC s/p resection followed by CRT due to residual disease. Developed metastatic progression while on maintenance durvalumab. Durvalumab last C#5 8/9/24. Zolderonic acid for bone mets. Follows with Dr. Eduardo.  - Pantoprazole 40mg qd  - Pilocarpine TID  - Sotarasib QD    Severe obesity (BMI 35.0-39.9) with comorbidity  Body mass index is 36.56 kg/m². Obesity complicates all aspects of disease management from diagnostic modalities to treatment. Weight loss encouraged and health benefits explained to patient.           Teja Walton MD  Hematology/Oncology  [facility] - Oncology ([facility])

## 2024-11-24 NOTE — ASSESSMENT & PLAN NOTE
Stg IIIA NSCLC s/p resection followed by CRT due to residual disease. Developed metastatic progression while on maintenance durvalumab. Durvalumab last C#5 8/9/24. Zolderonic acid for bone mets. Follows with Dr. Eduardo.  - Pantoprazole 40mg qd  - Pilocarpine TID  - Sotarasib QD

## 2024-11-24 NOTE — HPI
"Ms. Ervin is a 64-year-old lady who has a history of non small cell lung adenocarcinoma, s/p left upper lobe resection in 2023, Allergy, Amblyopia, Cataract, Eczema, HS (hereditary spherocytosis), total knee replacement, and Joint pain, here for nausea, vomiting, and inability to tolerate PO. The nausea and vomiting has started this week and not improved. Started Zometa at the beginning of the month. Nothing alleviates or worsens The patient says she has lost 4 pounds in the last month.    Ms. Ervin follows with Dr. Eduardo. Set to start treatment Monday, 11/25/2024 for the bony mets.She presented to [place] yesterday for the same complaints, was given fluids, and discharged with Zofran. She had a recent PET scan (10/2024) that showed metastases to the spine: "post treatment change in the left lung in this patient with a history of lung adenocarcinoma. There are hypermetabolic lesions in the L1 with pathologic compression deformity and T11 vertebral body suspicious for osseous metastases."     Lastly, she complains about foul vaginal odor and concern for a yeast infection. Decreased urination due to decrease in PO intake. Last BM was two days ago (11/21/2024).     Oncology History:  Stg IIIA NSCLC s/p resection followed by CRT due to residual disease. Developed metastatic progression while on maintenance durvalumab.   "

## 2024-11-24 NOTE — ASSESSMENT & PLAN NOTE
KESHA is likely due to pre-renal azotemia due to dehydration. Baseline creatinine is  1.3-1.4 . Most recent creatinine and eGFR are listed below.  Recent Labs     11/22/24  2213 11/24/24  0123   CREATININE 2.0* 1.7*   EGFRNORACEVR 27* 33.1*      Plan  - KESHA is improving  - Avoid nephrotoxins and renally dose meds for GFR listed above  - Monitor urine output, serial BMP, and adjust therapy as needed  - Continue IVF  - Follow up with Dr. Eduardo before resuming Zometa treatment.

## 2024-11-24 NOTE — ASSESSMENT & PLAN NOTE
BMP reviewed- noted Estimated Creatinine Clearance: 32.3 mL/min (A) (based on SCr of 1.7 mg/dL (H)). according to latest data. Based on current GFR, CKD stage is stage 3 - GFR 30-59.  Monitor UOP and serial BMP and adjust therapy as needed. Renally dose meds. Avoid nephrotoxic medications and procedures.

## 2024-11-24 NOTE — ED NOTES
"  Patient identifiers for Radha Ervin 64 y.o. female checked and correct.  Chief Complaint   Patient presents with    Fatigue     Believes she may be in kidney failure, had labs drawn yesterday. Not currently on chemo or radiation      Past Medical History:   Diagnosis Date    Allergy     Amblyopia     Cataract     Eczema     HS (hereditary spherocytosis)     Hx of total knee replacement 01/19/2016    right knee    Joint pain      Allergies reported:   Review of patient's allergies indicates:   Allergen Reactions    Paclitaxel Anaphylaxis and Other (See Comments)     Pt states "Anaphylaxis" last encounter w/ throat swelling. Encountered back pain, coughing and head fuzzy today.      Metformin Itching    Morphine Other (See Comments)     headaches    Latex, natural rubber Rash and Other (See Comments)     Redness and peeling only with bandaids    Penicillins Nausea And Vomiting and Rash           "

## 2024-11-24 NOTE — ASSESSMENT & PLAN NOTE
Pt complains about foul vaginal odor and concern for a yeast infection. Decreased urination due to decrease in PO intake. Denies dysuria or hematuria.  Urine dipstick shows positive for WBC's, positive for RBC's, and positive for leukocytes.  Micro exam: 9 WBC's per HPF, 14 RBC's per HPF, occasional+ bacteria, occasional yeast, and contaminated specimen with 5 squamous cells.  - 1 dose gentamicin in ED  - NF BID x5 days

## 2024-11-24 NOTE — ASSESSMENT & PLAN NOTE
"She had a recent PET scan (10/2024) that showed metastases to the spine: "post treatment change in the left lung in this patient with a history of lung adenocarcinoma. There are hypermetabolic lesions in the L1 with pathologic compression deformity and T11 vertebral body suspicious for osseous metastases."  - Takes tramadol at home, holding in setting of KESHA  - Will add pain medications if needed  "

## 2024-11-24 NOTE — ASSESSMENT & PLAN NOTE
Body mass index is 36.56 kg/m². Obesity complicates all aspects of disease management from diagnostic modalities to treatment. Weight loss encouraged and health benefits explained to patient.

## 2024-11-24 NOTE — ED PROVIDER NOTES
"Encounter Date: 11/24/2024       History     Chief Complaint   Patient presents with    Fatigue     Believes she may be in kidney failure, had labs drawn yesterday. Not currently on chemo or radiation      Ms. Ervin is a 64-year-old lady who has a history of non small cell lung adenocarcinoma, s/p left upper lobe resection in 2023, Allergy, Amblyopia, Cataract, Eczema, HS (hereditary spherocytosis), total knee replacement, and Joint pain, here for nausea, vomiting, and inability to tolerate PO.     The nausea and vomiting has started this week and not improved. Nothing alleviates or worsens The patient says she has lost 4 pounds in the last month. She had a recent PET scan (10/2024) that showed metastases to the spine: "post treatment change in the left lung in this patient with a history of lung adenocarcinoma. There are hypermetabolic lesions in the L1 with pathologic compression deformity and T11 vertebral body suspicious for osseous metastases."    Ms. Ervin follows with [facility] oncology. She is set to start treatment Monday, 11/25/2024 for the bony mets.She presented to [place] yesterday for the same complaints, was given fluids, and discharged with Zofran.     She also complains about back pain. She is scheduled to have a nerve ablation for the back pain due to disc bulges in her spine.     Lastly, she complains about foul vaginal odor and concern for a yeast infection. Decreased urination due to decrease in PO intake. Last BM was two days ago (11/21/2024).     The history is provided by the patient and a relative. No  was used.     Review of patient's allergies indicates:   Allergen Reactions    Paclitaxel Anaphylaxis and Other (See Comments)     Pt states "Anaphylaxis" last encounter w/ throat swelling. Encountered back pain, coughing and head fuzzy today.      Metformin Itching    Morphine Other (See Comments)     headaches    Latex, natural rubber Rash and Other (See Comments)     " Redness and peeling only with bandaids    Penicillins Nausea And Vomiting and Rash     Past Medical History:   Diagnosis Date    Allergy     Amblyopia     Cataract     Eczema     HS (hereditary spherocytosis)     Hx of total knee replacement 01/19/2016    right knee    Joint pain      Past Surgical History:   Procedure Laterality Date    achilles tendon repair      BRONCHOSCOPY N/A 8/3/2023    Procedure: Bronchoscopy;  Surgeon: Saeed Gould MD;  [facility];  Service: Cardiothoracic;  Laterality: N/A;    CHOLECYSTECTOMY      INJECTION OF ANESTHETIC AGENT AROUND MULTIPLE INTERCOSTAL NERVES N/A 8/3/2023    Procedure: BLOCK, NERVE, INTERCOSTAL, 2 OR MORE;  Surgeon: aSeed Gould MD;  [facility] F[facility];  Service: Cardiothoracic;  Laterality: N/A;    LYMPHADENECTOMY Left 8/3/2023    Procedure: LYMPHADENECTOMY;  Surgeon: Saeed Gould MD;  [facility];  Service: Cardiothoracic;  Laterality: Left;    NAVIGATIONAL BRONCHOSCOPY N/A 6/23/2023    Procedure: BRONCHOSCOPY, NAVIGATIONAL;  Surgeon: Radha Mccollum MD;  [facility];  Service: Pulmonary;  Laterality: N/A;    TOTAL KNEE ARTHROPLASTY      XI ROBOTIC RATS Left 8/3/2023    Procedure: XI ROBOTIC RATS upper lobectomy;  Surgeon: Saeed Gould MD;  [facility];  Service: Cardiothoracic;  Laterality: Left;     Family History   Problem Relation Name Age of Onset    Cancer Mother      Cancer Father          lung CA    Breast cancer Sister 1/2         1/2 sister    Cancer Son x2         hogdkins    Hodgkin's lymphoma Son x2     Liver cancer Maternal Uncle      Blindness Cousin      Diabetes Cousin      Amblyopia Neg Hx      Cataracts Neg Hx      Glaucoma Neg Hx      Macular degeneration Neg Hx      Retinal detachment Neg Hx      Strabismus Neg Hx       Social History     Tobacco Use    Smoking status: Former     Current packs/day: 0.00     Average packs/day: 1 pack/day for 46.3 years (46.3 ttl  pk-yrs)     Types: Vaping with nicotine, Cigarettes     Start date:      Quit date: 2023     Years since quittin.5    Smokeless tobacco: Never   Substance Use Topics    Alcohol use: Not Currently     Comment: occassionally    Drug use: No     Review of Systems    Physical Exam     Initial Vitals [24 0043]   BP Pulse Resp Temp SpO2   (!) 107/59 103 18 97.6 °F (36.4 °C) (!) 94 %      MAP       --         Physical Exam    ED Course   Procedures  Labs Reviewed   CBC W/ AUTO DIFFERENTIAL - Abnormal       Result Value    WBC 6.97      RBC 3.51 (*)     Hemoglobin 10.8 (*)     Hematocrit 33.2 (*)     MCV 95      MCH 30.8      MCHC 32.5      RDW 14.3      Platelets 305      MPV 9.5      Immature Granulocytes 0.4      Gran # (ANC) 4.3      Immature Grans (Abs) 0.03      Lymph # 1.4      Mono # 0.8      Eos # 0.5      Baso # 0.07      nRBC 0      Gran % 61.7      Lymph % 19.4      Mono % 10.9      Eosinophil % 6.6      Basophil % 1.0      Differential Method Automated     COMPREHENSIVE METABOLIC PANEL - Abnormal    Sodium 133 (*)     Potassium 3.6      Chloride 105      CO2 18 (*)     Glucose 89      BUN 15      Creatinine 1.7 (*)     Calcium 9.1      Total Protein 7.1      Albumin 3.1 (*)     Total Bilirubin 0.5      Alkaline Phosphatase 84      AST 21      ALT 18      eGFR 33.1 (*)     Anion Gap 10     URINALYSIS, REFLEX TO URINE CULTURE - Abnormal    Specimen UA Urine, Clean Catch      Color, UA Yellow      Appearance, UA Hazy (*)     pH, UA 6.0      Specific Gravity, UA 1.010      Protein, UA Negative      Glucose, UA Negative      Ketones, UA 1+ (*)     Bilirubin (UA) Negative      Occult Blood UA Negative      Nitrite, UA Negative      Leukocytes, UA 3+ (*)     Narrative:     Specimen Source->Urine   URINALYSIS MICROSCOPIC - Abnormal    RBC, UA 14 (*)     WBC, UA 9 (*)     Bacteria Occasional      Yeast, UA Occasional (*)     Squam Epithel, UA 5      Microscopic Comment SEE COMMENT      Narrative:      Specimen Source->Urine   HEPATITIS C ANTIBODY    Hepatitis C Ab Non-reactive      Narrative:     Release to patient->Immediate   HIV 1 / 2 ANTIBODY    HIV 1/2 Ag/Ab Non-reactive      Narrative:     Release to patient->Immediate          Imaging Results    None          Medications   ondansetron injection 4 mg (4 mg Intravenous Given 11/24/24 0221)   lactated ringers bolus 1,000 mL (1,000 mLs Intravenous New Bag 11/24/24 0221)   gentamicin (GARAMYCIN) 310.4 mg in 0.9% NaCl 100 mL IVPB (has no administration in time range)     Medical Decision Making  Ms. Ervin has continued fatigue and nausea and vomiting as well as decreased PO input. She had a CT done 11/22/2024 that showed Stable CT of the abdomen and pelvis with no acute finding. Stable lytic change in L1 compatible with lytic metastatic disease likely from known lung carcinoma with postoperative changes in the left lung. Consistent with recent PET. No new imaging ordered. UA ordered. Zofran for nausea. Patient has improved creatinine from 11/22/2024, 2.0 decreased to 1.7. Given 1L IV fluids and gentamicin one time dose for UTI.     Patient failed PO challenge, will admit to hospital medicine.          Amount and/or Complexity of Data Reviewed  Labs: ordered.    Risk  Prescription drug management.                                      Clinical Impression:  Final diagnoses:  [R11.2] Nausea and vomiting, unspecified vomiting type (Primary)  [R63.8] Decreased oral intake  [R53.1] Weakness  [R10.9] Abdominal pain, unspecified abdominal location  [M54.9] Back pain, unspecified back location, unspecified back pain laterality, unspecified chronicity                 Kymberly Conte MD  Resident  11/24/24 8315

## 2024-11-24 NOTE — ASSESSMENT & PLAN NOTE
The nausea and vomiting has started this week and not improved. Nothing alleviates or worsens The patient says she has lost 4 pounds in the last month.  - Received 1L in ED  - NS 125cc/hr x8hr  - PRN Zofran  - Advance diet as tolerated

## 2024-11-24 NOTE — LETTER
"December 1, 2024             [facility]  [address]  [facility] [address]  Phone: [phone]  December 1, 2024     Patient: Radha Ervin (Sue)     YOB: 1960        To Whom It May Concern:    Marley Ervin was admitted to the hospital on 11/24/2024 12:51 AM and discharged on 12/1/2024.  She may return to work on 12/3/2024 .  If you have any questions or concerns, or if I can be of any further assistance, please do not hesitate to contact me.    Sincerely,        Oswaldo Beck MD  Department of Hospital Medicine     "

## 2024-11-24 NOTE — SUBJECTIVE & OBJECTIVE
"Oncology Treatment Plan:   [No matching plan found]    Medications:  Continuous Infusions:   0.9% NaCl   Intravenous Continuous 125 mL/hr at 11/24/24 0519 New Bag at 11/24/24 0519     Scheduled Meds:   levothyroxine  75 mcg Oral Before breakfast    nitrofurantoin (macrocrystal-monohydrate)  100 mg Oral Q12H    pantoprazole  40 mg Intravenous Daily    pilocarpine  5 mg Oral TID     PRN Meds:  Current Facility-Administered Medications:     acetaminophen, 650 mg, Oral, Q4H PRN    dextrose 10%, 12.5 g, Intravenous, PRN    dextrose 10%, 25 g, Intravenous, PRN    glucagon (human recombinant), 1 mg, Intramuscular, PRN    glucose, 16 g, Oral, PRN    glucose, 24 g, Oral, PRN    naloxone, 0.02 mg, Intravenous, PRN    ondansetron, 4 mg, Intravenous, Q6H PRN    prochlorperazine, 10 mg, Intravenous, Q6H PRN    sodium chloride 0.9%, 10 mL, Intravenous, Q12H PRN     Review of patient's allergies indicates:   Allergen Reactions    Paclitaxel Anaphylaxis and Other (See Comments)     Pt states "Anaphylaxis" last encounter w/ throat swelling. Encountered back pain, coughing and head fuzzy today.      Metformin Itching    Morphine Other (See Comments)     headaches    Latex, natural rubber Rash and Other (See Comments)     Redness and peeling only with bandaids    Penicillins Nausea And Vomiting and Rash        Past Medical History:   Diagnosis Date    Allergy     Amblyopia     Cataract     Eczema     HS (hereditary spherocytosis)     Hx of total knee replacement 01/19/2016    right knee    Joint pain      Past Surgical History:   Procedure Laterality Date    achilles tendon repair      BRONCHOSCOPY N/A 8/3/2023    Procedure: Bronchoscopy;  Surgeon: Saeed Gould MD;  Location: [facility] OR [address] [place] [address];  Service: Cardiothoracic;  Laterality: N/A;    CHOLECYSTECTOMY      INJECTION OF ANESTHETIC AGENT AROUND MULTIPLE INTERCOSTAL NERVES N/A 8/3/2023    Procedure: BLOCK, NERVE, INTERCOSTAL, 2 OR MORE;  Surgeon: Saeed Gould MD;  " Location: [facility] OR 2ND FLR;  Service: Cardiothoracic;  Laterality: N/A;    LYMPHADENECTOMY Left 8/3/2023    Procedure: LYMPHADENECTOMY;  Surgeon: Saeed Gould MD;  Location: [facility] OR 2ND FLR;  Service: Cardiothoracic;  Laterality: Left;    NAVIGATIONAL BRONCHOSCOPY N/A 2023    Procedure: BRONCHOSCOPY, NAVIGATIONAL;  Surgeon: Radha Mccollum MD;  Location: [facility] 2ND FLR;  Service: Pulmonary;  Laterality: N/A;    TOTAL KNEE ARTHROPLASTY      XI ROBOTIC RATS Left 8/3/2023    Procedure: XI ROBOTIC RATS upper lobectomy;  Surgeon: Saeed Gould MD;  Location: [facility] 2ND FLR;  Service: Cardiothoracic;  Laterality: Left;     Family History       Problem Relation (Age of Onset)    Blindness Cousin    Breast cancer Sister    Cancer Mother, Father, Son    Diabetes Cousin    Hodgkin's lymphoma Son    Liver cancer Maternal Uncle          Tobacco Use    Smoking status: Former     Current packs/day: 0.00     Average packs/day: 1 pack/day for 46.3 years (46.3 ttl pk-yrs)     Types: Vaping with nicotine, Cigarettes     Start date:      Quit date: 2023     Years since quittin.5    Smokeless tobacco: Never   Substance and Sexual Activity    Alcohol use: Not Currently     Comment: occassionally    Drug use: No    Sexual activity: Not Currently     Partners: Male       Review of Systems   Constitutional:  Positive for activity change, appetite change, fatigue and unexpected weight change. Negative for chills and fever.   HENT:  Negative for trouble swallowing.    Respiratory:  Negative for cough, choking and shortness of breath.    Cardiovascular:  Negative for chest pain, palpitations and leg swelling.   Gastrointestinal:  Positive for nausea and vomiting. Negative for abdominal distention, abdominal pain and diarrhea.   Genitourinary:  Positive for decreased urine volume. Negative for dysuria and hematuria.   Musculoskeletal:  Positive for back pain.   Neurological:  Negative for seizures, weakness, numbness  and headaches.     Objective:     Vital Signs (Most Recent):  Temp: 97.9 °F (36.6 °C) (11/24/24 1112)  Pulse: 91 (11/24/24 1112)  Resp: 18 (11/24/24 1112)  BP: 114/80 (11/24/24 1112)  SpO2: (!) 92 % (11/24/24 1112) Vital Signs (24h Range):  Temp:  [97.6 °F (36.4 °C)-98 °F (36.7 °C)] 97.9 °F (36.6 °C)  Pulse:  [] 91  Resp:  [16-19] 18  SpO2:  [92 %-97 %] 92 %  BP: ()/(57-80) 114/80     Weight: 82.1 kg (181 lb)  Body mass index is 36.56 kg/m².  Body surface area is 1.85 meters squared.      Intake/Output Summary (Last 24 hours) at 11/24/2024 1246  Last data filed at 11/24/2024 0452  Gross per 24 hour   Intake 1000 ml   Output --   Net 1000 ml        Physical Exam  Vitals and nursing note reviewed.   Constitutional:       General: She is sleeping. She is not in acute distress.  HENT:      Head: Normocephalic and atraumatic.      Mouth/Throat:      Mouth: Mucous membranes are dry.      Pharynx: Oropharynx is clear.   Eyes:      Extraocular Movements: Extraocular movements intact.      Conjunctiva/sclera: Conjunctivae normal.   Cardiovascular:      Rate and Rhythm: Normal rate and regular rhythm.   Pulmonary:      Effort: Pulmonary effort is normal. No respiratory distress.   Abdominal:      General: There is no distension.      Tenderness: There is no abdominal tenderness.   Musculoskeletal:         General: No swelling.      Right lower leg: No edema.      Left lower leg: No edema.   Skin:     General: Skin is warm and dry.      Coloration: Skin is not pale.   Neurological:      General: No focal deficit present.      Mental Status: She is oriented to person, place, and time and easily aroused. Mental status is at baseline.          Significant Labs:   All pertinent labs from the last 24 hours have been reviewed.    Diagnostic Results:  I have reviewed all pertinent imaging results/findings within the past 24 hours.

## 2024-11-25 ENCOUNTER — PATIENT MESSAGE (OUTPATIENT)
Dept: DERMATOLOGY | Facility: CLINIC | Age: 64
End: 2024-11-25
Payer: MEDICARE

## 2024-11-25 LAB
ANION GAP SERPL CALC-SCNC: 8 MMOL/L (ref 8–16)
BASOPHILS # BLD AUTO: 0.06 K/UL (ref 0–0.2)
BASOPHILS NFR BLD: 0.8 % (ref 0–1.9)
BUN SERPL-MCNC: 9 MG/DL (ref 8–23)
CALCIUM SERPL-MCNC: 8.5 MG/DL (ref 8.7–10.5)
CHLORIDE SERPL-SCNC: 111 MMOL/L (ref 95–110)
CO2 SERPL-SCNC: 17 MMOL/L (ref 23–29)
CREAT SERPL-MCNC: 1.3 MG/DL (ref 0.5–1.4)
DIFFERENTIAL METHOD BLD: ABNORMAL
EOSINOPHIL # BLD AUTO: 0.6 K/UL (ref 0–0.5)
EOSINOPHIL NFR BLD: 7.3 % (ref 0–8)
ERYTHROCYTE [DISTWIDTH] IN BLOOD BY AUTOMATED COUNT: 14.3 % (ref 11.5–14.5)
EST. GFR  (NO RACE VARIABLE): 45.6 ML/MIN/1.73 M^2
GLUCOSE SERPL-MCNC: 67 MG/DL (ref 70–110)
HCT VFR BLD AUTO: 30.2 % (ref 37–48.5)
HGB BLD-MCNC: 10.1 G/DL (ref 12–16)
IMM GRANULOCYTES # BLD AUTO: 0.03 K/UL (ref 0–0.04)
IMM GRANULOCYTES NFR BLD AUTO: 0.4 % (ref 0–0.5)
LYMPHOCYTES # BLD AUTO: 1.1 K/UL (ref 1–4.8)
LYMPHOCYTES NFR BLD: 14.4 % (ref 18–48)
MAGNESIUM SERPL-MCNC: 1.5 MG/DL (ref 1.6–2.6)
MCH RBC QN AUTO: 30.8 PG (ref 27–31)
MCHC RBC AUTO-ENTMCNC: 33.4 G/DL (ref 32–36)
MCV RBC AUTO: 92 FL (ref 82–98)
MONOCYTES # BLD AUTO: 1 K/UL (ref 0.3–1)
MONOCYTES NFR BLD: 13.4 % (ref 4–15)
NEUTROPHILS # BLD AUTO: 4.9 K/UL (ref 1.8–7.7)
NEUTROPHILS NFR BLD: 63.7 % (ref 38–73)
NRBC BLD-RTO: 0 /100 WBC
OHS QRS DURATION: 90 MS
OHS QTC CALCULATION: 494 MS
PHOSPHATE SERPL-MCNC: 2.3 MG/DL (ref 2.7–4.5)
PLATELET # BLD AUTO: 271 K/UL (ref 150–450)
PMV BLD AUTO: 9.7 FL (ref 9.2–12.9)
POTASSIUM SERPL-SCNC: 3.9 MMOL/L (ref 3.5–5.1)
RBC # BLD AUTO: 3.28 M/UL (ref 4–5.4)
SODIUM SERPL-SCNC: 136 MMOL/L (ref 136–145)
WBC # BLD AUTO: 7.76 K/UL (ref 3.9–12.7)

## 2024-11-25 PROCEDURE — 20600001 HC STEP DOWN PRIVATE ROOM: Mod: HCNC

## 2024-11-25 PROCEDURE — 99233 SBSQ HOSP IP/OBS HIGH 50: CPT | Mod: HCNC,GC,, | Performed by: INTERNAL MEDICINE

## 2024-11-25 PROCEDURE — 36415 COLL VENOUS BLD VENIPUNCTURE: CPT | Mod: HCNC | Performed by: FAMILY MEDICINE

## 2024-11-25 PROCEDURE — 63600175 PHARM REV CODE 636 W HCPCS: Mod: HCNC

## 2024-11-25 PROCEDURE — 85025 COMPLETE CBC W/AUTO DIFF WBC: CPT | Mod: HCNC | Performed by: FAMILY MEDICINE

## 2024-11-25 PROCEDURE — 25000003 PHARM REV CODE 250: Mod: HCNC | Performed by: FAMILY MEDICINE

## 2024-11-25 PROCEDURE — 25500020 PHARM REV CODE 255: Mod: HCNC | Performed by: INTERNAL MEDICINE

## 2024-11-25 PROCEDURE — 83735 ASSAY OF MAGNESIUM: CPT | Mod: HCNC

## 2024-11-25 PROCEDURE — 80048 BASIC METABOLIC PNL TOTAL CA: CPT | Mod: HCNC | Performed by: FAMILY MEDICINE

## 2024-11-25 PROCEDURE — 25000003 PHARM REV CODE 250: Mod: HCNC | Performed by: NURSE PRACTITIONER

## 2024-11-25 PROCEDURE — 84100 ASSAY OF PHOSPHORUS: CPT | Mod: HCNC

## 2024-11-25 PROCEDURE — 25000003 PHARM REV CODE 250: Mod: HCNC

## 2024-11-25 PROCEDURE — 77435 SBRT MANAGEMENT: CPT | Mod: HCNC,,, | Performed by: RADIOLOGY

## 2024-11-25 PROCEDURE — 63600175 PHARM REV CODE 636 W HCPCS: Mod: HCNC | Performed by: FAMILY MEDICINE

## 2024-11-25 PROCEDURE — A9585 GADOBUTROL INJECTION: HCPCS | Mod: HCNC | Performed by: INTERNAL MEDICINE

## 2024-11-25 RX ORDER — MUPIROCIN 20 MG/G
OINTMENT TOPICAL 2 TIMES DAILY
Status: DISPENSED | OUTPATIENT
Start: 2024-11-25 | End: 2024-11-30

## 2024-11-25 RX ORDER — GADOBUTROL 604.72 MG/ML
9 INJECTION INTRAVENOUS
Status: COMPLETED | OUTPATIENT
Start: 2024-11-25 | End: 2024-11-25

## 2024-11-25 RX ORDER — LORAZEPAM 1 MG/1
1 TABLET ORAL ONCE AS NEEDED
Status: DISCONTINUED | OUTPATIENT
Start: 2024-11-25 | End: 2024-12-01 | Stop reason: HOSPADM

## 2024-11-25 RX ORDER — MAGNESIUM SULFATE 1 G/100ML
1 INJECTION INTRAVENOUS ONCE
Status: COMPLETED | OUTPATIENT
Start: 2024-11-25 | End: 2024-11-25

## 2024-11-25 RX ORDER — ENOXAPARIN SODIUM 100 MG/ML
40 INJECTION SUBCUTANEOUS EVERY 24 HOURS
Status: CANCELLED | OUTPATIENT
Start: 2024-11-25

## 2024-11-25 RX ADMIN — MUPIROCIN: 20 OINTMENT TOPICAL at 10:11

## 2024-11-25 RX ADMIN — PILOCARPINE HYDROCHLORIDE 5 MG: 5 TABLET, FILM COATED ORAL at 09:11

## 2024-11-25 RX ADMIN — MAGNESIUM SULFATE HEPTAHYDRATE 1 G: 500 INJECTION, SOLUTION INTRAMUSCULAR; INTRAVENOUS at 12:11

## 2024-11-25 RX ADMIN — NITROFURANTOIN MONOHYDRATE/MACROCRYSTALS 100 MG: 25; 75 CAPSULE ORAL at 10:11

## 2024-11-25 RX ADMIN — GADOBUTROL 9 ML: 604.72 INJECTION INTRAVENOUS at 04:11

## 2024-11-25 RX ADMIN — ONDANSETRON 4 MG: 2 INJECTION INTRAMUSCULAR; INTRAVENOUS at 02:11

## 2024-11-25 RX ADMIN — LEVOTHYROXINE SODIUM 75 MCG: 75 TABLET ORAL at 06:11

## 2024-11-25 RX ADMIN — PANTOPRAZOLE SODIUM 40 MG: 40 INJECTION, POWDER, LYOPHILIZED, FOR SOLUTION INTRAVENOUS at 09:11

## 2024-11-25 RX ADMIN — ONDANSETRON 4 MG: 2 INJECTION INTRAMUSCULAR; INTRAVENOUS at 10:11

## 2024-11-25 NOTE — ASSESSMENT & PLAN NOTE
The nausea and vomiting has started this week and not improved. Nothing alleviates or worsens The patient says she has lost 4 pounds in the last month.  - Received 1L in ED  - NS 125cc/hr x8hr  - PRN Zofran  - Advance diet as tolerated  -11/25 stopped pilocarpine for N/V, repeat MRI Brain w/wo ordered to assess for other underlying causes of sx

## 2024-11-25 NOTE — HOSPITAL COURSE
Ms. Ervin is a 64-year-old lady who has a history of non small cell lung adenocarcinoma, s/p left upper lobe resection in 2023, Allergy, Amblyopia, Cataract, Eczema, HS (hereditary spherocytosis), total knee replacement, and Joint pain, here for nausea, vomiting, and inability to tolerate PO. She was admitted w/ N/V and inability to tolerate PO. On admission CT Abd/pelvis ordered which was relatively unremarkable. She was found to have KESHA and UTI on admission. Started on Nitrofurantoin for UTI, and began IVF which improved her KESHA. Once creatinine back to baseline, she was scheduled for repeat MRI Brain w/wo to assess other possible causes of her N/V. Her pilocarpine was d/c as this could be a possible cause of her sx. MRI brain negative, but did develop some fevers and chills. She was switched to IV Cipro for UTI and UA repeated. Later in the morning, she had an episode of black tarry stool. GI consult placed with plan for scope 11/27. AES scope showed no pathology, and no acute bleed. Pt continues to have N/V. 11/29 she was started on steroid course with Dex 4mg BID in attempt to improve her N/V. 11/30 pt states that the steroids helped her nausea, and she was able to eat multiple foods without vomiting. Her appetite also increased. 12/1 she still had no further N/V after steroid administration, and was determined to be stable for d/c. She was sent home with a Dexamethasone taper, to make her full course consist of Dex 4mg BID x3 days followed by Dex 4mg Daily x3 days followed by Dex 2mg Daily x3 days. She was also instructed to follow up in Heme-Onc clinic after discharge.

## 2024-11-25 NOTE — PROGRESS NOTES
"[facility] ([facility])  Hematology/Oncology  Progress Note    Patient Name: Radha Ervin  Admission Date: 11/24/2024  Hospital Length of Stay: 0 days  Code Status: Full Code     Subjective:     HPI:  Ms. Ervin is a 64-year-old lady who has a history of non small cell lung adenocarcinoma, s/p left upper lobe resection in 2023, Allergy, Amblyopia, Cataract, Eczema, HS (hereditary spherocytosis), total knee replacement, and Joint pain, here for nausea, vomiting, and inability to tolerate PO. The nausea and vomiting has started this week and not improved. Started Zometa at the beginning of the month. Nothing alleviates or worsens The patient says she has lost 4 pounds in the last month.    Ms. Ervin follows with Dr. Eduardo. Set to start treatment Monday, 11/25/2024 for the bony mets.She presented to [facility] yesterday for the same complaints, was given fluids, and discharged with Zofran. She had a recent PET scan (10/2024) that showed metastases to the spine: "post treatment change in the left lung in this patient with a history of lung adenocarcinoma. There are hypermetabolic lesions in the L1 with pathologic compression deformity and T11 vertebral body suspicious for osseous metastases."     Lastly, she complains about foul vaginal odor and concern for a yeast infection. Decreased urination due to decrease in PO intake. Last BM was two days ago (11/21/2024).     Oncology History:  Stg IIIA NSCLC s/p resection followed by CRT due to residual disease. Developed metastatic progression while on maintenance durvalumab.     Interval History: SHERWIN, pt had one episode of emesis last night but overall improved since admission. Scheduled to receive radiation today. Pilocarpine d/c and MRI Brain ordered    Oncology Treatment Plan:   [No matching plan found]    Medications:  Continuous Infusions:  Scheduled Meds:   levothyroxine  75 mcg Oral Before breakfast    magnesium sulfate IVPB  1 g Intravenous Once    " mupirocin   Nasal BID    nitrofurantoin (macrocrystal-monohydrate)  100 mg Oral Q12H    pantoprazole  40 mg Intravenous Daily     PRN Meds:  Current Facility-Administered Medications:     acetaminophen, 650 mg, Oral, Q4H PRN    dextrose 10%, 12.5 g, Intravenous, PRN    dextrose 10%, 25 g, Intravenous, PRN    glucagon (human recombinant), 1 mg, Intramuscular, PRN    glucose, 16 g, Oral, PRN    glucose, 24 g, Oral, PRN    LORazepam, 1 mg, Oral, Once PRN    naloxone, 0.02 mg, Intravenous, PRN    ondansetron, 4 mg, Intravenous, Q6H PRN    prochlorperazine, 10 mg, Intravenous, Q6H PRN    sodium chloride 0.9%, 10 mL, Intravenous, Q12H PRN     Review of Systems  Objective:     Vital Signs (Most Recent):  Temp: 98.5 °F (36.9 °C) (11/25/24 0739)  Pulse: 97 (11/25/24 0739)  Resp: 15 (11/25/24 0739)  BP: 114/80 (11/25/24 0739)  SpO2: (!) 91 % (11/25/24 0739) Vital Signs (24h Range):  Temp:  [98 °F (36.7 °C)-98.5 °F (36.9 °C)] 98.5 °F (36.9 °C)  Pulse:  [86-98] 97  Resp:  [15-20] 15  SpO2:  [91 %-98 %] 91 %  BP: ()/(66-80) 114/80     Weight: 82.1 kg (181 lb)  Body mass index is 36.56 kg/m².  Body surface area is 1.85 meters squared.      Intake/Output Summary (Last 24 hours) at 11/25/2024 1139  Last data filed at 11/25/2024 0647  Gross per 24 hour   Intake 650 ml   Output 0 ml   Net 650 ml        Physical Exam  HENT:      Head: Normocephalic and atraumatic.      Mouth/Throat:      Mouth: Mucous membranes are dry.   Eyes:      Extraocular Movements: Extraocular movements intact.      Pupils: Pupils are equal, round, and reactive to light.   Cardiovascular:      Rate and Rhythm: Normal rate and regular rhythm.      Pulses: Normal pulses.      Heart sounds: Normal heart sounds.   Pulmonary:      Effort: Pulmonary effort is normal.      Breath sounds: Normal breath sounds.   Abdominal:      General: Bowel sounds are normal.   Neurological:      Mental Status: She is alert and oriented to person, place, and time.           Significant Labs:   CBC:   Recent Labs   Lab 11/24/24  0123 11/25/24  0511   WBC 6.97 7.76   HGB 10.8* 10.1*   HCT 33.2* 30.2*    271    and CMP:   Recent Labs   Lab 11/24/24  0123 11/25/24  0511   * 136   K 3.6 3.9    111*   CO2 18* 17*   GLU 89 67*   BUN 15 9   CREATININE 1.7* 1.3   CALCIUM 9.1 8.5*   PROT 7.1  --    ALBUMIN 3.1*  --    BILITOT 0.5  --    ALKPHOS 84  --    AST 21  --    ALT 18  --    ANIONGAP 10 8       Diagnostic Results:  None  Assessment/Plan:     * Intractable nausea and vomiting  The nausea and vomiting has started this week and not improved. Nothing alleviates or worsens The patient says she has lost 4 pounds in the last month.  - Received 1L in ED  - NS 125cc/hr x8hr  - PRN Zofran  - Advance diet as tolerated  -11/25 stopped pilocarpine for N/V, repeat MRI Brain w/wo ordered to assess for other underlying causes of sx    Hypothyroidism  - home synthroid    UTI (urinary tract infection)  Pt complains about foul vaginal odor and concern for a yeast infection. Decreased urination due to decrease in PO intake. Denies dysuria or hematuria.  Urine dipstick shows positive for WBC's, positive for RBC's, and positive for leukocytes.  Micro exam: 9 WBC's per HPF, 14 RBC's per HPF, occasional+ bacteria, occasional yeast, and contaminated specimen with 5 squamous cells.  - 1 dose gentamicin in ED  - NF BID x5 days    KESHA (acute kidney injury)  KESHA is likely due to pre-renal azotemia due to dehydration. Baseline creatinine is  1.3-1.4 . Most recent creatinine and eGFR are listed below.  Recent Labs     11/22/24  2213 11/24/24  0123 11/25/24  0511   CREATININE 2.0* 1.7* 1.3   EGFRNORACEVR 27* 33.1* 45.6*        Plan  - KESHA is improving  - Avoid nephrotoxins and renally dose meds for GFR listed above  - Monitor urine output, serial BMP, and adjust therapy as needed  - Continue IVF  - Follow up with Dr. Eduardo before resuming Zometa treatment.      Back pain  Chronic back pain. She  "is scheduled to have a nerve ablation 12/16/24 for the back pain due to disc bulges in her spine.   - Takes tramadol at home. Holding in setting of KESHA.  - Will consider adding pain medications if needed, holding at this time to see if N/V improves.    Secondary malignant neoplasm of bone  She had a recent PET scan (10/2024) that showed metastases to the spine: "post treatment change in the left lung in this patient with a history of lung adenocarcinoma. There are hypermetabolic lesions in the L1 with pathologic compression deformity and T11 vertebral body suspicious for osseous metastases."  - Takes tramadol at home, holding in setting of KESHA  - Will add pain medications if needed    Stage 3b chronic kidney disease  BMP reviewed- noted Estimated Creatinine Clearance: 32.3 mL/min (A) (based on SCr of 1.7 mg/dL (H)). according to latest data. Based on current GFR, CKD stage is stage 3 - GFR 30-59.  Monitor UOP and serial BMP and adjust therapy as needed. Renally dose meds. Avoid nephrotoxic medications and procedures.      Adenocarcinoma, lung, left  Stg IIIA NSCLC s/p resection followed by CRT due to residual disease. Developed metastatic progression while on maintenance durvalumab. Durvalumab last C#5 8/9/24. Zolderonic acid for bone mets. Follows with Dr. Eduardo.  - Pantoprazole 40mg qd  - Pilocarpine TID discontinued in setting of N/V  - Sotarasib QD    Severe obesity (BMI 35.0-39.9) with comorbidity  Body mass index is 36.56 kg/m². Obesity complicates all aspects of disease management from diagnostic modalities to treatment. Weight loss encouraged and health benefits explained to patient.                Oswaldo Beck MD  Hematology/Oncology  [facility] (Hospital)      "

## 2024-11-25 NOTE — CARE UPDATE
Unit [facility] Care Support Interaction      I have reviewed the chart of Radha Ervin who is hospitalized for Intractable nausea and vomiting. The patient is currently located in the following unit: ONC/BMT        I have assisted the primary physician in management of the following:      MRSA Decolonization - Mupirocin ordered and CHG ordered       PAOLO FELIPE  Unit Based [facility]

## 2024-11-25 NOTE — ASSESSMENT & PLAN NOTE
KESHA is likely due to pre-renal azotemia due to dehydration. Baseline creatinine is  1.3-1.4 . Most recent creatinine and eGFR are listed below.  Recent Labs     11/22/24  2213 11/24/24  0123 11/25/24  0511   CREATININE 2.0* 1.7* 1.3   EGFRNORACEVR 27* 33.1* 45.6*        Plan  - KESHA is improving  - Avoid nephrotoxins and renally dose meds for GFR listed above  - Monitor urine output, serial BMP, and adjust therapy as needed  - Continue IVF  - Follow up with Dr. Eduardo before resuming Zometa treatment.

## 2024-11-25 NOTE — SUBJECTIVE & OBJECTIVE
Interval History: SHERWIN, pt had one episode of emesis last night but overall improved since admission. Scheduled to receive radiation today. Pilocarpine d/c and MRI Brain ordered    Oncology Treatment Plan:   [No matching plan found]    Medications:  Continuous Infusions:  Scheduled Meds:   levothyroxine  75 mcg Oral Before breakfast    magnesium sulfate IVPB  1 g Intravenous Once    mupirocin   Nasal BID    nitrofurantoin (macrocrystal-monohydrate)  100 mg Oral Q12H    pantoprazole  40 mg Intravenous Daily     PRN Meds:  Current Facility-Administered Medications:     acetaminophen, 650 mg, Oral, Q4H PRN    dextrose 10%, 12.5 g, Intravenous, PRN    dextrose 10%, 25 g, Intravenous, PRN    glucagon (human recombinant), 1 mg, Intramuscular, PRN    glucose, 16 g, Oral, PRN    glucose, 24 g, Oral, PRN    LORazepam, 1 mg, Oral, Once PRN    naloxone, 0.02 mg, Intravenous, PRN    ondansetron, 4 mg, Intravenous, Q6H PRN    prochlorperazine, 10 mg, Intravenous, Q6H PRN    sodium chloride 0.9%, 10 mL, Intravenous, Q12H PRN     Review of Systems  Objective:     Vital Signs (Most Recent):  Temp: 98.5 °F (36.9 °C) (11/25/24 0739)  Pulse: 97 (11/25/24 0739)  Resp: 15 (11/25/24 0739)  BP: 114/80 (11/25/24 0739)  SpO2: (!) 91 % (11/25/24 0739) Vital Signs (24h Range):  Temp:  [98 °F (36.7 °C)-98.5 °F (36.9 °C)] 98.5 °F (36.9 °C)  Pulse:  [86-98] 97  Resp:  [15-20] 15  SpO2:  [91 %-98 %] 91 %  BP: ()/(66-80) 114/80     Weight: 82.1 kg (181 lb)  Body mass index is 36.56 kg/m².  Body surface area is 1.85 meters squared.      Intake/Output Summary (Last 24 hours) at 11/25/2024 1139  Last data filed at 11/25/2024 0647  Gross per 24 hour   Intake 650 ml   Output 0 ml   Net 650 ml        Physical Exam  HENT:      Head: Normocephalic and atraumatic.      Mouth/Throat:      Mouth: Mucous membranes are dry.   Eyes:      Extraocular Movements: Extraocular movements intact.      Pupils: Pupils are equal, round, and reactive to light.    Cardiovascular:      Rate and Rhythm: Normal rate and regular rhythm.      Pulses: Normal pulses.      Heart sounds: Normal heart sounds.   Pulmonary:      Effort: Pulmonary effort is normal.      Breath sounds: Normal breath sounds.   Abdominal:      General: Bowel sounds are normal.   Neurological:      Mental Status: She is alert and oriented to person, place, and time.          Significant Labs:   CBC:   Recent Labs   Lab 11/24/24  0123 11/25/24  0511   WBC 6.97 7.76   HGB 10.8* 10.1*   HCT 33.2* 30.2*    271    and CMP:   Recent Labs   Lab 11/24/24  0123 11/25/24  0511   * 136   K 3.6 3.9    111*   CO2 18* 17*   GLU 89 67*   BUN 15 9   CREATININE 1.7* 1.3   CALCIUM 9.1 8.5*   PROT 7.1  --    ALBUMIN 3.1*  --    BILITOT 0.5  --    ALKPHOS 84  --    AST 21  --    ALT 18  --    ANIONGAP 10 8       Diagnostic Results:  None

## 2024-11-25 NOTE — PLAN OF CARE
Discussed POC with patient at beginning of shift. Questions were asked and answered.  Pt stated understanding of POC.  Pt AAO x 4 throughout shift. Complaints of pain to left side and between shoulder blades.  Pt did not prn pain medication, so hot packs were applied and they were effective in controlling pain to those areas.  Complaints of nausea x 1, prn Zofran was effective in controlling her nausea.  Up independently in room.  Pt remained free from injury by having bed in low locked position, call light within reach, non skid footwear on while out of bed, and frequent rounding.  Denies needs at this time.

## 2024-11-25 NOTE — PLAN OF CARE
Pt involved in plan of care and communicating needs throughout shift. Up in room and to bathroom independently; voiding without difficulty. Poor appetite. Radiation therapy today. MRI of brain completed. Shower completed. Zofran x1 for nausea. All VSS. Pt remaining free from falls or injury throughout shift. Bed locked and in lowest position, personal belongings and call light within reach, non skid socks on when OOB. Pt instructed to call for assistance as needed. Hourly rounding done on pt.

## 2024-11-25 NOTE — ASSESSMENT & PLAN NOTE
Stg IIIA NSCLC s/p resection followed by CRT due to residual disease. Developed metastatic progression while on maintenance durvalumab. Durvalumab last C#5 8/9/24. Zolderonic acid for bone mets. Follows with Dr. Eduardo.  - Pantoprazole 40mg qd  - Pilocarpine TID discontinued in setting of N/V  - Sotarasib QD

## 2024-11-26 ENCOUNTER — ANESTHESIA EVENT (OUTPATIENT)
Dept: ENDOSCOPY | Facility: HOSPITAL | Age: 64
End: 2024-11-26
Payer: MEDICARE

## 2024-11-26 PROBLEM — C34.90 NON-SMALL CELL LUNG CANCER: Status: ACTIVE | Noted: 2024-11-26

## 2024-11-26 PROBLEM — R63.8 DECREASED ORAL INTAKE: Status: ACTIVE | Noted: 2024-11-26

## 2024-11-26 PROBLEM — K92.1 MELENA: Status: ACTIVE | Noted: 2024-11-26

## 2024-11-26 LAB
ANION GAP SERPL CALC-SCNC: 10 MMOL/L (ref 8–16)
BACTERIA #/AREA URNS AUTO: ABNORMAL /HPF
BASOPHILS # BLD AUTO: 0.05 K/UL (ref 0–0.2)
BASOPHILS NFR BLD: 0.7 % (ref 0–1.9)
BILIRUB UR QL STRIP: NEGATIVE
BUN SERPL-MCNC: 7 MG/DL (ref 8–23)
CALCIUM SERPL-MCNC: 8.5 MG/DL (ref 8.7–10.5)
CHLORIDE SERPL-SCNC: 109 MMOL/L (ref 95–110)
CLARITY UR REFRACT.AUTO: ABNORMAL
CO2 SERPL-SCNC: 19 MMOL/L (ref 23–29)
COLOR UR AUTO: YELLOW
CREAT SERPL-MCNC: 1.4 MG/DL (ref 0.5–1.4)
DIFFERENTIAL METHOD BLD: ABNORMAL
EOSINOPHIL # BLD AUTO: 0.6 K/UL (ref 0–0.5)
EOSINOPHIL NFR BLD: 8 % (ref 0–8)
ERYTHROCYTE [DISTWIDTH] IN BLOOD BY AUTOMATED COUNT: 14.3 % (ref 11.5–14.5)
EST. GFR  (NO RACE VARIABLE): 41.8 ML/MIN/1.73 M^2
GLUCOSE SERPL-MCNC: 88 MG/DL (ref 70–110)
GLUCOSE UR QL STRIP: NEGATIVE
HCT VFR BLD AUTO: 32.2 % (ref 37–48.5)
HGB BLD-MCNC: 10.6 G/DL (ref 12–16)
HGB UR QL STRIP: NEGATIVE
IMM GRANULOCYTES # BLD AUTO: 0.02 K/UL (ref 0–0.04)
IMM GRANULOCYTES NFR BLD AUTO: 0.3 % (ref 0–0.5)
KETONES UR QL STRIP: ABNORMAL
LEUKOCYTE ESTERASE UR QL STRIP: ABNORMAL
LYMPHOCYTES # BLD AUTO: 1 K/UL (ref 1–4.8)
LYMPHOCYTES NFR BLD: 14.2 % (ref 18–48)
MAGNESIUM SERPL-MCNC: 1.7 MG/DL (ref 1.6–2.6)
MCH RBC QN AUTO: 30.5 PG (ref 27–31)
MCHC RBC AUTO-ENTMCNC: 32.9 G/DL (ref 32–36)
MCV RBC AUTO: 93 FL (ref 82–98)
MICROSCOPIC COMMENT: ABNORMAL
MONOCYTES # BLD AUTO: 0.9 K/UL (ref 0.3–1)
MONOCYTES NFR BLD: 13.1 % (ref 4–15)
NEUTROPHILS # BLD AUTO: 4.5 K/UL (ref 1.8–7.7)
NEUTROPHILS NFR BLD: 63.7 % (ref 38–73)
NITRITE UR QL STRIP: NEGATIVE
NRBC BLD-RTO: 0 /100 WBC
PH UR STRIP: 6 [PH] (ref 5–8)
PHOSPHATE SERPL-MCNC: 2.6 MG/DL (ref 2.7–4.5)
PLATELET # BLD AUTO: 284 K/UL (ref 150–450)
PMV BLD AUTO: 9.3 FL (ref 9.2–12.9)
POTASSIUM SERPL-SCNC: 3.8 MMOL/L (ref 3.5–5.1)
PROT UR QL STRIP: ABNORMAL
RBC # BLD AUTO: 3.48 M/UL (ref 4–5.4)
RBC #/AREA URNS AUTO: 4 /HPF (ref 0–4)
SODIUM SERPL-SCNC: 138 MMOL/L (ref 136–145)
SP GR UR STRIP: 1.01 (ref 1–1.03)
SQUAMOUS #/AREA URNS AUTO: 15 /HPF
URN SPEC COLLECT METH UR: ABNORMAL
WBC # BLD AUTO: 7.03 K/UL (ref 3.9–12.7)
WBC #/AREA URNS AUTO: 25 /HPF (ref 0–5)

## 2024-11-26 PROCEDURE — 36415 COLL VENOUS BLD VENIPUNCTURE: CPT | Mod: HCNC | Performed by: FAMILY MEDICINE

## 2024-11-26 PROCEDURE — 77014 PR  CT GUIDANCE PLACEMENT RAD THERAPY FIELDS: CPT | Mod: 26,HCNC,, | Performed by: RADIOLOGY

## 2024-11-26 PROCEDURE — 63600175 PHARM REV CODE 636 W HCPCS: Mod: HCNC | Performed by: FAMILY MEDICINE

## 2024-11-26 PROCEDURE — 25000003 PHARM REV CODE 250: Mod: HCNC | Performed by: FAMILY MEDICINE

## 2024-11-26 PROCEDURE — 83735 ASSAY OF MAGNESIUM: CPT | Mod: HCNC

## 2024-11-26 PROCEDURE — 85025 COMPLETE CBC W/AUTO DIFF WBC: CPT | Mod: HCNC | Performed by: FAMILY MEDICINE

## 2024-11-26 PROCEDURE — 20600001 HC STEP DOWN PRIVATE ROOM: Mod: HCNC

## 2024-11-26 PROCEDURE — 63600175 PHARM REV CODE 636 W HCPCS: Mod: HCNC

## 2024-11-26 PROCEDURE — 99233 SBSQ HOSP IP/OBS HIGH 50: CPT | Mod: HCNC,GC,, | Performed by: INTERNAL MEDICINE

## 2024-11-26 PROCEDURE — 99223 1ST HOSP IP/OBS HIGH 75: CPT | Mod: HCNC,GC,, | Performed by: INTERNAL MEDICINE

## 2024-11-26 PROCEDURE — 81001 URINALYSIS AUTO W/SCOPE: CPT | Mod: HCNC

## 2024-11-26 PROCEDURE — 77373 STRTCTC BDY RAD THER TX DLVR: CPT | Mod: HCNC | Performed by: RADIOLOGY

## 2024-11-26 PROCEDURE — 87086 URINE CULTURE/COLONY COUNT: CPT | Mod: HCNC

## 2024-11-26 PROCEDURE — 80048 BASIC METABOLIC PNL TOTAL CA: CPT | Mod: HCNC | Performed by: FAMILY MEDICINE

## 2024-11-26 PROCEDURE — 25000003 PHARM REV CODE 250: Mod: HCNC

## 2024-11-26 PROCEDURE — 84100 ASSAY OF PHOSPHORUS: CPT | Mod: HCNC

## 2024-11-26 RX ORDER — ONDANSETRON HYDROCHLORIDE 2 MG/ML
8 INJECTION, SOLUTION INTRAVENOUS EVERY 8 HOURS
Status: DISCONTINUED | OUTPATIENT
Start: 2024-11-26 | End: 2024-11-29

## 2024-11-26 RX ORDER — TRAMADOL HYDROCHLORIDE 50 MG/1
50 TABLET ORAL EVERY 6 HOURS PRN
Status: DISCONTINUED | OUTPATIENT
Start: 2024-11-27 | End: 2024-12-01 | Stop reason: HOSPADM

## 2024-11-26 RX ORDER — ACETAMINOPHEN 325 MG/1
650 TABLET ORAL EVERY 4 HOURS PRN
Status: DISCONTINUED | OUTPATIENT
Start: 2024-11-26 | End: 2024-12-01 | Stop reason: HOSPADM

## 2024-11-26 RX ORDER — CIPROFLOXACIN 2 MG/ML
400 INJECTION, SOLUTION INTRAVENOUS
Status: COMPLETED | OUTPATIENT
Start: 2024-11-26 | End: 2024-11-29

## 2024-11-26 RX ORDER — PANTOPRAZOLE SODIUM 40 MG/10ML
40 INJECTION, POWDER, LYOPHILIZED, FOR SOLUTION INTRAVENOUS 2 TIMES DAILY
Status: DISCONTINUED | OUTPATIENT
Start: 2024-11-26 | End: 2024-11-29

## 2024-11-26 RX ORDER — SODIUM CHLORIDE, SODIUM LACTATE, POTASSIUM CHLORIDE, CALCIUM CHLORIDE 600; 310; 30; 20 MG/100ML; MG/100ML; MG/100ML; MG/100ML
INJECTION, SOLUTION INTRAVENOUS CONTINUOUS
Status: ACTIVE | OUTPATIENT
Start: 2024-11-26 | End: 2024-11-27

## 2024-11-26 RX ADMIN — SODIUM CHLORIDE, POTASSIUM CHLORIDE, SODIUM LACTATE AND CALCIUM CHLORIDE 500 ML: 600; 310; 30; 20 INJECTION, SOLUTION INTRAVENOUS at 04:11

## 2024-11-26 RX ADMIN — CIPROFLOXACIN 400 MG: 2 INJECTION, SOLUTION INTRAVENOUS at 11:11

## 2024-11-26 RX ADMIN — MUPIROCIN: 20 OINTMENT TOPICAL at 08:11

## 2024-11-26 RX ADMIN — PANTOPRAZOLE SODIUM 40 MG: 40 INJECTION, POWDER, LYOPHILIZED, FOR SOLUTION INTRAVENOUS at 09:11

## 2024-11-26 RX ADMIN — LEVOTHYROXINE SODIUM 75 MCG: 75 TABLET ORAL at 06:11

## 2024-11-26 RX ADMIN — PROCHLORPERAZINE EDISYLATE 10 MG: 5 INJECTION INTRAMUSCULAR; INTRAVENOUS at 11:11

## 2024-11-26 RX ADMIN — TRAMADOL HYDROCHLORIDE 50 MG: 50 TABLET, COATED ORAL at 11:11

## 2024-11-26 RX ADMIN — NITROFURANTOIN MONOHYDRATE/MACROCRYSTALS 100 MG: 25; 75 CAPSULE ORAL at 09:11

## 2024-11-26 RX ADMIN — SODIUM CHLORIDE, POTASSIUM CHLORIDE, SODIUM LACTATE AND CALCIUM CHLORIDE: 600; 310; 30; 20 INJECTION, SOLUTION INTRAVENOUS at 04:11

## 2024-11-26 RX ADMIN — PANTOPRAZOLE SODIUM 40 MG: 40 INJECTION, POWDER, LYOPHILIZED, FOR SOLUTION INTRAVENOUS at 08:11

## 2024-11-26 RX ADMIN — ONDANSETRON 8 MG: 2 INJECTION INTRAMUSCULAR; INTRAVENOUS at 02:11

## 2024-11-26 RX ADMIN — SODIUM CHLORIDE, POTASSIUM CHLORIDE, SODIUM LACTATE AND CALCIUM CHLORIDE: 600; 310; 30; 20 INJECTION, SOLUTION INTRAVENOUS at 11:11

## 2024-11-26 RX ADMIN — MUPIROCIN: 20 OINTMENT TOPICAL at 09:11

## 2024-11-26 RX ADMIN — ONDANSETRON 4 MG: 2 INJECTION INTRAMUSCULAR; INTRAVENOUS at 09:11

## 2024-11-26 RX ADMIN — ONDANSETRON 8 MG: 2 INJECTION INTRAMUSCULAR; INTRAVENOUS at 08:11

## 2024-11-26 RX ADMIN — PROCHLORPERAZINE EDISYLATE 10 MG: 5 INJECTION INTRAMUSCULAR; INTRAVENOUS at 10:11

## 2024-11-26 NOTE — ASSESSMENT & PLAN NOTE
Pt complains about foul vaginal odor and concern for a yeast infection. Decreased urination due to decrease in PO intake. Denies dysuria or hematuria.  Urine dipstick shows positive for WBC's, positive for RBC's, and positive for leukocytes.  Micro exam: 9 WBC's per HPF, 14 RBC's per HPF, occasional+ bacteria, occasional yeast, and contaminated specimen with 5 squamous cells.  - 1 dose gentamicin in ED  - NF BID x5 days, switched to IV cipro given persistent vomiting of meds  -repeat UA ordered 11/26 to ensure adequate treatment of UTI

## 2024-11-26 NOTE — ANESTHESIA PREPROCEDURE EVALUATION
[facility]  Anesthesia Pre-Operative Evaluation         Patient Name: Radha Ervin  YOB: 1960  MRN: [id number]    SUBJECTIVE:     Pre-operative Evaluation for Procedure(s) (LRB):  EGD (ESOPHAGOGASTRODUODENOSCOPY) (N/A)     11/26/2024    Radha Ervin is a 64 y.o. female with a PMHx significant for non small cell lung adenocarcinoma, s/p left upper lobe resection in 2023 with bone metastases, obesity, hereditary spherocytosis now with melena.     Level of Care: Floor  Hemodynamic Status: 89 - 110 / 60 - 70  Respiratory Status: RA  IV Access: PIV x1  NPO Status: NPOMN    Previous Airway:   Intubation:     Induction:  Intravenous    Intubated:  Postinduction    Mask Ventilation:  Easy mask    Attempts:  1    Attempted By:  Resident anesthesiologist    Method of Intubation:  Fiberoptic    Laryngeal View Grade: Grade I - full view of cords      Difficult Airway Encountered?: No      Complications:  None    Airway Device:  Double lumen tube left    Airway Device Size:  35F    Style/Cuff Inflation:  Cuffed    Tube secured:  22    Secured at:  The lips    Placement Verified By:  Capnometry    Complicating Factors:  None    Findings Post-Intubation:  BS equal bilateral    LDA:        Peripheral IV - Single Lumen 11/24/24 0220 20 G Anterior;Proximal;Right Forearm (Active)   Site Assessment Clean;Dry;Intact;No redness;No swelling;No drainage;No warmth 11/26/24 1200   Line Securement Device Secured with sutureless device 11/26/24 0922   Extremity Assessment Distal to IV No abnormal discoloration;No redness;No swelling;No warmth 11/26/24 0922   Line Status Capped;Flushed;Saline locked 11/26/24 1200   Dressing Status Clean;Dry;Intact 11/26/24 1200   Dressing Intervention Integrity maintained 11/26/24 1200   Dressing Change Due 11/28/24 11/26/24 0922   Site Change Due 11/28/24 11/26/24 0922   Reason Not Rotated Not due 11/26/24 0922   Number of days: 2       Drips: None documented        Patient  "Active Problem List   Diagnosis    Primary localized osteoarthrosis of right lower leg    Severe obesity (BMI 35.0-39.9) with comorbidity    Long-term current use of intravenous immunoglobulin (IVIG)    Hidradenitis suppurativa    Tobacco dependence quit 5/2023    Nuclear sclerosis of both eyes    Visual field defect    Drug-induced immunodeficiency    Emphysema lung    Pulmonary nodule    Adenocarcinoma, lung, left    Secondary and unspecified malignant neoplasm of intrathoracic lymph nodes    Tinnitus of both ears    Localized osteoporosis with current pathological fracture with routine healing    Stage 3b chronic kidney disease    Hyperparathyroidism    Thoracic aorta atherosclerosis    Anxiety    Secondary malignant neoplasm of bone    Intractable nausea and vomiting    Back pain    KESHA (acute kidney injury)    UTI (urinary tract infection)    Hypothyroidism    Melena       Review of patient's allergies indicates:   Allergen Reactions    Paclitaxel Anaphylaxis and Other (See Comments)     Pt states "Anaphylaxis" last encounter w/ throat swelling. Encountered back pain, coughing and head fuzzy today.      Metformin Itching    Morphine Other (See Comments)     headaches    Latex, natural rubber Rash and Other (See Comments)     Redness and peeling only with bandaids    Penicillins Nausea And Vomiting and Rash       Current Inpatient Medications:   ciprofloxacin  400 mg Intravenous Q12H    levothyroxine  75 mcg Oral Before breakfast    mupirocin   Nasal BID    ondansetron  8 mg Intravenous Q8H    pantoprazole  40 mg Intravenous BID       Past Surgical History:   Procedure Laterality Date    achilles tendon repair      BRONCHOSCOPY N/A 8/3/2023    Procedure: Bronchoscopy;  Surgeon: Saeed Gould MD;  Location: [facility] OR [address] [place] [address];  Service: Cardiothoracic;  Laterality: N/A;    CHOLECYSTECTOMY      INJECTION OF ANESTHETIC AGENT AROUND MULTIPLE INTERCOSTAL NERVES N/A 8/3/2023    Procedure: BLOCK, NERVE, INTERCOSTAL, " 2 OR MORE;  Surgeon: Saeed Gould MD;  [facility];  Service: Cardiothoracic;  Laterality: N/A;    LYMPHADENECTOMY Left 8/3/2023    Procedure: LYMPHADENECTOMY;  Surgeon: Saeed Gould MD;  [facility];  Service: Cardiothoracic;  Laterality: Left;    NAVIGATIONAL BRONCHOSCOPY N/A 6/23/2023    Procedure: BRONCHOSCOPY, NAVIGATIONAL;  Surgeon: Radha Mccollum MD;  [facility];  Service: Pulmonary;  Laterality: N/A;    TOTAL KNEE ARTHROPLASTY      XI ROBOTIC RATS Left 8/3/2023    Procedure: XI ROBOTIC RATS upper lobectomy;  Surgeon: Saeed Gould MD;  [facility];  Service: Cardiothoracic;  Laterality: Left;       Social History     Substance and Sexual Activity   Drug Use No     Tobacco Use: Medium Risk (11/24/2024)    Patient History     Smoking Tobacco Use: Former     Smokeless Tobacco Use: Never     Passive Exposure: Not on file     Alcohol Use: Not At Risk (10/9/2024)    AUDIT-C     Frequency of Alcohol Consumption: Never     Average Number of Drinks: Patient does not drink     Frequency of Binge Drinking: Never       OBJECTIVE:     Vital Signs Range (Last 24H):  Temp:  [36.8 °C (98.2 °F)-37.9 °C (100.2 °F)]   Pulse:  []   Resp:  [16-20]   BP: ()/(60-82)   SpO2:  [89 %-96 %]       Significant Labs    Heme Profile  Lab Results   Component Value Date    WBC 7.03 11/26/2024    HGB 10.6 (L) 11/26/2024    HCT 32.2 (L) 11/26/2024     11/26/2024       Coagulation Studies  Lab Results   Component Value Date    LABPROT 11.9 11/22/2024    INR 1.1 11/22/2024    APTT 31.7 11/22/2024       Metabolic Profile  Lab Results   Component Value Date     11/26/2024    K 3.8 11/26/2024     11/26/2024    CO2 19 (L) 11/26/2024    BUN 7 (L) 11/26/2024    CREATININE 1.4 11/26/2024    MG 1.7 11/26/2024    PHOS 2.6 (L) 11/26/2024       Liver Function Tests  Lab Results   Component Value Date    AST 21 11/24/2024    ALT 18 11/24/2024    ALKPHOS  84 11/24/2024    BILITOT 0.5 11/24/2024    PROT 7.1 11/24/2024    ALBUMIN 3.1 (L) 11/24/2024       Lipid Profile  Lab Results   Component Value Date    CHOL 167 05/26/2023    HDL 42 05/26/2023    TRIG 134 05/26/2023       Endocrine Profile  Lab Results   Component Value Date    HGBA1C 6.3 (H) 04/28/2023    TSH 0.598 10/30/2024       Diagnostic Studies    EKG:   Results for orders placed or performed in visit on 11/24/24   EKG 12-lead    Collection Time: 11/24/24  1:10 AM   Result Value Ref Range    QRS Duration 92 ms    [facility] QTC Calculation 462 ms    Narrative    Test Reason : R11.0,    Vent. Rate :  89 BPM     Atrial Rate :  89 BPM     P-R Int : 164 ms          QRS Dur :  92 ms      QT Int : 380 ms       P-R-T Axes :  48  59  59 degrees    QTcB Int : 462 ms    Normal sinus rhythm  Normal ECG  When compared with ECG of 22-Nov-2024 22:31,  No significant change was found  Confirmed by Rambo Rossi ([id number]) on 11/24/2024 9:53:21 AM    Referred By: AAAREFERRAL SELF           Confirmed By: Rambo Rossi       Stress Echo   Results for orders placed during the hospital encounter of 07/25/23    Stress Echo Which stress agent will be used? Pharmacological; Color Flow Doppler? No    Interpretation Summary  · The patient reached the end of the protocol.  · There were no arrhythmias during stress.  · The ECG portion of this study is negative for myocardial ischemia.  · The stress echo portion of this study is negative for myocardial ischemia.  · The left ventricle is normal in size with normal systolic function.  · The estimated ejection fraction is 60%.  · Normal right ventricular size with normal right ventricular systolic function.        ASSESSMENT/PLAN:     Radha Ervin is a 64 y.o. female with non small cell lung adenocarcinoma, s/p left upper lobe resection in 2023 with bone metastases, obesity, hereditary spherocytosis now with melena.     Pre-op Assessment    I have reviewed the Patient Summary Reports.     I have  reviewed the Nursing Notes. I have reviewed the NPO Status.   I have reviewed the Medications.     Review of Systems  Anesthesia Hx:   History of prior surgery of interest to airway management or planning:           Personal Hx of Anesthesia complications, Post-Operative Nausea/Vomiting                    Hematology/Oncology:                        --  Cancer in past history:                     Renal/:  Chronic Renal Disease                Musculoskeletal:  Arthritis               Endocrine:   Hypothyroidism              Physical Exam  General: Well nourished, Cooperative, Alert and Oriented    Airway:  Mallampati: III   Mouth Opening: Normal  TM Distance: Normal  Tongue: Normal  Neck ROM: Normal ROM    Dental:  Edentulous  Top and bottom dentures   Chest/Lungs:  Normal Respiratory Rate    Heart:  Rate: Normal        Anesthesia Plan  Type of Anesthesia, risks & benefits discussed:    Anesthesia Type: Gen Natural Airway, Gen ETT  Intra-op Monitoring Plan: Standard ASA Monitors  Post Op Pain Control Plan: multimodal analgesia and IV/PO Opioids PRN  Induction:  IV  Airway Plan: Direct and Video, Post-Induction  Informed Consent: Informed consent signed with the Patient and all parties understand the risks and agree with anesthesia plan.  All questions answered.   ASA Score: 3  Day of Surgery Review of History & Physical: H&P Update referred to the surgeon/provider.    Ready For Surgery From Anesthesia Perspective.     .

## 2024-11-26 NOTE — CONSULTS
[facility] ([facility])  Gastroenterology  Consult Note    Patient Name: Radha Ervin  MRN: [id number]  Admission Date: 11/24/2024  Hospital Length of Stay: 1 days  Code Status: Full Code   Attending Provider: Daniel Solorzano MD   Consulting Provider: Mendel Coronado MD  Primary Care Physician: Suraj Herrera III, MD  Principal Problem:Intractable nausea and vomiting    Inpatient consult to Gastroenterology  Consult performed by: Mendel Coronado MD  Consult ordered by: Oswaldo Beck MD        Subjective:     HPI:  Ms. Radha Ervin is a 63 yo female with non small cell lung adenocarcinoma with concerns of bone metastasis to the spine, s/p left upper lobe resection in 2023, Allergy, Amblyopia, Cataract, Eczema, hereditary spherocytosis, total knee replacement. She presented to the hospital for nausea, vomiting, and inability to tolerate PO solids or liquids. She reports nausea, vomiting, dyspepsia has started this week and not improved. Started Zometa at the beginning of the month and received radiation while inpatient yesterday and today. She reports taking Ibuprofen 200 mg every other day for back pain instead of the tramadol. The patient says she has lost 4 pounds in the last month. She had a BM that was black tarry earlier today and no further episodes.      At the time of consults, patient was AF, VSS complaining of back pain. Hgb 10.6 ( 10.1 previously) Bun 7, Cr 1.4. GI was consulted for concerns of dark tarry stool and possible scope.     Past Medical History:   Diagnosis Date    Allergy     Amblyopia     Cataract     Eczema     HS (hereditary spherocytosis)     Hx of total knee replacement 01/19/2016    right knee    Joint pain        Past Surgical History:   Procedure Laterality Date    achilles tendon repair      BRONCHOSCOPY N/A 8/3/2023    Procedure: Bronchoscopy;  Surgeon: Saeed Gould MD;  Location: [facility] OR [address] [place] [address];  Service: Cardiothoracic;  Laterality: N/A;     "CHOLECYSTECTOMY      INJECTION OF ANESTHETIC AGENT AROUND MULTIPLE INTERCOSTAL NERVES N/A 8/3/2023    Procedure: BLOCK, NERVE, INTERCOSTAL, 2 OR MORE;  Surgeon: Saeed Gould MD;  Location: [facility] OR 2ND FLR;  Service: Cardiothoracic;  Laterality: N/A;    LYMPHADENECTOMY Left 8/3/2023    Procedure: LYMPHADENECTOMY;  Surgeon: Saeed Gould MD;  Location: [facility] OR 2ND FLR;  Service: Cardiothoracic;  Laterality: Left;    NAVIGATIONAL BRONCHOSCOPY N/A 2023    Procedure: BRONCHOSCOPY, NAVIGATIONAL;  Surgeon: Radha Mccollum MD;  Location: [facility] OR 2ND FLR;  Service: Pulmonary;  Laterality: N/A;    TOTAL KNEE ARTHROPLASTY      XI ROBOTIC RATS Left 8/3/2023    Procedure: XI ROBOTIC RATS upper lobectomy;  Surgeon: Saeed Gould MD;  Location: [facility] OR 2ND FLR;  Service: Cardiothoracic;  Laterality: Left;       Review of patient's allergies indicates:   Allergen Reactions    Paclitaxel Anaphylaxis and Other (See Comments)     Pt states "Anaphylaxis" last encounter w/ throat swelling. Encountered back pain, coughing and head fuzzy today.      Metformin Itching    Morphine Other (See Comments)     headaches    Latex, natural rubber Rash and Other (See Comments)     Redness and peeling only with bandaids    Penicillins Nausea And Vomiting and Rash     Family History       Problem Relation (Age of Onset)    Blindness Cousin    Breast cancer Sister    Cancer Mother, Father, Son    Diabetes Cousin    Hodgkin's lymphoma Son    Liver cancer Maternal Uncle          Tobacco Use    Smoking status: Former     Current packs/day: 0.00     Average packs/day: 1 pack/day for 46.3 years (46.3 ttl pk-yrs)     Types: Vaping with nicotine, Cigarettes     Start date:      Quit date: 2023     Years since quittin.5    Smokeless tobacco: Never   Substance and Sexual Activity    Alcohol use: Not Currently     Comment: occassionally    Drug use: No    Sexual activity: Not Currently     Partners: Male     Review of Systems "   Respiratory:  Negative for shortness of breath.    Cardiovascular:  Negative for chest pain.   Gastrointestinal:  Positive for abdominal pain and blood in stool. Negative for abdominal distention, constipation, diarrhea, nausea, rectal pain and vomiting.   Musculoskeletal:  Positive for back pain.   Neurological:  Positive for weakness. Negative for dizziness and light-headedness.     Objective:     Vital Signs (Most Recent):  Temp: 98.4 °F (36.9 °C) (11/26/24 0922)  Pulse: 99 (11/26/24 0922)  Resp: 18 (11/26/24 0430)  BP: (!) 89/60 (11/26/24 0922)  SpO2: 96 % (11/26/24 0922) Vital Signs (24h Range):  Temp:  [98.2 °F (36.8 °C)-100.2 °F (37.9 °C)] 98.4 °F (36.9 °C)  Pulse:  [] 99  Resp:  [16-20] 18  SpO2:  [92 %-96 %] 96 %  BP: ()/(60-82) 89/60     Weight: 82.1 kg (181 lb) (11/24/24 0600)  Body mass index is 36.56 kg/m².      Intake/Output Summary (Last 24 hours) at 11/26/2024 1127  Last data filed at 11/26/2024 0600  Gross per 24 hour   Intake 525 ml   Output 0 ml   Net 525 ml       Lines/Drains/Airways       Peripheral Intravenous Line  Duration                  Peripheral IV - Single Lumen 11/24/24 0220 20 G Anterior;Proximal;Right Forearm 2 days                     Physical Exam  Constitutional:       General: She is not in acute distress.     Appearance: She is obese. She is ill-appearing.   HENT:      Right Ear: External ear normal.      Left Ear: External ear normal.      Mouth/Throat:      Mouth: Mucous membranes are dry.   Eyes:      General: No scleral icterus.  Cardiovascular:      Rate and Rhythm: Normal rate and regular rhythm.      Heart sounds: No murmur heard.  Pulmonary:      Effort: Pulmonary effort is normal. No respiratory distress.   Abdominal:      General: There is no distension.      Palpations: Abdomen is soft. There is no mass.      Tenderness: There is no abdominal tenderness. There is no guarding.   Musculoskeletal:      Cervical back: Normal range of motion.   Skin:      General: Skin is warm.   Neurological:      General: No focal deficit present.      Mental Status: She is alert and oriented to person, place, and time. Mental status is at baseline.          Significant Labs:  CBC:   Recent Labs   Lab 11/25/24  0511 11/26/24  0231   WBC 7.76 7.03   HGB 10.1* 10.6*   HCT 30.2* 32.2*    284     CMP:   Recent Labs   Lab 11/26/24  0231   GLU 88   CALCIUM 8.5*      K 3.8   CO2 19*      BUN 7*   CREATININE 1.4     All pertinent lab results from the last 24 hours have been reviewed.    Significant Imaging:  Imaging results within the past 24 hours have been reviewed.  Assessment/Plan:     GI  * Intractable nausea and vomiting  Ms. Radha Ervin is a 65 yo female with non small cell lung adenocarcinoma with concerns of bone metastasis to the spine, s/p left upper lobe resection in 2023. She presented to the hospital for nausea, vomiting, and inability to tolerate PO solids or liquids. She reports nausea, vomiting, dyspepsia has started this week and not improved. Started Zometa at the beginning of the month and received radiation while inpatient yesterday and today. She reports taking Ibuprofen 200 mg every other day for back pain instead of the tramadol. The patient says she has lost 4 pounds in the last month. She had a BM that was black tarry earlier today and no further episodes. Patient's sister reports that her underwear also had bloody stains on them.      At the time of consults, patient was AF, VSS complaining of back pain. Hgb 10.6 ( 10.1 previously) Bun 7, Cr 1.4. GI was consulted for concerns for a dark tarry BM and possible scope.           - Plan for an EGD tomorrow   - NPO midnight   - Trend Hgb and transfuse for goal Hgb > 7, or if patient become hemodynamically unstable   - Maintain IV access with 2 large bore IV's  - Hold all NSAIDs and anticoagulants, unless contraindicated  - Continue IV pantoprazole 40 mg BID  - Please correct any coagulopathy with  platelets and FFP for goal of platelets >50K and INR <2.0  - Please notify GI team if there is significant change in patient's clinical status           Thank you for your consult. I will follow-up with patient. Please contact us if you have any additional questions.    Mendel Coronado MD  Gastroenterology  [facility] - Oncology ([facility])

## 2024-11-26 NOTE — PLAN OF CARE
Discussed POC with patient at beginning of shift. Questions were asked and answered.  Pt stated understanding of POC.  Pt AAO x 4 throughout shift. Complaints of pain to right side   Pt did not prn pain medication, hot packs were applied and they were effective in controlling pain. No complaints of nausea. Pt had blood on sides of underwear, pt denied blood coming from rectum or vagina.  Pt advised that the blood came from abscess that she has d/t her HS.  Up independently in room.  Pt remained free from injury by having bed in low locked position, call light within reach, non skid footwear on while out of bed, and frequent rounding.  Denies needs at this time.

## 2024-11-26 NOTE — HPI
Ms. Radha Ervin is a 63 yo female with non small cell lung adenocarcinoma with concerns of bone metastasis to the spine, s/p left upper lobe resection in 2023, Allergy, Amblyopia, Cataract, Eczema, hereditary spherocytosis, total knee replacement. She presented to the hospital for nausea, vomiting, and inability to tolerate PO solids or liquids. She reports nausea, vomiting, dyspepsia has started this week and not improved. Started Zometa at the beginning of the month and received radiation while inpatient yesterday and today. She reports taking Ibuprofen 200 mg every other day for back pain instead of the tramadol. The patient says she has lost 4 pounds in the last month. She had a BM that was black tarry earlier today and no further episodes.      At the time of consults, patient was AF, VSS complaining of back pain. Hgb 10.6 ( 10.1 previously) Bun 7, Cr 1.4. GI was consulted for concerns of dark tarry stool and possible scope.

## 2024-11-26 NOTE — SUBJECTIVE & OBJECTIVE
"Past Medical History:   Diagnosis Date    Allergy     Amblyopia     Cataract     Eczema     HS (hereditary spherocytosis)     Hx of total knee replacement 01/19/2016    right knee    Joint pain        Past Surgical History:   Procedure Laterality Date    achilles tendon repair      BRONCHOSCOPY N/A 8/3/2023    Procedure: Bronchoscopy;  Surgeon: Saeed Gould MD;  [facility];  Service: Cardiothoracic;  Laterality: N/A;    CHOLECYSTECTOMY      INJECTION OF ANESTHETIC AGENT AROUND MULTIPLE INTERCOSTAL NERVES N/A 8/3/2023    Procedure: BLOCK, NERVE, INTERCOSTAL, 2 OR MORE;  Surgeon: Saeed Gould MD;  [facility];  Service: Cardiothoracic;  Laterality: N/A;    LYMPHADENECTOMY Left 8/3/2023    Procedure: LYMPHADENECTOMY;  Surgeon: Saeed Gould MD;  Location: [facility] OR [address] [place] [address];  Service: Cardiothoracic;  Laterality: Left;    NAVIGATIONAL BRONCHOSCOPY N/A 6/23/2023    Procedure: BRONCHOSCOPY, NAVIGATIONAL;  Surgeon: Radha Mccollum MD;  Location: [facility] OR [address] [place] [address];  Service: Pulmonary;  Laterality: N/A;    TOTAL KNEE ARTHROPLASTY      XI ROBOTIC RATS Left 8/3/2023    Procedure: XI ROBOTIC RATS upper lobectomy;  Surgeon: Saeed Gould MD;  Location: [facility] OR [address] [place] [address];  Service: Cardiothoracic;  Laterality: Left;       Review of patient's allergies indicates:   Allergen Reactions    Paclitaxel Anaphylaxis and Other (See Comments)     Pt states "Anaphylaxis" last encounter w/ throat swelling. Encountered back pain, coughing and head fuzzy today.      Metformin Itching    Morphine Other (See Comments)     headaches    Latex, natural rubber Rash and Other (See Comments)     Redness and peeling only with bandaids    Penicillins Nausea And Vomiting and Rash     Family History       Problem Relation (Age of Onset)    Blindness Cousin    Breast cancer Sister    Cancer Mother, Father, Son    Diabetes Cousin    Hodgkin's lymphoma Son    Liver cancer Maternal Uncle          Tobacco Use    " Smoking status: Former     Current packs/day: 0.00     Average packs/day: 1 pack/day for 46.3 years (46.3 ttl pk-yrs)     Types: Vaping with nicotine, Cigarettes     Start date:      Quit date: 2023     Years since quittin.5    Smokeless tobacco: Never   Substance and Sexual Activity    Alcohol use: Not Currently     Comment: occassionally    Drug use: No    Sexual activity: Not Currently     Partners: Male     Review of Systems   Respiratory:  Negative for shortness of breath.    Cardiovascular:  Negative for chest pain.   Gastrointestinal:  Positive for abdominal pain and blood in stool. Negative for abdominal distention, constipation, diarrhea, nausea, rectal pain and vomiting.   Musculoskeletal:  Positive for back pain.   Neurological:  Positive for weakness. Negative for dizziness and light-headedness.     Objective:     Vital Signs (Most Recent):  Temp: 98.4 °F (36.9 °C) (24)  Pulse: 99 (24)  Resp: 18 (24 0430)  BP: (!) 89/60 (24)  SpO2: 96 % (24) Vital Signs (24h Range):  Temp:  [98.2 °F (36.8 °C)-100.2 °F (37.9 °C)] 98.4 °F (36.9 °C)  Pulse:  [] 99  Resp:  [16-20] 18  SpO2:  [92 %-96 %] 96 %  BP: ()/(60-82) 89/60     Weight: 82.1 kg (181 lb) (24 06)  Body mass index is 36.56 kg/m².      Intake/Output Summary (Last 24 hours) at 2024 1127  Last data filed at 2024 0600  Gross per 24 hour   Intake 525 ml   Output 0 ml   Net 525 ml       Lines/Drains/Airways       Peripheral Intravenous Line  Duration                  Peripheral IV - Single Lumen 24 0220 20 G Anterior;Proximal;Right Forearm 2 days                     Physical Exam  Constitutional:       General: She is not in acute distress.     Appearance: She is obese. She is ill-appearing.   HENT:      Right Ear: External ear normal.      Left Ear: External ear normal.      Mouth/Throat:      Mouth: Mucous membranes are dry.   Eyes:      General: No scleral  icterus.  Cardiovascular:      Rate and Rhythm: Normal rate and regular rhythm.      Heart sounds: No murmur heard.  Pulmonary:      Effort: Pulmonary effort is normal. No respiratory distress.   Abdominal:      General: There is no distension.      Palpations: Abdomen is soft. There is no mass.      Tenderness: There is no abdominal tenderness. There is no guarding.   Musculoskeletal:      Cervical back: Normal range of motion.   Skin:     General: Skin is warm.   Neurological:      General: No focal deficit present.      Mental Status: She is alert and oriented to person, place, and time. Mental status is at baseline.          Significant Labs:  CBC:   Recent Labs   Lab 11/25/24  0511 11/26/24  0231   WBC 7.76 7.03   HGB 10.1* 10.6*   HCT 30.2* 32.2*    284     CMP:   Recent Labs   Lab 11/26/24  0231   GLU 88   CALCIUM 8.5*      K 3.8   CO2 19*      BUN 7*   CREATININE 1.4     All pertinent lab results from the last 24 hours have been reviewed.    Significant Imaging:  Imaging results within the past 24 hours have been reviewed.

## 2024-11-26 NOTE — ASSESSMENT & PLAN NOTE
The nausea and vomiting has started this week and not improved. Nothing alleviates or worsens The patient says she has lost 4 pounds in the last month.  - Received 1L in ED  - NS 125cc/hr x8hr  - PRN Zofran  - Advance diet as tolerated  -11/25 stopped pilocarpine for N/V, repeat MRI Brain negative

## 2024-11-26 NOTE — ASSESSMENT & PLAN NOTE
Pt with intractable N/V since admit, 11/26 had an episode of black tarry stool     -Protonix 40 IV BID started  -GI consulted, scope planned 11/27  -NPO midnight for scope

## 2024-11-26 NOTE — PROGRESS NOTES
"[facility] ([facility])  Hematology/Oncology  Progress Note    Patient Name: Radha Ervin  Admission Date: 11/24/2024  Hospital Length of Stay: 1 days  Code Status: Full Code     Subjective:     HPI:  Ms. Ervin is a 64-year-old lady who has a history of non small cell lung adenocarcinoma, s/p left upper lobe resection in 2023, Allergy, Amblyopia, Cataract, Eczema, HS (hereditary spherocytosis), total knee replacement, and Joint pain, here for nausea, vomiting, and inability to tolerate PO. The nausea and vomiting has started this week and not improved. Started Zometa at the beginning of the month. Nothing alleviates or worsens The patient says she has lost 4 pounds in the last month.    Ms. Ervin follows with Dr. Eduardo. Set to start treatment Monday, 11/25/2024 for the bony mets.She presented to [place] yesterday for the same complaints, was given fluids, and discharged with Zofran. She had a recent PET scan (10/2024) that showed metastases to the spine: "post treatment change in the left lung in this patient with a history of lung adenocarcinoma. There are hypermetabolic lesions in the L1 with pathologic compression deformity and T11 vertebral body suspicious for osseous metastases."     Lastly, she complains about foul vaginal odor and concern for a yeast infection. Decreased urination due to decrease in PO intake. Last BM was two days ago (11/21/2024).     Oncology History:  Stg IIIA NSCLC s/p resection followed by CRT due to residual disease. Developed metastatic progression while on maintenance durvalumab.     Interval History: pt without N/V overnight, however did endorse some fevers and chills with Tmax 100.2. Abx switched to Cipro IV and repeat UA ordered. MRI brain negative. Later in the morning 1 episode of black tarry stool, GI consult placed.    Oncology Treatment Plan:   [No matching plan found]    Medications:  Continuous Infusions:  Scheduled Meds:   levothyroxine  75 mcg Oral Before " breakfast    mupirocin   Nasal BID    nitrofurantoin (macrocrystal-monohydrate)  100 mg Oral Q12H    pantoprazole  40 mg Intravenous Daily     PRN Meds:  Current Facility-Administered Medications:     acetaminophen, 650 mg, Oral, Q4H PRN    dextrose 10%, 12.5 g, Intravenous, PRN    dextrose 10%, 25 g, Intravenous, PRN    glucagon (human recombinant), 1 mg, Intramuscular, PRN    glucose, 16 g, Oral, PRN    glucose, 24 g, Oral, PRN    LORazepam, 1 mg, Oral, Once PRN    naloxone, 0.02 mg, Intravenous, PRN    ondansetron, 4 mg, Intravenous, Q6H PRN    prochlorperazine, 10 mg, Intravenous, Q6H PRN    sodium chloride 0.9%, 10 mL, Intravenous, Q12H PRN     Review of Systems  Objective:     Vital Signs (Most Recent):  Temp: 98.6 °F (37 °C) (11/26/24 0430)  Pulse: 102 (11/26/24 0430)  Resp: 18 (11/26/24 0430)  BP: 93/61 (11/26/24 0430)  SpO2: (!) 93 % (11/26/24 0430) Vital Signs (24h Range):  Temp:  [98.2 °F (36.8 °C)-100.2 °F (37.9 °C)] 98.6 °F (37 °C)  Pulse:  [] 102  Resp:  [16-20] 18  SpO2:  [92 %-96 %] 93 %  BP: ()/(61-82) 93/61     Weight: 82.1 kg (181 lb)  Body mass index is 36.56 kg/m².  Body surface area is 1.85 meters squared.      Intake/Output Summary (Last 24 hours) at 11/26/2024 0835  Last data filed at 11/26/2024 0600  Gross per 24 hour   Intake 525 ml   Output 0 ml   Net 525 ml        Physical Exam  HENT:      Head: Normocephalic and atraumatic.   Eyes:      Extraocular Movements: Extraocular movements intact.      Pupils: Pupils are equal, round, and reactive to light.   Cardiovascular:      Rate and Rhythm: Normal rate and regular rhythm.      Pulses: Normal pulses.      Heart sounds: Normal heart sounds.   Pulmonary:      Effort: Pulmonary effort is normal.      Breath sounds: Normal breath sounds.   Abdominal:      General: Bowel sounds are normal.   Neurological:      Mental Status: She is alert and oriented to person, place, and time.          Significant Labs:   CBC:   Recent Labs   Lab  11/25/24  0511 11/26/24  0231   WBC 7.76 7.03   HGB 10.1* 10.6*   HCT 30.2* 32.2*    284    and CMP:   Recent Labs   Lab 11/25/24  0511 11/26/24  0231    138   K 3.9 3.8   * 109   CO2 17* 19*   GLU 67* 88   BUN 9 7*   CREATININE 1.3 1.4   CALCIUM 8.5* 8.5*   ANIONGAP 8 10       Diagnostic Results:  MRI Brain negative for acute process or new changes from previous  Assessment/Plan:     * Intractable nausea and vomiting  The nausea and vomiting has started this week and not improved. Nothing alleviates or worsens The patient says she has lost 4 pounds in the last month.  - Received 1L in ED  - NS 125cc/hr x8hr  - PRN Zofran  - Advance diet as tolerated  -11/25 stopped pilocarpine for N/V, repeat MRI Brain negative        Melena  Pt with intractable N/V since admit, 11/26 had an episode of black tarry stool     -Protonix 40 IV BID started  -GI consulted, scope planned 11/27  -NPO midnight for scope            Hypothyroidism  - home synthroid    UTI (urinary tract infection)  Pt complains about foul vaginal odor and concern for a yeast infection. Decreased urination due to decrease in PO intake. Denies dysuria or hematuria.  Urine dipstick shows positive for WBC's, positive for RBC's, and positive for leukocytes.  Micro exam: 9 WBC's per HPF, 14 RBC's per HPF, occasional+ bacteria, occasional yeast, and contaminated specimen with 5 squamous cells.  - 1 dose gentamicin in ED  - NF BID x5 days, switched to IV cipro given persistent vomiting of meds  -repeat UA ordered 11/26 to ensure adequate treatment of UTI    KESHA (acute kidney injury)  KESHA is likely due to pre-renal azotemia due to dehydration. Baseline creatinine is  1.3-1.4 . Most recent creatinine and eGFR are listed below.  Recent Labs     11/22/24  2213 11/24/24  0123 11/25/24  0511   CREATININE 2.0* 1.7* 1.3   EGFRNORACEVR 27* 33.1* 45.6*        Plan  - KESHA is improving  - Avoid nephrotoxins and renally dose meds for GFR listed above  - Monitor  "urine output, serial BMP, and adjust therapy as needed  - Continue IVF  - Follow up with Dr. Eduardo before resuming Zometa treatment.      Back pain  Chronic back pain. She is scheduled to have a nerve ablation 12/16/24 for the back pain due to disc bulges in her spine.   - Takes tramadol at home. Holding in setting of KESHA.  - Will consider adding pain medications if needed, holding at this time to see if N/V improves.    Secondary malignant neoplasm of bone  She had a recent PET scan (10/2024) that showed metastases to the spine: "post treatment change in the left lung in this patient with a history of lung adenocarcinoma. There are hypermetabolic lesions in the L1 with pathologic compression deformity and T11 vertebral body suspicious for osseous metastases."  - Takes tramadol at home, holding in setting of KESHA  - Will add pain medications if needed    Stage 3b chronic kidney disease  BMP reviewed- noted Estimated Creatinine Clearance: 32.3 mL/min (A) (based on SCr of 1.7 mg/dL (H)). according to latest data. Based on current GFR, CKD stage is stage 3 - GFR 30-59.  Monitor UOP and serial BMP and adjust therapy as needed. Renally dose meds. Avoid nephrotoxic medications and procedures.      Adenocarcinoma, lung, left  Stg IIIA NSCLC s/p resection followed by CRT due to residual disease. Developed metastatic progression while on maintenance durvalumab. Durvalumab last C#5 8/9/24. Zolderonic acid for bone mets. Follows with Dr. Eduardo.  - Pantoprazole 40mg qd  - Pilocarpine TID discontinued in setting of N/V  - Sotarasib QD    Severe obesity (BMI 35.0-39.9) with comorbidity  Body mass index is 36.56 kg/m². Obesity complicates all aspects of disease management from diagnostic modalities to treatment. Weight loss encouraged and health benefits explained to patient.                Oswaldo Beck MD  Hematology/Oncology  [facility] ([facility])      "

## 2024-11-26 NOTE — CONSULTS
Palliative Medicine  Consult Note     Patient Name: Radha Ervin   MRN: [id number]     Admission Date: 11/24/2024   Hospital Length of Stay: 1     Attending Provider: Daniel Solorzano MD   Consulting Provider: Mike Hinojosa MD  Primary Care Physician: Suraj Herrera III, MD     Principal Problem: Intractable nausea and vomiting     Patient information was obtained from patient, relative(s), past medical records, and primary team.    Assessment/Plan:   Radha Ervin is a 64 y.o. female with a PMH of NSCLC s/p Left upper lobe resection (2023) now with bony metastasis (while on Durvalumab) c/b pathologic compression deformities, hereditary spherocytosis, and CKD; see below for brief HPI.     Palliative Care consulted for non-pain symptoms.    Impression:  Patient's nausea could be cancer-related, however in setting of recent black tarry stools and regular ibuprofen use (because she wanted to avoid opioids) can also not rule out possibility of ulcer or GI issue. Plan for scope tomorrow, will defer recommendations regarding anti-emetics until afterwards to see if there is a reversible / treatable cause for her nausea. Additionally, her nausea seems well controlled currently on PRN prochlorperazine.      Recommendations:  Would not recommend any changes to current symptomatic treatments already ordered at this time. Will defer management of current medical conditions including symptomatic complaints to primary team.   May consider addition of Zyprexa qHS for nausea (cancer-directed treatment related), however will hold off on this until after patient's GI scope tomorrow.  Continue to build rapport with patient and family  Continue to explore [facility] relevant medical decision maker      #[facility] Discussions  mPOA paperwork completed on 11/26/24 by myself and uploaded to EMR      Code status: Full Code     Recommendations communicated directly to primary team on 11/26/2024 via EPIC chat.    Subjective:   Brief HPI:    Radha Ervin is a 64 y.o. female with a PMH of NSCLC s/p Left upper lobe resection (2023) now with bony metastasis (while on Durvalumab) c/b pathologic compression deformities, hereditary spherocytosis, and CKD who initially presented to the ED c/o nausea, vomiting, and inability to tolerate PO intake for past week. Of note, was started on Zoledronic acid earlier this month. Palliative Care consulted for assistance with symptom management.     Initial Assessment:   Initially met with patient's sister at bedside who provided history, since patient was asleep after being uncomfortable all morning. Sister states that she is mPOA and has corresponding paperwork. Our records however, show that she is the general power of , not medical power of ; appropriate paperwork completed with patient and her sister later in the day. Patient has been experiencing nausea and vomiting for the past week with gradual worsening of her PO intake. Patient reportedly very sensitive to medication SE, and sister concerned it is related to her cancer-treatment. Occasionally, patient has back pain related to bony metastasis, however she does not like taking opioids 2/2 oversedation. Instead she has been relying on ibuprofen regularly to manage her pain, so that she is able to keep working which she finds a good escape. Later visited with patient once awake who corroborated the above information. Also, able to state that she is not currently experiencing nausea or pain. Reported one episode of loose black tarry stool overnight.     Pain Assessment: N/A    [place] Symptom Assessment (ESAS):   Pain: Mild  Dyspnea: None  Anxiety: None  Nausea: None (currently)  Depression: None  Anorexia: Severe  Fatigue: Moderate  Insomnia: Not assessed  Restlessness: None  Agitation: None    Karnofsky Performance Scale: 40% - Disabled: requires special care and assistance    Substance use:  Social History     Tobacco Use    Smoking status:  Former     Current packs/day: 0.00     Average packs/day: 1 pack/day for 46.3 years (46.3 ttl pk-yrs)     Types: Vaping with nicotine, Cigarettes     Start date:      Quit date: 2023     Years since quittin.5    Smokeless tobacco: Never   Substance and Sexual Activity    Alcohol use: Not Currently     Comment: occassionally    Drug use: No    Sexual activity: Not Currently     Partners: Male     Objective:     Past Medical History:   Diagnosis Date    Allergy     Amblyopia     Cataract     Eczema     HS (hereditary spherocytosis)     Hx of total knee replacement 2016    right knee    Joint pain      Past Surgical History:   Procedure Laterality Date    achilles tendon repair      BRONCHOSCOPY N/A 8/3/2023    Procedure: Bronchoscopy;  Surgeon: Saeed Gould MD;  [facility];  Service: Cardiothoracic;  Laterality: N/A;    CHOLECYSTECTOMY      INJECTION OF ANESTHETIC AGENT AROUND MULTIPLE INTERCOSTAL NERVES N/A 8/3/2023    Procedure: BLOCK, NERVE, INTERCOSTAL, 2 OR MORE;  Surgeon: Saeed Gould MD;  [facility];  Service: Cardiothoracic;  Laterality: N/A;    LYMPHADENECTOMY Left 8/3/2023    Procedure: LYMPHADENECTOMY;  Surgeon: Saeed Gould MD;  [facility];  Service: Cardiothoracic;  Laterality: Left;    NAVIGATIONAL BRONCHOSCOPY N/A 2023    Procedure: BRONCHOSCOPY, NAVIGATIONAL;  Surgeon: Radha Mccollum MD;  Location: [facility] OR [address] [place] [address];  Service: Pulmonary;  Laterality: N/A;    TOTAL KNEE ARTHROPLASTY      XI ROBOTIC RATS Left 8/3/2023    Procedure: XI ROBOTIC RATS upper lobectomy;  Surgeon: Saeed Gould MD;  Location: [facility] OR [address] [place] [address];  Service: Cardiothoracic;  Laterality: Left;     Family History   Problem Relation Name Age of Onset    Cancer Mother      Cancer Father          lung CA    Breast cancer Sister 1/2         1/2 sister    Cancer Son x2         hogdkins    Hodgkin's lymphoma Son x2     Liver cancer Maternal Uncle       "Blindness Cousin      Diabetes Cousin      Amblyopia Neg Hx      Cataracts Neg Hx      Glaucoma Neg Hx      Macular degeneration Neg Hx      Retinal detachment Neg Hx      Strabismus Neg Hx       Review of patient's allergies indicates:   Allergen Reactions    Paclitaxel Anaphylaxis and Other (See Comments)     Pt states "Anaphylaxis" last encounter w/ throat swelling. Encountered back pain, coughing and head fuzzy today.      Metformin Itching    Morphine Other (See Comments)     headaches    Latex, natural rubber Rash and Other (See Comments)     Redness and peeling only with bandaids    Penicillins Nausea And Vomiting and Rash       Medications:  Continuous Infusions: None    Scheduled Meds:    ciprofloxacin  400 mg Intravenous Q12H    levothyroxine  75 mcg Oral Before breakfast    mupirocin   Nasal BID    ondansetron  8 mg Intravenous Q8H    pantoprazole  40 mg Intravenous BID       PRN Meds:  Current Facility-Administered Medications:     acetaminophen, 650 mg, Oral, Q4H PRN    dextrose 10%, 12.5 g, Intravenous, PRN    dextrose 10%, 25 g, Intravenous, PRN    glucagon (human recombinant), 1 mg, Intramuscular, PRN    glucose, 16 g, Oral, PRN    glucose, 24 g, Oral, PRN    LORazepam, 1 mg, Oral, Once PRN    naloxone, 0.02 mg, Intravenous, PRN    prochlorperazine, 10 mg, Intravenous, Q6H PRN    sodium chloride 0.9%, 10 mL, Intravenous, Q12H PRN     Vitals: Temp: 98.2 °F (36.8 °C) (11/26/24 1229)  Pulse: 106 (11/26/24 1229)  Resp: 16 (11/26/24 1229)  BP: 110/70 (11/26/24 1229)  SpO2: (!) 89 % (11/26/24 1229)    Physical Exam:  Constitutional:       General: NAD. They appear comfortable.     Appearance: They are ill-appearing. They are not diaphoretic.   HENT:      Head: Normocephalic and atraumatic.   Cardiovascular:      Rate and Rhythm: normal .   Pulmonary:      Effort: No respiratory distress. No increased work of breathing.   Abdominal:      General: There is no distension.   Skin:     General: Skin is warm and " dry.      Coloration: Skin is not jaundiced.      Findings: Bruising present on extremities.   Neurological:      General: Patient is alert. No focal neurological deficits. AAOx3.    Labs:   Creatinine   Date Value Ref Range Status   11/26/2024 1.4 0.5 - 1.4 mg/dL Final     POC Creatinine   Date Value Ref Range Status   09/13/2023 1.2 0.5 - 1.4 mg/dL Final      Hemoglobin   Date Value Ref Range Status   11/26/2024 10.6 (L) 12.0 - 16.0 g/dL Final      Albumin   Date Value Ref Range Status   11/24/2024 3.1 (L) 3.5 - 5.2 g/dL Final   11/22/2024 3.5 3.5 - 5.2 g/dL Final   10/30/2024 3.6 3.5 - 5.2 g/dL Final        Imaging & Investigations: reviewed on 11/26/2024    ===================================================================  Billing:  In my care of this patient with acute- on chronic-severe illness which poses threat to life and/or bodily function, I am recommending goal-concordant care as outlined above. I spent a significant amount of time reviewing of prior external records and recommendations of other care providing teams including Gastroenterology and Hematology/Oncology, reviewed relevant test results / studies, and discussed care with other specialists / providers involved in this patient's care.     Also, I spent an additional 16 min of time on voluntary advance care planning (including completion of mPOA documentation) and/or goals of care discussion, providing emotional support, discussing prognosis, and exploring the burden vs benefit of various treatment options.     Thank you for your consult. I will follow-up with patient. Please contact us if you have any additional questions.    SignedMike MD  Hospice & Palliative Medicine  [facility]

## 2024-11-26 NOTE — PLAN OF CARE
Pt sleeping in bed upon arrival to room. 20g to R AC, infusing without complications, dressing changed. Q1h patient rounds. Pt is a standby assist. No difficulty voiding. Most recent BP 85/58 with MAP 67; MD ordered bolus LR with maintenance fluids to follow. All other VSS. Given PRN compazine for N&V. Pt AAOx4. Urine cultures awaiting results. NPO order to start at midnight for scope tomorrow - consent in chart. Non skid socks on; Pt. Instructed to call if any assistance is needed. Pt remaining free from falls or injury; Bed locked in lowest position w/side rails up x2 & all belongings including call light within reach; Plan of care ongoing; All questions and concerns addressed at this time.

## 2024-11-26 NOTE — ASSESSMENT & PLAN NOTE
Ms. Radha Ervin is a 63 yo female with non small cell lung adenocarcinoma with concerns of bone metastasis to the spine, s/p left upper lobe resection in 2023. She presented to the hospital for nausea, vomiting, and inability to tolerate PO solids or liquids. She reports nausea, vomiting, dyspepsia has started this week and not improved. Started Zometa at the beginning of the month and received radiation while inpatient yesterday and today. She reports taking Ibuprofen 200 mg every other day for back pain instead of the tramadol. The patient says she has lost 4 pounds in the last month. She had a BM that was black tarry earlier today and no further episodes. Patient's sister reports that her underwear also had bloody stains on them.      At the time of consults, patient was AF, VSS complaining of back pain. Hgb 10.6 ( 10.1 previously) Bun 7, Cr 1.4. GI was consulted for concerns for a dark tarry BM and possible scope.           - Plan for an EGD tomorrow   - NPO midnight   - Trend Hgb and transfuse for goal Hgb > 7, or if patient become hemodynamically unstable   - Maintain IV access with 2 large bore IV's  - Hold all NSAIDs and anticoagulants, unless contraindicated  - Continue IV pantoprazole 40 mg BID  - Please correct any coagulopathy with platelets and FFP for goal of platelets >50K and INR <2.0  - Please notify GI team if there is significant change in patient's clinical status

## 2024-11-26 NOTE — SUBJECTIVE & OBJECTIVE
Interval History: pt without N/V overnight, however did endorse some fevers and chills with Tmax 100.2. Abx switched to Cipro IV and repeat UA ordered. MRI brain negative. Later in the morning 1 episode of black tarry stool, GI consult placed.    Oncology Treatment Plan:   [No matching plan found]    Medications:  Continuous Infusions:  Scheduled Meds:   levothyroxine  75 mcg Oral Before breakfast    mupirocin   Nasal BID    nitrofurantoin (macrocrystal-monohydrate)  100 mg Oral Q12H    pantoprazole  40 mg Intravenous Daily     PRN Meds:  Current Facility-Administered Medications:     acetaminophen, 650 mg, Oral, Q4H PRN    dextrose 10%, 12.5 g, Intravenous, PRN    dextrose 10%, 25 g, Intravenous, PRN    glucagon (human recombinant), 1 mg, Intramuscular, PRN    glucose, 16 g, Oral, PRN    glucose, 24 g, Oral, PRN    LORazepam, 1 mg, Oral, Once PRN    naloxone, 0.02 mg, Intravenous, PRN    ondansetron, 4 mg, Intravenous, Q6H PRN    prochlorperazine, 10 mg, Intravenous, Q6H PRN    sodium chloride 0.9%, 10 mL, Intravenous, Q12H PRN     Review of Systems  Objective:     Vital Signs (Most Recent):  Temp: 98.6 °F (37 °C) (11/26/24 0430)  Pulse: 102 (11/26/24 0430)  Resp: 18 (11/26/24 0430)  BP: 93/61 (11/26/24 0430)  SpO2: (!) 93 % (11/26/24 0430) Vital Signs (24h Range):  Temp:  [98.2 °F (36.8 °C)-100.2 °F (37.9 °C)] 98.6 °F (37 °C)  Pulse:  [] 102  Resp:  [16-20] 18  SpO2:  [92 %-96 %] 93 %  BP: ()/(61-82) 93/61     Weight: 82.1 kg (181 lb)  Body mass index is 36.56 kg/m².  Body surface area is 1.85 meters squared.      Intake/Output Summary (Last 24 hours) at 11/26/2024 0835  Last data filed at 11/26/2024 0600  Gross per 24 hour   Intake 525 ml   Output 0 ml   Net 525 ml        Physical Exam  HENT:      Head: Normocephalic and atraumatic.   Eyes:      Extraocular Movements: Extraocular movements intact.      Pupils: Pupils are equal, round, and reactive to light.   Cardiovascular:      Rate and Rhythm:  Normal rate and regular rhythm.      Pulses: Normal pulses.      Heart sounds: Normal heart sounds.   Pulmonary:      Effort: Pulmonary effort is normal.      Breath sounds: Normal breath sounds.   Abdominal:      General: Bowel sounds are normal.   Neurological:      Mental Status: She is alert and oriented to person, place, and time.          Significant Labs:   CBC:   Recent Labs   Lab 11/25/24  0511 11/26/24  0231   WBC 7.76 7.03   HGB 10.1* 10.6*   HCT 30.2* 32.2*    284    and CMP:   Recent Labs   Lab 11/25/24  0511 11/26/24  0231    138   K 3.9 3.8   * 109   CO2 17* 19*   GLU 67* 88   BUN 9 7*   CREATININE 1.3 1.4   CALCIUM 8.5* 8.5*   ANIONGAP 8 10       Diagnostic Results:  MRI Brain negative for acute process or new changes from previous

## 2024-11-27 ENCOUNTER — ANESTHESIA (OUTPATIENT)
Dept: ENDOSCOPY | Facility: HOSPITAL | Age: 64
End: 2024-11-27
Payer: MEDICARE

## 2024-11-27 LAB
ANION GAP SERPL CALC-SCNC: 10 MMOL/L (ref 8–16)
BACTERIA UR CULT: NORMAL
BASOPHILS # BLD AUTO: 0.06 K/UL (ref 0–0.2)
BASOPHILS NFR BLD: 0.8 % (ref 0–1.9)
BUN SERPL-MCNC: 6 MG/DL (ref 8–23)
CALCIUM SERPL-MCNC: 8.3 MG/DL (ref 8.7–10.5)
CHLORIDE SERPL-SCNC: 106 MMOL/L (ref 95–110)
CO2 SERPL-SCNC: 20 MMOL/L (ref 23–29)
CREAT SERPL-MCNC: 1.2 MG/DL (ref 0.5–1.4)
DIFFERENTIAL METHOD BLD: ABNORMAL
EOSINOPHIL # BLD AUTO: 0.6 K/UL (ref 0–0.5)
EOSINOPHIL NFR BLD: 8 % (ref 0–8)
ERYTHROCYTE [DISTWIDTH] IN BLOOD BY AUTOMATED COUNT: 14.3 % (ref 11.5–14.5)
EST. GFR  (NO RACE VARIABLE): 50.2 ML/MIN/1.73 M^2
GLUCOSE SERPL-MCNC: 72 MG/DL (ref 70–110)
HCT VFR BLD AUTO: 33.8 % (ref 37–48.5)
HGB BLD-MCNC: 11.2 G/DL (ref 12–16)
IMM GRANULOCYTES # BLD AUTO: 0.02 K/UL (ref 0–0.04)
IMM GRANULOCYTES NFR BLD AUTO: 0.3 % (ref 0–0.5)
LYMPHOCYTES # BLD AUTO: 0.9 K/UL (ref 1–4.8)
LYMPHOCYTES NFR BLD: 11 % (ref 18–48)
MAGNESIUM SERPL-MCNC: 1.4 MG/DL (ref 1.6–2.6)
MCH RBC QN AUTO: 30.5 PG (ref 27–31)
MCHC RBC AUTO-ENTMCNC: 33.1 G/DL (ref 32–36)
MCV RBC AUTO: 92 FL (ref 82–98)
MONOCYTES # BLD AUTO: 1.1 K/UL (ref 0.3–1)
MONOCYTES NFR BLD: 13.6 % (ref 4–15)
NEUTROPHILS # BLD AUTO: 5.3 K/UL (ref 1.8–7.7)
NEUTROPHILS NFR BLD: 66.3 % (ref 38–73)
NRBC BLD-RTO: 0 /100 WBC
PHOSPHATE SERPL-MCNC: 2.1 MG/DL (ref 2.7–4.5)
PLATELET # BLD AUTO: 307 K/UL (ref 150–450)
PMV BLD AUTO: 9.6 FL (ref 9.2–12.9)
POTASSIUM SERPL-SCNC: 3.5 MMOL/L (ref 3.5–5.1)
RBC # BLD AUTO: 3.67 M/UL (ref 4–5.4)
SODIUM SERPL-SCNC: 136 MMOL/L (ref 136–145)
WBC # BLD AUTO: 7.99 K/UL (ref 3.9–12.7)

## 2024-11-27 PROCEDURE — 63600175 PHARM REV CODE 636 W HCPCS: Mod: HCNC | Performed by: HOSPITALIST

## 2024-11-27 PROCEDURE — 80048 BASIC METABOLIC PNL TOTAL CA: CPT | Mod: HCNC | Performed by: FAMILY MEDICINE

## 2024-11-27 PROCEDURE — 43235 EGD DIAGNOSTIC BRUSH WASH: CPT | Mod: HCNC | Performed by: STUDENT IN AN ORGANIZED HEALTH CARE EDUCATION/TRAINING PROGRAM

## 2024-11-27 PROCEDURE — 99233 SBSQ HOSP IP/OBS HIGH 50: CPT | Mod: HCNC,GC,, | Performed by: INTERNAL MEDICINE

## 2024-11-27 PROCEDURE — 25000003 PHARM REV CODE 250: Mod: HCNC | Performed by: FAMILY MEDICINE

## 2024-11-27 PROCEDURE — 0DJ08ZZ INSPECTION OF UPPER INTESTINAL TRACT, VIA NATURAL OR ARTIFICIAL OPENING ENDOSCOPIC: ICD-10-PCS | Performed by: STUDENT IN AN ORGANIZED HEALTH CARE EDUCATION/TRAINING PROGRAM

## 2024-11-27 PROCEDURE — 63600175 PHARM REV CODE 636 W HCPCS: Mod: HCNC | Performed by: NURSE ANESTHETIST, CERTIFIED REGISTERED

## 2024-11-27 PROCEDURE — 37000008 HC ANESTHESIA 1ST 15 MINUTES: Mod: HCNC | Performed by: STUDENT IN AN ORGANIZED HEALTH CARE EDUCATION/TRAINING PROGRAM

## 2024-11-27 PROCEDURE — 36415 COLL VENOUS BLD VENIPUNCTURE: CPT | Mod: HCNC | Performed by: FAMILY MEDICINE

## 2024-11-27 PROCEDURE — 83735 ASSAY OF MAGNESIUM: CPT | Mod: HCNC

## 2024-11-27 PROCEDURE — 94761 N-INVAS EAR/PLS OXIMETRY MLT: CPT | Mod: HCNC

## 2024-11-27 PROCEDURE — 37000009 HC ANESTHESIA EA ADD 15 MINS: Mod: HCNC | Performed by: STUDENT IN AN ORGANIZED HEALTH CARE EDUCATION/TRAINING PROGRAM

## 2024-11-27 PROCEDURE — 25000003 PHARM REV CODE 250: Mod: HCNC | Performed by: HOSPITALIST

## 2024-11-27 PROCEDURE — 94799 UNLISTED PULMONARY SVC/PX: CPT | Mod: HCNC

## 2024-11-27 PROCEDURE — 43235 EGD DIAGNOSTIC BRUSH WASH: CPT | Mod: HCNC,GC,, | Performed by: STUDENT IN AN ORGANIZED HEALTH CARE EDUCATION/TRAINING PROGRAM

## 2024-11-27 PROCEDURE — 85025 COMPLETE CBC W/AUTO DIFF WBC: CPT | Mod: HCNC | Performed by: FAMILY MEDICINE

## 2024-11-27 PROCEDURE — 20600001 HC STEP DOWN PRIVATE ROOM: Mod: HCNC

## 2024-11-27 PROCEDURE — 63600175 PHARM REV CODE 636 W HCPCS: Mod: HCNC

## 2024-11-27 PROCEDURE — 84100 ASSAY OF PHOSPHORUS: CPT | Mod: HCNC

## 2024-11-27 RX ORDER — LIDOCAINE HYDROCHLORIDE 20 MG/ML
INJECTION INTRAVENOUS
Status: DISCONTINUED | OUTPATIENT
Start: 2024-11-27 | End: 2024-11-27

## 2024-11-27 RX ORDER — MAGNESIUM SULFATE HEPTAHYDRATE 40 MG/ML
2 INJECTION, SOLUTION INTRAVENOUS ONCE
Status: COMPLETED | OUTPATIENT
Start: 2024-11-27 | End: 2024-11-27

## 2024-11-27 RX ORDER — SODIUM CHLORIDE 0.9 % (FLUSH) 0.9 %
10 SYRINGE (ML) INJECTION
Status: DISCONTINUED | OUTPATIENT
Start: 2024-11-27 | End: 2024-11-27 | Stop reason: HOSPADM

## 2024-11-27 RX ORDER — GLUCAGON 1 MG
1 KIT INJECTION
Status: DISCONTINUED | OUTPATIENT
Start: 2024-11-27 | End: 2024-11-27 | Stop reason: HOSPADM

## 2024-11-27 RX ORDER — PROPOFOL 10 MG/ML
VIAL (ML) INTRAVENOUS
Status: DISCONTINUED | OUTPATIENT
Start: 2024-11-27 | End: 2024-11-27

## 2024-11-27 RX ORDER — POTASSIUM CHLORIDE 7.45 MG/ML
10 INJECTION INTRAVENOUS
Status: COMPLETED | OUTPATIENT
Start: 2024-11-27 | End: 2024-11-27

## 2024-11-27 RX ORDER — SODIUM CHLORIDE 9 MG/ML
INJECTION, SOLUTION INTRAVENOUS CONTINUOUS
OUTPATIENT
Start: 2024-11-27

## 2024-11-27 RX ADMIN — ONDANSETRON 8 MG: 2 INJECTION INTRAMUSCULAR; INTRAVENOUS at 02:11

## 2024-11-27 RX ADMIN — PANTOPRAZOLE SODIUM 40 MG: 40 INJECTION, POWDER, LYOPHILIZED, FOR SOLUTION INTRAVENOUS at 10:11

## 2024-11-27 RX ADMIN — PANTOPRAZOLE SODIUM 40 MG: 40 INJECTION, POWDER, LYOPHILIZED, FOR SOLUTION INTRAVENOUS at 09:11

## 2024-11-27 RX ADMIN — POTASSIUM CHLORIDE 10 MEQ: 7.46 INJECTION, SOLUTION INTRAVENOUS at 05:11

## 2024-11-27 RX ADMIN — MUPIROCIN: 20 OINTMENT TOPICAL at 09:11

## 2024-11-27 RX ADMIN — MAGNESIUM SULFATE HEPTAHYDRATE 2 G: 40 INJECTION, SOLUTION INTRAVENOUS at 05:11

## 2024-11-27 RX ADMIN — SODIUM CHLORIDE, POTASSIUM CHLORIDE, SODIUM LACTATE AND CALCIUM CHLORIDE: 600; 310; 30; 20 INJECTION, SOLUTION INTRAVENOUS at 09:11

## 2024-11-27 RX ADMIN — PROPOFOL 100 MCG/KG/MIN: 10 INJECTION, EMULSION INTRAVENOUS at 11:11

## 2024-11-27 RX ADMIN — LEVOTHYROXINE SODIUM 75 MCG: 75 TABLET ORAL at 05:11

## 2024-11-27 RX ADMIN — CIPROFLOXACIN 400 MG: 2 INJECTION, SOLUTION INTRAVENOUS at 03:11

## 2024-11-27 RX ADMIN — PROPOFOL 70 MG: 10 INJECTION, EMULSION INTRAVENOUS at 11:11

## 2024-11-27 RX ADMIN — ONDANSETRON 8 MG: 2 INJECTION INTRAMUSCULAR; INTRAVENOUS at 05:11

## 2024-11-27 RX ADMIN — SODIUM CHLORIDE, POTASSIUM CHLORIDE, SODIUM LACTATE AND CALCIUM CHLORIDE 500 ML: 600; 310; 30; 20 INJECTION, SOLUTION INTRAVENOUS at 12:11

## 2024-11-27 RX ADMIN — LIDOCAINE HYDROCHLORIDE 100 MG: 20 INJECTION INTRAVENOUS at 11:11

## 2024-11-27 RX ADMIN — ONDANSETRON 8 MG: 2 INJECTION INTRAMUSCULAR; INTRAVENOUS at 10:11

## 2024-11-27 RX ADMIN — POTASSIUM CHLORIDE 10 MEQ: 7.46 INJECTION, SOLUTION INTRAVENOUS at 06:11

## 2024-11-27 RX ADMIN — CIPROFLOXACIN 400 MG: 2 INJECTION, SOLUTION INTRAVENOUS at 10:11

## 2024-11-27 NOTE — PLAN OF CARE
Patient is AAOX4. Up, independent, family at the bed side. Call light and personal items within reach. Patient involved in care. NPO for EGD. EGD done,  diet changed to regular. IV drip discontinued as per order. IV electrolyte replaced. SPO2 86-88%in RA, oxygen initiated at 1 l/min, SATs 94-95% in nasal cannula. Patient stable at this time.

## 2024-11-27 NOTE — PLAN OF CARE
Problem: Adult Inpatient Plan of Care  Goal: Plan of Care Review  Outcome: Progressing  Goal: Patient-Specific Goal (Individualized)  Outcome: Progressing  Goal: Absence of Hospital-Acquired Illness or Injury  Outcome: Progressing  Goal: Optimal Comfort and Wellbeing  Outcome: Progressing  Goal: Readiness for Transition of Care  Outcome: Progressing     Problem: Wound  Goal: Optimal Coping  Outcome: Progressing  Goal: Optimal Functional Ability  Outcome: Progressing  Goal: Absence of Infection Signs and Symptoms  Outcome: Progressing  Goal: Improved Oral Intake  Outcome: Progressing  Goal: Optimal Pain Control and Function  Outcome: Progressing  Goal: Skin Health and Integrity  Outcome: Progressing  Goal: Optimal Wound Healing  Outcome: Progressing     Problem: Acute Kidney Injury/Impairment  Goal: Fluid and Electrolyte Balance  Outcome: Progressing  Goal: Improved Oral Intake  Outcome: Progressing  Goal: Effective Renal Function  Outcome: Progressing     Problem: Coping Ineffective  Goal: Effective Coping  Outcome: Progressing      2

## 2024-11-27 NOTE — ANESTHESIA POSTPROCEDURE EVALUATION
Anesthesia Post Evaluation    Patient: Radha Ervin    Procedure(s) Performed: Procedure(s) (LRB):  EGD (ESOPHAGOGASTRODUODENOSCOPY) (N/A)    Final Anesthesia Type: general      Patient location during evaluation: PACU  Patient participation: Yes- Able to Participate  Level of consciousness: awake and alert  Post-procedure vital signs: reviewed and stable  Pain management: adequate  Airway patency: patent    PONV status at discharge: No PONV  Anesthetic complications: no      Cardiovascular status: blood pressure returned to baseline and hemodynamically stable  Respiratory status: spontaneous ventilation  Hydration status: euvolemic  Follow-up not needed.              Vitals Value Taken Time   BP 92/51 11/27/24 1217   Temp 37.0 11/27/24 1219   Pulse 103 11/27/24 1218   Resp 25 11/27/24 1218   SpO2 91 % 11/27/24 1218   Vitals shown include unfiled device data.      No case tracking events are documented in the log.      Pain/Derick Score: Pain Rating Prior to Med Admin: 6 (11/26/2024 11:24 PM)  Derick Score: 10 (11/27/2024 12:10 PM)

## 2024-11-27 NOTE — SUBJECTIVE & OBJECTIVE
Interval History: NAEO, continues to vomit. BP and MAPs low, given 500 Bolus and started on maintenance fluids. Scheduled for EGD today with [facility] team.    Oncology Treatment Plan:   [No matching plan found]    Medications:  Continuous Infusions:   lactated ringers   Intravenous Continuous 100 mL/hr at 11/26/24 2306 New Bag at 11/26/24 2306     Scheduled Meds:   ciprofloxacin  400 mg Intravenous Q12H    levothyroxine  75 mcg Oral Before breakfast    mupirocin   Nasal BID    ondansetron  8 mg Intravenous Q8H    pantoprazole  40 mg Intravenous BID     PRN Meds:  Current Facility-Administered Medications:     acetaminophen, 650 mg, Oral, Q4H PRN    dextrose 10%, 12.5 g, Intravenous, PRN    dextrose 10%, 25 g, Intravenous, PRN    glucagon (human recombinant), 1 mg, Intramuscular, PRN    glucose, 16 g, Oral, PRN    glucose, 24 g, Oral, PRN    LORazepam, 1 mg, Oral, Once PRN    naloxone, 0.02 mg, Intravenous, PRN    prochlorperazine, 10 mg, Intravenous, Q6H PRN    sodium chloride 0.9%, 10 mL, Intravenous, Q12H PRN    traMADoL, 50 mg, Oral, Q6H PRN     Review of Systems  Objective:     Vital Signs (Most Recent):  Temp: 99.7 °F (37.6 °C) (11/27/24 0745)  Pulse: 100 (11/27/24 0745)  Resp: 18 (11/27/24 0745)  BP: 95/62 (11/27/24 0745)  SpO2: (!) 88 % (11/27/24 0745) Vital Signs (24h Range):  Temp:  [98.2 °F (36.8 °C)-99.7 °F (37.6 °C)] 99.7 °F (37.6 °C)  Pulse:  [] 100  Resp:  [16-18] 18  SpO2:  [86 %-96 %] 88 %  BP: ()/(50-74) 95/62     Weight: 82.1 kg (181 lb)  Body mass index is 36.56 kg/m².  Body surface area is 1.85 meters squared.      Intake/Output Summary (Last 24 hours) at 11/27/2024 0807  Last data filed at 11/26/2024 2346  Gross per 24 hour   Intake 197.76 ml   Output 500 ml   Net -302.24 ml        Physical Exam  HENT:      Head: Normocephalic and atraumatic.   Eyes:      Extraocular Movements: Extraocular movements intact.      Pupils: Pupils are equal, round, and reactive to light.   Cardiovascular:       Rate and Rhythm: Normal rate and regular rhythm.      Pulses: Normal pulses.      Heart sounds: Normal heart sounds.   Pulmonary:      Effort: Pulmonary effort is normal.      Breath sounds: Normal breath sounds.   Abdominal:      General: Bowel sounds are normal. There is no distension.      Tenderness: There is no abdominal tenderness.   Skin:     Coloration: Skin is pale.   Neurological:      Mental Status: She is alert.          Significant Labs:   CBC:   Recent Labs   Lab 11/26/24 0231 11/27/24  0245   WBC 7.03 7.99   HGB 10.6* 11.2*   HCT 32.2* 33.8*    307    and CMP:   Recent Labs   Lab 11/26/24 0231 11/27/24  0245    136   K 3.8 3.5    106   CO2 19* 20*   GLU 88 72   BUN 7* 6*   CREATININE 1.4 1.2   CALCIUM 8.5* 8.3*   ANIONGAP 10 10       Diagnostic Results:  None

## 2024-11-27 NOTE — H&P
"    Short Stay Endoscopy History and Physical    PCP - Suraj Herrera III, MD     Procedure - EGD  ASA - per anesthesia  Mallampati - per anesthesia  History of Anesthesia problems - no  Family history Anesthesia problems -  no   Plan of anesthesia - General    HPI:  This is a 64 y.o. female here for evaluation of : Nausea and Vomiting    ROS:  Constitutional: No fevers, chills, No weight loss  CV: No chest pain  Pulm: No cough, No shortness of breath  Ophtho: No vision changes  GI: see HPI  Derm: No rash    Medical History:  has a past medical history of Allergy, Amblyopia, Cataract, Eczema, HS (hereditary spherocytosis), total knee replacement (01/19/2016), and Joint pain.    Surgical History:  has a past surgical history that includes Cholecystectomy; achilles tendon repair; Total knee arthroplasty; Navigational bronchoscopy (N/A, 6/23/2023); xi robotic rats (Left, 8/3/2023); Bronchoscopy (N/A, 8/3/2023); lymphadenectomy (Left, 8/3/2023); and Injection of anesthetic agent around multiple intercostal nerves (N/A, 8/3/2023).    Family History: family history includes Blindness in her cousin; Breast cancer in her sister; Cancer in her father, mother, and son; Diabetes in her cousin; Hodgkin's lymphoma in her son; Liver cancer in her maternal uncle.. Otherwise no colon cancer, inflammatory bowel disease, or GI malignancies.    Social History:  reports that she quit smoking about 18 months ago. Her smoking use included vaping with nicotine and cigarettes. She started smoking about 47 years ago. She has a 46.3 pack-year smoking history. She has never used smokeless tobacco. She reports that she does not currently use alcohol. She reports that she does not use drugs.    Review of patient's allergies indicates:   Allergen Reactions    Paclitaxel Anaphylaxis and Other (See Comments)     Pt states "Anaphylaxis" last encounter w/ throat swelling. Encountered back pain, coughing and head fuzzy today.      Metformin Itching "    Morphine Other (See Comments)     headaches    Latex, natural rubber Rash and Other (See Comments)     Redness and peeling only with bandaids    Penicillins Nausea And Vomiting and Rash       Medications:   Medications Prior to Admission   Medication Sig Dispense Refill Last Dose/Taking    betamethasone dipropionate (DIPROLENE) 0.05 % ointment Apply topically 2 (two) times daily. Prn psoriasis flares 45 g 3     diphenhydramine HCl (ALLERGY ORAL) Take 1 tablet by mouth.       esomeprazole (NEXIUM) 20 MG capsule Take 20 mg by mouth every evening.       levothyroxine (SYNTHROID) 75 MCG tablet Take 1 tablet (75 mcg total) by mouth before breakfast. 30 tablet 11     LORazepam (ATIVAN) 1 MG tablet Take 1 tablet (1 mg total) by mouth every 6 (six) hours as needed for Anxiety (and half an hour before MRI). 10 tablet 3     mometasone 0.1% (ELOCON) 0.1 % cream AAA every day when red and tender under occlusion 60 g 2     ondansetron (ZOFRAN) 8 MG tablet Take 0.5 tablets (4 mg total) by mouth every 8 (eight) hours as needed for Nausea. 30 tablet 3     ondansetron (ZOFRAN-ODT) 4 MG TbDL Take 2 tablets (8 mg total) by mouth every 6 (six) hours as needed (n/v). 30 tablet 0     OTEZLA 30 mg Tab TAKE 1 TABLET BY MOUTH DAILY 60 tablet 1     pilocarpine (SALAGEN, PILOCARPINE,) 5 MG Tab Take 1 tablet (5 mg total) by mouth 3 (three) times daily. 90 tablet 11     promethazine (PHENERGAN) 25 MG tablet Take 1 tablet (25 mg total) by mouth every 6 (six) hours as needed for Nausea. 30 tablet 3     sotorasib (LUMAKRAS) 320 mg Tab Take 960 mg by mouth once daily. 90 tablet 3     traMADoL (ULTRAM) 50 mg tablet Take 1 tablet (50 mg total) by mouth every 6 (six) hours as needed for Pain. 28 each 0        Physical Exam:    Vital Signs:   Vitals:    11/27/24 1102   BP: (!) 114/57   Pulse: 102   Resp: 16   Temp: 98.2 °F (36.8 °C)       General Appearance: Well appearing in no acute distress  Eyes:    No scleral icterus  ENT: Neck supple, Lips,  mucosa, and tongue normal; teeth and gums normal  Abdomen: Soft, non tender, non distended with normal bowel sounds. No hepatosplenomegaly, ascites, or mass.  Extremities: No edema  Skin: No rash    Labs:  Lab Results   Component Value Date    WBC 7.99 11/27/2024    HGB 11.2 (L) 11/27/2024    HCT 33.8 (L) 11/27/2024     11/27/2024    CHOL 167 05/26/2023    TRIG 134 05/26/2023    HDL 42 05/26/2023    ALT 18 11/24/2024    AST 21 11/24/2024     11/27/2024    K 3.5 11/27/2024     11/27/2024    CREATININE 1.2 11/27/2024    BUN 6 (L) 11/27/2024    CO2 20 (L) 11/27/2024    TSH 0.598 10/30/2024    INR 1.1 11/22/2024    HGBA1C 6.3 (H) 04/28/2023       I have explained the risks and benefits of endoscopy procedures to the patient including but not limited to bleeding, perforation, infection, and death.  The patient was asked if they understand and allowed to ask any further questions to their satisfaction.      Raji Jacobo DO

## 2024-11-27 NOTE — ASSESSMENT & PLAN NOTE
Pt with intractable N/V since admit, 11/26 had an episode of black tarry stool     -Protonix 40 IV BID started  -GI consulted, scope planned 11/27

## 2024-11-27 NOTE — PROGRESS NOTES
Attempted to go bedside this am to complete assessment. Patient was already down for her EGD. Will attempt again at a different time.

## 2024-11-27 NOTE — NURSING TRANSFER
Nursing Transfer Note      11/27/2024   12:29 PM    Nurse giving handoff:daniel david   Nurse receiving handoff:daniel castle     Reason patient is being transferred: post procedure     Transfer To: return to 826    Transfer via stretcher    Transfer with  2L NC    Transported by pct      Order for Tele Monitor? No    Chart send with patient: Yes    Notified: sister    Patient reassessed at:1300

## 2024-11-27 NOTE — PROGRESS NOTES
"[facility] - [facility] ([facility])  Hematology/Oncology  Progress Note    Patient Name: Radha Ervin  Admission Date: 11/24/2024  Hospital Length of Stay: 2 days  Code Status: Full Code     Subjective:     HPI:  Ms. Ervin is a 64-year-old lady who has a history of non small cell lung adenocarcinoma, s/p left upper lobe resection in 2023, Allergy, Amblyopia, Cataract, Eczema, HS (hereditary spherocytosis), total knee replacement, and Joint pain, here for nausea, vomiting, and inability to tolerate PO. The nausea and vomiting has started this week and not improved. Started Zometa at the beginning of the month. Nothing alleviates or worsens The patient says she has lost 4 pounds in the last month.    Ms. Ervin follows with Dr. Eduardo. Set to start treatment Monday, 11/25/2024 for the bony mets.She presented to [place] yesterday for the same complaints, was given fluids, and discharged with Zofran. She had a recent PET scan (10/2024) that showed metastases to the spine: "post treatment change in the left lung in this patient with a history of lung adenocarcinoma. There are hypermetabolic lesions in the L1 with pathologic compression deformity and T11 vertebral body suspicious for osseous metastases."     Lastly, she complains about foul vaginal odor and concern for a yeast infection. Decreased urination due to decrease in PO intake. Last BM was two days ago (11/21/2024).     Oncology History:  Stg IIIA NSCLC s/p resection followed by CRT due to residual disease. Developed metastatic progression while on maintenance durvalumab.     Interval History: NAEO, continues to vomit. BP and MAPs low, given 500 Bolus and started on maintenance fluids. Scheduled for EGD today with [facility] team.    Oncology Treatment Plan:   [No matching plan found]    Medications:  Continuous Infusions:   lactated ringers   Intravenous Continuous 100 mL/hr at 11/26/24 2306 New Bag at 11/26/24 2306     Scheduled Meds:   ciprofloxacin  400 mg " Intravenous Q12H    levothyroxine  75 mcg Oral Before breakfast    mupirocin   Nasal BID    ondansetron  8 mg Intravenous Q8H    pantoprazole  40 mg Intravenous BID     PRN Meds:  Current Facility-Administered Medications:     acetaminophen, 650 mg, Oral, Q4H PRN    dextrose 10%, 12.5 g, Intravenous, PRN    dextrose 10%, 25 g, Intravenous, PRN    glucagon (human recombinant), 1 mg, Intramuscular, PRN    glucose, 16 g, Oral, PRN    glucose, 24 g, Oral, PRN    LORazepam, 1 mg, Oral, Once PRN    naloxone, 0.02 mg, Intravenous, PRN    prochlorperazine, 10 mg, Intravenous, Q6H PRN    sodium chloride 0.9%, 10 mL, Intravenous, Q12H PRN    traMADoL, 50 mg, Oral, Q6H PRN     Review of Systems  Objective:     Vital Signs (Most Recent):  Temp: 99.7 °F (37.6 °C) (11/27/24 0745)  Pulse: 100 (11/27/24 0745)  Resp: 18 (11/27/24 0745)  BP: 95/62 (11/27/24 0745)  SpO2: (!) 88 % (11/27/24 0745) Vital Signs (24h Range):  Temp:  [98.2 °F (36.8 °C)-99.7 °F (37.6 °C)] 99.7 °F (37.6 °C)  Pulse:  [] 100  Resp:  [16-18] 18  SpO2:  [86 %-96 %] 88 %  BP: ()/(50-74) 95/62     Weight: 82.1 kg (181 lb)  Body mass index is 36.56 kg/m².  Body surface area is 1.85 meters squared.      Intake/Output Summary (Last 24 hours) at 11/27/2024 0847  Last data filed at 11/26/2024 2346  Gross per 24 hour   Intake 197.76 ml   Output 500 ml   Net -302.24 ml        Physical Exam  HENT:      Head: Normocephalic and atraumatic.   Eyes:      Extraocular Movements: Extraocular movements intact.      Pupils: Pupils are equal, round, and reactive to light.   Cardiovascular:      Rate and Rhythm: Normal rate and regular rhythm.      Pulses: Normal pulses.      Heart sounds: Normal heart sounds.   Pulmonary:      Effort: Pulmonary effort is normal.      Breath sounds: Normal breath sounds.   Abdominal:      General: Bowel sounds are normal. There is no distension.      Tenderness: There is no abdominal tenderness.   Skin:     Coloration: Skin is pale.    Neurological:      Mental Status: She is alert.          Significant Labs:   CBC:   Recent Labs   Lab 11/26/24  0231 11/27/24  0245   WBC 7.03 7.99   HGB 10.6* 11.2*   HCT 32.2* 33.8*    307    and CMP:   Recent Labs   Lab 11/26/24  0231 11/27/24  0245    136   K 3.8 3.5    106   CO2 19* 20*   GLU 88 72   BUN 7* 6*   CREATININE 1.4 1.2   CALCIUM 8.5* 8.3*   ANIONGAP 10 10       Diagnostic Results:  None  Assessment/Plan:     * Intractable nausea and vomiting  The nausea and vomiting has started this week and not improved. Nothing alleviates or worsens The patient says she has lost 4 pounds in the last month.  - Received 1L in ED  - NS 125cc/hr x8hr  - PRN Zofran  - Advance diet as tolerated  -11/25 stopped pilocarpine for N/V, repeat MRI Brain negative        Melena  Pt with intractable N/V since admit, 11/26 had an episode of black tarry stool     -Protonix 40 IV BID started  -GI consulted, scope planned 11/27              Hypothyroidism  - home synthroid    UTI (urinary tract infection)  Pt complains about foul vaginal odor and concern for a yeast infection. Decreased urination due to decrease in PO intake. Denies dysuria or hematuria.  Urine dipstick shows positive for WBC's, positive for RBC's, and positive for leukocytes.  Micro exam: 9 WBC's per HPF, 14 RBC's per HPF, occasional+ bacteria, occasional yeast, and contaminated specimen with 5 squamous cells.  - 1 dose gentamicin in ED  - NF BID x5 days, switched to IV cipro given persistent vomiting of meds  -repeat UA ordered 11/26 to ensure adequate treatment of UTI    KESHA (acute kidney injury)  KESHA is likely due to pre-renal azotemia due to dehydration. Baseline creatinine is  1.3-1.4 . Most recent creatinine and eGFR are listed below.  Recent Labs     11/22/24  2213 11/24/24  0123 11/25/24  0511   CREATININE 2.0* 1.7* 1.3   EGFRNORACEVR 27* 33.1* 45.6*        Plan  - KESHA is improving  - Avoid nephrotoxins and renally dose meds for GFR  "listed above  - Monitor urine output, serial BMP, and adjust therapy as needed  - Continue IVF  - Follow up with Dr. Eduardo before resuming Zometa treatment.      Back pain  Chronic back pain. She is scheduled to have a nerve ablation 12/16/24 for the back pain due to disc bulges in her spine.   - Takes tramadol at home. Holding in setting of KESHA.  - Will consider adding pain medications if needed, holding at this time to see if N/V improves.    Secondary malignant neoplasm of bone  She had a recent PET scan (10/2024) that showed metastases to the spine: "post treatment change in the left lung in this patient with a history of lung adenocarcinoma. There are hypermetabolic lesions in the L1 with pathologic compression deformity and T11 vertebral body suspicious for osseous metastases."  - Takes tramadol at home, holding in setting of KESHA  - Will add pain medications if needed    Stage 3b chronic kidney disease  BMP reviewed- noted Estimated Creatinine Clearance: 32.3 mL/min (A) (based on SCr of 1.7 mg/dL (H)). according to latest data. Based on current GFR, CKD stage is stage 3 - GFR 30-59.  Monitor UOP and serial BMP and adjust therapy as needed. Renally dose meds. Avoid nephrotoxic medications and procedures.      Adenocarcinoma, lung, left  Stg IIIA NSCLC s/p resection followed by CRT due to residual disease. Developed metastatic progression while on maintenance durvalumab. Durvalumab last C#5 8/9/24. Zolderonic acid for bone mets. Follows with Dr. Eduardo.  - Pantoprazole 40mg qd  - Pilocarpine TID discontinued in setting of N/V  - Sotarasib QD    Severe obesity (BMI 35.0-39.9) with comorbidity  Body mass index is 36.56 kg/m². Obesity complicates all aspects of disease management from diagnostic modalities to treatment. Weight loss encouraged and health benefits explained to patient.                Oswaldo Beck MD  Hematology/Oncology  [facility] - [facility] (2nd Fl)      "

## 2024-11-27 NOTE — PROVATION PATIENT INSTRUCTIONS
Discharge Summary/Instructions after an Endoscopic Procedure  Patient Name: Radha Ervin  Patient MRN: [id number]  Patient YOB: 1960 Wednesday, November 27, 2024  Braxton Mooney MD  Dear patient,  As a result of recent federal legislation (The Federal Cures Act), you may   receive lab or pathology results from your procedure in your [facility]   account before your physician is able to contact you. Your physician or   their representative will relay the results to you with their   recommendations at their soonest availability.  Thank you,  RESTRICTIONS:  During your procedure today, you received medications for sedation.  These   medications may affect your judgment, balance and coordination.  Therefore,   for 24 hours, you have the following restrictions:   - DO NOT drive a car, operate machinery, make legal/financial decisions,   sign important papers or drink alcohol.    ACTIVITY:  Today: no heavy lifting, straining or running due to procedural   sedation/anesthesia.  The following day: return to full activity including work.  DIET:  Eat and drink normally unless instructed otherwise.     TREATMENT FOR COMMON SIDE EFFECTS:  - Mild abdominal pain, nausea, belching, bloating or excessive gas:  rest,   eat lightly and use a heating pad.  - Sore Throat: treat with throat lozenges and/or gargle with warm salt   water.  - Because air was used during the procedure, expelling large amounts of air   from your rectum or belching is normal.  - If a bowel prep was taken, you may not have a bowel movement for 1-3 days.    This is normal.  SYMPTOMS TO WATCH FOR AND REPORT TO YOUR PHYSICIAN:  1. Abdominal pain or bloating, other than gas cramps.  2. Chest pain.  3. Back pain.  4. Signs of infection such as: chills or fever occurring within 24 hours   after the procedure.  5. Rectal bleeding, which would show as bright red, maroon, or black stools.   (A tablespoon of blood from the rectum is not serious,  especially if   hemorrhoids are present.)  6. Vomiting.  7. Weakness or dizziness.  GO DIRECTLY TO THE NEAREST EMERGENCY ROOM IF YOU HAVE ANY OF THE FOLLOWING:      Difficulty breathing              Chills and/or fever over 101 F   Persistent vomiting and/or vomiting blood   Severe abdominal pain   Severe chest pain   Black, tarry stools   Bleeding- more than one tablespoon   Any other symptom or condition that you feel may need urgent attention  Your doctor recommends these additional instructions:  If any biopsies were taken, your doctors clinic will contact you in 1 to 2   weeks with any results.  - [facility].   - Resume previous diet.   - Continue present medications.   - The findings and recommendations were discussed with the patient.   - The findings and recommendations were discussed with the referring   physician.  For questions, problems or results please call your physician - Braxton Mooney MD at Work:  [phone].  [facility] [place], EMERGENCY ROOM PHONE NUMBER: [phone]  IF A COMPLICATION OR EMERGENCY SITUATION ARISES AND YOU ARE UNABLE TO REACH   YOUR PHYSICIAN - GO DIRECTLY TO THE EMERGENCY ROOM.  Braxton Mooney MD  11/27/2024 12:27:58 PM  This report has been verified and signed electronically.  Dear patient,  As a result of recent federal legislation (The Federal Cures Act), you may   receive lab or pathology results from your procedure in your [facility]   account before your physician is able to contact you. Your physician or   their representative will relay the results to you with their   recommendations at their soonest availability.  Thank you,  PROVATION

## 2024-11-27 NOTE — TRANSFER OF CARE
"Anesthesia Transfer of Care Note    Patient: Radha Ervin    Procedure(s) Performed: Procedure(s) (LRB):  EGD (ESOPHAGOGASTRODUODENOSCOPY) (N/A)    Patient location: PACU    Anesthesia Type: general    Transport from OR: Transported from OR on 6-10 L/min O2 by face mask with adequate spontaneous ventilation    Post pain: adequate analgesia    Post assessment: no apparent anesthetic complications and tolerated procedure well    Post vital signs: stable    Level of consciousness: awake    Nausea/Vomiting: no nausea/vomiting    Complications: none    Transfer of care protocol was followed      Last vitals: Visit Vitals  BP (!) 114/57 (BP Location: Left arm, Patient Position: Lying)   Pulse 102   Temp 36.8 °C (98.2 °F) (Temporal)   Resp 16   Ht 4' 11" (1.499 m)   Wt 82.1 kg (181 lb)   LMP  (LMP Unknown)   SpO2 95%   Breastfeeding No   BMI 36.56 kg/m²     "

## 2024-11-27 NOTE — TREATMENT PLAN
GI Treatment Plan    S/p EGD today    Impression:            - Normal esophagus.                          - Normal stomach.                          - Normal examined duodenum.                          - No specimens collected.   Recommendation:        - [facility] to [facility] for [facility].                          - Resume previous diet.                          - Continue present medications.                          - The findings and recommendations were discussed                          with the patient.                          - The findings and recommendations were discussed                          with the referring physician.     GI will sign off    Zeeshan Nicole MD  Gastroenterology Fellow, PGY-IV   Nicanor Keith from [facility] radiology states she received voicemail from [facility] stating she needed to speak with them. Informed Tess message will be sent to [facility]. Tess verbalized understanding.

## 2024-11-28 LAB
ALBUMIN SERPL BCP-MCNC: 2.3 G/DL (ref 3.5–5.2)
ALP SERPL-CCNC: 68 U/L (ref 40–150)
ALT SERPL W/O P-5'-P-CCNC: 11 U/L (ref 10–44)
ANION GAP SERPL CALC-SCNC: 9 MMOL/L (ref 8–16)
AST SERPL-CCNC: 16 U/L (ref 10–40)
BASOPHILS # BLD AUTO: 0.03 K/UL (ref 0–0.2)
BASOPHILS NFR BLD: 0.4 % (ref 0–1.9)
BILIRUB SERPL-MCNC: 0.4 MG/DL (ref 0.1–1)
BUN SERPL-MCNC: 5 MG/DL (ref 8–23)
CALCIUM SERPL-MCNC: 8.3 MG/DL (ref 8.7–10.5)
CHLORIDE SERPL-SCNC: 106 MMOL/L (ref 95–110)
CO2 SERPL-SCNC: 20 MMOL/L (ref 23–29)
CREAT SERPL-MCNC: 1.2 MG/DL (ref 0.5–1.4)
DIFFERENTIAL METHOD BLD: ABNORMAL
EOSINOPHIL # BLD AUTO: 0.7 K/UL (ref 0–0.5)
EOSINOPHIL NFR BLD: 8.8 % (ref 0–8)
ERYTHROCYTE [DISTWIDTH] IN BLOOD BY AUTOMATED COUNT: 14.4 % (ref 11.5–14.5)
EST. GFR  (NO RACE VARIABLE): 50.2 ML/MIN/1.73 M^2
GLUCOSE SERPL-MCNC: 82 MG/DL (ref 70–110)
HCT VFR BLD AUTO: 30 % (ref 37–48.5)
HGB BLD-MCNC: 9.7 G/DL (ref 12–16)
IMM GRANULOCYTES # BLD AUTO: 0.02 K/UL (ref 0–0.04)
IMM GRANULOCYTES NFR BLD AUTO: 0.3 % (ref 0–0.5)
LYMPHOCYTES # BLD AUTO: 0.8 K/UL (ref 1–4.8)
LYMPHOCYTES NFR BLD: 10.5 % (ref 18–48)
MAGNESIUM SERPL-MCNC: 1.4 MG/DL (ref 1.6–2.6)
MCH RBC QN AUTO: 30.7 PG (ref 27–31)
MCHC RBC AUTO-ENTMCNC: 32.3 G/DL (ref 32–36)
MCV RBC AUTO: 95 FL (ref 82–98)
MONOCYTES # BLD AUTO: 1.1 K/UL (ref 0.3–1)
MONOCYTES NFR BLD: 13.8 % (ref 4–15)
NEUTROPHILS # BLD AUTO: 5.3 K/UL (ref 1.8–7.7)
NEUTROPHILS NFR BLD: 66.2 % (ref 38–73)
NRBC BLD-RTO: 0 /100 WBC
PHOSPHATE SERPL-MCNC: 1.9 MG/DL (ref 2.7–4.5)
PLATELET # BLD AUTO: 279 K/UL (ref 150–450)
PMV BLD AUTO: 9.3 FL (ref 9.2–12.9)
POTASSIUM SERPL-SCNC: 3.8 MMOL/L (ref 3.5–5.1)
PROT SERPL-MCNC: 5.9 G/DL (ref 6–8.4)
RBC # BLD AUTO: 3.16 M/UL (ref 4–5.4)
SODIUM SERPL-SCNC: 135 MMOL/L (ref 136–145)
WBC # BLD AUTO: 7.98 K/UL (ref 3.9–12.7)

## 2024-11-28 PROCEDURE — 25000003 PHARM REV CODE 250: Mod: HCNC | Performed by: FAMILY MEDICINE

## 2024-11-28 PROCEDURE — 94761 N-INVAS EAR/PLS OXIMETRY MLT: CPT | Mod: HCNC

## 2024-11-28 PROCEDURE — 36415 COLL VENOUS BLD VENIPUNCTURE: CPT | Mod: HCNC | Performed by: INTERNAL MEDICINE

## 2024-11-28 PROCEDURE — 84100 ASSAY OF PHOSPHORUS: CPT | Mod: HCNC

## 2024-11-28 PROCEDURE — 63600175 PHARM REV CODE 636 W HCPCS: Mod: HCNC

## 2024-11-28 PROCEDURE — 36415 COLL VENOUS BLD VENIPUNCTURE: CPT | Mod: HCNC

## 2024-11-28 PROCEDURE — 80053 COMPREHEN METABOLIC PANEL: CPT | Mod: HCNC | Performed by: INTERNAL MEDICINE

## 2024-11-28 PROCEDURE — 85025 COMPLETE CBC W/AUTO DIFF WBC: CPT | Mod: HCNC | Performed by: INTERNAL MEDICINE

## 2024-11-28 PROCEDURE — 99233 SBSQ HOSP IP/OBS HIGH 50: CPT | Mod: HCNC,GC,, | Performed by: INTERNAL MEDICINE

## 2024-11-28 PROCEDURE — 63600175 PHARM REV CODE 636 W HCPCS: Mod: HCNC | Performed by: INTERNAL MEDICINE

## 2024-11-28 PROCEDURE — 20600001 HC STEP DOWN PRIVATE ROOM: Mod: HCNC

## 2024-11-28 PROCEDURE — 83735 ASSAY OF MAGNESIUM: CPT | Mod: HCNC

## 2024-11-28 PROCEDURE — 25000003 PHARM REV CODE 250: Mod: HCNC | Performed by: INTERNAL MEDICINE

## 2024-11-28 RX ORDER — MAGNESIUM SULFATE 1 G/100ML
1 INJECTION INTRAVENOUS ONCE
Status: COMPLETED | OUTPATIENT
Start: 2024-11-28 | End: 2024-11-28

## 2024-11-28 RX ORDER — SODIUM,POTASSIUM PHOSPHATES 280-250MG
2 POWDER IN PACKET (EA) ORAL EVERY 6 HOURS
Status: COMPLETED | OUTPATIENT
Start: 2024-11-28 | End: 2024-11-28

## 2024-11-28 RX ADMIN — ONDANSETRON 8 MG: 2 INJECTION INTRAMUSCULAR; INTRAVENOUS at 09:11

## 2024-11-28 RX ADMIN — MAGNESIUM SULFATE HEPTAHYDRATE 1 G: 500 INJECTION, SOLUTION INTRAMUSCULAR; INTRAVENOUS at 08:11

## 2024-11-28 RX ADMIN — CIPROFLOXACIN 400 MG: 2 INJECTION, SOLUTION INTRAVENOUS at 10:11

## 2024-11-28 RX ADMIN — POTASSIUM & SODIUM PHOSPHATES POWDER PACK 280-160-250 MG 2 PACKET: 280-160-250 PACK at 05:11

## 2024-11-28 RX ADMIN — POTASSIUM & SODIUM PHOSPHATES POWDER PACK 280-160-250 MG 2 PACKET: 280-160-250 PACK at 11:11

## 2024-11-28 RX ADMIN — ONDANSETRON 8 MG: 2 INJECTION INTRAMUSCULAR; INTRAVENOUS at 01:11

## 2024-11-28 RX ADMIN — ONDANSETRON 8 MG: 2 INJECTION INTRAMUSCULAR; INTRAVENOUS at 06:11

## 2024-11-28 RX ADMIN — MUPIROCIN: 20 OINTMENT TOPICAL at 09:11

## 2024-11-28 RX ADMIN — PANTOPRAZOLE SODIUM 40 MG: 40 INJECTION, POWDER, LYOPHILIZED, FOR SOLUTION INTRAVENOUS at 09:11

## 2024-11-28 RX ADMIN — LEVOTHYROXINE SODIUM 75 MCG: 75 TABLET ORAL at 06:11

## 2024-11-28 RX ADMIN — CIPROFLOXACIN 400 MG: 2 INJECTION, SOLUTION INTRAVENOUS at 11:11

## 2024-11-28 NOTE — PLAN OF CARE
Patient is AAOX4. Up, independent, family at the bed side. Call light and personal items within reach. Patient involved in care. IV and PO electrolytes replaced. Patient stable at this time.

## 2024-11-28 NOTE — CARE UPDATE
Labs noted below from nurse alert    -MgSO4 1g iv x 1  -Neutraphos 2 packets Q6 hours, first now x 2 doses    Recent Results (from the past 4 hours)   Magnesium    Collection Time: 11/28/24  4:19 AM   Result Value Ref Range    Magnesium 1.4 (L) 1.6 - 2.6 mg/dL   Phosphorus    Collection Time: 11/28/24  4:19 AM   Result Value Ref Range    Phosphorus 1.9 (L) 2.7 - 4.5 mg/dL

## 2024-11-28 NOTE — NURSING
Plan of Care     Signed            Patient is AAOX4. Up, independent, family at the bed side. Call light and personal items within reach. Patient involved in care. IV electrolyte reported to on-call MD; replacements ordered and scheduled. Patient remains at 1L NC. Patient stable at this time.

## 2024-11-28 NOTE — ASSESSMENT & PLAN NOTE
Chronic back pain. She is scheduled to have a nerve ablation 12/16/24 for the back pain due to disc bulges in her spine.   - Received 2 doses of palliative XRT to bone mets.  - Pain management per palliative care.

## 2024-11-28 NOTE — ASSESSMENT & PLAN NOTE
BMP reviewed- noted Estimated Creatinine Clearance: 45.8 mL/min (based on SCr of 1.2 mg/dL). according to latest data. Based on current GFR, CKD stage is stage 3 - GFR 30-59.  Monitor UOP and serial BMP and adjust therapy as needed. Renally dose meds. Avoid nephrotoxic medications and procedures.

## 2024-11-28 NOTE — ASSESSMENT & PLAN NOTE
Stg IIIA NSCLC s/p resection followed by CRT due to residual disease. Developed metastatic progression while on maintenance durvalumab. Durvalumab last C#5 8/9/24. Zolderonic acid given x 1 for bone mets. Follows with Dr. Eduardo.  - Pantoprazole 40mg qd  - Pilocarpine TID discontinued in setting of N/V  - Sotarasib QD has not yet been started as not yet received from [facility].

## 2024-11-28 NOTE — PROGRESS NOTES
"[facility] ([facility])  Hematology/Oncology  Progress Note    Patient Name: Radha Ervin  Admission Date: 11/24/2024  Hospital Length of Stay: 3 days  Code Status: Full Code     Subjective:     HPI:  Ms. Ervin is a 64-year-old lady who has a history of non small cell lung adenocarcinoma, s/p left upper lobe resection in 2023, Allergy, Amblyopia, Cataract, Eczema, HS (hereditary spherocytosis), total knee replacement, and Joint pain, here for nausea, vomiting, and inability to tolerate PO. The nausea and vomiting has started this week and not improved. Started Zometa at the beginning of the month. Nothing alleviates or worsens The patient says she has lost 4 pounds in the last month.    Ms. Ervin follows with Dr. Eduardo. Set to start treatment Monday, 11/25/2024 for the bony mets.She presented to [facility] yesterday for the same complaints, was given fluids, and discharged with Zofran. She had a recent PET scan (10/2024) that showed metastases to the spine: "post treatment change in the left lung in this patient with a history of lung adenocarcinoma. There are hypermetabolic lesions in the L1 with pathologic compression deformity and T11 vertebral body suspicious for osseous metastases."     Lastly, she complains about foul vaginal odor and concern for a yeast infection. Decreased urination due to decrease in PO intake. Last BM was two days ago (11/21/2024).     Oncology History:  Stg IIIA NSCLC s/p resection followed by CRT due to residual disease. Developed metastatic progression while on maintenance durvalumab.     Interval History: Did not sleep well overnight secondary to N/V after drinking lots of water.  Declines starting olanzapine.    Oncology Treatment Plan:   [No matching plan found]    Medications:  Continuous Infusions:  Scheduled Meds:   ciprofloxacin  400 mg Intravenous Q12H    levothyroxine  75 mcg Oral Before breakfast    magnesium sulfate IVPB  1 g Intravenous Once    mupirocin   Nasal " BID    ondansetron  8 mg Intravenous Q8H    pantoprazole  40 mg Intravenous BID    potassium, sodium phosphates  2 packet Oral Q6H     PRN Meds:  Current Facility-Administered Medications:     acetaminophen, 650 mg, Oral, Q4H PRN    dextrose 10%, 12.5 g, Intravenous, PRN    dextrose 10%, 12.5 g, Intravenous, PRN    dextrose 10%, 25 g, Intravenous, PRN    dextrose 10%, 25 g, Intravenous, PRN    glucagon (human recombinant), 1 mg, Intramuscular, PRN    glucose, 16 g, Oral, PRN    glucose, 24 g, Oral, PRN    LORazepam, 1 mg, Oral, Once PRN    naloxone, 0.02 mg, Intravenous, PRN    prochlorperazine, 10 mg, Intravenous, Q6H PRN    sodium chloride 0.9%, 10 mL, Intravenous, Q12H PRN    traMADoL, 50 mg, Oral, Q6H PRN     Review of Systems   Constitutional:  Positive for fatigue.   Respiratory:  Negative for shortness of breath.    Cardiovascular:  Negative for chest pain.   Gastrointestinal:  Positive for nausea and vomiting. Negative for abdominal distention, abdominal pain, blood in stool, constipation, diarrhea and rectal pain.   Musculoskeletal:  Positive for back pain.   Neurological:  Positive for weakness. Negative for dizziness and light-headedness.     Objective:     Vital Signs (Most Recent):  Temp: 99.7 °F (37.6 °C) (11/28/24 0753)  Pulse: 106 (11/28/24 0753)  Resp: 18 (11/28/24 0753)  BP: 101/67 (11/28/24 0753)  SpO2: (!) 92 % (11/28/24 0801) Vital Signs (24h Range):  Temp:  [97.8 °F (36.6 °C)-99.7 °F (37.6 °C)] 99.7 °F (37.6 °C)  Pulse:  [] 106  Resp:  [7-26] 18  SpO2:  [87 %-100 %] 92 %  BP: ()/(50-75) 101/67     Weight: 82.1 kg (181 lb)  Body mass index is 36.56 kg/m².  Body surface area is 1.85 meters squared.      Intake/Output Summary (Last 24 hours) at 11/28/2024 0852  Last data filed at 11/27/2024 1734  Gross per 24 hour   Intake 90 ml   Output 550 ml   Net -460 ml        Physical Exam  HENT:      Head: Normocephalic and atraumatic.   Eyes:      Extraocular Movements: Extraocular movements  intact.      Pupils: Pupils are equal, round, and reactive to light.   Cardiovascular:      Rate and Rhythm: Normal rate and regular rhythm.      Pulses: Normal pulses.      Heart sounds: Normal heart sounds.   Pulmonary:      Effort: Pulmonary effort is normal.      Breath sounds: Normal breath sounds.   Abdominal:      General: Bowel sounds are normal. There is no distension.      Tenderness: There is no abdominal tenderness.   Skin:     Coloration: Skin is pale.   Neurological:      Mental Status: She is alert.          Significant Labs:   CBC:   Recent Labs   Lab 11/27/24  0245   WBC 7.99   HGB 11.2*   HCT 33.8*       and CMP:   Recent Labs   Lab 11/27/24  0245      K 3.5      CO2 20*   GLU 72   BUN 6*   CREATININE 1.2   CALCIUM 8.3*   ANIONGAP 10       Diagnostic Results:  I have reviewed all pertinent imaging results/findings within the past 24 hours.  Assessment/Plan:     * Intractable nausea and vomiting  The nausea and vomiting has started this week and not improved. Nothing alleviates or worsens The patient says she has lost 4 pounds in the last month.  - Received 1L in ED  - NS 125cc/hr x8hr  - Advance diet as tolerated  -11/25 stopped pilocarpine for N/V, repeat MRI Brain negative for development of brain metastases.  - Will continue scheduled Zofran and PRN Compazine.  Offered Zyprexa 5 mg qhs but patient and sister declined as they did not want to add more medications.  - EGD negative 11/27 for any pathology.        Decreased oral intake  Related to N/V as above.    Melena  Pt with intractable N/V since admit, 11/26 had an episode of black tarry stool     -Protonix 40 IV BID started  -GI consulted, scope 11/27 showed no abnormalities.  - Hgb stable so suspect no bleed or at least no clinical significant bleeding.  Monitor closely.              Hypothyroidism  - home synthroid    UTI (urinary tract infection)  Pt complains about foul vaginal odor and concern for a yeast infection.  "Decreased urination due to decrease in PO intake. Denies dysuria or hematuria.  Urine dipstick shows positive for WBC's, positive for RBC's, and positive for leukocytes.  Micro exam: 9 WBC's per HPF, 14 RBC's per HPF, occasional+ bacteria, occasional yeast, and contaminated specimen with 5 squamous cells.  - 1 dose gentamicin in ED  - NF BID x5 days, switched to IV cipro given persistent vomiting of meds  -repeat UA ordered 11/26 to ensure adequate treatment of UTI    KESHA (acute kidney injury)  KESHA is likely due to pre-renal azotemia due to dehydration. Baseline creatinine is  1.3-1.4 . Most recent creatinine and eGFR are listed below.  Recent Labs     11/26/24  0231 11/27/24  0245   CREATININE 1.4 1.2   EGFRNORACEVR 41.8* 50.2*        Plan  - KESHA is improving  - Avoid nephrotoxins and renally dose meds for GFR listed above  - Monitor urine output, serial BMP, and adjust therapy as needed  - Continue IVF  - Follow up with Dr. Eduardo before resuming Zometa treatment.      Back pain  Chronic back pain. She is scheduled to have a nerve ablation 12/16/24 for the back pain due to disc bulges in her spine.   - Received 2 doses of palliative XRT to bone mets.  - Pain management per palliative care.    Secondary malignant neoplasm of bone  She had a recent PET scan (10/2024) that showed metastases to the spine: "post treatment change in the left lung in this patient with a history of lung adenocarcinoma. There are hypermetabolic lesions in the L1 with pathologic compression deformity and T11 vertebral body suspicious for osseous metastases."  - Pain management per palliative care recommendations.    Stage 3b chronic kidney disease  BMP reviewed- noted Estimated Creatinine Clearance: 45.8 mL/min (based on SCr of 1.2 mg/dL). according to latest data. Based on current GFR, CKD stage is stage 3 - GFR 30-59.  Monitor UOP and serial BMP and adjust therapy as needed. Renally dose meds. Avoid nephrotoxic medications and " procedures.      Adenocarcinoma, lung, left  Stg IIIA NSCLC s/p resection followed by CRT due to residual disease. Developed metastatic progression while on maintenance durvalumab. Durvalumab last C#5 8/9/24. Zolderonic acid given x 1 for bone mets. Follows with Dr. Eduardo.  - Pantoprazole 40mg qd  - Pilocarpine TID discontinued in setting of N/V  - Sotarasib QD has not yet been started as not yet received from [facility].    Severe obesity (BMI 35.0-39.9) with comorbidity  Body mass index is 36.56 kg/m². Obesity complicates all aspects of disease management from diagnostic modalities to treatment. Weight loss encouraged and health benefits explained to patient.                Daniel Solorzano MD  Hematology/Oncology  [facility] ([facility])

## 2024-11-28 NOTE — ASSESSMENT & PLAN NOTE
"She had a recent PET scan (10/2024) that showed metastases to the spine: "post treatment change in the left lung in this patient with a history of lung adenocarcinoma. There are hypermetabolic lesions in the L1 with pathologic compression deformity and T11 vertebral body suspicious for osseous metastases."  - Pain management per palliative care recommendations.  "

## 2024-11-28 NOTE — PLAN OF CARE
Problem: Adult Inpatient Plan of Care  Goal: Plan of Care Review  Outcome: Progressing  Goal: Patient-Specific Goal (Individualized)  Outcome: Progressing  Goal: Absence of Hospital-Acquired Illness or Injury  Outcome: Progressing  Goal: Optimal Comfort and Wellbeing  Outcome: Progressing  Goal: Readiness for Transition of Care  Outcome: Progressing     Problem: Wound  Goal: Optimal Coping  Outcome: Progressing  Goal: Optimal Functional Ability  Outcome: Progressing  Goal: Absence of Infection Signs and Symptoms  Outcome: Progressing  Goal: Improved Oral Intake  Outcome: Progressing  Goal: Optimal Pain Control and Function  Outcome: Progressing  Goal: Skin Health and Integrity  Outcome: Progressing  Goal: Optimal Wound Healing  Outcome: Progressing     Problem: Acute Kidney Injury/Impairment  Goal: Fluid and Electrolyte Balance  Outcome: Progressing  Goal: Improved Oral Intake  Outcome: Progressing  Goal: Effective Renal Function  Outcome: Progressing     Problem: Coping Ineffective  Goal: Effective Coping  Outcome: Progressing     Problem: Infection  Goal: Absence of Infection Signs and Symptoms  Outcome: Progressing     Problem: Nausea and Vomiting  Goal: Nausea and Vomiting Relief  Outcome: Progressing

## 2024-11-28 NOTE — ASSESSMENT & PLAN NOTE
Pt with intractable N/V since admit, 11/26 had an episode of black tarry stool     -Protonix 40 IV BID started  -GI consulted, scope 11/27 showed no abnormalities.  - Hgb stable so suspect no bleed or at least no clinical significant bleeding.  Monitor closely.

## 2024-11-28 NOTE — ASSESSMENT & PLAN NOTE
The nausea and vomiting has started this week and not improved. Nothing alleviates or worsens The patient says she has lost 4 pounds in the last month.  - Received 1L in ED  - NS 125cc/hr x8hr  - Advance diet as tolerated  -11/25 stopped pilocarpine for N/V, repeat MRI Brain negative for development of brain metastases.  - Will continue scheduled Zofran and PRN Compazine.  Offered Zyprexa 5 mg qhs but patient and sister declined as they did not want to add more medications.  - EGD negative 11/27 for any pathology.

## 2024-11-28 NOTE — ASSESSMENT & PLAN NOTE
KESHA is likely due to pre-renal azotemia due to dehydration. Baseline creatinine is  1.3-1.4 . Most recent creatinine and eGFR are listed below.  Recent Labs     11/26/24  0231 11/27/24  0245   CREATININE 1.4 1.2   EGFRNORACEVR 41.8* 50.2*        Plan  - KESHA is improving  - Avoid nephrotoxins and renally dose meds for GFR listed above  - Monitor urine output, serial BMP, and adjust therapy as needed  - Continue IVF  - Follow up with Dr. Eduardo before resuming Zometa treatment.

## 2024-11-29 LAB
ALBUMIN SERPL BCP-MCNC: 2.2 G/DL (ref 3.5–5.2)
ALP SERPL-CCNC: 68 U/L (ref 40–150)
ALT SERPL W/O P-5'-P-CCNC: 9 U/L (ref 10–44)
ANION GAP SERPL CALC-SCNC: 9 MMOL/L (ref 8–16)
AST SERPL-CCNC: 15 U/L (ref 10–40)
BASOPHILS # BLD AUTO: 0.04 K/UL (ref 0–0.2)
BASOPHILS NFR BLD: 0.5 % (ref 0–1.9)
BILIRUB SERPL-MCNC: 0.4 MG/DL (ref 0.1–1)
BUN SERPL-MCNC: 5 MG/DL (ref 8–23)
CALCIUM SERPL-MCNC: 8.2 MG/DL (ref 8.7–10.5)
CHLORIDE SERPL-SCNC: 104 MMOL/L (ref 95–110)
CO2 SERPL-SCNC: 20 MMOL/L (ref 23–29)
CREAT SERPL-MCNC: 1.3 MG/DL (ref 0.5–1.4)
DIFFERENTIAL METHOD BLD: ABNORMAL
EOSINOPHIL # BLD AUTO: 0.8 K/UL (ref 0–0.5)
EOSINOPHIL NFR BLD: 10.6 % (ref 0–8)
ERYTHROCYTE [DISTWIDTH] IN BLOOD BY AUTOMATED COUNT: 14.2 % (ref 11.5–14.5)
EST. GFR  (NO RACE VARIABLE): 45.6 ML/MIN/1.73 M^2
GLUCOSE SERPL-MCNC: 75 MG/DL (ref 70–110)
HCT VFR BLD AUTO: 30.5 % (ref 37–48.5)
HGB BLD-MCNC: 9.7 G/DL (ref 12–16)
IMM GRANULOCYTES # BLD AUTO: 0.03 K/UL (ref 0–0.04)
IMM GRANULOCYTES NFR BLD AUTO: 0.4 % (ref 0–0.5)
LYMPHOCYTES # BLD AUTO: 0.9 K/UL (ref 1–4.8)
LYMPHOCYTES NFR BLD: 11.2 % (ref 18–48)
MAGNESIUM SERPL-MCNC: 1.5 MG/DL (ref 1.6–2.6)
MCH RBC QN AUTO: 30.4 PG (ref 27–31)
MCHC RBC AUTO-ENTMCNC: 31.8 G/DL (ref 32–36)
MCV RBC AUTO: 96 FL (ref 82–98)
MONOCYTES # BLD AUTO: 1.1 K/UL (ref 0.3–1)
MONOCYTES NFR BLD: 14.2 % (ref 4–15)
NEUTROPHILS # BLD AUTO: 4.9 K/UL (ref 1.8–7.7)
NEUTROPHILS NFR BLD: 63.1 % (ref 38–73)
NRBC BLD-RTO: 0 /100 WBC
PHOSPHATE SERPL-MCNC: 2.7 MG/DL (ref 2.7–4.5)
PLATELET # BLD AUTO: 308 K/UL (ref 150–450)
PMV BLD AUTO: 9.3 FL (ref 9.2–12.9)
POCT GLUCOSE: 67 MG/DL (ref 70–110)
POCT GLUCOSE: 80 MG/DL (ref 70–110)
POTASSIUM SERPL-SCNC: 3.7 MMOL/L (ref 3.5–5.1)
PROT SERPL-MCNC: 5.8 G/DL (ref 6–8.4)
RBC # BLD AUTO: 3.19 M/UL (ref 4–5.4)
SODIUM SERPL-SCNC: 133 MMOL/L (ref 136–145)
WBC # BLD AUTO: 7.83 K/UL (ref 3.9–12.7)

## 2024-11-29 PROCEDURE — 80053 COMPREHEN METABOLIC PANEL: CPT | Mod: HCNC | Performed by: INTERNAL MEDICINE

## 2024-11-29 PROCEDURE — 84100 ASSAY OF PHOSPHORUS: CPT | Mod: HCNC

## 2024-11-29 PROCEDURE — 63600175 PHARM REV CODE 636 W HCPCS: Mod: HCNC

## 2024-11-29 PROCEDURE — 25000003 PHARM REV CODE 250: Mod: HCNC | Performed by: INTERNAL MEDICINE

## 2024-11-29 PROCEDURE — 83735 ASSAY OF MAGNESIUM: CPT | Mod: HCNC

## 2024-11-29 PROCEDURE — 20600001 HC STEP DOWN PRIVATE ROOM: Mod: HCNC

## 2024-11-29 PROCEDURE — 63600175 PHARM REV CODE 636 W HCPCS: Mod: HCNC | Performed by: HOSPITALIST

## 2024-11-29 PROCEDURE — 25000003 PHARM REV CODE 250: Mod: HCNC

## 2024-11-29 PROCEDURE — 85025 COMPLETE CBC W/AUTO DIFF WBC: CPT | Mod: HCNC | Performed by: INTERNAL MEDICINE

## 2024-11-29 PROCEDURE — 99233 SBSQ HOSP IP/OBS HIGH 50: CPT | Mod: HCNC,GC,, | Performed by: HOSPITALIST

## 2024-11-29 PROCEDURE — 25000003 PHARM REV CODE 250: Mod: HCNC | Performed by: NURSE PRACTITIONER

## 2024-11-29 PROCEDURE — 25000003 PHARM REV CODE 250: Mod: HCNC | Performed by: FAMILY MEDICINE

## 2024-11-29 PROCEDURE — 36415 COLL VENOUS BLD VENIPUNCTURE: CPT | Mod: HCNC

## 2024-11-29 RX ORDER — ONDANSETRON HYDROCHLORIDE 2 MG/ML
8 INJECTION, SOLUTION INTRAVENOUS EVERY 8 HOURS
Status: DISCONTINUED | OUTPATIENT
Start: 2024-11-29 | End: 2024-12-01 | Stop reason: HOSPADM

## 2024-11-29 RX ORDER — OLANZAPINE 2.5 MG/1
5 TABLET ORAL NIGHTLY
Status: DISCONTINUED | OUTPATIENT
Start: 2024-11-29 | End: 2024-11-29

## 2024-11-29 RX ORDER — DEXAMETHASONE SODIUM PHOSPHATE 4 MG/ML
4 INJECTION, SOLUTION INTRA-ARTICULAR; INTRALESIONAL; INTRAMUSCULAR; INTRAVENOUS; SOFT TISSUE EVERY 12 HOURS
Status: DISCONTINUED | OUTPATIENT
Start: 2024-11-29 | End: 2024-11-29

## 2024-11-29 RX ORDER — DEXAMETHASONE SODIUM PHOSPHATE 4 MG/ML
4 INJECTION, SOLUTION INTRA-ARTICULAR; INTRALESIONAL; INTRAMUSCULAR; INTRAVENOUS; SOFT TISSUE 2 TIMES DAILY
Status: DISCONTINUED | OUTPATIENT
Start: 2024-11-29 | End: 2024-11-30

## 2024-11-29 RX ORDER — PANTOPRAZOLE SODIUM 40 MG/1
40 TABLET, DELAYED RELEASE ORAL
Status: DISCONTINUED | OUTPATIENT
Start: 2024-11-29 | End: 2024-11-30

## 2024-11-29 RX ORDER — MAGNESIUM SULFATE HEPTAHYDRATE 40 MG/ML
2 INJECTION, SOLUTION INTRAVENOUS ONCE
Status: COMPLETED | OUTPATIENT
Start: 2024-11-29 | End: 2024-11-29

## 2024-11-29 RX ORDER — ONDANSETRON 4 MG/1
8 TABLET, FILM COATED ORAL EVERY 8 HOURS
Status: DISCONTINUED | OUTPATIENT
Start: 2024-11-29 | End: 2024-11-29

## 2024-11-29 RX ADMIN — MUPIROCIN: 20 OINTMENT TOPICAL at 09:11

## 2024-11-29 RX ADMIN — LEVOTHYROXINE SODIUM 75 MCG: 75 TABLET ORAL at 05:11

## 2024-11-29 RX ADMIN — PANTOPRAZOLE SODIUM 40 MG: 40 TABLET, DELAYED RELEASE ORAL at 05:11

## 2024-11-29 RX ADMIN — MAGNESIUM SULFATE HEPTAHYDRATE 2 G: 40 INJECTION, SOLUTION INTRAVENOUS at 09:11

## 2024-11-29 RX ADMIN — DEXAMETHASONE SODIUM PHOSPHATE 4 MG: 4 INJECTION INTRA-ARTICULAR; INTRALESIONAL; INTRAMUSCULAR; INTRAVENOUS; SOFT TISSUE at 09:11

## 2024-11-29 RX ADMIN — ONDANSETRON 8 MG: 2 INJECTION INTRAMUSCULAR; INTRAVENOUS at 05:11

## 2024-11-29 RX ADMIN — ONDANSETRON 8 MG: 2 INJECTION INTRAMUSCULAR; INTRAVENOUS at 02:11

## 2024-11-29 RX ADMIN — Medication 1 DOSE: at 05:11

## 2024-11-29 RX ADMIN — DEXAMETHASONE SODIUM PHOSPHATE 4 MG: 4 INJECTION INTRA-ARTICULAR; INTRALESIONAL; INTRAMUSCULAR; INTRAVENOUS; SOFT TISSUE at 01:11

## 2024-11-29 RX ADMIN — ONDANSETRON 8 MG: 2 INJECTION INTRAMUSCULAR; INTRAVENOUS at 09:11

## 2024-11-29 RX ADMIN — PANTOPRAZOLE SODIUM 40 MG: 40 INJECTION, POWDER, LYOPHILIZED, FOR SOLUTION INTRAVENOUS at 09:11

## 2024-11-29 RX ADMIN — Medication 1 DOSE: at 09:11

## 2024-11-29 NOTE — PLAN OF CARE
Went over POC with pt at beginning of shift.  Questions were asked and answered.  AAO x 4.  Afebrile.  Up to toilet independently. Watery BM*3. Voiding without difficulty. No complaints of pain. Family at bedside. Pt remained free from injury with bed in low locked position, call light with in reach, non-skid socks, and frequent rounding.  Pt instructed to call for assistance as needed. Denies needs at this time.

## 2024-11-29 NOTE — SUBJECTIVE & OBJECTIVE
Interval History: LEYDA COURTNEY. Pt states that her N/V have improved with scheduled Zofran and Compazine PRN. She was able to keep down some mashed potatoes and fruit yesterday, and states she is feeling somewhat better. However, had more vomiting after breakfast. Agreeable to starting trial of steroids to monitor for improvement.    Oncology Treatment Plan:   [No matching plan found]    Medications:  Continuous Infusions:  Scheduled Meds:   levothyroxine  75 mcg Oral Before breakfast    magnesium sulfate IVPB  2 g Intravenous Once    mupirocin   Nasal BID    ondansetron  8 mg Intravenous Q8H    pantoprazole  40 mg Intravenous BID     PRN Meds:  Current Facility-Administered Medications:     acetaminophen, 650 mg, Oral, Q4H PRN    dextrose 10%, 12.5 g, Intravenous, PRN    dextrose 10%, 12.5 g, Intravenous, PRN    dextrose 10%, 25 g, Intravenous, PRN    dextrose 10%, 25 g, Intravenous, PRN    glucagon (human recombinant), 1 mg, Intramuscular, PRN    glucose, 16 g, Oral, PRN    glucose, 24 g, Oral, PRN    LORazepam, 1 mg, Oral, Once PRN    naloxone, 0.02 mg, Intravenous, PRN    prochlorperazine, 10 mg, Intravenous, Q6H PRN    sodium chloride 0.9%, 10 mL, Intravenous, Q12H PRN    traMADoL, 50 mg, Oral, Q6H PRN     Review of Systems  Objective:     Vital Signs (Most Recent):  Temp: 99 °F (37.2 °C) (11/29/24 0801)  Pulse: 100 (11/29/24 0801)  Resp: 18 (11/29/24 0801)  BP: 92/61 (11/29/24 0801)  SpO2: 97 % (11/29/24 0801) Vital Signs (24h Range):  Temp:  [98.1 °F (36.7 °C)-100 °F (37.8 °C)] 99 °F (37.2 °C)  Pulse:  [] 100  Resp:  [18-20] 18  SpO2:  [87 %-98 %] 97 %  BP: ()/(54-70) 92/61     Weight: 82.1 kg (181 lb)  Body mass index is 36.56 kg/m².  Body surface area is 1.85 meters squared.      Intake/Output Summary (Last 24 hours) at 11/29/2024 0857  Last data filed at 11/29/2024 0600  Gross per 24 hour   Intake 1775.99 ml   Output 800 ml   Net 975.99 ml        Physical Exam  HENT:      Head: Normocephalic and  atraumatic.   Eyes:      Extraocular Movements: Extraocular movements intact.      Pupils: Pupils are equal, round, and reactive to light.   Cardiovascular:      Rate and Rhythm: Normal rate and regular rhythm.      Pulses: Normal pulses.      Heart sounds: Normal heart sounds.   Pulmonary:      Effort: Pulmonary effort is normal.      Breath sounds: Normal breath sounds.   Abdominal:      General: Bowel sounds are normal.   Neurological:      Mental Status: She is alert and oriented to person, place, and time.          Significant Labs:   CBC:   Recent Labs   Lab 11/28/24  1011 11/29/24  0314   WBC 7.98 7.83   HGB 9.7* 9.7*   HCT 30.0* 30.5*    308    and CMP:   Recent Labs   Lab 11/28/24  1011 11/29/24  0314   * 133*   K 3.8 3.7    104   CO2 20* 20*   GLU 82 75   BUN 5* 5*   CREATININE 1.2 1.3   CALCIUM 8.3* 8.2*   PROT 5.9* 5.8*   ALBUMIN 2.3* 2.2*   BILITOT 0.4 0.4   ALKPHOS 68 68   AST 16 15   ALT 11 9*   ANIONGAP 9 9       Diagnostic Results:  I have reviewed all pertinent imaging results/findings within the past 24 hours.

## 2024-11-29 NOTE — ASSESSMENT & PLAN NOTE
Pt complains about foul vaginal odor and concern for a yeast infection. Decreased urination due to decrease in PO intake. Denies dysuria or hematuria.  Urine dipstick shows positive for WBC's, positive for RBC's, and positive for leukocytes.  Micro exam: 9 WBC's per HPF, 14 RBC's per HPF, occasional+ bacteria, occasional yeast, and contaminated specimen with 5 squamous cells.  - 1 dose gentamicin in ED  - NF BID x5 days, switched to IV cipro given persistent vomiting of meds  -repeat UA ordered 11/26 to ensure adequate treatment of UTI  -11/28 UTI sx resolved and cx negative, stopped abx

## 2024-11-29 NOTE — PROGRESS NOTES
Palliative Medicine  Progress Note     Patient Name: Radha Ervin   MRN: [id number]     Admission Date: 11/24/2024   Hospital Length of Stay: 4     Attending Provider: Sonu Mg MD   Consulting Provider: Mike Hinojosa MD  Primary Care Physician: Suraj Herrera III, MD     Principal Problem: Intractable nausea and vomiting     Patient information was obtained from patient, relative(s), past medical records, and primary team.    Assessment/Plan:   Radha Ervin is a 64 y.o. female with a PMH of NSCLC s/p Left upper lobe resection (2023) now with bony metastasis (while on Durvalumab) c/b pathologic compression deformities, hereditary spherocytosis, and CKD; see below for brief HPI.      Palliative Care consulted for non-pain symptoms.    Impression:  Given unremarkable EGD, patient's nausea likely related to her cancer-directed treatment. Current treatments only partially effective. Discussed Zyprexa which she was amenable to, however following later conversation with primary team, she elected to start a course of steroids. For this reason, would hold off on Zyprexa for now to see if steroids are effective first; can always start this in the coming days if still an issue.     Recommendations:  #Nausea / Vomiting  Agree with primary team's decision to trial steroids   Consider starting Zyprexa 5mg ODT qHS in coming days if nausea persists s/p steroid course    #Xerostomia  START Sodium Bicarb-Sodium Chloride Swish & Spit solution QID    #GOC Discussions  mPOA paperwork completed on 11/26/24 by myself and uploaded to EMR    Code status: Full Code      Recommendations communicated directly to primary team on 11/29/2024 via EPIC chat.    Subjective:   Brief HPI:   Radha Ervin is a 64 y.o. female with a PMH of NSCLC s/p Left upper lobe resection (2023) now with bony metastasis (while on Durvalumab) c/b pathologic compression deformities, hereditary spherocytosis, and CKD who initially presented to the ED  c/o nausea, vomiting, and inability to tolerate PO intake for past week. Of note, was started on Zoledronic acid earlier this month. Palliative Care consulted for assistance with symptom management.     Today's Update:   SHERWIN. Patient reporting minimal relief from scheduled Zofran and PRN Compazine. Still vomiting whenever she tries to eat or drink anything. Managed to keep down a few bites of mashed potatoes yesterday, but felt nauseous afterwards. Discussed trial of Zyprexa which she was amenable to. One episode of loose stools yesterday, but no longer dark tarry colour.     Patient also complaining of dry mouth which is impacting her desire not to eat as well. States that she has had this for a long time which was being successfully managed with Pilocarpine until 3 weeks ago when it became ineffective. She has tried regularly using alcohol-free mouthwash, but this has not helped.     Pain Assessment: N/A    [place] Symptom Assessment (ESAS):   Pain: None  Dyspnea: None  Anxiety: None  Nausea: Severe  Depression: None  Anorexia: Severe  Fatigue: None  Insomnia: None  Restlessness: None  Agitation: None    Karnofsky Performance Scale: 60% - Requires occasional assistance but is able to care for needs    Objective:   Vitals: Temp: 100.3 °F (37.9 °C) (11/29/24 1518)  Pulse: 103 (11/29/24 1518)  Resp: 19 (11/29/24 1518)  BP: 106/72 (11/29/24 1518)  SpO2: (!) 90 % (11/29/24 1518)    Medications:  Continuous Infusions: None    Scheduled Meds:    dexAMETHasone  4 mg Intravenous BID    levothyroxine  75 mcg Oral Before breakfast    mupirocin   Nasal BID    OLANZapine  5 mg Oral QHS    ondansetron  8 mg Intravenous Q8H    pantoprazole  40 mg Oral BID AC    sodium bicarb-sodium chloride  1 Dose Swish & Spit QID       PRN Meds:  Current Facility-Administered Medications:     acetaminophen, 650 mg, Oral, Q4H PRN    dextrose 10%, 12.5 g, Intravenous, PRN    dextrose 10%, 25 g, Intravenous, PRN    glucagon (human recombinant),  1 mg, Intramuscular, PRN    glucose, 16 g, Oral, PRN    glucose, 24 g, Oral, PRN    LORazepam, 1 mg, Oral, Once PRN    naloxone, 0.02 mg, Intravenous, PRN    prochlorperazine, 10 mg, Intravenous, Q6H PRN    sodium chloride 0.9%, 10 mL, Intravenous, Q12H PRN    traMADoL, 50 mg, Oral, Q6H PRN     Physical Exam:  Constitutional:       General: NAD. They appear comfortable. Sitting in chair upright.     Appearance: They are ill-appearing. They are not diaphoretic.   HENT:      Head: Normocephalic and atraumatic.   Cardiovascular:      Rate and Rhythm: normal .   Pulmonary:      Effort: No respiratory distress. No increased work of breathing.   Abdominal:      General: There is no distension.   Skin:     General: Skin is warm and dry.      Coloration: Skin is not jaundiced.      Findings: Bruising present on extremities.   Neurological:      General: Patient is alert. No focal neurological deficits. AAOx3.    Labs:   Creatinine   Date Value Ref Range Status   11/29/2024 1.3 0.5 - 1.4 mg/dL Final     POC Creatinine   Date Value Ref Range Status   09/13/2023 1.2 0.5 - 1.4 mg/dL Final      Hemoglobin   Date Value Ref Range Status   11/29/2024 9.7 (L) 12.0 - 16.0 g/dL Final      Albumin   Date Value Ref Range Status   11/29/2024 2.2 (L) 3.5 - 5.2 g/dL Final   11/28/2024 2.3 (L) 3.5 - 5.2 g/dL Final   11/24/2024 3.1 (L) 3.5 - 5.2 g/dL Final        Imaging & Investigations: reviewed on 11/29/2024  EGD: 11/27/24  Normal esophagus, stomach, and duodenum. No specimens collected.  ===================================================================  Billing:  In my care of this patient with acute- on chronic-severe illness which poses threat to life and/or bodily function, I am recommending goal-concordant care as outlined above. I spent a significant amount of time reviewing of prior external records and recommendations of other care providing teams including Hospital Medicine and Hematology/Oncology, reviewed relevant test  results / studies, and discussed care with other specialists / providers involved in this patient's care.     Thank you for your consult. I will follow-up with patient. Please contact us if you have any additional questions.    Signed,  Mike Hinojosa MD  Hospice & Palliative Medicine  [facility]

## 2024-11-29 NOTE — ASSESSMENT & PLAN NOTE
The nausea and vomiting has started this week and not improved. Nothing alleviates or worsens The patient says she has lost 4 pounds in the last month.  - Received 1L in ED  - NS 125cc/hr x8hr  - Advance diet as tolerated  -11/25 stopped pilocarpine for N/V, repeat MRI Brain negative for development of brain metastases.  - Will continue scheduled Zofran and PRN Compazine.  Offered Zyprexa 5 mg qhs but patient and sister declined as they did not want to add more medications.  - EGD negative 11/27 for any pathology.  -Pt agreeable to trying steroids to see if they help with N/V. Starting Dex 4mg BID 11/29

## 2024-11-29 NOTE — ASSESSMENT & PLAN NOTE
KESHA is likely due to pre-renal azotemia due to dehydration. Baseline creatinine is  1.3-1.4 . Most recent creatinine and eGFR are listed below.  Recent Labs     11/27/24  0245 11/28/24  1011 11/29/24  0314   CREATININE 1.2 1.2 1.3   EGFRNORACEVR 50.2* 50.2* 45.6*        Plan  - KESHA resolved  - Avoid nephrotoxins and renally dose meds for GFR listed above  - Monitor urine output, serial BMP, and adjust therapy as needed  - Follow up with Dr. Eduardo before resuming Zometa treatment.

## 2024-11-29 NOTE — PROGRESS NOTES
"[facility] ([facility])  Hematology/Oncology  Progress Note    Patient Name: Radha Ervin  Admission Date: 11/24/2024  Hospital Length of Stay: 4 days  Code Status: Full Code     Subjective:     HPI:  Ms. Ervin is a 64-year-old lady who has a history of non small cell lung adenocarcinoma, s/p left upper lobe resection in 2023, Allergy, Amblyopia, Cataract, Eczema, HS (hereditary spherocytosis), total knee replacement, and Joint pain, here for nausea, vomiting, and inability to tolerate PO. The nausea and vomiting has started this week and not improved. Started Zometa at the beginning of the month. Nothing alleviates or worsens The patient says she has lost 4 pounds in the last month.    Ms. Ervin follows with Dr. Eduardo. Set to start treatment Monday, 11/25/2024 for the bony mets.She presented to [place] yesterday for the same complaints, was given fluids, and discharged with Zofran. She had a recent PET scan (10/2024) that showed metastases to the spine: "post treatment change in the left lung in this patient with a history of lung adenocarcinoma. There are hypermetabolic lesions in the L1 with pathologic compression deformity and T11 vertebral body suspicious for osseous metastases."     Lastly, she complains about foul vaginal odor and concern for a yeast infection. Decreased urination due to decrease in PO intake. Last BM was two days ago (11/21/2024).     Oncology History:  Stg IIIA NSCLC s/p resection followed by CRT due to residual disease. Developed metastatic progression while on maintenance durvalumab.     Interval History: LEYDA COURTNEY. Pt states that her N/V have improved with scheduled Zofran and Compazine PRN. She was able to keep down some mashed potatoes and fruit yesterday, and states she is feeling somewhat better. However, had more vomiting after breakfast. Agreeable to starting trial of steroids to monitor for improvement.    Oncology Treatment Plan:   [No matching plan " found]    Medications:  Continuous Infusions:  Scheduled Meds:   levothyroxine  75 mcg Oral Before breakfast    magnesium sulfate IVPB  2 g Intravenous Once    mupirocin   Nasal BID    ondansetron  8 mg Intravenous Q8H    pantoprazole  40 mg Intravenous BID     PRN Meds:  Current Facility-Administered Medications:     acetaminophen, 650 mg, Oral, Q4H PRN    dextrose 10%, 12.5 g, Intravenous, PRN    dextrose 10%, 12.5 g, Intravenous, PRN    dextrose 10%, 25 g, Intravenous, PRN    dextrose 10%, 25 g, Intravenous, PRN    glucagon (human recombinant), 1 mg, Intramuscular, PRN    glucose, 16 g, Oral, PRN    glucose, 24 g, Oral, PRN    LORazepam, 1 mg, Oral, Once PRN    naloxone, 0.02 mg, Intravenous, PRN    prochlorperazine, 10 mg, Intravenous, Q6H PRN    sodium chloride 0.9%, 10 mL, Intravenous, Q12H PRN    traMADoL, 50 mg, Oral, Q6H PRN     Review of Systems  Objective:     Vital Signs (Most Recent):  Temp: 99 °F (37.2 °C) (11/29/24 0801)  Pulse: 100 (11/29/24 0801)  Resp: 18 (11/29/24 0801)  BP: 92/61 (11/29/24 0801)  SpO2: 97 % (11/29/24 0801) Vital Signs (24h Range):  Temp:  [98.1 °F (36.7 °C)-100 °F (37.8 °C)] 99 °F (37.2 °C)  Pulse:  [] 100  Resp:  [18-20] 18  SpO2:  [87 %-98 %] 97 %  BP: ()/(54-70) 92/61     Weight: 82.1 kg (181 lb)  Body mass index is 36.56 kg/m².  Body surface area is 1.85 meters squared.      Intake/Output Summary (Last 24 hours) at 11/29/2024 0857  Last data filed at 11/29/2024 0600  Gross per 24 hour   Intake 1775.99 ml   Output 800 ml   Net 975.99 ml        Physical Exam  HENT:      Head: Normocephalic and atraumatic.   Eyes:      Extraocular Movements: Extraocular movements intact.      Pupils: Pupils are equal, round, and reactive to light.   Cardiovascular:      Rate and Rhythm: Normal rate and regular rhythm.      Pulses: Normal pulses.      Heart sounds: Normal heart sounds.   Pulmonary:      Effort: Pulmonary effort is normal.      Breath sounds: Normal breath sounds.    Abdominal:      General: Bowel sounds are normal.   Neurological:      Mental Status: She is alert and oriented to person, place, and time.          Significant Labs:   CBC:   Recent Labs   Lab 11/28/24  1011 11/29/24  0314   WBC 7.98 7.83   HGB 9.7* 9.7*   HCT 30.0* 30.5*    308    and CMP:   Recent Labs   Lab 11/28/24  1011 11/29/24  0314   * 133*   K 3.8 3.7    104   CO2 20* 20*   GLU 82 75   BUN 5* 5*   CREATININE 1.2 1.3   CALCIUM 8.3* 8.2*   PROT 5.9* 5.8*   ALBUMIN 2.3* 2.2*   BILITOT 0.4 0.4   ALKPHOS 68 68   AST 16 15   ALT 11 9*   ANIONGAP 9 9       Diagnostic Results:  I have reviewed all pertinent imaging results/findings within the past 24 hours.  Assessment/Plan:     * Intractable nausea and vomiting  The nausea and vomiting has started this week and not improved. Nothing alleviates or worsens The patient says she has lost 4 pounds in the last month.  - Received 1L in ED  - NS 125cc/hr x8hr  - Advance diet as tolerated  -11/25 stopped pilocarpine for N/V, repeat MRI Brain negative for development of brain metastases.  - Will continue scheduled Zofran and PRN Compazine.  Offered Zyprexa 5 mg qhs but patient and sister declined as they did not want to add more medications.  - EGD negative 11/27 for any pathology.  -Pt agreeable to trying steroids to see if they help with N/V. Starting Dex 4mg BID 11/29        Decreased oral intake  Related to N/V as above.    Melena  Pt with intractable N/V since admit, 11/26 had an episode of black tarry stool     -Protonix 40 IV BID started  -GI consulted, scope 11/27 showed no abnormalities.  - Hgb stable so suspect no bleed or at least no clinical significant bleeding.  Monitor closely.              Hypothyroidism  - home synthroid    UTI (urinary tract infection)  Pt complains about foul vaginal odor and concern for a yeast infection. Decreased urination due to decrease in PO intake. Denies dysuria or hematuria.  Urine dipstick shows positive  "for WBC's, positive for RBC's, and positive for leukocytes.  Micro exam: 9 WBC's per HPF, 14 RBC's per HPF, occasional+ bacteria, occasional yeast, and contaminated specimen with 5 squamous cells.  - 1 dose gentamicin in ED  - NF BID x5 days, switched to IV cipro given persistent vomiting of meds  -repeat UA ordered 11/26 to ensure adequate treatment of UTI  -11/28 UTI sx resolved and cx negative, stopped abx    KESHA (acute kidney injury)  KESHA is likely due to pre-renal azotemia due to dehydration. Baseline creatinine is  1.3-1.4 . Most recent creatinine and eGFR are listed below.  Recent Labs     11/27/24  0245 11/28/24  1011 11/29/24  0314   CREATININE 1.2 1.2 1.3   EGFRNORACEVR 50.2* 50.2* 45.6*        Plan  - KESHA resolved  - Avoid nephrotoxins and renally dose meds for GFR listed above  - Monitor urine output, serial BMP, and adjust therapy as needed  - Follow up with Dr. Eduardo before resuming Zometa treatment.      Back pain  Chronic back pain. She is scheduled to have a nerve ablation 12/16/24 for the back pain due to disc bulges in her spine.   - Received 2 doses of palliative XRT to bone mets.  - Pain management per palliative care.    Secondary malignant neoplasm of bone  She had a recent PET scan (10/2024) that showed metastases to the spine: "post treatment change in the left lung in this patient with a history of lung adenocarcinoma. There are hypermetabolic lesions in the L1 with pathologic compression deformity and T11 vertebral body suspicious for osseous metastases."  - Pain management per palliative care recommendations.    Stage 3b chronic kidney disease  BMP reviewed- noted Estimated Creatinine Clearance: 45.8 mL/min (based on SCr of 1.2 mg/dL). according to latest data. Based on current GFR, CKD stage is stage 3 - GFR 30-59.  Monitor UOP and serial BMP and adjust therapy as needed. Renally dose meds. Avoid nephrotoxic medications and procedures.      Adenocarcinoma, lung, left  Stg IIIA NSCLC " s/p resection followed by CRT due to residual disease. Developed metastatic progression while on maintenance durvalumab. Durvalumab last C#5 8/9/24. Zolderonic acid given x 1 for bone mets. Follows with Dr. Eduardo.  - Pantoprazole 40mg qd  - Pilocarpine TID discontinued in setting of N/V  - Sotarasib QD has not yet been started as not yet received from [facility].    Severe obesity (BMI 35.0-39.9) with comorbidity  Body mass index is 36.56 kg/m². Obesity complicates all aspects of disease management from diagnostic modalities to treatment. Weight loss encouraged and health benefits explained to patient.                Oswaldo Beck MD  Hematology/Oncology  [facility] ([facility])

## 2024-11-29 NOTE — PROGRESS NOTES
Admit Assessment    Patient Identification  Radha Ervin   :  1960  Admit Date:  2024  Attending Provider:  Daniel Solorzano MD              Referral:   Pt was admitted to  with a diagnosis of Intractable nausea and vomiting, and was admitted this hospital stay due to Weakness [R53.1]  Decreased oral intake [R63.8]  Chest pain [R07.9]  Non-small cell lung cancer, unspecified laterality [C34.90]  Abdominal pain, unspecified abdominal location [R10.9]  Back pain, unspecified back location, unspecified back pain laterality, unspecified chronicity [M54.9]  Nausea and vomiting, unspecified vomiting type [R11.2].       is involved was referred to the Social Work Department via routine referral.  Patient presents as a 64 y.o. year old  female.    Persons interviewed : patient and her sister    Living Situation:      Resides at [address] [place] [address]   [address]  [facility] [address] [facility] [address], phone: There is no home phone number on file..  Patient lives alone. She has 2 sisters. One lives locally and the other lives out of state. They are both supportive and involved in her care. She lives in an apartment on the 2nd floor. She will have 16 stairs to get into her apartment. Prior to hospitalization sister reports patient was having difficulty remaining independent at home due to pain. Sister lives in [facility] with bedrooms upstairs as well as the main bathroom with a shower. She does not feel her sister will be able to get up the stairs to stay with her upon discharge. She was still trying to work up until admission. 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         Current or Past Agencies and Description of Services/Supplies    DME: Previously did not utilize home medical equipment. Will need PT and OT evaluations to see if any medical equipment is needed and level of care upon  discharge.  Equipment Currently Used at Home: none    Home Health: Patient was not actively on service with home health prior to this admission.       IV Infusion: N/A      Nutrition: Oral but her intake has been very low. She was not eating or drinking much prior to admission according to her sister.     Outpatient Pharmacy:     [id number] - [place], [place] - [address] [facility] [address] [facility] [address]  [address] [address]  Phone: [phone] Fax: [phone]    [facility] - [place], [place] - [address]  [address] [facility] [address]  Phone: [phone] Fax: [phone]    [facility] [id number]  [place] - [address] [facility]  [address] [facility]  [place] [address]  Phone: [phone] Fax: [phone]    [facility]  [address] [facility] [address]  Phone: [phone] Fax: [phone]    [id number] - [place] - [address] [facility]  [address] [facility]  [place] [address]  Phone: [phone] Fax: [phone]    [facility] [place], [place] - [address] [place] [address]  [address] [facility] [address]  Phone: [phone] Fax: [phone]    [facility] [place], [place] - [address] [facility]  [address] [facility] [address]  Phone: [phone] Fax: [phone]    [facility]  [address] [place] [address]  Phone: [phone] Fax: [phone]      Patient Preference of agencies include : Patient has not expressed a preference     Patient/Caregiver informed of right to choose providers or agencies.  Patient provides permission to release any necessary information to [facility] and to Non-[facility] agencies as needed to facilitate patient care, treatment planning, and patient discharge planning.  Written and verbal resources provided.      Coping: Patient was sleeping for most of the assessment. Towards the end she woke up and participated in the  conversation. She did seem still a bit groggy. She did not express and questions or concerns.           Adjustment to Diagnosis and Treatment: Appropriate     Emotional/Behavioral/Cognitive Issues: None observed             History/Current Symptoms of Anxiety/Depression: No:   History/Current Substance Use:   Social History     Tobacco Use    Smoking status: Former     Current packs/day: 0.00     Average packs/day: 1 pack/day for 46.3 years (46.3 ttl pk-yrs)     Types: Vaping with nicotine, Cigarettes     Start date:      Quit date: 2023     Years since quittin.5    Smokeless tobacco: Never   Substance and Sexual Activity    Alcohol use: Not Currently     Comment: occassionally    Drug use: No    Sexual activity: Not Currently     Partners: Male       Indications of Abuse/Neglect: No:   Abuse Screen (yes response referral indicated)  Feels Unsafe at Home or Work/School: no  Feels Threatened by Someone: no  Does anyone try to keep you from having contact with others or doing things outside your home?: no  Physical Signs of Abuse Present: no    Financial:  Payer/Plan Subscr  Sex Relation Sub. Ins. ID Effective Group Num   1. [employer] MANAGE* FRANCESCOMARTA CAMEJO EMMA 1960 Female Self [id number] 7/1/24 [id number]                                   [address] [address]                            Other identified concerns/needs: None at this time    Plan: TBD    Interventions/Referrals: Will need evaluations from PT and OT to help determine the appropriate level of care   Patient/caregiver engaged in treatment planning process.     providing psychosocial and supportive counseling, resources, education, assistance and discharge planning as appropriate.  Patient/caregiver state understanding of  available resources,  following, remains available.

## 2024-11-30 LAB
ALBUMIN SERPL BCP-MCNC: 2.7 G/DL (ref 3.5–5.2)
ALP SERPL-CCNC: 75 U/L (ref 40–150)
ALT SERPL W/O P-5'-P-CCNC: 9 U/L (ref 10–44)
ANION GAP SERPL CALC-SCNC: 13 MMOL/L (ref 8–16)
ANISOCYTOSIS BLD QL SMEAR: SLIGHT
AST SERPL-CCNC: 16 U/L (ref 10–40)
BASOPHILS # BLD AUTO: 0.01 K/UL (ref 0–0.2)
BASOPHILS NFR BLD: 0.1 % (ref 0–1.9)
BILIRUB SERPL-MCNC: 0.3 MG/DL (ref 0.1–1)
BUN SERPL-MCNC: 7 MG/DL (ref 8–23)
CALCIUM SERPL-MCNC: 8.8 MG/DL (ref 8.7–10.5)
CHLORIDE SERPL-SCNC: 103 MMOL/L (ref 95–110)
CO2 SERPL-SCNC: 18 MMOL/L (ref 23–29)
CREAT SERPL-MCNC: 1.4 MG/DL (ref 0.5–1.4)
DIFFERENTIAL METHOD BLD: ABNORMAL
EOSINOPHIL # BLD AUTO: 0 K/UL (ref 0–0.5)
EOSINOPHIL NFR BLD: 0.1 % (ref 0–8)
ERYTHROCYTE [DISTWIDTH] IN BLOOD BY AUTOMATED COUNT: 14.1 % (ref 11.5–14.5)
EST. GFR  (NO RACE VARIABLE): 41.8 ML/MIN/1.73 M^2
GLUCOSE SERPL-MCNC: 163 MG/DL (ref 70–110)
HCT VFR BLD AUTO: 33.1 % (ref 37–48.5)
HGB BLD-MCNC: 11 G/DL (ref 12–16)
IMM GRANULOCYTES # BLD AUTO: 0.06 K/UL (ref 0–0.04)
IMM GRANULOCYTES NFR BLD AUTO: 0.8 % (ref 0–0.5)
INFLUENZA A, MOLECULAR: NOT DETECTED
INFLUENZA B, MOLECULAR: NOT DETECTED
LYMPHOCYTES # BLD AUTO: 0.4 K/UL (ref 1–4.8)
LYMPHOCYTES NFR BLD: 5.7 % (ref 18–48)
MAGNESIUM SERPL-MCNC: 1.8 MG/DL (ref 1.6–2.6)
MCH RBC QN AUTO: 30.9 PG (ref 27–31)
MCHC RBC AUTO-ENTMCNC: 33.2 G/DL (ref 32–36)
MCV RBC AUTO: 93 FL (ref 82–98)
MONOCYTES # BLD AUTO: 0.1 K/UL (ref 0.3–1)
MONOCYTES NFR BLD: 1.8 % (ref 4–15)
NEUTROPHILS # BLD AUTO: 6.7 K/UL (ref 1.8–7.7)
NEUTROPHILS NFR BLD: 91.5 % (ref 38–73)
NRBC BLD-RTO: 0 /100 WBC
PHOSPHATE SERPL-MCNC: 1.7 MG/DL (ref 2.7–4.5)
PLATELET # BLD AUTO: 352 K/UL (ref 150–450)
PMV BLD AUTO: 9.8 FL (ref 9.2–12.9)
POLYCHROMASIA BLD QL SMEAR: ABNORMAL
POTASSIUM SERPL-SCNC: 3.9 MMOL/L (ref 3.5–5.1)
PROT SERPL-MCNC: 6.8 G/DL (ref 6–8.4)
RBC # BLD AUTO: 3.56 M/UL (ref 4–5.4)
RSV AG BY MOLECULAR METHOD: NOT DETECTED
SARS-COV-2 RNA RESP QL NAA+PROBE: NOT DETECTED
SODIUM SERPL-SCNC: 134 MMOL/L (ref 136–145)
WBC # BLD AUTO: 7.36 K/UL (ref 3.9–12.7)

## 2024-11-30 PROCEDURE — 99233 SBSQ HOSP IP/OBS HIGH 50: CPT | Mod: HCNC,GC,, | Performed by: HOSPITALIST

## 2024-11-30 PROCEDURE — 0241U SARS-COV2 (COVID) WITH FLU/RSV BY PCR: CPT | Mod: HCNC | Performed by: FAMILY MEDICINE

## 2024-11-30 PROCEDURE — 25000003 PHARM REV CODE 250: Mod: HCNC | Performed by: FAMILY MEDICINE

## 2024-11-30 PROCEDURE — 25000003 PHARM REV CODE 250: Mod: HCNC | Performed by: INTERNAL MEDICINE

## 2024-11-30 PROCEDURE — 20600001 HC STEP DOWN PRIVATE ROOM: Mod: HCNC

## 2024-11-30 PROCEDURE — 25000003 PHARM REV CODE 250: Mod: HCNC

## 2024-11-30 PROCEDURE — 36415 COLL VENOUS BLD VENIPUNCTURE: CPT | Mod: HCNC

## 2024-11-30 PROCEDURE — 63600175 PHARM REV CODE 636 W HCPCS: Mod: HCNC

## 2024-11-30 PROCEDURE — 97165 OT EVAL LOW COMPLEX 30 MIN: CPT | Mod: HCNC

## 2024-11-30 PROCEDURE — 63600175 PHARM REV CODE 636 W HCPCS: Mod: HCNC | Performed by: HOSPITALIST

## 2024-11-30 PROCEDURE — 97530 THERAPEUTIC ACTIVITIES: CPT | Mod: HCNC

## 2024-11-30 PROCEDURE — 85025 COMPLETE CBC W/AUTO DIFF WBC: CPT | Mod: HCNC | Performed by: INTERNAL MEDICINE

## 2024-11-30 PROCEDURE — 83735 ASSAY OF MAGNESIUM: CPT | Mod: HCNC

## 2024-11-30 PROCEDURE — 84100 ASSAY OF PHOSPHORUS: CPT | Mod: HCNC

## 2024-11-30 PROCEDURE — 80053 COMPREHEN METABOLIC PANEL: CPT | Mod: HCNC | Performed by: INTERNAL MEDICINE

## 2024-11-30 RX ORDER — SODIUM,POTASSIUM PHOSPHATES 280-250MG
2 POWDER IN PACKET (EA) ORAL ONCE
Status: COMPLETED | OUTPATIENT
Start: 2024-11-30 | End: 2024-11-30

## 2024-11-30 RX ORDER — DEXAMETHASONE SODIUM PHOSPHATE 4 MG/ML
4 INJECTION, SOLUTION INTRA-ARTICULAR; INTRALESIONAL; INTRAMUSCULAR; INTRAVENOUS; SOFT TISSUE 2 TIMES DAILY
Status: DISCONTINUED | OUTPATIENT
Start: 2024-11-30 | End: 2024-12-01

## 2024-11-30 RX ORDER — FLUTICASONE PROPIONATE 50 MCG
2 SPRAY, SUSPENSION (ML) NASAL DAILY
Status: DISCONTINUED | OUTPATIENT
Start: 2024-12-01 | End: 2024-12-01

## 2024-11-30 RX ORDER — PANTOPRAZOLE SODIUM 40 MG/1
40 TABLET, DELAYED RELEASE ORAL DAILY
Status: DISCONTINUED | OUTPATIENT
Start: 2024-12-01 | End: 2024-12-01 | Stop reason: HOSPADM

## 2024-11-30 RX ADMIN — Medication 1 DOSE: at 08:11

## 2024-11-30 RX ADMIN — PANTOPRAZOLE SODIUM 40 MG: 40 TABLET, DELAYED RELEASE ORAL at 05:11

## 2024-11-30 RX ADMIN — Medication 1 DOSE: at 09:11

## 2024-11-30 RX ADMIN — Medication 1 DOSE: at 05:11

## 2024-11-30 RX ADMIN — DEXAMETHASONE SODIUM PHOSPHATE 4 MG: 4 INJECTION INTRA-ARTICULAR; INTRALESIONAL; INTRAMUSCULAR; INTRAVENOUS; SOFT TISSUE at 05:11

## 2024-11-30 RX ADMIN — ONDANSETRON 8 MG: 2 INJECTION INTRAMUSCULAR; INTRAVENOUS at 09:11

## 2024-11-30 RX ADMIN — ONDANSETRON 8 MG: 2 INJECTION INTRAMUSCULAR; INTRAVENOUS at 02:11

## 2024-11-30 RX ADMIN — ONDANSETRON 8 MG: 2 INJECTION INTRAMUSCULAR; INTRAVENOUS at 05:11

## 2024-11-30 RX ADMIN — POTASSIUM & SODIUM PHOSPHATES POWDER PACK 280-160-250 MG 2 PACKET: 280-160-250 PACK at 10:11

## 2024-11-30 RX ADMIN — LEVOTHYROXINE SODIUM 75 MCG: 75 TABLET ORAL at 05:11

## 2024-11-30 RX ADMIN — DEXAMETHASONE SODIUM PHOSPHATE 4 MG: 4 INJECTION INTRA-ARTICULAR; INTRALESIONAL; INTRAMUSCULAR; INTRAVENOUS; SOFT TISSUE at 08:11

## 2024-11-30 RX ADMIN — Medication 1 DOSE: at 12:11

## 2024-11-30 NOTE — PROGRESS NOTES
Physical Therapy Note      Patient Name:  Radha Ervin   MRN:  [id number]  Admitting Diagnosis:  Intractable nausea and vomiting   Recent Surgery: Procedure(s) (LRB):  EGD (ESOPHAGOGASTRODUODENOSCOPY) (N/A) 3 Days Post-Op  Admit Date: 11/24/2024  Length of Stay: 5 days    Patient not seen today due to patient politely requesting to defer PT eval to next date (per OT). Noted to ambulate >200 ft in hallway this date during OT eval. Will follow-up for progressive mobility pending continued medical stability and patient participation.    11/30/2024

## 2024-11-30 NOTE — PT/OT/SLP EVAL
Occupational Therapy   Evaluation and Treatment    Name: Radha Ervin  MRN: [id number]  Admitting Diagnosis: Intractable nausea and vomiting  Recent Surgery: Procedure(s) (LRB):  EGD (ESOPHAGOGASTRODUODENOSCOPY) (N/A) 3 Days Post-Op    Recommendations:     Discharge Recommendations: No Therapy Indicated  Discharge Equipment Recommendations:  none  Barriers to discharge:  Decreased caregiver support, Inaccessible home environment (Pt lives alone with 16 stairs to negotiate.)    Assessment:     Radha Ervin is a 64 y.o. female with a medical diagnosis of Intractable nausea and vomiting.  Pt tolerated session well and without incident.  She's primarily limited by R low back and hip pain, which affects her ability to perform functional mobility independently.  No post-acute OT services are indicated due to her current level of function and home environment/family support.  She presents with the following. Performance deficits affecting function: impaired endurance, impaired functional mobility, gait instability, impaired balance, pain.      Rehab Prognosis: Good; patient would benefit from acute skilled OT services to address these deficits and reach maximum level of function.       Plan:     Patient to be seen 2 x/week to address the above listed problems via self-care/home management, therapeutic activities, therapeutic exercises  Plan of Care Expires: 12/29/24  Plan of Care Reviewed with: patient, sibling (pt's sister)    Subjective     Chief Complaint: R lower back and hip pain, not sleeping well the night before   Patient/Family Comments/goals: Pt was agreeable to therapy.    Occupational Profile:  Living Environment: Pt lives alone on the 2nd floor of a complex with 16 ROBERTO (8 steps, then landing, then 8 more steps) with BHR she can reach simultaneously.  She has a tub/shower combo.  Her sister lives in a town-house with bed and bathroom on the 2nd floor.   Previous level of function: Independent with ADLs and  mobility.  Pt drives.  Roles and Routines: sister; pt works   Equipment Used at Home: none, other (see comments) (Pt's sister owns a RW and rollator.)  Assistance upon Discharge: Her sister     Pain/Comfort:  Pain Rating 1: other (see comments) (not rated)  Location - Side 1: Right  Location - Orientation 1: lower  Location 1: back (radiating to R hip)  Pain Addressed 1: Distraction, Nurse notified  Pain Rating Post-Intervention 1: 5/10    Patients cultural, spiritual, Methodist conflicts given the current situation: no    Objective:     Communicated with: nurse prior to session.  Patient found left sidelying with peripheral IV (IV not connected and running) with her sister present upon OT entry to room.    General Precautions: Standard, fall  Orthopedic Precautions: N/A  Braces: N/A  Respiratory Status: Room air    Occupational Performance:    Bed Mobility:    Patient completed Rolling/Turning to Left with  supervision  Patient completed Scooting/Bridging with supervision  Patient completed Supine to Sit with supervision    Functional Mobility/Transfers:  Patient completed Sit <> Stand Transfer from EOB x 2 trials and from toilet x 1 trial with supervision  with  no assistive device   Patient completed Bed <> Chair Transfer using Step Transfer technique with SBA-CGA with no assistive device  Patient completed Toilet Transfer Step Transfer technique with supervision with  no AD  Functional Mobility: Pt ambulated ~12 ft from EOB to the toilet and ~ 8 back to other side of EOB with Supervision with no AD.  She then ambulated ~230 ft in her room and in the hallway with mask donned with SBA-CGA.  Pt intermittently held onto the hallway railing to steady.  While turning in the hallway, pt had a minor LOB that required CGA with HHA to correct.  Pt attributed LOB to pain and reported no dizziness or SOB.      Activities of Daily Living:  Grooming: supervision to wash her hands while standing at bathroom sink  Lower Body  Dressing: SBA to don her pants while sitting/standing EOB.  Supervision with setup assistance to don her slippers via figure-4 technique while seated EOB.   Toileting: supervision to perform hygiene and clothing management at the toilet. She was able to urinate.     Cognitive/Visual Perceptual:  Cognitive/Psychosocial Skills:     -       Oriented to: Person, Place, Time, and Situation   -       Follows Commands/attention:Follows multistep  commands  -       Communication: clear/fluent    Physical Exam:  Upper Extremity Range of Motion:     -       Right Upper Extremity: WFL  -       Left Upper Extremity: WFL  Upper Extremity Strength:    -       Right Upper Extremity: WFL  -       Left Upper Extremity: WFL   Strength:    -       Right Upper Extremity: WFL  -       Left Upper Extremity: WFL    AMPAC 6 Click ADL:  AMPAC Total Score: 24    Treatment & Education:  Pt and her sister edu on role of OT, POC, safety when performing self care tasks, benefit of performing OOB activity, and safety when performing functional transfers and mobility.  - Self care tasks completed-- as noted above      - Nursing took pt's vitals while she sat EOB after toileting.  Her BP read 119/83, and her oxygen saturation rate was 92%.     Patient left up in chair with all lines intact, call button in reach, nurse notified, and her sister and nurse present    GOALS:   Multidisciplinary Problems       Occupational Therapy Goals          Problem: Occupational Therapy    Goal Priority Disciplines Outcome Interventions   Occupational Therapy Goal     OT, PT/OT Progressing    Description: Goals to be met by: 12/14/2024     Patient will increase functional independence with ADLs by performing:    UE Dressing with [place].  LE Dressing with Modified [place].  Grooming while standing at sink with [place].  Toileting from toilet with [place] for hygiene and clothing management.   Supine to sit with [place].  Step transfer  with Modified [place] using LRAD as needed.  Toilet transfer to toilet with Modified [place] using LRAD as needed.                         History:     Past Medical History:   Diagnosis Date    Allergy     Amblyopia     Cataract     Eczema     HS (hereditary spherocytosis)     Hx of total knee replacement 01/19/2016    right knee    Joint pain          Past Surgical History:   Procedure Laterality Date    achilles tendon repair      BRONCHOSCOPY N/A 8/3/2023    Procedure: Bronchoscopy;  Surgeon: Saeed Gould MD;  [facility];  Service: Cardiothoracic;  Laterality: N/A;    CHOLECYSTECTOMY      ESOPHAGOGASTRODUODENOSCOPY N/A 11/27/2024    Procedure: EGD (ESOPHAGOGASTRODUODENOSCOPY);  Surgeon: Braxton Mooney MD;  [facility]  Service: Endoscopy;  Laterality: N/A;    INJECTION OF ANESTHETIC AGENT AROUND MULTIPLE INTERCOSTAL NERVES N/A 8/3/2023    Procedure: BLOCK, NERVE, INTERCOSTAL, 2 OR MORE;  Surgeon: Saeed Gould MD;  [facility];  Service: Cardiothoracic;  Laterality: N/A;    LYMPHADENECTOMY Left 8/3/2023    Procedure: LYMPHADENECTOMY;  Surgeon: Saeed Gould MD;  [facility];  Service: Cardiothoracic;  Laterality: Left;    NAVIGATIONAL BRONCHOSCOPY N/A 6/23/2023    Procedure: BRONCHOSCOPY, NAVIGATIONAL;  Surgeon: Radha Mccollum MD;  Location: [address];  Service: Pulmonary;  Laterality: N/A;    TOTAL KNEE ARTHROPLASTY      XI ROBOTIC RATS Left 8/3/2023    Procedure: XI ROBOTIC RATS upper lobectomy;  Surgeon: Saeed Gould MD;  Location: [address];  Service: Cardiothoracic;  Laterality: Left;       Time Tracking:     OT Date of Treatment: 11/30/24  OT Start Time: 1030  OT Stop Time: 1047  OT Total Time (min): 17 min    Billable Minutes:Evaluation 8 min  Therapeutic Activity 9 min    11/30/2024   For information on Fall & Injury Prevention, visit: [email or web address] OR  [email or web address] OR  https://www.cdc.gov/steadi/patient.html

## 2024-11-30 NOTE — PLAN OF CARE
Went over POC with pt at beginning of shift. Questions were asked and answered. AAO x 4. Afebrile. Up to toilet independently. No BM during this shift, Cdiff pending. Voiding without difficulty. No PRN needed. Sister at bedside. Pt remained free from injury with bed in low locked position, call light with in reach, non-skid socks, and frequent rounding. Pt instructed to call for assistance as needed. Denies needs at this time .

## 2024-11-30 NOTE — ASSESSMENT & PLAN NOTE
Pt with intractable N/V since admit, 11/26 had an episode of black tarry stool     -Protonix 40 IV daily  -GI consulted, scope 11/27 showed no abnormalities.  - Hgb stable so suspect no bleed or at least no clinical significant bleeding.  Monitor closely.

## 2024-11-30 NOTE — SUBJECTIVE & OBJECTIVE
Interval History: NAEO, pt feeling better since starting steroids. No further N/V and she has increased appetite. Plan to reach out to pharmacy to ask about restarting home Otezla for HS.    Oncology Treatment Plan:   [No matching plan found]    Medications:  Continuous Infusions:  Scheduled Meds:   dexAMETHasone  4 mg Intravenous BID    levothyroxine  75 mcg Oral Before breakfast    ondansetron  8 mg Intravenous Q8H    [START ON 12/1/2024] pantoprazole  40 mg Oral Daily    sodium bicarb-sodium chloride  1 Dose Swish & Spit QID     PRN Meds:  Current Facility-Administered Medications:     acetaminophen, 650 mg, Oral, Q4H PRN    dextrose 10%, 12.5 g, Intravenous, PRN    dextrose 10%, 25 g, Intravenous, PRN    glucagon (human recombinant), 1 mg, Intramuscular, PRN    glucose, 16 g, Oral, PRN    glucose, 24 g, Oral, PRN    LORazepam, 1 mg, Oral, Once PRN    naloxone, 0.02 mg, Intravenous, PRN    prochlorperazine, 10 mg, Intravenous, Q6H PRN    sodium chloride 0.9%, 10 mL, Intravenous, Q12H PRN    traMADoL, 50 mg, Oral, Q6H PRN     Review of Systems  Objective:     Vital Signs (Most Recent):  Temp: 97.7 °F (36.5 °C) (11/30/24 1042)  Pulse: (!) 119 (11/30/24 1042)  Resp: 18 (11/30/24 1042)  BP: 119/83 (11/30/24 1042)  SpO2: (!) 92 % (11/30/24 1042) Vital Signs (24h Range):  Temp:  [97.7 °F (36.5 °C)-100.3 °F (37.9 °C)] 97.7 °F (36.5 °C)  Pulse:  [103-119] 119  Resp:  [18-19] 18  SpO2:  [90 %-95 %] 92 %  BP: (100-127)/(65-83) 119/83     Weight: 82.1 kg (181 lb)  Body mass index is 36.56 kg/m².  Body surface area is 1.85 meters squared.      Intake/Output Summary (Last 24 hours) at 11/30/2024 1307  Last data filed at 11/30/2024 1223  Gross per 24 hour   Intake 855.02 ml   Output 1850 ml   Net -994.98 ml        Physical Exam  Eyes:      Extraocular Movements: Extraocular movements intact.      Pupils: Pupils are equal, round, and reactive to light.   Cardiovascular:      Rate and Rhythm: Normal rate and regular rhythm.       Pulses: Normal pulses.      Heart sounds: Normal heart sounds.   Pulmonary:      Effort: Pulmonary effort is normal.      Breath sounds: Normal breath sounds.   Abdominal:      General: Bowel sounds are normal.   Skin:     General: Skin is warm and dry.   Neurological:      Mental Status: She is alert and oriented to person, place, and time.          Significant Labs:   CBC:   Recent Labs   Lab 11/29/24 0314 11/30/24  0331   WBC 7.83 7.36   HGB 9.7* 11.0*   HCT 30.5* 33.1*    352    and CMP:   Recent Labs   Lab 11/29/24 0314 11/30/24  0331   * 134*   K 3.7 3.9    103   CO2 20* 18*   GLU 75 163*   BUN 5* 7*   CREATININE 1.3 1.4   CALCIUM 8.2* 8.8   PROT 5.8* 6.8   ALBUMIN 2.2* 2.7*   BILITOT 0.4 0.3   ALKPHOS 68 75   AST 15 16   ALT 9* 9*   ANIONGAP 9 13       Diagnostic Results:  I have reviewed all pertinent imaging results/findings within the past 24 hours.

## 2024-11-30 NOTE — PLAN OF CARE
Problem: Occupational Therapy  Goal: Occupational Therapy Goal  Description: Goals to be met by: 12/14/2024     Patient will increase functional independence with ADLs by performing:    UE Dressing with [place].  LE Dressing with Modified [place].  Grooming while standing at sink with [place].  Toileting from toilet with [place] for hygiene and clothing management.   Supine to sit with [place].  Step transfer with Modified [place] using LRAD as needed.  Toilet transfer to toilet with Modified [place] using LRAD as needed.    Outcome: Progressing     Pt evaluated and OT goals established.

## 2024-11-30 NOTE — PLAN OF CARE
Pt. with nonskid footwear on with bed in lowest position and locked with bed rails up x2.  Pt. ambulates independently and instructed to call if assistance is needed.  Pt. with call light within reach.  Pt. received electrolytes today.  Pt. with emesis after sipping water this afternoon, but able to tolerate a smoothie this evening. Pt. With family at bedside.  Will continue to monitor pt.

## 2024-11-30 NOTE — PROGRESS NOTES
"[facility] ([facility])  Hematology/Oncology  Progress Note    Patient Name: Radha Ervin  Admission Date: 11/24/2024  Hospital Length of Stay: 5 days  Code Status: Full Code     Subjective:     HPI:  Ms. Ervin is a 64-year-old lady who has a history of non small cell lung adenocarcinoma, s/p left upper lobe resection in 2023, Allergy, Amblyopia, Cataract, Eczema, HS (hereditary spherocytosis), total knee replacement, and Joint pain, here for nausea, vomiting, and inability to tolerate PO. The nausea and vomiting has started this week and not improved. Started Zometa at the beginning of the month. Nothing alleviates or worsens The patient says she has lost 4 pounds in the last month.    Ms. Ervin follows with Dr. Eduardo. Set to start treatment Monday, 11/25/2024 for the bony mets.She presented to [facility] yesterday for the same complaints, was given fluids, and discharged with Zofran. She had a recent PET scan (10/2024) that showed metastases to the spine: "post treatment change in the left lung in this patient with a history of lung adenocarcinoma. There are hypermetabolic lesions in the L1 with pathologic compression deformity and T11 vertebral body suspicious for osseous metastases."     Lastly, she complains about foul vaginal odor and concern for a yeast infection. Decreased urination due to decrease in PO intake. Last BM was two days ago (11/21/2024).     Oncology History:  Stg IIIA NSCLC s/p resection followed by CRT due to residual disease. Developed metastatic progression while on maintenance durvalumab.     Interval History: NAEO, pt feeling better since starting steroids. No further N/V and she has increased appetite. Plan to reach out to pharmacy to ask about restarting home Otezla for HS.    Oncology Treatment Plan:   [No matching plan found]    Medications:  Continuous Infusions:  Scheduled Meds:   dexAMETHasone  4 mg Intravenous BID    levothyroxine  75 mcg Oral Before breakfast    " ondansetron  8 mg Intravenous Q8H    [START ON 12/1/2024] pantoprazole  40 mg Oral Daily    sodium bicarb-sodium chloride  1 Dose Swish & Spit QID     PRN Meds:  Current Facility-Administered Medications:     acetaminophen, 650 mg, Oral, Q4H PRN    dextrose 10%, 12.5 g, Intravenous, PRN    dextrose 10%, 25 g, Intravenous, PRN    glucagon (human recombinant), 1 mg, Intramuscular, PRN    glucose, 16 g, Oral, PRN    glucose, 24 g, Oral, PRN    LORazepam, 1 mg, Oral, Once PRN    naloxone, 0.02 mg, Intravenous, PRN    prochlorperazine, 10 mg, Intravenous, Q6H PRN    sodium chloride 0.9%, 10 mL, Intravenous, Q12H PRN    traMADoL, 50 mg, Oral, Q6H PRN     Review of Systems  Objective:     Vital Signs (Most Recent):  Temp: 97.7 °F (36.5 °C) (11/30/24 1042)  Pulse: (!) 119 (11/30/24 1042)  Resp: 18 (11/30/24 1042)  BP: 119/83 (11/30/24 1042)  SpO2: (!) 92 % (11/30/24 1042) Vital Signs (24h Range):  Temp:  [97.7 °F (36.5 °C)-100.3 °F (37.9 °C)] 97.7 °F (36.5 °C)  Pulse:  [103-119] 119  Resp:  [18-19] 18  SpO2:  [90 %-95 %] 92 %  BP: (100-127)/(65-83) 119/83     Weight: 82.1 kg (181 lb)  Body mass index is 36.56 kg/m².  Body surface area is 1.85 meters squared.      Intake/Output Summary (Last 24 hours) at 11/30/2024 1307  Last data filed at 11/30/2024 1223  Gross per 24 hour   Intake 855.02 ml   Output 1850 ml   Net -994.98 ml        Physical Exam  Eyes:      Extraocular Movements: Extraocular movements intact.      Pupils: Pupils are equal, round, and reactive to light.   Cardiovascular:      Rate and Rhythm: Normal rate and regular rhythm.      Pulses: Normal pulses.      Heart sounds: Normal heart sounds.   Pulmonary:      Effort: Pulmonary effort is normal.      Breath sounds: Normal breath sounds.   Abdominal:      General: Bowel sounds are normal.   Skin:     General: Skin is warm and dry.   Neurological:      Mental Status: She is alert and oriented to person, place, and time.          Significant Labs:   CBC:   Recent  Labs   Lab 11/29/24 0314 11/30/24  0331   WBC 7.83 7.36   HGB 9.7* 11.0*   HCT 30.5* 33.1*    352    and CMP:   Recent Labs   Lab 11/29/24 0314 11/30/24  0331   * 134*   K 3.7 3.9    103   CO2 20* 18*   GLU 75 163*   BUN 5* 7*   CREATININE 1.3 1.4   CALCIUM 8.2* 8.8   PROT 5.8* 6.8   ALBUMIN 2.2* 2.7*   BILITOT 0.4 0.3   ALKPHOS 68 75   AST 15 16   ALT 9* 9*   ANIONGAP 9 13       Diagnostic Results:  I have reviewed all pertinent imaging results/findings within the past 24 hours.  Assessment/Plan:     * Intractable nausea and vomiting  The nausea and vomiting has started this week and not improved. Nothing alleviates or worsens The patient says she has lost 4 pounds in the last month.  - Received 1L in ED  - NS 125cc/hr x8hr  - Advance diet as tolerated  -11/25 stopped pilocarpine for N/V, repeat MRI Brain negative for development of brain metastases.  - Will continue scheduled Zofran and PRN Compazine.  Offered Zyprexa 5 mg qhs but patient and sister declined as they did not want to add more medications.  - EGD negative 11/27 for any pathology.  -Started Dex 4mg BID 11/29, pt has had significant improvement in N/V and appetite        Decreased oral intake  Related to N/V as above.    Melena  Pt with intractable N/V since admit, 11/26 had an episode of black tarry stool     -Protonix 40 IV daily  -GI consulted, scope 11/27 showed no abnormalities.  - Hgb stable so suspect no bleed or at least no clinical significant bleeding.  Monitor closely.              Hypothyroidism  - home synthroid    UTI (urinary tract infection)  Pt complains about foul vaginal odor and concern for a yeast infection. Decreased urination due to decrease in PO intake. Denies dysuria or hematuria.  Urine dipstick shows positive for WBC's, positive for RBC's, and positive for leukocytes.  Micro exam: 9 WBC's per HPF, 14 RBC's per HPF, occasional+ bacteria, occasional yeast, and contaminated specimen with 5 squamous  "cells.  - 1 dose gentamicin in ED  - NF BID x5 days, switched to IV cipro given persistent vomiting of meds  -repeat UA ordered 11/26 to ensure adequate treatment of UTI  -11/28 UTI sx resolved and cx negative, stopped abx    KESHA (acute kidney injury)  KESHA is likely due to pre-renal azotemia due to dehydration. Baseline creatinine is  1.3-1.4 . Most recent creatinine and eGFR are listed below.  Recent Labs     11/27/24  0245 11/28/24  1011 11/29/24  0314   CREATININE 1.2 1.2 1.3   EGFRNORACEVR 50.2* 50.2* 45.6*        Plan  - KESHA resolved  - Avoid nephrotoxins and renally dose meds for GFR listed above  - Monitor urine output, serial BMP, and adjust therapy as needed  - Follow up with Dr. Eduardo before resuming Zometa treatment.      Back pain  Chronic back pain. She is scheduled to have a nerve ablation 12/16/24 for the back pain due to disc bulges in her spine.   - Received 2 doses of palliative XRT to bone mets.  - Pain management per palliative care.    Secondary malignant neoplasm of bone  She had a recent PET scan (10/2024) that showed metastases to the spine: "post treatment change in the left lung in this patient with a history of lung adenocarcinoma. There are hypermetabolic lesions in the L1 with pathologic compression deformity and T11 vertebral body suspicious for osseous metastases."  - Pain management per palliative care recommendations.    Stage 3b chronic kidney disease  BMP reviewed- noted Estimated Creatinine Clearance: 45.8 mL/min (based on SCr of 1.2 mg/dL). according to latest data. Based on current GFR, CKD stage is stage 3 - GFR 30-59.  Monitor UOP and serial BMP and adjust therapy as needed. Renally dose meds. Avoid nephrotoxic medications and procedures.      Adenocarcinoma, lung, left  Stg IIIA NSCLC s/p resection followed by CRT due to residual disease. Developed metastatic progression while on maintenance durvalumab. Durvalumab last C#5 8/9/24. Zolderonic acid given x 1 for bone mets. " Follows with Dr. Eduardo.  - Pantoprazole 40mg qd  - Pilocarpine TID discontinued in setting of N/V  - Sotarasib QD has not yet been started as not yet received from [facility].    Severe obesity (BMI 35.0-39.9) with comorbidity  Body mass index is 36.56 kg/m². Obesity complicates all aspects of disease management from diagnostic modalities to treatment. Weight loss encouraged and health benefits explained to patient.                Oswaldo Beck MD  Hematology/Oncology  [facility] ([facility])

## 2024-11-30 NOTE — PLAN OF CARE
Patient is AAOX4. Up, independent, family at the bed side. Call light and personal items within reach. Patient involved in care. PO electrolytes replaced. Patient stable at this time.

## 2024-11-30 NOTE — ASSESSMENT & PLAN NOTE
The nausea and vomiting has started this week and not improved. Nothing alleviates or worsens The patient says she has lost 4 pounds in the last month.  - Received 1L in ED  - NS 125cc/hr x8hr  - Advance diet as tolerated  -11/25 stopped pilocarpine for N/V, repeat MRI Brain negative for development of brain metastases.  - Will continue scheduled Zofran and PRN Compazine.  Offered Zyprexa 5 mg qhs but patient and sister declined as they did not want to add more medications.  - EGD negative 11/27 for any pathology.  -Started Dex 4mg BID 11/29, pt has had significant improvement in N/V and appetite

## 2024-12-01 VITALS
RESPIRATION RATE: 17 BRPM | OXYGEN SATURATION: 94 % | SYSTOLIC BLOOD PRESSURE: 126 MMHG | HEIGHT: 59 IN | DIASTOLIC BLOOD PRESSURE: 84 MMHG | WEIGHT: 181 LBS | HEART RATE: 90 BPM | TEMPERATURE: 98 F | BODY MASS INDEX: 36.49 KG/M2

## 2024-12-01 LAB
ALBUMIN SERPL BCP-MCNC: 2.8 G/DL (ref 3.5–5.2)
ALP SERPL-CCNC: 66 U/L (ref 40–150)
ALT SERPL W/O P-5'-P-CCNC: 10 U/L (ref 10–44)
ANION GAP SERPL CALC-SCNC: 9 MMOL/L (ref 8–16)
AST SERPL-CCNC: 14 U/L (ref 10–40)
BASOPHILS # BLD AUTO: 0.03 K/UL (ref 0–0.2)
BASOPHILS NFR BLD: 0.2 % (ref 0–1.9)
BILIRUB SERPL-MCNC: 0.2 MG/DL (ref 0.1–1)
BUN SERPL-MCNC: 13 MG/DL (ref 8–23)
CALCIUM SERPL-MCNC: 8.7 MG/DL (ref 8.7–10.5)
CHLORIDE SERPL-SCNC: 106 MMOL/L (ref 95–110)
CO2 SERPL-SCNC: 21 MMOL/L (ref 23–29)
CREAT SERPL-MCNC: 1.5 MG/DL (ref 0.5–1.4)
DIFFERENTIAL METHOD BLD: ABNORMAL
EOSINOPHIL # BLD AUTO: 0 K/UL (ref 0–0.5)
EOSINOPHIL NFR BLD: 0 % (ref 0–8)
ERYTHROCYTE [DISTWIDTH] IN BLOOD BY AUTOMATED COUNT: 14.1 % (ref 11.5–14.5)
EST. GFR  (NO RACE VARIABLE): 38.4 ML/MIN/1.73 M^2
GLUCOSE SERPL-MCNC: 209 MG/DL (ref 70–110)
HCT VFR BLD AUTO: 30.1 % (ref 37–48.5)
HGB BLD-MCNC: 10.2 G/DL (ref 12–16)
IMM GRANULOCYTES # BLD AUTO: 0.21 K/UL (ref 0–0.04)
IMM GRANULOCYTES NFR BLD AUTO: 1.5 % (ref 0–0.5)
LYMPHOCYTES # BLD AUTO: 0.6 K/UL (ref 1–4.8)
LYMPHOCYTES NFR BLD: 4.2 % (ref 18–48)
MAGNESIUM SERPL-MCNC: 1.8 MG/DL (ref 1.6–2.6)
MCH RBC QN AUTO: 30.6 PG (ref 27–31)
MCHC RBC AUTO-ENTMCNC: 33.9 G/DL (ref 32–36)
MCV RBC AUTO: 90 FL (ref 82–98)
MONOCYTES # BLD AUTO: 0.7 K/UL (ref 0.3–1)
MONOCYTES NFR BLD: 4.6 % (ref 4–15)
NEUTROPHILS # BLD AUTO: 12.8 K/UL (ref 1.8–7.7)
NEUTROPHILS NFR BLD: 89.5 % (ref 38–73)
NRBC BLD-RTO: 0 /100 WBC
PHOSPHATE SERPL-MCNC: 1.6 MG/DL (ref 2.7–4.5)
PLATELET # BLD AUTO: 376 K/UL (ref 150–450)
PMV BLD AUTO: 9.8 FL (ref 9.2–12.9)
POTASSIUM SERPL-SCNC: 3.8 MMOL/L (ref 3.5–5.1)
PROT SERPL-MCNC: 6.7 G/DL (ref 6–8.4)
RBC # BLD AUTO: 3.33 M/UL (ref 4–5.4)
SODIUM SERPL-SCNC: 136 MMOL/L (ref 136–145)
WBC # BLD AUTO: 14.3 K/UL (ref 3.9–12.7)

## 2024-12-01 PROCEDURE — 80053 COMPREHEN METABOLIC PANEL: CPT | Mod: HCNC | Performed by: INTERNAL MEDICINE

## 2024-12-01 PROCEDURE — 84100 ASSAY OF PHOSPHORUS: CPT | Mod: HCNC

## 2024-12-01 PROCEDURE — 63600175 PHARM REV CODE 636 W HCPCS: Mod: HCNC

## 2024-12-01 PROCEDURE — 99239 HOSP IP/OBS DSCHRG MGMT >30: CPT | Mod: HCNC,GC,, | Performed by: HOSPITALIST

## 2024-12-01 PROCEDURE — 25000003 PHARM REV CODE 250: Mod: HCNC | Performed by: FAMILY MEDICINE

## 2024-12-01 PROCEDURE — 25000003 PHARM REV CODE 250: Mod: HCNC

## 2024-12-01 PROCEDURE — 1111F DSCHRG MED/CURRENT MED MERGE: CPT | Mod: HCNC,CPTII,GC, | Performed by: HOSPITALIST

## 2024-12-01 PROCEDURE — 36415 COLL VENOUS BLD VENIPUNCTURE: CPT | Mod: HCNC

## 2024-12-01 PROCEDURE — 83735 ASSAY OF MAGNESIUM: CPT | Mod: HCNC

## 2024-12-01 PROCEDURE — 85025 COMPLETE CBC W/AUTO DIFF WBC: CPT | Mod: HCNC | Performed by: INTERNAL MEDICINE

## 2024-12-01 RX ORDER — DEXAMETHASONE 4 MG/1
4 TABLET ORAL DAILY
Status: DISCONTINUED | OUTPATIENT
Start: 2024-12-02 | End: 2024-12-01 | Stop reason: HOSPADM

## 2024-12-01 RX ORDER — SODIUM,POTASSIUM PHOSPHATES 280-250MG
2 POWDER IN PACKET (EA) ORAL ONCE
Status: COMPLETED | OUTPATIENT
Start: 2024-12-01 | End: 2024-12-01

## 2024-12-01 RX ORDER — DEXAMETHASONE 4 MG/1
4 TABLET ORAL EVERY 12 HOURS
Status: DISCONTINUED | OUTPATIENT
Start: 2024-12-01 | End: 2024-12-01 | Stop reason: HOSPADM

## 2024-12-01 RX ORDER — DEXAMETHASONE 1 MG/1
2 TABLET ORAL DAILY
Status: DISCONTINUED | OUTPATIENT
Start: 2024-12-05 | End: 2024-12-01 | Stop reason: HOSPADM

## 2024-12-01 RX ORDER — DEXAMETHASONE 2 MG/1
TABLET ORAL
Qty: 13 TABLET | Refills: 0 | Status: SHIPPED | OUTPATIENT
Start: 2024-12-01 | End: 2024-12-08

## 2024-12-01 RX ADMIN — Medication 1 DOSE: at 09:12

## 2024-12-01 RX ADMIN — LEVOTHYROXINE SODIUM 75 MCG: 75 TABLET ORAL at 05:12

## 2024-12-01 RX ADMIN — ONDANSETRON 8 MG: 2 INJECTION INTRAMUSCULAR; INTRAVENOUS at 05:12

## 2024-12-01 RX ADMIN — DEXAMETHASONE SODIUM PHOSPHATE 4 MG: 4 INJECTION INTRA-ARTICULAR; INTRALESIONAL; INTRAMUSCULAR; INTRAVENOUS; SOFT TISSUE at 09:12

## 2024-12-01 RX ADMIN — Medication 1 DOSE: at 12:12

## 2024-12-01 RX ADMIN — PANTOPRAZOLE SODIUM 40 MG: 40 TABLET, DELAYED RELEASE ORAL at 09:12

## 2024-12-01 RX ADMIN — POTASSIUM & SODIUM PHOSPHATES POWDER PACK 280-160-250 MG 2 PACKET: 280-160-250 PACK at 09:12

## 2024-12-01 NOTE — PLAN OF CARE
Patient is AAOX4. IV removed as per order. Prescribed medications delivered to the bed side. Discharge teaching and follow up instructions provided. Patient discharged with her belongings and family member by the wheelchair.

## 2024-12-01 NOTE — PLAN OF CARE
Went over POC with pt at beginning of shift. Questions were asked and answered. AAO x 4. Afebrile. Up to toilet independently. 1 soft BM. Voiding without difficulty. Pt complains she starting has nasal discharge and dry cough, covid and flu test done by PCR. No PRN needed. Pt remained free from injury with bed in low locked position, call light with in reach, non-skid socks, and frequent rounding. Pt instructed to call for assistance as needed. Denies needs at this time.

## 2024-12-01 NOTE — DISCHARGE SUMMARY
"[facility] - Oncology ([facility])  Hematology/Oncology  Discharge Summary      Patient Name: Radha Ervin  MRN: [id number]  Admission Date: 11/24/2024  Hospital Length of Stay: 6 days  Discharge Date and Time:  12/01/2024 11:44 AM  Attending Physician: Sonu Mg MD   Discharging Provider: Oswaldo Beck MD  Primary Care Provider: Suraj Herrera III, MD    HPI: Ms. Ervin is a 64-year-old lady who has a history of non small cell lung adenocarcinoma, s/p left upper lobe resection in 2023, Allergy, Amblyopia, Cataract, Eczema, HS (hereditary spherocytosis), total knee replacement, and Joint pain, here for nausea, vomiting, and inability to tolerate PO. The nausea and vomiting has started this week and not improved. Started Zometa at the beginning of the month. Nothing alleviates or worsens The patient says she has lost 4 pounds in the last month.    Ms. Ervin follows with Dr. Eduardo. Set to start treatment Monday, 11/25/2024 for the bony mets.She presented to [facility] yesterday for the same complaints, was given fluids, and discharged with Zofran. She had a recent PET scan (10/2024) that showed metastases to the spine: "post treatment change in the left lung in this patient with a history of lung adenocarcinoma. There are hypermetabolic lesions in the L1 with pathologic compression deformity and T11 vertebral body suspicious for osseous metastases."     Lastly, she complains about foul vaginal odor and concern for a yeast infection. Decreased urination due to decrease in PO intake. Last BM was two days ago (11/21/2024).     Oncology History:  Stg IIIA NSCLC s/p resection followed by CRT due to residual disease. Developed metastatic progression while on maintenance durvalumab.     Procedure(s) (LRB):  EGD (ESOPHAGOGASTRODUODENOSCOPY) (N/A)     Hospital Course: Ms. Ervin is a 64-year-old lady who has a history of non small cell lung adenocarcinoma, s/p left upper lobe resection in 2023, Allergy, Amblyopia, " Cataract, Eczema, HS (hereditary spherocytosis), total knee replacement, and Joint pain, here for nausea, vomiting, and inability to tolerate PO. She was admitted w/ N/V and inability to tolerate PO. On admission CT Abd/pelvis ordered which was relatively unremarkable. She was found to have KESHA and UTI on admission. Started on Nitrofurantoin for UTI, and began IVF which improved her KESHA. Once creatinine back to baseline, she was scheduled for repeat MRI Brain w/wo to assess other possible causes of her N/V. Her pilocarpine was d/c as this could be a possible cause of her sx. MRI brain negative, but did develop some fevers and chills. She was switched to IV Cipro for UTI and UA repeated. Later in the morning, she had an episode of black tarry stool. GI consult placed with plan for scope 11/27. AES scope showed no pathology, and no acute bleed. Pt continues to have N/V. 11/29 she was started on steroid course with Dex 4mg BID in attempt to improve her N/V. 11/30 pt states that the steroids helped her nausea, and she was able to eat multiple foods without vomiting. Her appetite also increased. 12/1 she still had no further N/V after steroid administration, and was determined to be stable for d/c. She was sent home with a Dexamethasone taper, to make her full course consist of Dex 4mg BID x3 days followed by Dex 4mg Daily x3 days followed by Dex 2mg Daily x3 days. She was also instructed to follow up in Heme-Onc clinic after discharge.      Physical Exam  HENT:      Head: Normocephalic.   Eyes:      Extraocular Movements: Extraocular movements intact.      Pupils: Pupils are equal, round, and reactive to light.   Cardiovascular:      Rate and Rhythm: Normal rate and regular rhythm.      Pulses: Normal pulses.      Heart sounds: Normal heart sounds.   Pulmonary:      Effort: Pulmonary effort is normal.      Breath sounds: Normal breath sounds.   Abdominal:      General: Bowel sounds are normal.   Skin:     General: Skin  is warm and dry.   Neurological:      Mental Status: She is alert and oriented to person, place, and time.        Goals of Care Treatment Preferences:  Code Status: Full Code    Living Will: Yes              Consults:   Consults (From admission, onward)          Status Ordering Provider     Inpatient consult to Palliative Care  Once        Provider:  (Not yet assigned)    Completed ELIZABETH ANDERSON     Inpatient consult to Gastroenterology  Once        Provider:  (Not yet assigned)    Completed DERRICK EASON            Significant Diagnostic Studies: Labs: CMP   Recent Labs   Lab 11/30/24  0331 12/01/24  0248   * 136   K 3.9 3.8    106   CO2 18* 21*   * 209*   BUN 7* 13   CREATININE 1.4 1.5*   CALCIUM 8.8 8.7   PROT 6.8 6.7   ALBUMIN 2.7* 2.8*   BILITOT 0.3 0.2   ALKPHOS 75 66   AST 16 14   ALT 9* 10   ANIONGAP 13 9    and CBC   Recent Labs   Lab 11/30/24  0331 12/01/24  0248   WBC 7.36 14.30*   HGB 11.0* 10.2*   HCT 33.1* 30.1*    376       Pending Diagnostic Studies:       None          Final Active Diagnoses:    Diagnosis Date Noted POA    PRINCIPAL PROBLEM:  Intractable nausea and vomiting [R11.2] 11/24/2024 Yes    Melena [K92.1] 11/26/2024 Unknown    Decreased oral intake [R63.8] 11/26/2024 Yes    Back pain [M54.9] 11/24/2024 Yes    KESHA (acute kidney injury) [N17.9] 11/24/2024 Yes    UTI (urinary tract infection) [N39.0] 11/24/2024 Yes    Hypothyroidism [E03.9] 11/24/2024 Yes    Secondary malignant neoplasm of bone [C79.51] 10/30/2024 Yes    Stage 3b chronic kidney disease [N18.32] 10/12/2024 Yes    Adenocarcinoma, lung, left [C34.92] 07/13/2023 Yes    Severe obesity (BMI 35.0-39.9) with comorbidity [E66.01] 01/13/2016 Yes      Problems Resolved During this Admission:      Discharged Condition: fair    Disposition: Home or Self Care    Follow Up:    Patient Instructions:   No discharge procedures on file.  Medications:  Reconciled Home Medications:      Medication List        START taking  these medications      dexAMETHasone 2 MG tablet  Commonly known as: DECADRON  Take 2 tablets by mouth every 12 hours for 1 day, THEN 2 tablets once daily for 3 days, THEN 1 tablet daily for 3 days  Start taking on: December 1, 2024            CONTINUE taking these medications      ALLERGY ORAL  Take 1 tablet by mouth.     betamethasone dipropionate 0.05 % ointment  Commonly known as: DIPROLENE  Apply topically 2 (two) times daily. Prn psoriasis flares     esomeprazole 20 MG capsule  Commonly known as: NEXIUM  Take 20 mg by mouth every evening.     levothyroxine 75 MCG tablet  Commonly known as: SYNTHROID  Take 1 tablet (75 mcg total) by mouth before breakfast.     LORazepam 1 MG tablet  Commonly known as: ATIVAN  Take 1 tablet (1 mg total) by mouth every 6 (six) hours as needed for Anxiety (and half an hour before MRI).     LUMAKRAS 320 mg Tab  Generic drug: sotorasib  Take 960 mg by mouth once daily.     mometasone 0.1% 0.1 % cream  Commonly known as: ELOCON  AAA every day when red and tender under occlusion     ondansetron 4 MG Tbdl  Commonly known as: ZOFRAN-ODT  Take 2 tablets (8 mg total) by mouth every 6 (six) hours as needed (n/v).     ondansetron 8 MG tablet  Commonly known as: ZOFRAN  Take 0.5 tablets (4 mg total) by mouth every 8 (eight) hours as needed for Nausea.     OTEZLA 30 mg Tab  Generic drug: apremilast  TAKE 1 TABLET BY MOUTH DAILY     pilocarpine 5 MG Tab  Commonly known as: SALAGEN (PILOCARPINE)  Take 1 tablet (5 mg total) by mouth 3 (three) times daily.     promethazine 25 MG tablet  Commonly known as: PHENERGAN  Take 1 tablet (25 mg total) by mouth every 6 (six) hours as needed for Nausea.     traMADoL 50 mg tablet  Commonly known as: ULTRAM  Take 1 tablet (50 mg total) by mouth every 6 (six) hours as needed for Pain.              Oswaldo Beck MD  Hematology/Oncology  [facility] - Oncology ([facility])

## 2024-12-02 ENCOUNTER — TELEPHONE (OUTPATIENT)
Dept: HEMATOLOGY/ONCOLOGY | Facility: CLINIC | Age: 64
End: 2024-12-02
Payer: MEDICARE

## 2024-12-02 ENCOUNTER — PATIENT MESSAGE (OUTPATIENT)
Dept: HEMATOLOGY/ONCOLOGY | Facility: CLINIC | Age: 64
End: 2024-12-02
Payer: MEDICARE

## 2024-12-02 ENCOUNTER — HOSPITAL ENCOUNTER (OUTPATIENT)
Dept: RADIATION THERAPY | Facility: HOSPITAL | Age: 64
Discharge: HOME | End: 2024-12-02
Attending: RADIOLOGY
Payer: MEDICARE

## 2024-12-03 ENCOUNTER — OFFICE VISIT (OUTPATIENT)
Dept: DERMATOLOGY | Facility: CLINIC | Age: 64
End: 2024-12-03
Payer: MEDICARE

## 2024-12-03 DIAGNOSIS — L40.9 PSORIASIS: ICD-10-CM

## 2024-12-03 DIAGNOSIS — L73.2 HIDRADENITIS SUPPURATIVA: Primary | ICD-10-CM

## 2024-12-03 PROCEDURE — 3066F NEPHROPATHY DOC TX: CPT | Mod: HCNC,CPTII,S$GLB, | Performed by: DERMATOLOGY

## 2024-12-03 PROCEDURE — 99999 PR PBB SHADOW E&M-EST. PATIENT-LVL III: CPT | Mod: PBBFAC,HCNC,, | Performed by: DERMATOLOGY

## 2024-12-03 PROCEDURE — 11900 INJECT SKIN LESIONS </W 7: CPT | Mod: HCNC,S$GLB,, | Performed by: DERMATOLOGY

## 2024-12-03 PROCEDURE — 1160F RVW MEDS BY RX/DR IN RCRD: CPT | Mod: HCNC,CPTII,S$GLB, | Performed by: DERMATOLOGY

## 2024-12-03 PROCEDURE — 3288F FALL RISK ASSESSMENT DOCD: CPT | Mod: HCNC,CPTII,S$GLB, | Performed by: DERMATOLOGY

## 2024-12-03 PROCEDURE — 1101F PT FALLS ASSESS-DOCD LE1/YR: CPT | Mod: HCNC,CPTII,S$GLB, | Performed by: DERMATOLOGY

## 2024-12-03 PROCEDURE — 99214 OFFICE O/P EST MOD 30 MIN: CPT | Mod: 25,HCNC,S$GLB, | Performed by: DERMATOLOGY

## 2024-12-03 PROCEDURE — 1111F DSCHRG MED/CURRENT MED MERGE: CPT | Mod: HCNC,CPTII,S$GLB, | Performed by: DERMATOLOGY

## 2024-12-03 PROCEDURE — 1159F MED LIST DOCD IN RCRD: CPT | Mod: HCNC,CPTII,S$GLB, | Performed by: DERMATOLOGY

## 2024-12-03 RX ORDER — CLOBETASOL PROPIONATE 0.5 MG/G
OINTMENT TOPICAL
Qty: 60 G | Refills: 3 | Status: SHIPPED | OUTPATIENT
Start: 2024-12-03

## 2024-12-03 RX ORDER — TRIAMCINOLONE ACETONIDE 40 MG/ML
40 INJECTION, SUSPENSION INTRA-ARTICULAR; INTRAMUSCULAR ONCE
Status: SHIPPED | OUTPATIENT
Start: 2024-12-03

## 2024-12-03 NOTE — PROGRESS NOTES
"  Subjective:      Patient ID:  Radha Ervin is a 64 y.o. female who presents for   Chief Complaint   Patient presents with    Hidradenitis Suppurativa     Flare- L groin     Patient with Hidradenitis suppurativa  Last seen on 9/19/2024 for IL K20 into 1 lesion left upper inner thigh.    Condition overall - same.    C/o flare L groin and L breast.  Couple days.     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 Negative for weight gain.   HENT:  Negative for nosebleeds and headaches.    Gastrointestinal:  Negative for diarrhea (resolved. never had colonoscopy) and Sensitivity to oral antibiotics.   Genitourinary:  Negative for irregular periods (post menopausal).   Musculoskeletal:  Negative for arthralgias.   Skin:  Positive for abscesses. Negative for recent sunburn.   Neurological:  Negative for headaches.   Psychiatric/Behavioral:  Negative for depressed mood.    Hematologic/Lymphatic: Bruises/bleeds easily.       Objective:   Physical Exam   Constitutional: She appears well-developed and well-nourished. She is obese.  No distress.   Neurological: She is alert and oriented to person, place, and time. She is not disoriented.   Psychiatric: She has a normal mood and affect.   Skin:   Areas Examined (abnormalities noted in diagram):   Chest / Axilla Inspection Performed  Genitals / Buttocks / Groin Inspection Performed  RLE Inspected  LLE Inspection Performed                    Diagram Legend     Erythematous " scaling macule/papule c/w actinic keratosis       Vascular papule c/w angioma      Pigmented verrucoid papule/plaque c/w seborrheic keratosis      Yellow umbilicated papule c/w sebaceous hyperplasia      Irregularly shaped tan macule c/w lentigo     1-2 mm smooth white papules consistent with Milia      Movable subcutaneous cyst with punctum c/w epidermal inclusion cyst      Subcutaneous movable cyst c/w pilar cyst      Firm pink to brown papule c/w dermatofibroma      Pedunculated fleshy papule(s) c/w skin tag(s)      Evenly pigmented macule c/w junctional nevus     Mildly variegated pigmented, slightly irregular-bordered macule c/w mildly atypical nevus      Flesh colored to evenly pigmented papule c/w intradermal nevus       Pink pearly papule/plaque c/w basal cell carcinoma      Erythematous hyperkeratotic cursted plaque c/w SCC      Surgical scar with no sign of skin cancer recurrence      Open and closed comedones      Inflammatory papules and pustules      Verrucoid papule consistent consistent with wart     Erythematous eczematous patches and plaques     Dystrophic onycholytic nail with subungual debris c/w onychomycosis     Umbilicated papule    Erythematous-base heme-crusted tan verrucoid plaque consistent with inflamed seborrheic keratosis     Erythematous Silvery Scaling Plaque c/w Psoriasis     See annotation      Assessment / Plan:        Hidradenitis suppurativa  -     triamcinolone acetonide injection 40 mg  -     NH [id number] ACETONIDE INJ PER 10MG  -     NH INJECTION INTO SKIN LESIONS, UP TO 7    Intralesional Kenalog 40mg/cc (0.5 cc total) injected into 1 lesions on the left upper inner thigh today after obtaining verbal consent including risk of surrounding hypopigmentation. Patient tolerated procedure well.    Units:4  NDC for Kenalog 40mg/cc:  9072-2341-80    Intralesional Kenalog 20mg/cc (1.5 cc total) injected into 2 lesions on the left inframammary and right upper inner thigh today after  obtaining verbal consent including risk of surrounding hypopigmentation. Patient tolerated procedure well.    Units: 4  NDC for Kenalog 40mg/cc:  1489-6356-87                    Psoriasis  -     clobetasol 0.05% (TEMOVATE) 0.05 % Oint; AAA fingertips bid - may occlude qhs  Dispense: 60 g; Refill: 3             No follow-ups on file.

## 2024-12-04 ENCOUNTER — PATIENT OUTREACH (OUTPATIENT)
Dept: ADMINISTRATIVE | Facility: CLINIC | Age: 64
End: 2024-12-04
Payer: MEDICARE

## 2024-12-05 ENCOUNTER — PATIENT MESSAGE (OUTPATIENT)
Dept: HEMATOLOGY/ONCOLOGY | Facility: CLINIC | Age: 64
End: 2024-12-05
Payer: MEDICARE

## 2024-12-06 ENCOUNTER — PATIENT MESSAGE (OUTPATIENT)
Dept: HEMATOLOGY/ONCOLOGY | Facility: CLINIC | Age: 64
End: 2024-12-06
Payer: MEDICARE

## 2024-12-08 ENCOUNTER — PATIENT MESSAGE (OUTPATIENT)
Dept: DERMATOLOGY | Facility: CLINIC | Age: 64
End: 2024-12-08
Payer: MEDICARE

## 2024-12-09 ENCOUNTER — TELEPHONE (OUTPATIENT)
Dept: PAIN MEDICINE | Facility: CLINIC | Age: 64
End: 2024-12-09
Payer: MEDICARE

## 2024-12-09 DIAGNOSIS — C79.51 SECONDARY MALIGNANT NEOPLASM OF BONE: Primary | ICD-10-CM

## 2024-12-10 ENCOUNTER — TELEPHONE (OUTPATIENT)
Dept: PAIN MEDICINE | Facility: CLINIC | Age: 64
End: 2024-12-10
Payer: MEDICARE

## 2024-12-13 ENCOUNTER — TELEPHONE (OUTPATIENT)
Dept: PAIN MEDICINE | Facility: CLINIC | Age: 64
End: 2024-12-13
Payer: MEDICARE

## 2024-12-13 DIAGNOSIS — C41.2 MALIGNANT NEOPLASM OF VERTEBRAL COLUMN: ICD-10-CM

## 2024-12-13 DIAGNOSIS — S32.010A COMPRESSION FRACTURE OF L1 VERTEBRA, INITIAL ENCOUNTER: Primary | ICD-10-CM

## 2024-12-13 NOTE — TELEPHONE ENCOUNTER
----- Message from Anthony sent at 12/13/2024  3:52 PM CST -----  Regarding: Call requested  Contact: [phone]  Hi,     Who called: The pt       Reason: Pt has question about Monday's surgery. Pls call  [phone]      Provider's name:      Additional Information: Thank you.

## 2024-12-16 ENCOUNTER — TELEPHONE (OUTPATIENT)
Dept: PAIN MEDICINE | Facility: CLINIC | Age: 64
End: 2024-12-16
Payer: MEDICARE

## 2024-12-16 DIAGNOSIS — G89.29 CHRONIC MIDLINE LOW BACK PAIN WITHOUT SCIATICA: Primary | ICD-10-CM

## 2024-12-16 DIAGNOSIS — M54.16 LUMBAR RADICULOPATHY: ICD-10-CM

## 2024-12-16 DIAGNOSIS — M54.50 CHRONIC MIDLINE LOW BACK PAIN WITHOUT SCIATICA: Primary | ICD-10-CM

## 2024-12-16 NOTE — TELEPHONE ENCOUNTER
----- Message from Andre Hubbard MD sent at 2024 11:26 AM CST -----  Regarding: Order for MARTA MAYA    Patient Name: MARTA MAYA([id number])  Sex: Female  : 1960      PCP: AUGUSTUS ARTEAGA III    Center: [facility]     Types of orders made on 2024: Procedure Request    Order Date:2024  Ordering User:ANDRE HUBBARD [[id number]]  Encounter Provider:Andre Hubbard MD [[id number]]  Authorizing Provider: Andre Hubbard MD [[id number]]  Department:[facility] PAIN MANAGEMENT[[id number]]    Common Order Information  Procedure -> Transforaminal Injection (Specify level and laterality) Cmt: L1/2             BL    Order Specific Information  Order: Procedure Order to Pain Management [Custom: [id number]]  Order #:          [id number]: 1 FUTURE    Priority: Routine  Class: Clinic Performed    Future Order Information      Expires on:2025            Expected by:2024                   Associated Diagnoses      M54.50, G89.29 Chronic midline low back pain without sciatica      M54.16 Lumbar radiculopathy      Physician -> sima         Facility Name: -> [facility]           Priority: Routine  Class: Clinic Performed    Future Order Information      Expires on:2025            Expected by:2024                   Associated Diagnoses      M54.50, G89.29 Chronic midline low back pain without sciatica      M54.16 Lumbar radiculopathy      Procedure -> Transforaminal Injection (Specify level and laterality) Cmt:                 L1/2 BL        Physician -> sima         Facility Name: -> [facility]

## 2024-12-17 ENCOUNTER — PATIENT MESSAGE (OUTPATIENT)
Dept: HEMATOLOGY/ONCOLOGY | Facility: CLINIC | Age: 64
End: 2024-12-17
Payer: MEDICARE

## 2024-12-17 DIAGNOSIS — M84.58XA PATHOLOGICAL FRACTURE IN NEOPLASTIC DISEASE, OTHER SPECIFIED SITE, INITIAL ENCOUNTER FOR FRACTURE: ICD-10-CM

## 2024-12-17 DIAGNOSIS — C41.2 MALIGNANT NEOPLASM OF VERTEBRAL COLUMN: ICD-10-CM

## 2024-12-17 DIAGNOSIS — S32.010A COMPRESSION FRACTURE OF L1 VERTEBRA, INITIAL ENCOUNTER: Primary | ICD-10-CM

## 2024-12-17 NOTE — LETTER
05/14/2018    To whom it may concern:    Radha Ervin is under my medical care. She was seen today and may return to work/school on Tomorrow with the following restrictions: no prolonged standing for 3 months.    Yours truly,        Flex De La Cruz MD   Last seen on 11/12/2024:  Alopecia areata  Androgenetic alopecia  Status: chronic condition flaring and/or not at treatment goal  Round patches of hair loss with terminal hair regrowth R and L temporal scalp behind ear and mid frontal scalp- c/w AA  Also with miniaturization frontotemporal and part line c/w AGA  Pt tried/failed spironolactone (made her feel lowered BP)     Plan:  Continue ketoconazole shampoo BIW  Continue vitamin d and iron supplementation  Continue serial ILK  Discussed trial of LDOM. In studies did not notably change BP- she is interested in trying and will discontinue if she feels lightheaded and return to topical minoxidil. May start cutting 2.5 mg pill in 1/4 then increase to 1/2 pill if tolerated  Start 1.25 mg minoxidil daily   Possible SE of treatment include hair growth on other areas of body besides scalp (hypetrichosis), lightheadedness, dizziness, headaches, insomnia, tachycardia, leg and/or periorbital edema, and potential initial shedding of hairs     Intralesional Kenalog 5mg/cc (1 cc total) injected into <7 lesions on the scalp today after obtaining verbal consent including risk of surrounding hypopigmentation. Patient tolerated procedure well.     Units: 1  NDC for Kenalog 10mg/cc:  7278-4280-71     Rosacea  Pt desires refill of Rhofade, $90 at pharmacy  [facility] Rx [facility]              SM50 Oxymetazoline 1%/ ivermectin 1%/ niacinamide 2% cream twice daily     RTC 3 mo, sooner prn      Please see the attached refill request.

## 2024-12-20 ENCOUNTER — OFFICE VISIT (OUTPATIENT)
Dept: FAMILY MEDICINE | Facility: CLINIC | Age: 64
End: 2024-12-20
Payer: MEDICARE

## 2024-12-20 ENCOUNTER — PATIENT MESSAGE (OUTPATIENT)
Dept: INTERNAL MEDICINE | Facility: CLINIC | Age: 64
End: 2024-12-20
Payer: MEDICARE

## 2024-12-20 VITALS — WEIGHT: 170.63 LBS | HEIGHT: 59 IN | BODY MASS INDEX: 34.4 KG/M2 | HEART RATE: 120 BPM | OXYGEN SATURATION: 98 %

## 2024-12-20 DIAGNOSIS — B96.89 BACTERIAL VAGINOSIS: Primary | ICD-10-CM

## 2024-12-20 DIAGNOSIS — N76.0 BACTERIAL VAGINOSIS: Primary | ICD-10-CM

## 2024-12-20 DIAGNOSIS — R39.9 URINARY SYMPTOM OR SIGN: ICD-10-CM

## 2024-12-20 LAB
BILIRUB SERPL-MCNC: ABNORMAL MG/DL
BLOOD URINE, POC: ABNORMAL
CLARITY, POC UA: CLEAR
COLOR, POC UA: YELLOW
GLUCOSE UR QL STRIP: NEGATIVE
KETONES UR QL STRIP: 80
LEUKOCYTE ESTERASE URINE, POC: ABNORMAL
NITRITE, POC UA: NEGATIVE
PH, POC UA: 6
PROTEIN, POC: ABNORMAL
SPECIFIC GRAVITY, POC UA: 1.02
UROBILINOGEN, POC UA: 0.2

## 2024-12-20 PROCEDURE — 99999 PR PBB SHADOW E&M-EST. PATIENT-LVL IV: CPT | Mod: PBBFAC,HCNC,,

## 2024-12-20 RX ORDER — METRONIDAZOLE 500 MG/1
500 TABLET ORAL 2 TIMES DAILY
Qty: 14 TABLET | Refills: 0 | Status: SHIPPED | OUTPATIENT
Start: 2024-12-20 | End: 2024-12-27

## 2024-12-20 NOTE — PROGRESS NOTES
[facility]    12/20/2024      Subjective:       Patient ID:  Marley is a 64 y.o. female .  has a past medical history of Allergy, Amblyopia, Cataract, Eczema, HS (hereditary spherocytosis), total knee replacement, and Joint pain.    History of Present Illness    CHIEF COMPLAINT:  Radha presents today with concerns of foul-smelling discharge.    GENITOURINARY:  She reports experiencing a foul-smelling discharge that started last week. This is her second occurrence, with the first being treated in the hospital. She denies dysuria. She suspects her thyroid medication may be contributing to urinary tract infections.    CANCER HISTORY:  She has a history of lung cancer with metastasis to the spine.    MUSCULOSKELETAL:  She has bulging discs in her lower back.    THYROID DISORDER:  She experiences thyroid level fluctuations, alternating between hypothyroid and hyperthyroid states. She is currently on thyroid medication prescribed by her oncologist.    ORAL HEALTH:  She reports progressive dry mouth causing difficulty with eating. Pilocarpine has been ineffective in alleviating the symptoms.      ROS:  Constitutional: -chills, -fever  Respiratory: -cough, -shortness of breath  Cardiovascular: -chest pain  Gastrointestinal: -abdominal pain, -constipation, -diarrhea, -nausea, -vomiting  Neurological: -dizziness, -lightheadedness, -headaches  Genitourinary: -dysuria  Throat: +dry mouth       Patient Active Problem List   Diagnosis    Primary localized osteoarthrosis of right lower leg    Severe obesity (BMI 35.0-39.9) with comorbidity    Long-term current use of intravenous immunoglobulin (IVIG)    Hidradenitis suppurativa    Tobacco dependence quit 5/2023    Nuclear sclerosis of both eyes    Visual field defect    Drug-induced immunodeficiency    Emphysema lung    Pulmonary nodule    Adenocarcinoma, lung, left    Secondary and unspecified malignant neoplasm of intrathoracic lymph nodes     "Tinnitus of both ears    Localized osteoporosis with current pathological fracture with routine healing    Stage 3b chronic kidney disease    Hyperparathyroidism    Thoracic aorta atherosclerosis    Anxiety    Secondary malignant neoplasm of bone    Intractable nausea and vomiting    Back pain    KESHA (acute kidney injury)    UTI (urinary tract infection)    Hypothyroidism    Melena    Non-small cell lung cancer    Decreased oral intake         Last HgbA1C:    Lab Results   Component Value Date    HGBA1C 6.3 (H) 04/28/2023         Last Lipid Panel:    Lab Results   Component Value Date    HDL 42 05/26/2023       Lab Results   Component Value Date    LDLCALC 104 05/26/2023       Lab Results   Component Value Date    TRIG 134 05/26/2023       No results found for: "CHOLHDL"      Review of patient's allergies indicates:   Allergen Reactions    Paclitaxel Anaphylaxis and Other (See Comments)     Pt states "Anaphylaxis" last encounter w/ throat swelling. Encountered back pain, coughing and head fuzzy today.      Metformin Itching    Morphine Other (See Comments)     headaches    Latex, natural rubber Rash and Other (See Comments)     Redness and peeling only with bandaids    Penicillins Nausea And Vomiting and Rash        Medication List with Changes/Refills   New Medications    METRONIDAZOLE (FLAGYL) 500 MG TABLET    Take 1 tablet (500 mg total) by mouth 2 (two) times a day. for 7 days   Current Medications    BETAMETHASONE DIPROPIONATE (DIPROLENE) 0.05 % OINTMENT    Apply topically 2 (two) times daily. Prn psoriasis flares    CLOBETASOL 0.05% (TEMOVATE) 0.05 % OINT    AAA fingertips bid - may occlude qhs    DIPHENHYDRAMINE HCL (ALLERGY ORAL)    Take 1 tablet by mouth.    ESOMEPRAZOLE (NEXIUM) 20 MG CAPSULE    Take 20 mg by mouth every evening.    LEVOTHYROXINE (SYNTHROID) 75 MCG TABLET    Take 1 tablet (75 mcg total) by mouth before breakfast.    LORAZEPAM (ATIVAN) 1 MG TABLET    Take 1 tablet (1 mg total) by mouth " "every 6 (six) hours as needed for Anxiety (and half an hour before MRI).    MOMETASONE 0.1% (ELOCON) 0.1 % CREAM    AAA every day when red and tender under occlusion    ONDANSETRON (ZOFRAN) 8 MG TABLET    Take 0.5 tablets (4 mg total) by mouth every 8 (eight) hours as needed for Nausea.    ONDANSETRON (ZOFRAN-ODT) 4 MG TBDL    Take 2 tablets (8 mg total) by mouth every 6 (six) hours as needed (n/v).    OTEZLA 30 MG TAB    TAKE 1 TABLET BY MOUTH DAILY    PILOCARPINE (SALAGEN, PILOCARPINE,) 5 MG TAB    Take 1 tablet (5 mg total) by mouth 3 (three) times daily.    PROMETHAZINE (PHENERGAN) 25 MG TABLET    Take 1 tablet (25 mg total) by mouth every 6 (six) hours as needed for Nausea.    SOTORASIB (LUMAKRAS) 320 MG TAB    Take 960 mg by mouth once daily.    TRAMADOL (ULTRAM) 50 MG TABLET    Take 1 tablet (50 mg total) by mouth every 6 (six) hours as needed for Pain.               Objective:      Pulse (!) 120   Ht 4' 11" (1.499 m)   Wt 77.4 kg (170 lb 10.2 oz)   LMP  (LMP Unknown)   SpO2 98%   BMI 34.46 kg/m²   Estimated body mass index is 34.46 kg/m² as calculated from the following:    Height as of this encounter: 4' 11" (1.499 m).    Weight as of this encounter: 77.4 kg (170 lb 10.2 oz).    Physical Exam  Vitals and nursing note reviewed.   Constitutional:       General: She is not in acute distress.     Appearance: Normal appearance.   HENT:      Head: Normocephalic and atraumatic.      Nose: Nose normal.   Eyes:      Conjunctiva/sclera: Conjunctivae normal.   Cardiovascular:      Rate and Rhythm: Tachycardia present.   Pulmonary:      Effort: Pulmonary effort is normal. No respiratory distress.   Abdominal:      Tenderness: There is no abdominal tenderness.   Genitourinary:     Comments: excoriations inner thigh   Musculoskeletal:         General: Normal range of motion.      Cervical back: Normal range of motion.   Skin:     General: Skin is warm.   Neurological:      Mental Status: She is alert and oriented to " "person, place, and time.   Psychiatric:         Mood and Affect: Mood normal.         Behavior: Behavior normal.             Assessment and Plan:     Radha Flor" was seen today for urinary tract infection.    Diagnoses and all orders for this visit:    Bacterial vaginosis  -     metroNIDAZOLE (FLAGYL) 500 MG tablet; Take 1 tablet (500 mg total) by mouth 2 (two) times a day. for 7 days    Urinary symptom or sign  -     POCT URINE DIPSTICK WITHOUT MICROSCOPE  -     Vaginosis Screen by DNA Probe; Future          Assessment & Plan    IMPRESSION:  - Assessed patient's symptoms, ruling out UTI due to absence of pain or burning with urination  - Suspected bacterial vaginosis based on reported foul odor and discharge  - Performed vaginal swab to test for bacterial overgrowth  - Ordered urinalysis to rule out UTI  - Noted patient's concerns about thyroid medication potentially causing symptoms  - Deferred management of thyroid medication and dry mouth to Dr. Eduardo (oncologist) due to patient's complex medical history including lung cancer with spinal metastases    BACTERIAL VAGINOSIS:  - Explained bacterial vaginosis as a condition caused by pH imbalance leading to excess bacterial growth in the vagina.  - Started medication for bacterial vaginosis, to be taken for 7 days.  - Performed vaginal swab for bacterial culture.  - Discussed the need to avoid alcohol while taking the prescribed medication.    HYPOTHYROIDISM:  - Radha to inform Dr. Eduardo if she decides to stop taking thyroid medication.    GENERAL FOLLOW-UP:  - Ordered urinalysis.  - Contact the office for results of urine test and vaginal swab.         The patient was informed of the following statements     Emergency Care:Seek immediate medical attention in the emergency room if you experience any new or worsening symptoms, or if your current condition significantly changes or becomes more severe.  Patient Acknowledgment: Patient verbalizes understanding of " the plan and agrees to proceed with the recommended care.    Follow Up:  RTC as scheduled          Other Orders Placed This Visit:  Orders Placed This Encounter   Procedures    Vaginosis Screen by DNA Probe    POCT URINE DIPSTICK WITHOUT MICROSCOPE         This note was generated with the assistance of ambient listening technology. Verbal consent was obtained by the patient and accompanying visitor(s) for the recording of patient appointment to facilitate this note. I attest to having reviewed and edited the generated note for accuracy, though some syntax or spelling errors may persist. Please contact the author of this note for any clarification.        Rebeca Laboy PA-C

## 2024-12-21 ENCOUNTER — LAB VISIT (OUTPATIENT)
Dept: LAB | Facility: HOSPITAL | Age: 64
End: 2024-12-21
Payer: MEDICARE

## 2024-12-21 DIAGNOSIS — R39.9 URINARY SYMPTOM OR SIGN: ICD-10-CM

## 2024-12-21 PROCEDURE — 0352U VAGINOSIS SCREEN BY DNA PROBE: CPT | Mod: HCNC

## 2024-12-22 ENCOUNTER — HOSPITAL ENCOUNTER (OUTPATIENT)
Facility: HOSPITAL | Age: 64
Discharge: HOME | End: 2024-12-24
Attending: EMERGENCY MEDICINE | Admitting: EMERGENCY MEDICINE
Payer: MEDICARE

## 2024-12-22 DIAGNOSIS — R07.9 CHEST PAIN: ICD-10-CM

## 2024-12-22 DIAGNOSIS — N17.9 AKI (ACUTE KIDNEY INJURY): ICD-10-CM

## 2024-12-22 DIAGNOSIS — E86.0 DEHYDRATION: Primary | ICD-10-CM

## 2024-12-22 PROBLEM — R13.12 DYSPHAGIA, OROPHARYNGEAL PHASE: Status: ACTIVE | Noted: 2024-12-22

## 2024-12-22 PROBLEM — J43.9 EMPHYSEMA LUNG: Chronic | Status: ACTIVE | Noted: 2023-06-21

## 2024-12-22 PROBLEM — M54.50 CHRONIC LOW BACK PAIN: Status: ACTIVE | Noted: 2024-11-24

## 2024-12-22 PROBLEM — E03.9 HYPOTHYROIDISM: Chronic | Status: ACTIVE | Noted: 2024-11-24

## 2024-12-22 PROBLEM — B96.89 BACTERIAL VAGINOSIS: Status: ACTIVE | Noted: 2024-12-22

## 2024-12-22 PROBLEM — H25.13 NUCLEAR SCLEROSIS OF BOTH EYES: Chronic | Status: ACTIVE | Noted: 2020-02-10

## 2024-12-22 PROBLEM — K92.1 MELENA: Status: RESOLVED | Noted: 2024-11-26 | Resolved: 2024-12-22

## 2024-12-22 PROBLEM — C79.51 SECONDARY MALIGNANT NEOPLASM OF BONE: Chronic | Status: ACTIVE | Noted: 2024-10-30

## 2024-12-22 PROBLEM — C34.90 NON-SMALL CELL LUNG CANCER: Chronic | Status: ACTIVE | Noted: 2024-11-26

## 2024-12-22 PROBLEM — K11.7 XEROSTOMIA: Status: ACTIVE | Noted: 2024-12-22

## 2024-12-22 PROBLEM — N18.32 ACUTE RENAL FAILURE SUPERIMPOSED ON STAGE 3B CHRONIC KIDNEY DISEASE: Status: ACTIVE | Noted: 2024-11-24

## 2024-12-22 PROBLEM — R11.2 INTRACTABLE NAUSEA AND VOMITING: Status: RESOLVED | Noted: 2024-11-24 | Resolved: 2024-12-22

## 2024-12-22 PROBLEM — C77.1 SECONDARY AND UNSPECIFIED MALIGNANT NEOPLASM OF INTRATHORACIC LYMPH NODES: Chronic | Status: ACTIVE | Noted: 2024-03-05

## 2024-12-22 PROBLEM — N76.0 BACTERIAL VAGINOSIS: Status: ACTIVE | Noted: 2024-12-22

## 2024-12-22 PROBLEM — N39.0 UTI (URINARY TRACT INFECTION): Status: RESOLVED | Noted: 2024-11-24 | Resolved: 2024-12-22

## 2024-12-22 PROBLEM — L73.2 HIDRADENITIS SUPPURATIVA: Chronic | Status: ACTIVE | Noted: 2018-12-13

## 2024-12-22 PROBLEM — N18.32 STAGE 3B CHRONIC KIDNEY DISEASE: Chronic | Status: ACTIVE | Noted: 2024-10-12

## 2024-12-22 PROBLEM — C34.92 ADENOCARCINOMA, LUNG, LEFT: Chronic | Status: ACTIVE | Noted: 2023-07-13

## 2024-12-22 PROBLEM — G89.29 CHRONIC LOW BACK PAIN: Status: ACTIVE | Noted: 2024-11-24

## 2024-12-22 PROBLEM — M80.80XD LOCALIZED OSTEOPOROSIS WITH CURRENT PATHOLOGICAL FRACTURE WITH ROUTINE HEALING: Chronic | Status: ACTIVE | Noted: 2024-09-06

## 2024-12-22 PROBLEM — R82.81 PYURIA: Status: ACTIVE | Noted: 2024-12-22

## 2024-12-22 PROBLEM — F17.200 TOBACCO DEPENDENCE: Chronic | Status: ACTIVE | Noted: 2018-12-13

## 2024-12-22 PROBLEM — R11.0 NAUSEA: Status: ACTIVE | Noted: 2024-12-22

## 2024-12-22 LAB
ALBUMIN SERPL BCP-MCNC: 3.5 G/DL (ref 3.5–5.2)
ALP SERPL-CCNC: 103 U/L (ref 40–150)
ALT SERPL W/O P-5'-P-CCNC: 12 U/L (ref 10–44)
ANION GAP SERPL CALC-SCNC: 15 MMOL/L (ref 8–16)
AST SERPL-CCNC: 15 U/L (ref 10–40)
B-OH-BUTYR BLD STRIP-SCNC: 1.7 MMOL/L (ref 0–0.5)
BACTERIA #/AREA URNS AUTO: ABNORMAL /HPF
BASOPHILS # BLD AUTO: 0.05 K/UL (ref 0–0.2)
BASOPHILS NFR BLD: 0.6 % (ref 0–1.9)
BILIRUB SERPL-MCNC: 0.6 MG/DL (ref 0.1–1)
BILIRUB UR QL STRIP: NEGATIVE
BUN SERPL-MCNC: 16 MG/DL (ref 6–30)
BUN SERPL-MCNC: 17 MG/DL (ref 8–23)
CALCIUM SERPL-MCNC: 9.8 MG/DL (ref 8.7–10.5)
CHLORIDE SERPL-SCNC: 105 MMOL/L (ref 95–110)
CHLORIDE SERPL-SCNC: 108 MMOL/L (ref 95–110)
CHLORIDE UR-SCNC: 41 MMOL/L (ref 25–200)
CLARITY UR REFRACT.AUTO: CLEAR
CO2 SERPL-SCNC: 19 MMOL/L (ref 23–29)
COLOR UR AUTO: YELLOW
CREAT SERPL-MCNC: 2 MG/DL (ref 0.5–1.4)
CREAT SERPL-MCNC: 2.2 MG/DL (ref 0.5–1.4)
CREAT UR-MCNC: 101 MG/DL (ref 15–325)
DIFFERENTIAL METHOD BLD: ABNORMAL
EOSINOPHIL # BLD AUTO: 0.6 K/UL (ref 0–0.5)
EOSINOPHIL NFR BLD: 7.4 % (ref 0–8)
ERYTHROCYTE [DISTWIDTH] IN BLOOD BY AUTOMATED COUNT: 14.9 % (ref 11.5–14.5)
EST. GFR  (NO RACE VARIABLE): 24.3 ML/MIN/1.73 M^2
GLUCOSE SERPL-MCNC: 78 MG/DL (ref 70–110)
GLUCOSE SERPL-MCNC: 87 MG/DL (ref 70–110)
GLUCOSE UR QL STRIP: NEGATIVE
HCT VFR BLD AUTO: 40.8 % (ref 37–48.5)
HCT VFR BLD CALC: 35 %PCV (ref 36–54)
HGB BLD-MCNC: 13.7 G/DL (ref 12–16)
HGB UR QL STRIP: NEGATIVE
HYALINE CASTS UR QL AUTO: 3 /LPF
IMM GRANULOCYTES # BLD AUTO: 0.02 K/UL (ref 0–0.04)
IMM GRANULOCYTES NFR BLD AUTO: 0.2 % (ref 0–0.5)
KETONES UR QL STRIP: ABNORMAL
LACTATE SERPL-SCNC: 1 MMOL/L (ref 0.5–2.2)
LEUKOCYTE ESTERASE UR QL STRIP: ABNORMAL
LYMPHOCYTES # BLD AUTO: 1.8 K/UL (ref 1–4.8)
LYMPHOCYTES NFR BLD: 21.3 % (ref 18–48)
MAGNESIUM SERPL-MCNC: 1.9 MG/DL (ref 1.6–2.6)
MCH RBC QN AUTO: 32.1 PG (ref 27–31)
MCHC RBC AUTO-ENTMCNC: 33.6 G/DL (ref 32–36)
MCV RBC AUTO: 96 FL (ref 82–98)
MICROSCOPIC COMMENT: ABNORMAL
MONOCYTES # BLD AUTO: 0.6 K/UL (ref 0.3–1)
MONOCYTES NFR BLD: 6.6 % (ref 4–15)
NEUTROPHILS # BLD AUTO: 5.3 K/UL (ref 1.8–7.7)
NEUTROPHILS NFR BLD: 63.9 % (ref 38–73)
NITRITE UR QL STRIP: NEGATIVE
NRBC BLD-RTO: 0 /100 WBC
OSMOLALITY UR: 266 MOSM/KG (ref 50–1200)
PH UR STRIP: 6 [PH] (ref 5–8)
PLATELET # BLD AUTO: 305 K/UL (ref 150–450)
PMV BLD AUTO: 9.7 FL (ref 9.2–12.9)
POC IONIZED CALCIUM: 1.17 MMOL/L (ref 1.06–1.42)
POC TCO2 (MEASURED): 19 MMOL/L (ref 23–29)
POTASSIUM BLD-SCNC: 3.7 MMOL/L (ref 3.5–5.1)
POTASSIUM SERPL-SCNC: 3.8 MMOL/L (ref 3.5–5.1)
POTASSIUM UR-SCNC: 16 MMOL/L (ref 15–95)
PROT SERPL-MCNC: 7.6 G/DL (ref 6–8.4)
PROT UR QL STRIP: NEGATIVE
RBC # BLD AUTO: 4.27 M/UL (ref 4–5.4)
RBC #/AREA URNS AUTO: 3 /HPF (ref 0–4)
SAMPLE: ABNORMAL
SODIUM BLD-SCNC: 141 MMOL/L (ref 136–145)
SODIUM SERPL-SCNC: 139 MMOL/L (ref 136–145)
SODIUM UR-SCNC: 32 MMOL/L (ref 20–250)
SP GR UR STRIP: 1.01 (ref 1–1.03)
SQUAMOUS #/AREA URNS AUTO: 7 /HPF
URN SPEC COLLECT METH UR: ABNORMAL
UUN UR-MCNC: 317 MG/DL (ref 140–1050)
WBC # BLD AUTO: 8.35 K/UL (ref 3.9–12.7)
WBC #/AREA URNS AUTO: 16 /HPF (ref 0–5)

## 2024-12-22 PROCEDURE — 82010 KETONE BODYS QUAN: CPT | Mod: HCNC | Performed by: HOSPITALIST

## 2024-12-22 PROCEDURE — 96361 HYDRATE IV INFUSION ADD-ON: CPT | Mod: HCNC

## 2024-12-22 PROCEDURE — 83605 ASSAY OF LACTIC ACID: CPT | Mod: HCNC | Performed by: EMERGENCY MEDICINE

## 2024-12-22 PROCEDURE — 87086 URINE CULTURE/COLONY COUNT: CPT | Mod: HCNC | Performed by: HOSPITALIST

## 2024-12-22 PROCEDURE — 83735 ASSAY OF MAGNESIUM: CPT | Mod: HCNC | Performed by: EMERGENCY MEDICINE

## 2024-12-22 PROCEDURE — 63600175 PHARM REV CODE 636 W HCPCS: Mod: HCNC | Performed by: HOSPITALIST

## 2024-12-22 PROCEDURE — G0378 HOSPITAL OBSERVATION PER HR: HCPCS | Mod: HCNC

## 2024-12-22 PROCEDURE — 84540 ASSAY OF URINE/UREA-N: CPT | Mod: HCNC | Performed by: HOSPITALIST

## 2024-12-22 PROCEDURE — 84133 ASSAY OF URINE POTASSIUM: CPT | Mod: HCNC | Performed by: HOSPITALIST

## 2024-12-22 PROCEDURE — 25000003 PHARM REV CODE 250: Mod: HCNC | Performed by: EMERGENCY MEDICINE

## 2024-12-22 PROCEDURE — 82570 ASSAY OF URINE CREATININE: CPT | Mod: HCNC | Performed by: HOSPITALIST

## 2024-12-22 PROCEDURE — 85025 COMPLETE CBC W/AUTO DIFF WBC: CPT | Mod: HCNC | Performed by: EMERGENCY MEDICINE

## 2024-12-22 PROCEDURE — 81001 URINALYSIS AUTO W/SCOPE: CPT | Mod: HCNC | Performed by: HOSPITALIST

## 2024-12-22 PROCEDURE — 25000003 PHARM REV CODE 250: Mod: HCNC | Performed by: HOSPITALIST

## 2024-12-22 PROCEDURE — 84300 ASSAY OF URINE SODIUM: CPT | Mod: HCNC | Performed by: HOSPITALIST

## 2024-12-22 PROCEDURE — 99285 EMERGENCY DEPT VISIT HI MDM: CPT | Mod: HCNC

## 2024-12-22 PROCEDURE — 80053 COMPREHEN METABOLIC PANEL: CPT | Mod: HCNC | Performed by: EMERGENCY MEDICINE

## 2024-12-22 PROCEDURE — 82436 ASSAY OF URINE CHLORIDE: CPT | Mod: HCNC | Performed by: HOSPITALIST

## 2024-12-22 PROCEDURE — 87205 SMEAR GRAM STAIN: CPT | Mod: HCNC | Performed by: HOSPITALIST

## 2024-12-22 PROCEDURE — 80047 BASIC METABLC PNL IONIZED CA: CPT | Mod: HCNC

## 2024-12-22 PROCEDURE — 83935 ASSAY OF URINE OSMOLALITY: CPT | Mod: HCNC | Performed by: HOSPITALIST

## 2024-12-22 RX ORDER — ALUMINUM HYDROXIDE, MAGNESIUM HYDROXIDE, AND SIMETHICONE 1200; 120; 1200 MG/30ML; MG/30ML; MG/30ML
30 SUSPENSION ORAL 4 TIMES DAILY PRN
Status: DISCONTINUED | OUTPATIENT
Start: 2024-12-22 | End: 2024-12-24 | Stop reason: HOSPADM

## 2024-12-22 RX ORDER — TRAMADOL HYDROCHLORIDE 50 MG/1
50 TABLET ORAL ONCE
Status: COMPLETED | OUTPATIENT
Start: 2024-12-22 | End: 2024-12-22

## 2024-12-22 RX ORDER — TRAMADOL HYDROCHLORIDE 50 MG/1
50 TABLET ORAL EVERY 8 HOURS PRN
Status: DISCONTINUED | OUTPATIENT
Start: 2024-12-23 | End: 2024-12-24 | Stop reason: HOSPADM

## 2024-12-22 RX ORDER — ACETAMINOPHEN 325 MG/1
650 TABLET ORAL EVERY 4 HOURS PRN
Status: DISCONTINUED | OUTPATIENT
Start: 2024-12-22 | End: 2024-12-24 | Stop reason: HOSPADM

## 2024-12-22 RX ORDER — HEPARIN SODIUM 5000 [USP'U]/ML
5000 INJECTION, SOLUTION INTRAVENOUS; SUBCUTANEOUS EVERY 8 HOURS
Status: DISCONTINUED | OUTPATIENT
Start: 2024-12-23 | End: 2024-12-24 | Stop reason: HOSPADM

## 2024-12-22 RX ORDER — PILOCARPINE HYDROCHLORIDE 5 MG/1
5 TABLET, FILM COATED ORAL 3 TIMES DAILY
Status: DISCONTINUED | OUTPATIENT
Start: 2024-12-22 | End: 2024-12-24 | Stop reason: HOSPADM

## 2024-12-22 RX ORDER — TALC
6 POWDER (GRAM) TOPICAL NIGHTLY PRN
Status: DISCONTINUED | OUTPATIENT
Start: 2024-12-22 | End: 2024-12-24 | Stop reason: HOSPADM

## 2024-12-22 RX ORDER — METRONIDAZOLE 500 MG/1
500 TABLET ORAL 2 TIMES DAILY
Status: DISCONTINUED | OUTPATIENT
Start: 2024-12-22 | End: 2024-12-24 | Stop reason: HOSPADM

## 2024-12-22 RX ORDER — NALOXONE HCL 0.4 MG/ML
0.2 VIAL (ML) INJECTION
Status: DISCONTINUED | OUTPATIENT
Start: 2024-12-22 | End: 2024-12-24 | Stop reason: HOSPADM

## 2024-12-22 RX ORDER — POLYETHYLENE GLYCOL 3350 17 G/17G
17 POWDER, FOR SOLUTION ORAL 2 TIMES DAILY PRN
Status: DISCONTINUED | OUTPATIENT
Start: 2024-12-22 | End: 2024-12-24 | Stop reason: HOSPADM

## 2024-12-22 RX ORDER — PROCHLORPERAZINE EDISYLATE 5 MG/ML
5 INJECTION INTRAMUSCULAR; INTRAVENOUS EVERY 6 HOURS PRN
Status: DISCONTINUED | OUTPATIENT
Start: 2024-12-22 | End: 2024-12-24 | Stop reason: HOSPADM

## 2024-12-22 RX ORDER — SODIUM CHLORIDE 0.9 % (FLUSH) 0.9 %
10 SYRINGE (ML) INJECTION EVERY 6 HOURS PRN
Status: DISCONTINUED | OUTPATIENT
Start: 2024-12-22 | End: 2024-12-24 | Stop reason: HOSPADM

## 2024-12-22 RX ORDER — ONDANSETRON HYDROCHLORIDE 2 MG/ML
4 INJECTION, SOLUTION INTRAVENOUS EVERY 8 HOURS PRN
Status: DISCONTINUED | OUTPATIENT
Start: 2024-12-22 | End: 2024-12-24 | Stop reason: HOSPADM

## 2024-12-22 RX ADMIN — PILOCARPINE HYDROCHLORIDE 5 MG: 5 TABLET, FILM COATED ORAL at 11:12

## 2024-12-22 RX ADMIN — SODIUM CHLORIDE, POTASSIUM CHLORIDE, SODIUM LACTATE AND CALCIUM CHLORIDE 1000 ML: 600; 310; 30; 20 INJECTION, SOLUTION INTRAVENOUS at 09:12

## 2024-12-22 RX ADMIN — TRAMADOL HYDROCHLORIDE 50 MG: 50 TABLET, COATED ORAL at 11:12

## 2024-12-22 RX ADMIN — METRONIDAZOLE 500 MG: 500 TABLET ORAL at 11:12

## 2024-12-22 RX ADMIN — SODIUM CHLORIDE 1000 ML: 9 INJECTION, SOLUTION INTRAVENOUS at 05:12

## 2024-12-22 RX ADMIN — SODIUM CHLORIDE 1000 ML: 9 INJECTION, SOLUTION INTRAVENOUS at 03:12

## 2024-12-22 NOTE — ED PROVIDER NOTES
"Encounter Date: 12/22/2024       History     Chief Complaint   Patient presents with    Nausea     Pt reports nausea and inability to keep foods down. Pt thinks she may be dehydrated.      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  Patient states that she has had a dry mouth since being on the immunotherapy agent and because of that does not like to eat or drink.  For this reason she has had decreased p.o. intake.  She denies any nausea, vomiting or diarrhea.  She denies any abdominal pain, denies any fever.  She is requesting IV fluids for her dehydration.    The history is provided by the patient.     Review of patient's allergies indicates:   Allergen Reactions    Paclitaxel Anaphylaxis and Other (See Comments)     Pt states "Anaphylaxis" last encounter w/ throat swelling. Encountered back pain, coughing and head fuzzy today.      Metformin Itching    Morphine Other (See Comments)     headaches    Latex, natural rubber Rash and Other (See Comments)     Redness and peeling only with bandaids    Penicillins Nausea And Vomiting and Rash     Past Medical History:   Diagnosis Date    Allergy     Amblyopia     Cataract     Eczema     HS (hereditary spherocytosis)     Hx of total knee replacement 01/19/2016    right knee    Joint pain      Past Surgical History:   Procedure Laterality Date    achilles tendon repair      BRONCHOSCOPY N/A 8/3/2023    Procedure: Bronchoscopy;  Surgeon: Saeed Gould MD;  Location: [facility] OR [address] [place] [address];  Service: Cardiothoracic;  Laterality: N/A;    CHOLECYSTECTOMY      ESOPHAGOGASTRODUODENOSCOPY N/A 11/27/2024    Procedure: EGD (ESOPHAGOGASTRODUODENOSCOPY);  Surgeon: Braxton Mooney MD;  Location: [facility] ([address] [place] [address]);  Service: Endoscopy;  Laterality: N/A;    " Request for foundation one test faxed to [facility], faxed confirmed.   INJECTION OF ANESTHETIC AGENT AROUND MULTIPLE INTERCOSTAL NERVES N/A 8/3/2023    Procedure: BLOCK, NERVE, INTERCOSTAL, 2 OR MORE;  Surgeon: Saeed Gould MD;  Location: [facility] OR [facility];  Service: Cardiothoracic;  Laterality: N/A;    LYMPHADENECTOMY Left 8/3/2023    Procedure: LYMPHADENECTOMY;  Surgeon: Saeed Gould MD;  Location: [facility] OR [facility];  Service: Cardiothoracic;  Laterality: Left;    NAVIGATIONAL BRONCHOSCOPY N/A 2023    Procedure: BRONCHOSCOPY, NAVIGATIONAL;  Surgeon: Radha Mccollum MD;  [facility];  Service: Pulmonary;  Laterality: N/A;    TOTAL KNEE ARTHROPLASTY      XI ROBOTIC RATS Left 8/3/2023    Procedure: XI ROBOTIC RATS upper lobectomy;  Surgeon: Saeed Gould MD;  [facility];  Service: Cardiothoracic;  Laterality: Left;     Family History   Problem Relation Name Age of Onset    Cancer Mother      Cancer Father          lung CA    Breast cancer Sister 1/2         1/2 sister    Cancer Son x2         hogdkins    Hodgkin's lymphoma Son x2     Liver cancer Maternal Uncle      Blindness Cousin      Diabetes Cousin      Amblyopia Neg Hx      Cataracts Neg Hx      Glaucoma Neg Hx      Macular degeneration Neg Hx      Retinal detachment Neg Hx      Strabismus Neg Hx       Social History     Tobacco Use    Smoking status: Former     Current packs/day: 0.00     Average packs/day: 1 pack/day for 46.3 years (46.3 ttl pk-yrs)     Types: Vaping with nicotine, Cigarettes     Start date:      Quit date: 2023     Years since quittin.6    Smokeless tobacco: Never   Substance Use Topics    Alcohol use: Not Currently     Comment: occassionally    Drug use: No     Review of Systems    Physical Exam     Initial Vitals [24 1313]   BP Pulse Resp Temp SpO2   104/62 106 20 98 °F (36.7 °C) 98 %      MAP       --         Physical Exam    Nursing note and vitals reviewed.  Constitutional: Vital signs are normal. She appears well-developed and well-nourished.  She is not diaphoretic.  Non-toxic appearance. She does not appear ill. No distress.   HENT:   Head: Normocephalic and atraumatic. Mouth/Throat: Oropharynx is clear and moist and mucous membranes are normal. Mucous membranes are not dry.   Eyes: Conjunctivae and lids are normal.   Neck: Neck supple.   Normal range of motion.  Cardiovascular:  Normal rate.           Pulmonary/Chest: No respiratory distress.   Abdominal: Abdomen is soft. She exhibits no distension. There is no abdominal tenderness. There is no rebound and no guarding.   Musculoskeletal:      Cervical back: Normal range of motion and neck supple.     Neurological: She is alert and oriented to person, place, and time.   Skin: Skin is dry and intact. No pallor.   Psychiatric: She has a normal mood and affect. Her speech is normal and behavior is normal.         ED Course   Procedures  Labs Reviewed   CBC W/ AUTO DIFFERENTIAL - Abnormal       Result Value    WBC 8.35      RBC 4.27      Hemoglobin 13.7      Hematocrit 40.8      MCV 96      MCH 32.1 (*)     MCHC 33.6      RDW 14.9 (*)     Platelets 305      MPV 9.7      Immature Granulocytes 0.2      Gran # (ANC) 5.3      Immature Grans (Abs) 0.02      Lymph # 1.8      Mono # 0.6      Eos # 0.6 (*)     Baso # 0.05      nRBC 0      Gran % 63.9      Lymph % 21.3      Mono % 6.6      Eosinophil % 7.4      Basophil % 0.6      Differential Method Automated     COMPREHENSIVE METABOLIC PANEL - Abnormal    Sodium 139      Potassium 3.8      Chloride 105      CO2 19 (*)     Glucose 78      BUN 17      Creatinine 2.2 (*)     Calcium 9.8      Total Protein 7.6      Albumin 3.5      Total Bilirubin 0.6      Alkaline Phosphatase 103      AST 15      ALT 12      eGFR 24.3 (*)     Anion Gap 15     MAGNESIUM    Magnesium 1.9     LACTIC ACID, PLASMA    Lactate (Lactic Acid) 1.0     ISTAT CHEM8          Imaging Results    None          Medications   sodium chloride 0.9% bolus 1,000 mL 1,000 mL (has no administration in time  range)   sodium chloride 0.9% bolus 1,000 mL 1,000 mL (0 mLs Intravenous Stopped 12/22/24 1607)     Medical Decision Making  Patient with non-small cell lung CA and spinal Mets, now presenting with decreased p.o. intake and concerns for dehydration.  Fortunately, no infectious symptoms at all.  It seems like the source of patient's dehydration is also fairly clear in that she reports decreased p.o. intake secondary to chronic issues that she has developed with a dry mouth.    Plan  -labs  -IVF  -d/w oncology  -dispo uncertain pending ED work-up    Amount and/or Complexity of Data Reviewed  External Data Reviewed: labs, radiology and notes.  Labs: ordered. Decision-making details documented in ED Course.    Risk  Decision regarding hospitalization.               ED Course as of 12/22/24 1649   Sun Dec 22, 2024   1550 Magnesium : 1.9 [AS]   1550 Lactic Acid Level: 1.0 [AS]   1550 Creatinine(!): 2.2 [AS]   1550 Patient with an increase in her creatinine from 1.5 to 2.2.  This has been over a 1 month period.  I think that this is secondary to decreased p.o. intake.  I had a long discussion with the patient and her sister who is at bedside about these results.  We discussed having the patient come in to our observation unit for IV fluids and repeat labs tomorrow versus aggressive IV fluid resuscitation in the ED with outpatient labs in the next day or 2 coordinated by Oncology.  Sister prefers the outpatient approach as the last time they were admitted it was under observation and they had a difficult time getting the insurance company to reimburse the stay.  Sister also notes that patient is struggling to live independently and she will actually bring the patient home with her tonight though she is wondering if the patient actually needs to be in an assisted living center long term.  Will need to be coordinated with the Oncology social worker. [AS]      ED Course User Index  [AS] Chey Retana MD                            Clinical Impression:  Final diagnoses:  [E86.0] Dehydration (Primary)  [N17.9] KESHA (acute kidney injury)                 Chey Retana MD  12/22/24 8729

## 2024-12-22 NOTE — ED TRIAGE NOTES
"Radha Ervin, a 64 y.o. female presents to the ED w/ complaint of dry mouth, nausea, for months, thinks is dehydrated, stage 4 bone cancer in back with pain    Triage note:  Chief Complaint   Patient presents with    Nausea     Pt reports nausea and inability to keep foods down. Pt thinks she may be dehydrated.      Review of patient's allergies indicates:   Allergen Reactions    Paclitaxel Anaphylaxis and Other (See Comments)     Pt states "Anaphylaxis" last encounter w/ throat swelling. Encountered back pain, coughing and head fuzzy today.      Metformin Itching    Morphine Other (See Comments)     headaches    Latex, natural rubber Rash and Other (See Comments)     Redness and peeling only with bandaids    Penicillins Nausea And Vomiting and Rash     Past Medical History:   Diagnosis Date    Allergy     Amblyopia     Cataract     Eczema     HS (hereditary spherocytosis)     Hx of total knee replacement 01/19/2016    right knee    Joint pain          APPEARANCE: awake and alert in NAD. PAIN  3/10  SKIN: warm, dry and intact. No breakdown or bruising.  MUSCULOSKELETAL: Patient moving all extremities spontaneously, no obvious swelling or deformities noted. Ambulates independently.  RESPIRATORY: Denies shortness of breath.Respirations unlabored.   CARDIAC: Denies CP, 2+ distal pulses; no peripheral edema  ABDOMEN: S/ND/NT, endorses nausea  : voids spontaneously, denies difficulty  Neurologic: AAO x 4; follows commands equal strength in all extremities; denies numbness/tingling. Denies dizziness    "

## 2024-12-23 ENCOUNTER — PATIENT MESSAGE (OUTPATIENT)
Dept: HEMATOLOGY/ONCOLOGY | Facility: CLINIC | Age: 64
End: 2024-12-23
Payer: MEDICARE

## 2024-12-23 PROBLEM — E83.39 HYPOPHOSPHATEMIA: Status: ACTIVE | Noted: 2024-12-23

## 2024-12-23 PROBLEM — E44.0 MODERATE PROTEIN-CALORIE MALNUTRITION: Status: ACTIVE | Noted: 2024-12-23

## 2024-12-23 PROBLEM — E83.42 HYPOMAGNESEMIA: Status: ACTIVE | Noted: 2024-12-23

## 2024-12-23 LAB
ALBUMIN SERPL BCP-MCNC: 2.6 G/DL (ref 3.5–5.2)
ALBUMIN SERPL BCP-MCNC: 2.8 G/DL (ref 3.5–5.2)
ANION GAP SERPL CALC-SCNC: 10 MMOL/L (ref 8–16)
ANION GAP SERPL CALC-SCNC: 7 MMOL/L (ref 8–16)
ANION GAP SERPL CALC-SCNC: 7 MMOL/L (ref 8–16)
BACTERIAL VAGINOSIS DNA: NOT DETECTED
BUN SERPL-MCNC: 11 MG/DL (ref 8–23)
BUN SERPL-MCNC: 11 MG/DL (ref 8–23)
BUN SERPL-MCNC: 13 MG/DL (ref 8–23)
CALCIUM SERPL-MCNC: 8.3 MG/DL (ref 8.7–10.5)
CALCIUM SERPL-MCNC: 8.6 MG/DL (ref 8.7–10.5)
CALCIUM SERPL-MCNC: 8.6 MG/DL (ref 8.7–10.5)
CANDIDA GLABRATA/KRUSEI: NOT DETECTED
CANDIDA RRNA VAG QL PROBE: NOT DETECTED
CHLORIDE SERPL-SCNC: 109 MMOL/L (ref 95–110)
CHLORIDE SERPL-SCNC: 110 MMOL/L (ref 95–110)
CHLORIDE SERPL-SCNC: 110 MMOL/L (ref 95–110)
CO2 SERPL-SCNC: 17 MMOL/L (ref 23–29)
CO2 SERPL-SCNC: 20 MMOL/L (ref 23–29)
CO2 SERPL-SCNC: 20 MMOL/L (ref 23–29)
CREAT SERPL-MCNC: 1.2 MG/DL (ref 0.5–1.4)
CREAT SERPL-MCNC: 1.2 MG/DL (ref 0.5–1.4)
CREAT SERPL-MCNC: 1.5 MG/DL (ref 0.5–1.4)
EST. GFR  (NO RACE VARIABLE): 38.4 ML/MIN/1.73 M^2
EST. GFR  (NO RACE VARIABLE): 50.2 ML/MIN/1.73 M^2
EST. GFR  (NO RACE VARIABLE): 50.2 ML/MIN/1.73 M^2
GLUCOSE SERPL-MCNC: 60 MG/DL (ref 70–110)
GLUCOSE SERPL-MCNC: 99 MG/DL (ref 70–110)
GLUCOSE SERPL-MCNC: 99 MG/DL (ref 70–110)
GRAM STN SPEC: NORMAL
GRAM STN SPEC: NORMAL
MAGNESIUM SERPL-MCNC: 1.5 MG/DL (ref 1.6–2.6)
PHOSPHATE SERPL-MCNC: 2.6 MG/DL (ref 2.7–4.5)
PHOSPHATE SERPL-MCNC: 2.9 MG/DL (ref 2.7–4.5)
POTASSIUM SERPL-SCNC: 3.5 MMOL/L (ref 3.5–5.1)
POTASSIUM SERPL-SCNC: 3.5 MMOL/L (ref 3.5–5.1)
POTASSIUM SERPL-SCNC: 3.6 MMOL/L (ref 3.5–5.1)
SODIUM SERPL-SCNC: 136 MMOL/L (ref 136–145)
SODIUM SERPL-SCNC: 137 MMOL/L (ref 136–145)
SODIUM SERPL-SCNC: 137 MMOL/L (ref 136–145)
TRICHOMONAS VAGINALIS: NOT DETECTED

## 2024-12-23 PROCEDURE — 97535 SELF CARE MNGMENT TRAINING: CPT | Mod: HCNC

## 2024-12-23 PROCEDURE — 25000003 PHARM REV CODE 250: Mod: HCNC | Performed by: PHYSICIAN ASSISTANT

## 2024-12-23 PROCEDURE — 25000003 PHARM REV CODE 250: Mod: HCNC | Performed by: HOSPITALIST

## 2024-12-23 PROCEDURE — 96376 TX/PRO/DX INJ SAME DRUG ADON: CPT

## 2024-12-23 PROCEDURE — 63600175 PHARM REV CODE 636 W HCPCS: Mod: HCNC | Performed by: HOSPITALIST

## 2024-12-23 PROCEDURE — 36415 COLL VENOUS BLD VENIPUNCTURE: CPT | Mod: HCNC | Performed by: PHYSICIAN ASSISTANT

## 2024-12-23 PROCEDURE — G0378 HOSPITAL OBSERVATION PER HR: HCPCS | Mod: HCNC

## 2024-12-23 PROCEDURE — 36415 COLL VENOUS BLD VENIPUNCTURE: CPT | Mod: HCNC | Performed by: HOSPITALIST

## 2024-12-23 PROCEDURE — 25000003 PHARM REV CODE 250: Mod: HCNC | Performed by: NURSE PRACTITIONER

## 2024-12-23 PROCEDURE — 83735 ASSAY OF MAGNESIUM: CPT | Mod: HCNC | Performed by: HOSPITALIST

## 2024-12-23 PROCEDURE — 80069 RENAL FUNCTION PANEL: CPT | Mod: HCNC | Performed by: HOSPITALIST

## 2024-12-23 PROCEDURE — 96365 THER/PROPH/DIAG IV INF INIT: CPT

## 2024-12-23 PROCEDURE — 92610 EVALUATE SWALLOWING FUNCTION: CPT | Mod: HCNC

## 2024-12-23 PROCEDURE — 96366 THER/PROPH/DIAG IV INF ADDON: CPT

## 2024-12-23 PROCEDURE — 96375 TX/PRO/DX INJ NEW DRUG ADDON: CPT

## 2024-12-23 PROCEDURE — 63600175 PHARM REV CODE 636 W HCPCS: Mod: HCNC | Performed by: PHYSICIAN ASSISTANT

## 2024-12-23 PROCEDURE — 80069 RENAL FUNCTION PANEL: CPT | Mod: 91,HCNC | Performed by: PHYSICIAN ASSISTANT

## 2024-12-23 RX ORDER — MAGNESIUM SULFATE HEPTAHYDRATE 40 MG/ML
2 INJECTION, SOLUTION INTRAVENOUS ONCE
Status: COMPLETED | OUTPATIENT
Start: 2024-12-23 | End: 2024-12-23

## 2024-12-23 RX ORDER — FAMOTIDINE 20 MG/1
20 TABLET, FILM COATED ORAL EVERY 24 HOURS
Status: DISCONTINUED | OUTPATIENT
Start: 2024-12-23 | End: 2024-12-24 | Stop reason: HOSPADM

## 2024-12-23 RX ORDER — SODIUM,POTASSIUM PHOSPHATES 280-250MG
1 POWDER IN PACKET (EA) ORAL ONCE
Status: COMPLETED | OUTPATIENT
Start: 2024-12-23 | End: 2024-12-23

## 2024-12-23 RX ORDER — DEXAMETHASONE SODIUM PHOSPHATE 4 MG/ML
4 INJECTION, SOLUTION INTRA-ARTICULAR; INTRALESIONAL; INTRAMUSCULAR; INTRAVENOUS; SOFT TISSUE DAILY PRN
Status: DISCONTINUED | OUTPATIENT
Start: 2024-12-24 | End: 2024-12-24 | Stop reason: HOSPADM

## 2024-12-23 RX ORDER — DEXTROSE MONOHYDRATE AND SODIUM CHLORIDE 5; .9 G/100ML; G/100ML
INJECTION, SOLUTION INTRAVENOUS CONTINUOUS
Status: DISCONTINUED | OUTPATIENT
Start: 2024-12-23 | End: 2024-12-23

## 2024-12-23 RX ORDER — DEXAMETHASONE SODIUM PHOSPHATE 4 MG/ML
4 INJECTION, SOLUTION INTRA-ARTICULAR; INTRALESIONAL; INTRAMUSCULAR; INTRAVENOUS; SOFT TISSUE ONCE
Status: COMPLETED | OUTPATIENT
Start: 2024-12-23 | End: 2024-12-23

## 2024-12-23 RX ORDER — DEXAMETHASONE SODIUM PHOSPHATE 4 MG/ML
4 INJECTION, SOLUTION INTRA-ARTICULAR; INTRALESIONAL; INTRAMUSCULAR; INTRAVENOUS; SOFT TISSUE DAILY PRN
Status: DISCONTINUED | OUTPATIENT
Start: 2024-12-23 | End: 2024-12-23

## 2024-12-23 RX ORDER — DOCUSATE SODIUM 100 MG
300 CAPSULE ORAL
Status: DISCONTINUED | OUTPATIENT
Start: 2024-12-23 | End: 2024-12-24 | Stop reason: HOSPADM

## 2024-12-23 RX ORDER — DEXTROSE MONOHYDRATE AND SODIUM CHLORIDE 5; .9 G/100ML; G/100ML
INJECTION, SOLUTION INTRAVENOUS CONTINUOUS
Status: ACTIVE | OUTPATIENT
Start: 2024-12-23 | End: 2024-12-23

## 2024-12-23 RX ORDER — DIPHENHYDRAMINE HCL 25 MG
25 CAPSULE ORAL EVERY 6 HOURS PRN
Status: DISCONTINUED | OUTPATIENT
Start: 2024-12-23 | End: 2024-12-24 | Stop reason: HOSPADM

## 2024-12-23 RX ADMIN — ONDANSETRON 4 MG: 2 INJECTION INTRAMUSCULAR; INTRAVENOUS at 01:12

## 2024-12-23 RX ADMIN — Medication 300 ML: at 09:12

## 2024-12-23 RX ADMIN — METRONIDAZOLE 500 MG: 500 TABLET ORAL at 09:12

## 2024-12-23 RX ADMIN — DIPHENHYDRAMINE HYDROCHLORIDE 25 MG: 25 CAPSULE ORAL at 09:12

## 2024-12-23 RX ADMIN — MAGNESIUM SULFATE HEPTAHYDRATE 2 G: 40 INJECTION, SOLUTION INTRAVENOUS at 10:12

## 2024-12-23 RX ADMIN — PILOCARPINE HYDROCHLORIDE 5 MG: 5 TABLET, FILM COATED ORAL at 09:12

## 2024-12-23 RX ADMIN — Medication 300 ML: at 02:12

## 2024-12-23 RX ADMIN — PILOCARPINE HYDROCHLORIDE 5 MG: 5 TABLET, FILM COATED ORAL at 02:12

## 2024-12-23 RX ADMIN — DEXAMETHASONE SODIUM PHOSPHATE 4 MG: 4 INJECTION INTRA-ARTICULAR; INTRALESIONAL; INTRAMUSCULAR; INTRAVENOUS; SOFT TISSUE at 02:12

## 2024-12-23 RX ADMIN — POTASSIUM & SODIUM PHOSPHATES POWDER PACK 280-160-250 MG 1 PACKET: 280-160-250 PACK at 09:12

## 2024-12-23 RX ADMIN — FAMOTIDINE 20 MG: 20 TABLET ORAL at 02:12

## 2024-12-23 RX ADMIN — ONDANSETRON 4 MG: 2 INJECTION INTRAMUSCULAR; INTRAVENOUS at 09:12

## 2024-12-23 RX ADMIN — DEXTROSE MONOHYDRATE AND SODIUM CHLORIDE: 5; .9 INJECTION, SOLUTION INTRAVENOUS at 07:12

## 2024-12-23 NOTE — ASSESSMENT & PLAN NOTE
Patient's most recent phosphorus results are listed below.   Recent Labs     12/23/24  0312   PHOS 2.6*     Plan  - Will treat hypophosphatemia with Kphos  - Patient's hypophosphatemia is stable

## 2024-12-23 NOTE — ASSESSMENT & PLAN NOTE
Body mass index is 34.34 kg/m². Morbid obesity complicates all aspects of disease management from diagnostic modalities to treatment.

## 2024-12-23 NOTE — ASSESSMENT & PLAN NOTE
-slightly high pyuria tonight on UA with + leuk esterase,  7 squamous epis, negative nitrite.   -patient reported oliguria symptoms, no dysuria/urgency or frequency noted.     -will hold any antibiotic therapy - f/u on pending Urine Gram stain & Culture.  If symptoms develop would treat for acute cystitis.

## 2024-12-23 NOTE — ASSESSMENT & PLAN NOTE
Patient has Abnormal Magnesium: hypomagnesemia. Will continue to monitor electrolytes closely. Will replace the affected electrolytes and repeat labs to be done after interventions completed. The patient's magnesium results have been reviewed and are listed below.  Recent Labs   Lab 12/23/24  0312   MG 1.5*

## 2024-12-23 NOTE — PT/OT/SLP EVAL
Speech Language Pathology Evaluation  Bedside Swallow/ Discharge    Patient Name:  Radha Ervin   MRN:  [id number]  Admitting Diagnosis: Acute renal failure superimposed on stage 3b chronic kidney disease    Recommendations:                 General Recommendations:  Follow-up not indicated  Diet recommendations:  Regular Diet - IDDSI Level 7, Thin liquids - IDDSI Level 0   Aspiration Precautions: HOB to 90 degrees, Meds whole 1 at a time, Small bites/sips, and Standard aspiration precautions   General Precautions: Standard, aspiration, fall  Communication strategies:  none    Assessment:     Radha Ervin is a 64 y.o. female with a functional oropharyngeal swallow for a regular diet with thin liquids.     History:     Past Medical History:   Diagnosis Date    Allergy     Amblyopia     Cataract     Eczema     HS (hereditary spherocytosis)     Hx of total knee replacement 01/19/2016    right knee    Joint pain     Melena 11/26/2024       Past Surgical History:   Procedure Laterality Date    achilles tendon repair      BRONCHOSCOPY N/A 8/3/2023    Procedure: Bronchoscopy;  Surgeon: Saeed Gould MD;  Location: [facility] OR [address] [place] [address];  Service: Cardiothoracic;  Laterality: N/A;    CHOLECYSTECTOMY      ESOPHAGOGASTRODUODENOSCOPY N/A 11/27/2024    Procedure: EGD (ESOPHAGOGASTRODUODENOSCOPY);  Surgeon: Braxton Mooney MD;  Location: [facility] ([address] [place] [address]);  Service: Endoscopy;  Laterality: N/A;    INJECTION OF ANESTHETIC AGENT AROUND MULTIPLE INTERCOSTAL NERVES N/A 8/3/2023    Procedure: BLOCK, NERVE, INTERCOSTAL, 2 OR MORE;  Surgeon: Saeed Gould MD;  Location: [address];  Service: Cardiothoracic;  Laterality: N/A;    LYMPHADENECTOMY Left 8/3/2023    Procedure: LYMPHADENECTOMY;  Surgeon: Saeed Gould MD;  Location: [facility] OR [address] [place] [address];  Service: Cardiothoracic;  Laterality: Left;    NAVIGATIONAL BRONCHOSCOPY N/A 6/23/2023    Procedure: BRONCHOSCOPY, NAVIGATIONAL;  Surgeon: Radha Mccollum MD;  Location:  [facility] OR 2ND FLR;  Service: Pulmonary;  Laterality: N/A;    TOTAL KNEE ARTHROPLASTY      XI ROBOTIC RATS Left 8/3/2023    Procedure: XI ROBOTIC RATS upper lobectomy;  Surgeon: Saeed Gould MD;  Location: [facility] OR [address] [place] [address];  Service: Cardiothoracic;  Laterality: Left;     HPI: 64-year-old woman with non-small cell lung cancer, Mets to spine CKD stage 3, emphysema presents to the emergency department with concerns of dehydration.  She reports having persistent nausea with intermittent vomiting which has resulted in decreased appetite.  She reports severe dry mouth and to the point where oral secretions are difficult to handle causing coughing and also she feels oropharyngeal dysphagia with solids.  She denies sense of esophageal dysphagia or food sticking.  She is able to tolerate liquids but her nausea limits the amount of liquid she can tolerate consuming.  She denies having any diarrhea, she has noticed oliguria.    On ED workup she is found with acute on chronic kidney injury, was given 2 L of IV fluids and repeat creatinine with only minor improvement so was referred for admission to hospital medicine for evaluation of KESHA.  She denies any fevers or chills, does not report being lightheaded or dizzy has had no falls.  She lives alone, former smoker quit before diagnosis of lung cancer.  Denies any alcohol or drug use.  She is no longer working due to her medical conditions.  She has inherent to home medications however she feels her levothyroxine is contributing to dry mouth and she has been holding this medication at home.    Prior Intubation HX:  none    Modified Barium Swallow: none    Chest X-Rays: none this admission    Prior diet: pt currently soft and bite size/thin but reports baseline diet of regular/thin.    Subjective     Pt awake and alert upon arrival with sister present at bedside. Pt agreeable to session. Nursing cleared.     Pain/Comfort:  Pain Rating 1: 0/10    Respiratory Status: Room  air    Objective:     Oral Musculature Evaluation  Oral Musculature: WFL  Dentition: upper dentures, lower dentures  Secretion Management: adequate  Mucosal Quality: good  Oral Labial Strength and Mobility: WFL  Lingual Strength and Mobility: WFL  Voice Prior to PO Intake: clear    Bedside Swallow Eval:   Consistencies Assessed:  Thin liquids via straw x7  Soft solids diced peaches      Oral Phase:   Pt with complaints of dry mouth  WFL    Pharyngeal Phase:   no overt clinical signs/symptoms of aspiration  no overt clinical signs/symptoms of pharyngeal dysphagia    Compensatory Strategies  None    Treatment: Pt reporting dry mouth since around July but reports being able to consume a regular diet. Pt reporting dislike for soft and bite size diet. No concerns regarding swallowing 2/2 no overt signs of aspiration noted. Pt with episode of emesis during session and reported nausea. SLP notified RN. Recommend regular diet with thin liquids with standard aspiration precautions. SLP provided education regarding role of SLP, aspiration precautions, diet levels, supplemental drinks, choosing appetizing foods to increase intake, hard candies to stimulate salivary glands, recommendations, and SLP POC. SLP notified RN and MD team of recommendations. Pt and sister expressed understanding. No further speech therapy needs at this time.     Goals:   Multidisciplinary Problems       SLP Goals       Not on file                    Plan:     Patient to be seen:      Plan of Care expires:     Plan of Care reviewed with:  patient, sibling   SLP Follow-Up:  No       Discharge recommendations:  No Therapy Indicated   Barriers to Discharge:  None    Time Tracking:     SLP Treatment Date:   12/23/24  Speech Start Time:  0835  Speech Stop Time:  0854     Speech Total Time (min):  19 min    Billable Minutes: Eval Swallow and Oral Function 11 and Self Care/Home Management Training 8    12/23/2024

## 2024-12-23 NOTE — PLAN OF CARE
[facility] - Observation 11H  Initial Discharge Assessment       Primary Care Provider: Suraj Herrera III, MD    Admission Diagnosis: Dehydration [E86.0]  KESHA (acute kidney injury) [N17.9]  Chest pain [R07.9]    Admission Date: 12/22/2024  Expected Discharge Date: 12/23/2024    Transition of Care Barriers: (P) None    Payor: HU[employer] MANAGED MEDICARE / Plan: HU[employer]NA MEDICARE HMO / Product Type: Capitation /     Extended Emergency Contact Information  Primary Emergency Contact: Jeannie Dai   [place]  Mobile Phone: [phone]  Relation: Sister    Discharge Plan A: (P) Home, Home Health  Discharge Plan B: (P) Home with family      [id number] [place] - [address] AT [facility] [address]  [address] [address]  Phone: [phone] Fax: [phone]    [facility] [address]  [address] [facility] [address]  Phone: [phone] Fax: [phone]    [facility] - [place] - [address] [facility]  [address] [facility]  [place] [address]  Phone: [phone] Fax: [phone]    [facility]  [address]  [place] [address]  Phone: [phone] Fax: [phone]    [id number] - [place] - [address]  [address] [place] [address]  Phone: [phone] Fax: [phone]    [facility] [place] [facility] [place] - [address]  [address]  [place] [address]  Phone: [phone] Fax: [phone]    [facility] [place] [place] - [address]  [address]  [facility] [address]  Phone: [phone] Fax: [phone]    [facility]  [address] [place], [place] [address] [address]  Phone: [phone] Fax: [phone]      Initial Assessment (most recent)       Adult Discharge Assessment - 12/23/24 1133          Discharge Assessment    Confirmed/corrected address, phone number and insurance Yes (P)       Confirmed Demographics Correct on Facesheet (P)      Source of Information patient;family (P)      When was your last doctors appointment? 12/18/24 (P)      Does patient/caregiver understand observation status Yes (P)      Communicated ROSEMARIE with patient/caregiver Date not available/Unable to determine (P)      Reason For Admission Acute renal failure superimposed on stage 3b chronic kidney disease (P)      People in Home alone (P)      Facility Arrived From: Home (P)      Do you expect to return to your current living situation? Yes (P)      Do you have help at home or someone to help you manage your care at home? No (P)      Prior to hospitilization cognitive status: Alert/Oriented (P)      Current cognitive status: Alert/Oriented (P)      Walking or Climbing Stairs Difficulty no (P)    Patient uses a cane; per sister climbs stairs slowly    Dressing/Bathing Difficulty no (P)      Home Accessibility not wheelchair accessible (P)      Home Layout -- (P)    Patient lives on second floor    Equipment Currently Used at Home cane, straight;walker, standard;rollator (P)      Readmission within 30 days? Yes (P)      Patient currently being followed by outpatient case management? No (P)      Do you currently have service(s) that help you manage your care at home? No (P)      Do you take prescription medications? Yes (P)      Do you have prescription coverage? Yes (P)      Coverage [employer] MANAGED MEDICARE - HU[employer]NA MEDICARE HMO - CAPITATED (P)      Do you have any problems affording any of your prescribed medications? No (P)      Is the patient taking medications as prescribed? yes (P)      Who is going to help you get home at discharge? Jeannie Dai [phone] (sister/support) (P)      How do you get to doctors appointments? family or friend will provide (P)    Jeannie Dai [phone] (sister/support)    Are you on dialysis? No (P)      Do you take coumadin? No (P)      Discharge Plan A Home;Home Health (P)       Discharge Plan B Home with family (P)      DME Needed Upon Discharge  none (P)      Discharge Plan discussed with: Patient;Sibling (P)      Name(s) and Number(s) Jeannie Dai [phone] (sister/support) (P)      Transition of Care Barriers None (P)         Physical Activity    On average, how many minutes do you engage in exercise at this level? 0 min (P)         Financial Resource Strain    How hard is it for you to pay for the very basics like food, housing, medical care, and heating? Not hard at all (P)         Housing Stability    At any time in the past 12 months, were you homeless or living in a shelter (including now)? No (P)         Transportation Needs    Has the lack of transportation kept you from medical appointments, meetings, work or from getting things needed for daily living? No (P)         Food Insecurity    Within the past 12 months, you worried that your food would run out before you got the money to buy more. Never true (P)      Within the past 12 months, the food you bought just didn't last and you didn't have money to get more. Never true (P)         Stress    Do you feel stress - tense, restless, nervous, or anxious, or unable to sleep at night because your mind is troubled all the time - these days? Very much (P)         Social Isolation    How often do you feel lonely or isolated from those around you?  Sometimes (P)         Alcohol Use    Q1: How often do you have a drink containing alcohol? Never (P)      Q2: How many drinks containing alcohol do you have on a typical day when you are drinking? Patient does not drink (P)      Q3: How often do you have six or more drinks on one occasion? Never (P)         Utilities    In the past 12 months has the electric, gas, oil, or water company threatened to shut off services in your home? No (P)         Health Literacy    How often do you need to have someone help you when you read instructions, pamphlets, or other written material from your  doctor or pharmacy? Always (P)                      Readmission Assessment (most recent)       Readmission Assessment - 12/23/24 1117          Readmission    Was this a planned readmission? No     Why were you hospitalized in the last 30 days? nausea     Why were you readmitted? Related to previous admission     When you left the hospital where did you go? Home Alone     Did patient/caregiver refused recommended DC plan? No     Tell me about what happened between when you left the hospital and the day you returned. Nausea; unable to keep food down.     When did you start not feeling well? patient went to PCP due to feeling unwell.     Did you try to manage your symptoms your self? Yes     What did you do? Patient's sister tried sprite, crackers, hydrating fluids.     Did you call anyone? Yes     Who did you call? Other (comments)   Sister    Did you try to see or did see a doctor or nurse before you came? No     Why? Patient was last seen Wednesday by PCP     Did you have  a follow-up appointment on discharge? Yes     Did you go? Yes                   Sw met with patient and pt's sister at bedside to discuss discharge planning. Pt reported that she is ambulatory and independent with ADL's. Pt's sister will report information due to pt feeling unwell. Pt's sister reported that pt lives on the second floor with 16 stairs to the apartment. Sister stated that she climbs the stairs slowly based on how well she is feeling. Pt uses a cane, standard walker and rollator within the home. Pt's sister Jeannie Dai [phone] will transport pt once medically ready to her home [address] [place]; [address] [address].    Discharge Plan A and Plan B have been determined by review of patient's clinical status, future medical and therapeutic needs, and coverage/benefits for post-acute care in coordination with multidisciplinary team members.       LEO Rodriguez  Case Management  [phone]

## 2024-12-23 NOTE — H&P
[facility] Dept  [facility]  History & Physical    Patient Name: Radha Ervin  MRN: [id number]  Patient Class: OP- Observation  Admission Date: 12/22/2024  Attending Physician: Jonathan Hensley MD [facility]  Admitting Physician: Justin Oliva MD  Primary Care Provider: Suraj Herrera III, MD    Patient information was obtained from patient, past medical records, and ER records.     Subjective:     Principal Problem:Acute renal failure superimposed on stage 3b chronic kidney disease    Chief Complaint:   Chief Complaint   Patient presents with    Nausea     Pt reports nausea and inability to keep foods down. Pt thinks she may be dehydrated.         HPI: 64-year-old woman with non-small cell lung cancer, Mets to spine CKD stage 3, emphysema presents to the emergency department with concerns of dehydration.    She reports having persistent nausea with intermittent vomiting which has resulted in decreased appetite.  She reports severe dry mouth and to the point where oral secretions are difficult to handle causing coughing and also she feels oropharyngeal dysphagia with solids.  She denies sense of esophageal dysphagia or food sticking.  She is able to tolerate liquids but her nausea limits the amount of liquid she can tolerate consuming.  She denies having any diarrhea, she has noticed oliguria.      On ED workup she is found with acute on chronic kidney injury, was given 2 L of IV fluids and repeat creatinine with only minor improvement so was referred for admission to hospital medicine for evaluation of KESHA.    She denies any fevers or chills, does not report being lightheaded or dizzy has had no falls.  She lives alone, former smoker quit before diagnosis of lung cancer.  Denies any alcohol or drug use.  She is no longer working due to her medical conditions.  She has inherent to home medications however she feels her levothyroxine is contributing to dry mouth and she has been holding  "this medication at home.       Past Medical History:   Diagnosis Date    Allergy     Amblyopia     Cataract     Eczema     HS (hereditary spherocytosis)     Hx of total knee replacement 01/19/2016    right knee    Joint pain     Melena 11/26/2024       Past Surgical History:   Procedure Laterality Date    achilles tendon repair      BRONCHOSCOPY N/A 8/3/2023    Procedure: Bronchoscopy;  Surgeon: Saeed Gould MD;  Location: [address];  Service: Cardiothoracic;  Laterality: N/A;    CHOLECYSTECTOMY      ESOPHAGOGASTRODUODENOSCOPY N/A 11/27/2024    Procedure: EGD (ESOPHAGOGASTRODUODENOSCOPY);  Surgeon: Braxton Mooney MD;  Location: [facility] ENDO (2ND FLR);  Service: Endoscopy;  Laterality: N/A;    INJECTION OF ANESTHETIC AGENT AROUND MULTIPLE INTERCOSTAL NERVES N/A 8/3/2023    Procedure: BLOCK, NERVE, INTERCOSTAL, 2 OR MORE;  Surgeon: Saeed Gould MD;  Location: [facility] OR [address] [place] [address];  Service: Cardiothoracic;  Laterality: N/A;    LYMPHADENECTOMY Left 8/3/2023    Procedure: LYMPHADENECTOMY;  Surgeon: Saeed Gould MD;  Location: [facility] OR [address] [place] [address];  Service: Cardiothoracic;  Laterality: Left;    NAVIGATIONAL BRONCHOSCOPY N/A 6/23/2023    Procedure: BRONCHOSCOPY, NAVIGATIONAL;  Surgeon: Radha Mccollum MD;  Location: [address];  Service: Pulmonary;  Laterality: N/A;    TOTAL KNEE ARTHROPLASTY      XI ROBOTIC RATS Left 8/3/2023    Procedure: XI ROBOTIC RATS upper lobectomy;  Surgeon: Saeed Gould MD;  Location: [address];  Service: Cardiothoracic;  Laterality: Left;       Review of patient's allergies indicates:   Allergen Reactions    Paclitaxel Anaphylaxis and Other (See Comments)     Pt states "Anaphylaxis" last encounter w/ throat swelling. Encountered back pain, coughing and head fuzzy today.      Metformin Itching    Morphine Other (See Comments)     headaches    Latex, natural rubber Rash and Other (See Comments)     Redness and peeling only with bandaids    Penicillins Nausea And " Vomiting and Rash       Current Facility-Administered Medications on File Prior to Encounter   Medication    [DISCONTINUED] triamcinolone acetonide injection 40 mg     Current Outpatient Medications on File Prior to Encounter   Medication Sig    betamethasone dipropionate (DIPROLENE) 0.05 % ointment Apply topically 2 (two) times daily. Prn psoriasis flares    clobetasol 0.05% (TEMOVATE) 0.05 % Oint AAA fingertips bid - may occlude qhs    esomeprazole (NEXIUM) 20 MG capsule Take 20 mg by mouth as needed (dyspepsia).    levothyroxine (SYNTHROID) 75 MCG tablet Take 1 tablet (75 mcg total) by mouth before breakfast.    metroNIDAZOLE (FLAGYL) 500 MG tablet Take 1 tablet (500 mg total) by mouth 2 (two) times a day. for 7 days    ondansetron (ZOFRAN) 8 MG tablet Take 0.5 tablets (4 mg total) by mouth every 8 (eight) hours as needed for Nausea.    OTEZLA 30 mg Tab TAKE 1 TABLET BY MOUTH DAILY    pilocarpine (SALAGEN, PILOCARPINE,) 5 MG Tab Take 1 tablet (5 mg total) by mouth 3 (three) times daily.    promethazine (PHENERGAN) 25 MG tablet Take 1 tablet (25 mg total) by mouth every 6 (six) hours as needed for Nausea.    sotorasib (LUMAKRAS) 320 mg Tab Take 960 mg by mouth once daily.    traMADoL (ULTRAM) 50 mg tablet Take 1 tablet (50 mg total) by mouth every 6 (six) hours as needed for Pain.    diphenhydramine HCl (ALLERGY ORAL) Take 1 tablet by mouth.    LORazepam (ATIVAN) 1 MG tablet Take 1 tablet (1 mg total) by mouth every 6 (six) hours as needed for Anxiety (and half an hour before MRI).    mometasone 0.1% (ELOCON) 0.1 % cream AAA every day when red and tender under occlusion    ondansetron (ZOFRAN-ODT) 4 MG TbDL Take 2 tablets (8 mg total) by mouth every 6 (six) hours as needed (n/v).     Family History       Problem Relation (Age of Onset)    Blindness Cousin    Breast cancer Sister    Cancer Mother, Father, Son    Diabetes Cousin    Hodgkin's lymphoma Son    Liver cancer Maternal Uncle          Tobacco Use    Smoking  status: Former     Current packs/day: 0.00     Average packs/day: 1 pack/day for 46.3 years (46.3 ttl pk-yrs)     Types: Vaping with nicotine, Cigarettes     Start date:      Quit date: 2023     Years since quittin.6    Smokeless tobacco: Never   Substance and Sexual Activity    Alcohol use: Not Currently     Comment: occassionally    Drug use: No    Sexual activity: Not Currently     Partners: Male     Review of Systems   Constitutional:  Positive for appetite change. Negative for fever.   HENT:  Positive for trouble swallowing. Negative for sore throat.    Eyes:  Negative for visual disturbance.   Respiratory: Negative.     Cardiovascular: Negative.    Gastrointestinal:  Negative for diarrhea.   Genitourinary: Negative.    Musculoskeletal:  Positive for back pain (chronic).   Neurological:  Negative for dizziness, syncope and light-headedness.     Objective:     Vital Signs (Most Recent):  Temp: 97.8 °F (36.6 °C) (24)  Pulse: 105 (24)  Resp: 17 (24)  BP: 95/69 (24)  SpO2: 97 % (24) Vital Signs (24h Range):  Temp:  [97.8 °F (36.6 °C)-98 °F (36.7 °C)] 97.8 °F (36.6 °C)  Pulse:  [103-110] 105  Resp:  [16-20] 17  SpO2:  [97 %-98 %] 97 %  BP: ()/(62-69) 95/69     Weight: 77.1 kg (170 lb)  Body mass index is 34.34 kg/m².     Physical Exam  Vitals and nursing note reviewed.   Constitutional:       General: She is not in acute distress.     Appearance: She is ill-appearing.   HENT:      Head: Normocephalic and atraumatic.   Eyes:      General: No scleral icterus.  Cardiovascular:      Rate and Rhythm: Regular rhythm. Tachycardia present.   Pulmonary:      Effort: Pulmonary effort is normal. No respiratory distress.      Breath sounds: Normal breath sounds. No wheezing or rales.   Musculoskeletal:      Right lower leg: No edema.      Left lower leg: No edema.   Skin:     Coloration: Skin is not jaundiced.   Neurological:      General: No focal deficit  "present.      Mental Status: She is alert and oriented to person, place, and time.   Psychiatric:         Mood and Affect: Mood normal.                Significant Labs: All pertinent labs within the past 24 hours have been reviewed.  CBC:   Recent Labs   Lab 12/22/24  1443 12/22/24  1856   WBC 8.35  --    HGB 13.7  --    HCT 40.8 35*     --      CMP:   Recent Labs   Lab 12/22/24  1443      K 3.8      CO2 19*   GLU 78   BUN 17   CREATININE 2.2*   CALCIUM 9.8   PROT 7.6   ALBUMIN 3.5   BILITOT 0.6   ALKPHOS 103   AST 15   ALT 12   ANIONGAP 15     Cardiac Markers: No results for input(s): "CKMB", "MYOGLOBIN", "BNP", "TROPISTAT" in the last 48 hours.  Coagulation: No results for input(s): "PT", "INR", "APTT" in the last 48 hours.  Lactic Acid:   Recent Labs   Lab 12/22/24  1443   LACTATE 1.0     Magnesium:   Recent Labs   Lab 12/22/24  1443   MG 1.9     Troponin: No results for input(s): "TROPONINI", "TROPONINIHS" in the last 48 hours.  Urine Studies:   Recent Labs   Lab 12/22/24  2159 12/22/24  2200   COLORU Yellow  --    APPEARANCEUA Clear  --    PHUR 6.0  --    SPECGRAV 1.010  --    PROTEINUA Negative  --    GLUCUA Negative  --    KETONESU 1+*  --    BILIRUBINUA Negative  --    OCCULTUA Negative  --    NITRITE Negative  --    LEUKOCYTESUR 2+*  --    RBCUA  --  3   WBCUA  --  16*   BACTERIA  --  Moderate*   SQUAMEPITHEL  --  7   HYALINECASTS  --  3*       Significant Imaging: I have reviewed all pertinent imaging results/findings within the past 24 hours.      Assessment/Plan:     * Acute renal failure superimposed on stage 3b chronic kidney disease  -suspect pre-renal etiology to Ridge secondary to anorexia, persistent nausea  -s/p 2L iv fluids in ED and Cr with only slight downtrend to 2.0.  Give 3L of fluids - LR bolus over 2 hours   -Obtain UA & Urine electrolyte levels   -    Dysphagia, oropharyngeal phase  -reporting oropharyngeal dysphagia to solid foods, able to tolerate pills,   -due to her " dry mouth she c/o of difficulty handling secretions.   -no pharyngeal erythema. , no signs of oral thrush on exam  -SLP consult in AM   -soft diet      Nausea  Prn anti-emetics   -she reports symptoms have been persistent in recent weeks  -vomiting is intermittent and none reported today   -she has been avoiding food and reporting difficulty tolerating liquid diet due to her symptoms.     She may require scheduled anti-emetics.   Consider trial of one time dose sumatriptan 6mg SQ that can be used as abortive therapy in cyclic vomiting.       Pyuria  -slightly high pyuria tonight on UA with + leuk esterase,  7 squamous epis, negative nitrite.   -patient reported oliguria symptoms, no dysuria/urgency or frequency noted.     -will hold any antibiotic therapy - f/u on pending Urine Gram stain & Culture.  If symptoms develop would treat for acute cystitis.       Emphysema lung  -noted on chronic history, patient is not on any chronic bronchodilator therapy  -no acute symptoms of exacerbation or acute symptoms.     Bacterial vaginosis  Recent symptom c/o in primary care clinic - she was prescribed flagyl course x 7 days.  Resume at this time  -Bacterial vaginosis screen collected in clinic - results are pending.       Xerostomia  Continue on home pilocarpine tablets.       Severe obesity (BMI 35.0-39.9) with comorbidity  Body mass index is 34.34 kg/m². Morbid obesity complicates all aspects of disease management from diagnostic modalities to treatment.     Adenocarcinoma, lung, left  Follows with [facility] oncology  On immunotherapy with SOTORASIB  -consult oncology in AM re: continuing this medication in acute phase of KESHA or if any dosing changes required.       Hypothyroidism  -per ambulatory notes - patient has had varying thyroid function levels in the past.   -Currently patient feels her levothyroxine is contributing or causing dry mouth symptoms, she requests that medication not be continued at this time, she has  been holding at home in the last week prior to admission.   -offered patient IV formulation while inpatient or until oral symptoms improved, but she declines at this time.     -TSH checked within last 3 months.       Chronic low back pain  -patient taking prn tramadol at home, reordered        VTE Risk Mitigation (From admission, onward)           Ordered     heparin (porcine) injection 5,000 Units  Every 8 hours         12/22/24 2216     IP VTE HIGH RISK PATIENT  Once         12/22/24 2216     Place sequential compression device  Until discontinued         12/22/24 2216                       On 12/22/2024, patient should be placed in hospital observation services under my care.      Justin Oliva MD  Department of Hospital Medicine  [facility] Dept

## 2024-12-23 NOTE — PLAN OF CARE
Bedside evaluation completed. Recommend regular diet with thin liquids with standard aspiration precautions. No further speech therapy needs.

## 2024-12-23 NOTE — PLAN OF CARE
Recommendations     1.) Recommend continuing with Regular Diet as tolerated.                  - monitor labs for re-feeding syndrome: phos, Mg, K and gluc.      2.) Recommend continuing with Novasource Renal BID to help meet needs.      3.) RD to monitor wt, PO intake, skin, labs.      Goals: To meet % of EEN/EPN by next RD f/u   Nutrition Goal Status: new  Communication of RD Recs:  (POC)

## 2024-12-23 NOTE — ASSESSMENT & PLAN NOTE
-per ambulatory notes - patient has had varying thyroid function levels in the past.   -Currently patient feels her levothyroxine is contributing or causing dry mouth symptoms, she requests that medication not be continued at this time, she has been holding at home in the last week prior to admission.   -offered patient IV formulation while inpatient or until oral symptoms improved, but she declines at this time.     -TSH checked within last 3 months.

## 2024-12-23 NOTE — ASSESSMENT & PLAN NOTE
Prn anti-emetics including Decadron  -she reports symptoms have been persistent in recent weeks  -vomiting is intermittent and none reported today   -she has been avoiding food and reporting difficulty tolerating liquid diet due to her symptoms.     She may require scheduled anti-emetics.   Consider trial of one time dose sumatriptan 6mg SQ that can be used as abortive therapy in cyclic vomiting.

## 2024-12-23 NOTE — HOSPITAL COURSE
Patient admitted to  for KESHA 2/2 intractable N/V. Cr back to BL s/p 3L IVF. Started on antiemetics, including decadron which usually helps with her chemotherapy related nausea. Not able to tolerate PO. Heme/onc reviewed her, but no official consult this admission. Plan for discharge when stable.

## 2024-12-23 NOTE — ASSESSMENT & PLAN NOTE
-suspect pre-renal etiology to Ridge secondary to anorexia, persistent nausea  -s/p 2L iv fluids in ED and Cr with only slight downtrend to 2.0.  Give 3L of fluids - LR bolus over 2 hours   -Obtain UA & Urine electrolyte levels   -

## 2024-12-23 NOTE — PLAN OF CARE
Pt arrived to unit via stretcher, AAOX4. Ambulated in room w/o difficulty.  Resp even and unlabored. Skin w/d to touch, intact. Cont of B/B. Harper to room/staff. C/o 3/10 pain at present. Zofran 4mg IVP administered for noted nausea/vomiting. No further distress noted/voiced. Will continue to monitor.  Problem: Adult Inpatient Plan of Care  Goal: Plan of Care Review  Outcome: Progressing  Flowsheets (Taken 12/23/2024 0124)  Plan of Care Reviewed With:   patient   sibling  Goal: Patient-Specific Goal (Individualized)  Outcome: Progressing  Goal: Absence of Hospital-Acquired Illness or Injury  Outcome: Progressing  Goal: Optimal Comfort and Wellbeing  Outcome: Progressing  Goal: Readiness for Transition of Care  Outcome: Progressing     Problem: Infection  Goal: Absence of Infection Signs and Symptoms  Outcome: Progressing     Problem: Acute Kidney Injury/Impairment  Goal: Fluid and Electrolyte Balance  Outcome: Progressing  Goal: Improved Oral Intake  Outcome: Progressing  Goal: Effective Renal Function  Outcome: Progressing     Problem: Fall Injury Risk  Goal: Absence of Fall and Fall-Related Injury  Outcome: Progressing

## 2024-12-23 NOTE — PROGRESS NOTES
Progress Note    Received patient at signout.    65 y/o F pending repeat labs after IVF.    -She received 2L IVF and creat was rechecked and it remains at 2.0  -Discussed results with patient, she is amenable for admission  -Oncology recommends HM admit;  to admit.  She has remained stable in the ED.

## 2024-12-23 NOTE — CONSULTS
[facility] - Observation 11H  Adult Nutrition  Consult Note    SUMMARY     Recommendations    1.) Recommend continuing with Regular Diet as tolerated.      - monitor labs for re-feeding syndrome: phos, Mg, K and gluc.     2.) Recommend continuing with Novasource Renal BID to help meet needs.     3.) RD to monitor wt, PO intake, skin, labs.     Goals: To meet % of EEN/EPN by next RD f/u   Nutrition Goal Status: new  Communication of RD Recs:  (POC)    Assessment and Plan    Endocrine  Moderate protein-calorie malnutrition  Malnutrition Type:  Context: chronic illness  Level: moderate    Related to (etiology):   Inadequate energy- calorie intake    Signs and Symptoms (as evidenced by):   Mild to moderate muscle/fat wasting, nausea/vomiting, significant wt loss: -7.5% x 3 months and PO intake <50%.      Malnutrition Characteristic Summary:  Weight Loss (Malnutrition): 7.5% in 3 months  Energy Intake (Malnutrition): less than 75% for greater than 7 days  Subcutaneous Fat (Malnutrition): mild depletion  Muscle Mass (Malnutrition): moderate depletion    Interventions/Recommendations (treatment strategy):  Collaboration of nutritional care with other providers.      Nutrition Diagnosis Status:   New         Malnutrition Assessment  Malnutrition Context: chronic illness  Malnutrition Level: moderate          Weight Loss (Malnutrition): 7.5% in 3 months  Energy Intake (Malnutrition): less than 75% for greater than 7 days  Subcutaneous Fat (Malnutrition): mild depletion  Muscle Mass (Malnutrition): moderate depletion   Orbital Region (Subcutaneous Fat Loss): mild depletion (NOte: moon face present)  Upper Arm Region (Subcutaneous Fat Loss): mild depletion  Thoracic and Lumbar Region: mild depletion   [place] Region (Muscle Loss): moderate depletion  Clavicle Bone Region (Muscle Loss): moderate depletion  Clavicle and Acromion Bone Region (Muscle Loss): moderate depletion  Scapular Bone Region (Muscle Loss): moderate  "depletion     Reason for Assessment    Reason For Assessment: consult, identified at risk by screening criteria  Diagnosis: renal disease (Acute renal failure superimposed on stage 3b chronic kidney disease)    General Information Comments: RD was consulted for Diet edu; pt was flagged for MST = 3. 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 Pt endorses persistent n/v; denies d/c. Pt endorses low appetite, stating that they were not able to much food since Thursday d/t symptoms. Pt endorses unsavory side affects from their cancer tx, stating that it has gotten worse over the past three months. Per chart review, RD noted significant wt loss: -7.5% x 3 months. RD did go over some CKD diet edu, but ultimately promotes consuming whatever foods pt can tolerate. Pt and caregiver stated that they understood. RD performed NFPE: RD feels pt meets the criteria for moderate malnutrition at this time. RD tea, to continue to monitor and f/u.     Nutrition Discharge Planning: Regular Diet, texture per MD- ONS BID    Nutrition Related Social Determinants of Health: SDOH: None Identified     Nutrition/Diet History    Spiritual, Cultural Beliefs, Yarsani Practices, Values that Affect Care: no  Food Allergies: NKFA  Factors Affecting Nutritional Intake: dry mouth, nausea/vomiting    Anthropometrics    Temp: 98.3 °F (36.8 °C)  Height Method: Stated  Height: 4' 11" (149.9 cm)  Height (inches): 59 in  Weight Method: Bed Scale  Weight: 78.8 kg (173 lb 11.6 oz)  Weight (lb): 173.72 lb  Ideal Body Weight (IBW), Female: 95 lb  % Ideal Body Weight, Female (lb): 182.86 %  BMI (Calculated): 35.1    Lab/Procedures/Meds    Pertinent Labs Reviewed: reviewed  Pertinent Labs Comments: Cr: 1.5, GFR: 38.4, gluc: 60, phos: 2.6, M.5    Pertinent Medications Reviewed: reviewed  Pertinent Medications Comments: Pedialyte, heparin, Mg Sulfate, K/Na phosphates, D5/NaCl    Estimated/Assessed Needs    Weight Used For Calorie " Calculations: 78.8 kg (173 lb 11.6 oz)  Energy Calorie Requirements (kcal): 1611 kcal  Energy Need Method: [place] (MSJ x 1.3)    Protein Requirements: 79g (1.0g/kg)  Weight Used For Protein Calculations: 78.8 kg (173 lb 11.6 oz)    Fluid Requirements (mL): as per MD or RDA  Estimated Fluid Requirement Method: RDA Method  RDA Method (mL): 1611    Nutrition Prescription Ordered    Current Diet Order: Soft and Bite-sized  Oral Nutrition Supplement: Novasource Renal    Evaluation of Received Nutrient/Fluid Intake    I/O: incomplete  Fluid Required:  (as per MD)  Comments: LBM 12/22  Tolerance: tolerating  % Intake of Estimated Energy Needs: 0 - 25 %  % Meal Intake: 0 - 25 %    Nutrition Risk    Level of Risk/Frequency of Follow-up: low     Monitor and Evaluation    Food and Nutrient Intake: food and beverage intake  Food and Nutrient Adminstration: diet order  Anthropometric Measurements: weight, weight change, body mass index  Biochemical Data, Medical Tests and Procedures: electrolyte and renal panel, gastrointestinal profile, glucose/endocrine profile, inflammatory profile, lipid profile  Nutrition-Focused Physical Findings: overall appearance, skin     Nutrition Follow-Up    RD Follow-up?: Yes

## 2024-12-23 NOTE — ASSESSMENT & PLAN NOTE
Follows with [facility]  On immunotherapy with SOTORASIB  -consult oncology in AM re: continuing this medication in acute phase of KESHA or if any dosing changes required.

## 2024-12-23 NOTE — ASSESSMENT & PLAN NOTE
-reporting oropharyngeal dysphagia to solid foods, able to tolerate pills,   -due to her dry mouth she c/o of difficulty handling secretions.   -no pharyngeal erythema. , no signs of oral thrush on exam  -SLP consult in AM   -soft diet

## 2024-12-23 NOTE — ED NOTES
I-STAT Chem-8+ Results:   Value Reference Range   Sodium 141 136-145 mmol/L   Potassium  3.7 3.5-5.1 mmol/L   Chloride 108  mmol/L   Ionized Calcium 1.17 1.06-1.42 mmol/L   CO2 (measured) 19 23-29 mmol/L   Glucose 87  mg/dL   BUN 16 6-30 mg/dL   Creatinine 2.0 0.5-1.4 mg/dL   Hematocrit 35 36-54%

## 2024-12-23 NOTE — HPI
64-year-old woman with non-small cell lung cancer, Mets to spine CKD stage 3, emphysema presents to the emergency department with concerns of dehydration.    She reports having persistent nausea with intermittent vomiting which has resulted in decreased appetite.  She reports severe dry mouth and to the point where oral secretions are difficult to handle causing coughing and also she feels oropharyngeal dysphagia with solids.  She denies sense of esophageal dysphagia or food sticking.  She is able to tolerate liquids but her nausea limits the amount of liquid she can tolerate consuming.  She denies having any diarrhea, she has noticed oliguria.      On ED workup she is found with acute on chronic kidney injury, was given 2 L of IV fluids and repeat creatinine with only minor improvement so was referred for admission to hospital medicine for evaluation of KESHA.    She denies any fevers or chills, does not report being lightheaded or dizzy has had no falls.  She lives alone, former smoker quit before diagnosis of lung cancer.  Denies any alcohol or drug use.  She is no longer working due to her medical conditions.  She has inherent to home medications however she feels her levothyroxine is contributing to dry mouth and she has been holding this medication at home.

## 2024-12-23 NOTE — SUBJECTIVE & OBJECTIVE
"Past Medical History:   Diagnosis Date    Allergy     Amblyopia     Cataract     Eczema     HS (hereditary spherocytosis)     Hx of total knee replacement 01/19/2016    right knee    Joint pain     Melena 11/26/2024       Past Surgical History:   Procedure Laterality Date    achilles tendon repair      BRONCHOSCOPY N/A 8/3/2023    Procedure: Bronchoscopy;  Surgeon: Saeed Gould MD;  Location: [address];  Service: Cardiothoracic;  Laterality: N/A;    CHOLECYSTECTOMY      ESOPHAGOGASTRODUODENOSCOPY N/A 11/27/2024    Procedure: EGD (ESOPHAGOGASTRODUODENOSCOPY);  Surgeon: Braxton Mooney MD;  Location: [facility] ENDO ([address] [place] [address]);  Service: Endoscopy;  Laterality: N/A;    INJECTION OF ANESTHETIC AGENT AROUND MULTIPLE INTERCOSTAL NERVES N/A 8/3/2023    Procedure: BLOCK, NERVE, INTERCOSTAL, 2 OR MORE;  Surgeon: Saeed Gould MD;  Location: [address];  Service: Cardiothoracic;  Laterality: N/A;    LYMPHADENECTOMY Left 8/3/2023    Procedure: LYMPHADENECTOMY;  Surgeon: Saeed Gould MD;  Location: [address];  Service: Cardiothoracic;  Laterality: Left;    NAVIGATIONAL BRONCHOSCOPY N/A 6/23/2023    Procedure: BRONCHOSCOPY, NAVIGATIONAL;  Surgeon: Radha Mccollum MD;  Location: [address];  Service: Pulmonary;  Laterality: N/A;    TOTAL KNEE ARTHROPLASTY      XI ROBOTIC RATS Left 8/3/2023    Procedure: XI ROBOTIC RATS upper lobectomy;  Surgeon: Saeed Gould MD;  Location: [address];  Service: Cardiothoracic;  Laterality: Left;       Review of patient's allergies indicates:   Allergen Reactions    Paclitaxel Anaphylaxis and Other (See Comments)     Pt states "Anaphylaxis" last encounter w/ throat swelling. Encountered back pain, coughing and head fuzzy today.      Metformin Itching    Morphine Other (See Comments)     headaches    Latex, natural rubber Rash and Other (See Comments)     Redness and peeling only with bandaids    Penicillins Nausea And Vomiting and Rash       Current " Facility-Administered Medications on File Prior to Encounter   Medication    [DISCONTINUED] triamcinolone acetonide injection 40 mg     Current Outpatient Medications on File Prior to Encounter   Medication Sig    betamethasone dipropionate (DIPROLENE) 0.05 % ointment Apply topically 2 (two) times daily. Prn psoriasis flares    clobetasol 0.05% (TEMOVATE) 0.05 % Oint AAA fingertips bid - may occlude qhs    esomeprazole (NEXIUM) 20 MG capsule Take 20 mg by mouth as needed (dyspepsia).    levothyroxine (SYNTHROID) 75 MCG tablet Take 1 tablet (75 mcg total) by mouth before breakfast.    metroNIDAZOLE (FLAGYL) 500 MG tablet Take 1 tablet (500 mg total) by mouth 2 (two) times a day. for 7 days    ondansetron (ZOFRAN) 8 MG tablet Take 0.5 tablets (4 mg total) by mouth every 8 (eight) hours as needed for Nausea.    OTEZLA 30 mg Tab TAKE 1 TABLET BY MOUTH DAILY    pilocarpine (SALAGEN, PILOCARPINE,) 5 MG Tab Take 1 tablet (5 mg total) by mouth 3 (three) times daily.    promethazine (PHENERGAN) 25 MG tablet Take 1 tablet (25 mg total) by mouth every 6 (six) hours as needed for Nausea.    sotorasib (LUMAKRAS) 320 mg Tab Take 960 mg by mouth once daily.    traMADoL (ULTRAM) 50 mg tablet Take 1 tablet (50 mg total) by mouth every 6 (six) hours as needed for Pain.    diphenhydramine HCl (ALLERGY ORAL) Take 1 tablet by mouth.    LORazepam (ATIVAN) 1 MG tablet Take 1 tablet (1 mg total) by mouth every 6 (six) hours as needed for Anxiety (and half an hour before MRI).    mometasone 0.1% (ELOCON) 0.1 % cream AAA every day when red and tender under occlusion    ondansetron (ZOFRAN-ODT) 4 MG TbDL Take 2 tablets (8 mg total) by mouth every 6 (six) hours as needed (n/v).     Family History       Problem Relation (Age of Onset)    Blindness Cousin    Breast cancer Sister    Cancer Mother, Father, Son    Diabetes Cousin    Hodgkin's lymphoma Son    Liver cancer Maternal Uncle          Tobacco Use    Smoking status: Former     Current  packs/day: 0.00     Average packs/day: 1 pack/day for 46.3 years (46.3 ttl pk-yrs)     Types: Vaping with nicotine, Cigarettes     Start date:      Quit date: 2023     Years since quittin.6    Smokeless tobacco: Never   Substance and Sexual Activity    Alcohol use: Not Currently     Comment: occassionally    Drug use: No    Sexual activity: Not Currently     Partners: Male     Review of Systems   Constitutional:  Positive for appetite change. Negative for fever.   HENT:  Positive for trouble swallowing. Negative for sore throat.    Eyes:  Negative for visual disturbance.   Respiratory: Negative.     Cardiovascular: Negative.    Gastrointestinal:  Negative for diarrhea.   Genitourinary: Negative.    Musculoskeletal:  Positive for back pain (chronic).   Neurological:  Negative for dizziness, syncope and light-headedness.     Objective:     Vital Signs (Most Recent):  Temp: 97.8 °F (36.6 °C) (24)  Pulse: 105 (24)  Resp: 17 (24)  BP: 95/69 (24)  SpO2: 97 % (24) Vital Signs (24h Range):  Temp:  [97.8 °F (36.6 °C)-98 °F (36.7 °C)] 97.8 °F (36.6 °C)  Pulse:  [103-110] 105  Resp:  [16-20] 17  SpO2:  [97 %-98 %] 97 %  BP: ()/(62-69) 95/69     Weight: 77.1 kg (170 lb)  Body mass index is 34.34 kg/m².     Physical Exam  Vitals and nursing note reviewed.   Constitutional:       General: She is not in acute distress.     Appearance: She is ill-appearing.   HENT:      Head: Normocephalic and atraumatic.   Eyes:      General: No scleral icterus.  Cardiovascular:      Rate and Rhythm: Regular rhythm. Tachycardia present.   Pulmonary:      Effort: Pulmonary effort is normal. No respiratory distress.      Breath sounds: Normal breath sounds. No wheezing or rales.   Musculoskeletal:      Right lower leg: No edema.      Left lower leg: No edema.   Skin:     Coloration: Skin is not jaundiced.   Neurological:      General: No focal deficit present.      Mental  "Status: She is alert and oriented to person, place, and time.   Psychiatric:         Mood and Affect: Mood normal.                Significant Labs: All pertinent labs within the past 24 hours have been reviewed.  CBC:   Recent Labs   Lab 12/22/24  1443 12/22/24  1856   WBC 8.35  --    HGB 13.7  --    HCT 40.8 35*     --      CMP:   Recent Labs   Lab 12/22/24  1443      K 3.8      CO2 19*   GLU 78   BUN 17   CREATININE 2.2*   CALCIUM 9.8   PROT 7.6   ALBUMIN 3.5   BILITOT 0.6   ALKPHOS 103   AST 15   ALT 12   ANIONGAP 15     Cardiac Markers: No results for input(s): "CKMB", "MYOGLOBIN", "BNP", "TROPISTAT" in the last 48 hours.  Coagulation: No results for input(s): "PT", "INR", "APTT" in the last 48 hours.  Lactic Acid:   Recent Labs   Lab 12/22/24  1443   LACTATE 1.0     Magnesium:   Recent Labs   Lab 12/22/24  1443   MG 1.9     Troponin: No results for input(s): "TROPONINI", "TROPONINIHS" in the last 48 hours.  Urine Studies:   Recent Labs   Lab 12/22/24  2159 12/22/24  2200   COLORU Yellow  --    APPEARANCEUA Clear  --    PHUR 6.0  --    SPECGRAV 1.010  --    PROTEINUA Negative  --    GLUCUA Negative  --    KETONESU 1+*  --    BILIRUBINUA Negative  --    OCCULTUA Negative  --    NITRITE Negative  --    LEUKOCYTESUR 2+*  --    RBCUA  --  3   WBCUA  --  16*   BACTERIA  --  Moderate*   SQUAMEPITHEL  --  7   HYALINECASTS  --  3*       Significant Imaging: I have reviewed all pertinent imaging results/findings within the past 24 hours.      "

## 2024-12-23 NOTE — ASSESSMENT & PLAN NOTE
-noted on chronic history, patient is not on any chronic bronchodilator therapy  -no acute symptoms of exacerbation or acute symptoms.

## 2024-12-23 NOTE — ASSESSMENT & PLAN NOTE
Recent symptom c/o in primary care clinic - she was prescribed flagyl course x 7 days.  Resume at this time  -Bacterial vaginosis screen collected in clinic - results are pending.

## 2024-12-23 NOTE — ASSESSMENT & PLAN NOTE
Malnutrition Type:  Context: chronic illness  Level: moderate    Related to (etiology):   Inadequate energy- calorie intake    Signs and Symptoms (as evidenced by):   Mild to moderate muscle/fat wasting, nausea/vomiting, significant wt loss: -7.5% x 3 months and PO intake <50%.      Malnutrition Characteristic Summary:  Weight Loss (Malnutrition): 7.5% in 3 months  Energy Intake (Malnutrition): less than 75% for greater than 7 days  Subcutaneous Fat (Malnutrition): mild depletion  Muscle Mass (Malnutrition): moderate depletion    Interventions/Recommendations (treatment strategy):  Collaboration of nutritional care with other providers.      Nutrition Diagnosis Status:   New

## 2024-12-23 NOTE — PROGRESS NOTES
[facility] [address] [place] [address] Medicine  Progress Note    Patient Name: Radha Ervin  MRN: [id number]  Patient Class: OP- Observation   Admission Date: 12/22/2024  Length of Stay: 0 days  Attending Physician: Jonathan Hensley MD  Primary Care Provider: Suraj Herrera III, MD        Subjective     Principal Problem:Acute renal failure superimposed on stage 3b chronic kidney disease        HPI:  64-year-old woman with non-small cell lung cancer, Mets to spine CKD stage 3, emphysema presents to the emergency department with concerns of dehydration.    She reports having persistent nausea with intermittent vomiting which has resulted in decreased appetite.  She reports severe dry mouth and to the point where oral secretions are difficult to handle causing coughing and also she feels oropharyngeal dysphagia with solids.  She denies sense of esophageal dysphagia or food sticking.  She is able to tolerate liquids but her nausea limits the amount of liquid she can tolerate consuming.  She denies having any diarrhea, she has noticed oliguria.      On ED workup she is found with acute on chronic kidney injury, was given 2 L of IV fluids and repeat creatinine with only minor improvement so was referred for admission to hospital medicine for evaluation of KESHA.    She denies any fevers or chills, does not report being lightheaded or dizzy has had no falls.  She lives alone, former smoker quit before diagnosis of lung cancer.  Denies any alcohol or drug use.  She is no longer working due to her medical conditions.  She has inherent to home medications however she feels her levothyroxine is contributing to dry mouth and she has been holding this medication at home.       Overview/Hospital Course:  Patient admitted to  for KESHA 2/2 intractable N/V. Cr back to BL s/p 3L IVF. Started on antiemetics, including decadron which usually helps with her chemotherapy related nausea. Not able to tolerate PO. Heme/onc reviewed her,  but no official consult this admission. Plan for discharge when stable.     Interval History: NAEON. VSS. Patient reports continued nausea and inability to tolerate PO. Started decadron as she reports improvement with n/v in the past from heme/onc. Mg 1.5, Phos 2.6, will replete. Plan for discharge when stable.     Review of Systems   Constitutional:  Negative for chills and fever.   Respiratory:  Negative for chest tightness and shortness of breath.    Cardiovascular:  Negative for chest pain and leg swelling.   Gastrointestinal:  Positive for nausea and vomiting. Negative for abdominal pain.   Neurological:  Negative for dizziness and weakness.     Objective:     Vital Signs (Most Recent):  Temp: 97.4 °F (36.3 °C) (12/23/24 1220)  Pulse: 87 (12/23/24 1220)  Resp: 18 (12/23/24 1220)  BP: 95/63 (12/23/24 1220)  SpO2: 96 % (12/23/24 1220) Vital Signs (24h Range):  Temp:  [97.4 °F (36.3 °C)-98.7 °F (37.1 °C)] 97.4 °F (36.3 °C)  Pulse:  [] 87  Resp:  [15-18] 18  SpO2:  [94 %-98 %] 96 %  BP: ()/(56-69) 95/63     Weight: 78.8 kg (173 lb 11.6 oz)  Body mass index is 35.09 kg/m².    Intake/Output Summary (Last 24 hours) at 12/23/2024 1423  Last data filed at 12/23/2024 0954  Gross per 24 hour   Intake 300 ml   Output --   Net 300 ml         Physical Exam  Vitals and nursing note reviewed.   Constitutional:       General: She is not in acute distress.     Appearance: Normal appearance. She is not ill-appearing.   HENT:      Head: Normocephalic and atraumatic.      Right Ear: External ear normal.      Left Ear: External ear normal.   Eyes:      Extraocular Movements: Extraocular movements intact.      Conjunctiva/sclera: Conjunctivae normal.      Pupils: Pupils are equal, round, and reactive to light.   Cardiovascular:      Rate and Rhythm: Normal rate and regular rhythm.      Pulses: Normal pulses.      Heart sounds: Normal heart sounds. No murmur heard.  Pulmonary:      Effort: Pulmonary effort is normal. No  respiratory distress.      Breath sounds: Normal breath sounds.   Abdominal:      General: Abdomen is flat. Bowel sounds are normal.      Palpations: Abdomen is soft.      Tenderness: There is no abdominal tenderness.   Musculoskeletal:         General: Normal range of motion.      Cervical back: Normal range of motion and neck supple. No tenderness.      Right lower leg: No edema.      Left lower leg: No edema.   Skin:     General: Skin is warm and dry.      Capillary Refill: Capillary refill takes less than 2 seconds.      Coloration: Skin is not jaundiced.   Neurological:      General: No focal deficit present.      Mental Status: She is alert and oriented to person, place, and time. Mental status is at baseline.   Psychiatric:         Mood and Affect: Mood normal.         Behavior: Behavior normal.             Significant Labs: All pertinent labs within the past 24 hours have been reviewed.  BMP:   Recent Labs   Lab 12/23/24  0312   GLU 60*      K 3.6      CO2 17*   BUN 13   CREATININE 1.5*   CALCIUM 8.3*   MG 1.5*     CBC:   Recent Labs   Lab 12/22/24  1443 12/22/24  1856   WBC 8.35  --    HGB 13.7  --    HCT 40.8 35*     --      CMP:   Recent Labs   Lab 12/22/24  1443 12/23/24  0312    136   K 3.8 3.6    109   CO2 19* 17*   GLU 78 60*   BUN 17 13   CREATININE 2.2* 1.5*   CALCIUM 9.8 8.3*   PROT 7.6  --    ALBUMIN 3.5 2.6*   BILITOT 0.6  --    ALKPHOS 103  --    AST 15  --    ALT 12  --    ANIONGAP 15 10       Significant Imaging: I have reviewed all pertinent imaging results/findings within the past 24 hours.    Assessment and Plan     * Acute renal failure superimposed on stage 3b chronic kidney disease  -suspect pre-renal etiology to [place] secondary to anorexia, persistent nausea  -s/p 2L iv fluids in ED and Cr with only slight downtrend to 2.0.  Give 3L of fluids - LR bolus over 2 hours   -Obtain UA & Urine electrolyte levels   -    Hypophosphatemia  Patient's most recent  phosphorus results are listed below.   Recent Labs     12/23/24  0312   PHOS 2.6*     Plan  - Will treat hypophosphatemia with Kphos  - Patient's hypophosphatemia is stable    Hypomagnesemia  Patient has Abnormal Magnesium: hypomagnesemia. Will continue to monitor electrolytes closely. Will replace the affected electrolytes and repeat labs to be done after interventions completed. The patient's magnesium results have been reviewed and are listed below.  Recent Labs   Lab 12/23/24  0312   MG 1.5*        Pyuria  -slightly high pyuria tonight on UA with + leuk esterase,  7 squamous epis, negative nitrite.   -patient reported oliguria symptoms, no dysuria/urgency or frequency noted.     -will hold any antibiotic therapy - f/u on pending Urine Gram stain & Culture.  If symptoms develop would treat for acute cystitis.       Bacterial vaginosis  Recent symptom c/o in primary care clinic - she was prescribed flagyl course x 7 days.  Resume at this time  -Bacterial vaginosis screen collected in clinic - results are pending.       Xerostomia  Continue on home pilocarpine tablets.       Dysphagia, oropharyngeal phase  -reporting oropharyngeal dysphagia to solid foods, able to tolerate pills,   -due to her dry mouth she c/o of difficulty handling secretions.   -no pharyngeal erythema. , no signs of oral thrush on exam  -SLP consult in AM   -soft diet      Nausea  Prn anti-emetics including Decadron  -she reports symptoms have been persistent in recent weeks  -vomiting is intermittent and none reported today   -she has been avoiding food and reporting difficulty tolerating liquid diet due to her symptoms.     She may require scheduled anti-emetics.   Consider trial of one time dose sumatriptan 6mg SQ that can be used as abortive therapy in cyclic vomiting.       Hypothyroidism  -per ambulatory notes - patient has had varying thyroid function levels in the past.   -Currently patient feels her levothyroxine is contributing or  causing dry mouth symptoms, she requests that medication not be continued at this time, she has been holding at home in the last week prior to admission.   -offered patient IV formulation while inpatient or until oral symptoms improved, but she declines at this time.     -TSH checked within last 3 months.       Chronic low back pain  -patient taking prn tramadol at home, reordered      Adenocarcinoma, lung, left  Follows with [facility]  On immunotherapy with SOTORASIB  -consult oncology in AM re: continuing this medication in acute phase of KESHA or if any dosing changes required.       Emphysema lung  -noted on chronic history, patient is not on any chronic bronchodilator therapy  -no acute symptoms of exacerbation or acute symptoms.     Severe obesity (BMI 35.0-39.9) with comorbidity  Body mass index is 34.34 kg/m². Morbid obesity complicates all aspects of disease management from diagnostic modalities to treatment.       VTE Risk Mitigation (From admission, onward)           Ordered     heparin (porcine) injection 5,000 Units  Every 8 hours         12/22/24 2216     IP VTE HIGH RISK PATIENT  Once         12/22/24 2216     Place sequential compression device  Until discontinued         12/22/24 2216                    Discharge Planning   ROSEMARIE: 12/23/2024     Code Status: Full Code   Medical Readiness for Discharge Date:   Discharge Plan A: Home, Home Health                        Slime Breaux PA-C  Department of Hospital Medicine   [facility] 11H

## 2024-12-23 NOTE — ASSESSMENT & PLAN NOTE
Prn anti-emetics   -she reports symptoms have been persistent in recent weeks  -vomiting is intermittent and none reported today   -she has been avoiding food and reporting difficulty tolerating liquid diet due to her symptoms.     She may require scheduled anti-emetics.   Consider trial of one time dose sumatriptan 6mg SQ that can be used as abortive therapy in cyclic vomiting.

## 2024-12-23 NOTE — ED NOTES
Pt care assumed. Report received by NICOLE Bustamante. Pt lying in stretcher in low and locked position and side rails raised x2. Call light, pt's belongings, and bedside table within pt's reach. Pt on continuous cardiac monitoring, pulse oximetry, and BP cycling every 4hrs. Pt in NAD and verbalized no needs at this time. Family member x1 at bedside.

## 2024-12-23 NOTE — SUBJECTIVE & OBJECTIVE
Interval History: NAEON. VSS. Patient reports continued nausea and inability to tolerate PO. Started decadron as she reports improvement with n/v in the past from heme/onc. Mg 1.5, Phos 2.6, will replete. Plan for discharge when stable.     Review of Systems   Constitutional:  Negative for chills and fever.   Respiratory:  Negative for chest tightness and shortness of breath.    Cardiovascular:  Negative for chest pain and leg swelling.   Gastrointestinal:  Positive for nausea and vomiting. Negative for abdominal pain.   Neurological:  Negative for dizziness and weakness.     Objective:     Vital Signs (Most Recent):  Temp: 97.4 °F (36.3 °C) (12/23/24 1220)  Pulse: 87 (12/23/24 1220)  Resp: 18 (12/23/24 1220)  BP: 95/63 (12/23/24 1220)  SpO2: 96 % (12/23/24 1220) Vital Signs (24h Range):  Temp:  [97.4 °F (36.3 °C)-98.7 °F (37.1 °C)] 97.4 °F (36.3 °C)  Pulse:  [] 87  Resp:  [15-18] 18  SpO2:  [94 %-98 %] 96 %  BP: ()/(56-69) 95/63     Weight: 78.8 kg (173 lb 11.6 oz)  Body mass index is 35.09 kg/m².    Intake/Output Summary (Last 24 hours) at 12/23/2024 1423  Last data filed at 12/23/2024 0954  Gross per 24 hour   Intake 300 ml   Output --   Net 300 ml         Physical Exam  Vitals and nursing note reviewed.   Constitutional:       General: She is not in acute distress.     Appearance: Normal appearance. She is not ill-appearing.   HENT:      Head: Normocephalic and atraumatic.      Right Ear: External ear normal.      Left Ear: External ear normal.   Eyes:      Extraocular Movements: Extraocular movements intact.      Conjunctiva/sclera: Conjunctivae normal.      Pupils: Pupils are equal, round, and reactive to light.   Cardiovascular:      Rate and Rhythm: Normal rate and regular rhythm.      Pulses: Normal pulses.      Heart sounds: Normal heart sounds. No murmur heard.  Pulmonary:      Effort: Pulmonary effort is normal. No respiratory distress.      Breath sounds: Normal breath sounds.   Abdominal:       General: Abdomen is flat. Bowel sounds are normal.      Palpations: Abdomen is soft.      Tenderness: There is no abdominal tenderness.   Musculoskeletal:         General: Normal range of motion.      Cervical back: Normal range of motion and neck supple. No tenderness.      Right lower leg: No edema.      Left lower leg: No edema.   Skin:     General: Skin is warm and dry.      Capillary Refill: Capillary refill takes less than 2 seconds.      Coloration: Skin is not jaundiced.   Neurological:      General: No focal deficit present.      Mental Status: She is alert and oriented to person, place, and time. Mental status is at baseline.   Psychiatric:         Mood and Affect: Mood normal.         Behavior: Behavior normal.             Significant Labs: All pertinent labs within the past 24 hours have been reviewed.  BMP:   Recent Labs   Lab 12/23/24  0312   GLU 60*      K 3.6      CO2 17*   BUN 13   CREATININE 1.5*   CALCIUM 8.3*   MG 1.5*     CBC:   Recent Labs   Lab 12/22/24  1443 12/22/24  1856   WBC 8.35  --    HGB 13.7  --    HCT 40.8 35*     --      CMP:   Recent Labs   Lab 12/22/24  1443 12/23/24  0312    136   K 3.8 3.6    109   CO2 19* 17*   GLU 78 60*   BUN 17 13   CREATININE 2.2* 1.5*   CALCIUM 9.8 8.3*   PROT 7.6  --    ALBUMIN 3.5 2.6*   BILITOT 0.6  --    ALKPHOS 103  --    AST 15  --    ALT 12  --    ANIONGAP 15 10       Significant Imaging: I have reviewed all pertinent imaging results/findings within the past 24 hours.

## 2024-12-23 NOTE — NURSING
Pt refused a heparin, states she has never taken that before, PA on call made aware. NAD noted.will continue to monitor.

## 2024-12-24 VITALS
HEART RATE: 99 BPM | SYSTOLIC BLOOD PRESSURE: 113 MMHG | DIASTOLIC BLOOD PRESSURE: 69 MMHG | HEIGHT: 59 IN | WEIGHT: 173.75 LBS | TEMPERATURE: 98 F | OXYGEN SATURATION: 95 % | BODY MASS INDEX: 35.03 KG/M2 | RESPIRATION RATE: 18 BRPM

## 2024-12-24 LAB
ALBUMIN SERPL BCP-MCNC: 2.9 G/DL (ref 3.5–5.2)
ALBUMIN SERPL BCP-MCNC: 2.9 G/DL (ref 3.5–5.2)
ALP SERPL-CCNC: 92 U/L (ref 40–150)
ALT SERPL W/O P-5'-P-CCNC: 7 U/L (ref 10–44)
ANION GAP SERPL CALC-SCNC: 10 MMOL/L (ref 8–16)
ANION GAP SERPL CALC-SCNC: 10 MMOL/L (ref 8–16)
AST SERPL-CCNC: 11 U/L (ref 10–40)
BACTERIA UR CULT: NORMAL
BACTERIA UR CULT: NORMAL
BASOPHILS # BLD AUTO: 0.01 K/UL (ref 0–0.2)
BASOPHILS NFR BLD: 0.2 % (ref 0–1.9)
BILIRUB SERPL-MCNC: 0.3 MG/DL (ref 0.1–1)
BUN SERPL-MCNC: 10 MG/DL (ref 8–23)
BUN SERPL-MCNC: 10 MG/DL (ref 8–23)
CALCIUM SERPL-MCNC: 9.1 MG/DL (ref 8.7–10.5)
CALCIUM SERPL-MCNC: 9.1 MG/DL (ref 8.7–10.5)
CHLORIDE SERPL-SCNC: 110 MMOL/L (ref 95–110)
CHLORIDE SERPL-SCNC: 110 MMOL/L (ref 95–110)
CO2 SERPL-SCNC: 16 MMOL/L (ref 23–29)
CO2 SERPL-SCNC: 16 MMOL/L (ref 23–29)
CREAT SERPL-MCNC: 1.2 MG/DL (ref 0.5–1.4)
CREAT SERPL-MCNC: 1.2 MG/DL (ref 0.5–1.4)
DIFFERENTIAL METHOD BLD: ABNORMAL
EOSINOPHIL # BLD AUTO: 0 K/UL (ref 0–0.5)
EOSINOPHIL NFR BLD: 0.2 % (ref 0–8)
ERYTHROCYTE [DISTWIDTH] IN BLOOD BY AUTOMATED COUNT: 14.6 % (ref 11.5–14.5)
EST. GFR  (NO RACE VARIABLE): 50.2 ML/MIN/1.73 M^2
EST. GFR  (NO RACE VARIABLE): 50.2 ML/MIN/1.73 M^2
GLUCOSE SERPL-MCNC: 113 MG/DL (ref 70–110)
GLUCOSE SERPL-MCNC: 113 MG/DL (ref 70–110)
HCT VFR BLD AUTO: 36.6 % (ref 37–48.5)
HGB BLD-MCNC: 11.1 G/DL (ref 12–16)
IMM GRANULOCYTES # BLD AUTO: 0.02 K/UL (ref 0–0.04)
IMM GRANULOCYTES NFR BLD AUTO: 0.4 % (ref 0–0.5)
LYMPHOCYTES # BLD AUTO: 0.4 K/UL (ref 1–4.8)
LYMPHOCYTES NFR BLD: 9 % (ref 18–48)
MAGNESIUM SERPL-MCNC: 2 MG/DL (ref 1.6–2.6)
MCH RBC QN AUTO: 30.2 PG (ref 27–31)
MCHC RBC AUTO-ENTMCNC: 30.3 G/DL (ref 32–36)
MCV RBC AUTO: 100 FL (ref 82–98)
MONOCYTES # BLD AUTO: 0.2 K/UL (ref 0.3–1)
MONOCYTES NFR BLD: 3.2 % (ref 4–15)
NEUTROPHILS # BLD AUTO: 4.1 K/UL (ref 1.8–7.7)
NEUTROPHILS NFR BLD: 87 % (ref 38–73)
NRBC BLD-RTO: 0 /100 WBC
PHOSPHATE SERPL-MCNC: 2.6 MG/DL (ref 2.7–4.5)
PLATELET # BLD AUTO: 295 K/UL (ref 150–450)
PMV BLD AUTO: 10.2 FL (ref 9.2–12.9)
POTASSIUM SERPL-SCNC: 4.1 MMOL/L (ref 3.5–5.1)
POTASSIUM SERPL-SCNC: 4.1 MMOL/L (ref 3.5–5.1)
PROT SERPL-MCNC: 6.3 G/DL (ref 6–8.4)
RBC # BLD AUTO: 3.68 M/UL (ref 4–5.4)
SODIUM SERPL-SCNC: 136 MMOL/L (ref 136–145)
SODIUM SERPL-SCNC: 136 MMOL/L (ref 136–145)
WBC # BLD AUTO: 4.66 K/UL (ref 3.9–12.7)

## 2024-12-24 PROCEDURE — 83735 ASSAY OF MAGNESIUM: CPT | Mod: HCNC | Performed by: HOSPITALIST

## 2024-12-24 PROCEDURE — 80053 COMPREHEN METABOLIC PANEL: CPT | Mod: HCNC | Performed by: PHYSICIAN ASSISTANT

## 2024-12-24 PROCEDURE — G0378 HOSPITAL OBSERVATION PER HR: HCPCS | Mod: HCNC

## 2024-12-24 PROCEDURE — 36415 COLL VENOUS BLD VENIPUNCTURE: CPT | Mod: HCNC | Performed by: HOSPITALIST

## 2024-12-24 PROCEDURE — 63600175 PHARM REV CODE 636 W HCPCS: Mod: HCNC | Performed by: HOSPITALIST

## 2024-12-24 PROCEDURE — 84100 ASSAY OF PHOSPHORUS: CPT | Mod: HCNC | Performed by: PHYSICIAN ASSISTANT

## 2024-12-24 PROCEDURE — 25000003 PHARM REV CODE 250: Mod: HCNC | Performed by: HOSPITALIST

## 2024-12-24 PROCEDURE — 25000003 PHARM REV CODE 250: Mod: HCNC | Performed by: PHYSICIAN ASSISTANT

## 2024-12-24 PROCEDURE — 96376 TX/PRO/DX INJ SAME DRUG ADON: CPT

## 2024-12-24 PROCEDURE — 85025 COMPLETE CBC W/AUTO DIFF WBC: CPT | Mod: HCNC | Performed by: STUDENT IN AN ORGANIZED HEALTH CARE EDUCATION/TRAINING PROGRAM

## 2024-12-24 RX ORDER — SODIUM,POTASSIUM PHOSPHATES 280-250MG
1 POWDER IN PACKET (EA) ORAL ONCE
Status: DISCONTINUED | OUTPATIENT
Start: 2024-12-24 | End: 2024-12-24 | Stop reason: HOSPADM

## 2024-12-24 RX ORDER — DEXAMETHASONE 4 MG/1
TABLET ORAL
Qty: 15 TABLET | Refills: 0 | Status: SHIPPED | OUTPATIENT
Start: 2024-12-24 | End: 2024-12-30

## 2024-12-24 RX ORDER — DEXAMETHASONE 2 MG/1
2 TABLET ORAL DAILY
Qty: 4 TABLET | Refills: 0 | Status: SHIPPED | OUTPATIENT
Start: 2024-12-30 | End: 2025-01-03

## 2024-12-24 RX ADMIN — ONDANSETRON 4 MG: 2 INJECTION INTRAMUSCULAR; INTRAVENOUS at 03:12

## 2024-12-24 RX ADMIN — PILOCARPINE HYDROCHLORIDE 5 MG: 5 TABLET, FILM COATED ORAL at 08:12

## 2024-12-24 RX ADMIN — ACETAMINOPHEN 650 MG: 325 TABLET ORAL at 11:12

## 2024-12-24 RX ADMIN — METRONIDAZOLE 500 MG: 500 TABLET ORAL at 08:12

## 2024-12-24 RX ADMIN — FAMOTIDINE 20 MG: 20 TABLET ORAL at 08:12

## 2024-12-24 RX ADMIN — Medication 300 ML: at 08:12

## 2024-12-24 NOTE — PLAN OF CARE
12/23/24 1420   Post-Acute Status   Post-Acute Authorization Home Health   Home Health Status Referrals Sent   Coverage [employer] MANAGED MEDICARE - [employer] MEDICARE HMO - CAPITATED   Discharge Delays None known at this time   Discharge Plan   Discharge Plan A Home;Home Health   Discharge Plan B Home with family     Ronaldo met with patient at bedside to discuss home health options. Patient and patient's sister were provided with a list of home health agencies to choose from. Ronaldo sent referrals to [facility], [facility], [facility],     Discharge Plan A and Plan B have been determined by review of patient's clinical status, future medical and therapeutic needs, and coverage/benefits for post-acute care in coordination with multidisciplinary team members.     RONALDO Rodriguez  Case Management  [phone]

## 2024-12-24 NOTE — PLAN OF CARE
[facility]      HOME HEALTH ORDERS  FACE TO FACE ENCOUNTER    Patient Name: Radha Ervin  YOB: 1960    PCP: Suraj Herrera III, MD   PCP Address: [address] [place] [address] / [place] [address]  PCP Phone Number: [phone]  PCP Fax: [phone]    Encounter Date: 12/22/24    Admit to Home Health    Diagnoses:  Active Hospital Problems    Diagnosis  POA    *Acute renal failure superimposed on stage 3b chronic kidney disease [N17.9, N18.32]  Yes    Hypomagnesemia [E83.42]  Yes    Hypophosphatemia [E83.39]  Yes    Moderate protein-calorie malnutrition [E44.0]  Yes    Nausea [R11.0]  Yes    Dysphagia, oropharyngeal phase [R13.12]  Yes    Xerostomia [K11.7]  Yes    Bacterial vaginosis [N76.0, B96.89]  Yes    Pyuria [R82.81]  Yes    Hypothyroidism [E03.9]  Yes     Chronic    Chronic low back pain [M54.50, G89.29]  Yes    Adenocarcinoma, lung, left [C34.92]  Yes     Chronic     Plan for robotic Left upper lobectomy   Walk test, FEV1 and overall functional status was adequate, do not feel further testing is necessary (despite no DLCO)  Will need cardiac stress test   May need to hold Apremilast for psoriasis, out of concern for wound healing/air leak/bronchopleural fistula      Emphysema lung [J43.9]  Yes     Chronic     Present on CT.  PFTs have been ordered.      Severe obesity (BMI 35.0-39.9) with comorbidity [E66.01]  Yes     Chronic      Resolved Hospital Problems   No resolved problems to display.       Follow Up Appointments:  Future Appointments   Date Time Provider Department Center   12/27/2024 10:00 AM Roger Eduardo MD [facility] HEM ONC [facility]   2/7/2025 11:00 AM CHAIR 04 [facility] CHEMO [place] [facility]   3/13/2025  8:45 AM [facility] OIC-MRI3 [facility] MRI [facility]   3/13/2025 10:00 AM Ramone Noriega MD [facility] RAD ONC [address]   10/17/2025  2:00 PM Suraj Herrera III, MD [facility]       Allergies:  Review of patient's allergies indicates:   Allergen Reactions    Paclitaxel  "Anaphylaxis and Other (See Comments)     Pt states "Anaphylaxis" last encounter w/ throat swelling. Encountered back pain, coughing and head fuzzy today.      Metformin Itching    Morphine Other (See Comments)     headaches    Latex, natural rubber Rash and Other (See Comments)     Redness and peeling only with bandaids    Penicillins Nausea And Vomiting and Rash       Medications: Review discharge medications with patient and family and provide education.    Current Facility-Administered Medications   Medication Dose Route Frequency Provider Last Rate Last Admin    acetaminophen tablet 650 mg  650 mg Oral Q4H PRN Justin Oliva MD   650 mg at 12/24/24 1143    aluminum-magnesium hydroxide-simethicone 200-200-20 mg/5 mL suspension 30 mL  30 mL Oral QID PRN Justin Oliva MD        dexAMETHasone injection 4 mg  4 mg Intravenous Daily PRN Slime Breaux PA-C        diphenhydrAMINE capsule 25 mg  25 mg Oral Q6H PRN Suzanna Gilman, NP   25 mg at 12/23/24 2157    electrolytes-dextrose (Pedialyte) oral solution 300 mL  300 mL Oral Q4H Slime Breaux PA-C   300 mL at 12/24/24 0841    famotidine tablet 20 mg  20 mg Oral Daily Slime Breaux PA-C   20 mg at 12/24/24 0838    heparin (porcine) injection 5,000 Units  5,000 Units Subcutaneous Q8H Justin Oliva MD        melatonin tablet 6 mg  6 mg Oral Nightly PRN Justin Oliva MD        metroNIDAZOLE tablet 500 mg  500 mg Oral BID Justin Oliva MD   500 mg at 12/24/24 0838    naloxone 0.4 mg/mL injection 0.2 mg  0.2 mg Intravenous PRN Justin Oliva MD        NON FORMULARY MEDICATION 30 mg  30 mg Oral Daily Justin Oliva MD   30 mg at 12/24/24 0953    ondansetron injection 4 mg  4 mg Intravenous Q8H PRN Justin Oliva MD   4 mg at 12/24/24 0330    pilocarpine tablet 5 mg  5 mg Oral TID Justin Oliva MD   5 mg at 12/24/24 0839    polyethylene glycol packet 17 g  17 g Oral BID PRN Justin Oliva MD        potassium, sodium " phosphates 280-160-250 mg packet 1 packet  1 packet Oral Once Gwendolyn Goss MD        prochlorperazine injection Soln 5 mg  5 mg Intravenous Q6H PRN Justin Oliva MD        sodium chloride 0.9% flush 10 mL  10 mL Intravenous Q6H PRN Justin Oliva MD        traMADoL tablet 50 mg  50 mg Oral Q8H PRN Justin Oliva MD            Medication List        CONTINUE taking these medications      ALLERGY ORAL  Take 1 tablet by mouth.     betamethasone dipropionate 0.05 % ointment  Commonly known as: DIPROLENE  Apply topically 2 (two) times daily. Prn psoriasis flares     clobetasol 0.05% 0.05 % Oint  Commonly known as: TEMOVATE  AAA fingertips bid - may occlude qhs     esomeprazole 20 MG capsule  Commonly known as: NEXIUM  Take 20 mg by mouth as needed (dyspepsia).     levothyroxine 75 MCG tablet  Commonly known as: SYNTHROID  Take 1 tablet (75 mcg total) by mouth before breakfast.     LORazepam 1 MG tablet  Commonly known as: ATIVAN  Take 1 tablet (1 mg total) by mouth every 6 (six) hours as needed for Anxiety (and half an hour before MRI).     LUMAKRAS 320 mg Tab  Generic drug: sotorasib  Take 960 mg by mouth once daily.     metroNIDAZOLE 500 MG tablet  Commonly known as: FLAGYL  Take 1 tablet (500 mg total) by mouth 2 (two) times a day. for 7 days     mometasone 0.1% 0.1 % cream  Commonly known as: ELOCON  AAA every day when red and tender under occlusion     ondansetron 4 MG Tbdl  Commonly known as: ZOFRAN-ODT  Take 2 tablets (8 mg total) by mouth every 6 (six) hours as needed (n/v).     ondansetron 8 MG tablet  Commonly known as: ZOFRAN  Take 0.5 tablets (4 mg total) by mouth every 8 (eight) hours as needed for Nausea.     OTEZLA 30 mg Tab  Generic drug: apremilast  TAKE 1 TABLET BY MOUTH DAILY     pilocarpine 5 MG Tab  Commonly known as: SALAGEN (PILOCARPINE)  Take 1 tablet (5 mg total) by mouth 3 (three) times daily.     promethazine 25 MG tablet  Commonly known as: PHENERGAN  Take 1 tablet (25  mg total) by mouth every 6 (six) hours as needed for Nausea.     traMADoL 50 mg tablet  Commonly known as: ULTRAM  Take 1 tablet (50 mg total) by mouth every 6 (six) hours as needed for Pain.                I have seen and examined this patient within the last 30 days. My clinical findings that support the need for the home health skilled services and home bound status are the following:no   Weakness/numbness causing balance and gait disturbance due to Weakness/Debility making it taxing to leave home.     Diet:   regular diet    Labs:  SN to perform labs:  CBC: Weekly; 1 week(s) and BMP: Weekly; 1 week(s)    Referrals/ Consults  Physical Therapy to evaluate and treat. Evaluate for home safety and equipment needs; Establish/upgrade home exercise program. Perform / instruct on therapeutic exercises, gait training, transfer training, and Range of Motion.  Occupational Therapy to evaluate and treat. Evaluate home environment for safety and equipment needs. Perform/Instruct on transfers, ADL training, ROM, and therapeutic exercises.    Activities:   activity as tolerated    Nursing:   Agency to admit patient within 24 hours of hospital discharge unless specified on physician order or at patient request    SN to complete comprehensive assessment including routine vital signs. Instruct on disease process and s/s of complications to report to MD. Review/verify medication list sent home with the patient at time of discharge  and instruct patient/caregiver as needed. Frequency may be adjusted depending on start of care date.     Skilled nurse to perform up to 3 visits PRN for symptoms related to diagnosis    Notify MD if SBP > 160 or < 90; DBP > 90 or < 50; HR > 120 or < 50; Temp > 101; O2 < 88%; Other:       Ok to schedule additional visits based on staff availability and patient request on consecutive days within the home health episode.    When multiple disciplines ordered:    Start of Care occurs on Sunday - Wednesday  schedule remaining discipline evaluations as ordered on separate consecutive days following the start of care.    Thursday SOC -schedule subsequent evaluations Friday and Monday the following week.     Friday - Saturday SOC - schedule subsequent discipline evaluations on consecutive days starting Monday of the following week.    For all post-discharge communication and subsequent orders please contact patient's primary care physician.     Miscellaneous: na    Home Health Aide:  Physical Therapy Three times daily and Occupational Therapy Three times daily    Wound Care Orders  no    I certify that this patient is confined to her home and needs physical therapy and occupational therapy.

## 2024-12-24 NOTE — NURSING
I am unsure of any more info other than from the patient and chartings. Patient states  that she vomited up her morning meds, was given the decadron and was told to take all medications an hour afterwards. Her partner who was in the room  cosigned that she took her flagyl about an hour after the decadron was given. Patient refusing to take this medication at this time d/t worry of vomiting and interrupted sleep. Patient wanted me to leave the medication in the room. It was explained that no medication can be left in a patient room.  GABRIELLA Mccracken aware

## 2024-12-24 NOTE — PLAN OF CARE
[facility]  Discharge Final Note    Primary Care Provider: Suraj Herrera III, MD    Expected Discharge Date: 12/24/2024              Patient was discharged home with family. Patient was referred to Home Health agencies, however there were several denials. Sw contacted [facility] and spoke with Jasmin who stated that a follow up with the patient would be on Thursday due to [place] holiday. Patient was not referred to Acute Care at Home as discussed with medical team. Patient did not have any other needs from case management.     Discharge Plan A and Plan B have been determined by review of patient's clinical status, future medical and therapeutic needs, and coverage/benefits for post-acute care in coordination with multidisciplinary team members.    Future Appointments   Date Time Provider Department Center   12/27/2024 10:00 AM Roger Eduardo MD [facility] HEM ONC [facility]   2/7/2025 11:00 AM [facility]IR [facility]   3/13/2025  8:45 AM [facility] OIC-MRI3 [facility] MRI IC Imaging Ctr   3/13/2025 10:00 AM Ramone Noriega MD [facility] RAD ONC [facility]   10/17/2025  2:00 PM Suraj Herrera III, MD [facility]             Final Discharge Note (most recent)       Final Note - 12/24/24 1522          Final Note    Assessment Type Final Discharge Note (P)      Anticipated Discharge Disposition Home or Self Care (P)      What phone number can be called within the next 1-3 days to see how you are doing after discharge? [phone] (P)      Hospital Resources/Appts/Education Provided Provided patient/caregiver with written discharge plan information;Provided education on problems/symptoms using teachback (P)         Post-Acute Status    Post-Acute Authorization Home Health (P)      Home Health Status Referrals Sent (P)      Coverage [employer] MANAGED MEDICARE - [employer] MEDICARE HMO - CAPITATED (P)      Patient choice form signed by patient/caregiver List with quality metrics by geographic  area provided;List from System Post-Acute Care (P)      Discharge Delays None known at this time (P)                      Important Message from Medicare             Contact Info       Roger Eduardo MD   Specialty: Medical Oncology, Hematology and Oncology    [facility] [facility] [address]   Phone: [phone]       Next Steps: Call in 1 day(s)    Suraj Herrera III, MD   Specialty: Internal Medicine   Relationship: PCP - General    [address] [facility] [address]   Phone: [phone]       Next Steps: Follow up in 1 week(s)    Suraj Herrera III, MD   Specialty: Internal Medicine   Relationship: PCP - General    [address] [facility]  [place] [address]   Phone: [phone]       Next Steps: Follow up in 1 week(s)          LEO Rodriguez  Case Management  [phone]

## 2024-12-24 NOTE — PLAN OF CARE
Problem: Adult Inpatient Plan of Care  Goal: Plan of Care Review  Outcome: Progressing  Goal: Patient-Specific Goal (Individualized)  Outcome: Progressing  Goal: Absence of Hospital-Acquired Illness or Injury  Outcome: Progressing  Goal: Optimal Comfort and Wellbeing  Outcome: Progressing  Goal: Readiness for Transition of Care  Outcome: Progressing     Problem: Infection  Goal: Absence of Infection Signs and Symptoms  Outcome: Progressing     Problem: Acute Kidney Injury/Impairment  Goal: Fluid and Electrolyte Balance  Outcome: Progressing  Goal: Improved Oral Intake  Outcome: Progressing  Goal: Effective Renal Function  Outcome: Progressing     Problem: Fall Injury Risk  Goal: Absence of Fall and Fall-Related Injury  Outcome: Progressing

## 2024-12-24 NOTE — PLAN OF CARE
Ronaldo met with patient and family and provided a new HH list from [employer] website. Referrals were sent to [facility] and [facility].     Patient was denied services with [facility] and [facility].    Discharge Plan A and Plan B have been determined by review of patient's clinical status, future medical and therapeutic needs, and coverage/benefits for post-acute care in coordination with multidisciplinary team members.     RONALDO Rodriguez  Case Management  [phone]

## 2024-12-24 NOTE — CARE UPDATE
"RAPID RESPONSE NURSE CHART REVIEW        Chart Reviewed: 12/24/2024, 3:30 AM    MRN: [id number]  Bed: 749/749 A    Dx: Acute renal failure superimposed on stage 3b chronic kidney disease    Radha Ervin has a past medical history of Allergy, Amblyopia, Cataract, Eczema, HS (hereditary spherocytosis), total knee replacement, Joint pain, and Melena.    Last VS: BP (!) 96/56 (BP Location: Right arm, Patient Position: Lying)   Pulse (!) 111   Temp 97.6 °F (36.4 °C) (Oral)   Resp 20   Ht 4' 11" (1.499 m)   Wt 78.8 kg (173 lb 11.6 oz)   LMP  (LMP Unknown)   SpO2 97%   Breastfeeding No   BMI 35.09 kg/m²     24H Vital Sign Range:  Temp:  [97.4 °F (36.3 °C)-98.5 °F (36.9 °C)]   Pulse:  []   Resp:  [18-20]   BP: ()/(56-67)   SpO2:  [94 %-99 %]     Level of Consciousness (AVPU): alert    Recent Labs     12/22/24  1443 12/22/24  1856   WBC 8.35  --    HGB 13.7  --    HCT 40.8 35*     --        Recent Labs     12/22/24  1443 12/23/24  0312 12/23/24  1405    136 137  137   K 3.8 3.6 3.5  3.5    109 110  110   CO2 19* 17* 20*  20*   BUN 17 13 11  11   CREATININE 2.2* 1.5* 1.2  1.2   GLU 78 60* 99  99   PHOS  --  2.6* 2.9   MG 1.9 1.5*  --           OXYGEN: room air             MEWS score: 4    Bedside Priscilla RIVERA contacted for tachycardia, soft BP reports patient BP has been soft since arrival. Pt currently sleeping in no distress. Will place order for CBC with AM labs. No additional concerns verbalized at this time. Instructed to call [phone] for further concerns or assistance.    HORACE Manzanares RN       "

## 2024-12-24 NOTE — PLAN OF CARE
CHW was unable to schedule a 1 week hospital follow up..Liv from the scheduling team sent a message to pcp scheduling team  to contact pt for a follow up visit

## 2024-12-26 ENCOUNTER — TELEPHONE (OUTPATIENT)
Dept: FAMILY MEDICINE | Facility: CLINIC | Age: 64
End: 2024-12-26
Payer: MEDICARE

## 2024-12-26 ENCOUNTER — TELEPHONE (OUTPATIENT)
Dept: PAIN MEDICINE | Facility: CLINIC | Age: 64
End: 2024-12-26
Payer: MEDICARE

## 2024-12-26 NOTE — TELEPHONE ENCOUNTER
----- Message from Liv sent at 12/24/2024 12:18 PM CST -----  Contact: [facility]  Type:  Sooner Apoointment Request    Caller is requesting a sooner appointment.  Caller declined first available appointment listed below.  Caller will not accept being placed on the waitlist and is requesting a message be sent to doctor.  Name of Caller:pt  When is the first available appointment?03/25  Symptoms:hospital f/u  Would the patient rather a call back or a response [facility]? call  Best Call Back Number:[phone]  Additional Information: pt needs to be seen within 7 days

## 2024-12-26 NOTE — TELEPHONE ENCOUNTER
Called patient to schedule hospital follow up. Spoke to patient. Patient doesn't need hospital follow up.  Patient stated she has someone coming to her home today for home health.

## 2024-12-27 ENCOUNTER — TELEPHONE (OUTPATIENT)
Dept: PAIN MEDICINE | Facility: CLINIC | Age: 64
End: 2024-12-27
Payer: MEDICARE

## 2024-12-27 ENCOUNTER — OFFICE VISIT (OUTPATIENT)
Dept: HEMATOLOGY/ONCOLOGY | Facility: CLINIC | Age: 64
End: 2024-12-27
Payer: MEDICARE

## 2024-12-27 ENCOUNTER — LAB VISIT (OUTPATIENT)
Dept: LAB | Facility: HOSPITAL | Age: 64
End: 2024-12-27
Attending: INTERNAL MEDICINE
Payer: MEDICARE

## 2024-12-27 VITALS
RESPIRATION RATE: 18 BRPM | HEART RATE: 87 BPM | BODY MASS INDEX: 34.66 KG/M2 | WEIGHT: 171.94 LBS | HEIGHT: 59 IN | DIASTOLIC BLOOD PRESSURE: 88 MMHG | OXYGEN SATURATION: 98 % | SYSTOLIC BLOOD PRESSURE: 124 MMHG | TEMPERATURE: 98 F

## 2024-12-27 DIAGNOSIS — Z29.89 IMMUNOTHERAPY: ICD-10-CM

## 2024-12-27 DIAGNOSIS — C34.92 ADENOCARCINOMA, LUNG, LEFT: Primary | ICD-10-CM

## 2024-12-27 DIAGNOSIS — Z79.899 DRUG THERAPY: ICD-10-CM

## 2024-12-27 DIAGNOSIS — C79.51 SECONDARY MALIGNANT NEOPLASM OF BONE: ICD-10-CM

## 2024-12-27 DIAGNOSIS — J40 BRONCHITIS: ICD-10-CM

## 2024-12-27 LAB
CORTIS SERPL-MCNC: 1 UG/DL
TSH SERPL DL<=0.005 MIU/L-ACNC: 1.75 UIU/ML (ref 0.4–4)

## 2024-12-27 PROCEDURE — 84443 ASSAY THYROID STIM HORMONE: CPT | Mod: HCNC | Performed by: INTERNAL MEDICINE

## 2024-12-27 PROCEDURE — 99999 PR PBB SHADOW E&M-EST. PATIENT-LVL IV: CPT | Mod: PBBFAC,HCNC,, | Performed by: INTERNAL MEDICINE

## 2024-12-27 PROCEDURE — 36415 COLL VENOUS BLD VENIPUNCTURE: CPT | Mod: HCNC | Performed by: INTERNAL MEDICINE

## 2024-12-27 PROCEDURE — 82024 ASSAY OF ACTH: CPT | Mod: HCNC | Performed by: INTERNAL MEDICINE

## 2024-12-27 PROCEDURE — 82533 TOTAL CORTISOL: CPT | Mod: HCNC | Performed by: INTERNAL MEDICINE

## 2024-12-27 RX ORDER — BENZONATATE 200 MG/1
200 CAPSULE ORAL 3 TIMES DAILY PRN
Qty: 30 CAPSULE | Refills: 0 | Status: SHIPPED | OUTPATIENT
Start: 2024-12-27 | End: 2025-01-06

## 2024-12-27 RX ORDER — AZITHROMYCIN 250 MG/1
TABLET, FILM COATED ORAL
Qty: 6 TABLET | Refills: 0 | Status: SHIPPED | OUTPATIENT
Start: 2024-12-27 | End: 2025-01-01

## 2024-12-27 NOTE — TELEPHONE ENCOUNTER
----- Message from Med Assistant Alvarado sent at 12/26/2024  4:54 PM CST -----  Regarding: FW: Advice  Contact: [phone]  Good evening team, please assist.   TY  ----- Message -----  From: Litzy Carpenter  Sent: 12/26/2024   2:21 PM CST  To: [facility] Staff - [facility]  Subject: Advice                                           Who call ? Radha Ervin     What is the request Details : Pt calling to speak with someone in provider office. States she currently was on antibiotic and finish up on tomorrow .     Please call pt back.      Can clinic  use patient portal  : No     What number to call back : [phone]

## 2024-12-27 NOTE — PROGRESS NOTES
PATIENT: Radha Ervin  MRN: [id number]  DATE: 12/27/2024      Subjective:     Chief complaint:No chief complaint on file.      Oncologic History:      Ms. Ervin is a 63-year-old female with 30 pack-year history of smoking, quit approximately 4 months ago, who was recently diagnosed with left lung cancer after evaluation for an abnormal chest x-ray.  A subsequent CT of the chest without contrast on May 26, 2023 revealed a spiculated nodule along the paramediastinal left upper lobe measuring 2.7 x 1.9 cm.  A mildly enlarged right paratracheal lymph node was seen measuring 1.1 cm.  She underwent EBUS and biopsy on June 23, 2023.  Pathology revealed the left upper lobe lesion positive for adenocarcinoma.  Station 4R was negative for malignancy.       A PET scan on July 13, 2023 revealed the left upper lobe mass measuring 2.6 x 1.6 cm with SUV max 7.4.  There was mildly metabolic prevascular mediastinal lymph node measuring 0.9 cm seen in short axis with SUV max 2.3.  She underwent robotic left upper lobectomy and lymph node sampling on August 3, 2023.  Pathology revealed invasive poorly differentiated adenocarcinoma measuring 2.1 cm.  There was visceral pleural invasion noted.  Vascular resection revealed invasive tumor present in the adventitial soft tissue.  There was lymphovascular invasion noted.  Invasive carcinoma is present at the vascular margin.  3/4 level 6 lymph nodes, 1/2 level 11 lymph nodes and 1/4 level 12 lymph nodes were involved out of a total of 23 lymph nodes.  Surgery was complicated by intraoperative bleeding of pulmonary arterial branches which resolved with pressure.      Her case was discussed at [facility] on 8/24/23: Recommend chemoRT followed by immunotherapy.     Established care with med onc 8/28. She had seen Dr. Quevedo who had recommended sequential chemoradiation then to be followed by immunotherapy. Consented for sequential CRT with carbo/taxol.    On 9/22/23 she presented to the  "infusion center for carbo/taxol C#1 but developed infusion reaction to taxol so that was stopped.  Had reaction with second infusion as well--carbo/taxol discontinued.  Consented for carbo/pemetrexed on 11/2/23    C#1 carbo/pemetrexed 11/3/23  C#2 carbo/pemetrexed 11/24/23  C#3 acute worsening of renal function--pemetrexed held: 12/15/23  C#4 carboplatin only 1/5/24    Radiation 60Gy in 30Fx 2/5/24--3/26/24    C#1 durvalumab 4/19/24  C#2 5/17/24  C#3 6/14/24  C#4 7/12/24  C#5 8/9/24   C#6 9/6/24  C#7 10/4/24    CT CAP 10/2/24 suspicious for lumbar spinal metastasis.  Pet CT 10/16/24 confirmed uptake in L-spine           Interval History: Ms. rEvin returns for follow up. She has had multiple hospitalizations since her last visit with me. She had a lot of intractible nausea/vomiting of unclear cause which ultimately improved after taking dexamethasone. She had another hospitalization last week due to dehydration. She notes that she has only taken Lumakras like 3 days total due to all the aforementioned issues. She is scheduled for RFA and kyphyplasty to L1 met next week.       Review of Systems   A comprehensive review of systems was performed; pertinent positives and negatives are noted in the HPI.        ECOG Performance Status:   ECOG SCORE             Objective:      Vitals:   Vitals:    12/27/24 0948   BP: 124/88   BP Location: Left arm   Patient Position: Sitting   Pulse: 87   Resp: 18   Temp: 98 °F (36.7 °C)   TempSrc: Oral   SpO2: 98%   Weight: 78 kg (171 lb 15.3 oz)   Height: 4' 11" (1.499 m)     BMI: Body mass index is 34.73 kg/m².      Physical Exam:   Physical Exam  Vitals and nursing note reviewed.   Constitutional:       General: She is not in acute distress.     Appearance: Normal appearance. She is not toxic-appearing.   HENT:      Head: Normocephalic and atraumatic.   Eyes:      General: No scleral icterus.     Conjunctiva/sclera: Conjunctivae normal.   Cardiovascular:      Rate and Rhythm: Normal " rate and regular rhythm.      Heart sounds: Normal heart sounds. No murmur heard.  Pulmonary:      Effort: Pulmonary effort is normal. No respiratory distress.      Breath sounds: Normal breath sounds. No wheezing, rhonchi or rales.   Abdominal:      General: There is no distension.      Palpations: Abdomen is soft.      Tenderness: There is no abdominal tenderness.   Musculoskeletal:         General: No swelling, tenderness or deformity.      Cervical back: Neck supple. No tenderness.   Skin:     Coloration: Skin is not jaundiced.      Findings: No erythema.   Neurological:      General: No focal deficit present.      Mental Status: She is alert and oriented to person, place, and time.      Motor: No weakness.   Psychiatric:         Mood and Affect: Mood normal.         Behavior: Behavior normal.           Laboratory Data:  WBC   Date Value Ref Range Status   12/24/2024 4.66 3.90 - 12.70 K/uL Final     Hemoglobin   Date Value Ref Range Status   12/24/2024 11.1 (L) 12.0 - 16.0 g/dL Final     POC Hematocrit   Date Value Ref Range Status   12/22/2024 35 (L) 36 - 54 %PCV Final     Hematocrit   Date Value Ref Range Status   12/24/2024 36.6 (L) 37.0 - 48.5 % Final     Platelets   Date Value Ref Range Status   12/24/2024 295 150 - 450 K/uL Final     Gran # (ANC)   Date Value Ref Range Status   12/24/2024 4.1 1.8 - 7.7 K/uL Final     Gran %   Date Value Ref Range Status   12/24/2024 87.0 (H) 38.0 - 73.0 % Final       Chemistry        Component Value Date/Time     12/24/2024 0442     12/24/2024 0442    K 4.1 12/24/2024 0442    K 4.1 12/24/2024 0442     12/24/2024 0442     12/24/2024 0442    CO2 16 (L) 12/24/2024 0442    CO2 16 (L) 12/24/2024 0442    BUN 10 12/24/2024 0442    BUN 10 12/24/2024 0442    BUN 12.0 04/28/2023 1207    CREATININE 1.2 12/24/2024 0442    CREATININE 1.2 12/24/2024 0442     (H) 12/24/2024 0442     (H) 12/24/2024 0442        Component Value Date/Time    CALCIUM 9.1  12/24/2024 0442    CALCIUM 9.1 12/24/2024 0442    ALKPHOS 92 12/24/2024 0442    AST 11 12/24/2024 0442    ALT 7 (L) 12/24/2024 0442    BILITOT 0.3 12/24/2024 0442    ESTGFRAFRICA >60.0 04/06/2020 1002    EGFRNONAA >60.0 04/06/2020 1002              Assessment/Plan:     1. Adenocarcinoma, lung, left    2. Secondary malignant neoplasm of bone    3. Bronchitis    4. Immunotherapy    5. Drug therapy      Stg IIIA NSCLC s/p resection followed by CRT due to residual disease. Developed metastatic progression while on maintenance durvalumab.   Referred for clinical trial--not eligible for anything currently.  Recommend next-line therapy with KRAS-inhib. Lumakras was ordered but due to recurrent issues related to nausea/vomiting and dehydration she hasn't gotten started on that consistently. She has taken about 3 days total and tolerated that without issue. She is scheduled for kyphoplasty and RFA to L1 lesion next week.      Med and Orders:  Orders Placed This Encounter    TSH    CORTISOL, RANDOM    ACTH    benzonatate (TESSALON) 200 MG capsule    azithromycin (Z-DERECK) 250 MG tablet       Follow Up:  No follow-ups on file.      Above care plan was discussed with patient and all questions were addressed to their expressed satisfaction.       Roger Eduardo MD, FACP  Hematology & Medical Oncology  [facility]

## 2024-12-30 ENCOUNTER — PATIENT OUTREACH (OUTPATIENT)
Dept: ADMINISTRATIVE | Facility: CLINIC | Age: 64
End: 2024-12-30
Payer: MEDICARE

## 2024-12-30 ENCOUNTER — TELEPHONE (OUTPATIENT)
Dept: PAIN MEDICINE | Facility: CLINIC | Age: 64
End: 2024-12-30
Payer: MEDICARE

## 2024-12-31 ENCOUNTER — PATIENT MESSAGE (OUTPATIENT)
Dept: HEMATOLOGY/ONCOLOGY | Facility: CLINIC | Age: 64
End: 2024-12-31
Payer: MEDICARE

## 2024-12-31 ENCOUNTER — TELEPHONE (OUTPATIENT)
Dept: HEMATOLOGY/ONCOLOGY | Facility: CLINIC | Age: 64
End: 2024-12-31
Payer: MEDICARE

## 2024-12-31 ENCOUNTER — E-CONSULT (OUTPATIENT)
Dept: ENDOCRINOLOGY | Facility: CLINIC | Age: 64
End: 2024-12-31
Payer: MEDICARE

## 2024-12-31 DIAGNOSIS — R68.89 ABNORMAL ENDOCRINE LABORATORY TEST FINDING: Primary | ICD-10-CM

## 2024-12-31 DIAGNOSIS — E27.40 ADRENAL INSUFFICIENCY: Primary | ICD-10-CM

## 2024-12-31 LAB — ACTH PLAS-MCNC: <5 PG/ML (ref 0–46)

## 2024-12-31 PROCEDURE — 99451 NTRPROF PH1/NTRNET/EHR 5/>: CPT | Mod: S$GLB,,, | Performed by: STUDENT IN AN ORGANIZED HEALTH CARE EDUCATION/TRAINING PROGRAM

## 2024-12-31 RX ORDER — HYDROCORTISONE 5 MG/1
TABLET ORAL
Qty: 120 TABLET | Refills: 0 | Status: SHIPPED | OUTPATIENT
Start: 2024-12-31 | End: 2025-01-30

## 2024-12-31 NOTE — CONSULTS
[facility] 6th Fl  Response for E-Consult     Patient Name: Radha Ervin  MRN: [id number]  Primary Care Provider: Suraj Herrera III, MD   Requesting Provider: Roger Eduardo MD  E-Consult to Endocrinology  Consult performed by: Michelle Berg MD  Consult ordered by: Roger Eduardo MD          Recommendation:     Per oncologist: Patient has been on prolonged dexamethasone treatment and becomes very symptomatic when it is discontinued--I am concerned for secondary adrenal insufficiency however she has also had immunotherapy in the past raising possibility of immunotherapy AI. I am starting her on hydrocortisone 15mg in the morning 5mg in the afternoon.    Stage IIIA non-small cell lung cancer s/p resection followed by CRT due to residual disease. Previous immunotherapy. This does put her at increased risk for development of immunotherapy-induced endocrinopathies especially thyroid disorders most commonly, but adrenal insufficiency can also be seen. This is often secondary AI due to hypophysitis, but it can also be primary autoimmune AI as well. Additionally she has been on prolonged dexamethasone so this could just be exogenous  glucocorticoid induced secondary AI.     Reviewed labs and on 12/27/2024 random cortisol drawn at 10:45am was 1.00. ACTH is pending. It looks like these labs were before oral HC was prescribed from what I can tell. Unsure when last dexamethasone dose was prior to these labs however, as dexamethasone is very long acting and if the pt had taken it within 72h before the lab draw, that could be the reason for the low cortisol.     Regardless, if there is any doubt it is safer to empirically treat as adrenal insufficiency and refer to endocrine and we can do additional testing. I agree with oncologist's decision to start hydrocortisone 15 mg in the morning and 5 mg in the evening (around 3-4pm).  Please also  pt on AI sick day rules and prescribe emergency  "injectable steroid - dex or HC.    Please place referral to endocrinology and if the pt has not been scheduled in 1 wk please message me and I will message our clinic manager to get the pt scheduled.      Adrenal insufficiency self-care instructions and sick day rules      Adrenal insufficiency, which involves a deficiency in steroid hormones that are normally produced by the body, can result in symptoms that include generalized and severe fatigue, malaise, dizziness, and low blood pressure. Should you develop any of these symptoms, please call us immediately or go to the ER if severe symptoms develop. You may also take your emergency intramuscular dexamethasone injection if you have this available     Please refer to the following instructions for patients with adrenal insufficiency:  1. Please get an alert bracelet that says "Adrenal insufficiency. Give stress doses of steroids in case of illness."      2. If you become nauseous and are unable to keep hydrocortisone down, you need to go to an emergency room to get this medication via IV.      3. If you are ill with a low-grade fever of  F, please double your dose of hydrocortisone. If you have a fever of >100 F, please triple your hydrocortisone dose for 3 days or until fever resolves.      4. If you are undergoing a planned medical procedure (such as minor surgery, colonoscopy) or a major dental procedure (tooth extraction, root canal etc) you should double your dose the day before and the day of and the day after the procedure.     5. If a major surgery requiring general anesthesia is being planned, please notify your endocrinologist so that we can give instructions to the anesthesia team that will be taking care of you with regards to the amount of hydrocortisone to be given during surgery.     Please call us with any questions and to notify us that you are ill at home or are heading to ER/hospital so that we can help you through the situation and " communicate with the physicians taking care of you.      Total time of Consultation: 5 minute    I did not speak to the requesting provider verbally about this.     *This eConsult is based on the clinical data available to me and is furnished without benefit of a physical examination. The eConsult will need to be interpreted in light of any clinical issues or changes in patient status not available to me at the time of filing this eConsults. Significant changes in patient condition or level of acuity should result in immediate formal consultation and reevaluation. Please alert me if you have further questions.    Thank you for this eConsult referral.     MD [facility]ama naomi - [facility] 6th Fl

## 2025-01-01 ENCOUNTER — PATIENT MESSAGE (OUTPATIENT)
Dept: HEMATOLOGY/ONCOLOGY | Facility: CLINIC | Age: 65
End: 2025-01-01
Payer: MEDICARE

## 2025-01-02 DIAGNOSIS — C34.90 MALIGNANT NEOPLASM OF UNSPECIFIED PART OF UNSPECIFIED BRONCHUS OR LUNG: ICD-10-CM

## 2025-01-02 DIAGNOSIS — C79.51 SECONDARY MALIGNANT NEOPLASM OF BONE: ICD-10-CM

## 2025-01-02 DIAGNOSIS — L40.9 PSORIASIS: ICD-10-CM

## 2025-01-02 RX ORDER — SOTORASIB 320 MG/1
960 TABLET, COATED ORAL DAILY
Qty: 90 TABLET | Refills: 3 | Status: ACTIVE | OUTPATIENT
Start: 2025-01-02

## 2025-01-03 ENCOUNTER — PATIENT MESSAGE (OUTPATIENT)
Dept: HEMATOLOGY/ONCOLOGY | Facility: CLINIC | Age: 65
End: 2025-01-03
Payer: MEDICARE

## 2025-01-03 ENCOUNTER — OUTPATIENT CASE MANAGEMENT (OUTPATIENT)
Dept: ADMINISTRATIVE | Facility: OTHER | Age: 65
End: 2025-01-03
Payer: MEDICARE

## 2025-01-03 RX ORDER — APREMILAST 30 MG/1
TABLET, FILM COATED ORAL
Qty: 30 TABLET | Refills: 3 | Status: SHIPPED | OUTPATIENT
Start: 2025-01-03

## 2025-01-03 NOTE — PROGRESS NOTES
SW contacted patient regarding referral. SW explained to patient reason for referral to assist with social needs. Patient reports needing are being met. SW will close case as needs are being met.

## 2025-01-03 NOTE — PRE-PROCEDURE INSTRUCTIONS
Patient reviewed on 1/3/2024.  Okay to proceed at [facility]. The following pre-procedure instructions and arrival time have been reviewed with patient via phone and sent to patient portal for review.  Patient verbalized an understanding.  Pt to be accompanied by sister day of procedure as responsible .      Dear Marley,     Please read over the following pre-procedure instructions in it's entirety as there is helpful information here to get you well prepared for your upcoming procedure.     You are scheduled for a procedure with Dr. Hinkle on 1/7/2024.     [facility] at the corner of [facility] and [facility]. It is in the [facility] next to [facility]. The address is: [address] [place] [address]. Take the elevator to the 2nd floor.       Registration check in time: 5:30 am  Procedure scheduled for time: 7:00 am     If you are receiving sedation, you CANNOT drive yourself and must have a responsible friend or family member (no rideshare) to drive you home.        You should take any medications that you routinely take for blood pressure, heart medications, thyroid, cholesterol, etc.      The fasting restrictions are dependent on whether or not you are receiving sedation. Sedation is not available for all procedures.      Your fasting instructions/Sedation type are as follow:  General Sedation. Nothing to eat after midnight the night prior to procedure.   Patients are encouraged to consume clear liquids up to 2 hours prior to scheduled arrival time.  -Clear liquids include Gatorade, water, soda, black coffee or tea (no milk or creamer), and clear juices. - Clear liquids do NOT include anything with pulp or food particles (chicken broth, ice cream, yogurt, Jello, etc.) You CANNOT drive yourself and must have a .          If you are on blood thinners, you need to follow the anticoagulation instructions that had been discussed previously. You should only stop the blood  thinners if it was approved by your primary care physician or your cardiologist. In the event that you are not able to stop your blood thinners, a blood thinner was not listed on your medication list, or we were not able to get clearance from your cardiologist, then the procedure may have to be postponed/canceled.      IF you were told to stop your blood thinners, this is how long you should generally hold some of the more common ones. Remember that stopping blood thinners is only necessary for certain procedures. If you are unsure of your instructions, please call us.   Aspirin - 5 days  Plavix/Clopidogrel - 7 days  Warfarin / Coumadin - 5 days  Eliquis - 3 days  Pradaxa/Dabigatran - 4 days  Xarelto/Rivaroxaban - 3 days     *If you take Ozempic, Trulicity, Mounjaro, Rybelsus, Zepbound Or other weight loss, non-insulin injections you must hold this for one week (7 days) prior if you are having IV sedation.      If you are a diabetic, do not take your medication if you will be fasting, but bring it with you. Please plan on being here for roughly 2-3 hours.      Please call us if any of the following have occurred:  *running fever or having any flu-like symptoms  *have been taking antibiotics in the past 2 weeks  *have had any or plan on having any immunizations in the 2 weeks before or after your injection(Flu/Shingles/Covid Booster/Pneumonia, etc.)  *had any out patient procedures other than with us in the past 2 weeks (Colonoscopy, Endoscopy, Biopsy, OBGYN, Dental, etc.) Or received a steroid injection from another provider within the last 2 weeks.  *have any wounds or rashes  *awaiting ANY test results that could result in you having to take antibiotics (Urine Culture, Flu/Covid/Strept Swab, etc)     If you have been COVID positive, you will need to hold off on your procedure until you are symptom free for 10 days. If you did not have any symptoms, you can have your procedure 10 days from your positive test  result.      On the morning of your procedure:  *HOLD ALL VITAMINS, MINERALS, HERBS (INCLUDING HERBAL TEAS) AND SUPPLEMENTS  *SHOWER WITH ANTIBACTERIAL SOAP (example: DIAL over the counter) NIGHT BEFORE AND MORNING OF PROCEDURE  *DO NOT APPLY ANY LOTIONS, OILS, POWDERS, PERFUME/COLOGNE, OINTMENTS, GELS, CREAMS, MAKEUP OR DEODORANT TO YOUR SKIN MORNING OF PROCEDURE  *LEAVE JEWELRY AND ANY VALUABLES AT HOME  *WEAR LOOSE COMFORTABLE CLOTHING      If you have any questions please call [phone].    Please reply to this portal message as receipt of delivery.     Thank you,  [facility]  [place], LPN [facility]  Pre-Admit

## 2025-01-04 ENCOUNTER — ANESTHESIA EVENT (OUTPATIENT)
Dept: SURGERY | Facility: HOSPITAL | Age: 65
End: 2025-01-04
Payer: MEDICARE

## 2025-01-05 NOTE — DISCHARGE INSTRUCTIONS
[facility] [place]  Messaging service # [phone]    KYPHOPLASTY POST-PROCEDURE INSTRUCTIONS:    **If you do not have a follow up schedule please send a message to my office and have one scheduled for 1-2 weeks after the procedure**    Fractures in the bones of the spine (vertebrae) can cause severe back pain and loss of movement. You had a procedure called kyphoplasty to cement the fractures in your spine, restore the height of the vertebrae, and help relieve pain. Using image-guided X-rays, your doctor made 1 or 2 small cuts (incisions) in your back for each vertebra treated. The doctor put a balloon on each side of the broken vertebra and inflated the balloons until they expanded the right amount. Then the balloons were removed. The spaces created by the balloons were filled with orthopedic cement. This gave strength and stability to your vertebra. The following are instructions to help you care for your back when you are at home.    Risks and complications  Kyphoplasty is considered safe. If complications do happen, they may include the following:  Nerve damage  Cement leakage  Heart or lung problems  New or unrelieved back pain  Infection    When to call your healthcare provider  Call your healthcare provider if you have any of these symptoms:  Fever of 100.4°F (38.0°C) or higher  New pain, weakness, tingling, or numbness in your legs  New or unrelieved back pain      When to seek medical attention  Call 911 right away if you have any of the following:  Chest pain  Severe abdominal pain  Shortness of breath  Otherwise, call your doctor right away if you have any of the following:  Increased redness, swelling, drainage, or warmth around the incision sites  Severe pain at the incision site  Weakness, numbness, or tingling in your legs  Fever above 100.4°F  (38.0°C) or shaking chills   changes in bowel and/or bladder function: urinating or  defecating on yourself and not knowing that you did it.     Home care  Take your medicine exactly as directed.  You can take any pain medications you were previously taking (except NSAIDs which should wait 24 hours).  Wait 24 hours to restart any blood thinners.  Minimize your activity for the next 24 hours.  You can get up to go to the bathroom or walk around the house, but try to take it very easy during this time.  Leave your bandages on until they fall off by themselves.  If the bandage falls off before your follow up appointment, apply a small amount of triple antibiotics ointment and a fresh Band-Aid until the incision has completely closed.  If a suture was placed to close the incision, it will be removed at your first follow up appointment.  Dont shower for 1 day. Avoid soaking in water for 2 weeks.  Use an ice pack or bag of frozen peas--or something similar--wrapped in a thin towel to reduce the swelling and pain around incision sites. Put the ice pack on the area for 20 minutes, then remove it for 20 minutes. Repeat as needed.  Dont drive for 24 hours after surgery. And never drive while taking opioid pain medicine.  Ask your healthcare provider when you can begin lifting objects again. We ask that you limit yourself to 10 lbs for the first 2 weeks.  Avoid lifting objects overhead for 2 weeks.  Do not engage in strenuous activity (e.g., lifting or pushing heavy objects or repeated bending) until you discussed it with your physician at follow up.  Avoid bending from the waist because this strains the back muscles. Instead, bend your knees or squat to  objects from below the waist.  Avoid sitting in soft chairs. Use a firm chair with a straight back, which gives better support.  Eat a normal diet. If you have stomach upset, try to eat bland and low-fat foods such as rice, toast, broiled chicken, and yogurt.  If you experience an extreme headache that is only relieved when lying flat, please contact  your provider.   Do not apply direct heat (heating pad or heat packs) to the procedure site for at least 1 week.      Recovery Time:  Pain decreases rapidly and you will barely need pain medicines in two weeks.  Many people start feeling significant better within a few days.  Most patients are able to do gentle daily activities in a week.     IF AT ANY POINT YOU ARE VERY CONCERNED ABOUT YOUR SYMPTOMS, PLEASE GO TO THE EMERGENCY ROOM.    If you develop worsening pain, weakness, numbness, lose bowel or bladder control (i.e., having an accident where you did not even know you had to go to the bathroom and suddenly noticed you soiled yourself), saddle anesthesia (a loss of sensation restricted to the area of the buttocks, anus and between the legs -- i.e., those parts of your body that would touch a saddle if you were sitting on one) you need to go immediately to the emergency department for evaluation and treatment.    ----------------------------------------------------------------------------------------------------------------------------------------------------------------  POST SEDATION INSTRUCTIONS    Today you received intravenous medication (also known as sedation) that was used to help you relax and/or decrease discomfort during your procedure. This medication will be acting in your body for the next 24 hours, so you might feel a little tired or sleepy. This feeling will slowly wear off.   Common side effects associated with these medications include: drowsiness, dizziness, sleepiness, confusion, feeling excited, difficulty remembering things, lack of steadiness with walking or balance, loss of fine muscle control, slowed reflexes, difficulty focusing, and blurred vision.  Some over-the-counter and prescription medications (e.g., muscle relaxants, opioids, mood-altering medications, sedatives/hypnotics, antihistamines) can interact with the intravenous medication you received and cause an increased risk of the  side effects listed above in addition to other potentially life threatening side effects. Use extreme caution if you are taking such medications, and consult with your Pain Physician or prescribing physician if you have any questions.  For the next 12-24 hours:    DO NOT--Drive a car, operate machinery or power tools   DO NOT--Drink any alcoholic beverages (not even beer), they may dangerously increase the risk of side effects.    DO NOT--Make any important legal or business decisions or sign important documents.  We advise you to have someone to assist you at home. Move slowly and carefully. Do not make sudden changes in position. Be aware of dizziness or light-headedness and move accordingly.   If you seek medical treatment within 24 hours, let the nurse or doctor caring for you know that you have received the above medications. If you have any questions or concerns related to your sedation or treatment today please contact us.

## 2025-01-06 ENCOUNTER — HOSPITAL ENCOUNTER (OUTPATIENT)
Facility: HOSPITAL | Age: 65
Discharge: HOME | End: 2025-01-06
Attending: STUDENT IN AN ORGANIZED HEALTH CARE EDUCATION/TRAINING PROGRAM | Admitting: STUDENT IN AN ORGANIZED HEALTH CARE EDUCATION/TRAINING PROGRAM
Payer: MEDICARE

## 2025-01-06 ENCOUNTER — ANESTHESIA (OUTPATIENT)
Dept: SURGERY | Facility: HOSPITAL | Age: 65
End: 2025-01-06
Payer: MEDICARE

## 2025-01-06 VITALS
RESPIRATION RATE: 16 BRPM | BODY MASS INDEX: 34.47 KG/M2 | WEIGHT: 171 LBS | SYSTOLIC BLOOD PRESSURE: 118 MMHG | DIASTOLIC BLOOD PRESSURE: 65 MMHG | HEART RATE: 81 BPM | OXYGEN SATURATION: 98 % | HEIGHT: 59 IN | TEMPERATURE: 97 F

## 2025-01-06 DIAGNOSIS — C79.51 SECONDARY MALIGNANT NEOPLASM OF BONE: Primary | Chronic | ICD-10-CM

## 2025-01-06 PROCEDURE — 71000016 HC POSTOP RECOV ADDL HR: Performed by: STUDENT IN AN ORGANIZED HEALTH CARE EDUCATION/TRAINING PROGRAM

## 2025-01-06 PROCEDURE — 27201423 OPTIME MED/SURG SUP & DEVICES STERILE SUPPLY: Performed by: STUDENT IN AN ORGANIZED HEALTH CARE EDUCATION/TRAINING PROGRAM

## 2025-01-06 PROCEDURE — 37000009 HC ANESTHESIA EA ADD 15 MINS: Performed by: STUDENT IN AN ORGANIZED HEALTH CARE EDUCATION/TRAINING PROGRAM

## 2025-01-06 PROCEDURE — 88307 TISSUE EXAM BY PATHOLOGIST: CPT | Performed by: STUDENT IN AN ORGANIZED HEALTH CARE EDUCATION/TRAINING PROGRAM

## 2025-01-06 PROCEDURE — 37000008 HC ANESTHESIA 1ST 15 MINUTES: Performed by: STUDENT IN AN ORGANIZED HEALTH CARE EDUCATION/TRAINING PROGRAM

## 2025-01-06 PROCEDURE — 88342 IMHCHEM/IMCYTCHM 1ST ANTB: CPT | Performed by: STUDENT IN AN ORGANIZED HEALTH CARE EDUCATION/TRAINING PROGRAM

## 2025-01-06 PROCEDURE — 25000003 PHARM REV CODE 250: Performed by: ANESTHESIOLOGY

## 2025-01-06 PROCEDURE — 71000033 HC RECOVERY, INTIAL HOUR: Performed by: STUDENT IN AN ORGANIZED HEALTH CARE EDUCATION/TRAINING PROGRAM

## 2025-01-06 PROCEDURE — D9220A PRA ANESTHESIA: Mod: ,,, | Performed by: NURSE ANESTHETIST, CERTIFIED REGISTERED

## 2025-01-06 PROCEDURE — 94761 N-INVAS EAR/PLS OXIMETRY MLT: CPT

## 2025-01-06 PROCEDURE — C1726 CATH, BAL DIL, NON-VASCULAR: HCPCS | Performed by: STUDENT IN AN ORGANIZED HEALTH CARE EDUCATION/TRAINING PROGRAM

## 2025-01-06 PROCEDURE — 88341 IMHCHEM/IMCYTCHM EA ADD ANTB: CPT | Mod: 59 | Performed by: STUDENT IN AN ORGANIZED HEALTH CARE EDUCATION/TRAINING PROGRAM

## 2025-01-06 PROCEDURE — 25000003 PHARM REV CODE 250

## 2025-01-06 PROCEDURE — 36000707: Performed by: STUDENT IN AN ORGANIZED HEALTH CARE EDUCATION/TRAINING PROGRAM

## 2025-01-06 PROCEDURE — 63600175 PHARM REV CODE 636 W HCPCS: Performed by: ANESTHESIOLOGY

## 2025-01-06 PROCEDURE — 71000015 HC POSTOP RECOV 1ST HR: Performed by: STUDENT IN AN ORGANIZED HEALTH CARE EDUCATION/TRAINING PROGRAM

## 2025-01-06 PROCEDURE — 63600175 PHARM REV CODE 636 W HCPCS: Performed by: STUDENT IN AN ORGANIZED HEALTH CARE EDUCATION/TRAINING PROGRAM

## 2025-01-06 PROCEDURE — 36000706: Performed by: STUDENT IN AN ORGANIZED HEALTH CARE EDUCATION/TRAINING PROGRAM

## 2025-01-06 PROCEDURE — 25000003 PHARM REV CODE 250: Performed by: NURSE ANESTHETIST, CERTIFIED REGISTERED

## 2025-01-06 PROCEDURE — 99900035 HC TECH TIME PER 15 MIN (STAT)

## 2025-01-06 PROCEDURE — 63600175 PHARM REV CODE 636 W HCPCS: Performed by: NURSE ANESTHETIST, CERTIFIED REGISTERED

## 2025-01-06 PROCEDURE — 25500020 PHARM REV CODE 255: Performed by: STUDENT IN AN ORGANIZED HEALTH CARE EDUCATION/TRAINING PROGRAM

## 2025-01-06 PROCEDURE — C1713 ANCHOR/SCREW BN/BN,TIS/BN: HCPCS | Performed by: STUDENT IN AN ORGANIZED HEALTH CARE EDUCATION/TRAINING PROGRAM

## 2025-01-06 PROCEDURE — 22514 PERQ VERTEBRAL AUGMENTATION: CPT | Mod: ,,, | Performed by: STUDENT IN AN ORGANIZED HEALTH CARE EDUCATION/TRAINING PROGRAM

## 2025-01-06 PROCEDURE — 88305 TISSUE EXAM BY PATHOLOGIST: CPT | Performed by: STUDENT IN AN ORGANIZED HEALTH CARE EDUCATION/TRAINING PROGRAM

## 2025-01-06 RX ORDER — FENTANYL CITRATE 50 UG/ML
25 INJECTION, SOLUTION INTRAMUSCULAR; INTRAVENOUS EVERY 5 MIN PRN
Status: COMPLETED | OUTPATIENT
Start: 2025-01-06 | End: 2025-01-06

## 2025-01-06 RX ORDER — PROCHLORPERAZINE EDISYLATE 5 MG/ML
5 INJECTION INTRAMUSCULAR; INTRAVENOUS EVERY 30 MIN PRN
Status: DISCONTINUED | OUTPATIENT
Start: 2025-01-06 | End: 2025-01-06 | Stop reason: HOSPADM

## 2025-01-06 RX ORDER — FENTANYL CITRATE 50 UG/ML
INJECTION, SOLUTION INTRAMUSCULAR; INTRAVENOUS
Status: DISCONTINUED | OUTPATIENT
Start: 2025-01-06 | End: 2025-01-06

## 2025-01-06 RX ORDER — LIDOCAINE HYDROCHLORIDE 20 MG/ML
INJECTION, SOLUTION INFILTRATION; PERINEURAL
Status: DISCONTINUED | OUTPATIENT
Start: 2025-01-06 | End: 2025-01-06 | Stop reason: HOSPADM

## 2025-01-06 RX ORDER — PHENYLEPHRINE HCL IN 0.9% NACL 1 MG/10 ML
SYRINGE (ML) INTRAVENOUS
Status: DISCONTINUED | OUTPATIENT
Start: 2025-01-06 | End: 2025-01-06

## 2025-01-06 RX ORDER — MIDAZOLAM HYDROCHLORIDE 1 MG/ML
INJECTION INTRAMUSCULAR; INTRAVENOUS
Status: DISCONTINUED | OUTPATIENT
Start: 2025-01-06 | End: 2025-01-06

## 2025-01-06 RX ORDER — OXYCODONE AND ACETAMINOPHEN 5; 325 MG/1; MG/1
1 TABLET ORAL EVERY 4 HOURS PRN
Qty: 20 TABLET | Refills: 0 | Status: SHIPPED | OUTPATIENT
Start: 2025-01-06 | End: 2025-01-11

## 2025-01-06 RX ORDER — ACETAMINOPHEN 10 MG/ML
INJECTION, SOLUTION INTRAVENOUS
Status: DISCONTINUED | OUTPATIENT
Start: 2025-01-06 | End: 2025-01-06

## 2025-01-06 RX ORDER — DEXAMETHASONE SODIUM PHOSPHATE 4 MG/ML
INJECTION, SOLUTION INTRA-ARTICULAR; INTRALESIONAL; INTRAMUSCULAR; INTRAVENOUS; SOFT TISSUE
Status: DISCONTINUED | OUTPATIENT
Start: 2025-01-06 | End: 2025-01-06

## 2025-01-06 RX ORDER — EPHEDRINE SULFATE 50 MG/ML
INJECTION, SOLUTION INTRAVENOUS
Status: DISCONTINUED | OUTPATIENT
Start: 2025-01-06 | End: 2025-01-06

## 2025-01-06 RX ORDER — ONDANSETRON HYDROCHLORIDE 2 MG/ML
4 INJECTION, SOLUTION INTRAVENOUS DAILY PRN
Status: DISCONTINUED | OUTPATIENT
Start: 2025-01-06 | End: 2025-01-06 | Stop reason: HOSPADM

## 2025-01-06 RX ORDER — CEFAZOLIN SODIUM 1 G/3ML
INJECTION, POWDER, FOR SOLUTION INTRAMUSCULAR; INTRAVENOUS
Status: DISCONTINUED | OUTPATIENT
Start: 2025-01-06 | End: 2025-01-06

## 2025-01-06 RX ORDER — PROPOFOL 10 MG/ML
VIAL (ML) INTRAVENOUS
Status: DISCONTINUED | OUTPATIENT
Start: 2025-01-06 | End: 2025-01-06

## 2025-01-06 RX ORDER — ONDANSETRON HYDROCHLORIDE 2 MG/ML
INJECTION, SOLUTION INTRAVENOUS
Status: DISCONTINUED | OUTPATIENT
Start: 2025-01-06 | End: 2025-01-06

## 2025-01-06 RX ORDER — GLUCAGON 1 MG
1 KIT INJECTION
Status: DISCONTINUED | OUTPATIENT
Start: 2025-01-06 | End: 2025-01-06 | Stop reason: HOSPADM

## 2025-01-06 RX ORDER — OXYCODONE AND ACETAMINOPHEN 5; 325 MG/1; MG/1
1 TABLET ORAL
Status: DISCONTINUED | OUTPATIENT
Start: 2025-01-06 | End: 2025-01-06 | Stop reason: HOSPADM

## 2025-01-06 RX ORDER — SUCCINYLCHOLINE CHLORIDE 20 MG/ML
INJECTION INTRAMUSCULAR; INTRAVENOUS
Status: DISCONTINUED | OUTPATIENT
Start: 2025-01-06 | End: 2025-01-06

## 2025-01-06 RX ORDER — HYDROMORPHONE HYDROCHLORIDE 1 MG/ML
0.2 INJECTION, SOLUTION INTRAMUSCULAR; INTRAVENOUS; SUBCUTANEOUS EVERY 5 MIN PRN
Status: DISCONTINUED | OUTPATIENT
Start: 2025-01-06 | End: 2025-01-06 | Stop reason: HOSPADM

## 2025-01-06 RX ORDER — LIDOCAINE HYDROCHLORIDE 20 MG/ML
INJECTION INTRAVENOUS
Status: DISCONTINUED | OUTPATIENT
Start: 2025-01-06 | End: 2025-01-06

## 2025-01-06 RX ORDER — BUPIVACAINE HYDROCHLORIDE 2.5 MG/ML
INJECTION, SOLUTION EPIDURAL; INFILTRATION; INTRACAUDAL
Status: DISCONTINUED | OUTPATIENT
Start: 2025-01-06 | End: 2025-01-06 | Stop reason: HOSPADM

## 2025-01-06 RX ADMIN — FENTANYL CITRATE 25 MCG: 50 INJECTION INTRAMUSCULAR; INTRAVENOUS at 09:01

## 2025-01-06 RX ADMIN — EPHEDRINE SULFATE 10 MG: 50 INJECTION INTRAVENOUS at 09:01

## 2025-01-06 RX ADMIN — MIDAZOLAM HYDROCHLORIDE 2 MG: 1 INJECTION, SOLUTION INTRAMUSCULAR; INTRAVENOUS at 07:01

## 2025-01-06 RX ADMIN — EPHEDRINE SULFATE 10 MG: 50 INJECTION INTRAVENOUS at 08:01

## 2025-01-06 RX ADMIN — CEFAZOLIN 2 G: 330 INJECTION, POWDER, FOR SOLUTION INTRAMUSCULAR; INTRAVENOUS at 07:01

## 2025-01-06 RX ADMIN — HYDROMORPHONE HYDROCHLORIDE 0.2 MG: 1 INJECTION, SOLUTION INTRAMUSCULAR; INTRAVENOUS; SUBCUTANEOUS at 10:01

## 2025-01-06 RX ADMIN — LIDOCAINE HYDROCHLORIDE 100 MG: 20 INJECTION INTRAVENOUS at 07:01

## 2025-01-06 RX ADMIN — Medication 100 MCG: at 07:01

## 2025-01-06 RX ADMIN — OXYCODONE HYDROCHLORIDE AND ACETAMINOPHEN 1 TABLET: 5; 325 TABLET ORAL at 09:01

## 2025-01-06 RX ADMIN — ONDANSETRON 4 MG: 2 INJECTION INTRAMUSCULAR; INTRAVENOUS at 10:01

## 2025-01-06 RX ADMIN — SODIUM CHLORIDE: 0.9 INJECTION, SOLUTION INTRAVENOUS at 07:01

## 2025-01-06 RX ADMIN — ACETAMINOPHEN 1000 MG: 10 INJECTION INTRAVENOUS at 07:01

## 2025-01-06 RX ADMIN — DEXAMETHASONE SODIUM PHOSPHATE 4 MG: 4 INJECTION INTRA-ARTICULAR; INTRALESIONAL; INTRAMUSCULAR; INTRAVENOUS; SOFT TISSUE at 07:01

## 2025-01-06 RX ADMIN — Medication 200 MCG: at 07:01

## 2025-01-06 RX ADMIN — ONDANSETRON 4 MG: 2 INJECTION INTRAMUSCULAR; INTRAVENOUS at 07:01

## 2025-01-06 RX ADMIN — GLYCOPYRROLATE 0.2 MG: 0.2 INJECTION, SOLUTION INTRAMUSCULAR; INTRAVENOUS at 07:01

## 2025-01-06 RX ADMIN — FENTANYL CITRATE 50 MCG: 50 INJECTION, SOLUTION INTRAMUSCULAR; INTRAVENOUS at 07:01

## 2025-01-06 RX ADMIN — SUCCINYLCHOLINE CHLORIDE 140 MG: 20 INJECTION, SOLUTION INTRAMUSCULAR; INTRAVENOUS at 07:01

## 2025-01-06 RX ADMIN — REMIFENTANIL HYDROCHLORIDE 0.2 MCG/KG/MIN: 1 INJECTION, POWDER, LYOPHILIZED, FOR SOLUTION INTRAVENOUS at 07:01

## 2025-01-06 RX ADMIN — PROPOFOL 160 MG: 10 INJECTION, EMULSION INTRAVENOUS at 07:01

## 2025-01-06 RX ADMIN — Medication 200 MCG: at 08:01

## 2025-01-06 NOTE — PLAN OF CARE
Pt in preop bay 31, VSS, meds given and IV inserted. Pt denies any open wounds on body or the use of any weight loss injections. Pt needs H&P, anesthesia and procedural consents. ,

## 2025-01-06 NOTE — DISCHARGE SUMMARY
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      mometasone 0.1% (ELOCON) 0.1 % cream AAA every day when red and tender under occlusion  Qty: 60 g, Refills: 2    Associated Diagnoses: Hidradenitis suppurativa      ondansetron (ZOFRAN) 8 MG tablet Take 0.5 tablets (4 mg total) by mouth every 8 (eight) hours as needed for Nausea.  Qty: 30 tablet, Refills: 3    Associated Diagnoses: CINV (chemotherapy-induced nausea and vomiting)      OTEZLA 30 mg Tab TAKE 1 TABLET BY MOUTH DAILY  Qty: 30 tablet, Refills: 3    Associated Diagnoses: Psoriasis      pilocarpine (SALAGEN, PILOCARPINE,) 5 MG Tab Take 1 tablet (5 mg total) by mouth 3 (three) times daily.  Qty: 90 tablet, Refills: 11    Associated Diagnoses: Clinical xerostomia      benzonatate (TESSALON) 200 MG capsule Take 1 capsule (200 mg total) by mouth 3 (three) times daily as needed for Cough.  Qty: 30 capsule, Refills: 0    Associated Diagnoses: Bronchitis      betamethasone dipropionate (DIPROLENE)  0.05 % ointment Apply topically 2 (two) times daily. Prn psoriasis flares  Qty: 45 g, Refills: 3      clobetasol 0.05% (TEMOVATE) 0.05 % Oint AAA fingertips bid - may occlude qhs  Qty: 60 g, Refills: 3    Associated Diagnoses: Psoriasis      diphenhydramine HCl (ALLERGY ORAL) Take 1 tablet by mouth.      hydrocortisone (CORTEF) 5 MG Tab Take 3 tablets (15 mg total) by mouth every morning AND 1 tablet (5 mg total) after lunch.  Qty: 120 tablet, Refills: 0    Associated Diagnoses: Adrenal insufficiency      levothyroxine (SYNTHROID) 75 MCG tablet Take 1 tablet (75 mcg total) by mouth before breakfast.  Qty: 30 tablet, Refills: 11    Associated Diagnoses: Secondary hypothyroidism      LORazepam (ATIVAN) 1 MG tablet Take 1 tablet (1 mg total) by mouth every 6 (six) hours as needed for Anxiety (and half an hour before MRI).  Qty: 10 tablet, Refills: 3    Associated Diagnoses: Situational anxiety      ondansetron (ZOFRAN-ODT) 4 MG TbDL Take 2 tablets (8 mg total) by mouth every 6 (six) hours as needed (n/v).  Qty: 30 tablet, Refills: 0      promethazine (PHENERGAN) 25 MG tablet Take 1 tablet (25 mg total) by mouth every 6 (six) hours as needed for Nausea.  Qty: 30 tablet, Refills: 3    Associated Diagnoses: CINV (chemotherapy-induced nausea and vomiting)      sotorasib (LUMAKRAS) 320 mg Tab Take 960 mg by mouth once daily.  Qty: 90 tablet, Refills: 3    Associated Diagnoses: Secondary malignant neoplasm of bone; Malignant neoplasm of unspecified part of unspecified bronchus or lung      traMADoL (ULTRAM) 50 mg tablet Take 1 tablet (50 mg total) by mouth every 6 (six) hours as needed for Pain.  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  Activity: as per instruction sheet.  Discharge to: Home with a responsible adult.  Follow up: 2-4 weeks       Please call my office or pager at [phone] if experienced any weakness or loss of sensation, fever > 101.5, pain uncontrolled with oral medications, persistent nausea/vomiting/or diarrhea, redness or drainage from the incisions, or any other worrisome concerns. If physician on call was not reached or could not communicate with our office for any reason please go to the nearest emergency department     Ab Bazan M.D.  PGY-5  Interventional Pain Management Fellow  [facility]  Pager: [phone]

## 2025-01-06 NOTE — TRANSFER OF CARE
"Anesthesia Transfer of Care Note    Patient: Radha Ervin    Procedure(s) Performed: Procedure(s) (LRB):  KYPHOPLASTY, SPINE, LUMBAR (N/A)    Patient location: PACU    Anesthesia Type: general    Transport from OR: Transported from OR on 6-10 L/min O2 by face mask with adequate spontaneous ventilation    Post pain: adequate analgesia    Post assessment: no apparent anesthetic complications and tolerated procedure well    Post vital signs: stable    Level of consciousness: responds to stimulation and sedated    Nausea/Vomiting: no nausea/vomiting    Complications: none    Transfer of care protocol was followed      Last vitals: Visit Vitals  BP (!) 125/59 (BP Location: Left arm, Patient Position: Lying)   Pulse 82   Temp 36.1 °C (97 °F) (Temporal)   Resp 18   Ht 4' 11" (1.499 m)   Wt 77.6 kg (171 lb)   LMP  (LMP Unknown)   SpO2 100%   Breastfeeding No   BMI 34.54 kg/m²     "

## 2025-01-06 NOTE — H&P
"HPI  Patient presenting for Procedure(s) (LRB):  KYPHOPLASTY, SPINE, LUMBAR (N/A)     Patient on Anti-coagulation No    No health changes since previous encounter    Past Medical History:   Diagnosis Date    Allergy     Amblyopia     Cataract     Eczema     HS (hereditary spherocytosis)     Hx of total knee replacement 01/19/2016    right knee    Joint pain     Melena 11/26/2024     Past Surgical History:   Procedure Laterality Date    achilles tendon repair      BRONCHOSCOPY N/A 8/3/2023    Procedure: Bronchoscopy;  Surgeon: Saeed Golud MD;  [facility] [facility] [place] [facility];  Service: Cardiothoracic;  Laterality: N/A;    CHOLECYSTECTOMY      ESOPHAGOGASTRODUODENOSCOPY N/A 11/27/2024    Procedure: EGD (ESOPHAGOGASTRODUODENOSCOPY);  Surgeon: Braxton Mooney MD;  [place] [facility] [place] [facility] ([address] [place] [address]);  Service: Endoscopy;  Laterality: N/A;    INJECTION OF ANESTHETIC AGENT AROUND MULTIPLE INTERCOSTAL NERVES N/A 8/3/2023    Procedure: BLOCK, NERVE, INTERCOSTAL, 2 OR MORE;  Surgeon: Saeed Gould MD;  [facility] [place] [facility];  Service: Cardiothoracic;  Laterality: N/A;    LYMPHADENECTOMY Left 8/3/2023    Procedure: LYMPHADENECTOMY;  Surgeon: Saeed Gould MD;  Location: [address];  Service: Cardiothoracic;  Laterality: Left;    NAVIGATIONAL BRONCHOSCOPY N/A 6/23/2023    Procedure: BRONCHOSCOPY, NAVIGATIONAL;  Surgeon: Radha Mccollum MD;  Location: [address];  Service: Pulmonary;  Laterality: N/A;    TOTAL KNEE ARTHROPLASTY      XI ROBOTIC RATS Left 8/3/2023    Procedure: XI ROBOTIC RATS upper lobectomy;  Surgeon: Saeed Gould MD;  Location: [address];  Service: Cardiothoracic;  Laterality: Left;     Review of patient's allergies indicates:   Allergen Reactions    Paclitaxel Anaphylaxis and Other (See Comments)     Pt states "Anaphylaxis" last encounter w/ throat swelling. Encountered back pain, coughing and head fuzzy today.      Metformin Itching    Morphine Other (See Comments)     " "headaches    Latex, natural rubber Rash and Other (See Comments)     Redness and peeling only with bandaids    Penicillins Nausea And Vomiting and Rash      No current facility-administered medications for this encounter.       PMHx, PSHx, Allergies, Medications reviewed in epic    ROS negative except pain complaints in HPI    OBJECTIVE:    /71 (BP Location: Right arm, Patient Position: Sitting)   Pulse 68   Temp 97.2 °F (36.2 °C) (Skin)   Resp 17   Ht 4' 11" (1.499 m)   Wt 77.6 kg (171 lb)   LMP  (LMP Unknown)   SpO2 99%   Breastfeeding No   BMI 34.54 kg/m²     PHYSICAL EXAMINATION:    GENERAL: Well appearing, in no acute distress, alert and oriented x3.  PSYCH:  Mood and affect appropriate.  SKIN: Skin color, texture, turgor normal, no rashes or lesions which will impact the procedure.  CV: RRR with palpation of the radial artery.  PULM: No evidence of respiratory difficulty, symmetric chest rise. Clear to auscultation.  NEURO: Cranial nerves grossly intact.    Plan:    Proceed with procedure as planned Procedure(s) (LRB):  KYPHOPLASTY, SPINE, LUMBAR (N/A)    Ab Bazan  01/06/2025            "

## 2025-01-06 NOTE — OP NOTE
PROCEDURE PERFORMED: Bilateral Transpedicular Kyphoplasty and Osteocool Procedure with Radiofrequency Ablation at the L1 level(s)     Patient Name: Radha Ervin  MRN: [id number]    PROCEDURE DATE: 1/6/2025    PREPROCEDURE DIAGNOSIS:                                                      1.  Pathologic compression fracture of the Lumbar spine    2.  Malignant Vertebral Fracture                                                                                                      POSTPROCEDURE DIAGNOSIS:   Same    CPT CODE:  DO NOT BILL FOR TUMOR ABLATION.  22793 (Percutaneous vertebral augmentation, including cavity creation - Lumbar 1st level),    SURGEON:  Eddi Hinkle DO     ASSISTANT: Dr. Andre Handy and Dr. Berger                                                      ANESTHESIA: MAC sedation - See Anesthesia Workflow                                                                                                              LOCAL ANESTHETIC USED:  50/50 mix of Lidocaine 1% with epi mixed with 0.25% bupivacaine at each side of vertebra, 5-10 ml at each level    ESTIMATED BLOOD LOSS: 2-5cc    COMPLICATIONS: intradiscal cement into L1-2     BONE MARROW BIOPSY:  yes                                                                               PREOPERATIVE ANTIBIOTICS: 2gIV  Ancef given 30 minutes prior to incision, see anesthesia record for time.    Informed Consent:    The patient's condition and proposed procedures, risks (including complications of nerve damage,  bleeding, infection, and failure of pain relief), and alternatives were discussed with the patient or responsible party.  The patient's/responsible party's questions were answered.  The patient/responsible party appeared to understand and chose to proceed.  Informed consent was obtained.     OPERATIVE PROCEDURE:   The patient was brought to the operating room suite and MAC sedation was performed. The patient was positioned prone on the procedure  table. The back was prepped and draped in the usual sterile fashion with Chloraprep, Duraprep, Ioban, and body fenestrated drape. The image intensifier (C-arm) was brought into position and using fluoroscopy in AP and lateral views, the above noted target pedicles were identified and marked with a skin marker. Local anesthetic was given with a 27-gauge 1.5 inch needle.  That was followed with completing the local anesthetic injection with a 3.5inch 22-gauge spinal needle going down all the way to the periosteum of the desired pedicle. A 2-mm longitudinal incision was made around the 22-gauge spinal needle using a #15 scapel to allow introduction of the trocar and cannula to the vertebral body.    Through a transpedicular approach, the trocar and cannula were introduced and advanced slowly.  Positioning was confirmed on the AP and lateral plane. Once in the posterior one-third of the vertebral body, through the cannula, a drill was advanced into the vertebral body under fluoroscopic guidance toward the anterior cortex, creating a channel to the anterior third of the vertebral body.  The anterior cortex was probed with the guide pin to ensure no perforations in the anterior cortex. Then Osteocool probes were placed in bilateral cannulas and radiofrequency ablation was performed.     After completing the radiofrequency procedure, the Osteocool probes were removed, and a 15 mm inflatable bone tamp was inserted through the cannula and advanced under fluoroscopic guidance into the vertebral body near the anterior cortex, bilateral. The radiopaque marker bands on the bone tamp were identified using AP and lateral images.  The balloon were then inflated under live fluoroscopy until noted to be filling the vertebral body, no height change noted, no movement of the end plates noted.  Expansion of the bone tamp was done with careful attention being paid to the inflation pressures and balloon position. The inflation was  monitored with AP and lateral imaging.  The methylmethacrylate resin was mixed and approximately 5ml was injected. intradiscal escape was observed while introducing the cement and this was done under live fluoroscopy.  The cannula was applied to each trocar under live fluoroscopy as well.  Each trocar and cannula was removed under live fluoroscopy in a very slow fashion to observe the contrast-impregnated cement. After the trocars were removed, the sites were cleaned and dried.     Dermabond was then used to close the incisions and after drying was covered with a bandage. The patient laid for approximately 10 minutes in a prone position post cement injection. The patient was then turned supine, monitored briefly and returned to the floor. In the recovery area, the patient layed supine for 60min after the procedure. The patient was monitored after the procedure with no change in neuromuscular status. The patient was given post-procedure and discharge instructions at home.  Instruction regarding bandage were given.  The patient was advised to call if developing any severe pain neurologic changes. Otherwise the patient will follow up with us in 1-2 week at our clinic.    Eddi Salazar

## 2025-01-06 NOTE — OP NOTE
PROCEDURE PERFORMED: Bilateral Transpedicular Kyphoplasty and Osteocool Procedure with Radiofrequency Ablation at the L1 level(s)     Patient Name: Radha Ervin  MRN: [id number]    PROCEDURE DATE: 1/6/2025    PREPROCEDURE DIAGNOSIS:                                                      1.  Pathologic compression fracture of the Lumbar spine    2.  Malignant Vertebral Fracture                                                                                                      POSTPROCEDURE DIAGNOSIS:   Same    CPT CODE:  39196 (Ablation therapy for reduction or eradication of 1 or more bone tumors (eg, metastasis), including adjacent soft tissue when involved by tumor extension, percutaneous, including imaging guidance when performed; radiofrequency), 47645 (Percutaneous vertebral augmentation, including cavity creation - Thoracic 1st level), 11808 (Percutaneous vertebral augmentation, including cavity creation - Lumbar 1st level), 33681 (each additional thoracic of lumbar level)    SURGEON:  Eddi Hinkle DO     ASSISTANT:  MD Ab Zhong MD                                                      ANESTHESIA: MAC sedation - See Anesthesia Workflow                                                                                                              LOCAL ANESTHETIC USED:  Lidocaine 2% at each side of vertebra, 5-10 ml at each level    ESTIMATED BLOOD LOSS: 2-5cc    COMPLICATIONS: Intradiscal methylmethacrylate resin spread     BONE MARROW BIOPSY:  left pedicle                                                                                PREOPERATIVE ANTIBIOTICS: 2gIV  Ancef given 30 minutes prior to incision, see anesthesia record for time.    Informed Consent:    The patient's condition and proposed procedures, risks (including complications of nerve damage,  bleeding, infection, and failure of pain relief), and alternatives were discussed with the patient or responsible party.   The patient's/responsible party's questions were answered.  The patient/responsible party appeared to understand and chose to proceed.  Informed consent was obtained.     OPERATIVE PROCEDURE:   The patient was brought to the operating room suite and MAC sedation was performed. The patient was positioned prone on the procedure table. The back was prepped and draped in the usual sterile fashion with Chloraprep, Duraprep, Ioban, and body fenestrated drape. The image intensifier (C-arm) was brought into position and using fluoroscopy in AP and lateral views, the above noted target pedicles were identified and marked with a skin marker. Local anesthetic was given with a 27-gauge 1.5 inch needle.  That was followed with completing the local anesthetic injection with a 3.5inch 22-gauge spinal needle going down all the way to the periosteum of the desired pedicle. A 2-mm longitudinal incision was made around the 22-gauge spinal needle using a #15 scapel to allow introduction of the 10 gauge trocar and cannula to the vertebral body.    Through a transpedicular approach, the trocar and cannula were introduced and advanced slowly.  Positioning was confirmed on the AP and lateral plane. Once in the posterior one-third of the vertebral body, through the cannula, a drill was advanced into the vertebral body under fluoroscopic guidance toward the anterior cortex, creating a channel to the anterior third of the vertebral body.  The anterior cortex was probed with the guide pin to ensure no perforations in the anterior cortex. Then Osteocool probes were placed in bilateral cannulas and radiofrequency ablation was performed.     After completing the radiofrequency procedure, the Osteocool probes were removed, and a 15 mm inflatable bone tamp was inserted through the cannula and advanced under fluoroscopic guidance into the vertebral body near the anterior cortex, bilateral. The radiopaque marker bands on the bone tamp were identified  using AP and lateral images.  The balloon were then inflated under live fluoroscopy until noted to be filling the vertebral body, no height change noted, no movement of the end plates noted.  Expansion of the bone tamp was done with careful attention being paid to the inflation pressures and balloon position. The inflation was monitored with AP and lateral imaging.  The methylmethacrylate resin was mixed and approximately 5ml was injected. Minimal escape was observed while introducing the cement and this was done under live fluoroscopy.  The cannula was applied to each trocar under live fluoroscopy as well.  Each trocar and cannula was removed under live fluoroscopy in a very slow fashion to observe the contrast-impregnated cement. After the trocars were removed, the sites were cleaned and dried.     Dermabond was then used to close the incisions and after drying was covered with a bandage. The patient laid for approximately 10 minutes in a prone position post cement injection. The patient was then turned supine, monitored briefly and returned to the floor. In the recovery area, the patient layed supine for 60min after the procedure. The patient was monitored after the procedure with no change in neuromuscular status. The patient was given post-procedure and discharge instructions at home.  Instruction regarding bandage were given.  The patient was advised to call if developing any severe pain neurologic changes. Otherwise the patient will follow up with us in 1-2 week at our clinic.    Andre Handy

## 2025-01-06 NOTE — DISCHARGE SUMMARY
[facility]  Discharge Note  Short Stay    Procedure(s) (LRB):  KYPHOPLASTY, SPINE, LUMBAR (N/A)      OUTCOME: Patient tolerated treatment/procedure well without complication and is now ready for discharge.    DISPOSITION: Home or Self Care    FINAL DIAGNOSIS:  <principal problem not specified>    FOLLOWUP: In clinic    DISCHARGE INSTRUCTIONS:  No discharge procedures on file.     TIME SPENT ON DISCHARGE: 10 minutes

## 2025-01-06 NOTE — ANESTHESIA PREPROCEDURE EVALUATION
01/06/2025  Radha Ervin is a 64 y.o., female.      Pre-op Assessment    I have reviewed the Patient Summary Reports.     I have reviewed the Nursing Notes. I have reviewed the NPO Status.   I have reviewed the Medications.     Review of Systems  Anesthesia Hx:             Denies Family Hx of Anesthesia complications.    Denies Personal Hx of Anesthesia complications.                      Physical Exam  General: Well nourished    Airway:  Mallampati: II   Mouth Opening: Normal  TM Distance: Normal    Chest/Lungs:  Normal Respiratory Rate    Heart:  Rate: Normal    Anesthesia Plan  Type of Anesthesia, risks & benefits discussed:    Anesthesia Type: Gen ETT  Intra-op Monitoring Plan: Standard ASA Monitors  Post Op Pain Control Plan: multimodal analgesia  Induction:  IV  Airway Plan: Video  Informed Consent: Informed consent signed with the Patient and all parties understand the risks and agree with anesthesia plan.  All questions answered.   ASA Score: 3  Day of Surgery Review of History & Physical: H&P Update referred to the surgeon/provider.    Ready For Surgery From Anesthesia Perspective.   .

## 2025-01-06 NOTE — PLAN OF CARE
Pt Resting in bed no s/s of distress RR even and unlabored VSS. Discharge instructions given and pt verbalized. IV D/C. Pt ready for discharge home/ RX picked up from pharmacy by sister. By ambu to bathroom twice with steady gait. Pain controlled.

## 2025-01-06 NOTE — ANESTHESIA POSTPROCEDURE EVALUATION
Anesthesia Post Evaluation    Patient: Radha Ervin    Procedure(s) Performed: Procedure(s) (LRB):  KYPHOPLASTY, SPINE, LUMBAR (N/A)    Final Anesthesia Type: general      Patient location during evaluation: PACU  Patient participation: Yes- Able to Participate  Level of consciousness: awake and alert  Post-procedure vital signs: reviewed and stable  Pain management: adequate  Airway patency: patent    PONV status at discharge: No PONV  Anesthetic complications: no      Cardiovascular status: stable  Respiratory status: spontaneous ventilation  Hydration status: euvolemic  Follow-up not needed.          Vitals Value Taken Time   /65 01/06/25 1047   Temp 36.1 °C (97 °F) 01/06/25 0927   Pulse 79 01/06/25 1053   Resp 19 01/06/25 1053   SpO2 97 % 01/06/25 1053   Vitals shown include unfiled device data.      Event Time   Out of Recovery 09:42:00         Pain/Derick Score: Pain Rating Prior to Med Admin: 5 (1/6/2025 10:17 AM)  Derick Score: 10 (1/6/2025  9:42 AM)

## 2025-01-06 NOTE — ANESTHESIA PROCEDURE NOTES
Intubation    Date/Time: 1/6/2025 7:16 AM    Performed by: Yen Lopez CRNA  Authorized by: John Paul Morales MD    Intubation:     Induction:  Intravenous    Intubated:  Postinduction    Mask Ventilation:  Easy mask    Attempts:  1    Attempted By:  CRNA    Method of Intubation:  Video laryngoscopy    Blade:  Gould 3    Laryngeal View Grade: Grade I - full view of cords      Difficult Airway Encountered?: No      Complications:  None    Airway Device:  Oral endotracheal tube    Airway Device Size:  7.5    Style/Cuff Inflation:  Cuffed (inflated to minimal occlusive pressure)    Tube secured:  20    Secured at:  The lips    Placement Verified By:  Capnometry    Complicating Factors:  None    Findings Post-Intubation:  BS equal bilateral and atraumatic/condition of teeth unchanged

## 2025-01-06 NOTE — DISCHARGE SUMMARY
Discharge Note  Short Stay      SUMMARY     Admit Date: 1/6/2025    Attending Physician: Andre Handy      Discharge Physician: Andre Handy      Discharge Date: 1/6/2025 10:08 AM    Procedure(s) (LRB):  KYPHOPLASTY, SPINE, LUMBAR (N/A)    Final Diagnosis: Compression fracture of L1 vertebra, initial encounter [S32.010A]  Malignant neoplasm of vertebral column [C41.2]  Pathological fracture in neoplastic disease, other specified site, initial encounter for fracture [M84.58XA]    Disposition: Home or self care    Patient Instructions:   Current Discharge Medication List        START taking these medications    Details   oxyCODONE-acetaminophen (PERCOCET) 5-325 mg per tablet Take 1 tablet by mouth every 4 (four) hours as needed for Pain.  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      mometasone 0.1% (ELOCON) 0.1 % cream AAA every day when red and tender under occlusion  Qty: 60 g, Refills: 2    Associated Diagnoses: Hidradenitis suppurativa      ondansetron (ZOFRAN) 8 MG tablet Take 0.5 tablets (4 mg total) by mouth every 8 (eight) hours as needed for Nausea.  Qty: 30 tablet, Refills: 3    Associated Diagnoses: CINV (chemotherapy-induced nausea and vomiting)      OTEZLA 30 mg Tab TAKE 1 TABLET BY MOUTH DAILY  Qty: 30 tablet, Refills: 3    Associated Diagnoses: Psoriasis      pilocarpine (SALAGEN, PILOCARPINE,) 5 MG Tab Take 1 tablet (5 mg total) by mouth 3 (three) times daily.  Qty: 90 tablet, Refills: 11    Associated Diagnoses: Clinical xerostomia      benzonatate (TESSALON) 200 MG capsule Take 1 capsule (200 mg total) by mouth 3 (three) times daily as needed for Cough.  Qty: 30 capsule, Refills: 0    Associated Diagnoses: Bronchitis      betamethasone dipropionate (DIPROLENE) 0.05 % ointment Apply  topically 2 (two) times daily. Prn psoriasis flares  Qty: 45 g, Refills: 3      clobetasol 0.05% (TEMOVATE) 0.05 % Oint AAA fingertips bid - may occlude qhs  Qty: 60 g, Refills: 3    Associated Diagnoses: Psoriasis      diphenhydramine HCl (ALLERGY ORAL) Take 1 tablet by mouth.      hydrocortisone (CORTEF) 5 MG Tab Take 3 tablets (15 mg total) by mouth every morning AND 1 tablet (5 mg total) after lunch.  Qty: 120 tablet, Refills: 0    Associated Diagnoses: Adrenal insufficiency      levothyroxine (SYNTHROID) 75 MCG tablet Take 1 tablet (75 mcg total) by mouth before breakfast.  Qty: 30 tablet, Refills: 11    Associated Diagnoses: Secondary hypothyroidism      LORazepam (ATIVAN) 1 MG tablet Take 1 tablet (1 mg total) by mouth every 6 (six) hours as needed for Anxiety (and half an hour before MRI).  Qty: 10 tablet, Refills: 3    Associated Diagnoses: Situational anxiety      ondansetron (ZOFRAN-ODT) 4 MG TbDL Take 2 tablets (8 mg total) by mouth every 6 (six) hours as needed (n/v).  Qty: 30 tablet, Refills: 0      promethazine (PHENERGAN) 25 MG tablet Take 1 tablet (25 mg total) by mouth every 6 (six) hours as needed for Nausea.  Qty: 30 tablet, Refills: 3    Associated Diagnoses: CINV (chemotherapy-induced nausea and vomiting)      sotorasib (LUMAKRAS) 320 mg Tab Take 960 mg by mouth once daily.  Qty: 90 tablet, Refills: 3    Associated Diagnoses: Secondary malignant neoplasm of bone; Malignant neoplasm of unspecified part of unspecified bronchus or lung      traMADoL (ULTRAM) 50 mg tablet Take 1 tablet (50 mg total) by mouth every 6 (six) hours as needed for Pain.  Qty: 28 each, Refills: 0    Associated Diagnoses: Secondary malignant neoplasm of bone                 Discharge Diagnosis: Compression fracture of L1 vertebra, initial encounter [S32.010A]  Malignant neoplasm of vertebral column [C41.2]  Pathological fracture in neoplastic disease, other specified site, initial encounter for fracture  [M84.58XA]  Condition on Discharge: Stable with no complications to procedure   Diet on Discharge: Same as before.  Activity: as per instruction sheet.  Discharge to: Home with a responsible adult.  Follow up: 2-4 weeks       Please call my office or pager at [phone] if experienced any weakness or loss of sensation, fever > 101.5, pain uncontrolled with oral medications, persistent nausea/vomiting/or diarrhea, redness or drainage from the incisions, or any other worrisome concerns. If physician on call was not reached or could not communicate with our office for any reason please go to the nearest emergency department

## 2025-01-08 PROBLEM — L40.9 PSORIASIS: Status: ACTIVE | Noted: 2025-01-08

## 2025-01-08 LAB
COMMENT: NORMAL
FINAL PATHOLOGIC DIAGNOSIS: NORMAL
GROSS: NORMAL
Lab: NORMAL
MICROSCOPIC EXAM: NORMAL

## 2025-01-17 ENCOUNTER — TELEPHONE (OUTPATIENT)
Dept: PAIN MEDICINE | Facility: CLINIC | Age: 65
End: 2025-01-17
Payer: MEDICARE

## 2025-01-25 ENCOUNTER — OFFICE VISIT (OUTPATIENT)
Dept: HEMATOLOGY/ONCOLOGY | Facility: CLINIC | Age: 65
End: 2025-01-25
Payer: MEDICARE

## 2025-01-25 ENCOUNTER — LAB VISIT (OUTPATIENT)
Dept: LAB | Facility: HOSPITAL | Age: 65
End: 2025-01-25
Attending: INTERNAL MEDICINE
Payer: MEDICARE

## 2025-01-25 VITALS
OXYGEN SATURATION: 97 % | WEIGHT: 167.31 LBS | TEMPERATURE: 98 F | SYSTOLIC BLOOD PRESSURE: 108 MMHG | DIASTOLIC BLOOD PRESSURE: 68 MMHG | HEIGHT: 59 IN | RESPIRATION RATE: 18 BRPM | HEART RATE: 97 BPM | BODY MASS INDEX: 33.73 KG/M2

## 2025-01-25 DIAGNOSIS — E27.3 ADRENAL INSUFFICIENCY DUE TO CANCER THERAPY: ICD-10-CM

## 2025-01-25 DIAGNOSIS — T50.905A DRUG-INDUCED PRURITUS: ICD-10-CM

## 2025-01-25 DIAGNOSIS — L29.89 DRUG-INDUCED PRURITUS: ICD-10-CM

## 2025-01-25 DIAGNOSIS — C34.92 ADENOCARCINOMA, LUNG, LEFT: Primary | Chronic | ICD-10-CM

## 2025-01-25 LAB
ALBUMIN SERPL BCP-MCNC: 3.7 G/DL (ref 3.5–5.2)
ALP SERPL-CCNC: 93 U/L (ref 40–150)
ALT SERPL W/O P-5'-P-CCNC: 11 U/L (ref 10–44)
ANION GAP SERPL CALC-SCNC: 11 MMOL/L (ref 8–16)
AST SERPL-CCNC: 12 U/L (ref 10–40)
BASOPHILS # BLD AUTO: 0.11 K/UL (ref 0–0.2)
BASOPHILS NFR BLD: 1.2 % (ref 0–1.9)
BILIRUB SERPL-MCNC: 0.3 MG/DL (ref 0.1–1)
BUN SERPL-MCNC: 14 MG/DL (ref 8–23)
CALCIUM SERPL-MCNC: 9.4 MG/DL (ref 8.7–10.5)
CHLORIDE SERPL-SCNC: 111 MMOL/L (ref 95–110)
CO2 SERPL-SCNC: 22 MMOL/L (ref 23–29)
CREAT SERPL-MCNC: 1.3 MG/DL (ref 0.5–1.4)
DIFFERENTIAL METHOD BLD: ABNORMAL
EOSINOPHIL # BLD AUTO: 0.4 K/UL (ref 0–0.5)
EOSINOPHIL NFR BLD: 4.6 % (ref 0–8)
ERYTHROCYTE [DISTWIDTH] IN BLOOD BY AUTOMATED COUNT: 15.2 % (ref 11.5–14.5)
EST. GFR  (NO RACE VARIABLE): 46 ML/MIN/1.73 M^2
GLUCOSE SERPL-MCNC: 100 MG/DL (ref 70–110)
HCT VFR BLD AUTO: 40.5 % (ref 37–48.5)
HGB BLD-MCNC: 12.4 G/DL (ref 12–16)
IMM GRANULOCYTES # BLD AUTO: 0.03 K/UL (ref 0–0.04)
IMM GRANULOCYTES NFR BLD AUTO: 0.3 % (ref 0–0.5)
LYMPHOCYTES # BLD AUTO: 2.5 K/UL (ref 1–4.8)
LYMPHOCYTES NFR BLD: 26 % (ref 18–48)
MCH RBC QN AUTO: 30.7 PG (ref 27–31)
MCHC RBC AUTO-ENTMCNC: 30.6 G/DL (ref 32–36)
MCV RBC AUTO: 100 FL (ref 82–98)
MONOCYTES # BLD AUTO: 0.9 K/UL (ref 0.3–1)
MONOCYTES NFR BLD: 9.1 % (ref 4–15)
NEUTROPHILS # BLD AUTO: 5.6 K/UL (ref 1.8–7.7)
NEUTROPHILS NFR BLD: 58.8 % (ref 38–73)
NRBC BLD-RTO: 0 /100 WBC
PLATELET # BLD AUTO: 337 K/UL (ref 150–450)
PMV BLD AUTO: 9.6 FL (ref 9.2–12.9)
POTASSIUM SERPL-SCNC: 3.9 MMOL/L (ref 3.5–5.1)
PROT SERPL-MCNC: 7.5 G/DL (ref 6–8.4)
RBC # BLD AUTO: 4.04 M/UL (ref 4–5.4)
SODIUM SERPL-SCNC: 144 MMOL/L (ref 136–145)
T4 FREE SERPL-MCNC: 0.98 NG/DL (ref 0.71–1.51)
TSH SERPL DL<=0.005 MIU/L-ACNC: 7.8 UIU/ML (ref 0.4–4)
WBC # BLD AUTO: 9.45 K/UL (ref 3.9–12.7)

## 2025-01-25 PROCEDURE — 84443 ASSAY THYROID STIM HORMONE: CPT | Mod: HCNC | Performed by: INTERNAL MEDICINE

## 2025-01-25 PROCEDURE — 99215 OFFICE O/P EST HI 40 MIN: CPT | Mod: HCNC,S$GLB,, | Performed by: INTERNAL MEDICINE

## 2025-01-25 PROCEDURE — 3074F SYST BP LT 130 MM HG: CPT | Mod: HCNC,CPTII,S$GLB, | Performed by: INTERNAL MEDICINE

## 2025-01-25 PROCEDURE — 1159F MED LIST DOCD IN RCRD: CPT | Mod: HCNC,CPTII,S$GLB, | Performed by: INTERNAL MEDICINE

## 2025-01-25 PROCEDURE — 1101F PT FALLS ASSESS-DOCD LE1/YR: CPT | Mod: HCNC,CPTII,S$GLB, | Performed by: INTERNAL MEDICINE

## 2025-01-25 PROCEDURE — 3288F FALL RISK ASSESSMENT DOCD: CPT | Mod: HCNC,CPTII,S$GLB, | Performed by: INTERNAL MEDICINE

## 2025-01-25 PROCEDURE — 85025 COMPLETE CBC W/AUTO DIFF WBC: CPT | Mod: HCNC | Performed by: INTERNAL MEDICINE

## 2025-01-25 PROCEDURE — 84439 ASSAY OF FREE THYROXINE: CPT | Mod: HCNC | Performed by: INTERNAL MEDICINE

## 2025-01-25 PROCEDURE — 36415 COLL VENOUS BLD VENIPUNCTURE: CPT | Mod: HCNC | Performed by: INTERNAL MEDICINE

## 2025-01-25 PROCEDURE — 1126F AMNT PAIN NOTED NONE PRSNT: CPT | Mod: HCNC,CPTII,S$GLB, | Performed by: INTERNAL MEDICINE

## 2025-01-25 PROCEDURE — 99999 PR PBB SHADOW E&M-EST. PATIENT-LVL IV: CPT | Mod: PBBFAC,HCNC,, | Performed by: INTERNAL MEDICINE

## 2025-01-25 PROCEDURE — 3008F BODY MASS INDEX DOCD: CPT | Mod: HCNC,CPTII,S$GLB, | Performed by: INTERNAL MEDICINE

## 2025-01-25 PROCEDURE — 80053 COMPREHEN METABOLIC PANEL: CPT | Mod: HCNC | Performed by: INTERNAL MEDICINE

## 2025-01-25 PROCEDURE — 3078F DIAST BP <80 MM HG: CPT | Mod: HCNC,CPTII,S$GLB, | Performed by: INTERNAL MEDICINE

## 2025-01-25 RX ORDER — HYDROXYZINE HYDROCHLORIDE 25 MG/1
25 TABLET, FILM COATED ORAL 3 TIMES DAILY PRN
Qty: 90 TABLET | Refills: 0 | Status: SHIPPED | OUTPATIENT
Start: 2025-01-25

## 2025-01-27 NOTE — PROGRESS NOTES
PATIENT: Radha Ervin  MRN: [id number]  DATE: 1/25/2025      Subjective:     Chief complaint:  Chief Complaint   Patient presents with    Lung Cancer    Follow-up       Oncologic History:      Ms. Ervin is a 63-year-old female with 30 pack-year history of smoking, quit approximately 4 months ago, who was recently diagnosed with left lung cancer after evaluation for an abnormal chest x-ray.  A subsequent CT of the chest without contrast on May 26, 2023 revealed a spiculated nodule along the paramediastinal left upper lobe measuring 2.7 x 1.9 cm.  A mildly enlarged right paratracheal lymph node was seen measuring 1.1 cm.  She underwent EBUS and biopsy on June 23, 2023.  Pathology revealed the left upper lobe lesion positive for adenocarcinoma.  Station 4R was negative for malignancy.       A PET scan on July 13, 2023 revealed the left upper lobe mass measuring 2.6 x 1.6 cm with SUV max 7.4.  There was mildly metabolic prevascular mediastinal lymph node measuring 0.9 cm seen in short axis with SUV max 2.3.  She underwent robotic left upper lobectomy and lymph node sampling on August 3, 2023.  Pathology revealed invasive poorly differentiated adenocarcinoma measuring 2.1 cm.  There was visceral pleural invasion noted.  Vascular resection revealed invasive tumor present in the adventitial soft tissue.  There was lymphovascular invasion noted.  Invasive carcinoma is present at the vascular margin.  3/4 level 6 lymph nodes, 1/2 level 11 lymph nodes and 1/4 level 12 lymph nodes were involved out of a total of 23 lymph nodes.  Surgery was complicated by intraoperative bleeding of pulmonary arterial branches which resolved with pressure.      Her case was discussed at [facility] on 8/24/23: Recommend chemoRT followed by immunotherapy.     Established care with med onc 8/28. She had seen Dr. Quevedo who had recommended sequential chemoradiation then to be followed by immunotherapy. Consented for sequential CRT with  "carbo/taxol.    On 9/22/23 she presented to the infusion center for carbo/taxol C#1 but developed infusion reaction to taxol so that was stopped.  Had reaction with second infusion as well--carbo/taxol discontinued.  Consented for carbo/pemetrexed on 11/2/23    C#1 carbo/pemetrexed 11/3/23  C#2 carbo/pemetrexed 11/24/23  C#3 acute worsening of renal function--pemetrexed held: 12/15/23  C#4 carboplatin only 1/5/24    Radiation 60Gy in 30Fx 2/5/24--3/26/24    C#1 durvalumab 4/19/24  C#2 5/17/24  C#3 6/14/24  C#4 7/12/24  C#5 8/9/24   C#6 9/6/24  C#7 10/4/24    CT CAP 10/2/24 suspicious for lumbar spinal metastasis.  Pet CT 10/16/24 confirmed uptake in L-spine    She received XRT to L-spine metastasis 2fx 11/25--11/26/24           Interval History: Ms. Ervin returns for follow up. She has been doing hydrocortisone and this has helped the nausea. She is also taking Lumakras and aside from some itching which is manageable with OTC antihistamine, she hasn't had any issues.       Review of Systems   A comprehensive review of systems was performed; pertinent positives and negatives are noted in the HPI.        ECOG Performance Status:   ECOG SCORE    1 - Restricted in strenuous activity-ambulatory and able to carry out work of a light nature         Objective:      Vitals:   Vitals:    01/25/25 0910   BP: 108/68   BP Location: Right arm   Patient Position: Sitting   Pulse: 97   Resp: 18   Temp: 97.6 °F (36.4 °C)   TempSrc: Oral   SpO2: 97%   Weight: 75.9 kg (167 lb 5.3 oz)   Height: 4' 11" (1.499 m)     BMI: Body mass index is 33.8 kg/m².      Physical Exam:   Physical Exam  Vitals and nursing note reviewed.   Constitutional:       General: She is not in acute distress.     Appearance: Normal appearance. She is not toxic-appearing.   HENT:      Head: Normocephalic and atraumatic.   Eyes:      General: No scleral icterus.     Conjunctiva/sclera: Conjunctivae normal.   Cardiovascular:      Rate and Rhythm: Normal rate and " regular rhythm.      Heart sounds: Normal heart sounds. No murmur heard.  Pulmonary:      Effort: Pulmonary effort is normal. No respiratory distress.      Breath sounds: Normal breath sounds. No wheezing, rhonchi or rales.   Abdominal:      General: There is no distension.      Palpations: Abdomen is soft.      Tenderness: There is no abdominal tenderness.   Musculoskeletal:         General: No swelling, tenderness or deformity.      Cervical back: Neck supple. No tenderness.   Skin:     Coloration: Skin is not jaundiced.      Findings: No erythema.   Neurological:      General: No focal deficit present.      Mental Status: She is alert and oriented to person, place, and time.      Motor: No weakness.   Psychiatric:         Mood and Affect: Mood normal.         Behavior: Behavior normal.           Laboratory Data:  WBC   Date Value Ref Range Status   01/25/2025 9.45 3.90 - 12.70 K/uL Final     Hemoglobin   Date Value Ref Range Status   01/25/2025 12.4 12.0 - 16.0 g/dL Final     POC Hematocrit   Date Value Ref Range Status   12/22/2024 35 (L) 36 - 54 %PCV Final     Hematocrit   Date Value Ref Range Status   01/25/2025 40.5 37.0 - 48.5 % Final     Platelets   Date Value Ref Range Status   01/25/2025 337 150 - 450 K/uL Final     Gran # (ANC)   Date Value Ref Range Status   01/25/2025 5.6 1.8 - 7.7 K/uL Final     Gran %   Date Value Ref Range Status   01/25/2025 58.8 38.0 - 73.0 % Final       Chemistry        Component Value Date/Time     01/25/2025 1014    K 3.9 01/25/2025 1014     (H) 01/25/2025 1014    CO2 22 (L) 01/25/2025 1014    BUN 14 01/25/2025 1014    BUN 12.0 04/28/2023 1207    CREATININE 1.3 01/25/2025 1014     01/25/2025 1014        Component Value Date/Time    CALCIUM 9.4 01/25/2025 1014    ALKPHOS 93 01/25/2025 1014    AST 12 01/25/2025 1014    ALT 11 01/25/2025 1014    BILITOT 0.3 01/25/2025 1014    ESTGFRAFRICA >60.0 04/06/2020 1002    EGFRNONAA >60.0 04/06/2020 1002               Assessment/Plan:     1. Adenocarcinoma, lung, left    2. Adrenal insufficiency due to cancer therapy    3. Drug-induced pruritus      Metastatic NSCLC with KRAS g12c mutation, oligometastatic disease to L-spine now s/p XRT.  2ry adrenal insufficiency--continue hydrocortisone.   Due for repeat scans    Med and Orders:  Orders Placed This Encounter    NM PET CT FDG Skull Base to Mid Thigh    CBC auto differential    CMP    TSH    hydrOXYzine HCL (ATARAX) 25 MG tablet       Follow Up:  Follow up in about 4 weeks (around 2/22/2025).      Above care plan was discussed with patient and all questions were addressed to their expressed satisfaction.       Roger Eduardo MD, FACP  Hematology & Medical Oncology  [facility]         High Risk Conditions:    Patient has a condition that poses threat to life and bodily function: mNSCLC, KRAS g12c mutated.  Patient is currently on drug therapy requiring intensive monitoring for toxicity: lumakras

## 2025-01-28 ENCOUNTER — OFFICE VISIT (OUTPATIENT)
Dept: PAIN MEDICINE | Facility: CLINIC | Age: 65
End: 2025-01-28
Payer: MEDICARE

## 2025-01-28 VITALS
HEIGHT: 59 IN | HEART RATE: 105 BPM | WEIGHT: 167.31 LBS | SYSTOLIC BLOOD PRESSURE: 103 MMHG | DIASTOLIC BLOOD PRESSURE: 74 MMHG | BODY MASS INDEX: 33.73 KG/M2

## 2025-01-28 DIAGNOSIS — C41.2 MALIGNANT NEOPLASM OF VERTEBRAL COLUMN: ICD-10-CM

## 2025-01-28 DIAGNOSIS — S32.010A COMPRESSION FRACTURE OF L1 VERTEBRA, INITIAL ENCOUNTER: Primary | ICD-10-CM

## 2025-01-28 PROCEDURE — 3008F BODY MASS INDEX DOCD: CPT | Mod: CPTII,S$GLB,, | Performed by: EMERGENCY MEDICINE

## 2025-01-28 PROCEDURE — 3078F DIAST BP <80 MM HG: CPT | Mod: CPTII,S$GLB,, | Performed by: EMERGENCY MEDICINE

## 2025-01-28 PROCEDURE — 3288F FALL RISK ASSESSMENT DOCD: CPT | Mod: CPTII,S$GLB,, | Performed by: EMERGENCY MEDICINE

## 2025-01-28 PROCEDURE — 99999 PR PBB SHADOW E&M-EST. PATIENT-LVL IV: CPT | Mod: PBBFAC,,, | Performed by: EMERGENCY MEDICINE

## 2025-01-28 PROCEDURE — 1125F AMNT PAIN NOTED PAIN PRSNT: CPT | Mod: CPTII,S$GLB,, | Performed by: EMERGENCY MEDICINE

## 2025-01-28 PROCEDURE — 1159F MED LIST DOCD IN RCRD: CPT | Mod: CPTII,S$GLB,, | Performed by: EMERGENCY MEDICINE

## 2025-01-28 PROCEDURE — 99213 OFFICE O/P EST LOW 20 MIN: CPT | Mod: S$GLB,,, | Performed by: EMERGENCY MEDICINE

## 2025-01-28 PROCEDURE — 1101F PT FALLS ASSESS-DOCD LE1/YR: CPT | Mod: CPTII,S$GLB,, | Performed by: EMERGENCY MEDICINE

## 2025-01-28 PROCEDURE — 3074F SYST BP LT 130 MM HG: CPT | Mod: CPTII,S$GLB,, | Performed by: EMERGENCY MEDICINE

## 2025-01-28 NOTE — PROGRESS NOTES
Interventional Pain Management - Established     Interval History 01/28/2025: On 01/06/2025 the patient had L1 bilateral kyphoplasty with Osteocool RFA and they report 60% pain relief, but there are days when she has no little.     Original HPI 11/15/24: Radha Ervin is a 64 y.o. year old female patient who has a past medical history of Allergy, Amblyopia, Cataract, Eczema, HS (hereditary spherocytosis), total knee replacement, Joint pain, and Melena. She presents in referral from No ref. provider found for lbp for the past 4 months     Original Pain Description:  The pain is located in the lower back and is axial. The pain is described as aching. Exacerbating factors: Sitting, Standing, Laying, Bending, Walking, and Getting out of bed/chair. Mitigating factors medications. Symptoms interfere with daily activity and sleeping. The patient feels like symptoms have been worsening. Patient denies significant motor weakness.    Patient also reports another pain that is lower down in her back and  radiating to the bilateral hip area, right > left.     PAIN SCORES:  Best: Pain is 6  Current: Pain is 8  Worst: Pain is 10    6 weeks of Conservative therapy:  PT: Not yet  Chiro:  HEP: Unable due to pain    Treatments / Medications:   Ativan  Tramadol 50mg qhs     Antiplatelets/Anticoagulants:  NA    Interventional Pain Procedures:   01/06/25 L1 Kyphoplasty + Osteocool RFA    IMAGING:    MRI LUMBAR SPINE W WO CONTRAST; MRI THORACIC SPINE W WO CONTRAST  11/09/2024  T10-T11: symmetric degenerative endplate changes with endplate edema  T12-L1: Mild spinal canal stenosis  L1:  Lytic lesion posterior body 1.0 cm.  Approx 30% height loss superior endplate w/edema. Mild bulging of the posterior cortex resulting in mild spinal canal stenosis.   L3-L4: Mild CDB  L4-L5: CDB, L>R facet arthropathy, & LF thickening=>mod spinal canal stenosis.     L5-S1: Mild CDB and mod facet arthropathy.      Past Surgical History:   Procedure  Laterality Date    achilles tendon repair      BRONCHOSCOPY N/A 8/3/2023    Procedure: Bronchoscopy;  Surgeon: Saeed Gould MD;  Location: [facility] OR 2ND FLR;  Service: Cardiothoracic;  Laterality: N/A;    CHOLECYSTECTOMY      ESOPHAGOGASTRODUODENOSCOPY N/A 2024    Procedure: EGD (ESOPHAGOGASTRODUODENOSCOPY);  Surgeon: Braxton Mooney MD;  Location: [facility] ENDO (2ND FLR);  Service: Endoscopy;  Laterality: N/A;    INJECTION OF ANESTHETIC AGENT AROUND MULTIPLE INTERCOSTAL NERVES N/A 8/3/2023    Procedure: BLOCK, NERVE, INTERCOSTAL, 2 OR MORE;  Surgeon: Saeed Gould MD;  Location: [facility] OR 2ND FLR;  Service: Cardiothoracic;  Laterality: N/A;    KYPHOPLASTY, SPINE, LUMBAR N/A 2025    Procedure: KYPHOPLASTY, SPINE, LUMBAR;  Surgeon: Eddi Hinkle DO;  Location: [facility] OR;  Service: Pain Management;  Laterality: N/A;  [employer]; SNS nerve monitoring scheduled    LYMPHADENECTOMY Left 8/3/2023    Procedure: LYMPHADENECTOMY;  Surgeon: Saeed Gould MD;  Location: [facility] OR 2ND FLR;  Service: Cardiothoracic;  Laterality: Left;    NAVIGATIONAL BRONCHOSCOPY N/A 2023    Procedure: BRONCHOSCOPY, NAVIGATIONAL;  Surgeon: Radha Mccollum MD;  [facility];  Service: Pulmonary;  Laterality: N/A;    TOTAL KNEE ARTHROPLASTY      XI ROBOTIC RATS Left 8/3/2023    Procedure: XI ROBOTIC RATS upper lobectomy;  Surgeon: Saeed Gould MD;  Location: [facility] OR 2ND FLR;  Service: Cardiothoracic;  Laterality: Left;       Social History     Socioeconomic History    Marital status: Single   Tobacco Use    Smoking status: Former     Current packs/day: 0.00     Average packs/day: 1 pack/day for 46.3 years (46.3 ttl pk-yrs)     Types: Vaping with nicotine, Cigarettes     Start date:      Quit date: 2023     Years since quittin.7    Smokeless tobacco: Never   Substance and Sexual Activity    Alcohol use: Not Currently     Comment: occassionally    Drug use: No    Sexual activity: Not  "Currently     Partners: Male     Social Drivers of Health     Financial Resource Strain: Medium Risk (1/23/2025)    Overall Financial Resource Strain (CARDIA)     Difficulty of Paying Living Expenses: Somewhat hard   Food Insecurity: No Food Insecurity (1/23/2025)    Hunger Vital Sign     Worried About Running Out of Food in the Last Year: Never true     Ran Out of Food in the Last Year: Never true   Transportation Needs: No Transportation Needs (12/23/2024)    TRANSPORTATION NEEDS     Transportation : No   Physical Activity: Insufficiently Active (1/23/2025)    Exercise Vital Sign     Days of Exercise per Week: 5 days     Minutes of Exercise per Session: 10 min   Stress: No Stress Concern Present (1/23/2025)    [place] [employer] - Occupational Stress Questionnaire     Feeling of Stress : Not at all   Recent Concern: Stress - Stress Concern Present (12/23/2024)    [place] [employer] - Occupational Stress Questionnaire     Feeling of Stress : Very much   Housing Stability: Low Risk  (1/23/2025)    Housing Stability Vital Sign     Unable to Pay for Housing in the Last Year: No     Homeless in the Last Year: No       Medications/Allergies: See med card    ROS:  GENERAL: No fever. No chills. No fatigue. Denies weight loss. Denies weight gain.  Back / musculoskeletal / neuro : See HPI    VITALS:   Vitals:    01/28/25 1354   BP: 103/74   Pulse: 105   Weight: 75.9 kg (167 lb 5.3 oz)   Height: 4' 11" (1.499 m)   PainSc:   4   PainLoc: Back       Body mass index is 33.8 kg/m².      1/28/2025     1:59 PM 11/15/2024     8:34 AM   Last 3 PDI Scores   Pain Disability Index (PDI) 35 56       PHYSICAL EXAM:   GENERAL: Well appearing, in no acute distress, alert and oriented x3.  PSYCH:  Mood and affect appropriate.  SKIN: Skin color, texture, turgor normal, no rashes or lesions.  HEENT:  Normocephalic, atraumatic. Cranial nerves grossly intact.  NECK: No pain to palpation over the " cervical paraspinous muscles. No pain to palpation over facets. No pain with neck flexion, extension, or lateral flexion.   PULM: No evidence of respiratory difficulty, symmetric chest rise.  GI:  Non-distended  BACK: Normal range of motion. Pain to palpation over the spinous processes. No pain to palpation over facet joints. There is no pain with palpation over the sacroiliac joints bilaterally.   EXTREMITIES: No deformities, edema, or skin discoloration.   MUSCULOSKELETAL: Shoulder, hip, and knee provocative maneuvers are negative. No atrophy is noted.  NEURO: Sensation is equal and appropriate bilaterally. Bilateral upper and lower extremity strength is normal and symmetric. Bilateral upper and lower extremity coordination and muscle stretch reflexes are physiologic and symmetric. Plantar response are downgoing. Straight leg raising in the supine position is negative to radicular pain.   GAIT: normal.      LABS:    Lab Results   Component Value Date    HGBA1C 6.3 (H) 04/28/2023       Lab Results   Component Value Date    CREATININE 1.3 01/25/2025         ASSESSMENT: 64 y.o. year old female with pain, consistent with:    Encounter Diagnoses   Name Primary?    Compression fracture of L1 vertebra, initial encounter Yes    Malignant neoplasm of vertebral column          DISCUSSION: Radha Ervin is a  at a local furniture store who comes to us with metastatic lung CA to L1 as well moderate canal stenosis of L4/5 that causes her bilateral LE radiculopathy.  Patient has significant relief of her pain after L1 osteo cool with kyphoplasty    PLAN:  - I have stressed the importance of physical activity and a home exercise plan to help with pain and improve health.  - Patient can continue with medications for now since they are providing benefits, using them appropriately, and without side effects.  - Counseled patient regarding the importance of activity modification and constant sleeping habits.  -  Will refer her to PT  - Imaging: Reviewed available imaging with patient and answered any questions they had regarding study.  - The patient's pathophysiology was explained in detail with reference to x-rays, models, other visual aids as appropriate.   - Follow up visit: return to clinic aamir Handy MD  01/28/2025

## 2025-02-07 ENCOUNTER — INFUSION (OUTPATIENT)
Dept: INFUSION THERAPY | Facility: HOSPITAL | Age: 65
End: 2025-02-07
Attending: INTERNAL MEDICINE
Payer: MEDICARE

## 2025-02-07 VITALS
TEMPERATURE: 98 F | RESPIRATION RATE: 18 BRPM | DIASTOLIC BLOOD PRESSURE: 63 MMHG | BODY MASS INDEX: 33.44 KG/M2 | WEIGHT: 165.88 LBS | HEART RATE: 101 BPM | SYSTOLIC BLOOD PRESSURE: 94 MMHG | OXYGEN SATURATION: 97 % | HEIGHT: 59 IN

## 2025-02-07 DIAGNOSIS — C34.92 ADENOCARCINOMA, LUNG, LEFT: Primary | ICD-10-CM

## 2025-02-07 DIAGNOSIS — M80.80XD LOCALIZED OSTEOPOROSIS WITH CURRENT PATHOLOGICAL FRACTURE WITH ROUTINE HEALING: ICD-10-CM

## 2025-02-07 PROCEDURE — 63600175 PHARM REV CODE 636 W HCPCS: Mod: HCNC | Performed by: INTERNAL MEDICINE

## 2025-02-07 PROCEDURE — 96374 THER/PROPH/DIAG INJ IV PUSH: CPT | Mod: HCNC

## 2025-02-07 PROCEDURE — 25000003 PHARM REV CODE 250: Mod: HCNC | Performed by: INTERNAL MEDICINE

## 2025-02-07 RX ORDER — EPINEPHRINE 0.3 MG/.3ML
0.3 INJECTION SUBCUTANEOUS ONCE AS NEEDED
Status: DISCONTINUED | OUTPATIENT
Start: 2025-02-07 | End: 2025-02-07 | Stop reason: HOSPADM

## 2025-02-07 RX ORDER — HEPARIN 100 UNIT/ML
500 SYRINGE INTRAVENOUS
Status: DISCONTINUED | OUTPATIENT
Start: 2025-02-07 | End: 2025-02-07 | Stop reason: HOSPADM

## 2025-02-07 RX ORDER — ZOLEDRONIC ACID 0.04 MG/ML
4 INJECTION, SOLUTION INTRAVENOUS
OUTPATIENT
Start: 2025-04-18

## 2025-02-07 RX ORDER — DIPHENHYDRAMINE HYDROCHLORIDE 50 MG/ML
50 INJECTION INTRAMUSCULAR; INTRAVENOUS ONCE AS NEEDED
OUTPATIENT
Start: 2025-04-18

## 2025-02-07 RX ORDER — HEPARIN 100 UNIT/ML
500 SYRINGE INTRAVENOUS
OUTPATIENT
Start: 2025-04-18

## 2025-02-07 RX ORDER — SODIUM CHLORIDE 0.9 % (FLUSH) 0.9 %
10 SYRINGE (ML) INJECTION
Status: DISCONTINUED | OUTPATIENT
Start: 2025-02-07 | End: 2025-02-07 | Stop reason: HOSPADM

## 2025-02-07 RX ORDER — ZOLEDRONIC ACID 0.04 MG/ML
4 INJECTION, SOLUTION INTRAVENOUS
Status: COMPLETED | OUTPATIENT
Start: 2025-02-07 | End: 2025-02-07

## 2025-02-07 RX ORDER — DIPHENHYDRAMINE HYDROCHLORIDE 50 MG/ML
50 INJECTION INTRAMUSCULAR; INTRAVENOUS ONCE AS NEEDED
Status: DISCONTINUED | OUTPATIENT
Start: 2025-02-07 | End: 2025-02-07 | Stop reason: HOSPADM

## 2025-02-07 RX ORDER — SODIUM CHLORIDE 0.9 % (FLUSH) 0.9 %
10 SYRINGE (ML) INJECTION
OUTPATIENT
Start: 2025-04-18

## 2025-02-07 RX ORDER — EPINEPHRINE 0.3 MG/.3ML
0.3 INJECTION SUBCUTANEOUS ONCE AS NEEDED
OUTPATIENT
Start: 2025-04-18

## 2025-02-07 RX ADMIN — SODIUM CHLORIDE: 9 INJECTION, SOLUTION INTRAVENOUS at 10:02

## 2025-02-07 RX ADMIN — ZOLEDRONIC ACID 4 MG: 0.04 INJECTION, SOLUTION INTRAVENOUS at 10:02

## 2025-02-07 NOTE — PLAN OF CARE
Patient tolerated zometa infusion today. NAD noted. VSS. PIV + blood return upon deaccess. Discharged home and ambulated independently off unit.

## 2025-02-12 ENCOUNTER — CLINICAL SUPPORT (OUTPATIENT)
Dept: REHABILITATION | Facility: HOSPITAL | Age: 65
End: 2025-02-12
Attending: EMERGENCY MEDICINE
Payer: MEDICARE

## 2025-02-12 DIAGNOSIS — S32.010A COMPRESSION FRACTURE OF L1 VERTEBRA, INITIAL ENCOUNTER: ICD-10-CM

## 2025-02-12 DIAGNOSIS — R53.1 DECREASED STRENGTH: ICD-10-CM

## 2025-02-12 DIAGNOSIS — Z74.09 IMPAIRED FUNCTIONAL MOBILITY AND ACTIVITY TOLERANCE: Primary | ICD-10-CM

## 2025-02-12 PROCEDURE — 97162 PT EVAL MOD COMPLEX 30 MIN: CPT | Mod: HCNC,PN

## 2025-02-12 PROCEDURE — 97530 THERAPEUTIC ACTIVITIES: CPT | Mod: HCNC,PN

## 2025-02-13 ENCOUNTER — DOCUMENT SCAN (OUTPATIENT)
Dept: HOME HEALTH SERVICES | Facility: HOSPITAL | Age: 65
End: 2025-02-13
Payer: MEDICARE

## 2025-02-13 PROBLEM — Z74.09 IMPAIRED FUNCTIONAL MOBILITY AND ACTIVITY TOLERANCE: Status: ACTIVE | Noted: 2025-02-13

## 2025-02-13 PROBLEM — R53.1 DECREASED STRENGTH: Status: ACTIVE | Noted: 2025-02-13

## 2025-02-13 NOTE — PROGRESS NOTES
Outpatient Rehab    Physical Therapy Evaluation    Patient Name: Marley Ervin  MRN: [id number]  YOB: 1960  Today's Date: 2/13/2025    Therapy Diagnosis:   Encounter Diagnoses   Name Primary?    Compression fracture of L1 vertebra, initial encounter     Impaired functional mobility and activity tolerance Yes    Decreased strength      Physician: Andre Handy MD    Physician Orders: Eval and Treat  Medical Diagnosis: S32.010A (ICD-10-CM) - Compression fracture of L1 vertebra, initial encounter     Visit # / Visits Authorized:  1 / 12   Date of Evaluation:  2/12/2025   Insurance Authorization Period: 2/12/2025 to 4/10/2025  Plan of Care Certification:  2/12/2025 to 3/28/2025      Time In: 1405   Time Out: 1500  Total Time: 55   Total Billable Time: 55     Intake Outcome Measure for FOTO Survey    Therapist reviewed FOTO scores for Marley Ervin on 2/12/2025.   FOTO report - see Media section or FOTO account episode details.     Intake Score: 47%    Precautions     Cancer       Subjective   History of Present Illness  Marley is a 64 y.o. female who reports to physical therapy with a chief concern of Low back pain. According to the patient's chart, Marley has a past medical history of Allergy, Amblyopia, Cataract, Eczema, HS (hereditary spherocytosis), total knee replacement, Joint pain, and Melena. Marley has a past surgical history that includes Cholecystectomy; achilles tendon repair; Total knee arthroplasty; Navigational bronchoscopy (N/A, 6/23/2023); xi robotic rats (Left, 8/3/2023); Bronchoscopy (N/A, 8/3/2023); lymphadenectomy (Left, 8/3/2023); Injection of anesthetic agent around multiple intercostal nerves (N/A, 8/3/2023); Esophagogastroduodenoscopy (N/A, 11/27/2024); and kyphoplasty, spine, lumbar (N/A, 1/6/2025).    The patient reports a medical diagnosis of S32.010A (ICD-10-CM) - Compression fracture of L1 vertebra, initial encounter.    Diagnostic tests related to this condition: MRI studies.    MRI Studies Details: 1. Lytic lesion in the L1 vertebral body with vertebral body height loss and edema, compatible with recent pathologic compression fracture.  Mild bulging of the posterior cortex results in mild spinal canal stenosis.  2. Degenerative endplate changes at T10-T11 as described above, without focal osseous lesion identified at T11.  FDG avidity at this level noted on prior PET-CT is favored to represent degenerative uptake.  3. Degenerative changes lumbar spine as detailed above, with moderate spinal canal stenosis at L4-L5.    History of Present Condition/Illness: Got diagnosed with lung cancer in May 2023. Has metastasized to her spine in the past few months which caused a tumor on her spine resulting in a compression fracture. Received kyphoplasty on 1/6/2025. However, pain is not at L1 location. Localizes pain to right sided lumbosacral region. Increased pain with standing, walking, and ADL's. Pain relief sitting in a recliner. Get infusions every few months and takes calcium tablets every day. Denies numbness/tingling down bilateral lower extremities. Patient denies radicular symptoms, incontinence, weakness or changes in gait, saddle/perineal paresthesia, pain unchanged with rest, or unexplainable weight loss. Goal would be to improve quality of life of function.     Pain     Patient reports a current pain level of 2/10. Pain at best is reported as 2/10. Pain at worst is reported as 8/10.   Location: R sided low back pain  Clinical Progression (since onset): Stable  Pain Qualities: Dull, Pressure  Pain-Relieving Factors: Sitting, Lying down, Change in position, Other (Comment), Heat  Other Pain-Relieving Factors: TENS unit  Pain-Aggravating Factors: Cooking, Walking, Standing         Living Arrangements  Living Situation  Housing: Home independently  Living Arrangements: Alone  Support Systems: Friends/neighbors, Family members    Home Setup  Type of Structure: Apartment/condo  Home Access:  "Stairs with rails  Number of Levels in Home: Two level  Patient is able to live on main floor of home: No        Employment  Patient reports: Does the patient's condition impact their ability to work?  Employment Status: Not employed   Gave up job in December secondary to cancer diagnosis and pain. Not on disability, but currently trying to get social security.       Past Medical History/Physical Systems Review:   Radha Ervin  has a past medical history of Allergy, Amblyopia, Cataract, Eczema, HS (hereditary spherocytosis), total knee replacement, Joint pain, and Melena.    Radha Ervin  has a past surgical history that includes Cholecystectomy; achilles tendon repair; Total knee arthroplasty; Navigational bronchoscopy (N/A, 6/23/2023); xi robotic rats (Left, 8/3/2023); Bronchoscopy (N/A, 8/3/2023); lymphadenectomy (Left, 8/3/2023); Injection of anesthetic agent around multiple intercostal nerves (N/A, 8/3/2023); Esophagogastroduodenoscopy (N/A, 11/27/2024); and kyphoplasty, spine, lumbar (N/A, 1/6/2025).    Radha has a current medication list which includes the following prescription(s): betamethasone dipropionate, clobetasol 0.05%, diphenhydramine hcl, esomeprazole, hydroxyzine hcl, levothyroxine, lorazepam, mometasone 0.1%, ondansetron, ondansetron, otezla, pilocarpine, promethazine, lumakras, and tramadol.    Review of patient's allergies indicates:   Allergen Reactions    Paclitaxel Anaphylaxis, Other (See Comments) and Nausea And Vomiting     Pt states "Anaphylaxis" last encounter w/ throat swelling. Encountered back pain, coughing and head fuzzy today.    Metformin Itching    Morphine Other (See Comments)     headaches    Latex, natural rubber Rash and Other (See Comments)     Redness and peeling only with bandaids    Penicillins Nausea And Vomiting and Rash        Objective   Posture    Increased lumbar lordosis is observed.            Bilateral ankles/feet exhibit: Toe In       Lower Extremity " Sensation  Right Lumbar/Lower Extremity Sensation  Intact: Light Touch       Left Lumbar/Lower Extremity Sensation  Intact: Light Touch                Right Lower Extremity Reflexes  Patellar, L4: Normal (2+)         Achilles, S1: Normal (2+)         Left Lower Extremity Reflexes  Patellar, L4: Normal (2+)          Achilles, S1: Absent (0)             Spinal Muscle Palpation     TTP R SIJ                Lumbar Range of Motion   Active (deg) Passive (deg) Pain   Flexion 70       Extension 15       Right Lateral Flexion         Right Rotation         Left Lateral Flexion         Left Rotation           Lateral flexion: 25% on R, full on L; Rotation: 50% bilateral                 Hip Strength - Planes of Motion   Right Strength Right Pain Left Strength Left  Pain   Flexion (L2) 4-   4-     Extension           ABduction           ADduction           Internal Rotation 4+   4+     External Rotation 4-   4-         Knee Strength   Right Strength Right Pain Left Strength Left  Pain   Flexion (S2) 4-   4-     Prone Flexion           Extension (L3) 4+   4+            Ankle/Foot Strength - Planes of Motion   Right Strength Right Pain Left Strength Left  Pain   Dorsiflexion (L4) 5   5     Plantar Flexion (S1) 5   5     Inversion           Eversion           Great Toe Flexion           Great Toe Extension (L5)           Lesser Toes Flexion           Lesser Toes Extension                  Lumbar/Pelvic Girdle Special Tests                 6 MWT: 1280 feet with increased right sided low back pain    Pelvic Girdle / Sacrum Tests  Positive: Right FADIR  Negative: Right RAISA, Left RAISA, and Left FADIR  Positive: Right Thigh Thrust/Posterior Shear  Negative: Left Thigh Thrust/Posterior Shear  Scour's: (+) R; (-) L      Hip Special Tests  Intra-Articular/Impingement Tests  Positive: Right FADIR  Negative: Right RAISA, Left RAISA, and Left FADIR  Sacroiliac Joint Tests  Positive: Right Thigh Thrust/Posterior Shear  Negative: Left  "Thigh Thrust/Posterior Shear              Gait Analysis  Hip Observations During Gait  Bilateral: Hip Trendelenburg         Treatment:  Therapeutic Activity  Therapeutic Activity 1: Gluteal set: 10x5"  Therapeutic Activity 2: Bridge: x15  Therapeutic Activity 3: SKTC: 5x10"  Therapeutic Activity 4: BKFO: 5x10"  Therapeutic Activity 5: Hip abduction isometric: blue band 10x5"  Therapeutic Activity 6: Education on prognosis, findings, and expectations    Assessment & Plan   Assessment  Marley presents with a condition of Moderate complexity.   Presentation of Symptoms: Changing  Will Comorbidities Impact Care: Yes  Active metastatic cancer, High BMI, stage 3 CKD, hypothyroidism, adrenal insufficiency     Functional Limitations: Activity tolerance, Bed mobility, Completing work/school activities, Decreased ambulation distance/endurance, Functional mobility, Gait limitations, Pain with ADLs/IADLs, Participating in leisure activities, Performing household chores, Range of motion, Standing tolerance  Impairments: Activity intolerance, Abnormal or restricted range of motion, Impaired physical strength, Pain with functional activity  Personal Factors Affecting Prognosis: Other (Comment)  Other Personal Factors Affecting Prognosis: comorbidities.    Patient Goal for Therapy (PT): Improve quality of life and function.  Prognosis: Fair  Assessment Details: Patient presents with medical diagnosis of S32.010A (ICD-10-CM) - Compression fracture of L1 vertebra, initial encounter. Presents to session with complaints of lumbosacral pain affecting tolerance to ADL's and functional activities. Presents with decreased lumbar active range of motion, decreased lower extremity strength, decreased hip mobility, poor tolerance to long duration ambulation, and pain with ADL's. Lumbosacral discomfort seems to correlate with suspected SIJ dysfunction. Poor lumbopelvic stability and poor kinesthetic noted with transverse abdominus activation. Will " benefit from lumbopelvic stabilization program with gross hip mobility and strengthening interventions to improve quality of life and function.    Plan  From a physical therapy perspective, the patient would benefit from: Skilled Rehab Services    Planned therapy interventions include: Therapeutic exercise, Therapeutic activities, Neuromuscular re-education, Manual therapy, and ADLs/IADLs.    Planned modalities to include: Cryotherapy (cold pack) and Thermotherapy (hot pack).        Visit Frequency: 2 times Per Week for 6 Weeks.       This plan was discussed with Patient and Therapy assistant.   Discussion participants: Agreed Upon Plan of Care             Patient's spiritual, cultural, and educational needs considered and patient agreeable to plan of care and goals.     Education  Education was done with Patient. The patient's learning style includes Demonstration and Listening. The patient Demonstrates understanding and Verbalizes understanding.                 Goals:   Active       Long Term Goals        1. Patient will perform Sahrmann level 2 with good control to demonstrate improved core strength.        Start:  02/13/25            2. Patient will improve impaired lower extremity myotomes/manual muscle tests to >/=4+/5 to improve strength for functional tasks.       Start:  02/13/25    Expected End:  03/27/25            3. Patient will report no pain during ADL's to promote improved quality of life.       Start:  02/13/25    Expected End:  03/27/25            4. Patient will improve distance ambulated during 6 MWT by >/= 350 feet to demonstrate improved tolerance to long duration walking.       Start:  02/13/25    Expected End:  03/27/25            5. Patient will be able to standing for >/= 10 minutes to improve tolerance to functional activities.       Start:  02/13/25    Expected End:  03/27/25               Short Term Goals        1. Patient will be compliant with home exercise program to supplement  therapy in promoting functional mobility.       Start:  02/13/25    Expected End:  03/06/25            2.  Patient will improve impaired lower extremity myotomes/manual muscle tests  to >/=4/5 to improve strength for functional tasks.       Start:  02/13/25    Expected End:  03/06/25            3. Patient will report pain levels </= 2/10 to improve quality of life.       Start:  02/13/25    Expected End:  03/06/25                Tennille Dacosta, PT

## 2025-02-20 ENCOUNTER — CLINICAL SUPPORT (OUTPATIENT)
Dept: REHABILITATION | Facility: HOSPITAL | Age: 65
End: 2025-02-20
Payer: MEDICARE

## 2025-02-20 DIAGNOSIS — R53.1 DECREASED STRENGTH: ICD-10-CM

## 2025-02-20 DIAGNOSIS — Z74.09 IMPAIRED FUNCTIONAL MOBILITY AND ACTIVITY TOLERANCE: Primary | ICD-10-CM

## 2025-02-20 PROCEDURE — 97110 THERAPEUTIC EXERCISES: CPT | Mod: HCNC,PN

## 2025-02-20 PROCEDURE — 97112 NEUROMUSCULAR REEDUCATION: CPT | Mod: HCNC,PN

## 2025-02-20 NOTE — PROGRESS NOTES
"  Outpatient Rehab    Physical Therapy Visit    Patient Name: Marley Ervin  MRN: [id number]  YOB: 1960  Today's Date: 2/21/2025    Therapy Diagnosis: No diagnosis found.  Physician: Andre Handy MD    Physician Orders: Eval and Treat  Medical Diagnosis: S32.010A (ICD-10-CM) - Compression fracture of L1 vertebra, initial encounter      Visit # / Visits Authorized:  1 / 12   Date of Evaluation:  2/12/2025   Insurance Authorization Period: 2/12/2025 to 4/10/2025  Plan of Care Certification:  2/12/2025 to 3/28/2025      Time In: 1400   Time Out: 1455  Total Time: 55   Total Billable Time: 30 minutes- 1:1 physical therapist      FOTO:  Intake Score:  %  Survey Score 1:  %  Survey Score 2:  %         Subjective   Patient doing well today with slightly less pain than first visit..  Pain reported as 1/10. right sided low back    Objective            Treatment:  Therapeutic Exercise  Therapeutic Exercise Activity 1: SKTC: 10x5" bilateral  Therapeutic Exercise Activity 2: BKFO: 10x5" bilateral  Therapeutic Exercise Activity 3: Gluteal set with bridge: 2x10  Therapeutic Exercise Activity 4: Open books: 10x  Therapeutic Exercise Activity 5: Standing hip abduction: red band 2x10  Therapeutic Exercise Activity 6: Lateral band walks: red band 5 laps length of ballet bar  Therapeutic Exercise Activity 7: Nu-step: level 5 - 6 minutes         Balance/Neuromuscular Re-Education  Balance/Neuromuscular Re-Education Activity 1: Hip abduction isometric: 3x30"  Balance/Neuromuscular Re-Education Activity 2: Hip adduction isometric: 20x5"  Balance/Neuromuscular Re-Education Activity 3: Side lying clamshells: red band 2x10  Balance/Neuromuscular Re-Education Activity 4: Transverse abdominus activation: 20x5"         Assessment & Plan   Assessment: Patient presents with medical diagnosis of S32.010A (ICD-10-CM) - Compression fracture of L1 vertebra, initial encounter. Presents to session with complaints of lumbosacral pain " affecting tolerance to ADL's and functional activities. Presents with pain levels. First follow up consisted of lower extremity stretching, core stabilization, and gluteal neuromuscular re-education. Some pain with bridges that reolves with gluteal setting prior to bridge. Verbalized decreased pain levels at end of session  Evaluation/Treatment Tolerance: Patient limited by fatigue    Patient will continue to benefit from skilled outpatient physical therapy to address the deficits listed in the problem list box on initial evaluation, provide pt/family education and to maximize pt's level of independence in the home and community environment.     Patient's spiritual, cultural, and educational needs considered and patient agreeable to plan of care and goals.           Plan:      Goals:   Active       Long Term Goals        1. Patient will perform Sahrmann level 2 with good control to demonstrate improved core strength.        Start:  02/13/25            2. Patient will improve impaired lower extremity myotomes/manual muscle tests to >/=4+/5 to improve strength for functional tasks.       Start:  02/13/25    Expected End:  03/27/25            3. Patient will report no pain during ADL's to promote improved quality of life.       Start:  02/13/25    Expected End:  03/27/25            4. Patient will improve distance ambulated during 6 MWT by >/= 350 feet to demonstrate improved tolerance to long duration walking.       Start:  02/13/25    Expected End:  03/27/25            5. Patient will be able to standing for >/= 10 minutes to improve tolerance to functional activities.       Start:  02/13/25    Expected End:  03/27/25               Short Term Goals        1. Patient will be compliant with home exercise program to supplement therapy in promoting functional mobility.       Start:  02/13/25    Expected End:  03/06/25            2.  Patient will improve impaired lower extremity myotomes/manual muscle tests  to >/=4/5 to  improve strength for functional tasks.       Start:  02/13/25    Expected End:  03/06/25            3. Patient will report pain levels </= 2/10 to improve quality of life.       Start:  02/13/25    Expected End:  03/06/25                Tennille Dacosta PT

## 2025-02-24 DIAGNOSIS — Z00.00 ENCOUNTER FOR MEDICARE ANNUAL WELLNESS EXAM: ICD-10-CM

## 2025-02-25 ENCOUNTER — CLINICAL SUPPORT (OUTPATIENT)
Dept: REHABILITATION | Facility: HOSPITAL | Age: 65
End: 2025-02-25
Payer: MEDICARE

## 2025-02-25 DIAGNOSIS — R53.1 DECREASED STRENGTH: ICD-10-CM

## 2025-02-25 DIAGNOSIS — S32.010A COMPRESSION FRACTURE OF L1 VERTEBRA, INITIAL ENCOUNTER: ICD-10-CM

## 2025-02-25 DIAGNOSIS — Z74.09 IMPAIRED FUNCTIONAL MOBILITY AND ACTIVITY TOLERANCE: Primary | ICD-10-CM

## 2025-02-25 PROCEDURE — 97112 NEUROMUSCULAR REEDUCATION: CPT | Mod: HCNC,PN

## 2025-02-25 PROCEDURE — 97110 THERAPEUTIC EXERCISES: CPT | Mod: HCNC,PN,CQ

## 2025-02-25 PROCEDURE — 97530 THERAPEUTIC ACTIVITIES: CPT | Mod: HCNC,PN

## 2025-02-25 NOTE — PROGRESS NOTES
"  Outpatient Rehab    Physical Therapy Visit    Patient Name: Marley Ervin  MRN: [id number]  YOB: 1960  Encounter Date: 2/25/2025    Therapy Diagnosis:   Physician: Andre Handy MD    Physician Orders: Eval and Treat  Encounter Diagnoses   Name Primary?    Impaired functional mobility and activity tolerance Yes    Compression fracture of L1 vertebra, initial encounter     Decreased strength        Medical Diagnosis: S32.010A (ICD-10-CM) - Compression fracture of L1 vertebra, initial encounter      Visit # / Visits Authorized:  1 / 12   Date of Evaluation:  2/12/2025   Insurance Authorization Period: 2/12/2025 to 4/10/2025  Plan of Care Certification:  2/12/2025 to 3/28/2025                 Time In: 1600  Time Out: 1655  Total Time: 55   Total Billable Time: 55 minutes- 1:1 physical therapist assistant    brandonke Score:  %  Survey Score 1:  %  Survey Score 2:  %         Subjective   "No pain nor soreness at all right now or over the last couple of days.".         Objective            Treatment:  Therapeutic Exercise  Therapeutic Exercise Activity 1: SKTC: 10x5" bilateral  Therapeutic Exercise Activity 2: BKFO: 10x5" bilateral  Therapeutic Exercise Activity 3: Gluteal set with bridge: 3x10  Therapeutic Exercise Activity 4: Open books: 10x  Therapeutic Exercise Activity 5: Standing hip abduction: red band 2x10  Therapeutic Exercise Activity 6: Lateral band walks: red band 5 laps length of ballet bar  Therapeutic Exercise Activity 7: Nu-step: level 5 -10 minutes  Therapeutic Exercise Activity 8: Standing HIp Ext 3x10-RTB  Therapeutic Exercise Activity 9: Retro band walks  4 laps -RTB    Balance/Neuromuscular Re-Education  Balance/Neuromuscular Re-Education Activity 1: Hip abduction isometric: 3x30"  Balance/Neuromuscular Re-Education Activity 2: Hip adduction isometric: 20x5"  Balance/Neuromuscular Re-Education Activity 3: Side lying clamshells: red band 2x10  Balance/Neuromuscular Re-Education Activity " "4: Transverse abdominus activation: 20x5"    Therapeutic Activity  Therapeutic Activity 1: Gluteal set: 10x5"  Therapeutic Activity 2: Bridge: x15  Therapeutic Activity 3: SKTC: 5x10"  Therapeutic Activity 4: BKFO: 5x10"  Therapeutic Activity 5: Hip abduction isometric: blue band 10x5"  Therapeutic Activity 6: Lateral heel taps from 6" step 2x10 B         Assessment & Plan   Assessment: Patient presents with medical diagnosis of S32.010A (ICD-10-CM) - Compression fracture of L1 vertebra, initial encounter. Presents to session with complaints of lumbosacral pain affecting tolerance to ADL's and functional activities. Presents with no pain today. Second  follow up consisted of lower extremity stretching, core stabilization, and gluteal neuromuscular re-education. No pain with bridges today. . Verbalized painfree function throughtout today's therapeutic interventions.       Patient will continue to benefit from skilled outpatient physical therapy to address the deficits listed in the problem list box on initial evaluation, provide pt/family education and to maximize pt's level of independence in the home and community environment.     Patient's spiritual, cultural, and educational needs considered and patient agreeable to plan of care and goals.           Plan: lumbopelvic stabilization, functional activity strengthening.    Goals:   Active       Long Term Goals        1. Patient will perform Sahrmann level 2 with good control to demonstrate improved core strength.  (Progressing)       Start:  02/13/25            2. Patient will improve impaired lower extremity myotomes/manual muscle tests to >/=4+/5 to improve strength for functional tasks. (Progressing)       Start:  02/13/25    Expected End:  03/27/25            3. Patient will report no pain during ADL's to promote improved quality of life. (Progressing)       Start:  02/13/25    Expected End:  03/27/25            4. Patient will improve distance ambulated during 6 " MWT by >/= 350 feet to demonstrate improved tolerance to long duration walking. (Progressing)       Start:  02/13/25    Expected End:  03/27/25            5. Patient will be able to standing for >/= 10 minutes to improve tolerance to functional activities. (Progressing)       Start:  02/13/25    Expected End:  03/27/25               Short Term Goals        1. Patient will be compliant with home exercise program to supplement therapy in promoting functional mobility. (Progressing)       Start:  02/13/25    Expected End:  03/06/25            2.  Patient will improve impaired lower extremity myotomes/manual muscle tests  to >/=4/5 to improve strength for functional tasks. (Progressing)       Start:  02/13/25    Expected End:  03/06/25            3. Patient will report pain levels </= 2/10 to improve quality of life. (Progressing)       Start:  02/13/25    Expected End:  03/06/25                Lawrence Patricio, PTA

## 2025-02-26 ENCOUNTER — HOSPITAL ENCOUNTER (OUTPATIENT)
Dept: RADIOLOGY | Facility: HOSPITAL | Age: 65
Discharge: HOME | End: 2025-02-26
Attending: INTERNAL MEDICINE
Payer: MEDICARE

## 2025-02-26 DIAGNOSIS — C34.92 ADENOCARCINOMA, LUNG, LEFT: Chronic | ICD-10-CM

## 2025-02-26 PROCEDURE — 78815 PET IMAGE W/CT SKULL-THIGH: CPT | Mod: TC,HCNC,PS

## 2025-02-26 PROCEDURE — A9552 F18 FDG: HCPCS | Mod: HCNC | Performed by: INTERNAL MEDICINE

## 2025-02-26 PROCEDURE — 78815 PET IMAGE W/CT SKULL-THIGH: CPT | Mod: 26,HCNC,PS, | Performed by: NUCLEAR MEDICINE

## 2025-02-26 RX ORDER — FLUDEOXYGLUCOSE F18 500 MCI/ML
15.9 INJECTION INTRAVENOUS
Status: COMPLETED | OUTPATIENT
Start: 2025-02-26 | End: 2025-02-26

## 2025-02-26 RX ADMIN — FLUDEOXYGLUCOSE F-18 15.9 MILLICURIE: 500 INJECTION INTRAVENOUS at 11:02

## 2025-02-27 ENCOUNTER — CLINICAL SUPPORT (OUTPATIENT)
Dept: REHABILITATION | Facility: HOSPITAL | Age: 65
End: 2025-02-27
Payer: MEDICARE

## 2025-02-27 DIAGNOSIS — R53.1 DECREASED STRENGTH: ICD-10-CM

## 2025-02-27 DIAGNOSIS — S32.010A COMPRESSION FRACTURE OF L1 VERTEBRA, INITIAL ENCOUNTER: ICD-10-CM

## 2025-02-27 DIAGNOSIS — Z74.09 IMPAIRED FUNCTIONAL MOBILITY AND ACTIVITY TOLERANCE: Primary | ICD-10-CM

## 2025-02-27 PROCEDURE — 97530 THERAPEUTIC ACTIVITIES: CPT | Mod: HCNC,PN

## 2025-02-27 PROCEDURE — 97112 NEUROMUSCULAR REEDUCATION: CPT | Mod: HCNC,PN

## 2025-02-27 PROCEDURE — 97110 THERAPEUTIC EXERCISES: CPT | Mod: HCNC,PN,CQ

## 2025-02-27 NOTE — PROGRESS NOTES
"  Outpatient Rehab    Physical Therapy Visit    Patient Name: Marley Ervin  MRN: [id number]  YOB: 1960  Encounter Date: 2/27/2025    Therapy Diagnosis:   Encounter Diagnoses   Name Primary?    Impaired functional mobility and activity tolerance Yes    Compression fracture of L1 vertebra, initial encounter     Decreased strength        Physician: Andre Handy MD    Physician Orders: Eval and Treat  Medical Diagnosis: S32.010A (ICD-10-CM) - Compression fracture of L1 vertebra, initial encounter      Visit # / Visits Authorized:  1 / 12   Date of Evaluation:  2/12/2025   Insurance Authorization Period: 2/12/2025 to 4/10/2025  Plan of Care Certification:  2/12/2025 to 3/28/2025                 Time In: 1300  Time Out: 1355  Total Time: 55   Total Billable Time: 55 minutes- 1:1 physical therapist assistant       FOTO:  Intake Score:  %  Survey Score 1:  %  Survey Score 2:  %         Subjective   "No pain nor soreness at all right now or over the last four days.".    right sided low back    Objective            Treatment:  Therapeutic Exercise  Therapeutic Exercise Activity 1: SKTC: 10x5" bilateral  Therapeutic Exercise Activity 2: BKFO: 10x5" bilateral  Therapeutic Exercise Activity 3: Gluteal set with bridge: 3x10 (modified less clearance to limit pain)  Therapeutic Exercise Activity 4: Open books: 10x B  Therapeutic Exercise Activity 5: Standing hip abduction: red band 2x10  Therapeutic Exercise Activity 6: Lateral band walks: red band 5 laps length of ballet bar  Therapeutic Exercise Activity 7: Recumbent Bike : level 4 - for 8  minutes  Therapeutic Exercise Activity 8: Standing HIp Ext 3x10-RTB  Therapeutic Exercise Activity 9: Retro band walks  4 laps -RTB  Therapeutic Exercise Activity 10: Sit to stand from mat table 2x10-         Balance/Neuromuscular Re-Education  Balance/Neuromuscular Re-Education Activity 1: Hip abduction isometric: 3x30"  Balance/Neuromuscular Re-Education Activity 2: Hip " "adduction isometric: 20x5"  Balance/Neuromuscular Re-Education Activity 3: Side lying clamshells: green band 2x10  Balance/Neuromuscular Re-Education Activity 4: Transverse abdominus activation: 20x5"    Therapeutic Activity  Therapeutic Activity 1: Gluteal set: 10x5"  Therapeutic Activity 2: Bridge: x15  Therapeutic Activity 3: SKTC: 5x10"  Therapeutic Activity 4: BKFO: 5x10"  Therapeutic Activity 5: Hip abduction isometric: blue band 10x5"  Therapeutic Activity 6: Lateral heel taps from 6" step 2x10 B         Assessment & Plan   Assessment: Patient presents with medical diagnosis of S32.010A (ICD-10-CM) - Compression fracture of L1 vertebra, initial encounter. Presents to session with complaints of lumbosacral pain affecting tolerance to ADL's and functional activities. Presents with no pain today. Third follow up consisted of lower extremity stretching, core stabilization, and gluteal neuromuscular re-education. Initial pain with bridges therefore modified decreasing range )hip clearance) for pain free preformance.  Verbalized painfree function throughtout today's therapeutic interventions.       Patient will continue to benefit from skilled outpatient physical therapy to address the deficits listed in the problem list box on initial evaluation, provide pt/family education and to maximize pt's level of independence in the home and community environment.     Patient's spiritual, cultural, and educational needs considered and patient agreeable to plan of care and goals.           Plan: lumbopelvic stabilization, functional activity strengthening.    Goals:   Active       Long Term Goals        1. Patient will perform Sahrmann level 2 with good control to demonstrate improved core strength.  (Progressing)       Start:  02/13/25            2. Patient will improve impaired lower extremity myotomes/manual muscle tests to >/=4+/5 to improve strength for functional tasks. (Progressing)       Start:  02/13/25    Expected " End:  03/27/25            3. Patient will report no pain during ADL's to promote improved quality of life. (Progressing)       Start:  02/13/25    Expected End:  03/27/25            4. Patient will improve distance ambulated during 6 MWT by >/= 350 feet to demonstrate improved tolerance to long duration walking. (Progressing)       Start:  02/13/25    Expected End:  03/27/25            5. Patient will be able to standing for >/= 10 minutes to improve tolerance to functional activities. (Progressing)       Start:  02/13/25    Expected End:  03/27/25               Short Term Goals        1. Patient will be compliant with home exercise program to supplement therapy in promoting functional mobility. (Progressing)       Start:  02/13/25    Expected End:  03/06/25            2.  Patient will improve impaired lower extremity myotomes/manual muscle tests  to >/=4/5 to improve strength for functional tasks. (Progressing)       Start:  02/13/25    Expected End:  03/06/25            3. Patient will report pain levels </= 2/10 to improve quality of life. (Progressing)       Start:  02/13/25    Expected End:  03/06/25                Lawrence Patricio PTA

## 2025-02-28 ENCOUNTER — OFFICE VISIT (OUTPATIENT)
Dept: HEMATOLOGY/ONCOLOGY | Facility: CLINIC | Age: 65
End: 2025-02-28
Payer: MEDICARE

## 2025-02-28 VITALS
HEART RATE: 95 BPM | SYSTOLIC BLOOD PRESSURE: 112 MMHG | DIASTOLIC BLOOD PRESSURE: 73 MMHG | RESPIRATION RATE: 18 BRPM | BODY MASS INDEX: 33.38 KG/M2 | HEIGHT: 59 IN | OXYGEN SATURATION: 97 % | WEIGHT: 165.56 LBS

## 2025-02-28 DIAGNOSIS — M17.11 PRIMARY LOCALIZED OSTEOARTHROSIS OF RIGHT LOWER LEG: Chronic | ICD-10-CM

## 2025-02-28 DIAGNOSIS — C77.1 SECONDARY AND UNSPECIFIED MALIGNANT NEOPLASM OF INTRATHORACIC LYMPH NODES: ICD-10-CM

## 2025-02-28 DIAGNOSIS — Z79.899 DRUG THERAPY: ICD-10-CM

## 2025-02-28 DIAGNOSIS — E66.01 SEVERE OBESITY (BMI 35.0-39.9) WITH COMORBIDITY: ICD-10-CM

## 2025-02-28 DIAGNOSIS — C34.90 NON-SMALL CELL LUNG CANCER, UNSPECIFIED LATERALITY: Primary | Chronic | ICD-10-CM

## 2025-02-28 DIAGNOSIS — E27.40 ADRENAL INSUFFICIENCY: ICD-10-CM

## 2025-02-28 PROCEDURE — 99999 PR PBB SHADOW E&M-EST. PATIENT-LVL III: CPT | Mod: PBBFAC,HCNC,, | Performed by: INTERNAL MEDICINE

## 2025-02-28 RX ORDER — ONDANSETRON 4 MG/1
8 TABLET, ORALLY DISINTEGRATING ORAL EVERY 6 HOURS PRN
Qty: 90 TABLET | Refills: 3 | Status: SHIPPED | OUTPATIENT
Start: 2025-02-28

## 2025-02-28 RX ORDER — HYDROCORTISONE 5 MG/1
TABLET ORAL
Qty: 120 TABLET | Refills: 0 | Status: SHIPPED | OUTPATIENT
Start: 2025-02-28 | End: 2025-03-30

## 2025-02-28 RX ORDER — ONDANSETRON 4 MG/1
8 TABLET, ORALLY DISINTEGRATING ORAL EVERY 6 HOURS PRN
Qty: 30 TABLET | Refills: 0 | Status: SHIPPED | OUTPATIENT
Start: 2025-02-28 | End: 2025-02-28

## 2025-02-28 NOTE — PROGRESS NOTES
PATIENT: Radha Ervin  MRN: [id number]  DATE: 2/28/2025      Subjective:     Chief complaint:  Chief Complaint   Patient presents with    Lung Cancer    Follow-up       Oncologic History:      Ms. Ervin is a 63-year-old female with 30 pack-year history of smoking, quit approximately 4 months ago, who was recently diagnosed with left lung cancer after evaluation for an abnormal chest x-ray.  A subsequent CT of the chest without contrast on May 26, 2023 revealed a spiculated nodule along the paramediastinal left upper lobe measuring 2.7 x 1.9 cm.  A mildly enlarged right paratracheal lymph node was seen measuring 1.1 cm.  She underwent EBUS and biopsy on June 23, 2023.  Pathology revealed the left upper lobe lesion positive for adenocarcinoma.  Station 4R was negative for malignancy.       A PET scan on July 13, 2023 revealed the left upper lobe mass measuring 2.6 x 1.6 cm with SUV max 7.4.  There was mildly metabolic prevascular mediastinal lymph node measuring 0.9 cm seen in short axis with SUV max 2.3.  She underwent robotic left upper lobectomy and lymph node sampling on August 3, 2023.  Pathology revealed invasive poorly differentiated adenocarcinoma measuring 2.1 cm.  There was visceral pleural invasion noted.  Vascular resection revealed invasive tumor present in the adventitial soft tissue.  There was lymphovascular invasion noted.  Invasive carcinoma is present at the vascular margin.  3/4 level 6 lymph nodes, 1/2 level 11 lymph nodes and 1/4 level 12 lymph nodes were involved out of a total of 23 lymph nodes.  Surgery was complicated by intraoperative bleeding of pulmonary arterial branches which resolved with pressure.      Her case was discussed at [facility] on 8/24/23: Recommend chemoRT followed by immunotherapy.     Established care with med onc 8/28. She had seen Dr. Quevedo who had recommended sequential chemoradiation then to be followed by immunotherapy. Consented for sequential CRT with  "carbo/taxol.    On 9/22/23 she presented to the infusion center for carbo/taxol C#1 but developed infusion reaction to taxol so that was stopped.  Had reaction with second infusion as well--carbo/taxol discontinued.  Consented for carbo/pemetrexed on 11/2/23    C#1 carbo/pemetrexed 11/3/23  C#2 carbo/pemetrexed 11/24/23  C#3 acute worsening of renal function--pemetrexed held: 12/15/23  C#4 carboplatin only 1/5/24    Radiation 60Gy in 30Fx 2/5/24--3/26/24    C#1 durvalumab 4/19/24  C#2 5/17/24  C#3 6/14/24  C#4 7/12/24  C#5 8/9/24   C#6 9/6/24  C#7 10/4/24    CT CAP 10/2/24 suspicious for lumbar spinal metastasis.  Pet CT 10/16/24 confirmed uptake in L-spine    She received XRT to L-spine metastasis 2fx 11/25--11/26/24           Interval History: Ms. Ervin returns for follow up. She notes that her nausea has gotten a little worse since her last appointment--still only doing hydrocortisone 10 qam 5 qpm. Requesting refill on zofran odt.       Review of Systems   A comprehensive review of systems was performed; pertinent positives and negatives are noted in the HPI.        ECOG Performance Status:   ECOG SCORE    1 - Restricted in strenuous activity-ambulatory and able to carry out work of a light nature         Objective:      Vitals:   Vitals:    02/28/25 1032   BP: 112/73   BP Location: Left arm   Patient Position: Sitting   Pulse: 95   Resp: 18   SpO2: 97%   Weight: 75.1 kg (165 lb 9.1 oz)   Height: 4' 11" (1.499 m)     BMI: Body mass index is 33.44 kg/m².      Physical Exam:   Physical Exam  Vitals and nursing note reviewed.   Constitutional:       General: She is not in acute distress.     Appearance: Normal appearance. She is not toxic-appearing.   HENT:      Head: Normocephalic and atraumatic.   Eyes:      General: No scleral icterus.     Conjunctiva/sclera: Conjunctivae normal.   Cardiovascular:      Rate and Rhythm: Normal rate and regular rhythm.      Heart sounds: Normal heart sounds. No murmur " heard.  Pulmonary:      Effort: Pulmonary effort is normal. No respiratory distress.      Breath sounds: Normal breath sounds. No wheezing, rhonchi or rales.   Abdominal:      General: There is no distension.      Palpations: Abdomen is soft.      Tenderness: There is no abdominal tenderness.   Musculoskeletal:         General: No swelling, tenderness or deformity.      Cervical back: Neck supple. No tenderness.   Skin:     Coloration: Skin is not jaundiced.      Findings: No erythema.   Neurological:      General: No focal deficit present.      Mental Status: She is alert and oriented to person, place, and time.      Motor: No weakness.   Psychiatric:         Mood and Affect: Mood normal.         Behavior: Behavior normal.           Laboratory Data:  WBC   Date Value Ref Range Status   01/25/2025 9.45 3.90 - 12.70 K/uL Final     Hemoglobin   Date Value Ref Range Status   01/25/2025 12.4 12.0 - 16.0 g/dL Final     POC Hematocrit   Date Value Ref Range Status   12/22/2024 35 (L) 36 - 54 %PCV Final     Hematocrit   Date Value Ref Range Status   01/25/2025 40.5 37.0 - 48.5 % Final     Platelets   Date Value Ref Range Status   01/25/2025 337 150 - 450 K/uL Final     Gran # (ANC)   Date Value Ref Range Status   01/25/2025 5.6 1.8 - 7.7 K/uL Final     Gran %   Date Value Ref Range Status   01/25/2025 58.8 38.0 - 73.0 % Final       Chemistry        Component Value Date/Time     01/25/2025 1014    K 3.9 01/25/2025 1014     (H) 01/25/2025 1014    CO2 22 (L) 01/25/2025 1014    BUN 14 01/25/2025 1014    BUN 12.0 04/28/2023 1207    CREATININE 1.3 01/25/2025 1014     01/25/2025 1014        Component Value Date/Time    CALCIUM 9.4 01/25/2025 1014    ALKPHOS 93 01/25/2025 1014    AST 12 01/25/2025 1014    ALT 11 01/25/2025 1014    BILITOT 0.3 01/25/2025 1014    ESTGFRAFRICA >60.0 04/06/2020 1002    EGFRNONAA >60.0 04/06/2020 1002        NM PET CT FDG Skull Base to Mid Thigh  Order: [id number]   Status: Final  result       Next appt: 03/05/2025 at 04:00 PM in Outpatient Rehab (Tennille Dacosta, PT)       Dx: Adenocarcinoma, lung, left    Test Result Released: Yes (seen)    0 Result Notes  Details    Reading Physician Reading Date Result Priority   Joanna Leong MD  [phone]  2/26/2025 Routine   Bharat Clark MD  [phone]  2/26/2025      Narrative & Impression  EXAMINATION:  NM PET CT FDG SKULL BASE TO MID THIGH     CLINICAL HISTORY:  Non-small cell lung cancer (NSCLC), metastatic, assess treatment response; Malignant neoplasm of unspecified part of left bronchus or lung 64-year-old female with history of lung cancer 06/23/2023 positive lymph nodes 08/03/2023 and metastatic lung carcinoma 01/08/2025     TECHNIQUE:  15.9 mCi of F18-FDG was administered intravenously in the right antecubital fossa.  After an approximately 60 min distribution time, PET/CT images were acquired from the skull base to mid thigh.  Transmission images were acquired to correct for attenuation using a whole body low-dose CT scan without IV contrast with the arms positioned above the head. Glycemia at the time of injection was 98 mg/dL.     COMPARISON:  CT abdomen pelvis 11/22/2024.  FDG PET-CT 10/16/2024.     FINDINGS:  Quality of the study: Adequate.     In the head and neck, there are no hypermetabolic lesions worrisome for malignancy.  No pathologically enlarged or hypermetabolic lymph nodes.     In the chest, there are postsurgical changes of prior left upper lobectomy.  There is continued bandlike opacities and architectural distortion near the surgical site, likely scarring/post radiation changes.  No focal uptake to suggest local recurrence.  No concerning pulmonary nodules or masses, and there are no pathologically enlarged or hypermetabolic lymph nodes.     In the abdomen and pelvis, there is physiologic tracer distribution within the abdominal organs and excretion into the genitourinary system.     In the bones, there are no new  hypermetabolic lesions worrisome for malignancy.  Status post vertebral augmentation at L1. There is physiologic muscular uptake about the pelvis.     Increased soft tissue and muscle uptake     Additional CT findings: Mild diffuse atherosclerosis.  Cholecystectomy.  Bilateral nonobstructive punctate renal stones.  Hysterectomy.  Few remote right rib fractures.     Impression:     History of lung malignancy status post left upper lobectomy.  No tracer uptake in the left lung to suggest local recurrence.     No hypermetabolic lymphadenopathy or evidence of new distant metastasis.     Electronically signed by resident: Bharat Clark  Date:                                            02/26/2025  Time:                                           14:07     Electronically signed by:Joanna Leong  Date:                                            02/26/2025  Time:                                           14:42        Exam Ended: 02/26/25 13:45 CST Last Resulted: 02/26/25 14:42 CST         Assessment/Plan:     1. Non-small cell lung cancer, unspecified laterality    2. Secondary and unspecified malignant neoplasm of intrathoracic lymph nodes    3. Adrenal insufficiency    4. Severe obesity (BMI 35.0-39.9) with comorbidity    5. Primary localized osteoarthrosis of right lower leg    6. Drug therapy      Metastatic NSCLC with KRAS g12c mutation, oligometastatic disease to L-spine now s/p XRT. Scans 2/26/25 OPAL.   2ry adrenal insufficiency--continue hydrocortisone--advised to increase to 15/5 at least temporarily to see if this might help some with the nausea.       Med and Orders:  Orders Placed This Encounter    CBC auto differential    TSH    CMP    hydrocortisone (CORTEF) 5 MG Tab    ondansetron (ZOFRAN-ODT) 4 MG TbDL       Follow Up:  Follow up in about 6 weeks (around 4/11/2025).      Above care plan was discussed with patient and all questions were addressed to their expressed satisfaction.       Roger Eduardo MD,  FACP  Hematology & Medical Oncology  [facility]         High Risk Conditions:    Patient has a condition that poses threat to life and bodily function: mNSCLC, KRAS g12c mutated.  Patient is currently on drug therapy requiring intensive monitoring for toxicity: lumakras

## 2025-03-05 ENCOUNTER — CLINICAL SUPPORT (OUTPATIENT)
Dept: REHABILITATION | Facility: HOSPITAL | Age: 65
End: 2025-03-05
Payer: MEDICARE

## 2025-03-05 DIAGNOSIS — R53.1 DECREASED STRENGTH: ICD-10-CM

## 2025-03-05 DIAGNOSIS — Z74.09 IMPAIRED FUNCTIONAL MOBILITY AND ACTIVITY TOLERANCE: Primary | ICD-10-CM

## 2025-03-05 PROCEDURE — 97112 NEUROMUSCULAR REEDUCATION: CPT | Mod: HCNC,PN

## 2025-03-05 NOTE — PROGRESS NOTES
"  Outpatient Rehab    Physical Therapy Visit    Patient Name: Marley Ervin  MRN: [id number]  YOB: 1960  Encounter Date: 3/5/2025    Therapy Diagnosis:   Encounter Diagnoses   Name Primary?    Impaired functional mobility and activity tolerance Yes    Decreased strength        Physician: Andre Handy MD    Physician Orders: Eval and Treat  Medical Diagnosis: S32.010A (ICD-10-CM) - Compression fracture of L1 vertebra, initial encounter      Visit # / Visits Authorized:  4 / 12   Date of Evaluation:  2/12/2025   Insurance Authorization Period: 2/12/2025 to 4/10/2025  Plan of Care Certification:  2/12/2025 to 3/28/2025                 Time In: 16:00   Time Out: 1655  Total Time: 55   Total Billable Time: 30 minutes- 1:1 physical therapist       FOTO:  Intake Score:  %  Survey Score 1:  %  Survey Score 2:  %         Subjective   Increased right sided low back pain today after changing her bed yesterdya..  Pain reported as 5/10. right sided low back    Objective            Treatment:  Therapeutic Exercise  Therapeutic Exercise Activity 1: SKTC: 10x5" bilateral  Therapeutic Exercise Activity 2: BKFO: 10x5" bilateral  Therapeutic Exercise Activity 3: LTR: 15x  Therapeutic Exercise Activity 4: Seated swiss ball roll out: 20x - 2 rouns  Therapeutic Exercise Activity 5: Standing hip abduction and extension: red band 2x10 leaning onto elevated mat  Therapeutic Exercise Activity 7: Nu step: level 4 for 8 minutes - [place]    Manual Therapy  Manual Therapy Activity 1: Long axis hip distraction - right  Manual Therapy Activity 2: Lateral hip distraction - right    Balance/Neuromuscular Re-Education  Balance/Neuromuscular Re-Education Activity 1: Hip abduction isometric: 4x30" with 30" rest break intervals  Balance/Neuromuscular Re-Education Activity 2: Forward bows at stairs: 10x  Balance/Neuromuscular Re-Education Activity 4: Transverse abdominus activation: 20x5"    Assessment & Plan   Assessment:     "     Patient will continue to benefit from skilled outpatient physical therapy to address the deficits listed in the problem list box on initial evaluation, provide pt/family education and to maximize pt's level of independence in the home and community environment.     Patient's spiritual, cultural, and educational needs considered and patient agreeable to plan of care and goals.           Plan:      Goals:   Active       Long Term Goals        1. Patient will perform Sahrmann level 2 with good control to demonstrate improved core strength.  (Progressing)       Start:  02/13/25            2. Patient will improve impaired lower extremity myotomes/manual muscle tests to >/=4+/5 to improve strength for functional tasks. (Progressing)       Start:  02/13/25    Expected End:  03/27/25            3. Patient will report no pain during ADL's to promote improved quality of life. (Progressing)       Start:  02/13/25    Expected End:  03/27/25            4. Patient will improve distance ambulated during 6 MWT by >/= 350 feet to demonstrate improved tolerance to long duration walking. (Progressing)       Start:  02/13/25    Expected End:  03/27/25            5. Patient will be able to standing for >/= 10 minutes to improve tolerance to functional activities. (Progressing)       Start:  02/13/25    Expected End:  03/27/25               Short Term Goals        1. Patient will be compliant with home exercise program to supplement therapy in promoting functional mobility. (Progressing)       Start:  02/13/25    Expected End:  03/06/25            2.  Patient will improve impaired lower extremity myotomes/manual muscle tests  to >/=4/5 to improve strength for functional tasks. (Progressing)       Start:  02/13/25    Expected End:  03/06/25            3. Patient will report pain levels </= 2/10 to improve quality of life. (Progressing)       Start:  02/13/25    Expected End:  03/06/25                Tennille Dacosta, PT

## 2025-03-07 ENCOUNTER — CLINICAL SUPPORT (OUTPATIENT)
Dept: REHABILITATION | Facility: HOSPITAL | Age: 65
End: 2025-03-07
Payer: MEDICARE

## 2025-03-07 DIAGNOSIS — Z74.09 IMPAIRED FUNCTIONAL MOBILITY AND ACTIVITY TOLERANCE: Primary | ICD-10-CM

## 2025-03-07 DIAGNOSIS — S32.010A COMPRESSION FRACTURE OF L1 VERTEBRA, INITIAL ENCOUNTER: ICD-10-CM

## 2025-03-07 DIAGNOSIS — R53.1 DECREASED STRENGTH: ICD-10-CM

## 2025-03-07 PROCEDURE — 97140 MANUAL THERAPY 1/> REGIONS: CPT | Mod: HCNC,PN,CQ

## 2025-03-07 PROCEDURE — 97530 THERAPEUTIC ACTIVITIES: CPT | Mod: HCNC,PN

## 2025-03-07 PROCEDURE — 97530 THERAPEUTIC ACTIVITIES: CPT | Mod: HCNC,PN,CQ

## 2025-03-07 NOTE — PROGRESS NOTES
"  Outpatient Rehab    Physical Therapy Visit    Patient Name: Marley Ervin  MRN: [id number]  YOB: 1960  Encounter Date: 3/7/2025    Therapy Diagnosis:   Encounter Diagnoses   Name Primary?    Impaired functional mobility and activity tolerance Yes    Decreased strength     Compression fracture of L1 vertebra, initial encounter        Physician: Andre Handy MD    Physician Orders: Eval and Treat  Medical Diagnosis: S32.010A (ICD-10-CM) - Compression fracture of L1 vertebra, initial encounter      Visit # / Visits Authorized:  5 / 12   Date of Evaluation:  2/12/2025   Insurance Authorization Period: 2/12/2025 to 4/10/2025  Plan of Care Certification:  2/12/2025 to 3/28/2025                 Time In: 1200  Time Out: 1255  Total Time: 55   Total Billable Time: 55 minutes- 1:1 physical therapist assistant       FOTO:  Intake Score:  %  Survey Score 1:  %  Survey Score 2:  %         Subjective   Increased right sided low back pain today after changing her bed Monday. Feel much better but still hurts a bit..    right sided low back    Objective            Treatment:      Manual Therapy  Manual Therapy Activity 1: Long axis hip distraction - right  Manual Therapy Activity 2: Lateral hip distraction - right (most relief)    Balance/Neuromuscular Re-Education  Balance/Neuromuscular Re-Education Activity 1: Hip abduction isometric: 4x30" with 30" rest break intervals  Balance/Neuromuscular Re-Education Activity 2: Forward bows at stairs: 10x  Balance/Neuromuscular Re-Education Activity 3: Side lying clamshells: green band 2x10  Balance/Neuromuscular Re-Education Activity 4: Transverse abdominus activation: 20x5"    Therapeutic Activity  Therapeutic Activity 1: Gluteal set: 10x5"  Therapeutic Activity 2: Bridge: x15  Therapeutic Activity 3: SKTC: 5x10"  Therapeutic Activity 4: BKFO: 5x10"  Therapeutic Activity 5: Hip abduction isometric: blue band 10x5"  Therapeutic Activity 6: Lateral heel taps from 6" step " 2x10 B    Assessment & Plan   Assessment: Patient presents with medical diagnosis of S32.010A (ICD-10-CM) - Compression fracture of L1 vertebra, initial encounter. Presents with increased right sided low back pain following weekend of parades and changing her bed sheets. Focused on hip mobility and lumbar stretching to reduce pain levels. Intense pain with bridges; therefore, deferred. Improved pain levels following end of session. Resumed higher level core stabilization and hip strengthening today. Tolerated well.       Patient will continue to benefit from skilled outpatient physical therapy to address the deficits listed in the problem list box on initial evaluation, provide pt/family education and to maximize pt's level of independence in the home and community environment.     Patient's spiritual, cultural, and educational needs considered and patient agreeable to plan of care and goals.           Plan: lumbopelvic stabilization, functional activity strengthening.    Goals:   Active       Long Term Goals        1. Patient will perform Sahrmann level 2 with good control to demonstrate improved core strength.  (Progressing)       Start:  02/13/25            2. Patient will improve impaired lower extremity myotomes/manual muscle tests to >/=4+/5 to improve strength for functional tasks. (Progressing)       Start:  02/13/25    Expected End:  03/27/25            3. Patient will report no pain during ADL's to promote improved quality of life. (Progressing)       Start:  02/13/25    Expected End:  03/27/25            4. Patient will improve distance ambulated during 6 MWT by >/= 350 feet to demonstrate improved tolerance to long duration walking. (Progressing)       Start:  02/13/25    Expected End:  03/27/25            5. Patient will be able to standing for >/= 10 minutes to improve tolerance to functional activities. (Progressing)       Start:  02/13/25    Expected End:  03/27/25               Short Term Goals         1. Patient will be compliant with home exercise program to supplement therapy in promoting functional mobility. (Progressing)       Start:  02/13/25    Expected End:  03/06/25            2.  Patient will improve impaired lower extremity myotomes/manual muscle tests  to >/=4/5 to improve strength for functional tasks. (Progressing)       Start:  02/13/25    Expected End:  03/06/25            3. Patient will report pain levels </= 2/10 to improve quality of life. (Progressing)       Start:  02/13/25    Expected End:  03/06/25                Lawrence Patricio PTA

## 2025-03-11 ENCOUNTER — CLINICAL SUPPORT (OUTPATIENT)
Dept: REHABILITATION | Facility: HOSPITAL | Age: 65
End: 2025-03-11
Payer: MEDICARE

## 2025-03-11 DIAGNOSIS — S32.010A COMPRESSION FRACTURE OF L1 VERTEBRA, INITIAL ENCOUNTER: ICD-10-CM

## 2025-03-11 DIAGNOSIS — Z74.09 IMPAIRED FUNCTIONAL MOBILITY AND ACTIVITY TOLERANCE: Primary | ICD-10-CM

## 2025-03-11 PROCEDURE — 97112 NEUROMUSCULAR REEDUCATION: CPT | Mod: HCNC,PN

## 2025-03-11 PROCEDURE — 97110 THERAPEUTIC EXERCISES: CPT | Mod: HCNC,PN,CQ

## 2025-03-11 NOTE — PROGRESS NOTES
"  Outpatient Rehab    Physical Therapy Visit    Patient Name: Marley Ervin  MRN: [id number]  YOB: 1960  Encounter Date: 3/11/2025    Therapy Diagnosis:   Encounter Diagnoses   Name Primary?    Impaired functional mobility and activity tolerance Yes    Compression fracture of L1 vertebra, initial encounter        Physician: Andre Handy MD    Physician Orders: Eval and Treat  Medical Diagnosis: S32.010A (ICD-10-CM) - Compression fracture of L1 vertebra, initial encounter      Visit # / Visits Authorized:  5 / 12   Date of Evaluation:  2/12/2025   Insurance Authorization Period: 2/12/2025 to 4/10/2025  Plan of Care Certification:  2/12/2025 to 3/28/2025                 Time In: 1300  Time Out: 1354  Total Time: 54   Total Billable Time: 54 minutes- 1:1 physical therapist assistant       FOTO:  Intake Score:  %  Survey Score 1:  %  Survey Score 2:  %         Subjective   Increased right sided low back pain today after changing her bed Monday. Feel much better but still hurts a bit..  Pain reported as 1/10. right sided low back    Objective            Treatment:  Therapeutic Exercise  Therapeutic Exercise Activity 1: SKTC: 10x5" bilateral  Therapeutic Exercise Activity 2: BKFO: 10x5" bilateral  Therapeutic Exercise Activity 3: LTR: 15x  Therapeutic Exercise Activity 4: Seated swiss ball roll out: 20x - 2 rouns  Therapeutic Exercise Activity 5: Standing hip abduction and extension: red band 2x10 leaning onto elevated mat  Therapeutic Exercise Activity 6: Lateral band walks: red band 5 laps length of ballet bar  Therapeutic Exercise Activity 7: Nu step: level 4 for 8 minutes - hills  Therapeutic Exercise Activity 8: Standing HIp Ext 3x10-GTB  Therapeutic Exercise Activity 9: Standing Hip Abd 3x10-GTB  Therapeutic Exercise Activity 10: Sit to stand from chair 3x10-#7- DB         Balance/Neuromuscular Re-Education  Balance/Neuromuscular Re-Education Activity 1: Hip abduction isometric: 4x30" with 30" rest " "break intervals  Balance/Neuromuscular Re-Education Activity 2: Forward bows at stairs: 10x  Balance/Neuromuscular Re-Education Activity 3: Side lying clamshells: green band 2x10  Balance/Neuromuscular Re-Education Activity 4: Transverse abdominus activation: 20x5"         Assessment & Plan   Assessment: Patient presents with medical diagnosis of S32.010A (ICD-10-CM) - Compression fracture of L1 vertebra, initial encounter. Presents with increased right sided low back pain following weekend of parades and changing her bed sheets. Focused on hip mobility and lumbar stretching to reduce pain levels. Intense pain with bridges; therefore, deferred. Improved pain levels following end of session. Continued  higher level core stabilization and hip strengthening today. Pain abolished folowing.  Tolerated well.       Patient will continue to benefit from skilled outpatient physical therapy to address the deficits listed in the problem list box on initial evaluation, provide pt/family education and to maximize pt's level of independence in the home and community environment.     Patient's spiritual, cultural, and educational needs considered and patient agreeable to plan of care and goals.           Plan: lumbopelvic stabilization, functional activity strengthening.    Goals:   Active       Long Term Goals        1. Patient will perform Sahrmann level 2 with good control to demonstrate improved core strength.  (Progressing)       Start:  02/13/25            2. Patient will improve impaired lower extremity myotomes/manual muscle tests to >/=4+/5 to improve strength for functional tasks. (Progressing)       Start:  02/13/25    Expected End:  03/27/25            3. Patient will report no pain during ADL's to promote improved quality of life. (Progressing)       Start:  02/13/25    Expected End:  03/27/25            4. Patient will improve distance ambulated during 6 MWT by >/= 350 feet to demonstrate improved tolerance to long " duration walking. (Progressing)       Start:  02/13/25    Expected End:  03/27/25            5. Patient will be able to standing for >/= 10 minutes to improve tolerance to functional activities. (Progressing)       Start:  02/13/25    Expected End:  03/27/25               Short Term Goals        1. Patient will be compliant with home exercise program to supplement therapy in promoting functional mobility. (Progressing)       Start:  02/13/25    Expected End:  03/06/25            2.  Patient will improve impaired lower extremity myotomes/manual muscle tests  to >/=4/5 to improve strength for functional tasks. (Progressing)       Start:  02/13/25    Expected End:  03/06/25            3. Patient will report pain levels </= 2/10 to improve quality of life. (Progressing)       Start:  02/13/25    Expected End:  03/06/25                Lawrence Patricio PTA

## 2025-03-13 ENCOUNTER — HOSPITAL ENCOUNTER (OUTPATIENT)
Dept: RADIOLOGY | Facility: HOSPITAL | Age: 65
Discharge: HOME | End: 2025-03-13
Attending: RADIOLOGY
Payer: MEDICARE

## 2025-03-13 ENCOUNTER — OFFICE VISIT (OUTPATIENT)
Dept: RADIATION ONCOLOGY | Facility: CLINIC | Age: 65
End: 2025-03-13
Payer: MEDICARE

## 2025-03-13 ENCOUNTER — CLINICAL SUPPORT (OUTPATIENT)
Dept: REHABILITATION | Facility: HOSPITAL | Age: 65
End: 2025-03-13
Payer: MEDICARE

## 2025-03-13 VITALS
RESPIRATION RATE: 20 BRPM | SYSTOLIC BLOOD PRESSURE: 105 MMHG | WEIGHT: 166.44 LBS | DIASTOLIC BLOOD PRESSURE: 73 MMHG | BODY MASS INDEX: 33.62 KG/M2

## 2025-03-13 DIAGNOSIS — C79.51 SECONDARY MALIGNANT NEOPLASM OF BONE: ICD-10-CM

## 2025-03-13 DIAGNOSIS — Z74.09 IMPAIRED FUNCTIONAL MOBILITY AND ACTIVITY TOLERANCE: Primary | ICD-10-CM

## 2025-03-13 DIAGNOSIS — R53.1 DECREASED STRENGTH: ICD-10-CM

## 2025-03-13 DIAGNOSIS — C79.51 SECONDARY MALIGNANT NEOPLASM OF BONE: Primary | Chronic | ICD-10-CM

## 2025-03-13 PROCEDURE — 97530 THERAPEUTIC ACTIVITIES: CPT | Mod: HCNC,PN

## 2025-03-13 PROCEDURE — 72158 MRI LUMBAR SPINE W/O & W/DYE: CPT | Mod: TC,HCNC

## 2025-03-13 PROCEDURE — 99999 PR PBB SHADOW E&M-EST. PATIENT-LVL II: CPT | Mod: PBBFAC,HCNC,, | Performed by: RADIOLOGY

## 2025-03-13 PROCEDURE — 72158 MRI LUMBAR SPINE W/O & W/DYE: CPT | Mod: 26,HCNC,, | Performed by: RADIOLOGY

## 2025-03-13 PROCEDURE — 97112 NEUROMUSCULAR REEDUCATION: CPT | Mod: HCNC,PN

## 2025-03-13 PROCEDURE — 25500020 PHARM REV CODE 255: Mod: HCNC | Performed by: RADIOLOGY

## 2025-03-13 PROCEDURE — A9585 GADOBUTROL INJECTION: HCPCS | Mod: HCNC | Performed by: RADIOLOGY

## 2025-03-13 RX ORDER — GADOBUTROL 604.72 MG/ML
8 INJECTION INTRAVENOUS
Status: COMPLETED | OUTPATIENT
Start: 2025-03-13 | End: 2025-03-13

## 2025-03-13 RX ADMIN — GADOBUTROL 8 ML: 604.72 INJECTION INTRAVENOUS at 09:03

## 2025-03-13 NOTE — PROGRESS NOTES
"  Outpatient Rehab    Physical Therapy Visit    Patient Name: Marley Ervin  MRN: [id number]  YOB: 1960  Encounter Date: 3/13/2025    Therapy Diagnosis:   No diagnosis found.      Physician: Andre Handy MD    Physician Orders: Eval and Treat  Medical Diagnosis: S32.010A (ICD-10-CM) - Compression fracture of L1 vertebra, initial encounter      Visit # / Visits Authorized:  5 / 12   Date of Evaluation:  2/12/2025   Insurance Authorization Period: 2/12/2025 to 4/10/2025  Plan of Care Certification:  2/12/2025 to 3/28/2025                 Time In: 1403  Time Out: 1458  Total Time: 55   Total Billable Time: 55  minutes- 1:1 physical therapist        FOTO:  Intake Score:  %  Survey Score 1:  %  Survey Score 2:  %         Subjective   Feels like physical therapy has improved her pain levels about 80% since beginning. Still feels right sided low back pain with long duration standing and walking. Highest pain levels it reaches while standing is about a 6/10..  Pain reported as 1/10. right sided low back    Objective            Treatment:  Therapeutic Exercise  Therapeutic Exercise Activity 4: Seated swiss ball roll out: 20x - 2 rouns  Therapeutic Exercise Activity 8: Standing Hip Ext 3x10-GTB  Therapeutic Exercise Activity 9: Standing Hip Abd 3x10-GTB         Balance/Neuromuscular Re-Education  Balance/Neuromuscular Re-Education Activity 1: Paloff press: double red without rotation 2x10  Balance/Neuromuscular Re-Education Activity 2: Lateral band walks: green band 5 laps at ballet bar    Therapeutic Activity  Therapeutic Activity 1: Step up with contralateral knee drive: 6" step 20x each  Therapeutic Activity 2: SAPD with alternating march: green theraband 20x  Therapeutic Activity 3: FOTO    Assessment & Plan   Assessment: Patient presents with medical diagnosis of S32.010A (ICD-10-CM) - Compression fracture of L1 vertebra, initial encounter. Patient has returned to baseline since recent flare up. " Re-iniated higher level core stabilization. Progressed to more functional lower extremity strengthening and cardiovascular endurance training. Functional limitation score decreased per FOTO. HAs two weeks remaining in plan of care. Will focus on higher level core stabilization with rotation, posterolateral hip strenghtening, and functional strengthening routine.  Evaluation/Treatment Tolerance: Patient tolerated treatment well    Patient will continue to benefit from skilled outpatient physical therapy to address the deficits listed in the problem list box on initial evaluation, provide pt/family education and to maximize pt's level of independence in the home and community environment.     Patient's spiritual, cultural, and educational needs considered and patient agreeable to plan of care and goals.           Plan: lumbopelvic stabilization, functional activity strengthening.    Goals:   Active       Long Term Goals        1. Patient will perform Sahrmann level 2 with good control to demonstrate improved core strength.  (Progressing)       Start:  02/13/25            2. Patient will improve impaired lower extremity myotomes/manual muscle tests to >/=4+/5 to improve strength for functional tasks. (Progressing)       Start:  02/13/25    Expected End:  03/27/25            3. Patient will report no pain during ADL's to promote improved quality of life. (Progressing)       Start:  02/13/25    Expected End:  03/27/25            4. Patient will improve distance ambulated during 6 MWT by >/= 350 feet to demonstrate improved tolerance to long duration walking. (Progressing)       Start:  02/13/25    Expected End:  03/27/25            5. Patient will be able to standing for >/= 10 minutes to improve tolerance to functional activities. (Progressing)       Start:  02/13/25    Expected End:  03/27/25               Short Term Goals        1. Patient will be compliant with home exercise program to supplement therapy in promoting  functional mobility. (Progressing)       Start:  02/13/25    Expected End:  03/06/25            2.  Patient will improve impaired lower extremity myotomes/manual muscle tests  to >/=4/5 to improve strength for functional tasks. (Progressing)       Start:  02/13/25    Expected End:  03/06/25            3. Patient will report pain levels </= 2/10 to improve quality of life. (Progressing)       Start:  02/13/25    Expected End:  03/06/25                Tennille Dacosta PT

## 2025-03-13 NOTE — PROGRESS NOTES
3/13/2025    Radiation Oncology Follow-Up Visit      Prior Radiation History:   Course 1:    Site  Energy  Dose/Fx (Gy)  #Fx  Total Dose (Gy)  Start Date  End Date  Elapsed Days    Left lung  6X  2  30 / 30  60  2/5/2024  3/26/2024  50      Course 2:    Site  Technique  Energy  Dose/Fx (Gy)  #Fx  Total Dose (Gy)  Start Date  End Date  Elapsed Days    L1  SBRT  6X  12  2 / 2  24  11/25/2024 11/26/2024  1      Is the patient female between ages 15-55:  No    Does the patient have a CIED:  No      Assessment   This is a 64 y.o. female with h/o Stage IIIA (pT1c pN2 M0) MARY NSCLC (adeno) diagnosed on EBUS 6/23/23 s/p robotic lobectomy with Dr. Gould 8/3/23; pathology with multiple positive margins. She received adjuvant chemotherapy followed by adjuvant radiation 60 Gy in 30 fx with Dr. Quevedo on 3/26/24. She received maintenance immunotherapy and was found to have oligometastatic disease to L1 and possibly T11 on PET/CT 10/16/24. She completed SBRT 24 Gy in 2 fx to L1 metastasis 11/26/24. She returns for routine follow up.     MRI L spine today demonstrates post-treatment changes in L1, somewhat obscured by interval kyphoplasty. No new/progressive disease by my review.           Plan   1)  Follow up with Dr. Eduardo as scheduled for continued systemic management. CT and/or PET should be sufficient for surveillance; MRI spine PRN if worsening back pain or new neurologic symptoms.            CHIEF COMPLAINT: Follow up after spine SBRT      HPI/Focused ROS: After end of radiation, she had persistent back pain and underwent L1 kyphoplasty 1/6/25. Her lower back pain did not improve after this and was attributed more likely to lower lumbar nerve compression on the Right from non-cancer etiology.  PET/CT 2/26/25 without evidence of FDG avid disease in lungs or spine. Today she reports Right lower back pain somewhat improved w/ PT. Denies focal weakness, numbness, or tingling.       Past Medical History:   Diagnosis Date     Allergy     Amblyopia     Cataract     Eczema     HS (hereditary spherocytosis)     Hx of total knee replacement 01/19/2016    right knee    Joint pain     Melena 11/26/2024       Past Surgical History:   Procedure Laterality Date    achilles tendon repair      BRONCHOSCOPY N/A 8/3/2023    Procedure: Bronchoscopy;  Surgeon: Saeed Gould MD;  [facility];  Service: Cardiothoracic;  Laterality: N/A;    CHOLECYSTECTOMY      ESOPHAGOGASTRODUODENOSCOPY N/A 11/27/2024    Procedure: EGD (ESOPHAGOGASTRODUODENOSCOPY);  Surgeon: Braxton Mooney MD;  Location: [facility] ENDO (2ND FLR);  Service: Endoscopy;  Laterality: N/A;    INJECTION OF ANESTHETIC AGENT AROUND MULTIPLE INTERCOSTAL NERVES N/A 8/3/2023    Procedure: BLOCK, NERVE, INTERCOSTAL, 2 OR MORE;  Surgeon: Saeed Gould MD;  Location: [address];  Service: Cardiothoracic;  Laterality: N/A;    KYPHOPLASTY, SPINE, LUMBAR N/A 1/6/2025    Procedure: KYPHOPLASTY, SPINE, LUMBAR;  Surgeon: Eddi Hinkle DO;  Location: [facility];  Service: Pain Management;  Laterality: N/A;  [employer]; SNS nerve monitoring scheduled    LYMPHADENECTOMY Left 8/3/2023    Procedure: LYMPHADENECTOMY;  Surgeon: Saeed Gould MD;  [facility];  Service: Cardiothoracic;  Laterality: Left;    NAVIGATIONAL BRONCHOSCOPY N/A 6/23/2023    Procedure: BRONCHOSCOPY, NAVIGATIONAL;  Surgeon: Radha Mccollum MD;  [address]  Service: Pulmonary;  Laterality: N/A;    TOTAL KNEE ARTHROPLASTY      XI ROBOTIC RATS Left 8/3/2023    Procedure: XI ROBOTIC RATS upper lobectomy;  Surgeon: Saeed Gould MD;  [facility];  Service: Cardiothoracic;  Laterality: Left;       Social History     Tobacco Use    Smoking status: Former     Current packs/day: 0.00     Average packs/day: 1 pack/day for 46.3 years (46.3 ttl pk-yrs)     Types: Vaping with nicotine, Cigarettes     Start date: 1977     Quit date: 05/2023     Years since  quittin.8    Smokeless tobacco: Never   Substance Use Topics    Alcohol use: Not Currently     Comment: occassionally    Drug use: No       Cancer-related family history includes Breast cancer in her sister; Cancer in her father, mother, and son; Liver cancer in her maternal uncle.    Current Outpatient Medications on File Prior to Visit   Medication Sig Dispense Refill    betamethasone dipropionate (DIPROLENE) 0.05 % ointment Apply topically 2 (two) times daily. Prn psoriasis flares 45 g 3    esomeprazole (NEXIUM) 20 MG capsule Take 20 mg by mouth as needed (dyspepsia).      hydrocortisone (CORTEF) 5 MG Tab Take 3 tablets (15 mg total) by mouth every morning AND 1 tablet (5 mg total) after lunch. 120 tablet 0    LORazepam (ATIVAN) 1 MG tablet Take 1 tablet (1 mg total) by mouth every 6 (six) hours as needed for Anxiety (and half an hour before MRI). 10 tablet 3    mometasone 0.1% (ELOCON) 0.1 % cream AAA every day when red and tender under occlusion (Patient taking differently: daily as needed. AAA every day when red and tender under occlusion) 60 g 2    ondansetron (ZOFRAN) 8 MG tablet Take 0.5 tablets (4 mg total) by mouth every 8 (eight) hours as needed for Nausea. 30 tablet 3    ondansetron (ZOFRAN-ODT) 4 MG TbDL Take 2 tablets (8 mg total) by mouth every 6 (six) hours as needed (n/v). 90 tablet 3    OTEZLA 30 mg Tab TAKE 1 TABLET BY MOUTH DAILY 30 tablet 3    pilocarpine (SALAGEN, PILOCARPINE,) 5 MG Tab Take 1 tablet (5 mg total) by mouth 3 (three) times daily. (Patient taking differently: Take 5 mg by mouth 3 (three) times daily as needed.) 90 tablet 11    sotorasib (LUMAKRAS) 320 mg Tab Take 3 tablets (960 mg total) by mouth once daily. 90 tablet 3    traMADoL (ULTRAM) 50 mg tablet Take 1 tablet (50 mg total) by mouth every 6 (six) hours as needed for Pain. 28 each 0     Current Facility-Administered Medications on File Prior to Visit   Medication Dose Route Frequency Provider Last Rate Last Admin     "[COMPLETED] gadobutroL (GADAVIST) injection 8 mL  8 mL Intravenous ONCE PRN Ramone Noriega MD   8 mL at 03/13/25 0990       Review of patient's allergies indicates:   Allergen Reactions    Paclitaxel Anaphylaxis, Other (See Comments) and Nausea And Vomiting     Pt states "Anaphylaxis" last encounter w/ throat swelling. Encountered back pain, coughing and head fuzzy today.    Metformin Itching    Morphine Other (See Comments)     headaches    Latex, natural rubber Rash and Other (See Comments)     Redness and peeling only with bandaids    Penicillins Nausea And Vomiting and Rash         Vital Signs: /73   Resp 20   Wt 75.5 kg (166 lb 7.2 oz)   LMP  (LMP Unknown)   BMI 33.62 kg/m²     ECOG Performance Status: (0) Fully active, able to carry on all predisease performance without restriction    Physical Exam  Constitutional:       Appearance: Normal appearance.   HENT:      Head: Normocephalic and atraumatic.   Eyes:      General: No scleral icterus.     Extraocular Movements: Extraocular movements intact.   Pulmonary:      Effort: No accessory muscle usage or respiratory distress.   Musculoskeletal:      Cervical back: Normal range of motion.   Neurological:      Mental Status: She is alert and oriented to person, place, and time.   Psychiatric:         Mood and Affect: Mood and affect normal.         Judgment: Judgment normal.          Labs:    Imaging: I have personally reviewed the patient's available images and reports and summarized pertinent findings above in HPI.     Pathology: No new path        "

## 2025-03-17 ENCOUNTER — DOCUMENT SCAN (OUTPATIENT)
Dept: HOME HEALTH SERVICES | Facility: HOSPITAL | Age: 65
End: 2025-03-17
Payer: MEDICARE

## 2025-03-17 ENCOUNTER — CLINICAL SUPPORT (OUTPATIENT)
Dept: REHABILITATION | Facility: HOSPITAL | Age: 65
End: 2025-03-17
Payer: MEDICARE

## 2025-03-17 DIAGNOSIS — R53.1 DECREASED STRENGTH: ICD-10-CM

## 2025-03-17 DIAGNOSIS — Z74.09 IMPAIRED FUNCTIONAL MOBILITY AND ACTIVITY TOLERANCE: Primary | ICD-10-CM

## 2025-03-17 PROCEDURE — 97110 THERAPEUTIC EXERCISES: CPT | Mod: PN

## 2025-03-17 PROCEDURE — 97112 NEUROMUSCULAR REEDUCATION: CPT | Mod: PN

## 2025-03-17 PROCEDURE — 97530 THERAPEUTIC ACTIVITIES: CPT | Mod: PN

## 2025-03-18 NOTE — PROGRESS NOTES
"  Outpatient Rehab    Physical Therapy Visit    Patient Name: Marley Ervin  MRN: [id number]  YOB: 1960  Encounter Date: 3/17/2025    Therapy Diagnosis:   No diagnosis found.      Physician: Andre Handy MD    Physician Orders: Eval and Treat  Medical Diagnosis: S32.010A (ICD-10-CM) - Compression fracture of L1 vertebra, initial encounter      Visit # / Visits Authorized:  8 / 12   Date of Evaluation:  2/12/2025   Insurance Authorization Period: 2/12/2025 to 4/10/2025  Plan of Care Certification:  2/12/2025 to 3/28/2025                 Time In: 1400  Time Out: 1455  Total Time: 55   Total Billable Time: 55  minutes- 1:1 physical therapist        FOTO:  Intake Score:  %  Survey Score 1:  %  Survey Score 2:  %         Subjective   Friend moved in with her and she had to move around a bunch of boxes. Thinks she over did it and got really sore, but it was soreness>pain. Her low back is feeling better today..  Pain reported as 0/10. right sided low back    Objective            Treatment:  Therapeutic Exercise  TE 3: Nu-step: multi hills - level 5 for 8 minutes  TE 4: Seated swiss ball roll out: 20x - 2 rouns         Balance/Neuromuscular Re-Education  NMR 1: Paloff press: double red with rotation 2x10  NMR 2: Lateral band walks: green band 5 laps at ballet bar  NMR 4: Sustained SAPD with alternating march: 2x20 green band    Therapeutic Activity  TA 1: Step up with contralateral knee drive: 6" step and 4# dumbbell - 10x leading each leg  TA 2: SAPD with alternating march: green theraband 20x  TA 3: Cybex leg press: 5 plates 3x10  TA 4: Cybex knee extensions: 30# 3x10    Assessment & Plan   Assessment: Patient presents with medical diagnosis of S32.010A (ICD-10-CM) - Compression fracture of L1 vertebra, initial encounter. Progressed to gravity minimized lumbar spine compression without increased pain levels. Some discomfort with rotational core stabilization with tendency to fall into lumbar flexion. " Improved with verbal cueing. Continued functional activity strengthening with excellent tolerance and aerobic fatigue.  Evaluation/Treatment Tolerance: Patient tolerated treatment well    Patient will continue to benefit from skilled outpatient physical therapy to address the deficits listed in the problem list box on initial evaluation, provide pt/family education and to maximize pt's level of independence in the home and community environment.     Patient's spiritual, cultural, and educational needs considered and patient agreeable to plan of care and goals.           Plan: lumbopelvic stabilization, functional activity strengthening. Two more weeks remaining.    Goals:   Active       Long Term Goals        1. Patient will perform Sahrmann level 2 with good control to demonstrate improved core strength.  (Progressing)       Start:  02/13/25            2. Patient will improve impaired lower extremity myotomes/manual muscle tests to >/=4+/5 to improve strength for functional tasks. (Progressing)       Start:  02/13/25    Expected End:  03/27/25            3. Patient will report no pain during ADL's to promote improved quality of life. (Progressing)       Start:  02/13/25    Expected End:  03/27/25            4. Patient will improve distance ambulated during 6 MWT by >/= 350 feet to demonstrate improved tolerance to long duration walking. (Progressing)       Start:  02/13/25    Expected End:  03/27/25            5. Patient will be able to standing for >/= 10 minutes to improve tolerance to functional activities. (Progressing)       Start:  02/13/25    Expected End:  03/27/25               Short Term Goals        1. Patient will be compliant with home exercise program to supplement therapy in promoting functional mobility. (Progressing)       Start:  02/13/25    Expected End:  03/06/25            2.  Patient will improve impaired lower extremity myotomes/manual muscle tests  to >/=4/5 to improve strength for  functional tasks. (Progressing)       Start:  02/13/25    Expected End:  03/06/25            3. Patient will report pain levels </= 2/10 to improve quality of life. (Progressing)       Start:  02/13/25    Expected End:  03/06/25                Tennille Dacosta PT

## 2025-03-19 ENCOUNTER — CLINICAL SUPPORT (OUTPATIENT)
Dept: REHABILITATION | Facility: HOSPITAL | Age: 65
End: 2025-03-19
Payer: MEDICARE

## 2025-03-19 DIAGNOSIS — R53.1 DECREASED STRENGTH: ICD-10-CM

## 2025-03-19 DIAGNOSIS — Z74.09 IMPAIRED FUNCTIONAL MOBILITY AND ACTIVITY TOLERANCE: Primary | ICD-10-CM

## 2025-03-19 PROCEDURE — 97530 THERAPEUTIC ACTIVITIES: CPT | Mod: PN

## 2025-03-19 NOTE — PROGRESS NOTES
Outpatient Rehab    Physical Therapy Visit/discharge    Patient Name: Marley Ervin  MRN: [id number]  YOB: 1960  Encounter Date: 3/19/2025    Therapy Diagnosis:   No diagnosis found.      Physician: Andre Handy MD    Physician Orders: Eval and Treat  Medical Diagnosis: S32.010A (ICD-10-CM) - Compression fracture of L1 vertebra, initial encounter      Visit # / Visits Authorized:  9/12   Date of Evaluation:  2/12/2025   Insurance Authorization Period: 2/12/2025 to 4/10/2025  Plan of Care Certification:  2/12/2025 to 3/28/2025                 Time In: 1400  Time Out: 1430  Total Time: 30   Total Billable Time: 30  minutes- 1:1 physical therapist        FOTO:  Intake Score:  %  Survey Score 1:  %  Survey Score 2:  %         Subjective             Objective             3/19/2025  Lumbar Range of Motion    Active (deg) Passive (deg) Pain   Flexion 70--75       Extension 15--25          Lateral flexion: 100% on R/L; Rotation: 100% bilateral       Hip Strength - Planes of Motion    Right Strength Right Pain Left Strength Left  Pain   Flexion (L2) 4+   4+     Internal Rotation 5   5     External Rotation 4+   4+           Knee Strength    Right Strength Right Pain Left Strength Left  Pain   Flexion (S2) 5   5     Extension (L3) 5   5           Ankle/Foot Strength - Planes of Motion    Right Strength Right Pain Left Strength Left  Pain   Dorsiflexion (L4) 5   5     Plantar Flexion (S1) 5   5        6 MWT: 1280--1440 feet           Treatment:   Therapeutic activities to improve functional performance for 30 minutes, including:  FOTO  Objective tests and measures  Outcome measures  Home exercise program overview   Questions and answers   Discharge                      Assessment & Plan   Assessment: Patient presents with medical diagnosis of S32.010A (ICD-10-CM) - Compression fracture of L1 vertebra, initial encounter. Progressed to gravity minimized lumbar spine compression without increased pain levels.  Significant improvements in strength, range of motion, and tolerance to functional activities. All goals met during plan of care. Provided updated HEP and contact information. Patient discharged at this time. Suggest patient ot join exercise gym to maintain gains met in therapy with good understanding and willingness.  Evaluation/Treatment Tolerance: Patient tolerated treatment well    Patient will continue to benefit from skilled outpatient physical therapy to address the deficits listed in the problem list box on initial evaluation, provide pt/family education and to maximize pt's level of independence in the home and community environment.     Patient's spiritual, cultural, and educational needs considered and patient agreeable to plan of care and goals.           Plan:  Discharge     Goals:   Active       Long Term Goals        1. Patient will perform Sahrmann level 2 with good control to demonstrate improved core strength.  (Met)       Start:  02/13/25    Resolved:  03/19/25         2. Patient will improve impaired lower extremity myotomes/manual muscle tests to >/=4+/5 to improve strength for functional tasks. (Met)       Start:  02/13/25    Expected End:  03/27/25    Resolved:  03/19/25         3. Patient will report no pain during ADL's to promote improved quality of life. (Met)       Start:  02/13/25    Expected End:  03/27/25    Resolved:  03/19/25         4. Patient will improve distance ambulated during 6 MWT by >/= 350 feet to demonstrate improved tolerance to long duration walking. (Adequate for Care Transition)       Start:  02/13/25    Expected End:  03/27/25            5. Patient will be able to standing for >/= 10 minutes to improve tolerance to functional activities. (Met)       Start:  02/13/25    Expected End:  03/27/25    Resolved:  03/19/25           Resolved       Short Term Goals        1. Patient will be compliant with home exercise program to supplement therapy in promoting functional  mobility. (Met)       Start:  02/13/25    Expected End:  03/06/25    Resolved:  03/19/25         2.  Patient will improve impaired lower extremity myotomes/manual muscle tests  to >/=4/5 to improve strength for functional tasks. (Met)       Start:  02/13/25    Expected End:  03/06/25    Resolved:  03/19/25         3. Patient will report pain levels </= 2/10 to improve quality of life. (Met)       Start:  02/13/25    Expected End:  03/06/25    Resolved:  03/19/25             Tennille Dacosta, PT

## 2025-03-24 ENCOUNTER — PATIENT MESSAGE (OUTPATIENT)
Dept: DERMATOLOGY | Facility: CLINIC | Age: 65
End: 2025-03-24
Payer: MEDICARE

## 2025-03-26 ENCOUNTER — OFFICE VISIT (OUTPATIENT)
Dept: DERMATOLOGY | Facility: CLINIC | Age: 65
End: 2025-03-26
Payer: MEDICARE

## 2025-03-26 DIAGNOSIS — L73.2 HIDRADENITIS SUPPURATIVA: Primary | ICD-10-CM

## 2025-03-26 DIAGNOSIS — L20.9 ATOPIC DERMATITIS, UNSPECIFIED TYPE: ICD-10-CM

## 2025-03-26 PROCEDURE — 99999 PR PBB SHADOW E&M-EST. PATIENT-LVL III: CPT | Mod: PBBFAC,,, | Performed by: DERMATOLOGY

## 2025-03-26 RX ORDER — TRIAMCINOLONE ACETONIDE 40 MG/ML
40 INJECTION, SUSPENSION INTRA-ARTICULAR; INTRAMUSCULAR ONCE
Status: SHIPPED | OUTPATIENT
Start: 2025-03-26

## 2025-03-26 RX ORDER — TRIAMCINOLONE ACETONIDE 1 MG/G
CREAM TOPICAL
Qty: 80 G | Refills: 1 | Status: SHIPPED | OUTPATIENT
Start: 2025-03-26

## 2025-03-26 NOTE — PROGRESS NOTES
"  Subjective:      Patient ID:  Radha Ervin is a 64 y.o. female who presents for   Chief Complaint   Patient presents with    Hidradenitis Suppurativa     F/u      Patient with Hidradenitis suppurativa  Last seen on 12/3/24 for IL K 40mg  into 1 lesion left upper inner thigh and ILK 20mg into 2 lesions on left inframammary.    Condition overall - same.    C/o flare groin x couple days (not drained)     Current treatment regimen:  Otezla 30mg 1x/day (dx with pso 2/2 humira) - Pso well controlled with no flares  Hibiclens alt BPO wash qday  Mometasone cream prn flares    Has not yet started:  Turmeric    Lifestyle modifications - diet, smoking, shaving etc  Limiting "white" foods, sugars and processed foods    Not shaving  Not smoking    Dx with lung cancer 5/23 - finished chemo and radiation - started immunotherapy 09/2024  Dx with metastatic to spine lung cancer 10/24 -       Also c/o itchy rash x couple months on arms. Mild soap; not c/w moisturizer      Review of Systems   Constitutional:  Positive for weight loss (162# (20#)). Negative for weight gain.   HENT:  Negative for nosebleeds and headaches.    Gastrointestinal:  Positive for nausea. Negative for diarrhea (resolved. never had colonoscopy) and Sensitivity to oral antibiotics.   Genitourinary:  Negative for irregular periods (post menopausal).   Musculoskeletal:  Positive for arthralgias (back).   Skin:  Positive for itching, rash and abscesses (groin). Negative for recent sunburn.   Neurological:  Negative for headaches.   Psychiatric/Behavioral:  Negative for depressed mood.    Hematologic/Lymphatic: Bruises/bleeds easily.       Objective:   Physical Exam   Constitutional: She appears well-developed and well-nourished. She is obese.  No distress.   Neurological: She is alert and oriented to person, place, and time. She is not disoriented.   Psychiatric: She has a normal mood and affect.   Skin:   Areas Examined (abnormalities noted in diagram): "   Genitals / Buttocks / Groin Inspection Performed  RUE Inspected  LUE Inspection Performed  RLE Inspected  LLE Inspection Performed                    Diagram Legend     Erythematous scaling macule/papule c/w actinic keratosis       Vascular papule c/w angioma      Pigmented verrucoid papule/plaque c/w seborrheic keratosis      Yellow umbilicated papule c/w sebaceous hyperplasia      Irregularly shaped tan macule c/w lentigo     1-2 mm smooth white papules consistent with Milia      Movable subcutaneous cyst with punctum c/w epidermal inclusion cyst      Subcutaneous movable cyst c/w pilar cyst      Firm pink to brown papule c/w dermatofibroma      Pedunculated fleshy papule(s) c/w skin tag(s)      Evenly pigmented macule c/w junctional nevus     Mildly variegated pigmented, slightly irregular-bordered macule c/w mildly atypical nevus      Flesh colored to evenly pigmented papule c/w intradermal nevus       Pink pearly papule/plaque c/w basal cell carcinoma      Erythematous hyperkeratotic cursted plaque c/w SCC      Surgical scar with no sign of skin cancer recurrence      Open and closed comedones      Inflammatory papules and pustules      Verrucoid papule consistent consistent with wart     Erythematous eczematous patches and plaques     Dystrophic onycholytic nail with subungual debris c/w onychomycosis     Umbilicated papule    Erythematous-base heme-crusted tan verrucoid plaque consistent with inflamed seborrheic keratosis     Erythematous Silvery Scaling Plaque c/w Psoriasis     See annotation      Assessment / Plan:        Hidradenitis suppurativa  -     triamcinolone acetonide injection 10 mg  -     ME [id number] ACETONIDE INJ PER 10MG  -     ME INJECTION INTO SKIN LESIONS, UP TO 7  -     triamcinolone acetonide injection 40 mg  -     ME [id number] ACETONIDE INJ PER 10MG  -     ME INJECTION INTO SKIN LESIONS, UP TO 7    Intralesional Kenalog 10mg/cc (1.0 cc total) injected into 2 lesions on the  bilateral medial upper inner thighs today after obtaining verbal consent including risk of surrounding hypopigmentation. Patient tolerated procedure well.    Units: 1  NDC for Kenalog 10mg/cc:  0560-9178-12    Intralesional Kenalog 20mg/cc (1.0 cc total) injected into 2 lesions on the bilateral lateral upper inner thighs today after obtaining verbal consent including risk of surrounding hypopigmentation. Patient tolerated procedure well.    Units:2  NDC for Kenalog 40mg/cc:  1582-5044-87            Atopic dermatitis, unspecified type  Good skin care regimen discussed including limiting to one bath or shower/day, using lukewarm water with mild soap and moisturizing cream to skin 1 - 2x/day. Brochure was provided and reviewed with patient.    -     triamcinolone acetonide 0.1% (KENALOG) 0.1 % cream; AAA arms bid  Dispense: 80 g; Refill: 1             No follow-ups on file.

## 2025-03-26 NOTE — PATIENT INSTRUCTIONS
XEROSIS (DRY SKIN)        Definition    Xerosis is the term for dry skin.  We all have a natural oil coating over our skin produced by the skin oil glands.  If this oil is removed, the skin becomes dry which can lead to cracking, which can lead to inflammation.  Xerosis is usually a long-term problem that recurs often, especially in the winter.    Cause    Long hot baths or showers can remove our natural oil and lead to xerosis.  One should never take more than one bath or shower a day and for no longer than ten minutes.  Use of harsh soaps such as Zest, Dial, and Ivory can worsen and cause xerosis.  Cold winter weather worsens xerosis because the amount of moisture contained in cold air is much less than the amount of moisture in warm air.    Treatment    Treatment is intended to restore the natural oil to your skin.  Keep the skin lubricated.    Do not take more than one bath or shower a day.  Use lukewarm water, not hot.  Hot water dries out the skin.    Use a gentle moisturizing soap such as Cetaphil soap, Oil of Olay, Dove, Basis, Ivory moisture care, Restoraderm cleanser.    When toweling dry, dont rub.  Blot the skin so there is still some water left on the skin.  You should apply a moisturizing cream to all of the skin such as Cerave cream, Cetaphil cream, Lipikar [place] AP+ Intense Repair Moisturizing Cream or Restoraderm or Eucerin Original Formula cream.   Alpha hydroxyacid lotions, i.e., AmLactin, also work very well for preventing dry skin, but may burn when used on inflamed or reddened skin.    If you like to swim during the winter months, you should not use soap when getting out of the pool.  When you have finished swimming, rinse off the chlorine with cool to warm water.  If this will be the only shower of the day, then you may use Cetaphil or another mild soap to cleanse your skin.  After the shower, apply a moisturizing cream to all of the skin as above.        [address] [facility] [place] [address]/ [phone] FAX/ [email or web address]

## 2025-04-04 ENCOUNTER — LAB VISIT (OUTPATIENT)
Dept: LAB | Facility: HOSPITAL | Age: 65
End: 2025-04-04
Attending: INTERNAL MEDICINE
Payer: MEDICARE

## 2025-04-04 DIAGNOSIS — Z79.899 DRUG THERAPY: ICD-10-CM

## 2025-04-04 DIAGNOSIS — C34.90 NON-SMALL CELL LUNG CANCER, UNSPECIFIED LATERALITY: ICD-10-CM

## 2025-04-04 DIAGNOSIS — C77.1 SECONDARY AND UNSPECIFIED MALIGNANT NEOPLASM OF INTRATHORACIC LYMPH NODES: ICD-10-CM

## 2025-04-04 LAB
ABSOLUTE EOSINOPHIL (OHS): 2.85 K/UL
ABSOLUTE MONOCYTE (OHS): 1.27 K/UL (ref 0.3–1)
ABSOLUTE NEUTROPHIL COUNT (OHS): 6.77 K/UL (ref 1.8–7.7)
ALBUMIN SERPL BCP-MCNC: 3.5 G/DL (ref 3.5–5.2)
ALP SERPL-CCNC: 177 UNIT/L (ref 40–150)
ALT SERPL W/O P-5'-P-CCNC: 40 UNIT/L (ref 10–44)
ANION GAP (OHS): 11 MMOL/L (ref 8–16)
AST SERPL-CCNC: 36 UNIT/L (ref 11–45)
BASOPHILS # BLD AUTO: 0.08 K/UL
BASOPHILS NFR BLD AUTO: 0.6 %
BILIRUB SERPL-MCNC: 1 MG/DL (ref 0.1–1)
BUN SERPL-MCNC: 18 MG/DL (ref 8–23)
CALCIUM SERPL-MCNC: 8.9 MG/DL (ref 8.7–10.5)
CHLORIDE SERPL-SCNC: 109 MMOL/L (ref 95–110)
CO2 SERPL-SCNC: 17 MMOL/L (ref 23–29)
CREAT SERPL-MCNC: 1.5 MG/DL (ref 0.5–1.4)
ERYTHROCYTE [DISTWIDTH] IN BLOOD BY AUTOMATED COUNT: 12.6 % (ref 11.5–14.5)
GFR SERPLBLD CREATININE-BSD FMLA CKD-EPI: 39 ML/MIN/1.73/M2
GLUCOSE SERPL-MCNC: 102 MG/DL (ref 70–110)
HCT VFR BLD AUTO: 33 % (ref 37–48.5)
HGB BLD-MCNC: 11 GM/DL (ref 12–16)
IMM GRANULOCYTES # BLD AUTO: 0.04 K/UL (ref 0–0.04)
IMM GRANULOCYTES NFR BLD AUTO: 0.3 % (ref 0–0.5)
LYMPHOCYTES # BLD AUTO: 1.98 K/UL (ref 1–4.8)
MCH RBC QN AUTO: 32.1 PG (ref 27–31)
MCHC RBC AUTO-ENTMCNC: 33.3 G/DL (ref 32–36)
MCV RBC AUTO: 96 FL (ref 82–98)
NUCLEATED RBC (/100WBC) (OHS): 0 /100 WBC
PLATELET # BLD AUTO: 321 K/UL (ref 150–450)
PMV BLD AUTO: 9.4 FL (ref 9.2–12.9)
POTASSIUM SERPL-SCNC: 3.2 MMOL/L (ref 3.5–5.1)
PROT SERPL-MCNC: 7.1 GM/DL (ref 6–8.4)
RBC # BLD AUTO: 3.43 M/UL (ref 4–5.4)
RELATIVE EOSINOPHIL (OHS): 21.9 %
RELATIVE LYMPHOCYTE (OHS): 15.2 % (ref 18–48)
RELATIVE MONOCYTE (OHS): 9.8 % (ref 4–15)
RELATIVE NEUTROPHIL (OHS): 52.2 % (ref 38–73)
SODIUM SERPL-SCNC: 137 MMOL/L (ref 136–145)
TSH SERPL-ACNC: 4.07 UIU/ML (ref 0.4–4)
WBC # BLD AUTO: 12.99 K/UL (ref 3.9–12.7)

## 2025-04-04 PROCEDURE — 80053 COMPREHEN METABOLIC PANEL: CPT

## 2025-04-04 PROCEDURE — 85025 COMPLETE CBC W/AUTO DIFF WBC: CPT

## 2025-04-04 PROCEDURE — 36415 COLL VENOUS BLD VENIPUNCTURE: CPT

## 2025-04-04 PROCEDURE — 84443 ASSAY THYROID STIM HORMONE: CPT

## 2025-04-07 ENCOUNTER — INFUSION (OUTPATIENT)
Dept: INFUSION THERAPY | Facility: HOSPITAL | Age: 65
End: 2025-04-07
Attending: INTERNAL MEDICINE
Payer: MEDICARE

## 2025-04-07 ENCOUNTER — OFFICE VISIT (OUTPATIENT)
Dept: HEMATOLOGY/ONCOLOGY | Facility: CLINIC | Age: 65
End: 2025-04-07
Payer: MEDICARE

## 2025-04-07 VITALS
HEIGHT: 59 IN | BODY MASS INDEX: 31.51 KG/M2 | OXYGEN SATURATION: 97 % | DIASTOLIC BLOOD PRESSURE: 57 MMHG | WEIGHT: 156.31 LBS | RESPIRATION RATE: 16 BRPM | HEART RATE: 106 BPM | SYSTOLIC BLOOD PRESSURE: 94 MMHG

## 2025-04-07 VITALS
HEIGHT: 59 IN | BODY MASS INDEX: 31.51 KG/M2 | HEART RATE: 100 BPM | WEIGHT: 156.31 LBS | TEMPERATURE: 98 F | DIASTOLIC BLOOD PRESSURE: 64 MMHG | SYSTOLIC BLOOD PRESSURE: 100 MMHG

## 2025-04-07 DIAGNOSIS — C34.92 ADENOCARCINOMA, LUNG, LEFT: Primary | ICD-10-CM

## 2025-04-07 DIAGNOSIS — C79.51 SECONDARY MALIGNANT NEOPLASM OF BONE: ICD-10-CM

## 2025-04-07 DIAGNOSIS — C34.90 MALIGNANT NEOPLASM OF UNSPECIFIED PART OF UNSPECIFIED BRONCHUS OR LUNG: ICD-10-CM

## 2025-04-07 DIAGNOSIS — C34.90 NON-SMALL CELL LUNG CANCER, UNSPECIFIED LATERALITY: Primary | ICD-10-CM

## 2025-04-07 DIAGNOSIS — M80.80XD LOCALIZED OSTEOPOROSIS WITH CURRENT PATHOLOGICAL FRACTURE WITH ROUTINE HEALING: ICD-10-CM

## 2025-04-07 DIAGNOSIS — E27.40 ADRENAL INSUFFICIENCY: ICD-10-CM

## 2025-04-07 PROCEDURE — 96360 HYDRATION IV INFUSION INIT: CPT

## 2025-04-07 PROCEDURE — 3288F FALL RISK ASSESSMENT DOCD: CPT | Mod: CPTII,S$GLB,, | Performed by: INTERNAL MEDICINE

## 2025-04-07 PROCEDURE — 1125F AMNT PAIN NOTED PAIN PRSNT: CPT | Mod: CPTII,S$GLB,, | Performed by: INTERNAL MEDICINE

## 2025-04-07 PROCEDURE — 25000003 PHARM REV CODE 250: Performed by: INTERNAL MEDICINE

## 2025-04-07 PROCEDURE — 1101F PT FALLS ASSESS-DOCD LE1/YR: CPT | Mod: CPTII,S$GLB,, | Performed by: INTERNAL MEDICINE

## 2025-04-07 PROCEDURE — A4216 STERILE WATER/SALINE, 10 ML: HCPCS | Performed by: INTERNAL MEDICINE

## 2025-04-07 PROCEDURE — 3008F BODY MASS INDEX DOCD: CPT | Mod: CPTII,S$GLB,, | Performed by: INTERNAL MEDICINE

## 2025-04-07 PROCEDURE — 3074F SYST BP LT 130 MM HG: CPT | Mod: CPTII,S$GLB,, | Performed by: INTERNAL MEDICINE

## 2025-04-07 PROCEDURE — 3078F DIAST BP <80 MM HG: CPT | Mod: CPTII,S$GLB,, | Performed by: INTERNAL MEDICINE

## 2025-04-07 PROCEDURE — 99215 OFFICE O/P EST HI 40 MIN: CPT | Mod: S$GLB,,, | Performed by: INTERNAL MEDICINE

## 2025-04-07 PROCEDURE — 99999 PR PBB SHADOW E&M-EST. PATIENT-LVL IV: CPT | Mod: PBBFAC,,, | Performed by: INTERNAL MEDICINE

## 2025-04-07 RX ORDER — SOTORASIB 240 MG/1
480 TABLET, COATED ORAL DAILY
Qty: 60 TABLET | Refills: 3 | Status: ACTIVE | OUTPATIENT
Start: 2025-04-07 | End: 2025-05-10

## 2025-04-07 RX ORDER — HEPARIN 100 UNIT/ML
500 SYRINGE INTRAVENOUS
OUTPATIENT
Start: 2025-04-07

## 2025-04-07 RX ORDER — ONDANSETRON 8 MG/1
8 TABLET, ORALLY DISINTEGRATING ORAL EVERY 6 HOURS PRN
Qty: 30 TABLET | Refills: 3 | Status: SHIPPED | OUTPATIENT
Start: 2025-04-07

## 2025-04-07 RX ORDER — SODIUM CHLORIDE 0.9 % (FLUSH) 0.9 %
10 SYRINGE (ML) INJECTION
Status: DISCONTINUED | OUTPATIENT
Start: 2025-04-07 | End: 2025-04-07 | Stop reason: HOSPADM

## 2025-04-07 RX ORDER — HEPARIN 100 UNIT/ML
500 SYRINGE INTRAVENOUS
Status: DISCONTINUED | OUTPATIENT
Start: 2025-04-07 | End: 2025-04-07 | Stop reason: HOSPADM

## 2025-04-07 RX ORDER — SODIUM CHLORIDE 0.9 % (FLUSH) 0.9 %
10 SYRINGE (ML) INJECTION
OUTPATIENT
Start: 2025-04-07

## 2025-04-07 RX ORDER — SODIUM CHLORIDE 0.9 % (FLUSH) 0.9 %
10 SYRINGE (ML) INJECTION
Status: CANCELLED | OUTPATIENT
Start: 2025-04-07

## 2025-04-07 RX ORDER — HEPARIN 100 UNIT/ML
500 SYRINGE INTRAVENOUS
Status: CANCELLED | OUTPATIENT
Start: 2025-04-07

## 2025-04-07 RX ADMIN — Medication 10 ML: at 03:04

## 2025-04-07 RX ADMIN — SODIUM CHLORIDE 1000 ML: 9 INJECTION, SOLUTION INTRAVENOUS at 02:04

## 2025-04-07 NOTE — PROGRESS NOTES
PATIENT: Radha Ervin  MRN: [id number]  DATE: 4/7/2025      Subjective:     Chief complaint:  Chief Complaint   Patient presents with    Lung Cancer    Follow-up       Oncologic History:      Ms. Ervin is a 63-year-old female with 30 pack-year history of smoking, quit approximately 4 months ago, who was recently diagnosed with left lung cancer after evaluation for an abnormal chest x-ray.  A subsequent CT of the chest without contrast on May 26, 2023 revealed a spiculated nodule along the paramediastinal left upper lobe measuring 2.7 x 1.9 cm.  A mildly enlarged right paratracheal lymph node was seen measuring 1.1 cm.  She underwent EBUS and biopsy on June 23, 2023.  Pathology revealed the left upper lobe lesion positive for adenocarcinoma.  Station 4R was negative for malignancy.       A PET scan on July 13, 2023 revealed the left upper lobe mass measuring 2.6 x 1.6 cm with SUV max 7.4.  There was mildly metabolic prevascular mediastinal lymph node measuring 0.9 cm seen in short axis with SUV max 2.3.  She underwent robotic left upper lobectomy and lymph node sampling on August 3, 2023.  Pathology revealed invasive poorly differentiated adenocarcinoma measuring 2.1 cm.  There was visceral pleural invasion noted.  Vascular resection revealed invasive tumor present in the adventitial soft tissue.  There was lymphovascular invasion noted.  Invasive carcinoma is present at the vascular margin.  3/4 level 6 lymph nodes, 1/2 level 11 lymph nodes and 1/4 level 12 lymph nodes were involved out of a total of 23 lymph nodes.  Surgery was complicated by intraoperative bleeding of pulmonary arterial branches which resolved with pressure.      Her case was discussed at [facility] on 8/24/23: Recommend chemoRT followed by immunotherapy.     Established care with med onc 8/28. She had seen Dr. Quevedo who had recommended sequential chemoradiation then to be followed by immunotherapy. Consented for sequential CRT with  "carbo/taxol.    On 9/22/23 she presented to the infusion center for carbo/taxol C#1 but developed infusion reaction to taxol so that was stopped.  Had reaction with second infusion as well--carbo/taxol discontinued.  Consented for carbo/pemetrexed on 11/2/23    C#1 carbo/pemetrexed 11/3/23  C#2 carbo/pemetrexed 11/24/23  C#3 acute worsening of renal function--pemetrexed held: 12/15/23  C#4 carboplatin only 1/5/24    Radiation 60Gy in 30Fx 2/5/24--3/26/24    C#1 durvalumab 4/19/24  C#2 5/17/24  C#3 6/14/24  C#4 7/12/24  C#5 8/9/24   C#6 9/6/24  C#7 10/4/24    CT CAP 10/2/24 suspicious for lumbar spinal metastasis.  Pet CT 10/16/24 confirmed uptake in L-spine    She received XRT to L-spine metastasis 2fx 11/25--11/26/24           Interval History: Ms. Ervin returns for follow up. Scheduled to see endocrinology later this month. Still having a lot of nausea and vomiting--zofran helps some but not fully. Discussed reducing dose of Lumakras for this reason. No new areas of pain but chronic back pain related to bulging disk remains symptomatic. BP today on low end likely because of dehydration--discussed doing fluids.         Review of Systems   A comprehensive review of systems was performed; pertinent positives and negatives are noted in the HPI.        ECOG Performance Status:   ECOG SCORE    2 - Capable of all selfcare but unable to carry out any work activities, active > 50% of hours         Objective:      Vitals:   Vitals:    04/07/25 1304   BP: (!) 94/57   BP Location: Left arm   Patient Position: Sitting   Pulse: 106   Resp: 16   SpO2: 97%   Weight: 70.9 kg (156 lb 4.9 oz)   Height: 4' 11" (1.499 m)     BMI: Body mass index is 31.57 kg/m².      Physical Exam:   Physical Exam  Vitals and nursing note reviewed.   Constitutional:       General: She is not in acute distress.     Appearance: Normal appearance. She is not toxic-appearing.   HENT:      Head: Normocephalic and atraumatic.   Eyes:      General: No " scleral icterus.     Conjunctiva/sclera: Conjunctivae normal.   Cardiovascular:      Rate and Rhythm: Normal rate and regular rhythm.      Heart sounds: Normal heart sounds. No murmur heard.  Pulmonary:      Effort: Pulmonary effort is normal. No respiratory distress.      Breath sounds: Normal breath sounds. No wheezing, rhonchi or rales.   Abdominal:      General: There is no distension.      Palpations: Abdomen is soft.      Tenderness: There is no abdominal tenderness.   Musculoskeletal:         General: No swelling, tenderness or deformity.      Cervical back: Neck supple. No tenderness.   Skin:     Coloration: Skin is not jaundiced.      Findings: No erythema.   Neurological:      General: No focal deficit present.      Mental Status: She is alert and oriented to person, place, and time.      Motor: No weakness.   Psychiatric:         Mood and Affect: Mood normal.         Behavior: Behavior normal.           Laboratory Data:  WBC   Date Value Ref Range Status   04/04/2025 12.99 (H) 3.90 - 12.70 K/uL Final     Hemoglobin   Date Value Ref Range Status   01/25/2025 12.4 12.0 - 16.0 g/dL Final     HGB   Date Value Ref Range Status   04/04/2025 11.0 (L) 12.0 - 16.0 gm/dL Final     POC Hematocrit   Date Value Ref Range Status   12/22/2024 35 (L) 36 - 54 %PCV Final     Hematocrit   Date Value Ref Range Status   01/25/2025 40.5 37.0 - 48.5 % Final     HCT   Date Value Ref Range Status   04/04/2025 33.0 (L) 37.0 - 48.5 % Final     Platelet Count   Date Value Ref Range Status   04/04/2025 321 150 - 450 K/uL Final     Platelets   Date Value Ref Range Status   01/25/2025 337 150 - 450 K/uL Final     Gran # (ANC)   Date Value Ref Range Status   01/25/2025 5.6 1.8 - 7.7 K/uL Final     Gran %   Date Value Ref Range Status   01/25/2025 58.8 38.0 - 73.0 % Final       Chemistry        Component Value Date/Time     04/04/2025 0924     01/25/2025 1014    K 3.2 (L) 04/04/2025 0924    K 3.9 01/25/2025 1014      04/04/2025 0924     (H) 01/25/2025 1014    CO2 17 (L) 04/04/2025 0924    CO2 22 (L) 01/25/2025 1014    BUN 18 04/04/2025 0924    CREATININE 1.5 (H) 04/04/2025 0924     01/25/2025 1014        Component Value Date/Time    CALCIUM 8.9 04/04/2025 0924    CALCIUM 9.4 01/25/2025 1014    ALKPHOS 177 (H) 04/04/2025 0924    ALKPHOS 93 01/25/2025 1014    AST 36 04/04/2025 0924    AST 12 01/25/2025 1014    ALT 40 04/04/2025 0924    ALT 11 01/25/2025 1014    BILITOT 1.0 04/04/2025 0924    BILITOT 0.3 01/25/2025 1014    ESTGFRAFRICA >60.0 04/06/2020 1002    EGFRNONAA >60.0 04/06/2020 1002        NM PET CT FDG Skull Base to Mid Thigh  Order: [id number]   Status: Final result       Next appt: 03/05/2025 at 04:00 PM in Outpatient Rehab (Tennille Dacosta PT)       Dx: Adenocarcinoma, lung, left    Test Result Released: Yes (seen)    0 Result Notes  Details    Reading Physician Reading Date Result Priority   Joanna Leong MD  [phone]  2/26/2025 Routine   Bharat Clark MD  [phone]  2/26/2025      Narrative & Impression  EXAMINATION:  NM PET CT FDG SKULL BASE TO MID THIGH     CLINICAL HISTORY:  Non-small cell lung cancer (NSCLC), metastatic, assess treatment response; Malignant neoplasm of unspecified part of left bronchus or lung 64-year-old female with history of lung cancer 06/23/2023 positive lymph nodes 08/03/2023 and metastatic lung carcinoma 01/08/2025     TECHNIQUE:  15.9 mCi of F18-FDG was administered intravenously in the right antecubital fossa.  After an approximately 60 min distribution time, PET/CT images were acquired from the skull base to mid thigh.  Transmission images were acquired to correct for attenuation using a whole body low-dose CT scan without IV contrast with the arms positioned above the head. Glycemia at the time of injection was 98 mg/dL.     COMPARISON:  CT abdomen pelvis 11/22/2024.  FDG PET-CT 10/16/2024.     FINDINGS:  Quality of the study: Adequate.     In the head and neck,  there are no hypermetabolic lesions worrisome for malignancy.  No pathologically enlarged or hypermetabolic lymph nodes.     In the chest, there are postsurgical changes of prior left upper lobectomy.  There is continued bandlike opacities and architectural distortion near the surgical site, likely scarring/post radiation changes.  No focal uptake to suggest local recurrence.  No concerning pulmonary nodules or masses, and there are no pathologically enlarged or hypermetabolic lymph nodes.     In the abdomen and pelvis, there is physiologic tracer distribution within the abdominal organs and excretion into the genitourinary system.     In the bones, there are no new hypermetabolic lesions worrisome for malignancy.  Status post vertebral augmentation at L1. There is physiologic muscular uptake about the pelvis.     Increased soft tissue and muscle uptake     Additional CT findings: Mild diffuse atherosclerosis.  Cholecystectomy.  Bilateral nonobstructive punctate renal stones.  Hysterectomy.  Few remote right rib fractures.     Impression:     History of lung malignancy status post left upper lobectomy.  No tracer uptake in the left lung to suggest local recurrence.     No hypermetabolic lymphadenopathy or evidence of new distant metastasis.     Electronically signed by resident: Bharat Clark  Date:                                            02/26/2025  Time:                                           14:07     Electronically signed by:Joanna Leong  Date:                                            02/26/2025  Time:                                           14:42        Exam Ended: 02/26/25 13:45 CST Last Resulted: 02/26/25 14:42 CST         Assessment/Plan:     1. Non-small cell lung cancer, unspecified laterality    2. Adrenal insufficiency    3. Secondary malignant neoplasm of bone    4. Malignant neoplasm of unspecified part of unspecified bronchus or lung      Metastatic NSCLC with KRAS g12c mutation,  oligometastatic disease to L-spine now s/p XRT. Scans 2/26/25 OPAL.   2ry adrenal insufficiency--continue hydrocortisone.  Will dose-reduce Lumakras due to persistent nausea/vomiting.    Med and Orders:  Orders Placed This Encounter    NM PET CT FDG Skull Base to Mid Thigh    ondansetron (ZOFRAN-ODT) 8 MG TbDL    sotorasib (LUMAKRAS) 240 mg Tab       Follow Up:  Follow up in about 4 weeks (around 5/5/2025).      Above care plan was discussed with patient and all questions were addressed to their expressed satisfaction.       Roger Eduardo MD, FACP  Hematology & Medical Oncology  [facility]         High Risk Conditions:    Patient has a condition that poses threat to life and bodily function: mNSCLC, KRAS g12c mutated.  Patient is currently on drug therapy requiring intensive monitoring for toxicity: lumakras

## 2025-04-07 NOTE — NURSING
Pt received IV fluids infusion. Pt tolerated it well. AVS given. Next appointment scheduled/ Pt will follow up with MD. Discharged with no acute distress.

## 2025-04-11 ENCOUNTER — PATIENT MESSAGE (OUTPATIENT)
Dept: PAIN MEDICINE | Facility: CLINIC | Age: 65
End: 2025-04-11
Payer: MEDICARE

## 2025-04-15 NOTE — PROGRESS NOTES
Interventional Pain Management - Established     Interval history 04/15/2025: Patient presents on follow up and reports severe pain in her lower lumbar region. The pain does not radiate down her legs. The pain is made worse with standing to shop. Occasionally has to lean on cart, but no always.     Interval History 01/28/2025: On 01/06/2025 the patient had L1 bilateral kyphoplasty with Osteocool RFA and they report 60% pain relief, but there are days when she has no little.     Original HPI 11/15/24: Radha Ervin is a 64 y.o. year old female patient who has a past medical history of Allergy, Amblyopia, Cataract, Eczema, HS (hereditary spherocytosis), total knee replacement, Joint pain, and Melena. She presents in referral from No ref. provider found for lbp for the past 4 months     Original Pain Description:  The pain is located in the lower back and is axial. The pain is described as aching. Exacerbating factors: Sitting, Standing, Laying, Bending, Walking, and Getting out of bed/chair. Mitigating factors medications. Symptoms interfere with daily activity and sleeping. The patient feels like symptoms have been worsening. Patient denies significant motor weakness.    Patient also reports another pain that is lower down in her back and  radiating to the bilateral hip area, right > left.     PAIN SCORES:  Best: Pain is 6  Current: Pain is 8  Worst: Pain is 10    6 weeks of Conservative therapy:  PT: Not yet  Chiro:  HEP: Unable due to pain    Treatments / Medications:   Ativan  Tramadol 50mg qhs     Antiplatelets/Anticoagulants:  NA    Interventional Pain Procedures:   01/06/25 L1 Kyphoplasty + Osteocool RFA    IMAGING:    MRI LUMBAR SPINE W WO CONTRAST; MRI THORACIC SPINE W WO CONTRAST  11/09/2024  T10-11: symmetric degenerative endplate changes with endplate edema  T12-L1: Mild spinal canal stenosis  L1:        Lytic lesion posterior body 1.0 cm.  Approx 30% height loss superior endplate w/edema. Mild  "bulging of the posterior cortex resulting in mild spinal canal stenosis.   L3-L4:   Mild CDB  L4-L5:   CDB, L>R facet arthropathy, & LF thickening=>mod spinal canal stenosis.     L5-S1:   Mild CDB and mod facet arthropathy.     Medications/Allergies: See med card    ROS:  GENERAL: No fever. No chills. No fatigue. Denies weight loss. Denies weight gain.  Back / musculoskeletal / neuro : See HPI    VITALS:   Vitals:    04/16/25 1501   BP: 97/69   Pulse: 108   Weight: 70.9 kg (156 lb 4.9 oz)   Height: 4' 11" (1.499 m)   PainSc:   4   PainLoc: Back         Body mass index is 31.57 kg/m².      4/16/2025     3:02 PM 1/28/2025     1:59 PM 11/15/2024     8:34 AM   Last 3 PDI Scores   Pain Disability Index (PDI) 28 35 56       PHYSICAL EXAM:   GENERAL: Well appearing, in no acute distress, alert and oriented x3.  PSYCH:  Mood and affect appropriate.  SKIN: Skin color, texture, turgor normal, no rashes or lesions.  HEENT:  Normocephalic, atraumatic. Cranial nerves grossly intact.  NECK: No pain to palpation over the cervical paraspinous muscles. No pain to palpation over facets. No pain with neck flexion, extension, or lateral flexion.   PULM: No evidence of respiratory difficulty, symmetric chest rise.  GI:  Non-distended  BACK: Normal range of motion. Pain to palpation over the spinous processes. No pain to palpation over facet joints.  Pain with axial loading bilaterally.  There is no pain with palpation over the sacroiliac joints bilaterally.   EXTREMITIES: No deformities, edema, or skin discoloration.   MUSCULOSKELETAL: Shoulder, hip, and knee provocative maneuvers are negative. No atrophy is noted.  NEURO: Sensation is equal and appropriate bilaterally. Bilateral upper and lower extremity strength is normal and symmetric. Bilateral upper and lower extremity coordination and muscle stretch reflexes are physiologic and symmetric. Plantar response are downgoing. Straight leg raising in the supine position is negative to " radicular pain.   GAIT: normal.      LABS:    Lab Results   Component Value Date    HGBA1C 6.3 (H) 04/28/2023       Lab Results   Component Value Date    CREATININE 1.5 (H) 04/04/2025         ASSESSMENT: 64 y.o. year old female with pain, consistent with:    Encounter Diagnoses   Name Primary?    Osteoarthritis of spine, unspecified spinal osteoarthritis complication status, unspecified spinal region Yes    Lumbar spondylosis        DISCUSSION: Radha Ervin is a  at a local furniture store who comes to us with metastatic lung CA to L1 s/p osteo cool with kyphoplasty who now has severe axial lower back most likely secondary to lumbar spondylosis.    PLAN:  - I have stressed the importance of physical activity and a home exercise plan to help with pain and improve health.  - Patient can continue with medications for now since they are providing benefits, using them appropriately, and without side effects.  - Counseled patient regarding the importance of activity modification and constant sleeping habits.  - Interventions:  BL L3,4,5 MBB . Explained the risks and benefits of the procedure in detail with the patient today in clinic along with alternative treatment options, and the patient elected to pursue the intervention at this time.   - Imaging: Reviewed available imaging with patient and answered any questions they had regarding study.  - The patient's pathophysiology was explained in detail with reference to x-rays, models, other visual aids as appropriate.   - Follow up visit: return to clinic 3-4 weeks after procedure      Andre Handy MD  04/16/2025

## 2025-04-16 ENCOUNTER — OFFICE VISIT (OUTPATIENT)
Dept: PAIN MEDICINE | Facility: CLINIC | Age: 65
End: 2025-04-16
Payer: MEDICARE

## 2025-04-16 ENCOUNTER — TELEPHONE (OUTPATIENT)
Dept: PAIN MEDICINE | Facility: CLINIC | Age: 65
End: 2025-04-16

## 2025-04-16 VITALS
HEIGHT: 59 IN | BODY MASS INDEX: 31.51 KG/M2 | HEART RATE: 108 BPM | SYSTOLIC BLOOD PRESSURE: 97 MMHG | WEIGHT: 156.31 LBS | DIASTOLIC BLOOD PRESSURE: 69 MMHG

## 2025-04-16 DIAGNOSIS — M47.9 OSTEOARTHRITIS OF SPINE, UNSPECIFIED SPINAL OSTEOARTHRITIS COMPLICATION STATUS, UNSPECIFIED SPINAL REGION: Primary | ICD-10-CM

## 2025-04-16 DIAGNOSIS — M47.816 LUMBAR SPONDYLOSIS: Primary | ICD-10-CM

## 2025-04-16 DIAGNOSIS — M47.816 LUMBAR SPONDYLOSIS: ICD-10-CM

## 2025-04-16 PROCEDURE — 99999 PR PBB SHADOW E&M-EST. PATIENT-LVL III: CPT | Mod: PBBFAC,,, | Performed by: EMERGENCY MEDICINE

## 2025-04-16 NOTE — TELEPHONE ENCOUNTER
----- Message from Andre Hubbard MD sent at 2025  3:12 PM CDT -----  Regarding: Order for MARTA MAYA    Patient Name: MARTA MAYA([id number])  Sex: Female  : 1960      PCP: AUGUSTUS ARTEAGA III    Center: [facility]     Types of orders made on 2025: Procedure Request    Order Date:2025  Ordering User:ANDRE HUBBARD [[id number]]  Encounter Provider:Andre Hubbard MD [[id number]]  Authorizing Provider: Andre Hubbard MD [[id number]]  Department:[facility][[id number]]    Common Order Information  Procedure -> Medial Branch Block (Specify level and laterality) Cmt: L3,4,5 BL    Order Specific Information  Order: Procedure Request Order for Pain Management [Custom: [id number]]  Order #:          [id number]: 1 FUTURE    Priority: Routine  Class: Clinic Performed    Future Order Information      Expires on:2026            Expected by:2025                   Associated Diagnoses      M47.9 Osteoarthritis of spine, unspecified spinal osteoarthritis       complication status, unspecified spinal region      M47.816 Lumbar spondylosis      Physician -> Ole         Facility Name: -> [place]           Priority: Routine  Class: Clinic Performed    Future Order Information      Expires on:2026            Expected by:2025                   Associated Diagnoses      M47.9 Osteoarthritis of spine, unspecified spinal osteoarthritis       complication status, unspecified spinal region      M47.816 Lumbar spondylosis      Procedure -> Medial Branch Block (Specify level and laterality) Cmt: L3,4,5                 BL        Physician -> Ole         Facility Name: -> [place]

## 2025-04-21 ENCOUNTER — PATIENT MESSAGE (OUTPATIENT)
Dept: HEMATOLOGY/ONCOLOGY | Facility: CLINIC | Age: 65
End: 2025-04-21
Payer: MEDICARE

## 2025-04-21 DIAGNOSIS — E27.40 ADRENAL INSUFFICIENCY: Primary | ICD-10-CM

## 2025-04-21 RX ORDER — HYDROCORTISONE 5 MG/1
TABLET ORAL
Qty: 90 TABLET | Refills: 11 | Status: SHIPPED | OUTPATIENT
Start: 2025-04-21

## 2025-05-01 ENCOUNTER — TELEPHONE (OUTPATIENT)
Dept: PAIN MEDICINE | Facility: CLINIC | Age: 65
End: 2025-05-01
Payer: MEDICARE

## 2025-05-06 ENCOUNTER — TELEPHONE (OUTPATIENT)
Dept: PAIN MEDICINE | Facility: HOSPITAL | Age: 65
End: 2025-05-06
Payer: MEDICARE

## 2025-05-08 ENCOUNTER — OFFICE VISIT (OUTPATIENT)
Dept: URGENT CARE | Facility: CLINIC | Age: 65
End: 2025-05-08
Payer: MEDICARE

## 2025-05-08 ENCOUNTER — TELEPHONE (OUTPATIENT)
Dept: PAIN MEDICINE | Facility: CLINIC | Age: 65
End: 2025-05-08
Payer: MEDICARE

## 2025-05-08 VITALS
HEIGHT: 59 IN | BODY MASS INDEX: 31.45 KG/M2 | DIASTOLIC BLOOD PRESSURE: 87 MMHG | WEIGHT: 156 LBS | HEART RATE: 79 BPM | TEMPERATURE: 98 F | SYSTOLIC BLOOD PRESSURE: 127 MMHG | OXYGEN SATURATION: 98 % | RESPIRATION RATE: 16 BRPM

## 2025-05-08 DIAGNOSIS — L02.91 ABSCESS: Primary | ICD-10-CM

## 2025-05-08 PROCEDURE — 99213 OFFICE O/P EST LOW 20 MIN: CPT | Mod: S$GLB,,, | Performed by: NURSE PRACTITIONER

## 2025-05-08 RX ORDER — SULFAMETHOXAZOLE AND TRIMETHOPRIM 800; 160 MG/1; MG/1
1 TABLET ORAL 2 TIMES DAILY
Qty: 14 TABLET | Refills: 0 | Status: SHIPPED | OUTPATIENT
Start: 2025-05-08 | End: 2025-05-15

## 2025-05-08 NOTE — PROGRESS NOTES
"Subjective:      Patient ID: Radha Ervin is a 64 y.o. female.    Vitals:  height is 4' 11" (1.499 m) and weight is 70.8 kg (156 lb). Her oral temperature is 97.9 °F (36.6 °C). Her blood pressure is 127/87 and her pulse is 79. Her respiration is 16 and oxygen saturation is 98%.     Chief Complaint: Mass    64 yr old female presents to the Urgent Care with complaint of boil to the back of right upper thigh. Soreness and redness began last night. Patient has hx of recurrent abscess.     Mass  This is a new problem. The current episode started yesterday. The problem occurs constantly. The problem has been unchanged. Pertinent negatives include no abdominal pain, anorexia, arthralgias, change in bowel habit, chest pain, chills, congestion, coughing, diaphoresis, fatigue, fever, headaches, joint swelling, myalgias, nausea, neck pain, numbness, rash, sore throat, swollen glands, urinary symptoms, vertigo, visual change, vomiting or weakness. Nothing aggravates the symptoms. She has tried nothing for the symptoms.     Constitution: Negative for chills, sweating, fatigue and fever.   HENT:  Negative for congestion and sore throat.    Neck: Negative for neck pain.   Cardiovascular:  Negative for chest pain.   Respiratory:  Negative for cough.    Gastrointestinal:  Negative for abdominal pain, nausea and vomiting.   Musculoskeletal:  Negative for joint pain, joint swelling and muscle ache.   Skin:  Positive for erythema and abscess. Negative for rash.   Neurological:  Negative for history of vertigo, headaches and numbness.      Objective:     Physical Exam   Constitutional: She is oriented to person, place, and time. She appears well-developed.  Non-toxic appearance. She does not appear ill.   HENT:   Head: Normocephalic and atraumatic. Head is without abrasion, without contusion and without laceration.   Ears:   Right Ear: External ear normal.   Left Ear: External ear normal.   Nose: Nose normal.   Eyes: EOM are normal. " Pupils are equal, round, and reactive to light.   Cardiovascular: Normal rate.   Pulmonary/Chest: Effort normal. No respiratory distress.   Musculoskeletal: Normal range of motion.         General: Normal range of motion.   Neurological: She is alert and oriented to person, place, and time.   Skin: Skin is warm, dry, intact, not diaphoretic, rash, papular and abscessed. Capillary refill takes less than 2 seconds. erythema No abrasion, No burn, No bruising and No ecchymosis        Psychiatric: Her speech is normal and behavior is normal. Judgment and thought content normal.   Nursing note and vitals reviewed.      Assessment:     1. Abscess      Plan:   I considered, but at this time, do not suspect an extensive cellulitis, sepsis, or bacteremia to warrant admission.    Antibiotic therapy only at this time. Discharged home with Bactrim rx. Additional D/C Information: warm compresses 10-15 minutes, 4-5 times a day to help decrease swelling. The diagnosis, treatment plan, instructions for follow-up and reevaluation with her PCP or the ED in 2 - 3 days for wound recheck as well as ED precautions were discussed and understanding was verbalized. All questions or concerns have been addressed.     Abscess  -     sulfamethoxazole-trimethoprim 800-160mg (BACTRIM DS) 800-160 mg Tab; Take 1 tablet by mouth 2 (two) times daily. for 7 days  Dispense: 14 tablet; Refill: 0

## 2025-05-09 ENCOUNTER — TELEPHONE (OUTPATIENT)
Dept: URGENT CARE | Facility: CLINIC | Age: 65
End: 2025-05-09
Payer: MEDICARE

## 2025-05-09 ENCOUNTER — PATIENT MESSAGE (OUTPATIENT)
Dept: DERMATOLOGY | Facility: CLINIC | Age: 65
End: 2025-05-09
Payer: MEDICARE

## 2025-05-09 DIAGNOSIS — L02.91 ABSCESS: Primary | ICD-10-CM

## 2025-05-09 RX ORDER — DOXYCYCLINE 100 MG/1
100 CAPSULE ORAL 2 TIMES DAILY
Qty: 14 CAPSULE | Refills: 0 | Status: SHIPPED | OUTPATIENT
Start: 2025-05-09 | End: 2025-05-16

## 2025-05-09 NOTE — TELEPHONE ENCOUNTER
Spoke with patient in regards to not being able to tolerate Bactrim.  Patient states took 2 doses and vomited both times and struggled to take regular medication that she is typically prescribed due to the vomiting.  Patient requesting to change to doxycycline.  Patient has taken in the past and tolerated well.  Patient  has an appointment on Monday with provider and will follow up with them.  Instructed to stop Bactrim and will switch antibiotic.  Patient verbalized understanding and being sent to patient chosen pharmacy.

## 2025-05-15 ENCOUNTER — HOSPITAL ENCOUNTER (OUTPATIENT)
Dept: RADIOLOGY | Facility: HOSPITAL | Age: 65
Discharge: HOME | End: 2025-05-15
Attending: INTERNAL MEDICINE
Payer: MEDICARE

## 2025-05-15 DIAGNOSIS — C34.90 NON-SMALL CELL LUNG CANCER, UNSPECIFIED LATERALITY: ICD-10-CM

## 2025-05-15 DIAGNOSIS — C34.90 MALIGNANT NEOPLASM OF UNSPECIFIED PART OF UNSPECIFIED BRONCHUS OR LUNG: ICD-10-CM

## 2025-05-15 DIAGNOSIS — C79.51 SECONDARY MALIGNANT NEOPLASM OF BONE: ICD-10-CM

## 2025-05-15 LAB — POCT GLUCOSE: 113 MG/DL (ref 70–110)

## 2025-05-15 PROCEDURE — 78815 PET IMAGE W/CT SKULL-THIGH: CPT | Mod: 26,PS,, | Performed by: NUCLEAR MEDICINE

## 2025-05-15 PROCEDURE — A9552 F18 FDG: HCPCS | Performed by: INTERNAL MEDICINE

## 2025-05-15 PROCEDURE — 78815 PET IMAGE W/CT SKULL-THIGH: CPT | Mod: TC,PS

## 2025-05-15 RX ORDER — FLUDEOXYGLUCOSE F18 500 MCI/ML
13.48 INJECTION INTRAVENOUS
Status: COMPLETED | OUTPATIENT
Start: 2025-05-15 | End: 2025-05-15

## 2025-05-15 RX ADMIN — FLUDEOXYGLUCOSE F-18 13.48 MILLICURIE: 500 INJECTION INTRAVENOUS at 08:05

## 2025-05-16 ENCOUNTER — OFFICE VISIT (OUTPATIENT)
Dept: HEMATOLOGY/ONCOLOGY | Facility: CLINIC | Age: 65
End: 2025-05-16
Payer: MEDICARE

## 2025-05-16 VITALS
HEART RATE: 83 BPM | BODY MASS INDEX: 30.81 KG/M2 | WEIGHT: 152.56 LBS | OXYGEN SATURATION: 95 % | TEMPERATURE: 98 F | SYSTOLIC BLOOD PRESSURE: 106 MMHG | DIASTOLIC BLOOD PRESSURE: 56 MMHG

## 2025-05-16 DIAGNOSIS — R93.1 ABNORMAL FINDINGS ON DX IMAGING OF HEART AND COR CIRC: ICD-10-CM

## 2025-05-16 DIAGNOSIS — E27.40 ADRENAL INSUFFICIENCY: ICD-10-CM

## 2025-05-16 DIAGNOSIS — T45.1X5A CINV (CHEMOTHERAPY-INDUCED NAUSEA AND VOMITING): ICD-10-CM

## 2025-05-16 DIAGNOSIS — C34.90 NON-SMALL CELL LUNG CANCER, UNSPECIFIED LATERALITY: Primary | ICD-10-CM

## 2025-05-16 DIAGNOSIS — R11.2 CINV (CHEMOTHERAPY-INDUCED NAUSEA AND VOMITING): ICD-10-CM

## 2025-05-16 DIAGNOSIS — Z09 CHEMOTHERAPY FOLLOW-UP EXAMINATION: ICD-10-CM

## 2025-05-16 DIAGNOSIS — C79.51 SECONDARY MALIGNANT NEOPLASM OF BONE: ICD-10-CM

## 2025-05-16 PROCEDURE — 99999 PR PBB SHADOW E&M-EST. PATIENT-LVL IV: CPT | Mod: PBBFAC,,, | Performed by: INTERNAL MEDICINE

## 2025-05-16 NOTE — PROGRESS NOTES
PATIENT: Radha Ervin  MRN: [id number]  DATE: 5/16/2025      Subjective:     Chief complaint:  Chief Complaint   Patient presents with    Lung Cancer    Follow-up       Oncologic History:      Ms. Ervin is a 63-year-old female with 30 pack-year history of smoking, quit approximately 4 months ago, who was recently diagnosed with left lung cancer after evaluation for an abnormal chest x-ray.  A subsequent CT of the chest without contrast on May 26, 2023 revealed a spiculated nodule along the paramediastinal left upper lobe measuring 2.7 x 1.9 cm.  A mildly enlarged right paratracheal lymph node was seen measuring 1.1 cm.  She underwent EBUS and biopsy on June 23, 2023.  Pathology revealed the left upper lobe lesion positive for adenocarcinoma.  Station 4R was negative for malignancy.       A PET scan on July 13, 2023 revealed the left upper lobe mass measuring 2.6 x 1.6 cm with SUV max 7.4.  There was mildly metabolic prevascular mediastinal lymph node measuring 0.9 cm seen in short axis with SUV max 2.3.  She underwent robotic left upper lobectomy and lymph node sampling on August 3, 2023.  Pathology revealed invasive poorly differentiated adenocarcinoma measuring 2.1 cm.  There was visceral pleural invasion noted.  Vascular resection revealed invasive tumor present in the adventitial soft tissue.  There was lymphovascular invasion noted.  Invasive carcinoma is present at the vascular margin.  3/4 level 6 lymph nodes, 1/2 level 11 lymph nodes and 1/4 level 12 lymph nodes were involved out of a total of 23 lymph nodes.  Surgery was complicated by intraoperative bleeding of pulmonary arterial branches which resolved with pressure.      Her case was discussed at [facility] on 8/24/23: Recommend chemoRT followed by immunotherapy.     Established care with med onc 8/28. She had seen Dr. Quevedo who had recommended sequential chemoradiation then to be followed by immunotherapy. Consented for sequential CRT with  "carbo/taxol.    On 9/22/23 she presented to the infusion center for carbo/taxol C#1 but developed infusion reaction to taxol so that was stopped.  Had reaction with second infusion as well--carbo/taxol discontinued.  Consented for carbo/pemetrexed on 11/2/23    C#1 carbo/pemetrexed 11/3/23  C#2 carbo/pemetrexed 11/24/23  C#3 acute worsening of renal function--pemetrexed held: 12/15/23  C#4 carboplatin only 1/5/24    Radiation 60Gy in 30Fx 2/5/24--3/26/24    C#1 durvalumab 4/19/24  C#2 5/17/24  C#3 6/14/24  C#4 7/12/24  C#5 8/9/24   C#6 9/6/24  C#7 10/4/24    CT CAP 10/2/24 suspicious for lumbar spinal metastasis.  Pet CT 10/16/24 confirmed uptake in L-spine    She received XRT to L-spine metastasis 2fx 11/25--11/26/24           Interval History: Ms. Ervin returns for follow up. Scheduled to see endocrinology later this month. Still having a lot of nausea and vomiting--zofran helps some but not fully. Discussed reducing dose of Lumakras for this reason. No new areas of pain but chronic back pain related to bulging disk remains symptomatic. BP today on low end likely because of dehydration--discussed doing fluids.     INTERVAL 5/16/25  Here for follow-up while on Lumakras. PET CT yesterday OPAL. There was finding of "lipomatous hypertrophy of interatrial septum, (LHIS)" which is reportedly a benign condition--she denies chest pain, SOB, heart palpitations. Skin changes on PET CT related to known skin infections both of which are improved in general compared to before when she had gotten antibiotics.       Review of Systems   A comprehensive review of systems was performed; pertinent positives and negatives are noted in the HPI.        ECOG Performance Status:   ECOG SCORE    1 - Restricted in strenuous activity-ambulatory and able to carry out work of a light nature         Objective:      Vitals:   Vitals:    05/16/25 1443   BP: (!) 106/56   Pulse: 83   Temp: 97.9 °F (36.6 °C)   SpO2: 95%   Weight: 69.2 kg (152 lb " 8.9 oz)     BMI: Body mass index is 30.81 kg/m².      Physical Exam:   Physical Exam  Vitals and nursing note reviewed.   Constitutional:       General: She is not in acute distress.     Appearance: Normal appearance. She is not toxic-appearing.   HENT:      Head: Normocephalic and atraumatic.   Eyes:      General: No scleral icterus.     Conjunctiva/sclera: Conjunctivae normal.   Cardiovascular:      Rate and Rhythm: Normal rate and regular rhythm.      Heart sounds: Normal heart sounds. No murmur heard.  Pulmonary:      Effort: Pulmonary effort is normal. No respiratory distress.      Breath sounds: Normal breath sounds. No wheezing, rhonchi or rales.   Abdominal:      General: There is no distension.      Palpations: Abdomen is soft.      Tenderness: There is no abdominal tenderness.   Musculoskeletal:         General: No swelling, tenderness or deformity.      Cervical back: Neck supple. No tenderness.   Skin:     Coloration: Skin is not jaundiced.      Findings: No erythema.   Neurological:      General: No focal deficit present.      Mental Status: She is alert and oriented to person, place, and time.      Motor: No weakness.   Psychiatric:         Mood and Affect: Mood normal.         Behavior: Behavior normal.           Laboratory Data:  WBC   Date Value Ref Range Status   04/04/2025 12.99 (H) 3.90 - 12.70 K/uL Final     Hemoglobin   Date Value Ref Range Status   01/25/2025 12.4 12.0 - 16.0 g/dL Final     HGB   Date Value Ref Range Status   04/04/2025 11.0 (L) 12.0 - 16.0 gm/dL Final     POC Hematocrit   Date Value Ref Range Status   12/22/2024 35 (L) 36 - 54 %PCV Final     Hematocrit   Date Value Ref Range Status   01/25/2025 40.5 37.0 - 48.5 % Final     HCT   Date Value Ref Range Status   04/04/2025 33.0 (L) 37.0 - 48.5 % Final     Platelet Count   Date Value Ref Range Status   04/04/2025 321 150 - 450 K/uL Final     Platelets   Date Value Ref Range Status   01/25/2025 337 150 - 450 K/uL Final     Gran #  (ANC)   Date Value Ref Range Status   01/25/2025 5.6 1.8 - 7.7 K/uL Final     Gran %   Date Value Ref Range Status   01/25/2025 58.8 38.0 - 73.0 % Final       Chemistry        Component Value Date/Time     04/04/2025 0924     01/25/2025 1014    K 3.2 (L) 04/04/2025 0924    K 3.9 01/25/2025 1014     04/04/2025 0924     (H) 01/25/2025 1014    CO2 17 (L) 04/04/2025 0924    CO2 22 (L) 01/25/2025 1014    BUN 18 04/04/2025 0924    CREATININE 1.5 (H) 04/04/2025 0924     04/04/2025 0924     01/25/2025 1014        Component Value Date/Time    CALCIUM 8.9 04/04/2025 0924    CALCIUM 9.4 01/25/2025 1014    ALKPHOS 177 (H) 04/04/2025 0924    ALKPHOS 93 01/25/2025 1014    AST 36 04/04/2025 0924    AST 12 01/25/2025 1014    ALT 40 04/04/2025 0924    ALT 11 01/25/2025 1014    BILITOT 1.0 04/04/2025 0924    BILITOT 0.3 01/25/2025 1014    ESTGFRAFRICA >60.0 04/06/2020 1002    EGFRNONAA >60.0 04/06/2020 1002        NM PET CT FDG Skull Base to Mid Thigh  Order: [id number]   Status: Final result       Next appt: 03/05/2025 at 04:00 PM in Outpatient Rehab ([facility])       Dx: Adenocarcinoma, lung, left    Test Result Released: Yes (seen)    0 Result Notes  Details    Reading Physician Reading Date Result Priority   Joanna Leong MD  [phone]  2/26/2025 Routine   Bharat Clark MD  [phone]  2/26/2025      Narrative & Impression  EXAMINATION:  NM PET CT FDG SKULL BASE TO MID THIGH     CLINICAL HISTORY:  Non-small cell lung cancer (NSCLC), metastatic, assess treatment response; Malignant neoplasm of unspecified part of left bronchus or lung 64-year-old female with history of lung cancer 06/23/2023 positive lymph nodes 08/03/2023 and metastatic lung carcinoma 01/08/2025     TECHNIQUE:  15.9 mCi of F18-FDG was administered intravenously in the right antecubital fossa.  After an approximately 60 min distribution time, PET/CT images were acquired from the skull base to mid thigh.   Transmission images were acquired to correct for attenuation using a whole body low-dose CT scan without IV contrast with the arms positioned above the head. Glycemia at the time of injection was 98 mg/dL.     COMPARISON:  CT abdomen pelvis 11/22/2024.  FDG PET-CT 10/16/2024.     FINDINGS:  Quality of the study: Adequate.     In the head and neck, there are no hypermetabolic lesions worrisome for malignancy.  No pathologically enlarged or hypermetabolic lymph nodes.     In the chest, there are postsurgical changes of prior left upper lobectomy.  There is continued bandlike opacities and architectural distortion near the surgical site, likely scarring/post radiation changes.  No focal uptake to suggest local recurrence.  No concerning pulmonary nodules or masses, and there are no pathologically enlarged or hypermetabolic lymph nodes.     In the abdomen and pelvis, there is physiologic tracer distribution within the abdominal organs and excretion into the genitourinary system.     In the bones, there are no new hypermetabolic lesions worrisome for malignancy.  Status post vertebral augmentation at L1. There is physiologic muscular uptake about the pelvis.     Increased soft tissue and muscle uptake     Additional CT findings: Mild diffuse atherosclerosis.  Cholecystectomy.  Bilateral nonobstructive punctate renal stones.  Hysterectomy.  Few remote right rib fractures.     Impression:     History of lung malignancy status post left upper lobectomy.  No tracer uptake in the left lung to suggest local recurrence.     No hypermetabolic lymphadenopathy or evidence of new distant metastasis.     Electronically signed by resident: Bharat Clark  Date:                                            02/26/2025  Time:                                           14:07     Electronically signed by:Joanna Leong  Date:                                            02/26/2025  Time:                                           14:42         Exam Ended: 02/26/25 13:45 CST Last Resulted: 02/26/25 14:42 CST     NM PET CT FDG Skull Base to Mid Thigh  Order: [id number]   Status: Final result       Next appt: 06/03/2025 at 09:30 AM in Cardiology ([facility])       Dx: Secondary malignant neoplasm of bone;...    Test Result Released: Yes (seen)    0 Result Notes  Details    Reading Physician Reading Date Result Priority   Joanna Leong MD  [phone]  5/15/2025 Routine   Jose Argueta MD  [phone]  5/15/2025      Narrative & Impression  EXAMINATION:  NM PET CT FDG SKULL BASE TO MID THIGH     CLINICAL HISTORY:  Non-small cell lung cancer (NSCLC), metastatic, assess treatment response; Secondary malignant neoplasm of bone     64-year-old female with history of non-small cell lung cancer status post left upper lobectomy and chemotherapy.     TECHNIQUE:  13.48 mCi of F18-FDG was administered intravenously in the right antecubital fossa.  After an approximately 60 min distribution time, PET/CT images were acquired from the skull base to mid-thigh. Transmission images were acquired to correct for attenuation using a whole body low-dose CT scan without IV contrast with the arms positioned above the head. Glycemia at the time of injection was 113 mg/dL.     COMPARISON:  NM 02/26/2025     FINDINGS:  Quality of the study: Adequate.     In the head and neck, there are no hypermetabolic lesions worrisome for malignancy. There are no hypermetabolic mucosal lesions, and there are no pathologically enlarged or hypermetabolic lymph nodes.     In the chest, there is a new hypermetabolic focus in the mediastinum, with max SUV measuring 7.1, with underlying lipomatous hypertrophy of the interatrial septum (image 78).  There are no concerning pulmonary nodules or masses, and there are no pathologically enlarged or hypermetabolic lymph nodes.     In the abdomen and pelvis, there is physiologic tracer distribution within the abdominal organs and excretion into  the genitourinary system.  There is a hypermetabolic soft tissue nodule along the anterior abdominal wall measuring 0.7 x 2.0 cm, with max SUV measuring 4.2 (image 27), new from prior.  Additional area of hypermetabolic skin thickening with associated inflammatory infiltration of the subcutaneous fat uptake along the inner aspect of the right posterior thigh measuring 1.9 x 0.6 cm, which was present when compared to prior and has a max SUV of 8.5 (image 227)     In the bones, there is a new hypermetabolic focus involving the articulation of the 2nd rib with the vertebral body max SUV measuring 4 with no CT correlate, which is likely degenerative in nature (image 47).     In the extremities, there are no hypermetabolic lesions worrisome for malignancy.  Diffuse muscle uptake and arthritic changes     Additional CT findings: Postsurgical changes of left upper lobectomy.  Prior cholecystectomy.  Bilateral non-obstructing renal stones.  Left-sided renal cyst.  Colonic diverticulosis.  Prior vertebroplasty of L1 vertebral body.     Impression:     1. There are areas of hypermetabolic uptake in the skin folds, which is likely infectious or inflammatory in nature.  2. 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  3. No definite convincing evidence of recurrence or metastatic disease.     Electronically signed by resident: Jose Argueta  Date:                                            05/15/2025  Time:                                           09:53     Electronically signed by:Joanna Leong  Date:                                            05/15/2025  Time:                                           10:47        Exam Ended: 05/15/25 09:45 CDT Last Resulted: 05/15/25 10:47 CDT       Assessment/Plan:     1. Non-small cell lung cancer, unspecified laterality    2. Secondary malignant neoplasm of bone    3. Chemotherapy follow-up examination    4. Abnormal findings on dx imaging of heart and cor  circ    5. CINV (chemotherapy-induced nausea and vomiting)    6. Adrenal insufficiency      Metastatic NSCLC with KRAS g12c mutation, oligometastatic disease to L-spine now s/p XRT. Scans 2/26/25 OPAL, scans 5/16/25 OPAL.   2ry adrenal insufficiency--continue hydrocortisone.    TTE and Cards e-consult for LHIS.     Med and Orders:  Orders Placed This Encounter    Echo Saline Bubble? No; Ultrasound enhancing contrast? No    E-Consult to General Cardiology       Follow Up:  Follow up in about 6 weeks (around 6/27/2025).      Above care plan was discussed with patient and all questions were addressed to their expressed satisfaction.       Roger Eduardo MD, FACP  Hematology & Medical Oncology  [facility]         High Risk Conditions:    Patient has a condition that poses threat to life and bodily function: mNSCLC, KRAS g12c mutated.  Patient is currently on drug therapy requiring intensive monitoring for toxicity: lumakras

## 2025-05-20 ENCOUNTER — E-CONSULT (OUTPATIENT)
Dept: CARDIOLOGY | Facility: CLINIC | Age: 65
End: 2025-05-20
Payer: MEDICARE

## 2025-05-20 DIAGNOSIS — R09.89 NONSPECIFIC ABNORMAL FINDING ON CARDIAC EVALUATION: Primary | ICD-10-CM

## 2025-05-20 NOTE — CONSULTS
[facility] ([facility])  Response for E-Consult     Patient Name: Radha Ervin  MRN: [id number]  Primary Care Provider: Suraj Herrera III, MD   Requesting Provider: Roger Eduardo MD  Consults    Recommendation:  Lipomatous hypertrophy of the interatrial septum is an insignificant finding that does not require further testing/evaluation.    Additional future steps to consider:  Not applicable    Total time of Consultation: 5 minute    I did not speak to the requesting provider verbally about this.     *This eConsult is based on the clinical data available to me and is furnished without benefit of a physical examination. The eConsult will need to be interpreted in light of any clinical issues or changes in patient status not available to me at the time of filing this eConsults. Significant changes in patient condition or level of acuity should result in immediate formal consultation and reevaluation. Please alert me if you have further questions.    Thank you for this eConsult referral.     Christina Foster MD  [facility] ([facility])

## 2025-05-29 ENCOUNTER — TELEPHONE (OUTPATIENT)
Dept: PAIN MEDICINE | Facility: CLINIC | Age: 65
End: 2025-05-29
Payer: MEDICARE

## 2025-05-29 ENCOUNTER — PATIENT MESSAGE (OUTPATIENT)
Dept: PAIN MEDICINE | Facility: CLINIC | Age: 65
End: 2025-05-29
Payer: MEDICARE

## 2025-05-29 ENCOUNTER — PATIENT MESSAGE (OUTPATIENT)
Dept: DERMATOLOGY | Facility: CLINIC | Age: 65
End: 2025-05-29
Payer: MEDICARE

## 2025-05-29 DIAGNOSIS — M47.816 LUMBAR SPONDYLOSIS: Primary | ICD-10-CM

## 2025-06-03 ENCOUNTER — OFFICE VISIT (OUTPATIENT)
Dept: DERMATOLOGY | Facility: CLINIC | Age: 65
End: 2025-06-03
Payer: MEDICARE

## 2025-06-03 DIAGNOSIS — L73.2 HIDRADENITIS SUPPURATIVA: Primary | ICD-10-CM

## 2025-06-03 PROCEDURE — 1101F PT FALLS ASSESS-DOCD LE1/YR: CPT | Mod: CPTII,S$GLB,, | Performed by: DERMATOLOGY

## 2025-06-03 PROCEDURE — 1160F RVW MEDS BY RX/DR IN RCRD: CPT | Mod: CPTII,S$GLB,, | Performed by: DERMATOLOGY

## 2025-06-03 PROCEDURE — 1125F AMNT PAIN NOTED PAIN PRSNT: CPT | Mod: CPTII,S$GLB,, | Performed by: DERMATOLOGY

## 2025-06-03 PROCEDURE — 3288F FALL RISK ASSESSMENT DOCD: CPT | Mod: CPTII,S$GLB,, | Performed by: DERMATOLOGY

## 2025-06-03 PROCEDURE — 1159F MED LIST DOCD IN RCRD: CPT | Mod: CPTII,S$GLB,, | Performed by: DERMATOLOGY

## 2025-06-03 PROCEDURE — 11900 INJECT SKIN LESIONS </W 7: CPT | Mod: S$GLB,,, | Performed by: DERMATOLOGY

## 2025-06-03 PROCEDURE — 99214 OFFICE O/P EST MOD 30 MIN: CPT | Mod: 25,S$GLB,, | Performed by: DERMATOLOGY

## 2025-06-03 PROCEDURE — 99999 PR PBB SHADOW E&M-EST. PATIENT-LVL III: CPT | Mod: PBBFAC,,, | Performed by: DERMATOLOGY

## 2025-06-03 RX ORDER — TRIAMCINOLONE ACETONIDE 40 MG/ML
40 INJECTION, SUSPENSION INTRA-ARTICULAR; INTRAMUSCULAR ONCE
Status: SHIPPED | OUTPATIENT
Start: 2025-06-03

## 2025-06-03 RX ORDER — DOXYCYCLINE 100 MG/1
CAPSULE ORAL
Qty: 60 CAPSULE | Refills: 0 | Status: SHIPPED | OUTPATIENT
Start: 2025-06-03

## 2025-06-09 ENCOUNTER — PATIENT MESSAGE (OUTPATIENT)
Dept: HEMATOLOGY/ONCOLOGY | Facility: CLINIC | Age: 65
End: 2025-06-09
Payer: MEDICARE

## 2025-06-12 ENCOUNTER — TELEPHONE (OUTPATIENT)
Dept: PAIN MEDICINE | Facility: CLINIC | Age: 65
End: 2025-06-12
Payer: MEDICARE

## 2025-06-15 ENCOUNTER — PATIENT MESSAGE (OUTPATIENT)
Dept: PAIN MEDICINE | Facility: CLINIC | Age: 65
End: 2025-06-15
Payer: MEDICARE

## 2025-06-16 ENCOUNTER — HOSPITAL ENCOUNTER (OUTPATIENT)
Dept: CARDIOLOGY | Facility: HOSPITAL | Age: 65
Discharge: HOME | End: 2025-06-16
Attending: INTERNAL MEDICINE
Payer: MEDICARE

## 2025-06-16 VITALS
HEART RATE: 83 BPM | HEIGHT: 55 IN | DIASTOLIC BLOOD PRESSURE: 56 MMHG | BODY MASS INDEX: 35.2 KG/M2 | WEIGHT: 152.13 LBS | SYSTOLIC BLOOD PRESSURE: 106 MMHG

## 2025-06-16 DIAGNOSIS — R93.1 ABNORMAL FINDINGS ON DX IMAGING OF HEART AND COR CIRC: ICD-10-CM

## 2025-06-16 LAB
AORTIC SIZE INDEX (SOV): 1.8 CM/M2
AORTIC SIZE INDEX: 2 CM/M2
ASCENDING AORTA: 3.1 CM
AV AREA BY CONTINUOUS VTI: 3.6 CM2
AV INDEX (PROSTH): 0.84
AV LVOT MEAN GRADIENT: 1 MMHG
AV LVOT PEAK GRADIENT: 3 MMHG
AV MEAN GRADIENT: 2 MMHG
AV PEAK GRADIENT: 4 MMHG
AV VALVE AREA BY VELOCITY RATIO: 3.3 CM²
AV VALVE AREA: 3.5 CM2
AV VELOCITY RATIO: 0.8
BSA FOR ECHO PROCEDURE: 1.64 M2
CV ECHO LV RWT: 0.35 CM
DOP CALC AO PEAK VEL: 1 M/S
DOP CALC AO VTI: 19.1 CM
DOP CALC LVOT AREA: 4.2 CM2
DOP CALC LVOT DIAMETER: 2.3 CM
DOP CALC LVOT PEAK VEL: 0.8 M/S
DOP CALC LVOT STROKE VOLUME: 66.9 CM3
DOP CALCLVOT PEAK VEL VTI: 16.1 CM
E WAVE DECELERATION TIME: 263 MS
E/A RATIO: 0.79
E/E' RATIO: 11 M/S
ECHO EF ESTIMATED: 48 %
ECHO LV POSTERIOR WALL: 0.6 CM (ref 0.6–1.1)
FRACTIONAL SHORTENING: 23.5 % (ref 28–44)
GLOBAL LONGITUIDAL STRAIN: 16.7 %
INTERVENTRICULAR SEPTUM: 0.6 CM (ref 0.6–1.1)
IVC DIAMETER: 1.15 CM
IVRT: 131 MS
LA MAJOR: 4 CM
LA MINOR: 4 CM
LA WIDTH: 3.2 CM
LEFT ATRIUM SIZE: 2.8 CM
LEFT ATRIUM VOLUME INDEX MOD: 23 ML/M2
LEFT ATRIUM VOLUME INDEX: 20 ML/M2
LEFT ATRIUM VOLUME MOD: 36 ML
LEFT ATRIUM VOLUME: 30 CM3
LEFT INTERNAL DIMENSION IN SYSTOLE: 2.6 CM (ref 2.1–4)
LEFT VENTRICLE DIASTOLIC VOLUME INDEX: 29.49 ML/M2
LEFT VENTRICLE DIASTOLIC VOLUME: 46 ML
LEFT VENTRICLE MASS INDEX: 31.3 G/M2
LEFT VENTRICLE SYSTOLIC VOLUME INDEX: 15.4 ML/M2
LEFT VENTRICLE SYSTOLIC VOLUME: 24 ML
LEFT VENTRICULAR INTERNAL DIMENSION IN DIASTOLE: 3.4 CM (ref 3.5–6)
LEFT VENTRICULAR MASS: 48.9 G
LV LATERAL E/E' RATIO: 8.9
LV SEPTAL E/E' RATIO: 15.5
MV A" WAVE DURATION": 137.01 MS
MV PEAK A VEL: 0.78 M/S
MV PEAK E VEL: 0.62 M/S
OHS CV RV/LV RATIO: 1 CM
PISA TR MAX VEL: 2.5 M/S
PULM VEIN A" WAVE DURATION": 137.01 MS
PULM VEIN S/D RATIO: 1.58
PULMONIC VEIN PEAK A VELOCITY: 0.2 M/S
PV PEAK D VEL: 0.4 M/S
PV PEAK S VEL: 0.63 M/S
RA MAJOR: 4 CM
RA PRESSURE ESTIMATED: 3 MMHG
RA WIDTH: 3.36 CM
RIGHT ATRIAL AREA: 12.1 CM2
RIGHT VENTRICLE DIASTOLIC BASEL DIMENSION: 3.4 CM
RV TB RVSP: 6 MMHG
RV TISSUE DOPPLER FREE WALL SYSTOLIC VELOCITY 1 (APICAL 4 CHAMBER VIEW): 8.03 CM/S
SINUS: 2.87 CM
STJ: 2.8 CM
TDI LATERAL: 0.07 M/S
TDI SEPTAL: 0.04 M/S
TDI: 0.06 M/S
TRICUSPID ANNULAR PLANE SYSTOLIC EXCURSION: 1.5 CM
TV PEAK GRADIENT: 25 MMHG
TV REST PULMONARY ARTERY PRESSURE: 28 MMHG
Z-SCORE OF LEFT VENTRICULAR DIMENSION IN END DIASTOLE: -2.8
Z-SCORE OF LEFT VENTRICULAR DIMENSION IN END SYSTOLE: -0.56

## 2025-06-16 PROCEDURE — 93306 TTE W/DOPPLER COMPLETE: CPT

## 2025-06-16 PROCEDURE — 93306 TTE W/DOPPLER COMPLETE: CPT | Mod: 26,,, | Performed by: INTERNAL MEDICINE

## 2025-06-16 PROCEDURE — 93356 MYOCRD STRAIN IMG SPCKL TRCK: CPT | Mod: ,,, | Performed by: INTERNAL MEDICINE

## 2025-06-17 ENCOUNTER — RESULTS FOLLOW-UP (OUTPATIENT)
Dept: HEMATOLOGY/ONCOLOGY | Facility: CLINIC | Age: 65
End: 2025-06-17

## 2025-06-17 ENCOUNTER — TELEPHONE (OUTPATIENT)
Dept: PAIN MEDICINE | Facility: HOSPITAL | Age: 65
End: 2025-06-17
Payer: MEDICARE

## 2025-06-18 NOTE — DISCHARGE INSTRUCTIONS
[facility]  Dr. Andre Handy  Messaging service # [phone]    MEDIAL BRANCH BLOCK POST-PROCEDURE INSTRUCTIONS:    What you need to do:  Keep a record of your response to the injection you had today.  It is very important that you accurately record how you responded to today's injection.  Please try to separate any soreness from the needle from your usual pain.  We want to know how this affects your usual pain.  Also REMEMBER that today's injection is a DIAGNOSTIC block, the pain relief will only last for a few hours to a few days.  Insurance requires 2 of these blocks before progressing to the ablation.  The sole purpose of this injection is to give us information, and not to treat your pain for an extended period.    Think about the amount of pain that you would have doing the most painful activities (I.e. bending, twisting, walking, standing straight, cleaning, etc...).  Now do those same activities as soon as you get home from the hospital.  Think about how much better you feel doing those activities now that the injection is done.  Does it feel 50%, 80%, 100% better than it would have otherwise?  This is the number you need to send me.    Send me a message through [facility] or leave a message at the phone number above telling me what % of improvement you got from the injection.    If you have difficulty putting a percentage on your pain relief fill this out:  (Rate each question below from 0-10 with 0 being no pain and 10 being the worst imaginable pain)    How bad would your pain get during activities like walking/going up stairs BEFORE the injection? _______    For the first 8 hours AFTER the injection, when you did those same painful activities, what was you best pain score? ____________    Send me those two numbers if you aren't able to give a  percentage.  ---------------------------------------------------------------------------------------------------------------------------------------------------------------------------------------------  What to watch out for:    If you experience any of the following symptoms after your procedure, please notify the messaging service immediately (see above for contact information):   fever (increased oral temperature)   bleeding or swelling at the injection site,    drainage, rash or redness at the injection site    possible signs of infection    increased pain at the injection site   worsening of your usual pain   severe headache   new or worsening numbness    new arm and/or leg weakness, or    changes in bowel and/or bladder function: urinating or defecating on yourself and not knowing that you did it.    PLEASE FOLLOW ALL INSTRUCTIONS CAREFULLY     Do not engage in strenuous activity (e.g., lifting or pushing heavy objects or repeated bending) for 24 hours.     Do not take a bath, swim or use Jacuzzi for 24 hours after procedure. (A shower is fine).   Remove any Band-Aids when you get home.    Use cold/ice, as needed for comfort.  We recommend the use of cold therapy alternating on for 20 minutes, off for 20 minutes.    Do not apply direct heat (heating pad or heat packs) to the injection site for 24 hours.     Resume your usual medications, unless instructed otherwise by your Pain Physician.     If you are on warfarin (Coumadin) or other blood thinner, resume this medication as instructed by your prescribing Physician.    IF AT ANY POINT YOU ARE VERY CONCERNED ABOUT YOUR SYMPTOMS, Urgent or emergent issues after between 5pm and 7am or on weekends, please contact the Doctor on call at [phone].    If you develop worsening pain, weakness, numbness, lose bowel or bladder control (i.e., having an accident where you did not even know you had to go to the bathroom and suddenly noticed you soiled yourself), saddle  anesthesia (a loss of sensation restricted to the area of the buttocks, anus and between the legs -- i.e., those parts of your body that would touch a saddle if you were sitting on one) you need to go immediately to the emergency department for evaluation and treatment.    ----------------------------------------------------------------------------------------------------------------------------------------------------------------  If you received Sedation please read the following instructions:  POST SEDATION INSTRUCTIONS    Today you received intravenous medication (also known as sedation) that was used to help you relax and/or decrease discomfort during your procedure. This medication will be acting in your body for the next 24 hours, so you might feel a little tired or sleepy. This feeling will slowly wear off.   Common side effects associated with these medications include: drowsiness, dizziness, sleepiness, confusion, feeling excited, difficulty remembering things, lack of steadiness with walking or balance, loss of fine muscle control, slowed reflexes, difficulty focusing, and blurred vision.  Some over-the-counter and prescription medications (e.g., muscle relaxants, opioids, mood-altering medications, sedatives/hypnotics, antihistamines) can interact with the intravenous medication you received and cause an increased risk of the side effects listed above in addition to other potentially life threatening side effects. Use extreme caution if you are taking such medications, and consult with your Pain Physician or prescribing physician if you have any questions.  For the next 12-24 hours:    DO NOT--Drive a car, operate machinery or power tools   DO NOT--Drink any alcoholic beverages (not even beer), they may dangerously increase the risk of side effects.    DO NOT--Make any important legal or business decisions or sign important documents.  We advise you to have someone to assist you at home. Move slowly and  carefully. Do not make sudden changes in position. Be aware of dizziness or light-headedness and move accordingly.   If you seek medical treatment within 24 hours, let the nurse or doctor caring for you know that you have received the above medications. If you have any questions or concerns related to your sedation or treatment today please contact us.

## 2025-06-19 ENCOUNTER — HOSPITAL ENCOUNTER (OUTPATIENT)
Facility: HOSPITAL | Age: 65
Discharge: HOME | End: 2025-06-19
Attending: EMERGENCY MEDICINE | Admitting: EMERGENCY MEDICINE
Payer: MEDICARE

## 2025-06-19 VITALS
RESPIRATION RATE: 14 BRPM | DIASTOLIC BLOOD PRESSURE: 73 MMHG | SYSTOLIC BLOOD PRESSURE: 112 MMHG | BODY MASS INDEX: 31.45 KG/M2 | HEART RATE: 71 BPM | HEIGHT: 59 IN | WEIGHT: 156 LBS | OXYGEN SATURATION: 97 % | TEMPERATURE: 98 F

## 2025-06-19 DIAGNOSIS — G89.29 CHRONIC PAIN: ICD-10-CM

## 2025-06-19 PROCEDURE — 64494 INJ PARAVERT F JNT L/S 2 LEV: CPT | Mod: 50,KX,, | Performed by: EMERGENCY MEDICINE

## 2025-06-19 PROCEDURE — 25000003 PHARM REV CODE 250: Performed by: EMERGENCY MEDICINE

## 2025-06-19 PROCEDURE — 64493 INJ PARAVERT F JNT L/S 1 LEV: CPT | Mod: 50 | Performed by: EMERGENCY MEDICINE

## 2025-06-19 PROCEDURE — 64493 INJ PARAVERT F JNT L/S 1 LEV: CPT | Mod: 50,KX,, | Performed by: EMERGENCY MEDICINE

## 2025-06-19 PROCEDURE — 63600175 PHARM REV CODE 636 W HCPCS: Performed by: EMERGENCY MEDICINE

## 2025-06-19 PROCEDURE — 64494 INJ PARAVERT F JNT L/S 2 LEV: CPT | Mod: 50 | Performed by: EMERGENCY MEDICINE

## 2025-06-19 RX ORDER — LIDOCAINE HYDROCHLORIDE 20 MG/ML
INJECTION, SOLUTION EPIDURAL; INFILTRATION; INTRACAUDAL; PERINEURAL
Status: DISCONTINUED | OUTPATIENT
Start: 2025-06-19 | End: 2025-06-19 | Stop reason: HOSPADM

## 2025-06-19 RX ORDER — BUPIVACAINE HYDROCHLORIDE 2.5 MG/ML
INJECTION, SOLUTION EPIDURAL; INFILTRATION; INTRACAUDAL; PERINEURAL
Status: DISCONTINUED | OUTPATIENT
Start: 2025-06-19 | End: 2025-06-19 | Stop reason: HOSPADM

## 2025-06-19 RX ORDER — ALPRAZOLAM 0.5 MG/1
0.5 TABLET, ORALLY DISINTEGRATING ORAL ONCE AS NEEDED
Status: COMPLETED | OUTPATIENT
Start: 2025-06-19 | End: 2025-06-19

## 2025-06-19 RX ADMIN — ALPRAZOLAM 0.5 MG: 0.5 TABLET, ORALLY DISINTEGRATING ORAL at 12:06

## 2025-06-19 NOTE — H&P (VIEW-ONLY)
HPI  Patient presenting for Procedure(s) (LRB):  B/L L3,4,5 MBB #1 (Bilateral)     No health changes since previous encounter    Past Medical History:   Diagnosis Date    Allergy     Amblyopia     Cataract     Eczema     HS (hereditary spherocytosis)     Hx of total knee replacement 01/19/2016    right knee    Joint pain     Melena 11/26/2024     Past Surgical History:   Procedure Laterality Date    achilles tendon repair      BRONCHOSCOPY N/A 8/3/2023    Procedure: Bronchoscopy;  Surgeon: Saeed Gould MD;  [facility];  Service: Cardiothoracic;  Laterality: N/A;    CHOLECYSTECTOMY      ESOPHAGOGASTRODUODENOSCOPY N/A 11/27/2024    Procedure: EGD (ESOPHAGOGASTRODUODENOSCOPY);  Surgeon: Braxton Mooney MD;  Location: [facility] ENDO (2ND FLR);  Service: Endoscopy;  Laterality: N/A;    INJECTION OF ANESTHETIC AGENT AROUND MULTIPLE INTERCOSTAL NERVES N/A 8/3/2023    Procedure: BLOCK, NERVE, INTERCOSTAL, 2 OR MORE;  Surgeon: Saeed Gould MD;  [facility];  Service: Cardiothoracic;  Laterality: N/A;    KYPHOPLASTY, SPINE, LUMBAR N/A 1/6/2025    Procedure: KYPHOPLASTY, SPINE, LUMBAR;  Surgeon: Eddi Hinkle DO;  Location: [facility] OR;  Service: Pain Management;  Laterality: N/A;  [employer]; SNS nerve monitoring scheduled    LYMPHADENECTOMY Left 8/3/2023    Procedure: LYMPHADENECTOMY;  Surgeon: Saeed Gould MD;  [facility];  Service: Cardiothoracic;  Laterality: Left;    NAVIGATIONAL BRONCHOSCOPY N/A 6/23/2023    Procedure: BRONCHOSCOPY, NAVIGATIONAL;  Surgeon: Radha Mccollum MD;  [facility];  Service: Pulmonary;  Laterality: N/A;    TOTAL KNEE ARTHROPLASTY      XI ROBOTIC RATS Left 8/3/2023    Procedure: XI ROBOTIC RATS upper lobectomy;  Surgeon: Saeed Gould MD;  Location: [facility] OR [address] [place] [address];  Service: Cardiothoracic;  Laterality: Left;     Review of patient's allergies indicates:   Allergen Reactions    Paclitaxel Anaphylaxis, Other (See  "Comments) and Nausea And Vomiting     Pt states "Anaphylaxis" last encounter w/ throat swelling. Encountered back pain, coughing and head fuzzy today.    Metformin Itching    Morphine Other (See Comments)     headaches    Latex, natural rubber Rash and Other (See Comments)     Redness and peeling only with bandaids    Penicillins Nausea And Vomiting and Rash      Current Facility-Administered Medications   Medication    alprazolam ODT dissolvable tablet 0.5 mg       PMHx, PSHx, Allergies, Medications reviewed in epic    ROS negative except pain complaints in HPI    OBJECTIVE:    /79   Pulse 73   Temp 97 °F (36.1 °C) (Temporal)   Resp 16   Ht 4' 11" (1.499 m)   Wt 70.8 kg (156 lb)   LMP  (LMP Unknown)   SpO2 (!) 94%   BMI 31.51 kg/m²     PHYSICAL EXAMINATION:    GENERAL: Well appearing, in no acute distress, alert and oriented x3.  PSYCH:  Mood and affect appropriate.  SKIN: Skin color, texture, turgor normal, no rashes or lesions which will impact the procedure.  CV: RRR with palpation of the radial artery.  PULM: No evidence of respiratory difficulty, symmetric chest rise. Clear to auscultation.  NEURO: Cranial nerves grossly intact.    Plan:    Proceed with procedure as planned Procedure(s) (LRB):  B/L L3,4,5 MBB #1 (Bilateral)    Andre Handy  06/19/2025            "

## 2025-06-19 NOTE — DISCHARGE SUMMARY
Discharge Note  Short Stay      SUMMARY     Admit Date: 6/19/2025    Attending Physician: Andre Handy      Discharge Physician: Andre Handy      Discharge Date: 6/19/2025 2:23 PM    Procedure(s) (LRB):  B/L L3,4,5 MBB #1 (Bilateral)    Final Diagnosis: Lumbar spondylosis [M47.816]    Disposition: Home or self care    Patient Instructions:   Discharge Medication List as of 6/19/2025  6:17 AM        START taking these medications    Details   betamethasone dipropionate (DIPROLENE) 0.05 % ointment Apply topically 2 (two) times daily. Prn psoriasis flares, Starting Wed 3/30/2022, Normal      doxycycline (VIBRAMYCIN) 100 MG Cap Take 1 po bid with food and not within 1 hour prior to lying down, Normal      esomeprazole (NEXIUM) 20 MG capsule Take 20 mg by mouth as needed (dyspepsia)., Historical Med      hydrocortisone (CORTEF) 5 MG Tab Take Two tabletsby mouth in the morning and one in the afternoon every day., Normal      mometasone 0.1% (ELOCON) 0.1 % cream AAA every day when red and tender under occlusion, Normal      ondansetron (ZOFRAN) 8 MG tablet Take 0.5 tablets (4 mg total) by mouth every 8 (eight) hours as needed for Nausea., Starting Fri 11/22/2024, Normal      ondansetron (ZOFRAN-ODT) 8 MG TbDL Take 1 tablet (8 mg total) by mouth every 6 (six) hours as needed (n/v)., Starting Mon 4/7/2025, Normal      OTEZLA 30 mg Tab TAKE 1 TABLET BY MOUTH DAILY, Fill Later      pilocarpine (SALAGEN, PILOCARPINE,) 5 MG Tab Take 1 tablet (5 mg total) by mouth 3 (three) times daily., Starting Fri 8/9/2024, Until Sat 8/9/2025, Normal      sotorasib (LUMAKRAS) 240 mg Tab Take 2 tablets (480 mg) by mouth once daily., Starting Mon 4/7/2025, Until Sat 7/5/2025, Fill Later      traMADoL (ULTRAM) 50 mg tablet Take 1 tablet (50 mg total) by mouth every 6 (six) hours as needed for Pain., Starting Wed 10/30/2024, Normal      triamcinolone acetonide 0.1% (KENALOG) 0.1 % cream AAA arms bid, Normal           CONTINUE these  medications which have NOT CHANGED    Details   LORazepam (ATIVAN) 1 MG tablet Take 1 tablet (1 mg total) by mouth every 6 (six) hours as needed for Anxiety (and half an hour before MRI)., Starting Tue 10/8/2024, Until Mon 4/7/2025 at 2359, Normal                 Discharge Diagnosis: Lumbar spondylosis [M47.816]  Condition on Discharge: Stable with no complications to procedure   Diet on Discharge: Same as before.  Activity: as per instruction sheet.  Discharge to: Home with a responsible adult.  Follow up: 2-4 weeks       Please call my office or pager at [phone] if experienced any weakness or loss of sensation, fever > 101.5, pain uncontrolled with oral medications, persistent nausea/vomiting/or diarrhea, redness or drainage from the incisions, or any other worrisome concerns. If physician on call was not reached or could not communicate with our office for any reason please go to the nearest emergency department

## 2025-06-19 NOTE — H&P
HPI  Patient presenting for Procedure(s) (LRB):  B/L L3,4,5 MBB #1 (Bilateral)     No health changes since previous encounter    Past Medical History:   Diagnosis Date    Allergy     Amblyopia     Cataract     Eczema     HS (hereditary spherocytosis)     Hx of total knee replacement 01/19/2016    right knee    Joint pain     Melena 11/26/2024     Past Surgical History:   Procedure Laterality Date    achilles tendon repair      BRONCHOSCOPY N/A 8/3/2023    Procedure: Bronchoscopy;  Surgeon: Saeed Gould MD;  [facility];  Service: Cardiothoracic;  Laterality: N/A;    CHOLECYSTECTOMY      ESOPHAGOGASTRODUODENOSCOPY N/A 11/27/2024    Procedure: EGD (ESOPHAGOGASTRODUODENOSCOPY);  Surgeon: Braxton Mooney MD;  Location: [facility] ENDO (2ND FLR);  Service: Endoscopy;  Laterality: N/A;    INJECTION OF ANESTHETIC AGENT AROUND MULTIPLE INTERCOSTAL NERVES N/A 8/3/2023    Procedure: BLOCK, NERVE, INTERCOSTAL, 2 OR MORE;  Surgeon: Saeed Gould MD;  [facility];  Service: Cardiothoracic;  Laterality: N/A;    KYPHOPLASTY, SPINE, LUMBAR N/A 1/6/2025    Procedure: KYPHOPLASTY, SPINE, LUMBAR;  Surgeon: Eddi Hinkle DO;  Location: [facility] OR;  Service: Pain Management;  Laterality: N/A;  [employer]; SNS nerve monitoring scheduled    LYMPHADENECTOMY Left 8/3/2023    Procedure: LYMPHADENECTOMY;  Surgeon: Saeed Gould MD;  [facility];  Service: Cardiothoracic;  Laterality: Left;    NAVIGATIONAL BRONCHOSCOPY N/A 6/23/2023    Procedure: BRONCHOSCOPY, NAVIGATIONAL;  Surgeon: Radha Mccollum MD;  [facility];  Service: Pulmonary;  Laterality: N/A;    TOTAL KNEE ARTHROPLASTY      XI ROBOTIC RATS Left 8/3/2023    Procedure: XI ROBOTIC RATS upper lobectomy;  Surgeon: Saeed Gould MD;  Location: [facility] OR [address] [place] [address];  Service: Cardiothoracic;  Laterality: Left;     Review of patient's allergies indicates:   Allergen Reactions    Paclitaxel Anaphylaxis, Other (See  "Comments) and Nausea And Vomiting     Pt states "Anaphylaxis" last encounter w/ throat swelling. Encountered back pain, coughing and head fuzzy today.    Metformin Itching    Morphine Other (See Comments)     headaches    Latex, natural rubber Rash and Other (See Comments)     Redness and peeling only with bandaids    Penicillins Nausea And Vomiting and Rash      Current Facility-Administered Medications   Medication    alprazolam ODT dissolvable tablet 0.5 mg       PMHx, PSHx, Allergies, Medications reviewed in epic    ROS negative except pain complaints in HPI    OBJECTIVE:    /79   Pulse 73   Temp 97 °F (36.1 °C) (Temporal)   Resp 16   Ht 4' 11" (1.499 m)   Wt 70.8 kg (156 lb)   LMP  (LMP Unknown)   SpO2 (!) 94%   BMI 31.51 kg/m²     PHYSICAL EXAMINATION:    GENERAL: Well appearing, in no acute distress, alert and oriented x3.  PSYCH:  Mood and affect appropriate.  SKIN: Skin color, texture, turgor normal, no rashes or lesions which will impact the procedure.  CV: RRR with palpation of the radial artery.  PULM: No evidence of respiratory difficulty, symmetric chest rise. Clear to auscultation.  NEURO: Cranial nerves grossly intact.    Plan:    Proceed with procedure as planned Procedure(s) (LRB):  B/L L3,4,5 MBB #1 (Bilateral)    Andre Handy  06/19/2025            "

## 2025-06-19 NOTE — OP NOTE
Diagnostic Lumbar Medial Branch Block Under Fluoroscopy    The procedure, risks, benefits, and options were discussed with the patient. There are no contraindications to the procedure. The patent expressed understanding and agreed to the procedure. Informed written consent was obtained prior to the start of the procedure and can be found in the patient's chart.    PATIENT NAME: Radha Ervin   MRN: [id number]     DATE OF PROCEDURE: 06/19/2025                                           PROCEDURE:  Diagnostic Bilateral L3, L4, and L5 Lumbar Medial Branch Block under Fluoroscopy    PRE-OP DIAGNOSIS: Lumbar spondylosis [M47.816] Lumbar spondylosis [M47.816]    POST-OP DIAGNOSIS: Same    PHYSICIAN: Andre Handy MD    MEDICATIONS INJECTED:  Bupivicaine 0.25%    LOCAL ANESTHETIC INJECTED:   Xylocaine 2%    SEDATION: None    ESTIMATED BLOOD LOSS:  None    COMPLICATIONS:  None.    INTERVAL HISTORY: Patient has clinical and imaging findings suggestive of facet mediated pain.    TECHNIQUE: Time-out was performed to identify the patient and procedure to be performed. With the patient laying in a prone position, the surgical area was prepped and draped in the usual sterile fashion using ChloraPrep and fenestrated drape. The levels were determined under fluoroscopic guidance. Skin anesthesia was achieved by injecting Lidocaine 2% over the injection sites. A 25 gauge, 3.5 inch needle was introduced into the medial branch nerves at the junctions of the superior articular process and the transverse processes of the targeted sites using AP, lateral and/or contralateral oblique fluoroscopic imaging. After negative aspiration for blood or CSF was confirmed, 1 mL of the anesthetic listed above was then slowly injected at each site. The needles were removed and bleeding was nil. A sterile dressing was applied. No specimens collected. The patient tolerated the procedure well.    PRE-PROCEDURE PAIN SCORE: 10/10    POST-PROCEDURE PAIN  SCORE: 0/10    The patient was monitored after the procedure in the recovery area. They were given post-procedure and discharge instructions to follow at home. The patient was discharged in a stable condition.    Andre Handy MD

## 2025-06-20 ENCOUNTER — TELEPHONE (OUTPATIENT)
Dept: PAIN MEDICINE | Facility: CLINIC | Age: 65
End: 2025-06-20
Payer: MEDICARE

## 2025-06-20 DIAGNOSIS — M47.816 LUMBAR SPONDYLOSIS: ICD-10-CM

## 2025-06-20 DIAGNOSIS — M47.9 OSTEOARTHRITIS OF SPINE, UNSPECIFIED SPINAL OSTEOARTHRITIS COMPLICATION STATUS, UNSPECIFIED SPINAL REGION: Primary | ICD-10-CM

## 2025-06-20 NOTE — TELEPHONE ENCOUNTER
----- Message from Andre Hubbard MD sent at 2025  2:00 PM CDT -----  Regarding: Order for MARTA MAYA    Patient Name: MARTA MAYA([id number])  Sex: Female  : 1960      PCP: AUGUSTUS ARTEAGA III    Center: [facility]     Types of orders made on 2025: Procedure Request    Order Date:2025  Ordering User:ANDRE HUBBARD [[id number]]  Encounter Provider:Andre Hubbard MD [[id number]]  Authorizing Provider: Andre Hubbard MD [[id number]]  Department:[facility][[id number]]    Common Order Information  Procedure -> Medial Branch Block (Specify level and laterality) Cmt: LEONCIO#2    Order Specific Information  Order: Procedure Request Order for Pain Management [Custom: [id number]]  Order #:          [id number]: 1 FUTURE    Priority: Routine  Class: Clinic Performed    Future Order Information      Expires on:2026            Expected by:2025                   Comment:IV sedation is a must    Associated Diagnoses      M47.9 Osteoarthritis of spine, unspecified spinal osteoarthritis       complication status, unspecified spinal region      M47.816 Lumbar spondylosis      Physician -> Ole         Facility Name: -> [facility]           Priority: Routine  Class: Clinic Performed    Future Order Information      Expires on:2026            Expected by:2025                   Comment:IV sedation is a must    Associated Diagnoses      M47.9 Osteoarthritis of spine, unspecified spinal osteoarthritis       complication status, unspecified spinal region      M47.816 Lumbar spondylosis      Procedure -> Medial Branch Block (Specify level and laterality) Cmt: MBB#2        Physician -> Ole         Facility Name: -> [facility]

## 2025-06-20 NOTE — TELEPHONE ENCOUNTER
Copied from CRM #[id number]. Topic: General Inquiry - Patient Advice  >> Jun 20, 2025 12:03 PM Gwendolyn wrote:  Patient calling to report that her condition has improved by 90%. Pls call

## 2025-06-26 ENCOUNTER — TELEPHONE (OUTPATIENT)
Dept: PAIN MEDICINE | Facility: CLINIC | Age: 65
End: 2025-06-26
Payer: MEDICARE

## 2025-07-01 ENCOUNTER — TELEPHONE (OUTPATIENT)
Dept: PAIN MEDICINE | Facility: HOSPITAL | Age: 65
End: 2025-07-01
Payer: MEDICARE

## 2025-07-01 ENCOUNTER — PATIENT OUTREACH (OUTPATIENT)
Dept: ADMINISTRATIVE | Facility: HOSPITAL | Age: 65
End: 2025-07-01
Payer: MEDICARE

## 2025-07-01 DIAGNOSIS — Z12.39 ENCOUNTER FOR SCREENING FOR MALIGNANT NEOPLASM OF BREAST, UNSPECIFIED SCREENING MODALITY: Primary | ICD-10-CM

## 2025-07-01 DIAGNOSIS — Z12.31 ENCOUNTER FOR SCREENING MAMMOGRAM FOR MALIGNANT NEOPLASM OF BREAST: ICD-10-CM

## 2025-07-01 NOTE — PROGRESS NOTES
07/01/2025  VB chart audit performed. Care Everywhere updates requested and reviewed  Overdue HM topic chart audit and/or requested. LINKS triggered and reconciled. Media reviewed. Health Maintenance Topic(s) Outreach Outcomes & Actions Taken:    Breast Cancer Screening - Outreach Outcomes & Actions Taken  : Mammogram Screening Scheduled     Additional Notes:       Care Management, Digital Medicine, and/or Education Referrals  Next Steps - Referral Actions: Digital Medicine Outcomes and Actions Taken: Pt Declined or Not Eligible

## 2025-07-01 NOTE — DISCHARGE INSTRUCTIONS
[facility]  Dr. Andre Handy  Messaging service # [phone]    MEDIAL BRANCH BLOCK POST-PROCEDURE INSTRUCTIONS:    What you need to do:  Keep a record of your response to the injection you had today.  It is very important that you accurately record how you responded to today's injection.  Please try to separate any soreness from the needle from your usual pain.  We want to know how this affects your usual pain.  Also REMEMBER that today's injection is a DIAGNOSTIC block, the pain relief will only last for a few hours to a few days.  Insurance requires 2 of these blocks before progressing to the ablation.  The sole purpose of this injection is to give us information, and not to treat your pain for an extended period.    Think about the amount of pain that you would have doing the most painful activities (I.e. bending, twisting, walking, standing straight, cleaning, etc...).  Now do those same activities as soon as you get home from the hospital.  Think about how much better you feel doing those activities now that the injection is done.  Does it feel 50%, 80%, 100% better than it would have otherwise?  This is the number you need to send me.    Send me a message through [facility] or leave a message at the phone number above telling me what % of improvement you got from the injection.    If you have difficulty putting a percentage on your pain relief fill this out:  (Rate each question below from 0-10 with 0 being no pain and 10 being the worst imaginable pain)    How bad would your pain get during activities like walking/going up stairs BEFORE the injection? _______    For the first 8 hours AFTER the injection, when you did those same painful activities, what was you best pain score? ____________    Send me those two numbers if you aren't able to give a  percentage.  ---------------------------------------------------------------------------------------------------------------------------------------------------------------------------------------------  What to watch out for:    If you experience any of the following symptoms after your procedure, please notify the messaging service immediately (see above for contact information):   fever (increased oral temperature)   bleeding or swelling at the injection site,    drainage, rash or redness at the injection site    possible signs of infection    increased pain at the injection site   worsening of your usual pain   severe headache   new or worsening numbness    new arm and/or leg weakness, or    changes in bowel and/or bladder function: urinating or defecating on yourself and not knowing that you did it.    PLEASE FOLLOW ALL INSTRUCTIONS CAREFULLY     Do not engage in strenuous activity (e.g., lifting or pushing heavy objects or repeated bending) for 24 hours.     Do not take a bath, swim or use Jacuzzi for 24 hours after procedure. (A shower is fine).   Remove any Band-Aids when you get home.    Use cold/ice, as needed for comfort.  We recommend the use of cold therapy alternating on for 20 minutes, off for 20 minutes.    Do not apply direct heat (heating pad or heat packs) to the injection site for 24 hours.     Resume your usual medications, unless instructed otherwise by your Pain Physician.     If you are on warfarin (Coumadin) or other blood thinner, resume this medication as instructed by your prescribing Physician.    IF AT ANY POINT YOU ARE VERY CONCERNED ABOUT YOUR SYMPTOMS, Urgent or emergent issues after between 5pm and 7am or on weekends, please contact the Doctor on call at [phone].    If you develop worsening pain, weakness, numbness, lose bowel or bladder control (i.e., having an accident where you did not even know you had to go to the bathroom and suddenly noticed you soiled yourself), saddle  anesthesia (a loss of sensation restricted to the area of the buttocks, anus and between the legs -- i.e., those parts of your body that would touch a saddle if you were sitting on one) you need to go immediately to the emergency department for evaluation and treatment.    ----------------------------------------------------------------------------------------------------------------------------------------------------------------  If you received Sedation please read the following instructions:  POST SEDATION INSTRUCTIONS    Today you received intravenous medication (also known as sedation) that was used to help you relax and/or decrease discomfort during your procedure. This medication will be acting in your body for the next 24 hours, so you might feel a little tired or sleepy. This feeling will slowly wear off.   Common side effects associated with these medications include: drowsiness, dizziness, sleepiness, confusion, feeling excited, difficulty remembering things, lack of steadiness with walking or balance, loss of fine muscle control, slowed reflexes, difficulty focusing, and blurred vision.  Some over-the-counter and prescription medications (e.g., muscle relaxants, opioids, mood-altering medications, sedatives/hypnotics, antihistamines) can interact with the intravenous medication you received and cause an increased risk of the side effects listed above in addition to other potentially life threatening side effects. Use extreme caution if you are taking such medications, and consult with your Pain Physician or prescribing physician if you have any questions.  For the next 12-24 hours:    DO NOT--Drive a car, operate machinery or power tools   DO NOT--Drink any alcoholic beverages (not even beer), they may dangerously increase the risk of side effects.    DO NOT--Make any important legal or business decisions or sign important documents.  We advise you to have someone to assist you at home. Move slowly and  carefully. Do not make sudden changes in position. Be aware of dizziness or light-headedness and move accordingly.   If you seek medical treatment within 24 hours, let the nurse or doctor caring for you know that you have received the above medications. If you have any questions or concerns related to your sedation or treatment today please contact us.

## 2025-07-03 ENCOUNTER — HOSPITAL ENCOUNTER (OUTPATIENT)
Facility: HOSPITAL | Age: 65
Discharge: HOME | End: 2025-07-03
Attending: EMERGENCY MEDICINE | Admitting: EMERGENCY MEDICINE
Payer: MEDICARE

## 2025-07-03 VITALS
TEMPERATURE: 98 F | DIASTOLIC BLOOD PRESSURE: 78 MMHG | WEIGHT: 152 LBS | HEART RATE: 79 BPM | SYSTOLIC BLOOD PRESSURE: 133 MMHG | BODY MASS INDEX: 30.64 KG/M2 | HEIGHT: 59 IN | OXYGEN SATURATION: 96 % | RESPIRATION RATE: 16 BRPM

## 2025-07-03 DIAGNOSIS — G89.29 CHRONIC PAIN: ICD-10-CM

## 2025-07-03 PROCEDURE — 64493 INJ PARAVERT F JNT L/S 1 LEV: CPT | Mod: 50,KX,, | Performed by: EMERGENCY MEDICINE

## 2025-07-03 PROCEDURE — 64494 INJ PARAVERT F JNT L/S 2 LEV: CPT | Mod: 50,KX,, | Performed by: EMERGENCY MEDICINE

## 2025-07-03 PROCEDURE — 64493 INJ PARAVERT F JNT L/S 1 LEV: CPT | Mod: 50 | Performed by: EMERGENCY MEDICINE

## 2025-07-03 PROCEDURE — 63600175 PHARM REV CODE 636 W HCPCS: Performed by: EMERGENCY MEDICINE

## 2025-07-03 PROCEDURE — 64494 INJ PARAVERT F JNT L/S 2 LEV: CPT | Mod: 50 | Performed by: EMERGENCY MEDICINE

## 2025-07-03 RX ORDER — BUPIVACAINE HYDROCHLORIDE 2.5 MG/ML
INJECTION, SOLUTION EPIDURAL; INFILTRATION; INTRACAUDAL; PERINEURAL
Status: DISCONTINUED | OUTPATIENT
Start: 2025-07-03 | End: 2025-07-03 | Stop reason: HOSPADM

## 2025-07-03 RX ORDER — MIDAZOLAM HYDROCHLORIDE 1 MG/ML
INJECTION INTRAMUSCULAR; INTRAVENOUS
Status: DISCONTINUED | OUTPATIENT
Start: 2025-07-03 | End: 2025-07-03 | Stop reason: HOSPADM

## 2025-07-03 RX ORDER — LIDOCAINE HYDROCHLORIDE 20 MG/ML
INJECTION, SOLUTION EPIDURAL; INFILTRATION; INTRACAUDAL; PERINEURAL
Status: DISCONTINUED | OUTPATIENT
Start: 2025-07-03 | End: 2025-07-03 | Stop reason: HOSPADM

## 2025-07-03 NOTE — PLAN OF CARE
Discharge instructions given and explained to patient with verbalization of understanding all instructions. Patients v/s stable, denies n/v and tolerating po, rates pain level tolerable, IV removed, and ready for patient discharge home.      Bandage(s) to injection site are clean, dry and intact with minimal to no drainage noted.  No edema or skin discoloration noted in perimeter of site.  Patient instruction to remove bandages this afternoon or tomorrow and monitor area for swelling or signs of injection.

## 2025-07-03 NOTE — OP NOTE
Diagnostic Lumbar Medial Branch Block Under Fluoroscopy    The procedure, risks, benefits, and options were discussed with the patient. There are no contraindications to the procedure. The patent expressed understanding and agreed to the procedure. Informed written consent was obtained prior to the start of the procedure and can be found in the patient's chart.    PATIENT NAME: Radha Ervin   MRN: [id number]     DATE OF PROCEDURE: 07/03/2025                                           PROCEDURE:  Diagnostic Bilateral L3, L4, and L5 Lumbar Medial Branch Block under Fluoroscopy    PRE-OP DIAGNOSIS: Osteoarthritis of spine, unspecified spinal osteoarthritis complication status, unspecified spinal region [M47.9]  Lumbar spondylosis [M47.816] Lumbar spondylosis [M47.816]    POST-OP DIAGNOSIS: Same    PHYSICIAN: Andre Handy MD    MEDICATIONS INJECTED:  Bupivicaine 0.25%    LOCAL ANESTHETIC INJECTED:   Xylocaine 2%    SEDATION: None    ESTIMATED BLOOD LOSS:  None    COMPLICATIONS:  None.    INTERVAL HISTORY: Patient has clinical and imaging findings suggestive of facet mediated pain. Patient had a previous diagnostic block performed with at least 80% relief in pain and/or at least 50% improvement in the ability to perform previously painful movements and ADLs for the expected duration of the local anesthetic utilized.    TECHNIQUE: Time-out was performed to identify the patient and procedure to be performed. With the patient laying in a prone position, the surgical area was prepped and draped in the usual sterile fashion using ChloraPrep and fenestrated drape. The levels were determined under fluoroscopic guidance. Skin anesthesia was achieved by injecting Lidocaine 2% over the injection sites. A 25 gauge, 3.5 inch needle was introduced into the medial branch nerves at the junctions of the superior articular process and the transverse processes of the targeted sites using AP, lateral and/or contralateral oblique  fluoroscopic imaging. After negative aspiration for blood or CSF was confirmed, 1 mL of the anesthetic listed above was then slowly injected at each site. The needles were removed and bleeding was nil. A sterile dressing was applied. No specimens collected. The patient tolerated the procedure well.    PRE-PROCEDURE PAIN SCORE: 10/10    POST-PROCEDURE PAIN SCORE: 1/10    The patient was monitored after the procedure in the recovery area. They were given post-procedure and discharge instructions to follow at home. The patient was discharged in a stable condition.    Andre Handy MD

## 2025-07-03 NOTE — DISCHARGE SUMMARY
Discharge Note  Short Stay      SUMMARY     Admit Date: 7/3/2025    Attending Physician: Andre Handy      Discharge Physician: Andre Handy      Discharge Date: 7/3/2025 11:07 AM    Procedure(s) (LRB):  MBB#2 B/L L3,4,5 (Bilateral)    Final Diagnosis: Osteoarthritis of spine, unspecified spinal osteoarthritis complication status, unspecified spinal region [M47.9]  Lumbar spondylosis [M47.816]    Disposition: Home or self care    Patient Instructions:   Discharge Medication List as of 7/3/2025  6:38 AM        START taking these medications    Details   betamethasone dipropionate (DIPROLENE) 0.05 % ointment Apply topically 2 (two) times daily. Prn psoriasis flares, Starting Wed 3/30/2022, Normal      doxycycline (VIBRAMYCIN) 100 MG Cap Take 1 po bid with food and not within 1 hour prior to lying down, Normal      esomeprazole (NEXIUM) 20 MG capsule Take 20 mg by mouth as needed (dyspepsia)., Historical Med      hydrocortisone (CORTEF) 5 MG Tab Take Two tabletsby mouth in the morning and one in the afternoon every day., Normal      mometasone 0.1% (ELOCON) 0.1 % cream AAA every day when red and tender under occlusion, Normal      ondansetron (ZOFRAN) 8 MG tablet Take 0.5 tablets (4 mg total) by mouth every 8 (eight) hours as needed for Nausea., Starting Fri 11/22/2024, Normal      ondansetron (ZOFRAN-ODT) 8 MG TbDL Take 1 tablet (8 mg total) by mouth every 6 (six) hours as needed (n/v)., Starting Mon 4/7/2025, Normal      OTEZLA 30 mg Tab TAKE 1 TABLET BY MOUTH DAILY, Fill Later      pilocarpine (SALAGEN, PILOCARPINE,) 5 MG Tab Take 1 tablet (5 mg total) by mouth 3 (three) times daily., Starting Fri 8/9/2024, Until Sat 8/9/2025, Normal      sotorasib (LUMAKRAS) 240 mg Tab Take 2 tablets (480 mg) by mouth once daily., Starting Mon 4/7/2025, Until Mon 8/4/2025, Fill Later      traMADoL (ULTRAM) 50 mg tablet Take 1 tablet (50 mg total) by mouth every 6 (six) hours as needed for Pain., Starting Wed 10/30/2024,  Normal      triamcinolone acetonide 0.1% (KENALOG) 0.1 % cream AAA arms bid, Normal           CONTINUE these medications which have NOT CHANGED    Details   LORazepam (ATIVAN) 1 MG tablet Take 1 tablet (1 mg total) by mouth every 6 (six) hours as needed for Anxiety (and half an hour before MRI)., Starting Tue 10/8/2024, Until Mon 4/7/2025 at 2359, Normal                 Discharge Diagnosis: Osteoarthritis of spine, unspecified spinal osteoarthritis complication status, unspecified spinal region [M47.9]  Lumbar spondylosis [M47.816]  Condition on Discharge: Stable with no complications to procedure   Diet on Discharge: Same as before.  Activity: as per instruction sheet.  Discharge to: Home with a responsible adult.  Follow up: 2-4 weeks       Please call my office or pager at [phone] if experienced any weakness or loss of sensation, fever > 101.5, pain uncontrolled with oral medications, persistent nausea/vomiting/or diarrhea, redness or drainage from the incisions, or any other worrisome concerns. If physician on call was not reached or could not communicate with our office for any reason please go to the nearest emergency department

## 2025-07-04 ENCOUNTER — PATIENT MESSAGE (OUTPATIENT)
Dept: PAIN MEDICINE | Facility: CLINIC | Age: 65
End: 2025-07-04
Payer: MEDICARE

## 2025-07-04 DIAGNOSIS — M47.816 LUMBAR SPONDYLOSIS: ICD-10-CM

## 2025-07-04 DIAGNOSIS — M47.9 OSTEOARTHRITIS OF SPINE, UNSPECIFIED SPINAL OSTEOARTHRITIS COMPLICATION STATUS, UNSPECIFIED SPINAL REGION: Primary | ICD-10-CM

## 2025-07-07 ENCOUNTER — TELEPHONE (OUTPATIENT)
Dept: PAIN MEDICINE | Facility: CLINIC | Age: 65
End: 2025-07-07
Payer: MEDICARE

## 2025-07-07 DIAGNOSIS — M47.9 OSTEOARTHRITIS OF SPINE, UNSPECIFIED SPINAL OSTEOARTHRITIS COMPLICATION STATUS, UNSPECIFIED SPINAL REGION: Primary | ICD-10-CM

## 2025-07-07 DIAGNOSIS — M47.816 LUMBAR SPONDYLOSIS: ICD-10-CM

## 2025-07-07 NOTE — TELEPHONE ENCOUNTER
----- Message from Andre Hubbard MD sent at 2025 12:56 PM CDT -----  Regarding: Order for MARTA MAYA    Patient Name: MARTA MAYA([id number])  Sex: Female  : 1960      PCP: AUGUSTUS ARTEAGA III    Center: [facility]     Types of orders made on 2025: Procedure Request    Order Date:2025  Ordering User:ANDRE HUBBARD [[id number]]  Encounter Provider:Andre Hubbard MD [[id number]]  Authorizing Provider: Andre Hubbard MD [[id number]]  Department:[facility][[id number]]    Common Order Information  Procedure -> Radiofrequency Ablation (Specify level and laterality) Cmt: L3,4,5             BL    Order Specific Information  Order: Procedure Request Order for Pain Management [Custom: [id number]]  Order #:          [id number]: 1 FUTURE    Priority: Routine  Class: Clinic Performed    Future Order Information      Expires on:2026            Expected by:2025                   Associated Diagnoses      M47.9 Osteoarthritis of spine, unspecified spinal osteoarthritis       complication status, unspecified spinal region      M47.816 Lumbar spondylosis      Physician -> Ole         Facility Name: -> [facility]           Priority: Routine  Class: Clinic Performed    Future Order Information      Expires on:2026            Expected by:2025                   Associated Diagnoses      M47.9 Osteoarthritis of spine, unspecified spinal osteoarthritis       complication status, unspecified spinal region      M47.816 Lumbar spondylosis      Procedure -> Radiofrequency Ablation (Specify level and laterality) Cmt:                 L3,4,5 BL        Physician -> Ole         Facility Name: -> [facility]

## 2025-07-14 ENCOUNTER — HOSPITAL ENCOUNTER (OUTPATIENT)
Dept: RADIOLOGY | Facility: HOSPITAL | Age: 65
Discharge: HOME | End: 2025-07-14
Attending: INTERNAL MEDICINE
Payer: MEDICARE

## 2025-07-14 ENCOUNTER — OFFICE VISIT (OUTPATIENT)
Dept: HEMATOLOGY/ONCOLOGY | Facility: CLINIC | Age: 65
End: 2025-07-14
Payer: MEDICARE

## 2025-07-14 VITALS
BODY MASS INDEX: 31.64 KG/M2 | HEIGHT: 59 IN | DIASTOLIC BLOOD PRESSURE: 76 MMHG | RESPIRATION RATE: 16 BRPM | OXYGEN SATURATION: 98 % | SYSTOLIC BLOOD PRESSURE: 116 MMHG | WEIGHT: 156.94 LBS | HEART RATE: 73 BPM

## 2025-07-14 DIAGNOSIS — C34.90 NON-SMALL CELL LUNG CANCER, UNSPECIFIED LATERALITY: Primary | ICD-10-CM

## 2025-07-14 DIAGNOSIS — L65.9 LOSS OF HAIR: ICD-10-CM

## 2025-07-14 DIAGNOSIS — Z12.39 ENCOUNTER FOR SCREENING FOR MALIGNANT NEOPLASM OF BREAST, UNSPECIFIED SCREENING MODALITY: ICD-10-CM

## 2025-07-14 DIAGNOSIS — G47.00 INSOMNIA, UNSPECIFIED TYPE: ICD-10-CM

## 2025-07-14 DIAGNOSIS — Z12.31 ENCOUNTER FOR SCREENING MAMMOGRAM FOR MALIGNANT NEOPLASM OF BREAST: ICD-10-CM

## 2025-07-14 DIAGNOSIS — Z79.899 DRUG THERAPY: ICD-10-CM

## 2025-07-14 PROCEDURE — 1101F PT FALLS ASSESS-DOCD LE1/YR: CPT | Mod: CPTII,S$GLB,, | Performed by: INTERNAL MEDICINE

## 2025-07-14 PROCEDURE — 1126F AMNT PAIN NOTED NONE PRSNT: CPT | Mod: CPTII,S$GLB,, | Performed by: INTERNAL MEDICINE

## 2025-07-14 PROCEDURE — 3078F DIAST BP <80 MM HG: CPT | Mod: CPTII,S$GLB,, | Performed by: INTERNAL MEDICINE

## 2025-07-14 PROCEDURE — 3008F BODY MASS INDEX DOCD: CPT | Mod: CPTII,S$GLB,, | Performed by: INTERNAL MEDICINE

## 2025-07-14 PROCEDURE — 3074F SYST BP LT 130 MM HG: CPT | Mod: CPTII,S$GLB,, | Performed by: INTERNAL MEDICINE

## 2025-07-14 PROCEDURE — 1159F MED LIST DOCD IN RCRD: CPT | Mod: CPTII,S$GLB,, | Performed by: INTERNAL MEDICINE

## 2025-07-14 PROCEDURE — 77063 BREAST TOMOSYNTHESIS BI: CPT | Mod: TC

## 2025-07-14 PROCEDURE — 99999 PR PBB SHADOW E&M-EST. PATIENT-LVL IV: CPT | Mod: PBBFAC,,, | Performed by: INTERNAL MEDICINE

## 2025-07-14 PROCEDURE — 3288F FALL RISK ASSESSMENT DOCD: CPT | Mod: CPTII,S$GLB,, | Performed by: INTERNAL MEDICINE

## 2025-07-14 PROCEDURE — 99215 OFFICE O/P EST HI 40 MIN: CPT | Mod: S$GLB,,, | Performed by: INTERNAL MEDICINE

## 2025-07-14 RX ORDER — DOXEPIN HYDROCHLORIDE 10 MG/1
10 CAPSULE ORAL NIGHTLY
Qty: 30 CAPSULE | Refills: 11 | Status: SHIPPED | OUTPATIENT
Start: 2025-07-14 | End: 2026-07-14

## 2025-07-14 NOTE — PROGRESS NOTES
PATIENT: Radha Ervin  MRN: [id number]  DATE: 7/14/2025      Subjective:     Chief complaint:  Chief Complaint   Patient presents with    Lung Cancer    Follow-up       Oncologic History:      Ms. Ervin is a 63-year-old female with 30 pack-year history of smoking, quit approximately 4 months ago, who was recently diagnosed with left lung cancer after evaluation for an abnormal chest x-ray.  A subsequent CT of the chest without contrast on May 26, 2023 revealed a spiculated nodule along the paramediastinal left upper lobe measuring 2.7 x 1.9 cm.  A mildly enlarged right paratracheal lymph node was seen measuring 1.1 cm.  She underwent EBUS and biopsy on June 23, 2023.  Pathology revealed the left upper lobe lesion positive for adenocarcinoma.  Station 4R was negative for malignancy.       A PET scan on July 13, 2023 revealed the left upper lobe mass measuring 2.6 x 1.6 cm with SUV max 7.4.  There was mildly metabolic prevascular mediastinal lymph node measuring 0.9 cm seen in short axis with SUV max 2.3.  She underwent robotic left upper lobectomy and lymph node sampling on August 3, 2023.  Pathology revealed invasive poorly differentiated adenocarcinoma measuring 2.1 cm.  There was visceral pleural invasion noted.  Vascular resection revealed invasive tumor present in the adventitial soft tissue.  There was lymphovascular invasion noted.  Invasive carcinoma is present at the vascular margin.  3/4 level 6 lymph nodes, 1/2 level 11 lymph nodes and 1/4 level 12 lymph nodes were involved out of a total of 23 lymph nodes.  Surgery was complicated by intraoperative bleeding of pulmonary arterial branches which resolved with pressure.      Her case was discussed at [facility] on 8/24/23: Recommend chemoRT followed by immunotherapy.     Established care with med onc 8/28. She had seen Dr. Quevedo who had recommended sequential chemoradiation then to be followed by immunotherapy. Consented for sequential CRT with  "carbo/taxol.    On 9/22/23 she presented to the infusion center for carbo/taxol C#1 but developed infusion reaction to taxol so that was stopped.  Had reaction with second infusion as well--carbo/taxol discontinued.  Consented for carbo/pemetrexed on 11/2/23    C#1 carbo/pemetrexed 11/3/23  C#2 carbo/pemetrexed 11/24/23  C#3 acute worsening of renal function--pemetrexed held: 12/15/23  C#4 carboplatin only 1/5/24    Radiation 60Gy in 30Fx 2/5/24--3/26/24    C#1 durvalumab 4/19/24  C#2 5/17/24  C#3 6/14/24  C#4 7/12/24  C#5 8/9/24   C#6 9/6/24  C#7 10/4/24    CT CAP 10/2/24 suspicious for lumbar spinal metastasis.  Pet CT 10/16/24 confirmed uptake in L-spine    She received XRT to L-spine metastasis 2fx 11/25--11/26/24           Interval History: Ms. Ervin returns for follow up. Scheduled to see endocrinology later this month. Still having a lot of nausea and vomiting--zofran helps some but not fully. Discussed reducing dose of Lumakras for this reason. No new areas of pain but chronic back pain related to bulging disk remains symptomatic. BP today on low end likely because of dehydration--discussed doing fluids.     INTERVAL 5/16/25  Here for follow-up while on Lumakras. PET CT yesterday OPAL. There was finding of "lipomatous hypertrophy of interatrial septum, (LHIS)" which is reportedly a benign condition--she denies chest pain, SOB, heart palpitations. Skin changes on PET CT related to known skin infections both of which are improved in general compared to before when she had gotten antibiotics.     INTERVAL 7/14/25  Ms. Ervin presents for scheduled follow-up. She notes her hair is falling out. She reports also difficulty sleeping at night--says diphenhydramine didn't help to fall asleep. Doing okay with Lumakras lately.       Review of Systems   A comprehensive review of systems was performed; pertinent positives and negatives are noted in the HPI.        ECOG Performance Status:   ECOG SCORE    1 - Restricted " "in strenuous activity-ambulatory and able to carry out work of a light nature         Objective:      Vitals:   Vitals:    07/14/25 1422   BP: 116/76   BP Location: Left arm   Patient Position: Sitting   Pulse: 73   Resp: 16   SpO2: 98%   Weight: 71.2 kg (156 lb 15.5 oz)   Height: 4' 11" (1.499 m)     BMI: Body mass index is 31.7 kg/m².      Physical Exam:   Physical Exam  Vitals and nursing note reviewed.   Constitutional:       General: She is not in acute distress.     Appearance: Normal appearance. She is not toxic-appearing.   HENT:      Head: Normocephalic and atraumatic.   Eyes:      General: No scleral icterus.     Conjunctiva/sclera: Conjunctivae normal.   Cardiovascular:      Rate and Rhythm: Normal rate and regular rhythm.      Heart sounds: Normal heart sounds. No murmur heard.  Pulmonary:      Effort: Pulmonary effort is normal. No respiratory distress.      Breath sounds: Normal breath sounds. No wheezing, rhonchi or rales.   Abdominal:      General: There is no distension.      Palpations: Abdomen is soft.      Tenderness: There is no abdominal tenderness.   Musculoskeletal:         General: No swelling, tenderness or deformity.      Cervical back: Neck supple. No tenderness.   Skin:     Coloration: Skin is not jaundiced.      Findings: No erythema.   Neurological:      General: No focal deficit present.      Mental Status: She is alert and oriented to person, place, and time.      Motor: No weakness.   Psychiatric:         Mood and Affect: Mood normal.         Behavior: Behavior normal.           Laboratory Data:  WBC   Date Value Ref Range Status   07/14/2025 8.47 3.90 - 12.70 K/uL Final     Hemoglobin   Date Value Ref Range Status   01/25/2025 12.4 12.0 - 16.0 g/dL Final     HGB   Date Value Ref Range Status   07/14/2025 12.4 12.0 - 16.0 gm/dL Final     POC Hematocrit   Date Value Ref Range Status   12/22/2024 35 (L) 36 - 54 %PCV Final     Hematocrit   Date Value Ref Range Status   01/25/2025 40.5 " 37.0 - 48.5 % Final     HCT   Date Value Ref Range Status   07/14/2025 37.4 37.0 - 48.5 % Final     Platelet Count   Date Value Ref Range Status   07/14/2025 239 150 - 450 K/uL Final     Platelets   Date Value Ref Range Status   01/25/2025 337 150 - 450 K/uL Final     Gran # (ANC)   Date Value Ref Range Status   01/25/2025 5.6 1.8 - 7.7 K/uL Final     Gran %   Date Value Ref Range Status   01/25/2025 58.8 38.0 - 73.0 % Final       Chemistry        Component Value Date/Time     07/14/2025 1501     01/25/2025 1014    K 3.7 07/14/2025 1501    K 3.9 01/25/2025 1014     (H) 07/14/2025 1501     (H) 01/25/2025 1014    CO2 22 (L) 07/14/2025 1501    CO2 22 (L) 01/25/2025 1014    BUN 15 07/14/2025 1501    CREATININE 1.1 07/14/2025 1501    GLU 84 07/14/2025 1501     01/25/2025 1014        Component Value Date/Time    CALCIUM 9.2 07/14/2025 1501    CALCIUM 9.4 01/25/2025 1014    ALKPHOS 64 07/14/2025 1501    ALKPHOS 93 01/25/2025 1014    AST 17 07/14/2025 1501    AST 12 01/25/2025 1014    ALT 8 (L) 07/14/2025 1501    ALT 11 01/25/2025 1014    BILITOT 0.4 07/14/2025 1501    BILITOT 0.3 01/25/2025 1014    ESTGFRAFRICA >60.0 04/06/2020 1002    EGFRNONAA >60.0 04/06/2020 1002        NM PET CT FDG Skull Base to Mid Thigh  Order: [id number]   Status: Final result       Next appt: 03/05/2025 at 04:00 PM in Outpatient Rehab ([facility], [place])       Dx: Adenocarcinoma, lung, left    Test Result Released: Yes (seen)    0 Result Notes  Details    Reading Physician Reading Date Result Priority   Joanna Leong MD  [phone]  2/26/2025 Routine   Bharat Clark MD  [phone]  2/26/2025      Narrative & Impression  EXAMINATION:  NM PET CT FDG SKULL BASE TO MID THIGH     CLINICAL HISTORY:  Non-small cell lung cancer (NSCLC), metastatic, assess treatment response; Malignant neoplasm of unspecified part of left bronchus or lung 64-year-old female with history of lung cancer 06/23/2023 positive lymph nodes  08/03/2023 and metastatic lung carcinoma 01/08/2025     TECHNIQUE:  15.9 mCi of F18-FDG was administered intravenously in the right antecubital fossa.  After an approximately 60 min distribution time, PET/CT images were acquired from the skull base to mid thigh.  Transmission images were acquired to correct for attenuation using a whole body low-dose CT scan without IV contrast with the arms positioned above the head. Glycemia at the time of injection was 98 mg/dL.     COMPARISON:  CT abdomen pelvis 11/22/2024.  FDG PET-CT 10/16/2024.     FINDINGS:  Quality of the study: Adequate.     In the head and neck, there are no hypermetabolic lesions worrisome for malignancy.  No pathologically enlarged or hypermetabolic lymph nodes.     In the chest, there are postsurgical changes of prior left upper lobectomy.  There is continued bandlike opacities and architectural distortion near the surgical site, likely scarring/post radiation changes.  No focal uptake to suggest local recurrence.  No concerning pulmonary nodules or masses, and there are no pathologically enlarged or hypermetabolic lymph nodes.     In the abdomen and pelvis, there is physiologic tracer distribution within the abdominal organs and excretion into the genitourinary system.     In the bones, there are no new hypermetabolic lesions worrisome for malignancy.  Status post vertebral augmentation at L1. There is physiologic muscular uptake about the pelvis.     Increased soft tissue and muscle uptake     Additional CT findings: Mild diffuse atherosclerosis.  Cholecystectomy.  Bilateral nonobstructive punctate renal stones.  Hysterectomy.  Few remote right rib fractures.     Impression:     History of lung malignancy status post left upper lobectomy.  No tracer uptake in the left lung to suggest local recurrence.     No hypermetabolic lymphadenopathy or evidence of new distant metastasis.     Electronically signed by resident: Bharat Clark  Date:                                             02/26/2025  Time:                                           14:07     Electronically signed by:Joanna Leong  Date:                                            02/26/2025  Time:                                           14:42        Exam Ended: 02/26/25 13:45 CST Last Resulted: 02/26/25 14:42 CST     NM PET CT FDG Skull Base to Mid Thigh  Order: [id number]   Status: Final result       Next appt: 06/03/2025 [place] 09[place]30 [place] [place]       Dx: Secondary malignant neoplasm of bone;...    Test Result Released: Yes (seen)    0 Result Notes  Details    Reading Physician Reading Date Result Priority   Joanna Leong MD  [phone]  5/15/2025 Routine   Jose Argueta MD  [phone]  5/15/2025      Narrative & Impression  EXAMINATION:  NM PET CT FDG SKULL BASE TO MID THIGH     CLINICAL HISTORY:  Non-small cell lung cancer (NSCLC), metastatic, assess treatment response; Secondary malignant neoplasm of bone     64-year-old female with history of non-small cell lung cancer status post left upper lobectomy and chemotherapy.     TECHNIQUE:  13.48 mCi of F18-FDG was administered intravenously in the right antecubital fossa.  After an approximately 60 min distribution time, PET/CT images were acquired from the skull base to mid-thigh. Transmission images were acquired to correct for attenuation using a whole body low-dose CT scan without IV contrast with the arms positioned above the head. Glycemia at the time of injection was 113 mg/dL.     COMPARISON:  NM 02/26/2025     FINDINGS:  Quality of the study: Adequate.     In the head and neck, there are no hypermetabolic lesions worrisome for malignancy. There are no hypermetabolic mucosal lesions, and there are no pathologically enlarged or hypermetabolic lymph nodes.     In the chest, there is a new hypermetabolic focus in the mediastinum, with max SUV measuring 7.1, with underlying lipomatous hypertrophy of the interatrial  septum (image 78).  There are no concerning pulmonary nodules or masses, and there are no pathologically enlarged or hypermetabolic lymph nodes.     In the abdomen and pelvis, there is physiologic tracer distribution within the abdominal organs and excretion into the genitourinary system.  There is a hypermetabolic soft tissue nodule along the anterior abdominal wall measuring 0.7 x 2.0 cm, with max SUV measuring 4.2 (image 27), new from prior.  Additional area of hypermetabolic skin thickening with associated inflammatory infiltration of the subcutaneous fat uptake along the inner aspect of the right posterior thigh measuring 1.9 x 0.6 cm, which was present when compared to prior and has a max SUV of 8.5 (image 227)     In the bones, there is a new hypermetabolic focus involving the articulation of the 2nd rib with the vertebral body max SUV measuring 4 with no CT correlate, which is likely degenerative in nature (image 47).     In the extremities, there are no hypermetabolic lesions worrisome for malignancy.  Diffuse muscle uptake and arthritic changes     Additional CT findings: Postsurgical changes of left upper lobectomy.  Prior cholecystectomy.  Bilateral non-obstructing renal stones.  Left-sided renal cyst.  Colonic diverticulosis.  Prior vertebroplasty of L1 vertebral body.     Impression:     1. There are areas of hypermetabolic uptake in the skin folds, which is likely infectious or inflammatory in nature.  2. 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  3. No definite convincing evidence of recurrence or metastatic disease.     Electronically signed by resident: Jose Argueta  Date:                                            05/15/2025  Time:                                           09:53     Electronically signed by:Joanna Leong  Date:                                            05/15/2025  Time:                                           10:47        Exam Ended: 05/15/25  09:45 CDT Last Resulted: 05/15/25 10:47 CDT       Assessment/Plan:     1. Non-small cell lung cancer, unspecified laterality    2. Loss of hair    3. Drug therapy    4. Insomnia, unspecified type      Metastatic NSCLC with KRAS g12c mutation, oligometastatic disease to L-spine now s/p XRT. Scans 2/26/25 OPAL, scans 5/16/25 OPAL.   2ry adrenal insufficiency--continue hydrocortisone. Endocrine consult next week.     Trial of doxepin for insomnia.    Repeat scans and follow-up 6 weeks.    Labs to eval for metabolic causes of hair loss as below:      Med and Orders:  Orders Placed This Encounter    CT Chest Abdomen Pelvis With IV Contrast (XPD) NO Oral Contrast    CBC auto differential    TSH    FREE T4    Iron and TIBC    FERRITIN    CMP    BMP    doxepin (SINEQUAN) 10 MG capsule       Follow Up:  Follow up in about 6 weeks (around 8/25/2025).      Above care plan was discussed with patient and all questions were addressed to their expressed satisfaction.       Roger Eduardo MD, FACP  Hematology & Medical Oncology  [facility]         High Risk Conditions:    Patient has a condition that poses threat to life and bodily function: mNSCLC, KRAS g12c mutated.  Patient is currently on drug therapy requiring intensive monitoring for toxicity: lumakras

## 2025-07-14 NOTE — Clinical Note
Hi Ms. Ervin told me you wanted her to ask me about the use of Cosentyx, I think that will be fine from heme/onc perspective and hopefully would help with her hidreadenitis

## 2025-07-17 ENCOUNTER — TELEPHONE (OUTPATIENT)
Dept: PAIN MEDICINE | Facility: CLINIC | Age: 65
End: 2025-07-17
Payer: MEDICARE

## 2025-07-21 ENCOUNTER — OFFICE VISIT (OUTPATIENT)
Dept: ENDOCRINOLOGY | Facility: CLINIC | Age: 65
End: 2025-07-21
Payer: MEDICARE

## 2025-07-21 VITALS
HEART RATE: 74 BPM | DIASTOLIC BLOOD PRESSURE: 74 MMHG | BODY MASS INDEX: 31.59 KG/M2 | WEIGHT: 156.38 LBS | SYSTOLIC BLOOD PRESSURE: 106 MMHG

## 2025-07-21 DIAGNOSIS — E03.9 HYPOTHYROIDISM, UNSPECIFIED TYPE: Chronic | ICD-10-CM

## 2025-07-21 DIAGNOSIS — E27.40 ADRENAL INSUFFICIENCY: Primary | ICD-10-CM

## 2025-07-21 DIAGNOSIS — C34.92 ADENOCARCINOMA, LUNG, LEFT: Chronic | ICD-10-CM

## 2025-07-21 DIAGNOSIS — N18.31 STAGE 3A CHRONIC KIDNEY DISEASE: ICD-10-CM

## 2025-07-21 DIAGNOSIS — T45.AX5D ADVERSE EFFECT OF IMMUNE CHECKPOINT INHIBITOR, SUBSEQUENT ENCOUNTER: ICD-10-CM

## 2025-07-21 PROCEDURE — 1101F PT FALLS ASSESS-DOCD LE1/YR: CPT | Mod: CPTII,S$GLB,, | Performed by: HOSPITALIST

## 2025-07-21 PROCEDURE — 3008F BODY MASS INDEX DOCD: CPT | Mod: CPTII,S$GLB,, | Performed by: HOSPITALIST

## 2025-07-21 PROCEDURE — 3074F SYST BP LT 130 MM HG: CPT | Mod: CPTII,S$GLB,, | Performed by: HOSPITALIST

## 2025-07-21 PROCEDURE — 99999 PR PBB SHADOW E&M-EST. PATIENT-LVL IV: CPT | Mod: PBBFAC,,, | Performed by: HOSPITALIST

## 2025-07-21 PROCEDURE — 99204 OFFICE O/P NEW MOD 45 MIN: CPT | Mod: S$GLB,,, | Performed by: HOSPITALIST

## 2025-07-21 PROCEDURE — 3078F DIAST BP <80 MM HG: CPT | Mod: CPTII,S$GLB,, | Performed by: HOSPITALIST

## 2025-07-21 PROCEDURE — 3288F FALL RISK ASSESSMENT DOCD: CPT | Mod: CPTII,S$GLB,, | Performed by: HOSPITALIST

## 2025-07-21 PROCEDURE — 1159F MED LIST DOCD IN RCRD: CPT | Mod: CPTII,S$GLB,, | Performed by: HOSPITALIST

## 2025-07-21 PROCEDURE — 1160F RVW MEDS BY RX/DR IN RCRD: CPT | Mod: CPTII,S$GLB,, | Performed by: HOSPITALIST

## 2025-07-21 NOTE — PATIENT INSTRUCTIONS
HYDROCORTISONE 1 pill in the AM and nothing in the afternoon>> for 1 month    Check lab work in 6 weeks       This note was copied from the chart of Priya Phillip.  Initial Lactation visit. Hand out given. Recommend unlimited, frequent breast feedings: At least 8 - 12 times every 24 hours. Avoid pacifiers and supplementation with formula unless medically indicated. Explained benefits of holding baby skin on skin to help promote better breastfeeding outcomes. Will revisit as needed.    Bhavya Santos RN, IBCLC

## 2025-07-21 NOTE — ASSESSMENT & PLAN NOTE
- Patient with   adrenal insufficiency Secondary to immunotherapy induced?, history of lung cancer  - Literature review:  immunotherapy:durvalumab 4/19/24 until 10/4/2024, can lead to adrenal insufficiency primary vs secondary  - Endocrine eConsult 12/31/2024:  Patient was unable to wean off hydrocortisone, started on oral hydrocortisone at that time 15/5 mg on 12/31/2024. Reported symptomatic when discontinue.  - MRI brain 11/25/2024 did not show any intracranial process  - CT abdomen/pelvis 11/22/2024: Noted unremarkable adrenal  - On evaluation radiation with me 7/21/2025 Patient reports that he has stopped taking hydrocortisone at night for the last few months.  Vital signs are stable.  Feel well    PLAN  - Patient expressed desire in possibly getting off hydrocortisone, on discussion 7/21/2025  - Will attempt to wean off hydrocortisone, recommend starting hydrocortisone 5 mg daily for the next 30 days, before stopping altogether  - Will repeat lab work in 6 week:  Checking random 8:00 a.m. cortisol and ACTH, if random cortisol in the a.m. then is greater than 8, high likelihood to be able to stopped hydrocortisone, if cortisol is low can consider ACTH stimulation test  - Discuss the symptoms of adrenal insufficiency: Low blood pressure, fatigue, weight loss, advise that if she is develop these symptoms that she should continue taking hydrocortisone     REPEAT LAB WORK  - Checking 8:00 a.m. cortisol (advised patient to home morning hydrocortisone in the future  - Check:  ACTH/cortisol/prolactin/IGF/TSH/free T4  - Check: 21 hydroxylase antibody>> given history of autoimmunity

## 2025-07-21 NOTE — ASSESSMENT & PLAN NOTE
- Secondary to immunotherapy induced? , history of lung cancer on immunotherapy.   - history of of thyroid fluctuation with suppressed TSH <0.010 initially 8/2024, elevated TSH hypothyroidism afterwards: Noted 10/2024 and 04/2025  - TSH elevated back then:  1/25/2025 TSH 7.8, TSH was 4.08 on 4/4/2025  - Patient briefly was on levothyroxine 75 mcg daily, noted 10/2024, did not take it regularly  - now thyroid function stable at 3.4 on 7/14/2025  - continue monitor

## 2025-07-21 NOTE — PROGRESS NOTES
Subjective:      Patient ID: Radha Ervin is a 65 y.o. female presented to [facility] today.  Chief complaint:  Adrenal Insufficiency      History of Present illness: Radha Ervin is a 65 y.o. female here for  adrenal insufficiency  Other significant past medical history:  Left lung cancer, diagnosed 05/2023.  Current PCP: Suraj Herrera III, MD    Patient follows with hematology oncology Dr. Eduardo: For left lung cancer 2023.  She underwent robotic left upper lobectomy and lymph node sampling on August 3, 2023.  Pathology revealed invasive poorly differentiated adenocarcinoma measuring 2.1 cm.  There was visceral pleural invasion noted.  Vascular resection revealed invasive tumor present in the adventitial soft tissue.  There was lymphovascular invasion noted.  Invasive carcinoma is present at the vascular margin.  3/4 level 6 lymph nodes, 1/2 level 11 lymph nodes and 1/4 level 12 lymph nodes were involved out of a total of 23 lymph nodes.  Surgery was complicated by intraoperative bleeding of pulmonary arterial branches which resolved with pressure.       1) Adrenal insufficiency  - Secondary to immunotherapy induced? , history of lung cancer  - ChemoRT followed by immunotherapy.   Carbo/pemetrexed on 11/2/23 until 1/5/2024  Radiation 60Gy in 30Fx 2/5/24--3/26/24  Started on immunotherapy:durvalumab 4/19/24 until 10/4/2024  Since that time patient has been on dexamethasone, also on hydrocortisone premedication in 2023, 2024  - Endocrine eConsult 12/31/2024:  Patient was unable to wean off hydrocortisone, started on oral hydrocortisone at that time 15/5 mg on 12/31/2024. Reported symptomatic when discontinue.  - Reviewed labs and on 12/27/2024 random cortisol drawn at 10:45am was 1.00. ACTH <5 (while on steroid?). Unsure when last dexamethasone dose was prior to these labs however, as dexamethasone is very long acting and if the pt had taken it within 72h before the lab draw,  "that could be the reason for the low cortisol.   - MRI brain 11/25/2024 did not show any intracranial process  - CT abdomen/pelvis 11/22/2024: Noted unremarkable adrenal  - On evaluation radiation with me 7/21/2025 Patient reports that he has stopped taking hydrocortisone at night for the last few months.  Vital signs are stable.  Feel well    CURRENT REGIMEN:  Hydrocortisone 10 mg mg in the a.m..  (no longer taking afternoon dose)  Denies any symptoms below    Primary adrenal insufficiency symptoms:  No   Yes  [x]    []   Weakness  [x]    []   Fatigue  [x]    []   Weight loss/loss of appetite  [x]    []   Hyperpigmentation on palms of hands and soles of feet  [x]    []   Hypotensive symptoms- lightheadedness when standing  [x]    []   Muscle and joint pain   [x]    []   Hyperpigmentation on palms of hands and soles of feet  [x]    []   Nausea & vomiting  [x]    []   Salt cravings    Lab Work  Lab Results   Component Value Date    CORTISOL 1.00 12/27/2024    ACTH <5 12/27/2024     No results found for: "[id number]"  Lab Results   Component Value Date    ACTH <5 12/27/2024       2) Subclinical hypothyroidism  - Secondary to immunotherapy induced? , history of lung cancer on immunotherapy.   - history of of thyroid fluctuation with e suppressed TSH <0.010 initially 8/2024, elevated TSH hypothyroidism afterwards: Noted 10/2024 and 04/2025  - TSH elevated back then:  1/25/2025 TSH 7.8, TSH was 4.08 on 4/4/2025  - Patient briefly was on levothyroxine 75 mcg daily, noted 10/2024, did not take it regularly  - patient attributed levothyroxine causing her dry mouth  - now thyroid function stable at 3.4 on 7/14/2025  - NOT ON LEVOTHYROXINE    Thyroid lab work  Lab Results   Component Value Date    TSH 3.445 07/14/2025    TSH 4.068 (H) 04/04/2025    TSH 7.800 (H) 01/25/2025    FREET4 0.87 07/14/2025    FREET4 0.87 07/14/2025      Antibodies  No results found for: "THYROPEROXID", "THYROIDAB", "TSIMMGLBN", "THYROIDSTIMI", " ""THYROS", "THYROTROPINR", "LABTHYR"        denies symptoms of adrenal insufficiency (no lightheadedness, N/V/abd pain, hypotension).     The patient's medications, allergies, past medical, surgical, social and family histories were reviewed and updated as appropriate.  Review of Systems: pertinent positives as per the above HPI, and otherwise negative.    Objective:   /74   Pulse 74   Wt 70.9 kg (156 lb 6.4 oz)   LMP  (LMP Unknown)   BMI 31.59 kg/m²   Body mass index is 31.59 kg/m².  Vital signs reviewed    Physical Exam  Vitals and nursing note reviewed.   Constitutional:       Appearance: Normal appearance. She is well-developed. She is not ill-appearing.   Neck:      Thyroid: No thyromegaly.   Pulmonary:      Effort: Pulmonary effort is normal. No respiratory distress.   Musculoskeletal:         General: Normal range of motion.      Cervical back: Normal range of motion.   Neurological:      General: No focal deficit present.      Mental Status: She is alert. Mental status is at baseline.   Psychiatric:         Mood and Affect: Mood normal.         Behavior: Behavior normal.       Labs reviewed:  See results in subjective  Lab Results   Component Value Date    HGBA1C 6.3 (H) 04/28/2023     Lab Results   Component Value Date    CHOL 167 05/26/2023    HDL 42 05/26/2023    LDLCALC 104 05/26/2023    TRIG 134 05/26/2023     Lab Results   Component Value Date     07/14/2025    K 3.7 07/14/2025     (H) 07/14/2025    CO2 22 (L) 07/14/2025    GLU 84 07/14/2025    BUN 15 07/14/2025    CREATININE 1.1 07/14/2025    CALCIUM 9.2 07/14/2025    PHOS 2.6 (L) 12/24/2024    PROT 7.3 07/14/2025    ALBUMIN 3.6 07/14/2025    BILITOT 0.4 07/14/2025    ALKPHOS 64 07/14/2025    AST 17 07/14/2025    ALT 8 (L) 07/14/2025    ANIONGAP 6 (L) 07/14/2025    EGFRNORACEVR 56 (L) 07/14/2025    TSH 3.445 07/14/2025    .6 (H) 01/26/2024     Assessment     1. Adrenal insufficiency  Ambulatory referral/consult to " Endocrinology    Cortisol, 8AM    ACTH    Comprehensive Metabolic Panel    Metanephrines, Plasma Free    DHEA-Sulfate    Renin    Aldosterone    TSH    T4, Free    21-Hydroxylase Antibodies, Serum    Prolactin    Insulin-Like Growth Factor      2. Adverse effect of immune checkpoint inhibitor, subsequent encounter        3. Adenocarcinoma, lung, left        4. Stage 3a chronic kidney disease        5. Hypothyroidism, unspecified type           Plan     Adrenal insufficiency  - Patient with   adrenal insufficiency Secondary to immunotherapy induced?, history of lung cancer  - Literature review:  immunotherapy:durvalumab 4/19/24 until 10/4/2024, can lead to adrenal insufficiency primary vs secondary  - Endocrine eConsult 12/31/2024:  Patient was unable to wean off hydrocortisone, started on oral hydrocortisone at that time 15/5 mg on 12/31/2024. Reported symptomatic when discontinue.  - MRI brain 11/25/2024 did not show any intracranial process  - CT abdomen/pelvis 11/22/2024: Noted unremarkable adrenal  - On evaluation radiation with me 7/21/2025 Patient reports that he has stopped taking hydrocortisone at night for the last few months.  Vital signs are stable.  Feel well    PLAN  - Patient expressed desire in possibly getting off hydrocortisone, on discussion 7/21/2025  - Will attempt to wean off hydrocortisone, recommend starting hydrocortisone 5 mg daily for the next 30 days, before stopping altogether  - Will repeat lab work in 6 week:  Checking random 8:00 a.m. cortisol and ACTH, if random cortisol in the a.m. then is greater than 8, high likelihood to be able to stopped hydrocortisone, if cortisol is low can consider ACTH stimulation test  - Discuss the symptoms of adrenal insufficiency: Low blood pressure, fatigue, weight loss, advise that if she is develop these symptoms that she should continue taking hydrocortisone     REPEAT LAB WORK  - Checking 8:00 a.m. cortisol (advised patient to home morning  hydrocortisone in the future  - Check:  ACTH/cortisol/prolactin/IGF/TSH/free T4  - Check: 21 hydroxylase antibody>> given history of autoimmunity    Adenocarcinoma, lung, left  - history of left lung cancer 2023 with ChemoRT followed by immunotherapy.   Carbo/pemetrexed on 11/2/23 until 1/5/2024  Radiation 60Gy in 30Fx 2/5/24--3/26/24  Started on immunotherapy:durvalumab 4/19/24 until 10/4/2024  - continue follow up with hematology/oncology    Stage 3a chronic kidney disease  - CKD stage IIIA  - monitor kidney function    Hypothyroidism  - Secondary to immunotherapy induced? , history of lung cancer on immunotherapy.   - history of of thyroid fluctuation with suppressed TSH <0.010 initially 8/2024, elevated TSH hypothyroidism afterwards: Noted 10/2024 and 04/2025  - TSH elevated back then:  1/25/2025 TSH 7.8, TSH was 4.08 on 4/4/2025  - Patient briefly was on levothyroxine 75 mcg daily, noted 10/2024, did not take it regularly  - now thyroid function stable at 3.4 on 7/14/2025  - continue monitor       Advised patient to follow up with PCP for routine health maintenance care.   RTC in pending evaluation    Dalton Washburn M.D.  Endocrinology  [facility] [place], [place]  7/21/2025      Disclaimer: This note has been generated in part with the use of voice-recognition software. There may be typographical errors that have been missed during proof-reading.  This note was generated with the assistance of ambient listening technology. Verbal consent was obtained by the patient and accompanying visitor(s) for the recording of patient appointment to facilitate this note. I attest to having reviewed and edited the generated note for accuracy, though some syntax or spelling errors may persist. Please contact the author of this note for any clarification.

## 2025-07-21 NOTE — ASSESSMENT & PLAN NOTE
- history of left lung cancer 2023 with ChemoRT followed by immunotherapy.   Carbo/pemetrexed on 11/2/23 until 1/5/2024  Radiation 60Gy in 30Fx 2/5/24--3/26/24  Started on immunotherapy:durvalumab 4/19/24 until 10/4/2024  - continue follow up with hematology/oncology

## 2025-07-22 ENCOUNTER — TELEPHONE (OUTPATIENT)
Dept: PAIN MEDICINE | Facility: CLINIC | Age: 65
End: 2025-07-22
Payer: MEDICARE

## 2025-07-23 NOTE — DISCHARGE INSTRUCTIONS
[facility]  Dr. Andre Handy  Messaging service # [phone]    RADIOFREQUENCY ABLATION POST-PROCEDURE INSTRUCTIONS:    Today you had a procedure called a radiofrequency ablation.  This is a procedure to ablate (or burn) the nerve that send pain signals from the small joints in your back or neck.  The ablation procedure can take 4-6 weeks for the nerve to die off.  Do not be surprised if your normal pain returns after the procedure and then should slowly improve over the course of the next few weeks.  Typically people do not get as much relief from the ablation as they do with the block, but the ablation should last much longer  The nerves will grow back!  The procedure can be repeated (without repeating the blocks) as long as you get 50% or more pain relief for at least 6 months.  So try to pay attention to when/if the pain returns.  The older we are, the slower the nerves regenerate.  I have seen patients get up to 5 years of pain improvement from this procedure, but more common in around 1 year.  It is important to combine this procedure with an increase in appropriate physical activity.  Strengthening the surrounding muscles will help this therapy last longer and provide better pain relief.  If you need a referral to physical therapy please let the office know.  Try to use this as an opportunity to decrease your pain medications if you are on any.  If you do not have a follow up appointment scheduled, please contact our office to get a post-procedure follow up scheduled 4-6 weeks after the procedure.  This can be done as a virtual visit if that is more convenient for you.    The procedure you had also included a steroid medication.  The steroid was mixed with a local anesthetic when it was injected to help prevent post-ablation pain.   If the procedure was in the neck, you may feel some pressure, numbness, or slight weakness in the arm after the procedure for a short period of time  (this is a normal response), if this persists for longer than 1 day please contact our office or go to the emergency room.  If the procedure was in the low back, you may feel some pressure, numbness, or slight weakness in the leg after the procedure for a short period of time (this is a normal response), if this persists for longer than 1 day please contact our office or go to the emergency room.  You may get side effects from the steroid.  This is not uncommon.  Symptoms include: elevated blood sugar, elevated blood pressure, headache, flushing, nausea, insomnia.  These symptoms are transient and will resolve within 1-3 days.  If symptoms last longer than this please contact our office or head to the emergency room.  Steroid medications can take anywhere from 3-14 days to take effect (rarely longer).  You may notice that your pain worsens for a short period of time after the injection, this would not be unusual due to the pressure and trauma from the needle.    *If you had the ablation in your neck it is not uncommon to get increased sensitivity and pain for 4-6 weeks after the procedure.  This is called neuritis and it will improve, but can take a month or two for it to fully resolve*    What you need to do:    Keep a record of your response to the injection you had today.    How much relief did you get?   When did the relief start and how long did it last?  Were you able to decrease the use of any of your pain medications?  Were you able to increase your level of activity?  How long did the relief last?    What to watch out for:    If you experience any of the following symptoms after your procedure, please notify the messaging service immediately (see above for contact information):   fever (increased oral temperature)   bleeding or swelling at the injection site,    drainage, rash or redness at the injection site    possible signs of infection    increased pain at the injection site   worsening of your usual  pain   severe headache   new or worsening numbness    new arm and/or leg weakness, or    changes in bowel and/or bladder function: urinating or defecating on yourself and not knowing that you did it.    PLEASE FOLLOW ALL INSTRUCTIONS CAREFULLY     Do not engage in strenuous activity (e.g., lifting or pushing heavy objects or repeated bending) for 24-48 hours.     Do not take a bath, swim or use Jacuzzi for 24 hours after procedure. (A shower is fine).   Remove any Band-Aids when you get home.    Use cold/ice, as needed for comfort.  We recommend the use of cold therapy alternating on for 20 minutes, off for 20 minutes.    Do not apply direct heat (heating pad or heat packs) to the injection site for 24 hours.     Resume your usual medications, unless instructed otherwise by your Pain Physician.     If you are on warfarin (Coumadin) or other blood thinner, resume this medication as instructed by your prescribing Physician.    IF AT ANY POINT YOU ARE VERY CONCERNED ABOUT YOUR SYMPTOMS, Urgent or emergent issues after between 5pm and 7am or on weekends, please contact the Doctor on call at [phone].    If you develop worsening pain, weakness, numbness, lose bowel or bladder control (i.e., having an accident where you did not even know you had to go to the bathroom and suddenly noticed you soiled yourself), saddle anesthesia (a loss of sensation restricted to the area of the buttocks, anus and between the legs -- i.e., those parts of your body that would touch a saddle if you were sitting on one) you need to go immediately to the emergency department for evaluation and treatment.    ----------------------------------------------------------------------------------------------------------------------------------------------------------------  If you received Sedation please read the following instructions:  POST SEDATION INSTRUCTIONS    Today you received intravenous medication (also known as sedation) that was used  to help you relax and/or decrease discomfort during your procedure. This medication will be acting in your body for the next 24 hours, so you might feel a little tired or sleepy. This feeling will slowly wear off.   Common side effects associated with these medications include: drowsiness, dizziness, sleepiness, confusion, feeling excited, difficulty remembering things, lack of steadiness with walking or balance, loss of fine muscle control, slowed reflexes, difficulty focusing, and blurred vision.  Some over-the-counter and prescription medications (e.g., muscle relaxants, opioids, mood-altering medications, sedatives/hypnotics, antihistamines) can interact with the intravenous medication you received and cause an increased risk of the side effects listed above in addition to other potentially life threatening side effects. Use extreme caution if you are taking such medications, and consult with your Pain Physician or prescribing physician if you have any questions.  For the next 12-24 hours:    DO NOT--Drive a car, operate machinery or power tools   DO NOT--Drink any alcoholic beverages (not even beer), they may dangerously increase the risk of side effects.    DO NOT--Make any important legal or business decisions or sign important documents.  We advise you to have someone to assist you at home. Move slowly and carefully. Do not make sudden changes in position. Be aware of dizziness or light-headedness and move accordingly.   If you seek medical treatment within 24 hours, let the nurse or doctor caring for you know that you have received the above medications. If you have any questions or concerns related to your sedation or treatment today please contact us.

## 2025-07-24 ENCOUNTER — HOSPITAL ENCOUNTER (OUTPATIENT)
Facility: HOSPITAL | Age: 65
Discharge: HOME | End: 2025-07-24
Attending: EMERGENCY MEDICINE | Admitting: EMERGENCY MEDICINE
Payer: MEDICARE

## 2025-07-24 VITALS
WEIGHT: 156 LBS | OXYGEN SATURATION: 99 % | DIASTOLIC BLOOD PRESSURE: 78 MMHG | HEART RATE: 86 BPM | RESPIRATION RATE: 16 BRPM | SYSTOLIC BLOOD PRESSURE: 136 MMHG | BODY MASS INDEX: 31.45 KG/M2 | TEMPERATURE: 98 F | HEIGHT: 59 IN

## 2025-07-24 DIAGNOSIS — G89.29 CHRONIC PAIN: ICD-10-CM

## 2025-07-24 PROCEDURE — 64636 DESTROY L/S FACET JNT ADDL: CPT | Mod: 50 | Performed by: EMERGENCY MEDICINE

## 2025-07-24 PROCEDURE — 99153 MOD SED SAME PHYS/QHP EA: CPT | Performed by: EMERGENCY MEDICINE

## 2025-07-24 PROCEDURE — 64636 DESTROY L/S FACET JNT ADDL: CPT | Mod: 50,,, | Performed by: EMERGENCY MEDICINE

## 2025-07-24 PROCEDURE — 63600175 PHARM REV CODE 636 W HCPCS: Performed by: EMERGENCY MEDICINE

## 2025-07-24 PROCEDURE — 99152 MOD SED SAME PHYS/QHP 5/>YRS: CPT | Performed by: EMERGENCY MEDICINE

## 2025-07-24 PROCEDURE — 64635 DESTROY LUMB/SAC FACET JNT: CPT | Mod: 50 | Performed by: EMERGENCY MEDICINE

## 2025-07-24 PROCEDURE — 64635 DESTROY LUMB/SAC FACET JNT: CPT | Mod: 50,,, | Performed by: EMERGENCY MEDICINE

## 2025-07-24 RX ORDER — MIDAZOLAM HYDROCHLORIDE 1 MG/ML
INJECTION INTRAMUSCULAR; INTRAVENOUS
Status: DISCONTINUED | OUTPATIENT
Start: 2025-07-24 | End: 2025-07-24 | Stop reason: HOSPADM

## 2025-07-24 RX ORDER — BUPIVACAINE HYDROCHLORIDE 2.5 MG/ML
INJECTION, SOLUTION EPIDURAL; INFILTRATION; INTRACAUDAL; PERINEURAL
Status: DISCONTINUED | OUTPATIENT
Start: 2025-07-24 | End: 2025-07-24 | Stop reason: HOSPADM

## 2025-07-24 RX ORDER — DEXAMETHASONE SODIUM PHOSPHATE 10 MG/ML
INJECTION, SOLUTION INTRA-ARTICULAR; INTRALESIONAL; INTRAMUSCULAR; INTRAVENOUS; SOFT TISSUE
Status: DISCONTINUED | OUTPATIENT
Start: 2025-07-24 | End: 2025-07-24 | Stop reason: HOSPADM

## 2025-07-24 RX ORDER — FENTANYL CITRATE 50 UG/ML
INJECTION, SOLUTION INTRAMUSCULAR; INTRAVENOUS
Status: DISCONTINUED | OUTPATIENT
Start: 2025-07-24 | End: 2025-07-24 | Stop reason: HOSPADM

## 2025-07-24 RX ORDER — LIDOCAINE HYDROCHLORIDE 20 MG/ML
INJECTION, SOLUTION EPIDURAL; INFILTRATION; INTRACAUDAL; PERINEURAL
Status: DISCONTINUED | OUTPATIENT
Start: 2025-07-24 | End: 2025-07-24 | Stop reason: HOSPADM

## 2025-07-24 RX ORDER — ONDANSETRON HYDROCHLORIDE 2 MG/ML
4 INJECTION, SOLUTION INTRAVENOUS ONCE AS NEEDED
Status: DISCONTINUED | OUTPATIENT
Start: 2025-07-24 | End: 2025-07-24 | Stop reason: HOSPADM

## 2025-07-24 NOTE — OP NOTE
Therapeutic Lumbar Medial Branch Radiofrequency Ablation under Fluoroscopy     The procedure, risks, benefits, and options were discussed with the patient. There are no contraindications to the procedure. The patent expressed understanding and agreed to the procedure. Informed written consent was obtained prior to the start of the procedure and can be found in the patient's chart.        PATIENT NAME: Radha Ervin   MRN: [id number]     DATE OF PROCEDURE: 07/24/2025     PROCEDURE:  Bilateral L3, L4, and L5 Lumbar Radiofrequency Ablation under Fluoroscopy    PRE-OP DIAGNOSIS: Osteoarthritis of spine, unspecified spinal osteoarthritis complication status, unspecified spinal region [M47.9]  Lumbar spondylosis [M47.816] Lumbar spondylosis [M47.816]    POST-OP DIAGNOSIS: Same    PHYSICIAN: Andre Handy MD    MEDICATIONS INJECTED:  Preservative-free Decadron 10mg with 9cc of Bupivicaine 0.25%    LOCAL ANESTHETIC INJECTED:   Xylocaine 2%    SEDATION: Versed 3mg and Fentanyl 100mcg                                                                                                                                                                                     Conscious sedation ordered by M.D. Patient re-evaluation prior to administration of conscious sedation. No changes noted in patient's status from initial evaluation. The patient's vital signs were monitored by RN and patient remained hemodynamically stable throughout the procedure.    Event Time In   Sedation Start 1023   Sedation End 1050       ESTIMATED BLOOD LOSS:  None    COMPLICATIONS:  None     INTERVAL HISTORY: Patient has clinical and imaging findings suggestive of facet mediated pain. Patients has completed 2 previous diagnostic medial branch blocks at specified levels with at least 80% relief for the expected duration of the local anesthetic utilized.    TECHNIQUE: Time-out was performed to identify the patient and procedure to be performed. With the  patient laying in a prone position, the surgical area was prepped and draped in the usual sterile fashion using ChloraPrep and fenestrated drape. The levels were determined under fluoroscopic guidance. Skin anesthesia was achieved by injecting Lidocaine 2% over the injection sites. A 18 gauge 10mm curved active tip needle was introduced to the anatomic local of the medial branch at each of the above levels using AP, lateral and/or contralateral oblique fluoroscopic imaging. Then sensory and motor testing was performed to confirm that the needle tips were in the correct location. After negative aspiration for blood or CSF was confirmed, 1 mL of the lidocaine 2% listed above was injected slowly at each site. This was followed by thermal lesioning at 80 degrees celsius for 90 seconds. Needles were then rotated, depth was checked with lateral fluoroscopy, and an additional ablation was performed without pain. That was followed by slowly injecting 1 mL of the medication mixture listed above at each site. The needles were removed and bleeding was nil. A sterile dressing was applied. No specimens collected. The patient tolerated the procedure well and did not have any procedure related motor deficit at the conclusion of the procedure.    The patient was monitored after the procedure in the recovery area. They were given post-procedure and discharge instructions to follow at home. The patient was discharged in a stable condition.    Andre Handy MD

## 2025-07-24 NOTE — H&P
HPI  Patient presenting for Procedure(s) (LRB):  B/L L3,4,5 RFA (Bilateral)       No health changes since previous encounter    Past Medical History:   Diagnosis Date    Allergy     Amblyopia     Cataract     Eczema     HS (hereditary spherocytosis)     Hx of total knee replacement 01/19/2016    right knee    Joint pain     Melena 11/26/2024     Past Surgical History:   Procedure Laterality Date    achilles tendon repair      BRONCHOSCOPY N/A 8/3/2023    Procedure: Bronchoscopy;  Surgeon: Saeed Gould MD;  [address];  Service: Cardiothoracic;  Laterality: N/A;    CHOLECYSTECTOMY      ESOPHAGOGASTRODUODENOSCOPY N/A 11/27/2024    Procedure: EGD (ESOPHAGOGASTRODUODENOSCOPY);  Surgeon: Braxton Mooney MD;  [facility]  Service: Endoscopy;  Laterality: N/A;    INJECTION OF ANESTHETIC AGENT AROUND MEDIAL BRANCH NERVES INNERVATING LUMBAR FACET JOINT Bilateral 6/19/2025    Procedure: B/L L3,4,5 MBB #1;  Surgeon: Andre Handy MD;  Location: [facility] PAIN MANAGEMENT;  Service: Pain Management;  Laterality: Bilateral;  oral sed no AC    INJECTION OF ANESTHETIC AGENT AROUND MEDIAL BRANCH NERVES INNERVATING LUMBAR FACET JOINT Bilateral 7/3/2025    Procedure: MBB#2 B/L L3,4,5;  Surgeon: Andre Handy MD;  Location: [facility] PAIN MANAGEMENT;  Service: Pain Management;  Laterality: Bilateral;  no ac-iv versed    INJECTION OF ANESTHETIC AGENT AROUND MULTIPLE INTERCOSTAL NERVES N/A 8/3/2023    Procedure: BLOCK, NERVE, INTERCOSTAL, 2 OR MORE;  Surgeon: Saeed Gould MD;  [facility];  Service: Cardiothoracic;  Laterality: N/A;    KYPHOPLASTY, SPINE, LUMBAR N/A 1/6/2025    Procedure: KYPHOPLASTY, SPINE, LUMBAR;  Surgeon: Eddi Hinkle DO;  Location: [facility] OR;  Service: Pain Management;  Laterality: N/A;  [employer]; SNS nerve monitoring scheduled    LYMPHADENECTOMY Left 8/3/2023    Procedure: LYMPHADENECTOMY;  Surgeon: Saeed Gould MD;  Location: [facility]  "2ND FLR;  Service: Cardiothoracic;  Laterality: Left;    NAVIGATIONAL BRONCHOSCOPY N/A 6/23/2023    Procedure: BRONCHOSCOPY, NAVIGATIONAL;  Surgeon: Radha Mccollum MD;  Location: [facility] OR 2ND FLR;  Service: Pulmonary;  Laterality: N/A;    TOTAL KNEE ARTHROPLASTY      XI ROBOTIC RATS Left 8/3/2023    Procedure: XI ROBOTIC RATS upper lobectomy;  Surgeon: Saeed Gould MD;  Location: [facility] OR 2ND FLR;  Service: Cardiothoracic;  Laterality: Left;     Review of patient's allergies indicates:   Allergen Reactions    Paclitaxel Anaphylaxis, Other (See Comments) and Nausea And Vomiting     Pt states "Anaphylaxis" last encounter w/ throat swelling. Encountered back pain, coughing and head fuzzy today.    Metformin Itching    Morphine Other (See Comments)     headaches    Latex, natural rubber Rash and Other (See Comments)     Redness and peeling only with bandaids    Penicillins Nausea And Vomiting and Rash      No current facility-administered medications for this encounter.       PMHx, PSHx, Allergies, Medications reviewed in epic    ROS negative except pain complaints in HPI    OBJECTIVE:    LMP  (LMP Unknown)   Breastfeeding No     PHYSICAL EXAMINATION:    GENERAL: Well appearing, in no acute distress, alert and oriented x3.  PSYCH:  Mood and affect appropriate.  SKIN: Skin color, texture, turgor normal, no rashes or lesions which will impact the procedure.  CV: RRR with palpation of the radial artery.  PULM: No evidence of respiratory difficulty, symmetric chest rise. Clear to auscultation.  NEURO: Cranial nerves grossly intact.    Plan:    Proceed with procedure as planned Procedure(s) (LRB):  B/L L3,4,5 RFA (Bilateral)    Andre Handy  07/24/2025            "

## 2025-07-24 NOTE — DISCHARGE SUMMARY
Discharge Note  Short Stay      SUMMARY     Admit Date: 7/24/2025    Attending Physician: Andre Handy      Discharge Physician: Andre Handy      Discharge Date: 7/24/2025 10:52 AM    Procedure(s) (LRB):  B/L L3,4,5 RFA (Bilateral)    Final Diagnosis: Osteoarthritis of spine, unspecified spinal osteoarthritis complication status, unspecified spinal region [M47.9]  Lumbar spondylosis [M47.816]    Disposition: Home or self care    Patient Instructions:   Current Discharge Medication List        CONTINUE these medications which have NOT CHANGED    Details   hydrocortisone (CORTEF) 5 MG Tab Take Two tabletsby mouth in the morning and one in the afternoon every day.  Qty: 90 tablet, Refills: 11    Associated Diagnoses: Adrenal insufficiency      ondansetron (ZOFRAN) 8 MG tablet Take 0.5 tablets (4 mg total) by mouth every 8 (eight) hours as needed for Nausea.  Qty: 30 tablet, Refills: 3    Associated Diagnoses: CINV (chemotherapy-induced nausea and vomiting)      ondansetron (ZOFRAN-ODT) 8 MG TbDL Take 1 tablet (8 mg total) by mouth every 6 (six) hours as needed (n/v).  Qty: 30 tablet, Refills: 3    Associated Diagnoses: Adrenal insufficiency      OTEZLA 30 mg Tab TAKE 1 TABLET BY MOUTH DAILY  Qty: 30 tablet, Refills: 3    Associated Diagnoses: Psoriasis      sotorasib (LUMAKRAS) 240 mg Tab Take 2 tablets (480 mg) by mouth once daily.  Qty: 60 tablet, Refills: 3    Associated Diagnoses: Adrenal insufficiency; Secondary malignant neoplasm of bone; Malignant neoplasm of unspecified part of unspecified bronchus or lung      betamethasone dipropionate (DIPROLENE) 0.05 % ointment Apply topically 2 (two) times daily. Prn psoriasis flares  Qty: 45 g, Refills: 3      doxepin (SINEQUAN) 10 MG capsule Take 1 capsule (10 mg total) by mouth every evening.  Qty: 30 capsule, Refills: 11    Associated Diagnoses: Insomnia, unspecified type      doxycycline (VIBRAMYCIN) 100 MG Cap Take 1 po bid with food and not within 1 hour  prior to lying down  Qty: 60 capsule, Refills: 0    Comments: Ok to substitute with doxycycline monohydrate or tablets if covered by patient's insurance  Associated Diagnoses: Hidradenitis suppurativa      esomeprazole (NEXIUM) 20 MG capsule Take 20 mg by mouth as needed (dyspepsia).      LORazepam (ATIVAN) 1 MG tablet Take 1 tablet (1 mg total) by mouth every 6 (six) hours as needed for Anxiety (and half an hour before MRI).  Qty: 10 tablet, Refills: 3    Associated Diagnoses: Situational anxiety      mometasone 0.1% (ELOCON) 0.1 % cream AAA every day when red and tender under occlusion  Qty: 60 g, Refills: 2    Associated Diagnoses: Hidradenitis suppurativa      pilocarpine (SALAGEN, PILOCARPINE,) 5 MG Tab Take 1 tablet (5 mg total) by mouth 3 (three) times daily.  Qty: 90 tablet, Refills: 11    Associated Diagnoses: Clinical xerostomia      traMADoL (ULTRAM) 50 mg tablet Take 1 tablet (50 mg total) by mouth every 6 (six) hours as needed for Pain.  Qty: 28 each, Refills: 0    Associated Diagnoses: Secondary malignant neoplasm of bone      triamcinolone acetonide 0.1% (KENALOG) 0.1 % cream AAA arms bid  Qty: 80 g, Refills: 1    Associated Diagnoses: Atopic dermatitis, unspecified type                 Discharge Diagnosis: Osteoarthritis of spine, unspecified spinal osteoarthritis complication status, unspecified spinal region [M47.9]  Lumbar spondylosis [M47.816]  Condition on Discharge: Stable with no complications to procedure   Diet on Discharge: Same as before.  Activity: as per instruction sheet.  Discharge to: Home with a responsible adult.  Follow up: 2-4 weeks       Please call my office or pager at [phone] if experienced any weakness or loss of sensation, fever > 101.5, pain uncontrolled with oral medications, persistent nausea/vomiting/or diarrhea, redness or drainage from the incisions, or any other worrisome concerns. If physician on call was not reached or could not communicate with our office for  any reason please go to the nearest emergency department

## 2025-07-24 NOTE — PLAN OF CARE
Bandage(s) are intact to the procedural site. Scant to no drainage noted. No edema or skin discoloration outside of skin antiseptic noted in perimeter of site.  Pt. Instructed to keep area dry and free from heat for 24 hrs, but may apply ice packs for 20 minutes as needed.      Discharge instructions given and explained to patient with verbalization of understanding all instructions. Patients v/s stable, denies n/v and tolerating po, rates pain level tolerable, IV removed, and ready for patient discharge home.       Discharge instructions given and explained to patient with verbalization of understanding all instructions. Patients v/s stable, denies n/v and tolerating po, rates pain level tolerable, IV removed, and family at bedside for patient discharge home.

## 2025-07-28 ENCOUNTER — OFFICE VISIT (OUTPATIENT)
Dept: DERMATOLOGY | Facility: CLINIC | Age: 65
End: 2025-07-28
Payer: MEDICARE

## 2025-07-28 DIAGNOSIS — L73.2 HIDRADENITIS SUPPURATIVA: Primary | ICD-10-CM

## 2025-07-28 DIAGNOSIS — Z79.899 LONG-TERM USE OF HIGH-RISK MEDICATION: ICD-10-CM

## 2025-07-28 PROCEDURE — 99214 OFFICE O/P EST MOD 30 MIN: CPT | Mod: 25,S$GLB,, | Performed by: DERMATOLOGY

## 2025-07-28 PROCEDURE — 3288F FALL RISK ASSESSMENT DOCD: CPT | Mod: CPTII,S$GLB,, | Performed by: DERMATOLOGY

## 2025-07-28 PROCEDURE — 1101F PT FALLS ASSESS-DOCD LE1/YR: CPT | Mod: CPTII,S$GLB,, | Performed by: DERMATOLOGY

## 2025-07-28 PROCEDURE — 1160F RVW MEDS BY RX/DR IN RCRD: CPT | Mod: CPTII,S$GLB,, | Performed by: DERMATOLOGY

## 2025-07-28 PROCEDURE — 11900 INJECT SKIN LESIONS </W 7: CPT | Mod: S$GLB,,, | Performed by: DERMATOLOGY

## 2025-07-28 PROCEDURE — 1159F MED LIST DOCD IN RCRD: CPT | Mod: CPTII,S$GLB,, | Performed by: DERMATOLOGY

## 2025-07-28 PROCEDURE — 1125F AMNT PAIN NOTED PAIN PRSNT: CPT | Mod: CPTII,S$GLB,, | Performed by: DERMATOLOGY

## 2025-07-28 PROCEDURE — 99999 PR PBB SHADOW E&M-EST. PATIENT-LVL III: CPT | Mod: PBBFAC,,, | Performed by: DERMATOLOGY

## 2025-07-28 RX ORDER — TRIAMCINOLONE ACETONIDE 40 MG/ML
40 INJECTION, SUSPENSION INTRA-ARTICULAR; INTRAMUSCULAR ONCE
Status: SHIPPED | OUTPATIENT
Start: 2025-07-28

## 2025-07-28 NOTE — PROGRESS NOTES
"  Subjective:      Patient ID:  Radha Ervin is a 65 y.o. female who presents for   Chief Complaint   Patient presents with    Hidradenitis Suppurativa     F/u     Patient here for Hidradenitis suppurativa flare    Last seen on 6/3/2025 for IL K 20mg into 3 lesions right and left upper thigh- improved. Pt has not yet started doxy 100mg bid x 1 month due to tests performed by pain clinic.    Condition overall - same.    Current flare on Left upper thigh.    Current treatment regimen:  Otezla 30mg 1x/day (dx with pso 2/2 humira) - Pso well controlled with no flares  Hibiclens alt BPO wash qday  Mometasone cream prn flares    Has not yet started:  Turmeric    Lifestyle modifications - diet, smoking, shaving etc  Limiting "white" foods, sugars and processed foods    Not shaving  Not smoking    Dx with lung cancer 5/23 - finished chemo and radiation - started immunotherapy 09/2024  Dx with metastatic to spine lung cancer 10/24 -             Review of Systems   Constitutional:  Positive for weight loss (156# (28#)). Negative for weight gain.   HENT:  Negative for nosebleeds and headaches.    Gastrointestinal:  Negative for nausea, diarrhea (resolved. never had colonoscopy) and Sensitivity to oral antibiotics.   Genitourinary:  Negative for irregular periods (post menopausal).   Musculoskeletal:  Positive for arthralgias (back).   Skin:  Positive for abscesses (L inner thigh). Negative for rash and recent sunburn.   Neurological:  Negative for headaches.   Psychiatric/Behavioral:  Negative for depressed mood.    Hematologic/Lymphatic: Bruises/bleeds easily.       Objective:   Physical Exam   Constitutional: She appears well-developed and well-nourished. No distress.   Neurological: She is alert and oriented to person, place, and time. She is not disoriented.   Psychiatric: She has a normal mood and affect.   Skin:   Areas Examined (abnormalities noted in diagram):   Genitals / Buttocks / Groin Inspection Performed  RLE " Inspected  LLE Inspection Performed            Diagram Legend     Erythematous scaling macule/papule c/w actinic keratosis       Vascular papule c/w angioma      Pigmented verrucoid papule/plaque c/w seborrheic keratosis      Yellow umbilicated papule c/w sebaceous hyperplasia      Irregularly shaped tan macule c/w lentigo     1-2 mm smooth white papules consistent with Milia      Movable subcutaneous cyst with punctum c/w epidermal inclusion cyst      Subcutaneous movable cyst c/w pilar cyst      Firm pink to brown papule c/w dermatofibroma      Pedunculated fleshy papule(s) c/w skin tag(s)      Evenly pigmented macule c/w junctional nevus     Mildly variegated pigmented, slightly irregular-bordered macule c/w mildly atypical nevus      Flesh colored to evenly pigmented papule c/w intradermal nevus       Pink pearly papule/plaque c/w basal cell carcinoma      Erythematous hyperkeratotic cursted plaque c/w SCC      Surgical scar with no sign of skin cancer recurrence      Open and closed comedones      Inflammatory papules and pustules      Verrucoid papule consistent consistent with wart     Erythematous eczematous patches and plaques     Dystrophic onycholytic nail with subungual debris c/w onychomycosis     Umbilicated papule    Erythematous-base heme-crusted tan verrucoid plaque consistent with inflamed seborrheic keratosis     Erythematous Silvery Scaling Plaque c/w Psoriasis     See annotation  Lab Results   Component Value Date    WBC 8.47 07/14/2025    HGB 12.4 07/14/2025    HCT 37.4 07/14/2025    MCV 96 07/14/2025     07/14/2025         Lab Results   Component Value Date    HEPCAB Non-reactive 11/24/2024                       Assessment / Plan:        Hidradenitis suppurativa  -     Quantiferon Gold TB; Future; Expected date: 07/28/2025  -     Hepatitis B Surface Antigen; Future; Expected date: 07/28/2025  -     Hepatitis B Surface Ab, Qualitative; Future; Expected date: 07/28/2025  -     Hepatitis B  Core Antibody, Total; Future; Expected date: 07/28/2025  -     triamcinolone acetonide injection 40 mg  -     TN XCOGOER1AZRI ACETONIDE INJ PER 10MG  -     TN INJECTION INTO SKIN LESIONS, UP TO 7    Long-term use of high-risk medication  -     Hepatitis B Surface Antigen; Future; Expected date: 07/28/2025  -     Hepatitis B Surface Ab, Qualitative; Future; Expected date: 07/28/2025  -     Hepatitis B Core Antibody, Total; Future; Expected date: 07/28/2025      Hidradenitis suppurativa  Today's Plan:    Continue Otezla at 30mg qday    Intralesional Kenalog 20mg/cc (0.8 cc total) injected into 3 lesions on the right and left upper thigh today after obtaining verbal consent including risk of surrounding hypopigmentation. Patient tolerated procedure well.     Units: 2  NDC for Kenalog 10mg/cc:  4470-4472-39     Start Doxy at 100mg 2x.day x 1 month     Consider starting cosentyx - will need med onc clearance as pt under tx for metastatic lung cancer.  Consider deroofing of bilateral upper thighs      No follow-ups on file.

## 2025-07-28 NOTE — Clinical Note
Hey, Our mutual pt has moderate to severe hidradenitis and is a candidate for cosentyx. Given her current metastatic lung cancer, are you comfortable with Cosentyx treatment for her? JAM

## 2025-07-28 NOTE — ASSESSMENT & PLAN NOTE
Today's Plan:    Continue Otezla at 30mg qday    Intralesional Kenalog 20mg/cc (0.8 cc total) injected into 3 lesions on the right and left upper thigh today after obtaining verbal consent including risk of surrounding hypopigmentation. Patient tolerated procedure well.     Units: 2  NDC for Kenalog 10mg/cc:  4409-5439-58     Start Doxy at 100mg 2x.day x 1 month     Consider starting cosentyx - will need med onc clearance as pt under tx for metastatic lung cancer.  Consider deroofing of bilateral upper thighs

## 2025-07-29 ENCOUNTER — PATIENT MESSAGE (OUTPATIENT)
Dept: DERMATOLOGY | Facility: CLINIC | Age: 65
End: 2025-07-29
Payer: MEDICARE

## 2025-07-31 ENCOUNTER — LAB VISIT (OUTPATIENT)
Dept: LAB | Facility: HOSPITAL | Age: 65
End: 2025-07-31
Attending: DERMATOLOGY
Payer: MEDICARE

## 2025-07-31 DIAGNOSIS — L73.2 HIDRADENITIS SUPPURATIVA: ICD-10-CM

## 2025-07-31 DIAGNOSIS — Z79.899 LONG-TERM USE OF HIGH-RISK MEDICATION: ICD-10-CM

## 2025-07-31 LAB
HBV CORE AB SERPL QL IA: NORMAL
HBV SURFACE AB SER-ACNC: <3 MIU/ML
HBV SURFACE AB SERPL IA-ACNC: NORMAL M[IU]/ML
HBV SURFACE AG SERPL QL IA: NORMAL

## 2025-07-31 PROCEDURE — 36415 COLL VENOUS BLD VENIPUNCTURE: CPT

## 2025-07-31 PROCEDURE — 87340 HEPATITIS B SURFACE AG IA: CPT

## 2025-07-31 PROCEDURE — 86480 TB TEST CELL IMMUN MEASURE: CPT

## 2025-07-31 PROCEDURE — 86706 HEP B SURFACE ANTIBODY: CPT

## 2025-07-31 PROCEDURE — 86704 HEP B CORE ANTIBODY TOTAL: CPT

## 2025-08-01 LAB
MITOGEN MINUS NIL (OHS): 9.98
NIL TB SYNCED (OHS): 0.02
QUANTIFERON GOLD INTERP (OHS): NEGATIVE
TB1 AG MINUS NIL (OHS): 0.04
TB2 AG MINUS NIL (OHS): 0.04

## 2025-08-15 ENCOUNTER — TELEPHONE (OUTPATIENT)
Dept: HEMATOLOGY/ONCOLOGY | Facility: CLINIC | Age: 65
End: 2025-08-15
Payer: MEDICARE

## 2025-08-15 ENCOUNTER — HOSPITAL ENCOUNTER (OUTPATIENT)
Dept: RADIOLOGY | Facility: HOSPITAL | Age: 65
Discharge: HOME | End: 2025-08-15
Attending: INTERNAL MEDICINE
Payer: MEDICARE

## 2025-08-15 DIAGNOSIS — C34.90 NON-SMALL CELL LUNG CANCER, UNSPECIFIED LATERALITY: ICD-10-CM

## 2025-08-15 PROCEDURE — 74177 CT ABD & PELVIS W/CONTRAST: CPT | Mod: 26,,, | Performed by: RADIOLOGY

## 2025-08-15 PROCEDURE — 25500020 PHARM REV CODE 255: Performed by: INTERNAL MEDICINE

## 2025-08-15 PROCEDURE — 71260 CT THORAX DX C+: CPT | Mod: 26,,, | Performed by: RADIOLOGY

## 2025-08-15 PROCEDURE — 71260 CT THORAX DX C+: CPT | Mod: TC

## 2025-08-15 RX ADMIN — IOHEXOL 75 ML: 350 INJECTION, SOLUTION INTRAVENOUS at 10:08

## 2025-08-18 ENCOUNTER — OFFICE VISIT (OUTPATIENT)
Dept: PAIN MEDICINE | Facility: CLINIC | Age: 65
End: 2025-08-18
Payer: MEDICARE

## 2025-08-18 ENCOUNTER — OFFICE VISIT (OUTPATIENT)
Dept: HEMATOLOGY/ONCOLOGY | Facility: CLINIC | Age: 65
End: 2025-08-18
Payer: MEDICARE

## 2025-08-18 VITALS
HEIGHT: 59 IN | SYSTOLIC BLOOD PRESSURE: 101 MMHG | DIASTOLIC BLOOD PRESSURE: 72 MMHG | RESPIRATION RATE: 16 BRPM | BODY MASS INDEX: 31.11 KG/M2 | HEART RATE: 82 BPM | OXYGEN SATURATION: 99 % | WEIGHT: 154.31 LBS

## 2025-08-18 VITALS
BODY MASS INDEX: 31.11 KG/M2 | WEIGHT: 154.31 LBS | HEART RATE: 82 BPM | SYSTOLIC BLOOD PRESSURE: 101 MMHG | DIASTOLIC BLOOD PRESSURE: 72 MMHG | HEIGHT: 59 IN

## 2025-08-18 DIAGNOSIS — C79.51 SECONDARY MALIGNANT NEOPLASM OF BONE: ICD-10-CM

## 2025-08-18 DIAGNOSIS — Z79.899 DRUG THERAPY: ICD-10-CM

## 2025-08-18 DIAGNOSIS — C34.90 NON-SMALL CELL LUNG CANCER, UNSPECIFIED LATERALITY: Primary | ICD-10-CM

## 2025-08-18 DIAGNOSIS — G89.4 CHRONIC PAIN SYNDROME: ICD-10-CM

## 2025-08-18 DIAGNOSIS — M54.16 LUMBAR RADICULOPATHY: ICD-10-CM

## 2025-08-18 DIAGNOSIS — M47.9 OSTEOARTHRITIS OF SPINE, UNSPECIFIED SPINAL OSTEOARTHRITIS COMPLICATION STATUS, UNSPECIFIED SPINAL REGION: Primary | ICD-10-CM

## 2025-08-18 DIAGNOSIS — M47.816 LUMBAR SPONDYLOSIS: ICD-10-CM

## 2025-08-18 PROCEDURE — 1101F PT FALLS ASSESS-DOCD LE1/YR: CPT | Mod: CPTII,S$GLB,, | Performed by: NURSE PRACTITIONER

## 2025-08-18 PROCEDURE — 3074F SYST BP LT 130 MM HG: CPT | Mod: CPTII,S$GLB,, | Performed by: INTERNAL MEDICINE

## 2025-08-18 PROCEDURE — 1126F AMNT PAIN NOTED NONE PRSNT: CPT | Mod: CPTII,S$GLB,, | Performed by: INTERNAL MEDICINE

## 2025-08-18 PROCEDURE — 3078F DIAST BP <80 MM HG: CPT | Mod: CPTII,S$GLB,, | Performed by: INTERNAL MEDICINE

## 2025-08-18 PROCEDURE — 3288F FALL RISK ASSESSMENT DOCD: CPT | Mod: CPTII,S$GLB,, | Performed by: NURSE PRACTITIONER

## 2025-08-18 PROCEDURE — 3078F DIAST BP <80 MM HG: CPT | Mod: CPTII,S$GLB,, | Performed by: NURSE PRACTITIONER

## 2025-08-18 PROCEDURE — 1159F MED LIST DOCD IN RCRD: CPT | Mod: CPTII,S$GLB,, | Performed by: NURSE PRACTITIONER

## 2025-08-18 PROCEDURE — 3074F SYST BP LT 130 MM HG: CPT | Mod: CPTII,S$GLB,, | Performed by: NURSE PRACTITIONER

## 2025-08-18 PROCEDURE — 3008F BODY MASS INDEX DOCD: CPT | Mod: CPTII,S$GLB,, | Performed by: NURSE PRACTITIONER

## 2025-08-18 PROCEDURE — 1160F RVW MEDS BY RX/DR IN RCRD: CPT | Mod: CPTII,S$GLB,, | Performed by: NURSE PRACTITIONER

## 2025-08-18 PROCEDURE — 1159F MED LIST DOCD IN RCRD: CPT | Mod: CPTII,S$GLB,, | Performed by: INTERNAL MEDICINE

## 2025-08-18 PROCEDURE — 3288F FALL RISK ASSESSMENT DOCD: CPT | Mod: CPTII,S$GLB,, | Performed by: INTERNAL MEDICINE

## 2025-08-18 PROCEDURE — 99215 OFFICE O/P EST HI 40 MIN: CPT | Mod: S$GLB,,, | Performed by: INTERNAL MEDICINE

## 2025-08-18 PROCEDURE — 1101F PT FALLS ASSESS-DOCD LE1/YR: CPT | Mod: CPTII,S$GLB,, | Performed by: INTERNAL MEDICINE

## 2025-08-18 PROCEDURE — 99214 OFFICE O/P EST MOD 30 MIN: CPT | Mod: S$GLB,,, | Performed by: NURSE PRACTITIONER

## 2025-08-18 PROCEDURE — 1126F AMNT PAIN NOTED NONE PRSNT: CPT | Mod: CPTII,S$GLB,, | Performed by: NURSE PRACTITIONER

## 2025-08-18 PROCEDURE — 3008F BODY MASS INDEX DOCD: CPT | Mod: CPTII,S$GLB,, | Performed by: INTERNAL MEDICINE

## 2025-08-18 PROCEDURE — 99999 PR PBB SHADOW E&M-EST. PATIENT-LVL IV: CPT | Mod: PBBFAC,,, | Performed by: INTERNAL MEDICINE

## 2025-08-18 PROCEDURE — 99999 PR PBB SHADOW E&M-EST. PATIENT-LVL III: CPT | Mod: PBBFAC,,, | Performed by: NURSE PRACTITIONER

## 2025-08-18 PROCEDURE — G2211 COMPLEX E/M VISIT ADD ON: HCPCS | Mod: S$GLB,,, | Performed by: NURSE PRACTITIONER

## 2025-08-19 ENCOUNTER — PROCEDURE VISIT (OUTPATIENT)
Dept: DERMATOLOGY | Facility: CLINIC | Age: 65
End: 2025-08-19
Payer: MEDICARE

## 2025-08-19 DIAGNOSIS — L73.2 HIDRADENITIS SUPPURATIVA: Primary | ICD-10-CM

## 2025-08-19 PROCEDURE — 99499 UNLISTED E&M SERVICE: CPT | Mod: S$GLB,,, | Performed by: DERMATOLOGY

## 2025-08-19 PROCEDURE — 11462 EXC SKN HDRDNT ING SMPL/NTRM: CPT | Mod: LT,S$GLB,, | Performed by: DERMATOLOGY

## 2025-08-21 DIAGNOSIS — C79.51 SECONDARY MALIGNANT NEOPLASM OF BONE: ICD-10-CM

## 2025-08-21 DIAGNOSIS — C34.90 MALIGNANT NEOPLASM OF UNSPECIFIED PART OF UNSPECIFIED BRONCHUS OR LUNG: ICD-10-CM

## 2025-08-21 DIAGNOSIS — E27.40 ADRENAL INSUFFICIENCY: ICD-10-CM

## 2025-08-21 RX ORDER — SOTORASIB 240 MG/1
480 TABLET, COATED ORAL DAILY
Qty: 60 TABLET | Refills: 3 | Status: ACTIVE | OUTPATIENT
Start: 2025-08-21 | End: 2025-09-20

## 2025-08-22 DIAGNOSIS — L40.9 PSORIASIS: ICD-10-CM

## 2025-08-25 RX ORDER — APREMILAST 30 MG/1
TABLET, FILM COATED ORAL
Qty: 30 TABLET | Refills: 3 | Status: ACTIVE | OUTPATIENT
Start: 2025-08-25

## 2025-09-02 ENCOUNTER — TELEPHONE (OUTPATIENT)
Dept: HEMATOLOGY/ONCOLOGY | Facility: CLINIC | Age: 65
End: 2025-09-02
Payer: MEDICARE

## 2025-09-02 ENCOUNTER — LAB VISIT (OUTPATIENT)
Dept: LAB | Facility: HOSPITAL | Age: 65
End: 2025-09-02
Attending: HOSPITALIST
Payer: MEDICARE

## 2025-09-02 DIAGNOSIS — E27.40 ADRENAL INSUFFICIENCY: ICD-10-CM

## 2025-09-02 LAB
ALBUMIN SERPL BCP-MCNC: 3.7 G/DL (ref 3.5–5.2)
ALP SERPL-CCNC: 60 UNIT/L (ref 40–150)
ALT SERPL W/O P-5'-P-CCNC: <8 UNIT/L (ref 10–44)
ANION GAP (OHS): 9 MMOL/L (ref 8–16)
AST SERPL-CCNC: 20 UNIT/L (ref 11–45)
BILIRUB SERPL-MCNC: 0.4 MG/DL (ref 0.1–1)
BUN SERPL-MCNC: 12 MG/DL (ref 8–23)
CALCIUM SERPL-MCNC: 9.6 MG/DL (ref 8.7–10.5)
CHLORIDE SERPL-SCNC: 110 MMOL/L (ref 95–110)
CO2 SERPL-SCNC: 22 MMOL/L (ref 23–29)
CORTIS SERPL-MCNC: 18.8 UG/DL (ref 4.3–22.4)
CREAT SERPL-MCNC: 1.3 MG/DL (ref 0.5–1.4)
DHEA-S SERPL-MCNC: 17.1 UG/DL (ref 15.3–184.9)
GFR SERPLBLD CREATININE-BSD FMLA CKD-EPI: 45 ML/MIN/1.73/M2
GLUCOSE SERPL-MCNC: 82 MG/DL (ref 70–110)
POTASSIUM SERPL-SCNC: 3.7 MMOL/L (ref 3.5–5.1)
PROLACTIN SERPL IA-MCNC: 18.2 NG/ML (ref 5.2–26.5)
PROT SERPL-MCNC: 6.6 GM/DL (ref 6–8.4)
SODIUM SERPL-SCNC: 141 MMOL/L (ref 136–145)
T4 FREE SERPL-MCNC: 0.94 NG/DL (ref 0.71–1.51)
T4 FREE SERPL-MCNC: 0.94 NG/DL (ref 0.71–1.51)
TSH SERPL-ACNC: 6.68 UIU/ML (ref 0.4–4)

## 2025-09-02 PROCEDURE — 84439 ASSAY OF FREE THYROXINE: CPT

## 2025-09-02 PROCEDURE — 82024 ASSAY OF ACTH: CPT

## 2025-09-02 PROCEDURE — 82627 DEHYDROEPIANDROSTERONE: CPT

## 2025-09-02 PROCEDURE — 84146 ASSAY OF PROLACTIN: CPT

## 2025-09-02 PROCEDURE — 36415 COLL VENOUS BLD VENIPUNCTURE: CPT

## 2025-09-02 PROCEDURE — 82040 ASSAY OF SERUM ALBUMIN: CPT

## 2025-09-02 PROCEDURE — 84305 ASSAY OF SOMATOMEDIN: CPT

## 2025-09-02 PROCEDURE — 83835 ASSAY OF METANEPHRINES: CPT

## 2025-09-02 PROCEDURE — 82088 ASSAY OF ALDOSTERONE: CPT

## 2025-09-02 PROCEDURE — 82533 TOTAL CORTISOL: CPT

## 2025-09-02 PROCEDURE — 84244 ASSAY OF RENIN: CPT

## 2025-09-02 PROCEDURE — 83516 IMMUNOASSAY NONANTIBODY: CPT

## 2025-09-05 LAB
ACTH (OHS): <5 PG/ML
W ALDOSTERONE: 18.6 NG/DL

## (undated) DEVICE — ELECTRODE REM PLYHSV RETURN 9

## (undated) DEVICE — DRAPE INCISE IOBAN 2 23X17IN

## (undated) DEVICE — GOWN POLY REINF BRTH SLV XL

## (undated) DEVICE — NDL CYTOLOGY TIP TRACK 21GA

## (undated) DEVICE — NDL SPINAL 18GX3.5 SPINOCAN

## (undated) DEVICE — BAG INZII TISS RETRV 12/15MM

## (undated) DEVICE — RELOAD SUREFORM 45 3.5 BLU 6R

## (undated) DEVICE — DRESSING TRANS 4X4 TEGADERM

## (undated) DEVICE — LUBRICANT SURGILUBE 2 OZ

## (undated) DEVICE — SUT MCRYL PLUS 4-0 PS2 27IN

## (undated) DEVICE — GOWN SMART IMP BREATHABLE XXLG

## (undated) DEVICE — UNDERGLOVES BIOGEL PI SIZE 7.5

## (undated) DEVICE — TUBING SUC UNIV W/CONN 12FT

## (undated) DEVICE — SUT 2-0 VICRYL / CT-1

## (undated) DEVICE — ELECTRODE BLADE INSULATED 1 IN

## (undated) DEVICE — NDL SPINAL 22GA 3.5 IN QUINCKE

## (undated) DEVICE — PORT AIRSEAL 12/120MM LPI

## (undated) DEVICE — ALCOHOL BLEND 95%

## (undated) DEVICE — SYS LABEL CORRECT MED

## (undated) DEVICE — TRAY MINOR GEN SURG OMC

## (undated) DEVICE — CANNULA REDUCER 12-8MM

## (undated) DEVICE — RELOAD ENDO GIA TRISTAPLE 45MM

## (undated) DEVICE — SUT SILK 0 STRANDS 30IN BLK

## (undated) DEVICE — OBTURATOR BLADELESS 8MM XI CLR

## (undated) DEVICE — DRAPE LAP T SHT W/ INSTR PAD

## (undated) DEVICE — DEVICE KYPHON BONE BIOPSY 13G

## (undated) DEVICE — BOWL STERILE LARGE 32OZ

## (undated) DEVICE — SYR ONLY LUER LOCK 20CC

## (undated) DEVICE — SUT 0 30IN ETHIBOND EXCEL G

## (undated) DEVICE — APPLICATOR VISTASEAL FLEX 45CM

## (undated) DEVICE — DRAPE COLUMN DAVINCI XI

## (undated) DEVICE — NDL FLEXISION BIOPSY 21G

## (undated) DEVICE — SUT 0 VICRYL / CT-1

## (undated) DEVICE — TAPE CURAD SILK ADH 3INX10YD

## (undated) DEVICE — RELOAD SUREFORM 45 4.3 GRN 6R

## (undated) DEVICE — STAPLER SUREFORM CRVD TIP 45

## (undated) DEVICE — GLOVE BIOGEL SKINSENSE PI 7.0

## (undated) DEVICE — NDL SPINAL DISPOSABLE 23X3.5

## (undated) DEVICE — SPONGE IV DRAIN 4X4 STERILE

## (undated) DEVICE — KIT KYPHOPAK KYPHOPLASTY FX

## (undated) DEVICE — TRACKER PATIENT VPAD

## (undated) DEVICE — DRESSING SURGICAL 1X3

## (undated) DEVICE — DRAPE SCOPE PILLOW WARMER

## (undated) DEVICE — KIT BONE ACCESS 10G 090

## (undated) DEVICE — SET TRI-LUMEN FILTERED TUBE

## (undated) DEVICE — TRAY MINOR GEN SURG

## (undated) DEVICE — SUT MONOCRYL UD 4-0 27 PS-1

## (undated) DEVICE — SPONGE GAUZE 16PLY 4X4

## (undated) DEVICE — SYR SLIP TIP 20CC

## (undated) DEVICE — Device

## (undated) DEVICE — DRAPE C-ARMOR EQUIPMENT COVER

## (undated) DEVICE — KIT ANTIFOG W/SPONG & FLUID

## (undated) DEVICE — CATH THORACIC 28FR ST

## (undated) DEVICE — FILLER KYPHON CEM GUN BONE SZ2

## (undated) DEVICE — LOOP VESSEL BLUE MAXI

## (undated) DEVICE — CANNULA SEAL 12MM

## (undated) DEVICE — DRESSING TELFA STRL 4X3 LF

## (undated) DEVICE — ADAPTER SWIVEL

## (undated) DEVICE — DRESSING TRANS 2X2 TEGADERM

## (undated) DEVICE — GLOVE BIOGEL ORTHOPEDIC 7.5

## (undated) DEVICE — DRAPE THREE-QTR REINF 53X77IN

## (undated) DEVICE — DRESSING TRANS 6X8 TEGADERM

## (undated) DEVICE — CARTRIDGE KYPHON CEMENT DEL

## (undated) DEVICE — PACK ECLIPSE SET-UP W/O DRAPE

## (undated) DEVICE — CYTOSPIN COLLECTION FLUID BLT

## (undated) DEVICE — HEMOSTAT SURGICEL NUKNIT 3X4IN

## (undated) DEVICE — STAPLER GIA HANDLE STD

## (undated) DEVICE — HEMOSTAT SURGICEL NU-KNIT 6X9

## (undated) DEVICE — DRAPE ABDOMINAL TIBURON 14X11

## (undated) DEVICE — DRAPE C-ARM ELAS CLIP 42X120IN

## (undated) DEVICE — SYR 10CC LUER LOCK

## (undated) DEVICE — DRAPE ARM DAVINCI XI

## (undated) DEVICE — SPONGE COTTON TRAY 4X4IN

## (undated) DEVICE — SEAL UNIVERSAL 5MM-8MM XI

## (undated) DEVICE — KIT KYPHOPAK 1ST FX CDS 15/2

## (undated) DEVICE — RELOAD SUREFORM 45 2.5 WHT 6R

## (undated) DEVICE — CONTAINER SPECIMEN STRL 4OZ

## (undated) DEVICE — SUT SILK 2-0 PS 18IN BLACK

## (undated) DEVICE — SOL WATER STRL IRR 1000ML

## (undated) DEVICE — CATH BRONCHOSCOPE F/BF

## (undated) DEVICE — CHLORAPREP W TINT 26ML APPL

## (undated) DEVICE — COVER LIGHT HANDLE

## (undated) DEVICE — PENCIL GOLF STERILE

## (undated) DEVICE — SUT VICRYL PLUS 2-0 CT1 18

## (undated) DEVICE — DRAIN CHEST DRY SUCTION

## (undated) DEVICE — NDL ASPIRATING VIZISHOT 20-40M

## (undated) DEVICE — NDL SPINFLEX TIP TRACKED 22GA

## (undated) DEVICE — NDL VIZISHOT 2 FLEX 22G

## (undated) DEVICE — SEALANT VISTASEAL FIBRIN 10ML